# Patient Record
Sex: FEMALE | Race: ASIAN | NOT HISPANIC OR LATINO
[De-identification: names, ages, dates, MRNs, and addresses within clinical notes are randomized per-mention and may not be internally consistent; named-entity substitution may affect disease eponyms.]

---

## 2018-05-01 ENCOUNTER — APPOINTMENT (OUTPATIENT)
Dept: UROLOGY | Facility: CLINIC | Age: 63
End: 2018-05-01
Payer: COMMERCIAL

## 2018-05-01 VITALS
BODY MASS INDEX: 27.79 KG/M2 | HEART RATE: 76 BPM | TEMPERATURE: 98.5 F | WEIGHT: 151 LBS | DIASTOLIC BLOOD PRESSURE: 78 MMHG | HEIGHT: 62 IN | SYSTOLIC BLOOD PRESSURE: 130 MMHG | RESPIRATION RATE: 16 BRPM

## 2018-05-01 DIAGNOSIS — Z86.39 PERSONAL HISTORY OF OTHER ENDOCRINE, NUTRITIONAL AND METABOLIC DISEASE: ICD-10-CM

## 2018-05-01 PROCEDURE — 99204 OFFICE O/P NEW MOD 45 MIN: CPT

## 2018-05-04 LAB
BACTERIA UR CULT: NORMAL
CORE LAB FLUID CYTOLOGY: NORMAL

## 2018-05-06 ENCOUNTER — FORM ENCOUNTER (OUTPATIENT)
Age: 63
End: 2018-05-06

## 2018-05-07 ENCOUNTER — APPOINTMENT (OUTPATIENT)
Dept: UROLOGY | Facility: CLINIC | Age: 63
End: 2018-05-07
Payer: COMMERCIAL

## 2018-05-07 ENCOUNTER — OUTPATIENT (OUTPATIENT)
Dept: OUTPATIENT SERVICES | Facility: HOSPITAL | Age: 63
LOS: 1 days | End: 2018-05-07
Payer: COMMERCIAL

## 2018-05-07 ENCOUNTER — APPOINTMENT (OUTPATIENT)
Dept: CT IMAGING | Facility: IMAGING CENTER | Age: 63
End: 2018-05-07
Payer: COMMERCIAL

## 2018-05-07 VITALS
DIASTOLIC BLOOD PRESSURE: 73 MMHG | SYSTOLIC BLOOD PRESSURE: 113 MMHG | TEMPERATURE: 97.4 F | RESPIRATION RATE: 16 BRPM | HEART RATE: 73 BPM

## 2018-05-07 DIAGNOSIS — Z00.8 ENCOUNTER FOR OTHER GENERAL EXAMINATION: ICD-10-CM

## 2018-05-07 DIAGNOSIS — R35.0 FREQUENCY OF MICTURITION: ICD-10-CM

## 2018-05-07 PROCEDURE — 52000 CYSTOURETHROSCOPY: CPT

## 2018-05-07 PROCEDURE — 74178 CT ABD&PLV WO CNTR FLWD CNTR: CPT | Mod: 26

## 2018-05-07 PROCEDURE — 74178 CT ABD&PLV WO CNTR FLWD CNTR: CPT

## 2018-05-08 DIAGNOSIS — R31.29 OTHER MICROSCOPIC HEMATURIA: ICD-10-CM

## 2018-06-29 ENCOUNTER — APPOINTMENT (OUTPATIENT)
Dept: NEUROLOGY | Facility: CLINIC | Age: 63
End: 2018-06-29
Payer: COMMERCIAL

## 2018-06-29 VITALS
SYSTOLIC BLOOD PRESSURE: 130 MMHG | DIASTOLIC BLOOD PRESSURE: 70 MMHG | HEART RATE: 64 BPM | HEIGHT: 62 IN | WEIGHT: 155 LBS | OXYGEN SATURATION: 98 % | RESPIRATION RATE: 16 BRPM | BODY MASS INDEX: 28.52 KG/M2 | TEMPERATURE: 98.3 F

## 2018-06-29 DIAGNOSIS — G44.039 EPISODIC PAROXYSMAL HEMICRANIA, NOT INTRACTABLE: ICD-10-CM

## 2018-06-29 PROCEDURE — 99244 OFF/OP CNSLTJ NEW/EST MOD 40: CPT

## 2018-06-29 RX ORDER — INDOMETHACIN 25 MG/1
25 CAPSULE ORAL 3 TIMES DAILY
Qty: 90 | Refills: 0 | Status: ACTIVE | COMMUNITY
Start: 2018-06-29 | End: 1900-01-01

## 2019-01-01 ENCOUNTER — APPOINTMENT (OUTPATIENT)
Dept: UROLOGY | Facility: CLINIC | Age: 64
End: 2019-01-01
Payer: COMMERCIAL

## 2019-01-01 DIAGNOSIS — N95.2 POSTMENOPAUSAL ATROPHIC VAGINITIS: ICD-10-CM

## 2019-01-01 DIAGNOSIS — N94.10 UNSPECIFIED DYSPAREUNIA: ICD-10-CM

## 2019-01-01 DIAGNOSIS — R31.29 OTHER MICROSCOPIC HEMATURIA: ICD-10-CM

## 2019-01-01 LAB — URINE CYTOLOGY: NORMAL

## 2019-01-01 PROCEDURE — 99213 OFFICE O/P EST LOW 20 MIN: CPT

## 2019-01-01 RX ORDER — ESTRADIOL 0.1 MG/G
0.1 CREAM VAGINAL
Qty: 1 | Refills: 6 | Status: ACTIVE | COMMUNITY
Start: 2019-01-01 | End: 1900-01-01

## 2019-06-03 PROBLEM — N94.10 DYSPAREUNIA IN FEMALE: Status: ACTIVE | Noted: 2019-01-01

## 2019-06-03 PROBLEM — R31.29 MICROHEMATURIA: Status: ACTIVE | Noted: 2018-05-01

## 2019-06-03 PROBLEM — N95.2 POST-MENOPAUSAL ATROPHIC VAGINITIS: Status: ACTIVE | Noted: 2019-01-01

## 2019-06-03 NOTE — HISTORY OF PRESENT ILLNESS
[FreeTextEntry1] : 65 yo woman with hx of  microhematuria in past - work up negative and had recent UA while in [place] routinely that showed 2-3  RBC and neg cx and Abdominal US that was WNL except fatty liver that she will follow up with her PCP .Voids Q 1 1 /2 TO 2  HOURS day time and nocturia X 3 , drinks close to bedtime and consumes 8 glasses of water, 1 cup tea and 2 cups of  coffee. Reports dyspareunia , naive to therapy, denies personal hx of Uterine/ovarian/breast  cancer.  denies any other obstructive  voiding symptoms. 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

## 2019-06-03 NOTE — ASSESSMENT
[FreeTextEntry1] : \par \par Impression/Plan:63 yo woman with hx of  microhematuria in past - work up negative and had recent UA while in [place] routinely that showed 2-3  RBC and neg cx and Abdominal US that was WNL except fatty liver that she will follow up with her PCP .Voids Q 1 1 /2 TO 2  HOURS day time and nocturia X 3 , drinks close to bedtime and consumes 8 glasses of water, 1 cup tea and 2 cups of  coffee. Reports dyspareunia , naive to therapy, denies personal hx of Uterine/ovarian/breast  cancer.  denies any other obstructive  voiding symptoms. 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

## 2019-06-03 NOTE — PHYSICAL EXAM
[General Appearance - Well Developed] : well developed [General Appearance - Well Nourished] : well nourished [Normal Appearance] : normal appearance [Well Groomed] : well groomed [Abdomen Soft] : soft [General Appearance - In No Acute Distress] : no acute distress [Abdomen Tenderness] : non-tender [Urinary Bladder Findings] : the bladder was normal on palpation [Costovertebral Angle Tenderness] : no ~M costovertebral angle tenderness [] : no respiratory distress [Edema] : no peripheral edema [Oriented To Time, Place, And Person] : oriented to person, place, and time [Not Anxious] : not anxious [Mood] : the mood was normal [Affect] : the affect was normal [Normal Station and Gait] : the gait and station were normal for the patient's age

## 2020-01-01 ENCOUNTER — INPATIENT (INPATIENT)
Facility: HOSPITAL | Age: 65
LOS: 35 days | DRG: 870 | End: 2020-06-03
Attending: STUDENT IN AN ORGANIZED HEALTH CARE EDUCATION/TRAINING PROGRAM | Admitting: HOSPITALIST
Payer: COMMERCIAL

## 2020-01-01 VITALS — RESPIRATION RATE: 32 BRPM

## 2020-01-01 VITALS
HEIGHT: 62 IN | TEMPERATURE: 99 F | RESPIRATION RATE: 18 BRPM | DIASTOLIC BLOOD PRESSURE: 76 MMHG | WEIGHT: 154.98 LBS | SYSTOLIC BLOOD PRESSURE: 132 MMHG | OXYGEN SATURATION: 95 % | HEART RATE: 86 BPM

## 2020-01-01 DIAGNOSIS — U07.1 COVID-19: ICD-10-CM

## 2020-01-01 DIAGNOSIS — Z51.5 ENCOUNTER FOR PALLIATIVE CARE: ICD-10-CM

## 2020-01-01 DIAGNOSIS — Z90.49 ACQUIRED ABSENCE OF OTHER SPECIFIED PARTS OF DIGESTIVE TRACT: Chronic | ICD-10-CM

## 2020-01-01 DIAGNOSIS — Z98.890 OTHER SPECIFIED POSTPROCEDURAL STATES: Chronic | ICD-10-CM

## 2020-01-01 DIAGNOSIS — R53.2 FUNCTIONAL QUADRIPLEGIA: ICD-10-CM

## 2020-01-01 DIAGNOSIS — J96.01 ACUTE RESPIRATORY FAILURE WITH HYPOXIA: ICD-10-CM

## 2020-01-01 DIAGNOSIS — Z29.9 ENCOUNTER FOR PROPHYLACTIC MEASURES, UNSPECIFIED: ICD-10-CM

## 2020-01-01 DIAGNOSIS — R09.02 HYPOXEMIA: ICD-10-CM

## 2020-01-01 DIAGNOSIS — Z71.89 OTHER SPECIFIED COUNSELING: ICD-10-CM

## 2020-01-01 DIAGNOSIS — K13.79 OTHER LESIONS OF ORAL MUCOSA: ICD-10-CM

## 2020-01-01 DIAGNOSIS — J18.9 PNEUMONIA, UNSPECIFIED ORGANISM: ICD-10-CM

## 2020-01-01 LAB
ALBUMIN SERPL ELPH-MCNC: 1.9 G/DL — LOW (ref 3.3–5)
ALBUMIN SERPL ELPH-MCNC: 1.9 G/DL — LOW (ref 3.3–5)
ALBUMIN SERPL ELPH-MCNC: 2 G/DL — LOW (ref 3.3–5)
ALBUMIN SERPL ELPH-MCNC: 2 G/DL — LOW (ref 3.3–5)
ALBUMIN SERPL ELPH-MCNC: 2.1 G/DL — LOW (ref 3.3–5)
ALBUMIN SERPL ELPH-MCNC: 2.2 G/DL — LOW (ref 3.3–5)
ALBUMIN SERPL ELPH-MCNC: 2.3 G/DL — LOW (ref 3.3–5)
ALBUMIN SERPL ELPH-MCNC: 2.4 G/DL — LOW (ref 3.3–5)
ALBUMIN SERPL ELPH-MCNC: 2.4 G/DL — LOW (ref 3.3–5)
ALBUMIN SERPL ELPH-MCNC: 2.5 G/DL — LOW (ref 3.3–5)
ALBUMIN SERPL ELPH-MCNC: 2.6 G/DL — LOW (ref 3.3–5)
ALBUMIN SERPL ELPH-MCNC: 2.7 G/DL — LOW (ref 3.3–5)
ALBUMIN SERPL ELPH-MCNC: 2.8 G/DL — LOW (ref 3.3–5)
ALBUMIN SERPL ELPH-MCNC: 2.8 G/DL — LOW (ref 3.3–5)
ALBUMIN SERPL ELPH-MCNC: 2.9 G/DL — LOW (ref 3.3–5)
ALBUMIN SERPL ELPH-MCNC: 3 G/DL — LOW (ref 3.3–5)
ALBUMIN SERPL ELPH-MCNC: 3 G/DL — LOW (ref 3.3–5)
ALBUMIN SERPL ELPH-MCNC: 3.1 G/DL — LOW (ref 3.3–5)
ALBUMIN SERPL ELPH-MCNC: 3.2 G/DL — LOW (ref 3.3–5)
ALBUMIN SERPL ELPH-MCNC: 3.3 G/DL — SIGNIFICANT CHANGE UP (ref 3.3–5)
ALP SERPL-CCNC: 103 U/L — SIGNIFICANT CHANGE UP (ref 40–120)
ALP SERPL-CCNC: 103 U/L — SIGNIFICANT CHANGE UP (ref 40–120)
ALP SERPL-CCNC: 110 U/L — SIGNIFICANT CHANGE UP (ref 40–120)
ALP SERPL-CCNC: 110 U/L — SIGNIFICANT CHANGE UP (ref 40–120)
ALP SERPL-CCNC: 112 U/L — SIGNIFICANT CHANGE UP (ref 40–120)
ALP SERPL-CCNC: 118 U/L — SIGNIFICANT CHANGE UP (ref 40–120)
ALP SERPL-CCNC: 137 U/L — HIGH (ref 40–120)
ALP SERPL-CCNC: 142 U/L — HIGH (ref 40–120)
ALP SERPL-CCNC: 142 U/L — HIGH (ref 40–120)
ALP SERPL-CCNC: 145 U/L — HIGH (ref 40–120)
ALP SERPL-CCNC: 148 U/L — HIGH (ref 40–120)
ALP SERPL-CCNC: 149 U/L — HIGH (ref 40–120)
ALP SERPL-CCNC: 169 U/L — HIGH (ref 40–120)
ALP SERPL-CCNC: 176 U/L — HIGH (ref 40–120)
ALP SERPL-CCNC: 179 U/L — HIGH (ref 40–120)
ALP SERPL-CCNC: 208 U/L — HIGH (ref 40–120)
ALP SERPL-CCNC: 218 U/L — HIGH (ref 40–120)
ALP SERPL-CCNC: 221 U/L — HIGH (ref 40–120)
ALP SERPL-CCNC: 231 U/L — HIGH (ref 40–120)
ALP SERPL-CCNC: 248 U/L — HIGH (ref 40–120)
ALP SERPL-CCNC: 250 U/L — HIGH (ref 40–120)
ALP SERPL-CCNC: 263 U/L — HIGH (ref 40–120)
ALP SERPL-CCNC: 265 U/L — HIGH (ref 40–120)
ALP SERPL-CCNC: 270 U/L — HIGH (ref 40–120)
ALP SERPL-CCNC: 277 U/L — HIGH (ref 40–120)
ALP SERPL-CCNC: 282 U/L — HIGH (ref 40–120)
ALP SERPL-CCNC: 294 U/L — HIGH (ref 40–120)
ALP SERPL-CCNC: 295 U/L — HIGH (ref 40–120)
ALP SERPL-CCNC: 304 U/L — HIGH (ref 40–120)
ALP SERPL-CCNC: 311 U/L — HIGH (ref 40–120)
ALP SERPL-CCNC: 315 U/L — HIGH (ref 40–120)
ALP SERPL-CCNC: 321 U/L — HIGH (ref 40–120)
ALP SERPL-CCNC: 322 U/L — HIGH (ref 40–120)
ALP SERPL-CCNC: 331 U/L — HIGH (ref 40–120)
ALP SERPL-CCNC: 343 U/L — HIGH (ref 40–120)
ALP SERPL-CCNC: 358 U/L — HIGH (ref 40–120)
ALP SERPL-CCNC: 386 U/L — HIGH (ref 40–120)
ALP SERPL-CCNC: 395 U/L — HIGH (ref 40–120)
ALT FLD-CCNC: 105 U/L — HIGH (ref 10–45)
ALT FLD-CCNC: 105 U/L — HIGH (ref 10–45)
ALT FLD-CCNC: 106 U/L — HIGH (ref 10–45)
ALT FLD-CCNC: 122 U/L — HIGH (ref 10–45)
ALT FLD-CCNC: 128 U/L — HIGH (ref 10–45)
ALT FLD-CCNC: 128 U/L — HIGH (ref 10–45)
ALT FLD-CCNC: 129 U/L — HIGH (ref 10–45)
ALT FLD-CCNC: 132 U/L — HIGH (ref 10–45)
ALT FLD-CCNC: 136 U/L — HIGH (ref 10–45)
ALT FLD-CCNC: 147 U/L — HIGH (ref 10–45)
ALT FLD-CCNC: 160 U/L — HIGH (ref 10–45)
ALT FLD-CCNC: 165 U/L — HIGH (ref 10–45)
ALT FLD-CCNC: 193 U/L — HIGH (ref 10–45)
ALT FLD-CCNC: 43 U/L — SIGNIFICANT CHANGE UP (ref 10–45)
ALT FLD-CCNC: 44 U/L — SIGNIFICANT CHANGE UP (ref 10–45)
ALT FLD-CCNC: 48 U/L — HIGH (ref 10–45)
ALT FLD-CCNC: 49 U/L — HIGH (ref 10–45)
ALT FLD-CCNC: 50 U/L — HIGH (ref 10–45)
ALT FLD-CCNC: 51 U/L — HIGH (ref 10–45)
ALT FLD-CCNC: 52 U/L — HIGH (ref 10–45)
ALT FLD-CCNC: 54 U/L — HIGH (ref 10–45)
ALT FLD-CCNC: 56 U/L — HIGH (ref 10–45)
ALT FLD-CCNC: 57 U/L — HIGH (ref 10–45)
ALT FLD-CCNC: 59 U/L — HIGH (ref 10–45)
ALT FLD-CCNC: 60 U/L — HIGH (ref 10–45)
ALT FLD-CCNC: 61 U/L — HIGH (ref 10–45)
ALT FLD-CCNC: 65 U/L — HIGH (ref 10–45)
ALT FLD-CCNC: 72 U/L — HIGH (ref 10–45)
ALT FLD-CCNC: 72 U/L — HIGH (ref 10–45)
ALT FLD-CCNC: 74 U/L — HIGH (ref 10–45)
ALT FLD-CCNC: 74 U/L — HIGH (ref 10–45)
ALT FLD-CCNC: 77 U/L — HIGH (ref 10–45)
ALT FLD-CCNC: 77 U/L — HIGH (ref 10–45)
ALT FLD-CCNC: 80 U/L — HIGH (ref 10–45)
ALT FLD-CCNC: 82 U/L — HIGH (ref 10–45)
ALT FLD-CCNC: 87 U/L — HIGH (ref 10–45)
ALT FLD-CCNC: 90 U/L — HIGH (ref 10–45)
ALT FLD-CCNC: 92 U/L — HIGH (ref 10–45)
AMYLASE P1 CFR SERPL: 43 U/L — SIGNIFICANT CHANGE UP (ref 25–125)
ANION GAP SERPL CALC-SCNC: 10 MMOL/L — SIGNIFICANT CHANGE UP (ref 5–17)
ANION GAP SERPL CALC-SCNC: 10 MMOL/L — SIGNIFICANT CHANGE UP (ref 5–17)
ANION GAP SERPL CALC-SCNC: 11 MMOL/L — SIGNIFICANT CHANGE UP (ref 5–17)
ANION GAP SERPL CALC-SCNC: 12 MMOL/L — SIGNIFICANT CHANGE UP (ref 5–17)
ANION GAP SERPL CALC-SCNC: 13 MMOL/L — SIGNIFICANT CHANGE UP (ref 5–17)
ANION GAP SERPL CALC-SCNC: 14 MMOL/L — SIGNIFICANT CHANGE UP (ref 5–17)
ANION GAP SERPL CALC-SCNC: 14 MMOL/L — SIGNIFICANT CHANGE UP (ref 5–17)
ANION GAP SERPL CALC-SCNC: 15 MMOL/L — SIGNIFICANT CHANGE UP (ref 5–17)
ANION GAP SERPL CALC-SCNC: 16 MMOL/L — SIGNIFICANT CHANGE UP (ref 5–17)
ANION GAP SERPL CALC-SCNC: 20 MMOL/L — HIGH (ref 5–17)
ANION GAP SERPL CALC-SCNC: 3 MMOL/L — LOW (ref 5–17)
ANION GAP SERPL CALC-SCNC: 3 MMOL/L — LOW (ref 5–17)
ANION GAP SERPL CALC-SCNC: 4 MMOL/L — LOW (ref 5–17)
ANION GAP SERPL CALC-SCNC: 4 MMOL/L — LOW (ref 5–17)
ANION GAP SERPL CALC-SCNC: 5 MMOL/L — SIGNIFICANT CHANGE UP (ref 5–17)
ANION GAP SERPL CALC-SCNC: 6 MMOL/L — SIGNIFICANT CHANGE UP (ref 5–17)
ANION GAP SERPL CALC-SCNC: 7 MMOL/L — SIGNIFICANT CHANGE UP (ref 5–17)
ANION GAP SERPL CALC-SCNC: 8 MMOL/L — SIGNIFICANT CHANGE UP (ref 5–17)
ANION GAP SERPL CALC-SCNC: 9 MMOL/L — SIGNIFICANT CHANGE UP (ref 5–17)
ANION GAP SERPL CALC-SCNC: SIGNIFICANT CHANGE UP MMOL/L (ref 5–17)
ANION GAP SERPL CALC-SCNC: SIGNIFICANT CHANGE UP MMOL/L (ref 5–17)
APPEARANCE UR: ABNORMAL
APTT BLD: 131.4 SEC — CRITICAL HIGH (ref 27.5–36.3)
APTT BLD: 155.7 SEC — CRITICAL HIGH (ref 27.5–36.3)
APTT BLD: 25.8 SEC — LOW (ref 27.5–36.3)
APTT BLD: 27.6 SEC — SIGNIFICANT CHANGE UP (ref 27.5–36.3)
APTT BLD: 28.9 SEC — SIGNIFICANT CHANGE UP (ref 27.5–36.3)
APTT BLD: 30.9 SEC — SIGNIFICANT CHANGE UP (ref 27.5–36.3)
APTT BLD: 31 SEC — SIGNIFICANT CHANGE UP (ref 27.5–36.3)
APTT BLD: 40.1 SEC — HIGH (ref 27.5–36.3)
APTT BLD: 50.5 SEC — HIGH (ref 27.5–36.3)
APTT BLD: 52.1 SEC — HIGH (ref 27.5–36.3)
APTT BLD: 52.2 SEC — HIGH (ref 27.5–36.3)
APTT BLD: 52.6 SEC — HIGH (ref 27.5–36.3)
APTT BLD: 55.1 SEC — HIGH (ref 27.5–36.3)
APTT BLD: 56.5 SEC — HIGH (ref 27.5–36.3)
APTT BLD: 56.9 SEC — HIGH (ref 27.5–36.3)
APTT BLD: 57.1 SEC — HIGH (ref 27.5–36.3)
APTT BLD: 58 SEC — HIGH (ref 27.5–36.3)
APTT BLD: 60.7 SEC — HIGH (ref 27.5–36.3)
APTT BLD: 61.4 SEC — HIGH (ref 27.5–36.3)
APTT BLD: 61.9 SEC — HIGH (ref 27.5–36.3)
APTT BLD: 63.4 SEC — HIGH (ref 27.5–36.3)
APTT BLD: 63.7 SEC — HIGH (ref 27.5–36.3)
APTT BLD: 66.4 SEC — HIGH (ref 27.5–36.3)
APTT BLD: 67.6 SEC — HIGH (ref 27.5–36.3)
APTT BLD: 68.4 SEC — HIGH (ref 27.5–36.3)
APTT BLD: 68.6 SEC — HIGH (ref 27.5–36.3)
APTT BLD: 68.9 SEC — HIGH (ref 27.5–36.3)
APTT BLD: 69.4 SEC — HIGH (ref 27.5–36.3)
APTT BLD: 70.2 SEC — HIGH (ref 27.5–36.3)
APTT BLD: 71.9 SEC — HIGH (ref 27.5–36.3)
APTT BLD: 74.4 SEC — HIGH (ref 27.5–36.3)
APTT BLD: 75.8 SEC — HIGH (ref 27.5–36.3)
APTT BLD: 76.7 SEC — HIGH (ref 27.5–36.3)
APTT BLD: 78.2 SEC — HIGH (ref 27.5–36.3)
APTT BLD: 78.3 SEC — HIGH (ref 27.5–36.3)
APTT BLD: 79.1 SEC — HIGH (ref 27.5–36.3)
APTT BLD: 79.3 SEC — HIGH (ref 27.5–36.3)
APTT BLD: 79.3 SEC — HIGH (ref 27.5–36.3)
APTT BLD: 79.4 SEC — HIGH (ref 27.5–36.3)
APTT BLD: 81 SEC — HIGH (ref 27.5–36.3)
APTT BLD: 81.9 SEC — HIGH (ref 27.5–36.3)
APTT BLD: 83.2 SEC — HIGH (ref 27.5–36.3)
APTT BLD: 83.3 SEC — HIGH (ref 27.5–36.3)
APTT BLD: 83.5 SEC — HIGH (ref 27.5–36.3)
APTT BLD: 94.1 SEC — HIGH (ref 27.5–36.3)
APTT BLD: 96.8 SEC — HIGH (ref 27.5–36.3)
APTT BLD: 96.8 SEC — HIGH (ref 27.5–36.3)
AST SERPL-CCNC: 100 U/L — HIGH (ref 10–40)
AST SERPL-CCNC: 101 U/L — HIGH (ref 10–40)
AST SERPL-CCNC: 103 U/L — HIGH (ref 10–40)
AST SERPL-CCNC: 110 U/L — HIGH (ref 10–40)
AST SERPL-CCNC: 115 U/L — HIGH (ref 10–40)
AST SERPL-CCNC: 124 U/L — HIGH (ref 10–40)
AST SERPL-CCNC: 126 U/L — HIGH (ref 10–40)
AST SERPL-CCNC: 134 U/L — HIGH (ref 10–40)
AST SERPL-CCNC: 138 U/L — HIGH (ref 10–40)
AST SERPL-CCNC: 138 U/L — HIGH (ref 10–40)
AST SERPL-CCNC: 147 U/L — HIGH (ref 10–40)
AST SERPL-CCNC: 156 U/L — HIGH (ref 10–40)
AST SERPL-CCNC: 156 U/L — HIGH (ref 10–40)
AST SERPL-CCNC: 41 U/L — HIGH (ref 10–40)
AST SERPL-CCNC: 44 U/L — HIGH (ref 10–40)
AST SERPL-CCNC: 48 U/L — HIGH (ref 10–40)
AST SERPL-CCNC: 49 U/L — HIGH (ref 10–40)
AST SERPL-CCNC: 56 U/L — HIGH (ref 10–40)
AST SERPL-CCNC: 56 U/L — HIGH (ref 10–40)
AST SERPL-CCNC: 61 U/L — HIGH (ref 10–40)
AST SERPL-CCNC: 64 U/L — HIGH (ref 10–40)
AST SERPL-CCNC: 67 U/L — HIGH (ref 10–40)
AST SERPL-CCNC: 70 U/L — HIGH (ref 10–40)
AST SERPL-CCNC: 71 U/L — HIGH (ref 10–40)
AST SERPL-CCNC: 71 U/L — HIGH (ref 10–40)
AST SERPL-CCNC: 74 U/L — HIGH (ref 10–40)
AST SERPL-CCNC: 75 U/L — HIGH (ref 10–40)
AST SERPL-CCNC: 77 U/L — HIGH (ref 10–40)
AST SERPL-CCNC: 78 U/L — HIGH (ref 10–40)
AST SERPL-CCNC: 80 U/L — HIGH (ref 10–40)
AST SERPL-CCNC: 85 U/L — HIGH (ref 10–40)
AST SERPL-CCNC: 87 U/L — HIGH (ref 10–40)
AST SERPL-CCNC: 88 U/L — HIGH (ref 10–40)
AST SERPL-CCNC: 91 U/L — HIGH (ref 10–40)
AST SERPL-CCNC: 95 U/L — HIGH (ref 10–40)
BACTERIA # UR AUTO: ABNORMAL
BACTERIA # UR AUTO: NEGATIVE — SIGNIFICANT CHANGE UP
BACTERIA # UR AUTO: NEGATIVE — SIGNIFICANT CHANGE UP
BASE EXCESS BLDA CALC-SCNC: -0.4 MMOL/L — SIGNIFICANT CHANGE UP (ref -2–2)
BASE EXCESS BLDA CALC-SCNC: 12.5 MMOL/L — HIGH (ref -2–2)
BASE EXCESS BLDA CALC-SCNC: 12.8 MMOL/L — HIGH (ref -2–2)
BASE EXCESS BLDA CALC-SCNC: 14.4 MMOL/L — HIGH (ref -2–2)
BASE EXCESS BLDA CALC-SCNC: 17.7 MMOL/L — HIGH (ref -2–2)
BASE EXCESS BLDA CALC-SCNC: 18.9 MMOL/L — HIGH (ref -2–2)
BASE EXCESS BLDA CALC-SCNC: 18.9 MMOL/L — HIGH (ref -2–2)
BASE EXCESS BLDA CALC-SCNC: 19.4 MMOL/L — HIGH (ref -2–2)
BASE EXCESS BLDA CALC-SCNC: 19.9 MMOL/L — HIGH (ref -2–2)
BASE EXCESS BLDA CALC-SCNC: 21.2 MMOL/L — HIGH (ref -2–2)
BASE EXCESS BLDA CALC-SCNC: 23 MMOL/L — HIGH (ref -2–2)
BASE EXCESS BLDA CALC-SCNC: 23.5 MMOL/L — HIGH (ref -2–2)
BASE EXCESS BLDA CALC-SCNC: 24.8 MMOL/L — HIGH (ref -2–2)
BASE EXCESS BLDA CALC-SCNC: 3.6 MMOL/L — HIGH (ref -2–2)
BASE EXCESS BLDA CALC-SCNC: 7.2 MMOL/L — HIGH (ref -2–2)
BASE EXCESS BLDA CALC-SCNC: 9.7 MMOL/L — HIGH (ref -2–2)
BASE EXCESS BLDA CALC-SCNC: SIGNIFICANT CHANGE UP MMOL/L (ref -2–2)
BASE EXCESS BLDA CALC-SCNC: SIGNIFICANT CHANGE UP MMOL/L (ref -2–2)
BASE EXCESS BLDV CALC-SCNC: 6.2 MMOL/L — HIGH (ref -2–2)
BASOPHILS # BLD AUTO: 0 K/UL — SIGNIFICANT CHANGE UP (ref 0–0.2)
BASOPHILS # BLD AUTO: 0 K/UL — SIGNIFICANT CHANGE UP (ref 0–0.2)
BASOPHILS # BLD AUTO: 0.03 K/UL — SIGNIFICANT CHANGE UP (ref 0–0.2)
BASOPHILS # BLD AUTO: 0.04 K/UL — SIGNIFICANT CHANGE UP (ref 0–0.2)
BASOPHILS # BLD AUTO: 0.05 K/UL — SIGNIFICANT CHANGE UP (ref 0–0.2)
BASOPHILS # BLD AUTO: 0.07 K/UL — SIGNIFICANT CHANGE UP (ref 0–0.2)
BASOPHILS # BLD AUTO: 0.13 K/UL — SIGNIFICANT CHANGE UP (ref 0–0.2)
BASOPHILS # BLD AUTO: 0.14 K/UL — SIGNIFICANT CHANGE UP (ref 0–0.2)
BASOPHILS # BLD AUTO: 0.17 K/UL — SIGNIFICANT CHANGE UP (ref 0–0.2)
BASOPHILS NFR BLD AUTO: 0 % — SIGNIFICANT CHANGE UP (ref 0–2)
BASOPHILS NFR BLD AUTO: 0 % — SIGNIFICANT CHANGE UP (ref 0–2)
BASOPHILS NFR BLD AUTO: 0.2 % — SIGNIFICANT CHANGE UP (ref 0–2)
BASOPHILS NFR BLD AUTO: 0.3 % — SIGNIFICANT CHANGE UP (ref 0–2)
BASOPHILS NFR BLD AUTO: 0.4 % — SIGNIFICANT CHANGE UP (ref 0–2)
BASOPHILS NFR BLD AUTO: 0.4 % — SIGNIFICANT CHANGE UP (ref 0–2)
BASOPHILS NFR BLD AUTO: 0.5 % — SIGNIFICANT CHANGE UP (ref 0–2)
BASOPHILS NFR BLD AUTO: 0.7 % — SIGNIFICANT CHANGE UP (ref 0–2)
BILIRUB SERPL-MCNC: 0.2 MG/DL — SIGNIFICANT CHANGE UP (ref 0.2–1.2)
BILIRUB SERPL-MCNC: 0.3 MG/DL — SIGNIFICANT CHANGE UP (ref 0.2–1.2)
BILIRUB SERPL-MCNC: 0.4 MG/DL — SIGNIFICANT CHANGE UP (ref 0.2–1.2)
BILIRUB SERPL-MCNC: 0.5 MG/DL — SIGNIFICANT CHANGE UP (ref 0.2–1.2)
BILIRUB SERPL-MCNC: 0.6 MG/DL — SIGNIFICANT CHANGE UP (ref 0.2–1.2)
BILIRUB SERPL-MCNC: 0.7 MG/DL — SIGNIFICANT CHANGE UP (ref 0.2–1.2)
BILIRUB SERPL-MCNC: 0.7 MG/DL — SIGNIFICANT CHANGE UP (ref 0.2–1.2)
BILIRUB SERPL-MCNC: 0.8 MG/DL — SIGNIFICANT CHANGE UP (ref 0.2–1.2)
BILIRUB UR-MCNC: ABNORMAL
BILIRUB UR-MCNC: ABNORMAL
BILIRUB UR-MCNC: NEGATIVE — SIGNIFICANT CHANGE UP
BLD GP AB SCN SERPL QL: NEGATIVE — SIGNIFICANT CHANGE UP
BUN SERPL-MCNC: 10 MG/DL — SIGNIFICANT CHANGE UP (ref 7–23)
BUN SERPL-MCNC: 10 MG/DL — SIGNIFICANT CHANGE UP (ref 7–23)
BUN SERPL-MCNC: 11 MG/DL — SIGNIFICANT CHANGE UP (ref 7–23)
BUN SERPL-MCNC: 12 MG/DL — SIGNIFICANT CHANGE UP (ref 7–23)
BUN SERPL-MCNC: 13 MG/DL — SIGNIFICANT CHANGE UP (ref 7–23)
BUN SERPL-MCNC: 14 MG/DL — SIGNIFICANT CHANGE UP (ref 7–23)
BUN SERPL-MCNC: 16 MG/DL — SIGNIFICANT CHANGE UP (ref 7–23)
BUN SERPL-MCNC: 17 MG/DL — SIGNIFICANT CHANGE UP (ref 7–23)
BUN SERPL-MCNC: 18 MG/DL — SIGNIFICANT CHANGE UP (ref 7–23)
BUN SERPL-MCNC: 19 MG/DL — SIGNIFICANT CHANGE UP (ref 7–23)
BUN SERPL-MCNC: 20 MG/DL — SIGNIFICANT CHANGE UP (ref 7–23)
BUN SERPL-MCNC: 21 MG/DL — SIGNIFICANT CHANGE UP (ref 7–23)
BUN SERPL-MCNC: 22 MG/DL — SIGNIFICANT CHANGE UP (ref 7–23)
BUN SERPL-MCNC: 25 MG/DL — HIGH (ref 7–23)
BUN SERPL-MCNC: 26 MG/DL — HIGH (ref 7–23)
BUN SERPL-MCNC: 26 MG/DL — HIGH (ref 7–23)
BUN SERPL-MCNC: 27 MG/DL — HIGH (ref 7–23)
BUN SERPL-MCNC: 28 MG/DL — HIGH (ref 7–23)
BUN SERPL-MCNC: 28 MG/DL — HIGH (ref 7–23)
BUN SERPL-MCNC: 29 MG/DL — HIGH (ref 7–23)
BUN SERPL-MCNC: 30 MG/DL — HIGH (ref 7–23)
BUN SERPL-MCNC: 31 MG/DL — HIGH (ref 7–23)
BUN SERPL-MCNC: 33 MG/DL — HIGH (ref 7–23)
BUN SERPL-MCNC: 34 MG/DL — HIGH (ref 7–23)
BUN SERPL-MCNC: 35 MG/DL — HIGH (ref 7–23)
BUN SERPL-MCNC: 35 MG/DL — HIGH (ref 7–23)
BUN SERPL-MCNC: 41 MG/DL — HIGH (ref 7–23)
BUN SERPL-MCNC: 41 MG/DL — HIGH (ref 7–23)
BUN SERPL-MCNC: 48 MG/DL — HIGH (ref 7–23)
BUN SERPL-MCNC: 54 MG/DL — HIGH (ref 7–23)
BUN SERPL-MCNC: 57 MG/DL — HIGH (ref 7–23)
BUN SERPL-MCNC: 61 MG/DL — HIGH (ref 7–23)
BUN SERPL-MCNC: 7 MG/DL — SIGNIFICANT CHANGE UP (ref 7–23)
BUN SERPL-MCNC: 7 MG/DL — SIGNIFICANT CHANGE UP (ref 7–23)
BUN SERPL-MCNC: 8 MG/DL — SIGNIFICANT CHANGE UP (ref 7–23)
BUN SERPL-MCNC: 9 MG/DL — SIGNIFICANT CHANGE UP (ref 7–23)
C DIFF GDH STL QL: NEGATIVE — SIGNIFICANT CHANGE UP
C DIFF GDH STL QL: SIGNIFICANT CHANGE UP
CA-I SERPL-SCNC: 1.15 MMOL/L — SIGNIFICANT CHANGE UP (ref 1.12–1.3)
CALCIUM SERPL-MCNC: 7.4 MG/DL — LOW (ref 8.4–10.5)
CALCIUM SERPL-MCNC: 7.5 MG/DL — LOW (ref 8.4–10.5)
CALCIUM SERPL-MCNC: 7.5 MG/DL — LOW (ref 8.4–10.5)
CALCIUM SERPL-MCNC: 7.6 MG/DL — LOW (ref 8.4–10.5)
CALCIUM SERPL-MCNC: 7.6 MG/DL — LOW (ref 8.4–10.5)
CALCIUM SERPL-MCNC: 7.7 MG/DL — LOW (ref 8.4–10.5)
CALCIUM SERPL-MCNC: 7.8 MG/DL — LOW (ref 8.4–10.5)
CALCIUM SERPL-MCNC: 7.9 MG/DL — LOW (ref 8.4–10.5)
CALCIUM SERPL-MCNC: 8 MG/DL — LOW (ref 8.4–10.5)
CALCIUM SERPL-MCNC: 8.1 MG/DL — LOW (ref 8.4–10.5)
CALCIUM SERPL-MCNC: 8.2 MG/DL — LOW (ref 8.4–10.5)
CALCIUM SERPL-MCNC: 8.2 MG/DL — LOW (ref 8.4–10.5)
CALCIUM SERPL-MCNC: 8.3 MG/DL — LOW (ref 8.4–10.5)
CALCIUM SERPL-MCNC: 8.4 MG/DL — SIGNIFICANT CHANGE UP (ref 8.4–10.5)
CALCIUM SERPL-MCNC: 8.5 MG/DL — SIGNIFICANT CHANGE UP (ref 8.4–10.5)
CALCIUM SERPL-MCNC: 8.6 MG/DL — SIGNIFICANT CHANGE UP (ref 8.4–10.5)
CALCIUM SERPL-MCNC: 8.7 MG/DL — SIGNIFICANT CHANGE UP (ref 8.4–10.5)
CALCIUM SERPL-MCNC: 8.8 MG/DL — SIGNIFICANT CHANGE UP (ref 8.4–10.5)
CALCIUM SERPL-MCNC: 8.9 MG/DL — SIGNIFICANT CHANGE UP (ref 8.4–10.5)
CALCIUM SERPL-MCNC: 8.9 MG/DL — SIGNIFICANT CHANGE UP (ref 8.4–10.5)
CALCIUM UR-MCNC: 4.1 MG/DL — SIGNIFICANT CHANGE UP
CHLORIDE BLDV-SCNC: 104 MMOL/L — SIGNIFICANT CHANGE UP (ref 96–108)
CHLORIDE SERPL-SCNC: 100 MMOL/L — SIGNIFICANT CHANGE UP (ref 96–108)
CHLORIDE SERPL-SCNC: 100 MMOL/L — SIGNIFICANT CHANGE UP (ref 96–108)
CHLORIDE SERPL-SCNC: 101 MMOL/L — SIGNIFICANT CHANGE UP (ref 96–108)
CHLORIDE SERPL-SCNC: 102 MMOL/L — SIGNIFICANT CHANGE UP (ref 96–108)
CHLORIDE SERPL-SCNC: 102 MMOL/L — SIGNIFICANT CHANGE UP (ref 96–108)
CHLORIDE SERPL-SCNC: 103 MMOL/L — SIGNIFICANT CHANGE UP (ref 96–108)
CHLORIDE SERPL-SCNC: 105 MMOL/L — SIGNIFICANT CHANGE UP (ref 96–108)
CHLORIDE SERPL-SCNC: 106 MMOL/L — SIGNIFICANT CHANGE UP (ref 96–108)
CHLORIDE SERPL-SCNC: 107 MMOL/L — SIGNIFICANT CHANGE UP (ref 96–108)
CHLORIDE SERPL-SCNC: 109 MMOL/L — HIGH (ref 96–108)
CHLORIDE SERPL-SCNC: 109 MMOL/L — HIGH (ref 96–108)
CHLORIDE SERPL-SCNC: 110 MMOL/L — HIGH (ref 96–108)
CHLORIDE SERPL-SCNC: 85 MMOL/L — LOW (ref 96–108)
CHLORIDE SERPL-SCNC: 85 MMOL/L — LOW (ref 96–108)
CHLORIDE SERPL-SCNC: 86 MMOL/L — LOW (ref 96–108)
CHLORIDE SERPL-SCNC: 87 MMOL/L — LOW (ref 96–108)
CHLORIDE SERPL-SCNC: 88 MMOL/L — LOW (ref 96–108)
CHLORIDE SERPL-SCNC: 89 MMOL/L — LOW (ref 96–108)
CHLORIDE SERPL-SCNC: 90 MMOL/L — LOW (ref 96–108)
CHLORIDE SERPL-SCNC: 91 MMOL/L — LOW (ref 96–108)
CHLORIDE SERPL-SCNC: 92 MMOL/L — LOW (ref 96–108)
CHLORIDE SERPL-SCNC: 93 MMOL/L — LOW (ref 96–108)
CHLORIDE SERPL-SCNC: 95 MMOL/L — LOW (ref 96–108)
CHLORIDE SERPL-SCNC: 95 MMOL/L — LOW (ref 96–108)
CHLORIDE SERPL-SCNC: 96 MMOL/L — SIGNIFICANT CHANGE UP (ref 96–108)
CHLORIDE SERPL-SCNC: 96 MMOL/L — SIGNIFICANT CHANGE UP (ref 96–108)
CHLORIDE SERPL-SCNC: 97 MMOL/L — SIGNIFICANT CHANGE UP (ref 96–108)
CHLORIDE SERPL-SCNC: 98 MMOL/L — SIGNIFICANT CHANGE UP (ref 96–108)
CHLORIDE SERPL-SCNC: 98 MMOL/L — SIGNIFICANT CHANGE UP (ref 96–108)
CHLORIDE SERPL-SCNC: 99 MMOL/L — SIGNIFICANT CHANGE UP (ref 96–108)
CHLORIDE SERPL-SCNC: 99 MMOL/L — SIGNIFICANT CHANGE UP (ref 96–108)
CHLORIDE UR-SCNC: 53 MMOL/L — SIGNIFICANT CHANGE UP
CK SERPL-CCNC: 114 U/L — SIGNIFICANT CHANGE UP (ref 25–170)
CK SERPL-CCNC: 117 U/L — SIGNIFICANT CHANGE UP (ref 25–170)
CK SERPL-CCNC: 132 U/L — SIGNIFICANT CHANGE UP (ref 25–170)
CK SERPL-CCNC: 148 U/L — SIGNIFICANT CHANGE UP (ref 25–170)
CK SERPL-CCNC: 162 U/L — SIGNIFICANT CHANGE UP (ref 25–170)
CK SERPL-CCNC: 205 U/L — HIGH (ref 25–170)
CK SERPL-CCNC: 213 U/L — HIGH (ref 25–170)
CK SERPL-CCNC: 301 U/L — HIGH (ref 25–170)
CK SERPL-CCNC: 34 U/L — SIGNIFICANT CHANGE UP (ref 25–170)
CK SERPL-CCNC: 389 U/L — HIGH (ref 25–170)
CK SERPL-CCNC: 41 U/L — SIGNIFICANT CHANGE UP (ref 25–170)
CK SERPL-CCNC: 416 U/L — HIGH (ref 25–170)
CK SERPL-CCNC: 433 U/L — HIGH (ref 25–170)
CK SERPL-CCNC: 48 U/L — SIGNIFICANT CHANGE UP (ref 25–170)
CK SERPL-CCNC: 49 U/L — SIGNIFICANT CHANGE UP (ref 25–170)
CK SERPL-CCNC: 50 U/L — SIGNIFICANT CHANGE UP (ref 25–170)
CK SERPL-CCNC: 54 U/L — SIGNIFICANT CHANGE UP (ref 25–170)
CK SERPL-CCNC: 543 U/L — HIGH (ref 25–170)
CK SERPL-CCNC: 55 U/L — SIGNIFICANT CHANGE UP (ref 25–170)
CK SERPL-CCNC: 617 U/L — HIGH (ref 25–170)
CK SERPL-CCNC: 62 U/L — SIGNIFICANT CHANGE UP (ref 25–170)
CK SERPL-CCNC: 63 U/L — SIGNIFICANT CHANGE UP (ref 25–170)
CK SERPL-CCNC: 64 U/L — SIGNIFICANT CHANGE UP (ref 25–170)
CK SERPL-CCNC: 76 U/L — SIGNIFICANT CHANGE UP (ref 25–170)
CK SERPL-CCNC: 79 U/L — SIGNIFICANT CHANGE UP (ref 25–170)
CK SERPL-CCNC: 80 U/L — SIGNIFICANT CHANGE UP (ref 25–170)
CK SERPL-CCNC: 94 U/L — SIGNIFICANT CHANGE UP (ref 25–170)
CK SERPL-CCNC: 97 U/L — SIGNIFICANT CHANGE UP (ref 25–170)
CO2 BLDA-SCNC: 30 MMOL/L — SIGNIFICANT CHANGE UP (ref 22–30)
CO2 BLDA-SCNC: 33 MMOL/L — HIGH (ref 22–30)
CO2 BLDA-SCNC: 37 MMOL/L — HIGH (ref 22–30)
CO2 BLDA-SCNC: 37 MMOL/L — HIGH (ref 22–30)
CO2 BLDA-SCNC: 40 MMOL/L — HIGH (ref 22–30)
CO2 BLDA-SCNC: 40 MMOL/L — HIGH (ref 22–30)
CO2 BLDA-SCNC: 46 MMOL/L — HIGH (ref 22–30)
CO2 BLDA-SCNC: 49 MMOL/L — HIGH (ref 22–30)
CO2 BLDA-SCNC: 50 MMOL/L — HIGH (ref 22–30)
CO2 BLDA-SCNC: 51 MMOL/L — HIGH (ref 22–30)
CO2 BLDA-SCNC: 53 MMOL/L — HIGH (ref 22–30)
CO2 BLDA-SCNC: 53 MMOL/L — HIGH (ref 22–30)
CO2 BLDA-SCNC: 55 MMOL/L — HIGH (ref 22–30)
CO2 BLDA-SCNC: 56 MMOL/L — HIGH (ref 22–30)
CO2 BLDA-SCNC: 72 MMOL/L — HIGH (ref 22–30)
CO2 BLDA-SCNC: 72 MMOL/L — HIGH (ref 22–30)
CO2 BLDV-SCNC: 35 MMOL/L — HIGH (ref 22–30)
CO2 SERPL-SCNC: 22 MMOL/L — SIGNIFICANT CHANGE UP (ref 22–31)
CO2 SERPL-SCNC: 23 MMOL/L — SIGNIFICANT CHANGE UP (ref 22–31)
CO2 SERPL-SCNC: 24 MMOL/L — SIGNIFICANT CHANGE UP (ref 22–31)
CO2 SERPL-SCNC: 25 MMOL/L — SIGNIFICANT CHANGE UP (ref 22–31)
CO2 SERPL-SCNC: 26 MMOL/L — SIGNIFICANT CHANGE UP (ref 22–31)
CO2 SERPL-SCNC: 27 MMOL/L — SIGNIFICANT CHANGE UP (ref 22–31)
CO2 SERPL-SCNC: 28 MMOL/L — SIGNIFICANT CHANGE UP (ref 22–31)
CO2 SERPL-SCNC: 29 MMOL/L — SIGNIFICANT CHANGE UP (ref 22–31)
CO2 SERPL-SCNC: 30 MMOL/L — SIGNIFICANT CHANGE UP (ref 22–31)
CO2 SERPL-SCNC: 31 MMOL/L — SIGNIFICANT CHANGE UP (ref 22–31)
CO2 SERPL-SCNC: 32 MMOL/L — HIGH (ref 22–31)
CO2 SERPL-SCNC: 33 MMOL/L — HIGH (ref 22–31)
CO2 SERPL-SCNC: 34 MMOL/L — HIGH (ref 22–31)
CO2 SERPL-SCNC: 35 MMOL/L — HIGH (ref 22–31)
CO2 SERPL-SCNC: 36 MMOL/L — HIGH (ref 22–31)
CO2 SERPL-SCNC: 36 MMOL/L — HIGH (ref 22–31)
CO2 SERPL-SCNC: 37 MMOL/L — HIGH (ref 22–31)
CO2 SERPL-SCNC: 38 MMOL/L — HIGH (ref 22–31)
CO2 SERPL-SCNC: 39 MMOL/L — HIGH (ref 22–31)
CO2 SERPL-SCNC: 40 MMOL/L — HIGH (ref 22–31)
CO2 SERPL-SCNC: 41 MMOL/L — HIGH (ref 22–31)
CO2 SERPL-SCNC: 42 MMOL/L — HIGH (ref 22–31)
CO2 SERPL-SCNC: 43 MMOL/L — HIGH (ref 22–31)
CO2 SERPL-SCNC: 44 MMOL/L — HIGH (ref 22–31)
CO2 SERPL-SCNC: 44 MMOL/L — HIGH (ref 22–31)
CO2 SERPL-SCNC: 45 MMOL/L — CRITICAL HIGH (ref 22–31)
CO2 SERPL-SCNC: 45 MMOL/L — CRITICAL HIGH (ref 22–31)
CO2 SERPL-SCNC: >45 MMOL/L — CRITICAL HIGH (ref 22–31)
COLOR SPEC: ABNORMAL
COLOR SPEC: SIGNIFICANT CHANGE UP
COLOR SPEC: YELLOW — SIGNIFICANT CHANGE UP
COMMENT - URINE: SIGNIFICANT CHANGE UP
CREAT SERPL-MCNC: 0.3 MG/DL — LOW (ref 0.5–1.3)
CREAT SERPL-MCNC: 0.31 MG/DL — LOW (ref 0.5–1.3)
CREAT SERPL-MCNC: 0.32 MG/DL — LOW (ref 0.5–1.3)
CREAT SERPL-MCNC: 0.32 MG/DL — LOW (ref 0.5–1.3)
CREAT SERPL-MCNC: 0.33 MG/DL — LOW (ref 0.5–1.3)
CREAT SERPL-MCNC: 0.34 MG/DL — LOW (ref 0.5–1.3)
CREAT SERPL-MCNC: 0.34 MG/DL — LOW (ref 0.5–1.3)
CREAT SERPL-MCNC: 0.35 MG/DL — LOW (ref 0.5–1.3)
CREAT SERPL-MCNC: 0.36 MG/DL — LOW (ref 0.5–1.3)
CREAT SERPL-MCNC: 0.38 MG/DL — LOW (ref 0.5–1.3)
CREAT SERPL-MCNC: 0.41 MG/DL — LOW (ref 0.5–1.3)
CREAT SERPL-MCNC: 0.44 MG/DL — LOW (ref 0.5–1.3)
CREAT SERPL-MCNC: 0.45 MG/DL — LOW (ref 0.5–1.3)
CREAT SERPL-MCNC: 0.48 MG/DL — LOW (ref 0.5–1.3)
CREAT SERPL-MCNC: 0.48 MG/DL — LOW (ref 0.5–1.3)
CREAT SERPL-MCNC: 0.49 MG/DL — LOW (ref 0.5–1.3)
CREAT SERPL-MCNC: 0.5 MG/DL — SIGNIFICANT CHANGE UP (ref 0.5–1.3)
CREAT SERPL-MCNC: 0.52 MG/DL — SIGNIFICANT CHANGE UP (ref 0.5–1.3)
CREAT SERPL-MCNC: 0.63 MG/DL — SIGNIFICANT CHANGE UP (ref 0.5–1.3)
CREAT SERPL-MCNC: 0.64 MG/DL — SIGNIFICANT CHANGE UP (ref 0.5–1.3)
CREAT SERPL-MCNC: 0.66 MG/DL — SIGNIFICANT CHANGE UP (ref 0.5–1.3)
CREAT SERPL-MCNC: 0.66 MG/DL — SIGNIFICANT CHANGE UP (ref 0.5–1.3)
CREAT SERPL-MCNC: 0.78 MG/DL — SIGNIFICANT CHANGE UP (ref 0.5–1.3)
CREAT SERPL-MCNC: <0.3 MG/DL — LOW (ref 0.5–1.3)
CRP SERPL-MCNC: 0.61 UG/ML — SIGNIFICANT CHANGE UP (ref 0–40)
CRP SERPL-MCNC: 0.66 UG/ML — SIGNIFICANT CHANGE UP (ref 0–40)
CRP SERPL-MCNC: 0.76 MG/DL — HIGH (ref 0–0.4)
CRP SERPL-MCNC: 1.35 UG/ML — SIGNIFICANT CHANGE UP (ref 0–40)
CRP SERPL-MCNC: 1.8 UG/ML — SIGNIFICANT CHANGE UP (ref 0–40)
CRP SERPL-MCNC: 12.05 MG/DL — HIGH (ref 0–0.4)
CRP SERPL-MCNC: 12.1 UG/ML — SIGNIFICANT CHANGE UP (ref 0–40)
CRP SERPL-MCNC: 12.22 UG/ML — SIGNIFICANT CHANGE UP (ref 0–40)
CRP SERPL-MCNC: 12.57 UG/ML — SIGNIFICANT CHANGE UP (ref 0–40)
CRP SERPL-MCNC: 13.66 MG/DL — HIGH (ref 0–0.4)
CRP SERPL-MCNC: 15.76 MG/DL — HIGH (ref 0–0.4)
CRP SERPL-MCNC: 17.01 MG/DL — HIGH (ref 0–0.4)
CRP SERPL-MCNC: 17.09 UG/ML — SIGNIFICANT CHANGE UP (ref 0–40)
CRP SERPL-MCNC: 2.46 UG/ML — SIGNIFICANT CHANGE UP (ref 0–40)
CRP SERPL-MCNC: 20.92 MG/DL — HIGH (ref 0–0.4)
CRP SERPL-MCNC: 23.92 UG/ML — SIGNIFICANT CHANGE UP (ref 0–40)
CRP SERPL-MCNC: 25.9 MG/DL — HIGH (ref 0–0.4)
CRP SERPL-MCNC: 31.81 UG/ML — SIGNIFICANT CHANGE UP (ref 0–40)
CRP SERPL-MCNC: 33.15 MG/DL — HIGH (ref 0–0.4)
CRP SERPL-MCNC: 4.27 MG/DL — HIGH (ref 0–0.4)
CRP SERPL-MCNC: 4.59 UG/ML — SIGNIFICANT CHANGE UP (ref 0–40)
CRP SERPL-MCNC: 4.73 UG/ML — SIGNIFICANT CHANGE UP (ref 0–40)
CRP SERPL-MCNC: 5.26 MG/DL — HIGH (ref 0–0.4)
CRP SERPL-MCNC: 5.39 MG/DL — HIGH (ref 0–0.4)
CRP SERPL-MCNC: 5.47 MG/DL — HIGH (ref 0–0.4)
CRP SERPL-MCNC: 6.31 MG/DL — HIGH (ref 0–0.4)
CRP SERPL-MCNC: 6.58 UG/ML — SIGNIFICANT CHANGE UP (ref 0–40)
CRP SERPL-MCNC: 7.73 MG/DL — HIGH (ref 0–0.4)
CRP SERPL-MCNC: 8.36 UG/ML — SIGNIFICANT CHANGE UP (ref 0–40)
CRP SERPL-MCNC: 9.35 MG/DL — HIGH (ref 0–0.4)
CRP SERPL-MCNC: 9.37 MG/DL — HIGH (ref 0–0.4)
CRP SERPL-MCNC: 9.66 UG/ML — SIGNIFICANT CHANGE UP (ref 0–40)
CULTURE RESULTS: NO GROWTH — SIGNIFICANT CHANGE UP
CULTURE RESULTS: NO GROWTH — SIGNIFICANT CHANGE UP
CULTURE RESULTS: SIGNIFICANT CHANGE UP
D DIMER BLD IA.RAPID-MCNC: 1031 NG/ML DDU — HIGH
D DIMER BLD IA.RAPID-MCNC: 1034 NG/ML DDU — HIGH
D DIMER BLD IA.RAPID-MCNC: 1084 NG/ML DDU — HIGH
D DIMER BLD IA.RAPID-MCNC: 1114 NG/ML DDU — HIGH
D DIMER BLD IA.RAPID-MCNC: 1121 NG/ML DDU — HIGH
D DIMER BLD IA.RAPID-MCNC: 1168 NG/ML DDU — HIGH
D DIMER BLD IA.RAPID-MCNC: 1209 NG/ML DDU — HIGH
D DIMER BLD IA.RAPID-MCNC: 1295 NG/ML DDU — HIGH
D DIMER BLD IA.RAPID-MCNC: 1326 NG/ML DDU — HIGH
D DIMER BLD IA.RAPID-MCNC: 1339 NG/ML DDU — HIGH
D DIMER BLD IA.RAPID-MCNC: 1663 NG/ML DDU — HIGH
D DIMER BLD IA.RAPID-MCNC: 1819 NG/ML DDU — HIGH
D DIMER BLD IA.RAPID-MCNC: 2020 NG/ML DDU — HIGH
D DIMER BLD IA.RAPID-MCNC: 2281 NG/ML DDU — HIGH
D DIMER BLD IA.RAPID-MCNC: 2353 NG/ML DDU — HIGH
D DIMER BLD IA.RAPID-MCNC: 2635 NG/ML DDU — HIGH
D DIMER BLD IA.RAPID-MCNC: 2662 NG/ML DDU — HIGH
D DIMER BLD IA.RAPID-MCNC: 2865 NG/ML DDU — HIGH
D DIMER BLD IA.RAPID-MCNC: 290 NG/ML DDU — HIGH
D DIMER BLD IA.RAPID-MCNC: 3247 NG/ML DDU — HIGH
D DIMER BLD IA.RAPID-MCNC: 3356 NG/ML DDU — HIGH
D DIMER BLD IA.RAPID-MCNC: 3610 NG/ML DDU — HIGH
D DIMER BLD IA.RAPID-MCNC: 453 NG/ML DDU — HIGH
D DIMER BLD IA.RAPID-MCNC: 5707 NG/ML DDU — HIGH
D DIMER BLD IA.RAPID-MCNC: 814 NG/ML DDU — HIGH
D DIMER BLD IA.RAPID-MCNC: 966 NG/ML DDU — HIGH
D DIMER BLD IA.RAPID-MCNC: 983 NG/ML DDU — HIGH
D DIMER BLD IA.RAPID-MCNC: 998 NG/ML DDU — HIGH
D DIMER BLD IA.RAPID-MCNC: HIGH NG/ML DDU
DIFF PNL FLD: ABNORMAL
EOSINOPHIL # BLD AUTO: 0 K/UL — SIGNIFICANT CHANGE UP (ref 0–0.5)
EOSINOPHIL # BLD AUTO: 0.04 K/UL — SIGNIFICANT CHANGE UP (ref 0–0.5)
EOSINOPHIL # BLD AUTO: 0.04 K/UL — SIGNIFICANT CHANGE UP (ref 0–0.5)
EOSINOPHIL # BLD AUTO: 0.05 K/UL — SIGNIFICANT CHANGE UP (ref 0–0.5)
EOSINOPHIL # BLD AUTO: 0.06 K/UL — SIGNIFICANT CHANGE UP (ref 0–0.5)
EOSINOPHIL # BLD AUTO: 0.09 K/UL — SIGNIFICANT CHANGE UP (ref 0–0.5)
EOSINOPHIL # BLD AUTO: 0.12 K/UL — SIGNIFICANT CHANGE UP (ref 0–0.5)
EOSINOPHIL # BLD AUTO: 0.2 K/UL — SIGNIFICANT CHANGE UP (ref 0–0.5)
EOSINOPHIL # BLD AUTO: 0.24 K/UL — SIGNIFICANT CHANGE UP (ref 0–0.5)
EOSINOPHIL # BLD AUTO: 0.26 K/UL — SIGNIFICANT CHANGE UP (ref 0–0.5)
EOSINOPHIL # BLD AUTO: 1.01 K/UL — HIGH (ref 0–0.5)
EOSINOPHIL NFR BLD AUTO: 0 % — SIGNIFICANT CHANGE UP (ref 0–6)
EOSINOPHIL NFR BLD AUTO: 0.2 % — SIGNIFICANT CHANGE UP (ref 0–6)
EOSINOPHIL NFR BLD AUTO: 0.3 % — SIGNIFICANT CHANGE UP (ref 0–6)
EOSINOPHIL NFR BLD AUTO: 0.4 % — SIGNIFICANT CHANGE UP (ref 0–6)
EOSINOPHIL NFR BLD AUTO: 0.5 % — SIGNIFICANT CHANGE UP (ref 0–6)
EOSINOPHIL NFR BLD AUTO: 0.7 % — SIGNIFICANT CHANGE UP (ref 0–6)
EOSINOPHIL NFR BLD AUTO: 0.9 % — SIGNIFICANT CHANGE UP (ref 0–6)
EOSINOPHIL NFR BLD AUTO: 1.2 % — SIGNIFICANT CHANGE UP (ref 0–6)
EOSINOPHIL NFR BLD AUTO: 5.8 % — SIGNIFICANT CHANGE UP (ref 0–6)
EPI CELLS # UR: 0 /HPF — SIGNIFICANT CHANGE UP
EPI CELLS # UR: 1 — SIGNIFICANT CHANGE UP
EPI CELLS # UR: 4 /HPF — SIGNIFICANT CHANGE UP
FERRITIN SERPL-MCNC: 1002 NG/ML — HIGH (ref 15–150)
FERRITIN SERPL-MCNC: 1017 NG/ML — HIGH (ref 15–150)
FERRITIN SERPL-MCNC: 1017 NG/ML — HIGH (ref 15–150)
FERRITIN SERPL-MCNC: 1018 NG/ML — HIGH (ref 15–150)
FERRITIN SERPL-MCNC: 1060 NG/ML — HIGH (ref 15–150)
FERRITIN SERPL-MCNC: 1068 NG/ML — HIGH (ref 15–150)
FERRITIN SERPL-MCNC: 1093 NG/ML — HIGH (ref 15–150)
FERRITIN SERPL-MCNC: 1130 NG/ML — HIGH (ref 15–150)
FERRITIN SERPL-MCNC: 1145 NG/ML — HIGH (ref 15–150)
FERRITIN SERPL-MCNC: 1326 NG/ML — HIGH (ref 15–150)
FERRITIN SERPL-MCNC: 1460 NG/ML — HIGH (ref 15–150)
FERRITIN SERPL-MCNC: 1549 NG/ML — HIGH (ref 15–150)
FERRITIN SERPL-MCNC: 1747 NG/ML — HIGH (ref 15–150)
FERRITIN SERPL-MCNC: 1781 NG/ML — HIGH (ref 15–150)
FERRITIN SERPL-MCNC: 1863 NG/ML — HIGH (ref 15–150)
FERRITIN SERPL-MCNC: 1871 NG/ML — HIGH (ref 15–150)
FERRITIN SERPL-MCNC: 1896 NG/ML — HIGH (ref 15–150)
FERRITIN SERPL-MCNC: 621 NG/ML — HIGH (ref 15–150)
FERRITIN SERPL-MCNC: 678 NG/ML — HIGH (ref 15–150)
FERRITIN SERPL-MCNC: 768 NG/ML — HIGH (ref 15–150)
FERRITIN SERPL-MCNC: 818 NG/ML — HIGH (ref 15–150)
FERRITIN SERPL-MCNC: 855 NG/ML — HIGH (ref 15–150)
FERRITIN SERPL-MCNC: 880 NG/ML — HIGH (ref 15–150)
FERRITIN SERPL-MCNC: 881 NG/ML — HIGH (ref 15–150)
FERRITIN SERPL-MCNC: 883 NG/ML — HIGH (ref 15–150)
FERRITIN SERPL-MCNC: 918 NG/ML — HIGH (ref 15–150)
FERRITIN SERPL-MCNC: 920 NG/ML — HIGH (ref 15–150)
FERRITIN SERPL-MCNC: 933 NG/ML — HIGH (ref 15–150)
FERRITIN SERPL-MCNC: 950 NG/ML — HIGH (ref 15–150)
FERRITIN SERPL-MCNC: 959 NG/ML — HIGH (ref 15–150)
FERRITIN SERPL-MCNC: 982 NG/ML — HIGH (ref 15–150)
FUNGITELL: <31 PG/ML — SIGNIFICANT CHANGE UP
GAS PNL BLDA: SIGNIFICANT CHANGE UP
GAS PNL BLDV: 140 MMOL/L — SIGNIFICANT CHANGE UP (ref 135–145)
GAS PNL BLDV: SIGNIFICANT CHANGE UP
GLUCOSE BLDC GLUCOMTR-MCNC: 100 MG/DL — HIGH (ref 70–99)
GLUCOSE BLDC GLUCOMTR-MCNC: 101 MG/DL — HIGH (ref 70–99)
GLUCOSE BLDC GLUCOMTR-MCNC: 106 MG/DL — HIGH (ref 70–99)
GLUCOSE BLDC GLUCOMTR-MCNC: 109 MG/DL — HIGH (ref 70–99)
GLUCOSE BLDC GLUCOMTR-MCNC: 110 MG/DL — HIGH (ref 70–99)
GLUCOSE BLDC GLUCOMTR-MCNC: 112 MG/DL — HIGH (ref 70–99)
GLUCOSE BLDC GLUCOMTR-MCNC: 113 MG/DL — HIGH (ref 70–99)
GLUCOSE BLDC GLUCOMTR-MCNC: 114 MG/DL — HIGH (ref 70–99)
GLUCOSE BLDC GLUCOMTR-MCNC: 116 MG/DL — HIGH (ref 70–99)
GLUCOSE BLDC GLUCOMTR-MCNC: 118 MG/DL — HIGH (ref 70–99)
GLUCOSE BLDC GLUCOMTR-MCNC: 119 MG/DL — HIGH (ref 70–99)
GLUCOSE BLDC GLUCOMTR-MCNC: 120 MG/DL — HIGH (ref 70–99)
GLUCOSE BLDC GLUCOMTR-MCNC: 121 MG/DL — HIGH (ref 70–99)
GLUCOSE BLDC GLUCOMTR-MCNC: 122 MG/DL — HIGH (ref 70–99)
GLUCOSE BLDC GLUCOMTR-MCNC: 124 MG/DL — HIGH (ref 70–99)
GLUCOSE BLDC GLUCOMTR-MCNC: 125 MG/DL — HIGH (ref 70–99)
GLUCOSE BLDC GLUCOMTR-MCNC: 126 MG/DL — HIGH (ref 70–99)
GLUCOSE BLDC GLUCOMTR-MCNC: 129 MG/DL — HIGH (ref 70–99)
GLUCOSE BLDC GLUCOMTR-MCNC: 132 MG/DL — HIGH (ref 70–99)
GLUCOSE BLDC GLUCOMTR-MCNC: 132 MG/DL — HIGH (ref 70–99)
GLUCOSE BLDC GLUCOMTR-MCNC: 133 MG/DL — HIGH (ref 70–99)
GLUCOSE BLDC GLUCOMTR-MCNC: 133 MG/DL — HIGH (ref 70–99)
GLUCOSE BLDC GLUCOMTR-MCNC: 135 MG/DL — HIGH (ref 70–99)
GLUCOSE BLDC GLUCOMTR-MCNC: 137 MG/DL — HIGH (ref 70–99)
GLUCOSE BLDC GLUCOMTR-MCNC: 138 MG/DL — HIGH (ref 70–99)
GLUCOSE BLDC GLUCOMTR-MCNC: 138 MG/DL — HIGH (ref 70–99)
GLUCOSE BLDC GLUCOMTR-MCNC: 139 MG/DL — HIGH (ref 70–99)
GLUCOSE BLDC GLUCOMTR-MCNC: 140 MG/DL — HIGH (ref 70–99)
GLUCOSE BLDC GLUCOMTR-MCNC: 141 MG/DL — HIGH (ref 70–99)
GLUCOSE BLDC GLUCOMTR-MCNC: 142 MG/DL — HIGH (ref 70–99)
GLUCOSE BLDC GLUCOMTR-MCNC: 142 MG/DL — HIGH (ref 70–99)
GLUCOSE BLDC GLUCOMTR-MCNC: 143 MG/DL — HIGH (ref 70–99)
GLUCOSE BLDC GLUCOMTR-MCNC: 145 MG/DL — HIGH (ref 70–99)
GLUCOSE BLDC GLUCOMTR-MCNC: 146 MG/DL — HIGH (ref 70–99)
GLUCOSE BLDC GLUCOMTR-MCNC: 147 MG/DL — HIGH (ref 70–99)
GLUCOSE BLDC GLUCOMTR-MCNC: 148 MG/DL — HIGH (ref 70–99)
GLUCOSE BLDC GLUCOMTR-MCNC: 149 MG/DL — HIGH (ref 70–99)
GLUCOSE BLDC GLUCOMTR-MCNC: 150 MG/DL — HIGH (ref 70–99)
GLUCOSE BLDC GLUCOMTR-MCNC: 151 MG/DL — HIGH (ref 70–99)
GLUCOSE BLDC GLUCOMTR-MCNC: 151 MG/DL — HIGH (ref 70–99)
GLUCOSE BLDC GLUCOMTR-MCNC: 152 MG/DL — HIGH (ref 70–99)
GLUCOSE BLDC GLUCOMTR-MCNC: 153 MG/DL — HIGH (ref 70–99)
GLUCOSE BLDC GLUCOMTR-MCNC: 153 MG/DL — HIGH (ref 70–99)
GLUCOSE BLDC GLUCOMTR-MCNC: 154 MG/DL — HIGH (ref 70–99)
GLUCOSE BLDC GLUCOMTR-MCNC: 154 MG/DL — HIGH (ref 70–99)
GLUCOSE BLDC GLUCOMTR-MCNC: 155 MG/DL — HIGH (ref 70–99)
GLUCOSE BLDC GLUCOMTR-MCNC: 156 MG/DL — HIGH (ref 70–99)
GLUCOSE BLDC GLUCOMTR-MCNC: 157 MG/DL — HIGH (ref 70–99)
GLUCOSE BLDC GLUCOMTR-MCNC: 157 MG/DL — HIGH (ref 70–99)
GLUCOSE BLDC GLUCOMTR-MCNC: 158 MG/DL — HIGH (ref 70–99)
GLUCOSE BLDC GLUCOMTR-MCNC: 158 MG/DL — HIGH (ref 70–99)
GLUCOSE BLDC GLUCOMTR-MCNC: 159 MG/DL — HIGH (ref 70–99)
GLUCOSE BLDC GLUCOMTR-MCNC: 161 MG/DL — HIGH (ref 70–99)
GLUCOSE BLDC GLUCOMTR-MCNC: 162 MG/DL — HIGH (ref 70–99)
GLUCOSE BLDC GLUCOMTR-MCNC: 162 MG/DL — HIGH (ref 70–99)
GLUCOSE BLDC GLUCOMTR-MCNC: 163 MG/DL — HIGH (ref 70–99)
GLUCOSE BLDC GLUCOMTR-MCNC: 164 MG/DL — HIGH (ref 70–99)
GLUCOSE BLDC GLUCOMTR-MCNC: 166 MG/DL — HIGH (ref 70–99)
GLUCOSE BLDC GLUCOMTR-MCNC: 166 MG/DL — HIGH (ref 70–99)
GLUCOSE BLDC GLUCOMTR-MCNC: 167 MG/DL — HIGH (ref 70–99)
GLUCOSE BLDC GLUCOMTR-MCNC: 168 MG/DL — HIGH (ref 70–99)
GLUCOSE BLDC GLUCOMTR-MCNC: 169 MG/DL — HIGH (ref 70–99)
GLUCOSE BLDC GLUCOMTR-MCNC: 170 MG/DL — HIGH (ref 70–99)
GLUCOSE BLDC GLUCOMTR-MCNC: 170 MG/DL — HIGH (ref 70–99)
GLUCOSE BLDC GLUCOMTR-MCNC: 171 MG/DL — HIGH (ref 70–99)
GLUCOSE BLDC GLUCOMTR-MCNC: 171 MG/DL — HIGH (ref 70–99)
GLUCOSE BLDC GLUCOMTR-MCNC: 172 MG/DL — HIGH (ref 70–99)
GLUCOSE BLDC GLUCOMTR-MCNC: 173 MG/DL — HIGH (ref 70–99)
GLUCOSE BLDC GLUCOMTR-MCNC: 173 MG/DL — HIGH (ref 70–99)
GLUCOSE BLDC GLUCOMTR-MCNC: 174 MG/DL — HIGH (ref 70–99)
GLUCOSE BLDC GLUCOMTR-MCNC: 178 MG/DL — HIGH (ref 70–99)
GLUCOSE BLDC GLUCOMTR-MCNC: 178 MG/DL — HIGH (ref 70–99)
GLUCOSE BLDC GLUCOMTR-MCNC: 182 MG/DL — HIGH (ref 70–99)
GLUCOSE BLDC GLUCOMTR-MCNC: 185 MG/DL — HIGH (ref 70–99)
GLUCOSE BLDC GLUCOMTR-MCNC: 187 MG/DL — HIGH (ref 70–99)
GLUCOSE BLDC GLUCOMTR-MCNC: 187 MG/DL — HIGH (ref 70–99)
GLUCOSE BLDC GLUCOMTR-MCNC: 188 MG/DL — HIGH (ref 70–99)
GLUCOSE BLDC GLUCOMTR-MCNC: 190 MG/DL — HIGH (ref 70–99)
GLUCOSE BLDC GLUCOMTR-MCNC: 193 MG/DL — HIGH (ref 70–99)
GLUCOSE BLDC GLUCOMTR-MCNC: 196 MG/DL — HIGH (ref 70–99)
GLUCOSE BLDC GLUCOMTR-MCNC: 197 MG/DL — HIGH (ref 70–99)
GLUCOSE BLDC GLUCOMTR-MCNC: 205 MG/DL — HIGH (ref 70–99)
GLUCOSE BLDC GLUCOMTR-MCNC: 208 MG/DL — HIGH (ref 70–99)
GLUCOSE BLDC GLUCOMTR-MCNC: 210 MG/DL — HIGH (ref 70–99)
GLUCOSE BLDC GLUCOMTR-MCNC: 242 MG/DL — HIGH (ref 70–99)
GLUCOSE BLDV-MCNC: 139 MG/DL — HIGH (ref 70–99)
GLUCOSE SERPL-MCNC: 102 MG/DL — HIGH (ref 70–99)
GLUCOSE SERPL-MCNC: 106 MG/DL — HIGH (ref 70–99)
GLUCOSE SERPL-MCNC: 110 MG/DL — HIGH (ref 70–99)
GLUCOSE SERPL-MCNC: 113 MG/DL — HIGH (ref 70–99)
GLUCOSE SERPL-MCNC: 113 MG/DL — HIGH (ref 70–99)
GLUCOSE SERPL-MCNC: 115 MG/DL — HIGH (ref 70–99)
GLUCOSE SERPL-MCNC: 116 MG/DL — HIGH (ref 70–99)
GLUCOSE SERPL-MCNC: 120 MG/DL — HIGH (ref 70–99)
GLUCOSE SERPL-MCNC: 122 MG/DL — HIGH (ref 70–99)
GLUCOSE SERPL-MCNC: 125 MG/DL — HIGH (ref 70–99)
GLUCOSE SERPL-MCNC: 125 MG/DL — HIGH (ref 70–99)
GLUCOSE SERPL-MCNC: 128 MG/DL — HIGH (ref 70–99)
GLUCOSE SERPL-MCNC: 129 MG/DL — HIGH (ref 70–99)
GLUCOSE SERPL-MCNC: 129 MG/DL — HIGH (ref 70–99)
GLUCOSE SERPL-MCNC: 132 MG/DL — HIGH (ref 70–99)
GLUCOSE SERPL-MCNC: 133 MG/DL — HIGH (ref 70–99)
GLUCOSE SERPL-MCNC: 133 MG/DL — HIGH (ref 70–99)
GLUCOSE SERPL-MCNC: 135 MG/DL — HIGH (ref 70–99)
GLUCOSE SERPL-MCNC: 139 MG/DL — HIGH (ref 70–99)
GLUCOSE SERPL-MCNC: 140 MG/DL — HIGH (ref 70–99)
GLUCOSE SERPL-MCNC: 141 MG/DL — HIGH (ref 70–99)
GLUCOSE SERPL-MCNC: 143 MG/DL — HIGH (ref 70–99)
GLUCOSE SERPL-MCNC: 148 MG/DL — HIGH (ref 70–99)
GLUCOSE SERPL-MCNC: 149 MG/DL — HIGH (ref 70–99)
GLUCOSE SERPL-MCNC: 149 MG/DL — HIGH (ref 70–99)
GLUCOSE SERPL-MCNC: 150 MG/DL — HIGH (ref 70–99)
GLUCOSE SERPL-MCNC: 152 MG/DL — HIGH (ref 70–99)
GLUCOSE SERPL-MCNC: 153 MG/DL — HIGH (ref 70–99)
GLUCOSE SERPL-MCNC: 154 MG/DL — HIGH (ref 70–99)
GLUCOSE SERPL-MCNC: 154 MG/DL — HIGH (ref 70–99)
GLUCOSE SERPL-MCNC: 155 MG/DL — HIGH (ref 70–99)
GLUCOSE SERPL-MCNC: 157 MG/DL — HIGH (ref 70–99)
GLUCOSE SERPL-MCNC: 158 MG/DL — HIGH (ref 70–99)
GLUCOSE SERPL-MCNC: 163 MG/DL — HIGH (ref 70–99)
GLUCOSE SERPL-MCNC: 164 MG/DL — HIGH (ref 70–99)
GLUCOSE SERPL-MCNC: 167 MG/DL — HIGH (ref 70–99)
GLUCOSE SERPL-MCNC: 168 MG/DL — HIGH (ref 70–99)
GLUCOSE SERPL-MCNC: 169 MG/DL — HIGH (ref 70–99)
GLUCOSE SERPL-MCNC: 172 MG/DL — HIGH (ref 70–99)
GLUCOSE SERPL-MCNC: 174 MG/DL — HIGH (ref 70–99)
GLUCOSE SERPL-MCNC: 177 MG/DL — HIGH (ref 70–99)
GLUCOSE SERPL-MCNC: 179 MG/DL — HIGH (ref 70–99)
GLUCOSE SERPL-MCNC: 180 MG/DL — HIGH (ref 70–99)
GLUCOSE SERPL-MCNC: 182 MG/DL — HIGH (ref 70–99)
GLUCOSE SERPL-MCNC: 192 MG/DL — HIGH (ref 70–99)
GLUCOSE SERPL-MCNC: 198 MG/DL — HIGH (ref 70–99)
GLUCOSE SERPL-MCNC: 203 MG/DL — HIGH (ref 70–99)
GLUCOSE SERPL-MCNC: 209 MG/DL — HIGH (ref 70–99)
GLUCOSE SERPL-MCNC: 214 MG/DL — HIGH (ref 70–99)
GLUCOSE SERPL-MCNC: 222 MG/DL — HIGH (ref 70–99)
GLUCOSE SERPL-MCNC: 238 MG/DL — HIGH (ref 70–99)
GLUCOSE SERPL-MCNC: 92 MG/DL — SIGNIFICANT CHANGE UP (ref 70–99)
GLUCOSE UR QL: NEGATIVE — SIGNIFICANT CHANGE UP
GRAM STN FLD: SIGNIFICANT CHANGE UP
HCO3 BLDA-SCNC: 28 MMOL/L — SIGNIFICANT CHANGE UP (ref 21–29)
HCO3 BLDA-SCNC: 31 MMOL/L — HIGH (ref 21–29)
HCO3 BLDA-SCNC: 35 MMOL/L — HIGH (ref 21–29)
HCO3 BLDA-SCNC: 36 MMOL/L — HIGH (ref 21–29)
HCO3 BLDA-SCNC: 38 MMOL/L — HIGH (ref 21–29)
HCO3 BLDA-SCNC: 38 MMOL/L — HIGH (ref 21–29)
HCO3 BLDA-SCNC: 43 MMOL/L — HIGH (ref 21–29)
HCO3 BLDA-SCNC: 46 MMOL/L — HIGH (ref 21–29)
HCO3 BLDA-SCNC: 48 MMOL/L — HIGH (ref 21–29)
HCO3 BLDA-SCNC: 49 MMOL/L — HIGH (ref 21–29)
HCO3 BLDA-SCNC: 50 MMOL/L — HIGH (ref 21–29)
HCO3 BLDA-SCNC: 53 MMOL/L — HIGH (ref 21–29)
HCO3 BLDA-SCNC: 53 MMOL/L — HIGH (ref 21–29)
HCO3 BLDA-SCNC: 65 MMOL/L — HIGH (ref 21–29)
HCO3 BLDA-SCNC: 68 MMOL/L — HIGH (ref 21–29)
HCO3 BLDV-SCNC: 33 MMOL/L — HIGH (ref 21–29)
HCT VFR BLD CALC: 21.4 % — LOW (ref 34.5–45)
HCT VFR BLD CALC: 23.5 % — LOW (ref 34.5–45)
HCT VFR BLD CALC: 23.6 % — LOW (ref 34.5–45)
HCT VFR BLD CALC: 23.7 % — LOW (ref 34.5–45)
HCT VFR BLD CALC: 24.6 % — LOW (ref 34.5–45)
HCT VFR BLD CALC: 24.7 % — LOW (ref 34.5–45)
HCT VFR BLD CALC: 24.9 % — LOW (ref 34.5–45)
HCT VFR BLD CALC: 25 % — LOW (ref 34.5–45)
HCT VFR BLD CALC: 25.3 % — LOW (ref 34.5–45)
HCT VFR BLD CALC: 25.5 % — LOW (ref 34.5–45)
HCT VFR BLD CALC: 25.7 % — LOW (ref 34.5–45)
HCT VFR BLD CALC: 26.2 % — LOW (ref 34.5–45)
HCT VFR BLD CALC: 26.2 % — LOW (ref 34.5–45)
HCT VFR BLD CALC: 27.4 % — LOW (ref 34.5–45)
HCT VFR BLD CALC: 28 % — LOW (ref 34.5–45)
HCT VFR BLD CALC: 28.1 % — LOW (ref 34.5–45)
HCT VFR BLD CALC: 28.7 % — LOW (ref 34.5–45)
HCT VFR BLD CALC: 28.8 % — LOW (ref 34.5–45)
HCT VFR BLD CALC: 29.5 % — LOW (ref 34.5–45)
HCT VFR BLD CALC: 29.6 % — LOW (ref 34.5–45)
HCT VFR BLD CALC: 29.6 % — LOW (ref 34.5–45)
HCT VFR BLD CALC: 29.7 % — LOW (ref 34.5–45)
HCT VFR BLD CALC: 30 % — LOW (ref 34.5–45)
HCT VFR BLD CALC: 30.2 % — LOW (ref 34.5–45)
HCT VFR BLD CALC: 30.2 % — LOW (ref 34.5–45)
HCT VFR BLD CALC: 30.4 % — LOW (ref 34.5–45)
HCT VFR BLD CALC: 30.9 % — LOW (ref 34.5–45)
HCT VFR BLD CALC: 31.7 % — LOW (ref 34.5–45)
HCT VFR BLD CALC: 32 % — LOW (ref 34.5–45)
HCT VFR BLD CALC: 32.2 % — LOW (ref 34.5–45)
HCT VFR BLD CALC: 32.4 % — LOW (ref 34.5–45)
HCT VFR BLD CALC: 34 % — LOW (ref 34.5–45)
HCT VFR BLD CALC: 34.2 % — LOW (ref 34.5–45)
HCT VFR BLD CALC: 34.3 % — LOW (ref 34.5–45)
HCT VFR BLD CALC: 34.4 % — LOW (ref 34.5–45)
HCT VFR BLD CALC: 34.9 % — SIGNIFICANT CHANGE UP (ref 34.5–45)
HCT VFR BLD CALC: 35.1 % — SIGNIFICANT CHANGE UP (ref 34.5–45)
HCT VFR BLD CALC: 35.4 % — SIGNIFICANT CHANGE UP (ref 34.5–45)
HCT VFR BLD CALC: 35.6 % — SIGNIFICANT CHANGE UP (ref 34.5–45)
HCT VFR BLD CALC: 36.5 % — SIGNIFICANT CHANGE UP (ref 34.5–45)
HCT VFR BLD CALC: 37.1 % — SIGNIFICANT CHANGE UP (ref 34.5–45)
HCT VFR BLD CALC: 37.6 % — SIGNIFICANT CHANGE UP (ref 34.5–45)
HCT VFR BLD CALC: 38.4 % — SIGNIFICANT CHANGE UP (ref 34.5–45)
HCT VFR BLD CALC: 38.4 % — SIGNIFICANT CHANGE UP (ref 34.5–45)
HCT VFR BLD CALC: 38.7 % — SIGNIFICANT CHANGE UP (ref 34.5–45)
HCT VFR BLD CALC: 39.1 % — SIGNIFICANT CHANGE UP (ref 34.5–45)
HCT VFR BLD CALC: 39.3 % — SIGNIFICANT CHANGE UP (ref 34.5–45)
HCT VFR BLD CALC: 39.5 % — SIGNIFICANT CHANGE UP (ref 34.5–45)
HCT VFR BLD CALC: 40 % — SIGNIFICANT CHANGE UP (ref 34.5–45)
HCT VFR BLD CALC: 44.6 % — SIGNIFICANT CHANGE UP (ref 34.5–45)
HCT VFR BLDA CALC: 27 % — LOW (ref 39–50)
HCV AB S/CO SERPL IA: 0.16 S/CO — SIGNIFICANT CHANGE UP (ref 0–0.99)
HCV AB SERPL-IMP: SIGNIFICANT CHANGE UP
HGB BLD CALC-MCNC: 8.7 G/DL — LOW (ref 11.5–15.5)
HGB BLD-MCNC: 10.4 G/DL — LOW (ref 11.5–15.5)
HGB BLD-MCNC: 10.5 G/DL — LOW (ref 11.5–15.5)
HGB BLD-MCNC: 10.5 G/DL — LOW (ref 11.5–15.5)
HGB BLD-MCNC: 10.6 G/DL — LOW (ref 11.5–15.5)
HGB BLD-MCNC: 10.8 G/DL — LOW (ref 11.5–15.5)
HGB BLD-MCNC: 10.9 G/DL — LOW (ref 11.5–15.5)
HGB BLD-MCNC: 11 G/DL — LOW (ref 11.5–15.5)
HGB BLD-MCNC: 11.1 G/DL — LOW (ref 11.5–15.5)
HGB BLD-MCNC: 11.3 G/DL — LOW (ref 11.5–15.5)
HGB BLD-MCNC: 11.4 G/DL — LOW (ref 11.5–15.5)
HGB BLD-MCNC: 11.5 G/DL — SIGNIFICANT CHANGE UP (ref 11.5–15.5)
HGB BLD-MCNC: 11.8 G/DL — SIGNIFICANT CHANGE UP (ref 11.5–15.5)
HGB BLD-MCNC: 11.9 G/DL — SIGNIFICANT CHANGE UP (ref 11.5–15.5)
HGB BLD-MCNC: 12.5 G/DL — SIGNIFICANT CHANGE UP (ref 11.5–15.5)
HGB BLD-MCNC: 12.7 G/DL — SIGNIFICANT CHANGE UP (ref 11.5–15.5)
HGB BLD-MCNC: 12.8 G/DL — SIGNIFICANT CHANGE UP (ref 11.5–15.5)
HGB BLD-MCNC: 13 G/DL — SIGNIFICANT CHANGE UP (ref 11.5–15.5)
HGB BLD-MCNC: 13.3 G/DL — SIGNIFICANT CHANGE UP (ref 11.5–15.5)
HGB BLD-MCNC: 13.3 G/DL — SIGNIFICANT CHANGE UP (ref 11.5–15.5)
HGB BLD-MCNC: 13.9 G/DL — SIGNIFICANT CHANGE UP (ref 11.5–15.5)
HGB BLD-MCNC: 6.2 G/DL — CRITICAL LOW (ref 11.5–15.5)
HGB BLD-MCNC: 6.8 G/DL — CRITICAL LOW (ref 11.5–15.5)
HGB BLD-MCNC: 7.1 G/DL — LOW (ref 11.5–15.5)
HGB BLD-MCNC: 7.2 G/DL — LOW (ref 11.5–15.5)
HGB BLD-MCNC: 7.4 G/DL — LOW (ref 11.5–15.5)
HGB BLD-MCNC: 7.4 G/DL — LOW (ref 11.5–15.5)
HGB BLD-MCNC: 7.5 G/DL — LOW (ref 11.5–15.5)
HGB BLD-MCNC: 7.5 G/DL — LOW (ref 11.5–15.5)
HGB BLD-MCNC: 7.7 G/DL — LOW (ref 11.5–15.5)
HGB BLD-MCNC: 7.8 G/DL — LOW (ref 11.5–15.5)
HGB BLD-MCNC: 8 G/DL — LOW (ref 11.5–15.5)
HGB BLD-MCNC: 8.4 G/DL — LOW (ref 11.5–15.5)
HGB BLD-MCNC: 8.5 G/DL — LOW (ref 11.5–15.5)
HGB BLD-MCNC: 8.6 G/DL — LOW (ref 11.5–15.5)
HGB BLD-MCNC: 9 G/DL — LOW (ref 11.5–15.5)
HGB BLD-MCNC: 9 G/DL — LOW (ref 11.5–15.5)
HGB BLD-MCNC: 9.1 G/DL — LOW (ref 11.5–15.5)
HGB BLD-MCNC: 9.2 G/DL — LOW (ref 11.5–15.5)
HGB BLD-MCNC: 9.2 G/DL — LOW (ref 11.5–15.5)
HGB BLD-MCNC: 9.4 G/DL — LOW (ref 11.5–15.5)
HGB BLD-MCNC: 9.5 G/DL — LOW (ref 11.5–15.5)
HGB BLD-MCNC: 9.6 G/DL — LOW (ref 11.5–15.5)
HGB BLD-MCNC: 9.8 G/DL — LOW (ref 11.5–15.5)
HOROWITZ INDEX BLDA+IHG-RTO: 100 — SIGNIFICANT CHANGE UP
HOROWITZ INDEX BLDA+IHG-RTO: 100 — SIGNIFICANT CHANGE UP
HOROWITZ INDEX BLDA+IHG-RTO: 40 — SIGNIFICANT CHANGE UP
HOROWITZ INDEX BLDA+IHG-RTO: 40 — SIGNIFICANT CHANGE UP
HOROWITZ INDEX BLDA+IHG-RTO: 70 — SIGNIFICANT CHANGE UP
HOROWITZ INDEX BLDA+IHG-RTO: 70 — SIGNIFICANT CHANGE UP
HOROWITZ INDEX BLDA+IHG-RTO: 80 — SIGNIFICANT CHANGE UP
HOROWITZ INDEX BLDA+IHG-RTO: 80 — SIGNIFICANT CHANGE UP
HOROWITZ INDEX BLDA+IHG-RTO: 90 — SIGNIFICANT CHANGE UP
HYALINE CASTS # UR AUTO: 0 /LPF — SIGNIFICANT CHANGE UP (ref 0–2)
HYALINE CASTS # UR AUTO: 0 /LPF — SIGNIFICANT CHANGE UP (ref 0–2)
HYALINE CASTS # UR AUTO: 5 /LPF — HIGH (ref 0–2)
IMM GRANULOCYTES NFR BLD AUTO: 0.6 % — SIGNIFICANT CHANGE UP (ref 0–1.5)
IMM GRANULOCYTES NFR BLD AUTO: 1.7 % — HIGH (ref 0–1.5)
IMM GRANULOCYTES NFR BLD AUTO: 1.7 % — HIGH (ref 0–1.5)
IMM GRANULOCYTES NFR BLD AUTO: 2.3 % — HIGH (ref 0–1.5)
IMM GRANULOCYTES NFR BLD AUTO: 2.7 % — HIGH (ref 0–1.5)
IMM GRANULOCYTES NFR BLD AUTO: 2.9 % — HIGH (ref 0–1.5)
IMM GRANULOCYTES NFR BLD AUTO: 3.4 % — HIGH (ref 0–1.5)
IMM GRANULOCYTES NFR BLD AUTO: 4.3 % — HIGH (ref 0–1.5)
IMM GRANULOCYTES NFR BLD AUTO: 4.8 % — HIGH (ref 0–1.5)
INR BLD: 0.94 RATIO — SIGNIFICANT CHANGE UP (ref 0.88–1.16)
INR BLD: 0.94 RATIO — SIGNIFICANT CHANGE UP (ref 0.88–1.16)
INR BLD: 0.96 RATIO — SIGNIFICANT CHANGE UP (ref 0.88–1.16)
INR BLD: 0.97 RATIO — SIGNIFICANT CHANGE UP (ref 0.88–1.16)
INR BLD: 0.97 RATIO — SIGNIFICANT CHANGE UP (ref 0.88–1.16)
INR BLD: 1 RATIO — SIGNIFICANT CHANGE UP (ref 0.88–1.16)
INR BLD: 1.02 RATIO — SIGNIFICANT CHANGE UP (ref 0.88–1.16)
INR BLD: 1.02 RATIO — SIGNIFICANT CHANGE UP (ref 0.88–1.16)
INR BLD: 1.03 RATIO — SIGNIFICANT CHANGE UP (ref 0.88–1.16)
INR BLD: 1.04 RATIO — SIGNIFICANT CHANGE UP (ref 0.88–1.16)
INR BLD: 1.06 RATIO — SIGNIFICANT CHANGE UP (ref 0.88–1.16)
INR BLD: 1.07 RATIO — SIGNIFICANT CHANGE UP (ref 0.88–1.16)
INR BLD: 1.07 RATIO — SIGNIFICANT CHANGE UP (ref 0.88–1.16)
INR BLD: 1.1 RATIO — SIGNIFICANT CHANGE UP (ref 0.88–1.16)
INR BLD: 1.11 RATIO — SIGNIFICANT CHANGE UP (ref 0.88–1.16)
INR BLD: 1.12 RATIO — SIGNIFICANT CHANGE UP (ref 0.88–1.16)
INR BLD: 1.13 RATIO — SIGNIFICANT CHANGE UP (ref 0.88–1.16)
INR BLD: 1.17 RATIO — HIGH (ref 0.88–1.16)
INR BLD: 1.19 RATIO — HIGH (ref 0.88–1.16)
INR BLD: 1.23 RATIO — HIGH (ref 0.88–1.16)
INR BLD: 1.29 RATIO — HIGH (ref 0.88–1.16)
INR BLD: 1.39 RATIO — HIGH (ref 0.88–1.16)
KETONES UR-MCNC: ABNORMAL
KETONES UR-MCNC: NEGATIVE — SIGNIFICANT CHANGE UP
LACTATE BLDA-MCNC: 0.8 MMOL/L — SIGNIFICANT CHANGE UP (ref 0.7–2)
LACTATE BLDV-MCNC: 1.1 MMOL/L — SIGNIFICANT CHANGE UP (ref 0.7–2)
LACTATE SERPL-SCNC: 0.7 MMOL/L — SIGNIFICANT CHANGE UP (ref 0.7–2)
LACTATE SERPL-SCNC: 0.8 MMOL/L — SIGNIFICANT CHANGE UP (ref 0.7–2)
LACTATE SERPL-SCNC: 0.9 MMOL/L — SIGNIFICANT CHANGE UP (ref 0.7–2)
LDH SERPL L TO P-CCNC: 1406 U/L — HIGH (ref 50–242)
LDH SERPL L TO P-CCNC: 1504 U/L — HIGH (ref 50–242)
LDH SERPL L TO P-CCNC: 431 U/L — HIGH (ref 50–242)
LDH SERPL L TO P-CCNC: 447 U/L — HIGH (ref 50–242)
LDH SERPL L TO P-CCNC: 478 U/L — HIGH (ref 50–242)
LDH SERPL L TO P-CCNC: 491 U/L — HIGH (ref 50–242)
LDH SERPL L TO P-CCNC: 522 U/L — HIGH (ref 50–242)
LDH SERPL L TO P-CCNC: 525 U/L — HIGH (ref 50–242)
LDH SERPL L TO P-CCNC: 536 U/L — HIGH (ref 50–242)
LDH SERPL L TO P-CCNC: 540 U/L — HIGH (ref 50–242)
LDH SERPL L TO P-CCNC: 551 U/L — HIGH (ref 50–242)
LDH SERPL L TO P-CCNC: 564 U/L — HIGH (ref 50–242)
LDH SERPL L TO P-CCNC: 566 U/L — HIGH (ref 50–242)
LDH SERPL L TO P-CCNC: 573 U/L — HIGH (ref 50–242)
LDH SERPL L TO P-CCNC: 574 U/L — HIGH (ref 50–242)
LDH SERPL L TO P-CCNC: 583 U/L — HIGH (ref 50–242)
LDH SERPL L TO P-CCNC: 595 U/L — HIGH (ref 50–242)
LDH SERPL L TO P-CCNC: 610 U/L — HIGH (ref 50–242)
LDH SERPL L TO P-CCNC: 612 U/L — HIGH (ref 50–242)
LDH SERPL L TO P-CCNC: 614 U/L — HIGH (ref 50–242)
LDH SERPL L TO P-CCNC: 627 U/L — HIGH (ref 50–242)
LDH SERPL L TO P-CCNC: 651 U/L — HIGH (ref 50–242)
LDH SERPL L TO P-CCNC: 652 U/L — HIGH (ref 50–242)
LDH SERPL L TO P-CCNC: 692 U/L — HIGH (ref 50–242)
LDH SERPL L TO P-CCNC: 806 U/L — HIGH (ref 50–242)
LDH SERPL L TO P-CCNC: 959 U/L — HIGH (ref 50–242)
LEUKOCYTE ESTERASE UR-ACNC: ABNORMAL
LEUKOCYTE ESTERASE UR-ACNC: ABNORMAL
LEUKOCYTE ESTERASE UR-ACNC: NEGATIVE — SIGNIFICANT CHANGE UP
LEUKOCYTE ESTERASE UR-ACNC: SIGNIFICANT CHANGE UP
LIDOCAIN IGE QN: 29 U/L — SIGNIFICANT CHANGE UP (ref 7–60)
LYMPHOCYTES # BLD AUTO: 0 % — LOW (ref 13–44)
LYMPHOCYTES # BLD AUTO: 0 K/UL — LOW (ref 1–3.3)
LYMPHOCYTES # BLD AUTO: 0.71 K/UL — LOW (ref 1–3.3)
LYMPHOCYTES # BLD AUTO: 0.77 K/UL — LOW (ref 1–3.3)
LYMPHOCYTES # BLD AUTO: 0.88 K/UL — LOW (ref 1–3.3)
LYMPHOCYTES # BLD AUTO: 0.88 K/UL — LOW (ref 1–3.3)
LYMPHOCYTES # BLD AUTO: 1.11 K/UL — SIGNIFICANT CHANGE UP (ref 1–3.3)
LYMPHOCYTES # BLD AUTO: 1.16 K/UL — SIGNIFICANT CHANGE UP (ref 1–3.3)
LYMPHOCYTES # BLD AUTO: 1.29 K/UL — SIGNIFICANT CHANGE UP (ref 1–3.3)
LYMPHOCYTES # BLD AUTO: 1.34 K/UL — SIGNIFICANT CHANGE UP (ref 1–3.3)
LYMPHOCYTES # BLD AUTO: 1.5 K/UL — SIGNIFICANT CHANGE UP (ref 1–3.3)
LYMPHOCYTES # BLD AUTO: 1.54 K/UL — SIGNIFICANT CHANGE UP (ref 1–3.3)
LYMPHOCYTES # BLD AUTO: 2.4 % — LOW (ref 13–44)
LYMPHOCYTES # BLD AUTO: 3.7 % — LOW (ref 13–44)
LYMPHOCYTES # BLD AUTO: 4.3 % — LOW (ref 13–44)
LYMPHOCYTES # BLD AUTO: 4.8 % — LOW (ref 13–44)
LYMPHOCYTES # BLD AUTO: 4.9 % — LOW (ref 13–44)
LYMPHOCYTES # BLD AUTO: 6 % — LOW (ref 13–44)
LYMPHOCYTES # BLD AUTO: 6.9 % — LOW (ref 13–44)
LYMPHOCYTES # BLD AUTO: 7.1 % — LOW (ref 13–44)
LYMPHOCYTES # BLD AUTO: 8.7 % — LOW (ref 13–44)
LYMPHOCYTES # BLD AUTO: 8.8 % — LOW (ref 13–44)
MAGNESIUM SERPL-MCNC: 2 MG/DL — SIGNIFICANT CHANGE UP (ref 1.6–2.6)
MAGNESIUM SERPL-MCNC: 2.1 MG/DL — SIGNIFICANT CHANGE UP (ref 1.6–2.6)
MAGNESIUM SERPL-MCNC: 2.2 MG/DL — SIGNIFICANT CHANGE UP (ref 1.6–2.6)
MAGNESIUM SERPL-MCNC: 2.3 MG/DL — SIGNIFICANT CHANGE UP (ref 1.6–2.6)
MAGNESIUM SERPL-MCNC: 2.4 MG/DL — SIGNIFICANT CHANGE UP (ref 1.6–2.6)
MAGNESIUM SERPL-MCNC: 2.5 MG/DL — SIGNIFICANT CHANGE UP (ref 1.6–2.6)
MAGNESIUM SERPL-MCNC: 2.6 MG/DL — SIGNIFICANT CHANGE UP (ref 1.6–2.6)
MAGNESIUM SERPL-MCNC: 2.7 MG/DL — HIGH (ref 1.6–2.6)
MAGNESIUM SERPL-MCNC: 2.8 MG/DL — HIGH (ref 1.6–2.6)
MAGNESIUM SERPL-MCNC: 3.1 MG/DL — HIGH (ref 1.6–2.6)
MANUAL SMEAR VERIFICATION: SIGNIFICANT CHANGE UP
MANUAL SMEAR VERIFICATION: SIGNIFICANT CHANGE UP
MCHC RBC-ENTMCNC: 28 GM/DL — LOW (ref 32–36)
MCHC RBC-ENTMCNC: 28.4 GM/DL — LOW (ref 32–36)
MCHC RBC-ENTMCNC: 28.5 GM/DL — LOW (ref 32–36)
MCHC RBC-ENTMCNC: 28.7 GM/DL — LOW (ref 32–36)
MCHC RBC-ENTMCNC: 28.8 GM/DL — LOW (ref 32–36)
MCHC RBC-ENTMCNC: 29 GM/DL — LOW (ref 32–36)
MCHC RBC-ENTMCNC: 29 GM/DL — LOW (ref 32–36)
MCHC RBC-ENTMCNC: 29.3 GM/DL — LOW (ref 32–36)
MCHC RBC-ENTMCNC: 29.4 GM/DL — LOW (ref 32–36)
MCHC RBC-ENTMCNC: 29.4 GM/DL — LOW (ref 32–36)
MCHC RBC-ENTMCNC: 29.8 PG — SIGNIFICANT CHANGE UP (ref 27–34)
MCHC RBC-ENTMCNC: 29.9 GM/DL — LOW (ref 32–36)
MCHC RBC-ENTMCNC: 29.9 PG — SIGNIFICANT CHANGE UP (ref 27–34)
MCHC RBC-ENTMCNC: 30 GM/DL — LOW (ref 32–36)
MCHC RBC-ENTMCNC: 30 PG — SIGNIFICANT CHANGE UP (ref 27–34)
MCHC RBC-ENTMCNC: 30.1 PG — SIGNIFICANT CHANGE UP (ref 27–34)
MCHC RBC-ENTMCNC: 30.2 GM/DL — LOW (ref 32–36)
MCHC RBC-ENTMCNC: 30.2 PG — SIGNIFICANT CHANGE UP (ref 27–34)
MCHC RBC-ENTMCNC: 30.3 PG — SIGNIFICANT CHANGE UP (ref 27–34)
MCHC RBC-ENTMCNC: 30.4 PG — SIGNIFICANT CHANGE UP (ref 27–34)
MCHC RBC-ENTMCNC: 30.5 GM/DL — LOW (ref 32–36)
MCHC RBC-ENTMCNC: 30.5 PG — SIGNIFICANT CHANGE UP (ref 27–34)
MCHC RBC-ENTMCNC: 30.5 PG — SIGNIFICANT CHANGE UP (ref 27–34)
MCHC RBC-ENTMCNC: 30.6 PG — SIGNIFICANT CHANGE UP (ref 27–34)
MCHC RBC-ENTMCNC: 30.7 GM/DL — LOW (ref 32–36)
MCHC RBC-ENTMCNC: 30.7 PG — SIGNIFICANT CHANGE UP (ref 27–34)
MCHC RBC-ENTMCNC: 30.7 PG — SIGNIFICANT CHANGE UP (ref 27–34)
MCHC RBC-ENTMCNC: 30.8 GM/DL — LOW (ref 32–36)
MCHC RBC-ENTMCNC: 30.8 GM/DL — LOW (ref 32–36)
MCHC RBC-ENTMCNC: 30.8 PG — SIGNIFICANT CHANGE UP (ref 27–34)
MCHC RBC-ENTMCNC: 30.8 PG — SIGNIFICANT CHANGE UP (ref 27–34)
MCHC RBC-ENTMCNC: 30.9 GM/DL — LOW (ref 32–36)
MCHC RBC-ENTMCNC: 30.9 GM/DL — LOW (ref 32–36)
MCHC RBC-ENTMCNC: 31 GM/DL — LOW (ref 32–36)
MCHC RBC-ENTMCNC: 31 PG — SIGNIFICANT CHANGE UP (ref 27–34)
MCHC RBC-ENTMCNC: 31 PG — SIGNIFICANT CHANGE UP (ref 27–34)
MCHC RBC-ENTMCNC: 31.1 GM/DL — LOW (ref 32–36)
MCHC RBC-ENTMCNC: 31.1 PG — SIGNIFICANT CHANGE UP (ref 27–34)
MCHC RBC-ENTMCNC: 31.2 GM/DL — LOW (ref 32–36)
MCHC RBC-ENTMCNC: 31.2 GM/DL — LOW (ref 32–36)
MCHC RBC-ENTMCNC: 31.2 PG — SIGNIFICANT CHANGE UP (ref 27–34)
MCHC RBC-ENTMCNC: 31.2 PG — SIGNIFICANT CHANGE UP (ref 27–34)
MCHC RBC-ENTMCNC: 31.3 GM/DL — LOW (ref 32–36)
MCHC RBC-ENTMCNC: 31.3 GM/DL — LOW (ref 32–36)
MCHC RBC-ENTMCNC: 31.3 PG — SIGNIFICANT CHANGE UP (ref 27–34)
MCHC RBC-ENTMCNC: 31.4 GM/DL — LOW (ref 32–36)
MCHC RBC-ENTMCNC: 31.4 PG — SIGNIFICANT CHANGE UP (ref 27–34)
MCHC RBC-ENTMCNC: 31.5 PG — SIGNIFICANT CHANGE UP (ref 27–34)
MCHC RBC-ENTMCNC: 31.6 GM/DL — LOW (ref 32–36)
MCHC RBC-ENTMCNC: 31.6 PG — SIGNIFICANT CHANGE UP (ref 27–34)
MCHC RBC-ENTMCNC: 31.6 PG — SIGNIFICANT CHANGE UP (ref 27–34)
MCHC RBC-ENTMCNC: 31.8 GM/DL — LOW (ref 32–36)
MCHC RBC-ENTMCNC: 32 GM/DL — SIGNIFICANT CHANGE UP (ref 32–36)
MCHC RBC-ENTMCNC: 32 PG — SIGNIFICANT CHANGE UP (ref 27–34)
MCHC RBC-ENTMCNC: 32.1 GM/DL — SIGNIFICANT CHANGE UP (ref 32–36)
MCHC RBC-ENTMCNC: 32.1 GM/DL — SIGNIFICANT CHANGE UP (ref 32–36)
MCHC RBC-ENTMCNC: 32.1 PG — SIGNIFICANT CHANGE UP (ref 27–34)
MCHC RBC-ENTMCNC: 32.3 GM/DL — SIGNIFICANT CHANGE UP (ref 32–36)
MCHC RBC-ENTMCNC: 32.4 GM/DL — SIGNIFICANT CHANGE UP (ref 32–36)
MCHC RBC-ENTMCNC: 32.5 PG — SIGNIFICANT CHANGE UP (ref 27–34)
MCHC RBC-ENTMCNC: 32.6 GM/DL — SIGNIFICANT CHANGE UP (ref 32–36)
MCHC RBC-ENTMCNC: 32.6 GM/DL — SIGNIFICANT CHANGE UP (ref 32–36)
MCHC RBC-ENTMCNC: 33 PG — SIGNIFICANT CHANGE UP (ref 27–34)
MCHC RBC-ENTMCNC: 33.1 GM/DL — SIGNIFICANT CHANGE UP (ref 32–36)
MCHC RBC-ENTMCNC: 33.3 GM/DL — SIGNIFICANT CHANGE UP (ref 32–36)
MCHC RBC-ENTMCNC: 33.5 GM/DL — SIGNIFICANT CHANGE UP (ref 32–36)
MCHC RBC-ENTMCNC: 33.6 GM/DL — SIGNIFICANT CHANGE UP (ref 32–36)
MCHC RBC-ENTMCNC: 33.6 GM/DL — SIGNIFICANT CHANGE UP (ref 32–36)
MCHC RBC-ENTMCNC: 33.8 GM/DL — SIGNIFICANT CHANGE UP (ref 32–36)
MCHC RBC-ENTMCNC: 34 GM/DL — SIGNIFICANT CHANGE UP (ref 32–36)
MCV RBC AUTO: 100.3 FL — HIGH (ref 80–100)
MCV RBC AUTO: 100.3 FL — HIGH (ref 80–100)
MCV RBC AUTO: 100.7 FL — HIGH (ref 80–100)
MCV RBC AUTO: 101.3 FL — HIGH (ref 80–100)
MCV RBC AUTO: 102.3 FL — HIGH (ref 80–100)
MCV RBC AUTO: 103 FL — HIGH (ref 80–100)
MCV RBC AUTO: 103.1 FL — HIGH (ref 80–100)
MCV RBC AUTO: 103.5 FL — HIGH (ref 80–100)
MCV RBC AUTO: 103.7 FL — HIGH (ref 80–100)
MCV RBC AUTO: 103.8 FL — HIGH (ref 80–100)
MCV RBC AUTO: 103.8 FL — HIGH (ref 80–100)
MCV RBC AUTO: 104.5 FL — HIGH (ref 80–100)
MCV RBC AUTO: 105 FL — HIGH (ref 80–100)
MCV RBC AUTO: 105.1 FL — HIGH (ref 80–100)
MCV RBC AUTO: 105.8 FL — HIGH (ref 80–100)
MCV RBC AUTO: 105.8 FL — HIGH (ref 80–100)
MCV RBC AUTO: 106.1 FL — HIGH (ref 80–100)
MCV RBC AUTO: 106.5 FL — HIGH (ref 80–100)
MCV RBC AUTO: 106.5 FL — HIGH (ref 80–100)
MCV RBC AUTO: 107 FL — HIGH (ref 80–100)
MCV RBC AUTO: 109.2 FL — HIGH (ref 80–100)
MCV RBC AUTO: 110.5 FL — HIGH (ref 80–100)
MCV RBC AUTO: 111.9 FL — HIGH (ref 80–100)
MCV RBC AUTO: 89.1 FL — SIGNIFICANT CHANGE UP (ref 80–100)
MCV RBC AUTO: 90.2 FL — SIGNIFICANT CHANGE UP (ref 80–100)
MCV RBC AUTO: 90.5 FL — SIGNIFICANT CHANGE UP (ref 80–100)
MCV RBC AUTO: 91 FL — SIGNIFICANT CHANGE UP (ref 80–100)
MCV RBC AUTO: 91 FL — SIGNIFICANT CHANGE UP (ref 80–100)
MCV RBC AUTO: 91.6 FL — SIGNIFICANT CHANGE UP (ref 80–100)
MCV RBC AUTO: 91.6 FL — SIGNIFICANT CHANGE UP (ref 80–100)
MCV RBC AUTO: 92.3 FL — SIGNIFICANT CHANGE UP (ref 80–100)
MCV RBC AUTO: 92.6 FL — SIGNIFICANT CHANGE UP (ref 80–100)
MCV RBC AUTO: 94.2 FL — SIGNIFICANT CHANGE UP (ref 80–100)
MCV RBC AUTO: 95.8 FL — SIGNIFICANT CHANGE UP (ref 80–100)
MCV RBC AUTO: 95.9 FL — SIGNIFICANT CHANGE UP (ref 80–100)
MCV RBC AUTO: 96.8 FL — SIGNIFICANT CHANGE UP (ref 80–100)
MCV RBC AUTO: 97.1 FL — SIGNIFICANT CHANGE UP (ref 80–100)
MCV RBC AUTO: 97.2 FL — SIGNIFICANT CHANGE UP (ref 80–100)
MCV RBC AUTO: 97.2 FL — SIGNIFICANT CHANGE UP (ref 80–100)
MCV RBC AUTO: 97.5 FL — SIGNIFICANT CHANGE UP (ref 80–100)
MCV RBC AUTO: 97.7 FL — SIGNIFICANT CHANGE UP (ref 80–100)
MCV RBC AUTO: 97.9 FL — SIGNIFICANT CHANGE UP (ref 80–100)
MCV RBC AUTO: 98.1 FL — SIGNIFICANT CHANGE UP (ref 80–100)
MCV RBC AUTO: 98.3 FL — SIGNIFICANT CHANGE UP (ref 80–100)
MCV RBC AUTO: 98.3 FL — SIGNIFICANT CHANGE UP (ref 80–100)
MCV RBC AUTO: 98.5 FL — SIGNIFICANT CHANGE UP (ref 80–100)
MCV RBC AUTO: 98.6 FL — SIGNIFICANT CHANGE UP (ref 80–100)
MCV RBC AUTO: 99.3 FL — SIGNIFICANT CHANGE UP (ref 80–100)
MCV RBC AUTO: 99.5 FL — SIGNIFICANT CHANGE UP (ref 80–100)
MCV RBC AUTO: 99.7 FL — SIGNIFICANT CHANGE UP (ref 80–100)
MONOCYTES # BLD AUTO: 0 K/UL — SIGNIFICANT CHANGE UP (ref 0–0.9)
MONOCYTES # BLD AUTO: 0.26 K/UL — SIGNIFICANT CHANGE UP (ref 0–0.9)
MONOCYTES # BLD AUTO: 0.32 K/UL — SIGNIFICANT CHANGE UP (ref 0–0.9)
MONOCYTES # BLD AUTO: 0.43 K/UL — SIGNIFICANT CHANGE UP (ref 0–0.9)
MONOCYTES # BLD AUTO: 0.69 K/UL — SIGNIFICANT CHANGE UP (ref 0–0.9)
MONOCYTES # BLD AUTO: 0.78 K/UL — SIGNIFICANT CHANGE UP (ref 0–0.9)
MONOCYTES # BLD AUTO: 0.93 K/UL — HIGH (ref 0–0.9)
MONOCYTES # BLD AUTO: 0.99 K/UL — HIGH (ref 0–0.9)
MONOCYTES # BLD AUTO: 1.21 K/UL — HIGH (ref 0–0.9)
MONOCYTES # BLD AUTO: 1.33 K/UL — HIGH (ref 0–0.9)
MONOCYTES # BLD AUTO: 1.67 K/UL — HIGH (ref 0–0.9)
MONOCYTES NFR BLD AUTO: 0 % — LOW (ref 2–14)
MONOCYTES NFR BLD AUTO: 0.8 % — LOW (ref 2–14)
MONOCYTES NFR BLD AUTO: 0.9 % — LOW (ref 2–14)
MONOCYTES NFR BLD AUTO: 2.9 % — SIGNIFICANT CHANGE UP (ref 2–14)
MONOCYTES NFR BLD AUTO: 3.8 % — SIGNIFICANT CHANGE UP (ref 2–14)
MONOCYTES NFR BLD AUTO: 4.2 % — SIGNIFICANT CHANGE UP (ref 2–14)
MONOCYTES NFR BLD AUTO: 5 % — SIGNIFICANT CHANGE UP (ref 2–14)
MONOCYTES NFR BLD AUTO: 5.7 % — SIGNIFICANT CHANGE UP (ref 2–14)
MONOCYTES NFR BLD AUTO: 6.2 % — SIGNIFICANT CHANGE UP (ref 2–14)
MONOCYTES NFR BLD AUTO: 8.1 % — SIGNIFICANT CHANGE UP (ref 2–14)
MONOCYTES NFR BLD AUTO: 8.6 % — SIGNIFICANT CHANGE UP (ref 2–14)
MRSA PCR RESULT.: SIGNIFICANT CHANGE UP
MRSA PCR RESULT.: SIGNIFICANT CHANGE UP
MYELOCYTES NFR BLD: 1 % — HIGH (ref 0–0)
NEUTROPHILS # BLD AUTO: 12.88 K/UL — HIGH (ref 1.8–7.4)
NEUTROPHILS # BLD AUTO: 12.97 K/UL — HIGH (ref 1.8–7.4)
NEUTROPHILS # BLD AUTO: 13.23 K/UL — HIGH (ref 1.8–7.4)
NEUTROPHILS # BLD AUTO: 15.7 K/UL — HIGH (ref 1.8–7.4)
NEUTROPHILS # BLD AUTO: 16.06 K/UL — HIGH (ref 1.8–7.4)
NEUTROPHILS # BLD AUTO: 16.1 K/UL — HIGH (ref 1.8–7.4)
NEUTROPHILS # BLD AUTO: 16.35 K/UL — HIGH (ref 1.8–7.4)
NEUTROPHILS # BLD AUTO: 17.08 K/UL — HIGH (ref 1.8–7.4)
NEUTROPHILS # BLD AUTO: 28.53 K/UL — HIGH (ref 1.8–7.4)
NEUTROPHILS # BLD AUTO: 30.6 K/UL — HIGH (ref 1.8–7.4)
NEUTROPHILS # BLD AUTO: 31.02 K/UL — HIGH (ref 1.8–7.4)
NEUTROPHILS NFR BLD AUTO: 74.2 % — SIGNIFICANT CHANGE UP (ref 43–77)
NEUTROPHILS NFR BLD AUTO: 80.9 % — HIGH (ref 43–77)
NEUTROPHILS NFR BLD AUTO: 81 % — HIGH (ref 43–77)
NEUTROPHILS NFR BLD AUTO: 81.1 % — HIGH (ref 43–77)
NEUTROPHILS NFR BLD AUTO: 87 % — HIGH (ref 43–77)
NEUTROPHILS NFR BLD AUTO: 87.3 % — HIGH (ref 43–77)
NEUTROPHILS NFR BLD AUTO: 87.8 % — HIGH (ref 43–77)
NEUTROPHILS NFR BLD AUTO: 89.1 % — HIGH (ref 43–77)
NEUTROPHILS NFR BLD AUTO: 89.3 % — HIGH (ref 43–77)
NEUTROPHILS NFR BLD AUTO: 93.3 % — HIGH (ref 43–77)
NEUTROPHILS NFR BLD AUTO: 99.1 % — HIGH (ref 43–77)
NEUTS BAND # BLD: 7 % — SIGNIFICANT CHANGE UP (ref 0–8)
NITRITE UR-MCNC: NEGATIVE — SIGNIFICANT CHANGE UP
NITRITE UR-MCNC: POSITIVE
NRBC # BLD: 0 /100 WBCS — SIGNIFICANT CHANGE UP (ref 0–0)
NRBC # BLD: 1 /100 WBCS — HIGH (ref 0–0)
NRBC # BLD: 1 /100 WBCS — HIGH (ref 0–0)
NRBC # BLD: 1 /100 — HIGH (ref 0–0)
NT-PROBNP SERPL-SCNC: 1049 PG/ML — HIGH (ref 0–300)
NT-PROBNP SERPL-SCNC: 1178 PG/ML — HIGH (ref 0–300)
NT-PROBNP SERPL-SCNC: 1275 PG/ML — HIGH (ref 0–300)
NT-PROBNP SERPL-SCNC: 1304 PG/ML — HIGH (ref 0–300)
NT-PROBNP SERPL-SCNC: 1453 PG/ML — HIGH (ref 0–300)
NT-PROBNP SERPL-SCNC: 1537 PG/ML — HIGH (ref 0–300)
NT-PROBNP SERPL-SCNC: 1630 PG/ML — HIGH (ref 0–300)
NT-PROBNP SERPL-SCNC: 2201 PG/ML — HIGH (ref 0–300)
NT-PROBNP SERPL-SCNC: 378 PG/ML — HIGH (ref 0–300)
NT-PROBNP SERPL-SCNC: 410 PG/ML — HIGH (ref 0–300)
NT-PROBNP SERPL-SCNC: 448 PG/ML — HIGH (ref 0–300)
NT-PROBNP SERPL-SCNC: 656 PG/ML — HIGH (ref 0–300)
NT-PROBNP SERPL-SCNC: 746 PG/ML — HIGH (ref 0–300)
NT-PROBNP SERPL-SCNC: 861 PG/ML — HIGH (ref 0–300)
NT-PROBNP SERPL-SCNC: 882 PG/ML — HIGH (ref 0–300)
NT-PROBNP SERPL-SCNC: 890 PG/ML — HIGH (ref 0–300)
NT-PROBNP SERPL-SCNC: 982 PG/ML — HIGH (ref 0–300)
OSMOLALITY SERPL: 361 MOSMOL/KG — HIGH (ref 280–301)
OSMOLALITY UR: 444 MOS/KG — SIGNIFICANT CHANGE UP (ref 300–900)
OTHER CELLS CSF MANUAL: 11 ML/DL — LOW (ref 18–22)
PCO2 BLDA: 53 MMHG — HIGH (ref 32–46)
PCO2 BLDA: 55 MMHG — HIGH (ref 32–46)
PCO2 BLDA: 56 MMHG — HIGH (ref 32–46)
PCO2 BLDA: 64 MMHG — HIGH (ref 32–46)
PCO2 BLDA: 67 MMHG — HIGH (ref 32–46)
PCO2 BLDA: 68 MMHG — HIGH (ref 32–46)
PCO2 BLDA: 75 MMHG — CRITICAL HIGH (ref 32–46)
PCO2 BLDA: 79 MMHG — CRITICAL HIGH (ref 32–46)
PCO2 BLDA: 82 MMHG — CRITICAL HIGH (ref 32–46)
PCO2 BLDA: 85 MMHG — CRITICAL HIGH (ref 32–46)
PCO2 BLDA: 87 MMHG — CRITICAL HIGH (ref 32–46)
PCO2 BLDA: 90 MMHG — CRITICAL HIGH (ref 32–46)
PCO2 BLDA: 91 MMHG — CRITICAL HIGH (ref 32–46)
PCO2 BLDA: 92 MMHG — CRITICAL HIGH (ref 32–46)
PCO2 BLDA: 93 MMHG — CRITICAL HIGH (ref 32–46)
PCO2 BLDA: >104 MMHG — CRITICAL HIGH (ref 32–46)
PCO2 BLDV: 67 MMHG — HIGH (ref 35–50)
PH BLDA: 7.12 — CRITICAL LOW (ref 7.35–7.45)
PH BLDA: 7.21 — LOW (ref 7.35–7.45)
PH BLDA: 7.24 — LOW (ref 7.35–7.45)
PH BLDA: 7.3 — LOW (ref 7.35–7.45)
PH BLDA: 7.33 — LOW (ref 7.35–7.45)
PH BLDA: 7.33 — LOW (ref 7.35–7.45)
PH BLDA: 7.34 — LOW (ref 7.35–7.45)
PH BLDA: 7.36 — SIGNIFICANT CHANGE UP (ref 7.35–7.45)
PH BLDA: 7.37 — SIGNIFICANT CHANGE UP (ref 7.35–7.45)
PH BLDA: 7.39 — SIGNIFICANT CHANGE UP (ref 7.35–7.45)
PH BLDA: 7.41 — SIGNIFICANT CHANGE UP (ref 7.35–7.45)
PH BLDA: 7.42 — SIGNIFICANT CHANGE UP (ref 7.35–7.45)
PH BLDA: 7.45 — SIGNIFICANT CHANGE UP (ref 7.35–7.45)
PH BLDA: 7.46 — HIGH (ref 7.35–7.45)
PH BLDA: 7.47 — HIGH (ref 7.35–7.45)
PH BLDV: 7.31 — LOW (ref 7.35–7.45)
PH UR: 5.5 — SIGNIFICANT CHANGE UP (ref 5–8)
PH UR: 5.5 — SIGNIFICANT CHANGE UP (ref 5–8)
PH UR: 6 — SIGNIFICANT CHANGE UP (ref 5–8)
PH UR: 6 — SIGNIFICANT CHANGE UP (ref 5–8)
PH UR: 6.5 — SIGNIFICANT CHANGE UP (ref 5–8)
PH UR: 7 — SIGNIFICANT CHANGE UP (ref 5–8)
PH UR: 7 — SIGNIFICANT CHANGE UP (ref 5–8)
PH UR: 7.5 — SIGNIFICANT CHANGE UP (ref 5–8)
PHOSPHATE SERPL-MCNC: 1.5 MG/DL — LOW (ref 2.5–4.5)
PHOSPHATE SERPL-MCNC: 1.6 MG/DL — LOW (ref 2.5–4.5)
PHOSPHATE SERPL-MCNC: 1.7 MG/DL — LOW (ref 2.5–4.5)
PHOSPHATE SERPL-MCNC: 1.7 MG/DL — LOW (ref 2.5–4.5)
PHOSPHATE SERPL-MCNC: 1.8 MG/DL — LOW (ref 2.5–4.5)
PHOSPHATE SERPL-MCNC: 2 MG/DL — LOW (ref 2.5–4.5)
PHOSPHATE SERPL-MCNC: 2.1 MG/DL — LOW (ref 2.5–4.5)
PHOSPHATE SERPL-MCNC: 2.1 MG/DL — LOW (ref 2.5–4.5)
PHOSPHATE SERPL-MCNC: 2.2 MG/DL — LOW (ref 2.5–4.5)
PHOSPHATE SERPL-MCNC: 2.3 MG/DL — LOW (ref 2.5–4.5)
PHOSPHATE SERPL-MCNC: 2.4 MG/DL — LOW (ref 2.5–4.5)
PHOSPHATE SERPL-MCNC: 2.4 MG/DL — LOW (ref 2.5–4.5)
PHOSPHATE SERPL-MCNC: 2.5 MG/DL — SIGNIFICANT CHANGE UP (ref 2.5–4.5)
PHOSPHATE SERPL-MCNC: 2.6 MG/DL — SIGNIFICANT CHANGE UP (ref 2.5–4.5)
PHOSPHATE SERPL-MCNC: 2.7 MG/DL — SIGNIFICANT CHANGE UP (ref 2.5–4.5)
PHOSPHATE SERPL-MCNC: 2.7 MG/DL — SIGNIFICANT CHANGE UP (ref 2.5–4.5)
PHOSPHATE SERPL-MCNC: 2.8 MG/DL — SIGNIFICANT CHANGE UP (ref 2.5–4.5)
PHOSPHATE SERPL-MCNC: 2.9 MG/DL — SIGNIFICANT CHANGE UP (ref 2.5–4.5)
PHOSPHATE SERPL-MCNC: 3 MG/DL — SIGNIFICANT CHANGE UP (ref 2.5–4.5)
PHOSPHATE SERPL-MCNC: 3.1 MG/DL — SIGNIFICANT CHANGE UP (ref 2.5–4.5)
PHOSPHATE SERPL-MCNC: 3.2 MG/DL — SIGNIFICANT CHANGE UP (ref 2.5–4.5)
PHOSPHATE SERPL-MCNC: 3.3 MG/DL — SIGNIFICANT CHANGE UP (ref 2.5–4.5)
PHOSPHATE SERPL-MCNC: 3.4 MG/DL — SIGNIFICANT CHANGE UP (ref 2.5–4.5)
PHOSPHATE SERPL-MCNC: 3.6 MG/DL — SIGNIFICANT CHANGE UP (ref 2.5–4.5)
PHOSPHATE SERPL-MCNC: 3.8 MG/DL — SIGNIFICANT CHANGE UP (ref 2.5–4.5)
PHOSPHATE SERPL-MCNC: 4 MG/DL — SIGNIFICANT CHANGE UP (ref 2.5–4.5)
PHOSPHATE SERPL-MCNC: 4.6 MG/DL — HIGH (ref 2.5–4.5)
PLAT MORPH BLD: NORMAL — SIGNIFICANT CHANGE UP
PLAT MORPH BLD: NORMAL — SIGNIFICANT CHANGE UP
PLATELET # BLD AUTO: 193 K/UL — SIGNIFICANT CHANGE UP (ref 150–400)
PLATELET # BLD AUTO: 196 K/UL — SIGNIFICANT CHANGE UP (ref 150–400)
PLATELET # BLD AUTO: 216 K/UL — SIGNIFICANT CHANGE UP (ref 150–400)
PLATELET # BLD AUTO: 231 K/UL — SIGNIFICANT CHANGE UP (ref 150–400)
PLATELET # BLD AUTO: 236 K/UL — SIGNIFICANT CHANGE UP (ref 150–400)
PLATELET # BLD AUTO: 245 K/UL — SIGNIFICANT CHANGE UP (ref 150–400)
PLATELET # BLD AUTO: 250 K/UL — SIGNIFICANT CHANGE UP (ref 150–400)
PLATELET # BLD AUTO: 250 K/UL — SIGNIFICANT CHANGE UP (ref 150–400)
PLATELET # BLD AUTO: 251 K/UL — SIGNIFICANT CHANGE UP (ref 150–400)
PLATELET # BLD AUTO: 252 K/UL — SIGNIFICANT CHANGE UP (ref 150–400)
PLATELET # BLD AUTO: 264 K/UL — SIGNIFICANT CHANGE UP (ref 150–400)
PLATELET # BLD AUTO: 267 K/UL — SIGNIFICANT CHANGE UP (ref 150–400)
PLATELET # BLD AUTO: 268 K/UL — SIGNIFICANT CHANGE UP (ref 150–400)
PLATELET # BLD AUTO: 269 K/UL — SIGNIFICANT CHANGE UP (ref 150–400)
PLATELET # BLD AUTO: 271 K/UL — SIGNIFICANT CHANGE UP (ref 150–400)
PLATELET # BLD AUTO: 275 K/UL — SIGNIFICANT CHANGE UP (ref 150–400)
PLATELET # BLD AUTO: 277 K/UL — SIGNIFICANT CHANGE UP (ref 150–400)
PLATELET # BLD AUTO: 278 K/UL — SIGNIFICANT CHANGE UP (ref 150–400)
PLATELET # BLD AUTO: 279 K/UL — SIGNIFICANT CHANGE UP (ref 150–400)
PLATELET # BLD AUTO: 282 K/UL — SIGNIFICANT CHANGE UP (ref 150–400)
PLATELET # BLD AUTO: 284 K/UL — SIGNIFICANT CHANGE UP (ref 150–400)
PLATELET # BLD AUTO: 285 K/UL — SIGNIFICANT CHANGE UP (ref 150–400)
PLATELET # BLD AUTO: 287 K/UL — SIGNIFICANT CHANGE UP (ref 150–400)
PLATELET # BLD AUTO: 288 K/UL — SIGNIFICANT CHANGE UP (ref 150–400)
PLATELET # BLD AUTO: 289 K/UL — SIGNIFICANT CHANGE UP (ref 150–400)
PLATELET # BLD AUTO: 290 K/UL — SIGNIFICANT CHANGE UP (ref 150–400)
PLATELET # BLD AUTO: 291 K/UL — SIGNIFICANT CHANGE UP (ref 150–400)
PLATELET # BLD AUTO: 292 K/UL — SIGNIFICANT CHANGE UP (ref 150–400)
PLATELET # BLD AUTO: 298 K/UL — SIGNIFICANT CHANGE UP (ref 150–400)
PLATELET # BLD AUTO: 301 K/UL — SIGNIFICANT CHANGE UP (ref 150–400)
PLATELET # BLD AUTO: 307 K/UL — SIGNIFICANT CHANGE UP (ref 150–400)
PLATELET # BLD AUTO: 309 K/UL — SIGNIFICANT CHANGE UP (ref 150–400)
PLATELET # BLD AUTO: 320 K/UL — SIGNIFICANT CHANGE UP (ref 150–400)
PLATELET # BLD AUTO: 322 K/UL — SIGNIFICANT CHANGE UP (ref 150–400)
PLATELET # BLD AUTO: 325 K/UL — SIGNIFICANT CHANGE UP (ref 150–400)
PLATELET # BLD AUTO: 328 K/UL — SIGNIFICANT CHANGE UP (ref 150–400)
PLATELET # BLD AUTO: 334 K/UL — SIGNIFICANT CHANGE UP (ref 150–400)
PLATELET # BLD AUTO: 353 K/UL — SIGNIFICANT CHANGE UP (ref 150–400)
PLATELET # BLD AUTO: 358 K/UL — SIGNIFICANT CHANGE UP (ref 150–400)
PLATELET # BLD AUTO: 381 K/UL — SIGNIFICANT CHANGE UP (ref 150–400)
PLATELET # BLD AUTO: 382 K/UL — SIGNIFICANT CHANGE UP (ref 150–400)
PLATELET # BLD AUTO: 389 K/UL — SIGNIFICANT CHANGE UP (ref 150–400)
PLATELET # BLD AUTO: 398 K/UL — SIGNIFICANT CHANGE UP (ref 150–400)
PLATELET # BLD AUTO: 403 K/UL — HIGH (ref 150–400)
PLATELET # BLD AUTO: 405 K/UL — HIGH (ref 150–400)
PLATELET # BLD AUTO: 407 K/UL — HIGH (ref 150–400)
PLATELET # BLD AUTO: 422 K/UL — HIGH (ref 150–400)
PLATELET # BLD AUTO: 445 K/UL — HIGH (ref 150–400)
PO2 BLDA: 110 MMHG — HIGH (ref 74–108)
PO2 BLDA: 115 MMHG — HIGH (ref 74–108)
PO2 BLDA: 121 MMHG — HIGH (ref 74–108)
PO2 BLDA: 132 MMHG — HIGH (ref 74–108)
PO2 BLDA: 55 MMHG — LOW (ref 74–108)
PO2 BLDA: 57 MMHG — LOW (ref 74–108)
PO2 BLDA: 60 MMHG — LOW (ref 74–108)
PO2 BLDA: 62 MMHG — LOW (ref 74–108)
PO2 BLDA: 63 MMHG — LOW (ref 74–108)
PO2 BLDA: 64 MMHG — LOW (ref 74–108)
PO2 BLDA: 69 MMHG — LOW (ref 74–108)
PO2 BLDA: 76 MMHG — SIGNIFICANT CHANGE UP (ref 74–108)
PO2 BLDA: 77 MMHG — SIGNIFICANT CHANGE UP (ref 74–108)
PO2 BLDA: 78 MMHG — SIGNIFICANT CHANGE UP (ref 74–108)
PO2 BLDA: 80 MMHG — SIGNIFICANT CHANGE UP (ref 74–108)
PO2 BLDA: 81 MMHG — SIGNIFICANT CHANGE UP (ref 74–108)
PO2 BLDA: 84 MMHG — SIGNIFICANT CHANGE UP (ref 74–108)
PO2 BLDA: 96 MMHG — SIGNIFICANT CHANGE UP (ref 74–108)
PO2 BLDV: 68 MMHG — HIGH (ref 25–45)
POTASSIUM BLDV-SCNC: 3.5 MMOL/L — SIGNIFICANT CHANGE UP (ref 3.5–5.3)
POTASSIUM SERPL-MCNC: 3 MMOL/L — LOW (ref 3.5–5.3)
POTASSIUM SERPL-MCNC: 3.1 MMOL/L — LOW (ref 3.5–5.3)
POTASSIUM SERPL-MCNC: 3.2 MMOL/L — LOW (ref 3.5–5.3)
POTASSIUM SERPL-MCNC: 3.4 MMOL/L — LOW (ref 3.5–5.3)
POTASSIUM SERPL-MCNC: 3.5 MMOL/L — SIGNIFICANT CHANGE UP (ref 3.5–5.3)
POTASSIUM SERPL-MCNC: 3.6 MMOL/L — SIGNIFICANT CHANGE UP (ref 3.5–5.3)
POTASSIUM SERPL-MCNC: 3.7 MMOL/L — SIGNIFICANT CHANGE UP (ref 3.5–5.3)
POTASSIUM SERPL-MCNC: 3.8 MMOL/L — SIGNIFICANT CHANGE UP (ref 3.5–5.3)
POTASSIUM SERPL-MCNC: 3.9 MMOL/L — SIGNIFICANT CHANGE UP (ref 3.5–5.3)
POTASSIUM SERPL-MCNC: 4 MMOL/L — SIGNIFICANT CHANGE UP (ref 3.5–5.3)
POTASSIUM SERPL-MCNC: 4.1 MMOL/L — SIGNIFICANT CHANGE UP (ref 3.5–5.3)
POTASSIUM SERPL-MCNC: 4.1 MMOL/L — SIGNIFICANT CHANGE UP (ref 3.5–5.3)
POTASSIUM SERPL-MCNC: 4.2 MMOL/L — SIGNIFICANT CHANGE UP (ref 3.5–5.3)
POTASSIUM SERPL-MCNC: 4.3 MMOL/L — SIGNIFICANT CHANGE UP (ref 3.5–5.3)
POTASSIUM SERPL-MCNC: 4.4 MMOL/L — SIGNIFICANT CHANGE UP (ref 3.5–5.3)
POTASSIUM SERPL-MCNC: 4.4 MMOL/L — SIGNIFICANT CHANGE UP (ref 3.5–5.3)
POTASSIUM SERPL-MCNC: 4.5 MMOL/L — SIGNIFICANT CHANGE UP (ref 3.5–5.3)
POTASSIUM SERPL-MCNC: 5.2 MMOL/L — SIGNIFICANT CHANGE UP (ref 3.5–5.3)
POTASSIUM SERPL-SCNC: 3 MMOL/L — LOW (ref 3.5–5.3)
POTASSIUM SERPL-SCNC: 3.1 MMOL/L — LOW (ref 3.5–5.3)
POTASSIUM SERPL-SCNC: 3.2 MMOL/L — LOW (ref 3.5–5.3)
POTASSIUM SERPL-SCNC: 3.4 MMOL/L — LOW (ref 3.5–5.3)
POTASSIUM SERPL-SCNC: 3.5 MMOL/L — SIGNIFICANT CHANGE UP (ref 3.5–5.3)
POTASSIUM SERPL-SCNC: 3.6 MMOL/L — SIGNIFICANT CHANGE UP (ref 3.5–5.3)
POTASSIUM SERPL-SCNC: 3.7 MMOL/L — SIGNIFICANT CHANGE UP (ref 3.5–5.3)
POTASSIUM SERPL-SCNC: 3.8 MMOL/L — SIGNIFICANT CHANGE UP (ref 3.5–5.3)
POTASSIUM SERPL-SCNC: 3.9 MMOL/L — SIGNIFICANT CHANGE UP (ref 3.5–5.3)
POTASSIUM SERPL-SCNC: 4 MMOL/L — SIGNIFICANT CHANGE UP (ref 3.5–5.3)
POTASSIUM SERPL-SCNC: 4.1 MMOL/L — SIGNIFICANT CHANGE UP (ref 3.5–5.3)
POTASSIUM SERPL-SCNC: 4.1 MMOL/L — SIGNIFICANT CHANGE UP (ref 3.5–5.3)
POTASSIUM SERPL-SCNC: 4.2 MMOL/L — SIGNIFICANT CHANGE UP (ref 3.5–5.3)
POTASSIUM SERPL-SCNC: 4.3 MMOL/L — SIGNIFICANT CHANGE UP (ref 3.5–5.3)
POTASSIUM SERPL-SCNC: 4.4 MMOL/L — SIGNIFICANT CHANGE UP (ref 3.5–5.3)
POTASSIUM SERPL-SCNC: 4.4 MMOL/L — SIGNIFICANT CHANGE UP (ref 3.5–5.3)
POTASSIUM SERPL-SCNC: 4.5 MMOL/L — SIGNIFICANT CHANGE UP (ref 3.5–5.3)
POTASSIUM SERPL-SCNC: 5.2 MMOL/L — SIGNIFICANT CHANGE UP (ref 3.5–5.3)
POTASSIUM UR-SCNC: 36 MMOL/L — SIGNIFICANT CHANGE UP
PROCALCITONIN SERPL-MCNC: 0.08 NG/ML — SIGNIFICANT CHANGE UP (ref 0.02–0.1)
PROCALCITONIN SERPL-MCNC: 0.08 NG/ML — SIGNIFICANT CHANGE UP (ref 0.02–0.1)
PROCALCITONIN SERPL-MCNC: 0.09 NG/ML — SIGNIFICANT CHANGE UP (ref 0.02–0.1)
PROCALCITONIN SERPL-MCNC: 0.1 NG/ML — SIGNIFICANT CHANGE UP (ref 0.02–0.1)
PROCALCITONIN SERPL-MCNC: 0.11 NG/ML — HIGH (ref 0.02–0.1)
PROCALCITONIN SERPL-MCNC: 0.12 NG/ML — HIGH (ref 0.02–0.1)
PROCALCITONIN SERPL-MCNC: 0.12 NG/ML — HIGH (ref 0.02–0.1)
PROCALCITONIN SERPL-MCNC: 0.15 NG/ML — HIGH (ref 0.02–0.1)
PROCALCITONIN SERPL-MCNC: 0.16 NG/ML — HIGH (ref 0.02–0.1)
PROCALCITONIN SERPL-MCNC: 0.17 NG/ML — HIGH (ref 0.02–0.1)
PROCALCITONIN SERPL-MCNC: 0.18 NG/ML — HIGH (ref 0.02–0.1)
PROCALCITONIN SERPL-MCNC: 0.19 NG/ML — HIGH (ref 0.02–0.1)
PROCALCITONIN SERPL-MCNC: 0.2 NG/ML — HIGH (ref 0.02–0.1)
PROCALCITONIN SERPL-MCNC: 0.21 NG/ML — HIGH (ref 0.02–0.1)
PROCALCITONIN SERPL-MCNC: 0.23 NG/ML — HIGH (ref 0.02–0.1)
PROCALCITONIN SERPL-MCNC: 0.24 NG/ML — HIGH (ref 0.02–0.1)
PROCALCITONIN SERPL-MCNC: 0.25 NG/ML — HIGH (ref 0.02–0.1)
PROCALCITONIN SERPL-MCNC: 0.26 NG/ML — HIGH (ref 0.02–0.1)
PROCALCITONIN SERPL-MCNC: 0.26 NG/ML — HIGH (ref 0.02–0.1)
PROCALCITONIN SERPL-MCNC: 0.28 NG/ML — HIGH (ref 0.02–0.1)
PROCALCITONIN SERPL-MCNC: 0.29 NG/ML — HIGH (ref 0.02–0.1)
PROCALCITONIN SERPL-MCNC: 0.32 NG/ML — HIGH (ref 0.02–0.1)
PROCALCITONIN SERPL-MCNC: 0.34 NG/ML — HIGH (ref 0.02–0.1)
PROCALCITONIN SERPL-MCNC: 0.4 NG/ML — HIGH (ref 0.02–0.1)
PROCALCITONIN SERPL-MCNC: 0.63 NG/ML — HIGH (ref 0.02–0.1)
PROCALCITONIN SERPL-MCNC: 1.18 NG/ML — HIGH (ref 0.02–0.1)
PROT SERPL-MCNC: 4.5 G/DL — LOW (ref 6–8.3)
PROT SERPL-MCNC: 4.9 G/DL — LOW (ref 6–8.3)
PROT SERPL-MCNC: 5 G/DL — LOW (ref 6–8.3)
PROT SERPL-MCNC: 5.1 G/DL — LOW (ref 6–8.3)
PROT SERPL-MCNC: 5.1 G/DL — LOW (ref 6–8.3)
PROT SERPL-MCNC: 5.2 G/DL — LOW (ref 6–8.3)
PROT SERPL-MCNC: 5.3 G/DL — LOW (ref 6–8.3)
PROT SERPL-MCNC: 5.5 G/DL — LOW (ref 6–8.3)
PROT SERPL-MCNC: 5.5 G/DL — LOW (ref 6–8.3)
PROT SERPL-MCNC: 5.6 G/DL — LOW (ref 6–8.3)
PROT SERPL-MCNC: 5.6 G/DL — LOW (ref 6–8.3)
PROT SERPL-MCNC: 5.7 G/DL — LOW (ref 6–8.3)
PROT SERPL-MCNC: 5.8 G/DL — LOW (ref 6–8.3)
PROT SERPL-MCNC: 6 G/DL — SIGNIFICANT CHANGE UP (ref 6–8.3)
PROT SERPL-MCNC: 6.1 G/DL — SIGNIFICANT CHANGE UP (ref 6–8.3)
PROT SERPL-MCNC: 6.2 G/DL — SIGNIFICANT CHANGE UP (ref 6–8.3)
PROT SERPL-MCNC: 6.3 G/DL — SIGNIFICANT CHANGE UP (ref 6–8.3)
PROT SERPL-MCNC: 6.4 G/DL — SIGNIFICANT CHANGE UP (ref 6–8.3)
PROT SERPL-MCNC: 6.5 G/DL — SIGNIFICANT CHANGE UP (ref 6–8.3)
PROT SERPL-MCNC: 6.5 G/DL — SIGNIFICANT CHANGE UP (ref 6–8.3)
PROT SERPL-MCNC: 6.6 G/DL — SIGNIFICANT CHANGE UP (ref 6–8.3)
PROT SERPL-MCNC: 6.8 G/DL — SIGNIFICANT CHANGE UP (ref 6–8.3)
PROT SERPL-MCNC: 7.2 G/DL — SIGNIFICANT CHANGE UP (ref 6–8.3)
PROT SERPL-MCNC: 7.2 G/DL — SIGNIFICANT CHANGE UP (ref 6–8.3)
PROT SERPL-MCNC: 7.3 G/DL — SIGNIFICANT CHANGE UP (ref 6–8.3)
PROT UR-MCNC: ABNORMAL
PROTHROM AB SERPL-ACNC: 10.7 SEC — SIGNIFICANT CHANGE UP (ref 10–12.9)
PROTHROM AB SERPL-ACNC: 10.8 SEC — SIGNIFICANT CHANGE UP (ref 10–12.9)
PROTHROM AB SERPL-ACNC: 11 SEC — SIGNIFICANT CHANGE UP (ref 10–12.9)
PROTHROM AB SERPL-ACNC: 11.5 SEC — SIGNIFICANT CHANGE UP (ref 10–12.9)
PROTHROM AB SERPL-ACNC: 11.6 SEC — SIGNIFICANT CHANGE UP (ref 10–12.9)
PROTHROM AB SERPL-ACNC: 11.6 SEC — SIGNIFICANT CHANGE UP (ref 10–12.9)
PROTHROM AB SERPL-ACNC: 11.7 SEC — SIGNIFICANT CHANGE UP (ref 10–12.9)
PROTHROM AB SERPL-ACNC: 11.9 SEC — SIGNIFICANT CHANGE UP (ref 10–12.9)
PROTHROM AB SERPL-ACNC: 11.9 SEC — SIGNIFICANT CHANGE UP (ref 10–12.9)
PROTHROM AB SERPL-ACNC: 12 SEC — SIGNIFICANT CHANGE UP (ref 10–12.9)
PROTHROM AB SERPL-ACNC: 12.1 SEC — SIGNIFICANT CHANGE UP (ref 10–12.9)
PROTHROM AB SERPL-ACNC: 12.2 SEC — SIGNIFICANT CHANGE UP (ref 10–12.9)
PROTHROM AB SERPL-ACNC: 12.3 SEC — SIGNIFICANT CHANGE UP (ref 10–12.9)
PROTHROM AB SERPL-ACNC: 12.7 SEC — SIGNIFICANT CHANGE UP (ref 10–12.9)
PROTHROM AB SERPL-ACNC: 12.7 SEC — SIGNIFICANT CHANGE UP (ref 10–12.9)
PROTHROM AB SERPL-ACNC: 12.8 SEC — SIGNIFICANT CHANGE UP (ref 10–12.9)
PROTHROM AB SERPL-ACNC: 13 SEC — HIGH (ref 10–12.9)
PROTHROM AB SERPL-ACNC: 13.4 SEC — HIGH (ref 10–13.1)
PROTHROM AB SERPL-ACNC: 13.7 SEC — HIGH (ref 10–12.9)
PROTHROM AB SERPL-ACNC: 14.2 SEC — HIGH (ref 10–12.9)
PROTHROM AB SERPL-ACNC: 14.8 SEC — HIGH (ref 10–12.9)
PROTHROM AB SERPL-ACNC: 16.2 SEC — HIGH (ref 10–12.9)
RBC # BLD: 2 M/UL — LOW (ref 3.8–5.2)
RBC # BLD: 2.17 M/UL — LOW (ref 3.8–5.2)
RBC # BLD: 2.27 M/UL — LOW (ref 3.8–5.2)
RBC # BLD: 2.29 M/UL — LOW (ref 3.8–5.2)
RBC # BLD: 2.29 M/UL — LOW (ref 3.8–5.2)
RBC # BLD: 2.31 M/UL — LOW (ref 3.8–5.2)
RBC # BLD: 2.35 M/UL — LOW (ref 3.8–5.2)
RBC # BLD: 2.38 M/UL — LOW (ref 3.8–5.2)
RBC # BLD: 2.4 M/UL — LOW (ref 3.8–5.2)
RBC # BLD: 2.41 M/UL — LOW (ref 3.8–5.2)
RBC # BLD: 2.43 M/UL — LOW (ref 3.8–5.2)
RBC # BLD: 2.47 M/UL — LOW (ref 3.8–5.2)
RBC # BLD: 2.56 M/UL — LOW (ref 3.8–5.2)
RBC # BLD: 2.66 M/UL — LOW (ref 3.8–5.2)
RBC # BLD: 2.68 M/UL — LOW (ref 3.8–5.2)
RBC # BLD: 2.68 M/UL — LOW (ref 3.8–5.2)
RBC # BLD: 2.71 M/UL — LOW (ref 3.8–5.2)
RBC # BLD: 2.78 M/UL — LOW (ref 3.8–5.2)
RBC # BLD: 2.85 M/UL — LOW (ref 3.8–5.2)
RBC # BLD: 2.86 M/UL — LOW (ref 3.8–5.2)
RBC # BLD: 2.87 M/UL — LOW (ref 3.8–5.2)
RBC # BLD: 3 M/UL — LOW (ref 3.8–5.2)
RBC # BLD: 3 M/UL — LOW (ref 3.8–5.2)
RBC # BLD: 3.01 M/UL — LOW (ref 3.8–5.2)
RBC # BLD: 3.03 M/UL — LOW (ref 3.8–5.2)
RBC # BLD: 3.04 M/UL — LOW (ref 3.8–5.2)
RBC # BLD: 3.15 M/UL — LOW (ref 3.8–5.2)
RBC # BLD: 3.21 M/UL — LOW (ref 3.8–5.2)
RBC # BLD: 3.25 M/UL — LOW (ref 3.8–5.2)
RBC # BLD: 3.27 M/UL — LOW (ref 3.8–5.2)
RBC # BLD: 3.31 M/UL — LOW (ref 3.8–5.2)
RBC # BLD: 3.52 M/UL — LOW (ref 3.8–5.2)
RBC # BLD: 3.55 M/UL — LOW (ref 3.8–5.2)
RBC # BLD: 3.56 M/UL — LOW (ref 3.8–5.2)
RBC # BLD: 3.59 M/UL — LOW (ref 3.8–5.2)
RBC # BLD: 3.62 M/UL — LOW (ref 3.8–5.2)
RBC # BLD: 3.65 M/UL — LOW (ref 3.8–5.2)
RBC # BLD: 3.73 M/UL — LOW (ref 3.8–5.2)
RBC # BLD: 3.76 M/UL — LOW (ref 3.8–5.2)
RBC # BLD: 3.77 M/UL — LOW (ref 3.8–5.2)
RBC # BLD: 3.89 M/UL — SIGNIFICANT CHANGE UP (ref 3.8–5.2)
RBC # BLD: 4.08 M/UL — SIGNIFICANT CHANGE UP (ref 3.8–5.2)
RBC # BLD: 4.19 M/UL — SIGNIFICANT CHANGE UP (ref 3.8–5.2)
RBC # BLD: 4.19 M/UL — SIGNIFICANT CHANGE UP (ref 3.8–5.2)
RBC # BLD: 4.22 M/UL — SIGNIFICANT CHANGE UP (ref 3.8–5.2)
RBC # BLD: 4.26 M/UL — SIGNIFICANT CHANGE UP (ref 3.8–5.2)
RBC # BLD: 4.29 M/UL — SIGNIFICANT CHANGE UP (ref 3.8–5.2)
RBC # BLD: 4.32 M/UL — SIGNIFICANT CHANGE UP (ref 3.8–5.2)
RBC # BLD: 4.32 M/UL — SIGNIFICANT CHANGE UP (ref 3.8–5.2)
RBC # BLD: 4.65 M/UL — SIGNIFICANT CHANGE UP (ref 3.8–5.2)
RBC # FLD: 11.6 % — SIGNIFICANT CHANGE UP (ref 10.3–14.5)
RBC # FLD: 11.7 % — SIGNIFICANT CHANGE UP (ref 10.3–14.5)
RBC # FLD: 11.9 % — SIGNIFICANT CHANGE UP (ref 10.3–14.5)
RBC # FLD: 12 % — SIGNIFICANT CHANGE UP (ref 10.3–14.5)
RBC # FLD: 12 % — SIGNIFICANT CHANGE UP (ref 10.3–14.5)
RBC # FLD: 12.1 % — SIGNIFICANT CHANGE UP (ref 10.3–14.5)
RBC # FLD: 12.3 % — SIGNIFICANT CHANGE UP (ref 10.3–14.5)
RBC # FLD: 12.6 % — SIGNIFICANT CHANGE UP (ref 10.3–14.5)
RBC # FLD: 13 % — SIGNIFICANT CHANGE UP (ref 10.3–14.5)
RBC # FLD: 13.3 % — SIGNIFICANT CHANGE UP (ref 10.3–14.5)
RBC # FLD: 13.8 % — SIGNIFICANT CHANGE UP (ref 10.3–14.5)
RBC # FLD: 14.4 % — SIGNIFICANT CHANGE UP (ref 10.3–14.5)
RBC # FLD: 14.7 % — HIGH (ref 10.3–14.5)
RBC # FLD: 15.1 % — HIGH (ref 10.3–14.5)
RBC # FLD: 15.5 % — HIGH (ref 10.3–14.5)
RBC # FLD: 15.6 % — HIGH (ref 10.3–14.5)
RBC # FLD: 15.8 % — HIGH (ref 10.3–14.5)
RBC # FLD: 15.9 % — HIGH (ref 10.3–14.5)
RBC # FLD: 16 % — HIGH (ref 10.3–14.5)
RBC # FLD: 16.1 % — HIGH (ref 10.3–14.5)
RBC # FLD: 16.2 % — HIGH (ref 10.3–14.5)
RBC # FLD: 16.2 % — HIGH (ref 10.3–14.5)
RBC # FLD: 16.3 % — HIGH (ref 10.3–14.5)
RBC # FLD: 16.4 % — HIGH (ref 10.3–14.5)
RBC # FLD: 16.4 % — HIGH (ref 10.3–14.5)
RBC # FLD: 16.5 % — HIGH (ref 10.3–14.5)
RBC # FLD: 16.5 % — HIGH (ref 10.3–14.5)
RBC # FLD: 16.7 % — HIGH (ref 10.3–14.5)
RBC # FLD: 17 % — HIGH (ref 10.3–14.5)
RBC # FLD: 17.3 % — HIGH (ref 10.3–14.5)
RBC # FLD: 17.4 % — HIGH (ref 10.3–14.5)
RBC # FLD: 17.6 % — HIGH (ref 10.3–14.5)
RBC # FLD: 17.7 % — HIGH (ref 10.3–14.5)
RBC # FLD: 17.9 % — HIGH (ref 10.3–14.5)
RBC # FLD: 17.9 % — HIGH (ref 10.3–14.5)
RBC # FLD: 18.6 % — HIGH (ref 10.3–14.5)
RBC # FLD: 18.6 % — HIGH (ref 10.3–14.5)
RBC # FLD: 18.8 % — HIGH (ref 10.3–14.5)
RBC # FLD: 18.8 % — HIGH (ref 10.3–14.5)
RBC # FLD: 19 % — HIGH (ref 10.3–14.5)
RBC # FLD: 19.2 % — HIGH (ref 10.3–14.5)
RBC # FLD: 19.4 % — HIGH (ref 10.3–14.5)
RBC # FLD: 19.6 % — HIGH (ref 10.3–14.5)
RBC # FLD: 20.5 % — HIGH (ref 10.3–14.5)
RBC BLD AUTO: NORMAL — SIGNIFICANT CHANGE UP
RBC BLD AUTO: SIGNIFICANT CHANGE UP
RBC CASTS # UR COMP ASSIST: 437 /HPF — HIGH (ref 0–4)
RBC CASTS # UR COMP ASSIST: >50 /HPF — SIGNIFICANT CHANGE UP (ref 0–4)
RH IG SCN BLD-IMP: POSITIVE — SIGNIFICANT CHANGE UP
S AUREUS DNA NOSE QL NAA+PROBE: SIGNIFICANT CHANGE UP
S AUREUS DNA NOSE QL NAA+PROBE: SIGNIFICANT CHANGE UP
SAO2 % BLDA: 90 % — LOW (ref 92–96)
SAO2 % BLDA: 90 % — LOW (ref 92–96)
SAO2 % BLDA: 91 % — LOW (ref 92–96)
SAO2 % BLDA: 92 % — SIGNIFICANT CHANGE UP (ref 92–96)
SAO2 % BLDA: 93 % — SIGNIFICANT CHANGE UP (ref 92–96)
SAO2 % BLDA: 94 % — SIGNIFICANT CHANGE UP (ref 92–96)
SAO2 % BLDA: 95 % — SIGNIFICANT CHANGE UP (ref 92–96)
SAO2 % BLDA: 96 % — SIGNIFICANT CHANGE UP (ref 92–96)
SAO2 % BLDA: 97 % — HIGH (ref 92–96)
SAO2 % BLDA: 98 % — HIGH (ref 92–96)
SAO2 % BLDA: 99 % — HIGH (ref 92–96)
SAO2 % BLDA: 99 % — HIGH (ref 92–96)
SAO2 % BLDV: 92 % — HIGH (ref 67–88)
SARS-COV-2 RNA SPEC QL NAA+PROBE: DETECTED
SARS-COV-2 RNA SPEC QL NAA+PROBE: DETECTED
SODIUM SERPL-SCNC: 130 MMOL/L — LOW (ref 135–145)
SODIUM SERPL-SCNC: 131 MMOL/L — LOW (ref 135–145)
SODIUM SERPL-SCNC: 132 MMOL/L — LOW (ref 135–145)
SODIUM SERPL-SCNC: 133 MMOL/L — LOW (ref 135–145)
SODIUM SERPL-SCNC: 134 MMOL/L — LOW (ref 135–145)
SODIUM SERPL-SCNC: 135 MMOL/L — SIGNIFICANT CHANGE UP (ref 135–145)
SODIUM SERPL-SCNC: 135 MMOL/L — SIGNIFICANT CHANGE UP (ref 135–145)
SODIUM SERPL-SCNC: 136 MMOL/L — SIGNIFICANT CHANGE UP (ref 135–145)
SODIUM SERPL-SCNC: 136 MMOL/L — SIGNIFICANT CHANGE UP (ref 135–145)
SODIUM SERPL-SCNC: 137 MMOL/L — SIGNIFICANT CHANGE UP (ref 135–145)
SODIUM SERPL-SCNC: 138 MMOL/L — SIGNIFICANT CHANGE UP (ref 135–145)
SODIUM SERPL-SCNC: 139 MMOL/L — SIGNIFICANT CHANGE UP (ref 135–145)
SODIUM SERPL-SCNC: 140 MMOL/L — SIGNIFICANT CHANGE UP (ref 135–145)
SODIUM SERPL-SCNC: 140 MMOL/L — SIGNIFICANT CHANGE UP (ref 135–145)
SODIUM SERPL-SCNC: 141 MMOL/L — SIGNIFICANT CHANGE UP (ref 135–145)
SODIUM SERPL-SCNC: 141 MMOL/L — SIGNIFICANT CHANGE UP (ref 135–145)
SODIUM SERPL-SCNC: 142 MMOL/L — SIGNIFICANT CHANGE UP (ref 135–145)
SODIUM SERPL-SCNC: 143 MMOL/L — SIGNIFICANT CHANGE UP (ref 135–145)
SODIUM SERPL-SCNC: 144 MMOL/L — SIGNIFICANT CHANGE UP (ref 135–145)
SODIUM SERPL-SCNC: 145 MMOL/L — SIGNIFICANT CHANGE UP (ref 135–145)
SODIUM SERPL-SCNC: 146 MMOL/L — HIGH (ref 135–145)
SODIUM SERPL-SCNC: 147 MMOL/L — HIGH (ref 135–145)
SODIUM SERPL-SCNC: 147 MMOL/L — HIGH (ref 135–145)
SODIUM SERPL-SCNC: 148 MMOL/L — HIGH (ref 135–145)
SODIUM SERPL-SCNC: 150 MMOL/L — HIGH (ref 135–145)
SODIUM SERPL-SCNC: 150 MMOL/L — HIGH (ref 135–145)
SODIUM SERPL-SCNC: 151 MMOL/L — HIGH (ref 135–145)
SODIUM SERPL-SCNC: 153 MMOL/L — HIGH (ref 135–145)
SODIUM SERPL-SCNC: 155 MMOL/L — HIGH (ref 135–145)
SODIUM SERPL-SCNC: 158 MMOL/L — HIGH (ref 135–145)
SODIUM UR-SCNC: <35 MMOL/L — SIGNIFICANT CHANGE UP
SP GR SPEC: 1.02 — SIGNIFICANT CHANGE UP (ref 1.01–1.02)
SP GR SPEC: 1.03 — HIGH (ref 1.01–1.02)
SP GR SPEC: 1.03 — HIGH (ref 1.01–1.02)
SP GR UR STRIP: 1.01 — SIGNIFICANT CHANGE UP (ref 1.01–1.02)
SPECIMEN SOURCE: SIGNIFICANT CHANGE UP
TRIGL SERPL-MCNC: 106 MG/DL — SIGNIFICANT CHANGE UP (ref 10–149)
TRIGL SERPL-MCNC: 121 MG/DL — SIGNIFICANT CHANGE UP (ref 10–149)
TRIGL SERPL-MCNC: 139 MG/DL — SIGNIFICANT CHANGE UP (ref 10–149)
TRIGL SERPL-MCNC: 152 MG/DL — HIGH (ref 10–149)
TRIGL SERPL-MCNC: 155 MG/DL — HIGH (ref 10–149)
TRIGL SERPL-MCNC: 170 MG/DL — HIGH (ref 10–149)
TRIGL SERPL-MCNC: 171 MG/DL — HIGH (ref 10–149)
TRIGL SERPL-MCNC: 179 MG/DL — HIGH (ref 10–149)
TRIGL SERPL-MCNC: 182 MG/DL — HIGH (ref 10–149)
TRIGL SERPL-MCNC: 183 MG/DL — HIGH (ref 10–149)
TRIGL SERPL-MCNC: 184 MG/DL — HIGH (ref 10–149)
TRIGL SERPL-MCNC: 192 MG/DL — HIGH (ref 10–149)
TRIGL SERPL-MCNC: 204 MG/DL — HIGH (ref 10–149)
TRIGL SERPL-MCNC: 208 MG/DL — HIGH (ref 10–149)
TRIGL SERPL-MCNC: 210 MG/DL — HIGH (ref 10–149)
TRIGL SERPL-MCNC: 212 MG/DL — HIGH (ref 10–149)
TRIGL SERPL-MCNC: 249 MG/DL — HIGH (ref 10–149)
TRIGL SERPL-MCNC: 352 MG/DL — HIGH (ref 10–149)
TRIGL SERPL-MCNC: 532 MG/DL — HIGH (ref 10–149)
TRIGL SERPL-MCNC: 89 MG/DL — SIGNIFICANT CHANGE UP (ref 10–149)
TROPONIN T, HIGH SENSITIVITY RESULT: 12 NG/L — SIGNIFICANT CHANGE UP (ref 0–51)
TROPONIN T, HIGH SENSITIVITY RESULT: 13 NG/L — SIGNIFICANT CHANGE UP (ref 0–51)
TROPONIN T, HIGH SENSITIVITY RESULT: 13 NG/L — SIGNIFICANT CHANGE UP (ref 0–51)
TROPONIN T, HIGH SENSITIVITY RESULT: 16 NG/L — SIGNIFICANT CHANGE UP (ref 0–51)
TROPONIN T, HIGH SENSITIVITY RESULT: 17 NG/L — SIGNIFICANT CHANGE UP (ref 0–51)
TROPONIN T, HIGH SENSITIVITY RESULT: 19 NG/L — SIGNIFICANT CHANGE UP (ref 0–51)
TROPONIN T, HIGH SENSITIVITY RESULT: 23 NG/L — SIGNIFICANT CHANGE UP (ref 0–51)
TROPONIN T, HIGH SENSITIVITY RESULT: 25 NG/L — SIGNIFICANT CHANGE UP (ref 0–51)
TROPONIN T, HIGH SENSITIVITY RESULT: 27 NG/L — SIGNIFICANT CHANGE UP (ref 0–51)
TROPONIN T, HIGH SENSITIVITY RESULT: 28 NG/L — SIGNIFICANT CHANGE UP (ref 0–51)
TROPONIN T, HIGH SENSITIVITY RESULT: 31 NG/L — SIGNIFICANT CHANGE UP (ref 0–51)
TROPONIN T, HIGH SENSITIVITY RESULT: 31 NG/L — SIGNIFICANT CHANGE UP (ref 0–51)
TROPONIN T, HIGH SENSITIVITY RESULT: 33 NG/L — SIGNIFICANT CHANGE UP (ref 0–51)
TROPONIN T, HIGH SENSITIVITY RESULT: 33 NG/L — SIGNIFICANT CHANGE UP (ref 0–51)
TROPONIN T, HIGH SENSITIVITY RESULT: 37 NG/L — SIGNIFICANT CHANGE UP (ref 0–51)
TROPONIN T, HIGH SENSITIVITY RESULT: 48 NG/L — SIGNIFICANT CHANGE UP (ref 0–51)
TROPONIN T, HIGH SENSITIVITY RESULT: 51 NG/L — SIGNIFICANT CHANGE UP (ref 0–51)
UROBILINOGEN FLD QL: ABNORMAL
UROBILINOGEN FLD QL: NEGATIVE — SIGNIFICANT CHANGE UP
VANCOMYCIN TROUGH SERPL-MCNC: 13 UG/ML — SIGNIFICANT CHANGE UP (ref 10–20)
VANCOMYCIN TROUGH SERPL-MCNC: 15 UG/ML — SIGNIFICANT CHANGE UP (ref 10–20)
VANCOMYCIN TROUGH SERPL-MCNC: 15.7 UG/ML — SIGNIFICANT CHANGE UP (ref 10–20)
VANCOMYCIN TROUGH SERPL-MCNC: 16.2 UG/ML — SIGNIFICANT CHANGE UP (ref 10–20)
VANCOMYCIN TROUGH SERPL-MCNC: 7.1 UG/ML — LOW (ref 10–20)
VANCOMYCIN TROUGH SERPL-MCNC: <4 UG/ML — LOW (ref 10–20)
WBC # BLD: 10.68 K/UL — HIGH (ref 3.8–10.5)
WBC # BLD: 12.17 K/UL — HIGH (ref 3.8–10.5)
WBC # BLD: 14.76 K/UL — HIGH (ref 3.8–10.5)
WBC # BLD: 14.96 K/UL — HIGH (ref 3.8–10.5)
WBC # BLD: 15.15 K/UL — HIGH (ref 3.8–10.5)
WBC # BLD: 15.75 K/UL — HIGH (ref 3.8–10.5)
WBC # BLD: 15.91 K/UL — HIGH (ref 3.8–10.5)
WBC # BLD: 16.13 K/UL — HIGH (ref 3.8–10.5)
WBC # BLD: 16.34 K/UL — HIGH (ref 3.8–10.5)
WBC # BLD: 16.35 K/UL — HIGH (ref 3.8–10.5)
WBC # BLD: 16.81 K/UL — HIGH (ref 3.8–10.5)
WBC # BLD: 17.08 K/UL — HIGH (ref 3.8–10.5)
WBC # BLD: 17.27 K/UL — HIGH (ref 3.8–10.5)
WBC # BLD: 17.29 K/UL — HIGH (ref 3.8–10.5)
WBC # BLD: 17.48 K/UL — HIGH (ref 3.8–10.5)
WBC # BLD: 17.59 K/UL — HIGH (ref 3.8–10.5)
WBC # BLD: 17.81 K/UL — HIGH (ref 3.8–10.5)
WBC # BLD: 18.02 K/UL — HIGH (ref 3.8–10.5)
WBC # BLD: 18.06 K/UL — HIGH (ref 3.8–10.5)
WBC # BLD: 18.26 K/UL — HIGH (ref 3.8–10.5)
WBC # BLD: 18.32 K/UL — HIGH (ref 3.8–10.5)
WBC # BLD: 18.37 K/UL — HIGH (ref 3.8–10.5)
WBC # BLD: 18.46 K/UL — HIGH (ref 3.8–10.5)
WBC # BLD: 18.58 K/UL — HIGH (ref 3.8–10.5)
WBC # BLD: 19.04 K/UL — HIGH (ref 3.8–10.5)
WBC # BLD: 19.28 K/UL — HIGH (ref 3.8–10.5)
WBC # BLD: 19.41 K/UL — HIGH (ref 3.8–10.5)
WBC # BLD: 19.45 K/UL — HIGH (ref 3.8–10.5)
WBC # BLD: 19.45 K/UL — HIGH (ref 3.8–10.5)
WBC # BLD: 20.64 K/UL — HIGH (ref 3.8–10.5)
WBC # BLD: 20.65 K/UL — HIGH (ref 3.8–10.5)
WBC # BLD: 20.67 K/UL — HIGH (ref 3.8–10.5)
WBC # BLD: 21.02 K/UL — HIGH (ref 3.8–10.5)
WBC # BLD: 21.41 K/UL — HIGH (ref 3.8–10.5)
WBC # BLD: 22.01 K/UL — HIGH (ref 3.8–10.5)
WBC # BLD: 22.54 K/UL — HIGH (ref 3.8–10.5)
WBC # BLD: 23.41 K/UL — HIGH (ref 3.8–10.5)
WBC # BLD: 25 K/UL — HIGH (ref 3.8–10.5)
WBC # BLD: 25.65 K/UL — HIGH (ref 3.8–10.5)
WBC # BLD: 25.78 K/UL — HIGH (ref 3.8–10.5)
WBC # BLD: 25.8 K/UL — HIGH (ref 3.8–10.5)
WBC # BLD: 26.19 K/UL — HIGH (ref 3.8–10.5)
WBC # BLD: 26.76 K/UL — HIGH (ref 3.8–10.5)
WBC # BLD: 28.79 K/UL — HIGH (ref 3.8–10.5)
WBC # BLD: 31.35 K/UL — HIGH (ref 3.8–10.5)
WBC # BLD: 32.55 K/UL — HIGH (ref 3.8–10.5)
WBC # BLD: 32.69 K/UL — HIGH (ref 3.8–10.5)
WBC # BLD: 33.56 K/UL — HIGH (ref 3.8–10.5)
WBC # BLD: 34.76 K/UL — HIGH (ref 3.8–10.5)
WBC # BLD: 35.06 K/UL — HIGH (ref 3.8–10.5)
WBC # FLD AUTO: 10.68 K/UL — HIGH (ref 3.8–10.5)
WBC # FLD AUTO: 12.17 K/UL — HIGH (ref 3.8–10.5)
WBC # FLD AUTO: 14.76 K/UL — HIGH (ref 3.8–10.5)
WBC # FLD AUTO: 14.96 K/UL — HIGH (ref 3.8–10.5)
WBC # FLD AUTO: 15.15 K/UL — HIGH (ref 3.8–10.5)
WBC # FLD AUTO: 15.75 K/UL — HIGH (ref 3.8–10.5)
WBC # FLD AUTO: 15.91 K/UL — HIGH (ref 3.8–10.5)
WBC # FLD AUTO: 16.13 K/UL — HIGH (ref 3.8–10.5)
WBC # FLD AUTO: 16.34 K/UL — HIGH (ref 3.8–10.5)
WBC # FLD AUTO: 16.35 K/UL — HIGH (ref 3.8–10.5)
WBC # FLD AUTO: 16.81 K/UL — HIGH (ref 3.8–10.5)
WBC # FLD AUTO: 17.08 K/UL — HIGH (ref 3.8–10.5)
WBC # FLD AUTO: 17.27 K/UL — HIGH (ref 3.8–10.5)
WBC # FLD AUTO: 17.29 K/UL — HIGH (ref 3.8–10.5)
WBC # FLD AUTO: 17.48 K/UL — HIGH (ref 3.8–10.5)
WBC # FLD AUTO: 17.59 K/UL — HIGH (ref 3.8–10.5)
WBC # FLD AUTO: 17.81 K/UL — HIGH (ref 3.8–10.5)
WBC # FLD AUTO: 18.02 K/UL — HIGH (ref 3.8–10.5)
WBC # FLD AUTO: 18.06 K/UL — HIGH (ref 3.8–10.5)
WBC # FLD AUTO: 18.26 K/UL — HIGH (ref 3.8–10.5)
WBC # FLD AUTO: 18.32 K/UL — HIGH (ref 3.8–10.5)
WBC # FLD AUTO: 18.37 K/UL — HIGH (ref 3.8–10.5)
WBC # FLD AUTO: 18.46 K/UL — HIGH (ref 3.8–10.5)
WBC # FLD AUTO: 18.58 K/UL — HIGH (ref 3.8–10.5)
WBC # FLD AUTO: 19.04 K/UL — HIGH (ref 3.8–10.5)
WBC # FLD AUTO: 19.28 K/UL — HIGH (ref 3.8–10.5)
WBC # FLD AUTO: 19.41 K/UL — HIGH (ref 3.8–10.5)
WBC # FLD AUTO: 19.45 K/UL — HIGH (ref 3.8–10.5)
WBC # FLD AUTO: 19.45 K/UL — HIGH (ref 3.8–10.5)
WBC # FLD AUTO: 20.64 K/UL — HIGH (ref 3.8–10.5)
WBC # FLD AUTO: 20.65 K/UL — HIGH (ref 3.8–10.5)
WBC # FLD AUTO: 20.67 K/UL — HIGH (ref 3.8–10.5)
WBC # FLD AUTO: 21.02 K/UL — HIGH (ref 3.8–10.5)
WBC # FLD AUTO: 21.41 K/UL — HIGH (ref 3.8–10.5)
WBC # FLD AUTO: 22.01 K/UL — HIGH (ref 3.8–10.5)
WBC # FLD AUTO: 22.54 K/UL — HIGH (ref 3.8–10.5)
WBC # FLD AUTO: 23.41 K/UL — HIGH (ref 3.8–10.5)
WBC # FLD AUTO: 25 K/UL — HIGH (ref 3.8–10.5)
WBC # FLD AUTO: 25.65 K/UL — HIGH (ref 3.8–10.5)
WBC # FLD AUTO: 25.78 K/UL — HIGH (ref 3.8–10.5)
WBC # FLD AUTO: 25.8 K/UL — HIGH (ref 3.8–10.5)
WBC # FLD AUTO: 26.19 K/UL — HIGH (ref 3.8–10.5)
WBC # FLD AUTO: 26.76 K/UL — HIGH (ref 3.8–10.5)
WBC # FLD AUTO: 28.79 K/UL — HIGH (ref 3.8–10.5)
WBC # FLD AUTO: 31.35 K/UL — HIGH (ref 3.8–10.5)
WBC # FLD AUTO: 32.55 K/UL — HIGH (ref 3.8–10.5)
WBC # FLD AUTO: 32.69 K/UL — HIGH (ref 3.8–10.5)
WBC # FLD AUTO: 33.56 K/UL — HIGH (ref 3.8–10.5)
WBC # FLD AUTO: 34.76 K/UL — HIGH (ref 3.8–10.5)
WBC # FLD AUTO: 35.06 K/UL — HIGH (ref 3.8–10.5)
WBC UR QL: 2 /HPF — SIGNIFICANT CHANGE UP (ref 0–5)
WBC UR QL: 8 /HPF — HIGH (ref 0–5)
WBC UR QL: ABNORMAL

## 2020-01-01 PROCEDURE — 99232 SBSQ HOSP IP/OBS MODERATE 35: CPT | Mod: GC

## 2020-01-01 PROCEDURE — 84100 ASSAY OF PHOSPHORUS: CPT

## 2020-01-01 PROCEDURE — 84300 ASSAY OF URINE SODIUM: CPT

## 2020-01-01 PROCEDURE — 99291 CRITICAL CARE FIRST HOUR: CPT

## 2020-01-01 PROCEDURE — 99233 SBSQ HOSP IP/OBS HIGH 50: CPT

## 2020-01-01 PROCEDURE — 36620 INSERTION CATHETER ARTERY: CPT | Mod: GC

## 2020-01-01 PROCEDURE — 71045 X-RAY EXAM CHEST 1 VIEW: CPT | Mod: 26

## 2020-01-01 PROCEDURE — 99291 CRITICAL CARE FIRST HOUR: CPT | Mod: 25

## 2020-01-01 PROCEDURE — 99233 SBSQ HOSP IP/OBS HIGH 50: CPT | Mod: GC

## 2020-01-01 PROCEDURE — 99232 SBSQ HOSP IP/OBS MODERATE 35: CPT

## 2020-01-01 PROCEDURE — 94640 AIRWAY INHALATION TREATMENT: CPT

## 2020-01-01 PROCEDURE — 99223 1ST HOSP IP/OBS HIGH 75: CPT | Mod: 25

## 2020-01-01 PROCEDURE — 82340 ASSAY OF CALCIUM IN URINE: CPT

## 2020-01-01 PROCEDURE — 31615 TRCHEOBRNCHSC EST TRACHS INC: CPT | Mod: 52

## 2020-01-01 PROCEDURE — 85610 PROTHROMBIN TIME: CPT

## 2020-01-01 PROCEDURE — 99285 EMERGENCY DEPT VISIT HI MDM: CPT

## 2020-01-01 PROCEDURE — 76604 US EXAM CHEST: CPT | Mod: 26,GC

## 2020-01-01 PROCEDURE — 80053 COMPREHEN METABOLIC PANEL: CPT

## 2020-01-01 PROCEDURE — 82436 ASSAY OF URINE CHLORIDE: CPT

## 2020-01-01 PROCEDURE — 87640 STAPH A DNA AMP PROBE: CPT

## 2020-01-01 PROCEDURE — 87086 URINE CULTURE/COLONY COUNT: CPT

## 2020-01-01 PROCEDURE — 83930 ASSAY OF BLOOD OSMOLALITY: CPT

## 2020-01-01 PROCEDURE — 86923 COMPATIBILITY TEST ELECTRIC: CPT

## 2020-01-01 PROCEDURE — 96374 THER/PROPH/DIAG INJ IV PUSH: CPT

## 2020-01-01 PROCEDURE — 86140 C-REACTIVE PROTEIN: CPT

## 2020-01-01 PROCEDURE — 82728 ASSAY OF FERRITIN: CPT

## 2020-01-01 PROCEDURE — 36556 INSERT NON-TUNNEL CV CATH: CPT | Mod: GC

## 2020-01-01 PROCEDURE — 93308 TTE F-UP OR LMTD: CPT | Mod: 26,GC

## 2020-01-01 PROCEDURE — G0378: CPT

## 2020-01-01 PROCEDURE — P9016: CPT

## 2020-01-01 PROCEDURE — 82962 GLUCOSE BLOOD TEST: CPT

## 2020-01-01 PROCEDURE — 93010 ELECTROCARDIOGRAM REPORT: CPT

## 2020-01-01 PROCEDURE — 86803 HEPATITIS C AB TEST: CPT

## 2020-01-01 PROCEDURE — 74018 RADEX ABDOMEN 1 VIEW: CPT | Mod: 26

## 2020-01-01 PROCEDURE — 87641 MR-STAPH DNA AMP PROBE: CPT

## 2020-01-01 PROCEDURE — 83935 ASSAY OF URINE OSMOLALITY: CPT

## 2020-01-01 PROCEDURE — 87040 BLOOD CULTURE FOR BACTERIA: CPT

## 2020-01-01 PROCEDURE — P9047: CPT

## 2020-01-01 PROCEDURE — 93971 EXTREMITY STUDY: CPT | Mod: 26,GC

## 2020-01-01 PROCEDURE — 84484 ASSAY OF TROPONIN QUANT: CPT

## 2020-01-01 PROCEDURE — 82150 ASSAY OF AMYLASE: CPT

## 2020-01-01 PROCEDURE — 87324 CLOSTRIDIUM AG IA: CPT

## 2020-01-01 PROCEDURE — 86900 BLOOD TYPING SEROLOGIC ABO: CPT

## 2020-01-01 PROCEDURE — 96375 TX/PRO/DX INJ NEW DRUG ADDON: CPT

## 2020-01-01 PROCEDURE — 83615 LACTATE (LD) (LDH) ENZYME: CPT

## 2020-01-01 PROCEDURE — 83880 ASSAY OF NATRIURETIC PEPTIDE: CPT

## 2020-01-01 PROCEDURE — 84133 ASSAY OF URINE POTASSIUM: CPT

## 2020-01-01 PROCEDURE — 86901 BLOOD TYPING SEROLOGIC RH(D): CPT

## 2020-01-01 PROCEDURE — 74018 RADEX ABDOMEN 1 VIEW: CPT

## 2020-01-01 PROCEDURE — 84478 ASSAY OF TRIGLYCERIDES: CPT

## 2020-01-01 PROCEDURE — 99236 HOSP IP/OBS SAME DATE HI 85: CPT

## 2020-01-01 PROCEDURE — 93005 ELECTROCARDIOGRAM TRACING: CPT

## 2020-01-01 PROCEDURE — 84295 ASSAY OF SERUM SODIUM: CPT

## 2020-01-01 PROCEDURE — 99285 EMERGENCY DEPT VISIT HI MDM: CPT | Mod: 25

## 2020-01-01 PROCEDURE — 83986 ASSAY PH BODY FLUID NOS: CPT

## 2020-01-01 PROCEDURE — 82565 ASSAY OF CREATININE: CPT

## 2020-01-01 PROCEDURE — 82435 ASSAY OF BLOOD CHLORIDE: CPT

## 2020-01-01 PROCEDURE — 82330 ASSAY OF CALCIUM: CPT

## 2020-01-01 PROCEDURE — 82803 BLOOD GASES ANY COMBINATION: CPT

## 2020-01-01 PROCEDURE — 94002 VENT MGMT INPAT INIT DAY: CPT

## 2020-01-01 PROCEDURE — 83690 ASSAY OF LIPASE: CPT

## 2020-01-01 PROCEDURE — 83735 ASSAY OF MAGNESIUM: CPT

## 2020-01-01 PROCEDURE — 85379 FIBRIN DEGRADATION QUANT: CPT

## 2020-01-01 PROCEDURE — 99223 1ST HOSP IP/OBS HIGH 75: CPT | Mod: GC

## 2020-01-01 PROCEDURE — 85730 THROMBOPLASTIN TIME PARTIAL: CPT

## 2020-01-01 PROCEDURE — 83605 ASSAY OF LACTIC ACID: CPT

## 2020-01-01 PROCEDURE — 82550 ASSAY OF CK (CPK): CPT

## 2020-01-01 PROCEDURE — 81001 URINALYSIS AUTO W/SCOPE: CPT

## 2020-01-01 PROCEDURE — 71045 X-RAY EXAM CHEST 1 VIEW: CPT | Mod: 26,77

## 2020-01-01 PROCEDURE — 82947 ASSAY GLUCOSE BLOOD QUANT: CPT

## 2020-01-01 PROCEDURE — 76705 ECHO EXAM OF ABDOMEN: CPT | Mod: 26,RT

## 2020-01-01 PROCEDURE — 31622 DX BRONCHOSCOPE/WASH: CPT | Mod: GC,59

## 2020-01-01 PROCEDURE — 99497 ADVNCD CARE PLAN 30 MIN: CPT

## 2020-01-01 PROCEDURE — 87449 NOS EACH ORGANISM AG IA: CPT

## 2020-01-01 PROCEDURE — 81005 URINALYSIS: CPT

## 2020-01-01 PROCEDURE — 76705 ECHO EXAM OF ABDOMEN: CPT

## 2020-01-01 PROCEDURE — 85014 HEMATOCRIT: CPT

## 2020-01-01 PROCEDURE — 71045 X-RAY EXAM CHEST 1 VIEW: CPT

## 2020-01-01 PROCEDURE — 84132 ASSAY OF SERUM POTASSIUM: CPT

## 2020-01-01 PROCEDURE — U0003: CPT

## 2020-01-01 PROCEDURE — 86850 RBC ANTIBODY SCREEN: CPT

## 2020-01-01 PROCEDURE — 99497 ADVNCD CARE PLAN 30 MIN: CPT | Mod: GC

## 2020-01-01 PROCEDURE — 94003 VENT MGMT INPAT SUBQ DAY: CPT

## 2020-01-01 PROCEDURE — 87070 CULTURE OTHR SPECIMN AEROBIC: CPT

## 2020-01-01 PROCEDURE — 80048 BASIC METABOLIC PNL TOTAL CA: CPT

## 2020-01-01 PROCEDURE — 31500 INSERT EMERGENCY AIRWAY: CPT | Mod: GC,59

## 2020-01-01 PROCEDURE — 80202 ASSAY OF VANCOMYCIN: CPT

## 2020-01-01 PROCEDURE — 85027 COMPLETE CBC AUTOMATED: CPT

## 2020-01-01 PROCEDURE — 36430 TRANSFUSION BLD/BLD COMPNT: CPT

## 2020-01-01 PROCEDURE — 99221 1ST HOSP IP/OBS SF/LOW 40: CPT

## 2020-01-01 PROCEDURE — 84145 PROCALCITONIN (PCT): CPT

## 2020-01-01 RX ORDER — METHADONE HYDROCHLORIDE 40 MG/1
5 TABLET ORAL DAILY
Refills: 0 | Status: DISCONTINUED | OUTPATIENT
Start: 2020-01-01 | End: 2020-01-01

## 2020-01-01 RX ORDER — FUROSEMIDE 40 MG
7 TABLET ORAL
Qty: 500 | Refills: 0 | Status: DISCONTINUED | OUTPATIENT
Start: 2020-01-01 | End: 2020-01-01

## 2020-01-01 RX ORDER — NOREPINEPHRINE BITARTRATE/D5W 8 MG/250ML
0.05 PLASTIC BAG, INJECTION (ML) INTRAVENOUS
Qty: 16 | Refills: 0 | Status: DISCONTINUED | OUTPATIENT
Start: 2020-01-01 | End: 2020-01-01

## 2020-01-01 RX ORDER — DIPHENHYDRAMINE HCL 50 MG
50 CAPSULE ORAL ONCE
Refills: 0 | Status: DISCONTINUED | OUTPATIENT
Start: 2020-01-01 | End: 2020-01-01

## 2020-01-01 RX ORDER — POTASSIUM CHLORIDE 20 MEQ
40 PACKET (EA) ORAL ONCE
Refills: 0 | Status: COMPLETED | OUTPATIENT
Start: 2020-01-01 | End: 2020-01-01

## 2020-01-01 RX ORDER — POTASSIUM CHLORIDE 20 MEQ
40 PACKET (EA) ORAL EVERY 4 HOURS
Refills: 0 | Status: COMPLETED | OUTPATIENT
Start: 2020-01-01 | End: 2020-01-01

## 2020-01-01 RX ORDER — CASPOFUNGIN ACETATE 7 MG/ML
50 INJECTION, POWDER, LYOPHILIZED, FOR SOLUTION INTRAVENOUS EVERY 24 HOURS
Refills: 0 | Status: DISCONTINUED | OUTPATIENT
Start: 2020-01-01 | End: 2020-01-01

## 2020-01-01 RX ORDER — FENTANYL CITRATE 50 UG/ML
100 INJECTION INTRAVENOUS ONCE
Refills: 0 | Status: DISCONTINUED | OUTPATIENT
Start: 2020-01-01 | End: 2020-01-01

## 2020-01-01 RX ORDER — ALBUTEROL 90 UG/1
2 AEROSOL, METERED ORAL EVERY 6 HOURS
Refills: 0 | Status: DISCONTINUED | OUTPATIENT
Start: 2020-01-01 | End: 2020-01-01

## 2020-01-01 RX ORDER — PROPOFOL 10 MG/ML
24.89 INJECTION, EMULSION INTRAVENOUS
Qty: 1000 | Refills: 0 | Status: DISCONTINUED | OUTPATIENT
Start: 2020-01-01 | End: 2020-01-01

## 2020-01-01 RX ORDER — ROCURONIUM BROMIDE 10 MG/ML
8 VIAL (ML) INTRAVENOUS
Qty: 500 | Refills: 0 | Status: DISCONTINUED | OUTPATIENT
Start: 2020-01-01 | End: 2020-01-01

## 2020-01-01 RX ORDER — KETAMINE HYDROCHLORIDE 100 MG/ML
0.25 INJECTION INTRAMUSCULAR; INTRAVENOUS
Qty: 1000 | Refills: 0 | Status: DISCONTINUED | OUTPATIENT
Start: 2020-01-01 | End: 2020-01-01

## 2020-01-01 RX ORDER — SENNA PLUS 8.6 MG/1
2 TABLET ORAL AT BEDTIME
Refills: 0 | Status: DISCONTINUED | OUTPATIENT
Start: 2020-01-01 | End: 2020-01-01

## 2020-01-01 RX ORDER — SODIUM CHLORIDE 9 MG/ML
1000 INJECTION, SOLUTION INTRAVENOUS ONCE
Refills: 0 | Status: COMPLETED | OUTPATIENT
Start: 2020-01-01 | End: 2020-01-01

## 2020-01-01 RX ORDER — KETAMINE HYDROCHLORIDE 100 MG/ML
0.5 INJECTION INTRAMUSCULAR; INTRAVENOUS
Qty: 1000 | Refills: 0 | Status: DISCONTINUED | OUTPATIENT
Start: 2020-01-01 | End: 2020-01-01

## 2020-01-01 RX ORDER — POTASSIUM CHLORIDE 20 MEQ
20 PACKET (EA) ORAL
Refills: 0 | Status: COMPLETED | OUTPATIENT
Start: 2020-01-01 | End: 2020-01-01

## 2020-01-01 RX ORDER — POTASSIUM CHLORIDE 20 MEQ
10 PACKET (EA) ORAL
Refills: 0 | Status: COMPLETED | OUTPATIENT
Start: 2020-01-01 | End: 2020-01-01

## 2020-01-01 RX ORDER — ACETAMINOPHEN 500 MG
1000 TABLET ORAL ONCE
Refills: 0 | Status: COMPLETED | OUTPATIENT
Start: 2020-01-01 | End: 2020-01-01

## 2020-01-01 RX ORDER — ACETAZOLAMIDE 250 MG/1
500 TABLET ORAL ONCE
Refills: 0 | Status: COMPLETED | OUTPATIENT
Start: 2020-01-01 | End: 2020-01-01

## 2020-01-01 RX ORDER — VANCOMYCIN HCL 1 G
750 VIAL (EA) INTRAVENOUS EVERY 12 HOURS
Refills: 0 | Status: DISCONTINUED | OUTPATIENT
Start: 2020-01-01 | End: 2020-01-01

## 2020-01-01 RX ORDER — AZITHROMYCIN 500 MG/1
500 TABLET, FILM COATED ORAL ONCE
Refills: 0 | Status: COMPLETED | OUTPATIENT
Start: 2020-01-01 | End: 2020-01-01

## 2020-01-01 RX ORDER — HEPARIN SODIUM 5000 [USP'U]/ML
900 INJECTION INTRAVENOUS; SUBCUTANEOUS
Qty: 25000 | Refills: 0 | Status: DISCONTINUED | OUTPATIENT
Start: 2020-01-01 | End: 2020-01-01

## 2020-01-01 RX ORDER — FUROSEMIDE 40 MG
40 TABLET ORAL ONCE
Refills: 0 | Status: COMPLETED | OUTPATIENT
Start: 2020-01-01 | End: 2020-01-01

## 2020-01-01 RX ORDER — CHLORHEXIDINE GLUCONATE 213 G/1000ML
1 SOLUTION TOPICAL
Refills: 0 | Status: DISCONTINUED | OUTPATIENT
Start: 2020-01-01 | End: 2020-01-01

## 2020-01-01 RX ORDER — FENTANYL CITRATE 50 UG/ML
0.5 INJECTION INTRAVENOUS
Qty: 5000 | Refills: 0 | Status: DISCONTINUED | OUTPATIENT
Start: 2020-01-01 | End: 2020-01-01

## 2020-01-01 RX ORDER — ACETAZOLAMIDE 250 MG/1
250 TABLET ORAL ONCE
Refills: 0 | Status: COMPLETED | OUTPATIENT
Start: 2020-01-01 | End: 2020-01-01

## 2020-01-01 RX ORDER — MEROPENEM 1 G/30ML
1000 INJECTION INTRAVENOUS EVERY 8 HOURS
Refills: 0 | Status: DISCONTINUED | OUTPATIENT
Start: 2020-01-01 | End: 2020-01-01

## 2020-01-01 RX ORDER — HEPARIN SODIUM 5000 [USP'U]/ML
13 INJECTION INTRAVENOUS; SUBCUTANEOUS
Qty: 25000 | Refills: 0 | Status: DISCONTINUED | OUTPATIENT
Start: 2020-01-01 | End: 2020-01-01

## 2020-01-01 RX ORDER — CASPOFUNGIN ACETATE 7 MG/ML
70 INJECTION, POWDER, LYOPHILIZED, FOR SOLUTION INTRAVENOUS ONCE
Refills: 0 | Status: COMPLETED | OUTPATIENT
Start: 2020-01-01 | End: 2020-01-01

## 2020-01-01 RX ORDER — FUROSEMIDE 40 MG
40 TABLET ORAL EVERY 12 HOURS
Refills: 0 | Status: DISCONTINUED | OUTPATIENT
Start: 2020-01-01 | End: 2020-01-01

## 2020-01-01 RX ORDER — PROPOFOL 10 MG/ML
20 INJECTION, EMULSION INTRAVENOUS
Qty: 1000 | Refills: 0 | Status: DISCONTINUED | OUTPATIENT
Start: 2020-01-01 | End: 2020-01-01

## 2020-01-01 RX ORDER — POTASSIUM CHLORIDE 20 MEQ
40 PACKET (EA) ORAL ONCE
Refills: 0 | Status: DISCONTINUED | OUTPATIENT
Start: 2020-01-01 | End: 2020-01-01

## 2020-01-01 RX ORDER — POTASSIUM PHOSPHATE, MONOBASIC POTASSIUM PHOSPHATE, DIBASIC 236; 224 MG/ML; MG/ML
30 INJECTION, SOLUTION INTRAVENOUS ONCE
Refills: 0 | Status: COMPLETED | OUTPATIENT
Start: 2020-01-01 | End: 2020-01-01

## 2020-01-01 RX ORDER — LABETALOL HCL 100 MG
10 TABLET ORAL
Refills: 0 | Status: DISCONTINUED | OUTPATIENT
Start: 2020-01-01 | End: 2020-01-01

## 2020-01-01 RX ORDER — PROPOFOL 10 MG/ML
50 INJECTION, EMULSION INTRAVENOUS
Qty: 1000 | Refills: 0 | Status: DISCONTINUED | OUTPATIENT
Start: 2020-01-01 | End: 2020-01-01

## 2020-01-01 RX ORDER — REMDESIVIR 5 MG/ML
200 INJECTION INTRAVENOUS ONCE
Refills: 0 | Status: COMPLETED | OUTPATIENT
Start: 2020-01-01 | End: 2020-01-01

## 2020-01-01 RX ORDER — ACETAZOLAMIDE 250 MG/1
250 TABLET ORAL ONCE
Refills: 0 | Status: DISCONTINUED | OUTPATIENT
Start: 2020-01-01 | End: 2020-01-01

## 2020-01-01 RX ORDER — POTASSIUM PHOSPHATE, MONOBASIC POTASSIUM PHOSPHATE, DIBASIC 236; 224 MG/ML; MG/ML
15 INJECTION, SOLUTION INTRAVENOUS ONCE
Refills: 0 | Status: COMPLETED | OUTPATIENT
Start: 2020-01-01 | End: 2020-01-01

## 2020-01-01 RX ORDER — SODIUM CHLORIDE 9 MG/ML
1000 INJECTION INTRAMUSCULAR; INTRAVENOUS; SUBCUTANEOUS
Refills: 0 | Status: DISCONTINUED | OUTPATIENT
Start: 2020-01-01 | End: 2020-01-01

## 2020-01-01 RX ORDER — CISATRACURIUM BESYLATE 2 MG/ML
20 INJECTION INTRAVENOUS ONCE
Refills: 0 | Status: COMPLETED | OUTPATIENT
Start: 2020-01-01 | End: 2020-01-01

## 2020-01-01 RX ORDER — SODIUM CHLORIDE 9 MG/ML
500 INJECTION, SOLUTION INTRAVENOUS ONCE
Refills: 0 | Status: COMPLETED | OUTPATIENT
Start: 2020-01-01 | End: 2020-01-01

## 2020-01-01 RX ORDER — VASOPRESSIN 20 [USP'U]/ML
0.04 INJECTION INTRAVENOUS
Qty: 50 | Refills: 0 | Status: DISCONTINUED | OUTPATIENT
Start: 2020-01-01 | End: 2020-01-01

## 2020-01-01 RX ORDER — ROCURONIUM BROMIDE 10 MG/ML
6 VIAL (ML) INTRAVENOUS
Qty: 500 | Refills: 0 | Status: DISCONTINUED | OUTPATIENT
Start: 2020-01-01 | End: 2020-01-01

## 2020-01-01 RX ORDER — AZITHROMYCIN 500 MG/1
500 TABLET, FILM COATED ORAL EVERY 24 HOURS
Refills: 0 | Status: DISCONTINUED | OUTPATIENT
Start: 2020-01-01 | End: 2020-01-01

## 2020-01-01 RX ORDER — FUROSEMIDE 40 MG
20 TABLET ORAL DAILY
Refills: 0 | Status: DISCONTINUED | OUTPATIENT
Start: 2020-01-01 | End: 2020-01-01

## 2020-01-01 RX ORDER — FUROSEMIDE 40 MG
20 TABLET ORAL ONCE
Refills: 0 | Status: COMPLETED | OUTPATIENT
Start: 2020-01-01 | End: 2020-01-01

## 2020-01-01 RX ORDER — MIDAZOLAM HYDROCHLORIDE 1 MG/ML
2 INJECTION, SOLUTION INTRAMUSCULAR; INTRAVENOUS ONCE
Refills: 0 | Status: DISCONTINUED | OUTPATIENT
Start: 2020-01-01 | End: 2020-01-01

## 2020-01-01 RX ORDER — CHLORHEXIDINE GLUCONATE 213 G/1000ML
15 SOLUTION TOPICAL EVERY 12 HOURS
Refills: 0 | Status: DISCONTINUED | OUTPATIENT
Start: 2020-01-01 | End: 2020-01-01

## 2020-01-01 RX ORDER — CALCIUM GLUCONATE 100 MG/ML
2 VIAL (ML) INTRAVENOUS ONCE
Refills: 0 | Status: COMPLETED | OUTPATIENT
Start: 2020-01-01 | End: 2020-01-01

## 2020-01-01 RX ORDER — PROPOFOL 10 MG/ML
25 INJECTION, EMULSION INTRAVENOUS
Qty: 1000 | Refills: 0 | Status: DISCONTINUED | OUTPATIENT
Start: 2020-01-01 | End: 2020-01-01

## 2020-01-01 RX ORDER — BUMETANIDE 0.25 MG/ML
1 INJECTION INTRAMUSCULAR; INTRAVENOUS
Qty: 20 | Refills: 0 | Status: DISCONTINUED | OUTPATIENT
Start: 2020-01-01 | End: 2020-01-01

## 2020-01-01 RX ORDER — VANCOMYCIN HCL 1 G
1000 VIAL (EA) INTRAVENOUS EVERY 12 HOURS
Refills: 0 | Status: DISCONTINUED | OUTPATIENT
Start: 2020-01-01 | End: 2020-01-01

## 2020-01-01 RX ORDER — EPINEPHRINE 0.3 MG/.3ML
0.3 INJECTION INTRAMUSCULAR; SUBCUTANEOUS ONCE
Refills: 0 | Status: DISCONTINUED | OUTPATIENT
Start: 2020-01-01 | End: 2020-01-01

## 2020-01-01 RX ORDER — CEFTRIAXONE 500 MG/1
1000 INJECTION, POWDER, FOR SOLUTION INTRAMUSCULAR; INTRAVENOUS ONCE
Refills: 0 | Status: COMPLETED | OUTPATIENT
Start: 2020-01-01 | End: 2020-01-01

## 2020-01-01 RX ORDER — BUMETANIDE 0.25 MG/ML
1 INJECTION INTRAMUSCULAR; INTRAVENOUS EVERY 8 HOURS
Refills: 0 | Status: DISCONTINUED | OUTPATIENT
Start: 2020-01-01 | End: 2020-01-01

## 2020-01-01 RX ORDER — SENNA PLUS 8.6 MG/1
10 TABLET ORAL DAILY
Refills: 0 | Status: DISCONTINUED | OUTPATIENT
Start: 2020-01-01 | End: 2020-01-01

## 2020-01-01 RX ORDER — ACETAMINOPHEN 500 MG
650 TABLET ORAL EVERY 6 HOURS
Refills: 0 | Status: DISCONTINUED | OUTPATIENT
Start: 2020-01-01 | End: 2020-01-01

## 2020-01-01 RX ORDER — ENOXAPARIN SODIUM 100 MG/ML
40 INJECTION SUBCUTANEOUS DAILY
Refills: 0 | Status: DISCONTINUED | OUTPATIENT
Start: 2020-01-01 | End: 2020-01-01

## 2020-01-01 RX ORDER — POLYETHYLENE GLYCOL 3350 17 G/17G
17 POWDER, FOR SOLUTION ORAL
Refills: 0 | Status: DISCONTINUED | OUTPATIENT
Start: 2020-01-01 | End: 2020-01-01

## 2020-01-01 RX ORDER — FENTANYL CITRATE 50 UG/ML
0.5 INJECTION INTRAVENOUS
Qty: 2500 | Refills: 0 | Status: DISCONTINUED | OUTPATIENT
Start: 2020-01-01 | End: 2020-01-01

## 2020-01-01 RX ORDER — HEPARIN SODIUM 5000 [USP'U]/ML
1300 INJECTION INTRAVENOUS; SUBCUTANEOUS
Qty: 25000 | Refills: 0 | Status: DISCONTINUED | OUTPATIENT
Start: 2020-01-01 | End: 2020-01-01

## 2020-01-01 RX ORDER — PIPERACILLIN AND TAZOBACTAM 4; .5 G/20ML; G/20ML
3.38 INJECTION, POWDER, LYOPHILIZED, FOR SOLUTION INTRAVENOUS ONCE
Refills: 0 | Status: COMPLETED | OUTPATIENT
Start: 2020-01-01 | End: 2020-01-01

## 2020-01-01 RX ORDER — INSULIN LISPRO 100/ML
VIAL (ML) SUBCUTANEOUS
Refills: 0 | Status: DISCONTINUED | OUTPATIENT
Start: 2020-01-01 | End: 2020-01-01

## 2020-01-01 RX ORDER — ALBUMIN HUMAN 25 %
100 VIAL (ML) INTRAVENOUS EVERY 6 HOURS
Refills: 0 | Status: DISCONTINUED | OUTPATIENT
Start: 2020-01-01 | End: 2020-01-01

## 2020-01-01 RX ORDER — ACETAZOLAMIDE 250 MG/1
500 TABLET ORAL ONCE
Refills: 0 | Status: DISCONTINUED | OUTPATIENT
Start: 2020-01-01 | End: 2020-01-01

## 2020-01-01 RX ORDER — CASPOFUNGIN ACETATE 7 MG/ML
INJECTION, POWDER, LYOPHILIZED, FOR SOLUTION INTRAVENOUS
Refills: 0 | Status: DISCONTINUED | OUTPATIENT
Start: 2020-01-01 | End: 2020-01-01

## 2020-01-01 RX ORDER — SODIUM ZIRCONIUM CYCLOSILICATE 10 G/10G
10 POWDER, FOR SUSPENSION ORAL ONCE
Refills: 0 | Status: DISCONTINUED | OUTPATIENT
Start: 2020-01-01 | End: 2020-01-01

## 2020-01-01 RX ORDER — BACITRACIN ZINC 500 UNIT/G
1 OINTMENT IN PACKET (EA) TOPICAL
Refills: 0 | Status: DISCONTINUED | OUTPATIENT
Start: 2020-01-01 | End: 2020-01-01

## 2020-01-01 RX ORDER — CHLORHEXIDINE GLUCONATE 213 G/1000ML
15 SOLUTION TOPICAL
Refills: 0 | Status: DISCONTINUED | OUTPATIENT
Start: 2020-01-01 | End: 2020-01-01

## 2020-01-01 RX ORDER — BUMETANIDE 0.25 MG/ML
1 INJECTION INTRAMUSCULAR; INTRAVENOUS EVERY 12 HOURS
Refills: 0 | Status: DISCONTINUED | OUTPATIENT
Start: 2020-01-01 | End: 2020-01-01

## 2020-01-01 RX ORDER — ROCURONIUM BROMIDE 10 MG/ML
35 VIAL (ML) INTRAVENOUS ONCE
Refills: 0 | Status: COMPLETED | OUTPATIENT
Start: 2020-01-01 | End: 2020-01-01

## 2020-01-01 RX ORDER — SODIUM CHLORIDE 9 MG/ML
2000 INJECTION, SOLUTION INTRAVENOUS
Refills: 0 | Status: DISCONTINUED | OUTPATIENT
Start: 2020-01-01 | End: 2020-01-01

## 2020-01-01 RX ORDER — FLUCONAZOLE 150 MG/1
200 TABLET ORAL EVERY 24 HOURS
Refills: 0 | Status: DISCONTINUED | OUTPATIENT
Start: 2020-01-01 | End: 2020-01-01

## 2020-01-01 RX ORDER — TOCILIZUMAB 20 MG/ML
400 INJECTION, SOLUTION, CONCENTRATE INTRAVENOUS ONCE
Refills: 0 | Status: COMPLETED | OUTPATIENT
Start: 2020-01-01 | End: 2020-01-01

## 2020-01-01 RX ORDER — SODIUM CHLORIDE 9 MG/ML
250 INJECTION, SOLUTION INTRAVENOUS
Refills: 0 | Status: DISCONTINUED | OUTPATIENT
Start: 2020-01-01 | End: 2020-01-01

## 2020-01-01 RX ORDER — SODIUM CHLORIDE 9 MG/ML
10 INJECTION INTRAMUSCULAR; INTRAVENOUS; SUBCUTANEOUS
Refills: 0 | Status: DISCONTINUED | OUTPATIENT
Start: 2020-01-01 | End: 2020-01-01

## 2020-01-01 RX ORDER — NICARDIPINE HYDROCHLORIDE 30 MG/1
5 CAPSULE, EXTENDED RELEASE ORAL
Qty: 40 | Refills: 0 | Status: DISCONTINUED | OUTPATIENT
Start: 2020-01-01 | End: 2020-01-01

## 2020-01-01 RX ORDER — METHADONE HYDROCHLORIDE 40 MG/1
5 TABLET ORAL EVERY 12 HOURS
Refills: 0 | Status: DISCONTINUED | OUTPATIENT
Start: 2020-01-01 | End: 2020-01-01

## 2020-01-01 RX ORDER — PIPERACILLIN AND TAZOBACTAM 4; .5 G/20ML; G/20ML
3.38 INJECTION, POWDER, LYOPHILIZED, FOR SOLUTION INTRAVENOUS EVERY 8 HOURS
Refills: 0 | Status: DISCONTINUED | OUTPATIENT
Start: 2020-01-01 | End: 2020-01-01

## 2020-01-01 RX ORDER — BUMETANIDE 0.25 MG/ML
1 INJECTION INTRAMUSCULAR; INTRAVENOUS ONCE
Refills: 0 | Status: COMPLETED | OUTPATIENT
Start: 2020-01-01 | End: 2020-01-01

## 2020-01-01 RX ORDER — LABETALOL HCL 100 MG
200 TABLET ORAL EVERY 8 HOURS
Refills: 0 | Status: DISCONTINUED | OUTPATIENT
Start: 2020-01-01 | End: 2020-01-01

## 2020-01-01 RX ORDER — MIDODRINE HYDROCHLORIDE 2.5 MG/1
10 TABLET ORAL EVERY 8 HOURS
Refills: 0 | Status: DISCONTINUED | OUTPATIENT
Start: 2020-01-01 | End: 2020-01-01

## 2020-01-01 RX ORDER — ROCURONIUM BROMIDE 10 MG/ML
70 VIAL (ML) INTRAVENOUS ONCE
Refills: 0 | Status: COMPLETED | OUTPATIENT
Start: 2020-01-01 | End: 2020-01-01

## 2020-01-01 RX ORDER — POTASSIUM CHLORIDE 20 MEQ
20 PACKET (EA) ORAL EVERY 4 HOURS
Refills: 0 | Status: DISCONTINUED | OUTPATIENT
Start: 2020-01-01 | End: 2020-01-01

## 2020-01-01 RX ORDER — FENTANYL CITRATE 50 UG/ML
1.5 INJECTION INTRAVENOUS
Qty: 5000 | Refills: 0 | Status: DISCONTINUED | OUTPATIENT
Start: 2020-01-01 | End: 2020-01-01

## 2020-01-01 RX ORDER — SODIUM BICARBONATE 1 MEQ/ML
50 SYRINGE (ML) INTRAVENOUS
Refills: 0 | Status: COMPLETED | OUTPATIENT
Start: 2020-01-01 | End: 2020-01-01

## 2020-01-01 RX ORDER — ENOXAPARIN SODIUM 100 MG/ML
70 INJECTION SUBCUTANEOUS
Refills: 0 | Status: DISCONTINUED | OUTPATIENT
Start: 2020-01-01 | End: 2020-01-01

## 2020-01-01 RX ORDER — LACTULOSE 10 G/15ML
10 SOLUTION ORAL EVERY 12 HOURS
Refills: 0 | Status: DISCONTINUED | OUTPATIENT
Start: 2020-01-01 | End: 2020-01-01

## 2020-01-01 RX ORDER — SODIUM BICARBONATE 1 MEQ/ML
50 SYRINGE (ML) INTRAVENOUS ONCE
Refills: 0 | Status: COMPLETED | OUTPATIENT
Start: 2020-01-01 | End: 2020-01-01

## 2020-01-01 RX ORDER — AZITHROMYCIN 500 MG/1
250 TABLET, FILM COATED ORAL DAILY
Refills: 0 | Status: DISCONTINUED | OUTPATIENT
Start: 2020-01-01 | End: 2020-01-01

## 2020-01-01 RX ORDER — FENTANYL CITRATE 50 UG/ML
200 INJECTION INTRAVENOUS ONCE
Refills: 0 | Status: DISCONTINUED | OUTPATIENT
Start: 2020-01-01 | End: 2020-01-01

## 2020-01-01 RX ORDER — VANCOMYCIN HCL 1 G
750 VIAL (EA) INTRAVENOUS EVERY 8 HOURS
Refills: 0 | Status: DISCONTINUED | OUTPATIENT
Start: 2020-01-01 | End: 2020-01-01

## 2020-01-01 RX ORDER — LABETALOL HCL 100 MG
10 TABLET ORAL ONCE
Refills: 0 | Status: COMPLETED | OUTPATIENT
Start: 2020-01-01 | End: 2020-01-01

## 2020-01-01 RX ORDER — MIDAZOLAM HYDROCHLORIDE 1 MG/ML
4 INJECTION, SOLUTION INTRAMUSCULAR; INTRAVENOUS ONCE
Refills: 0 | Status: DISCONTINUED | OUTPATIENT
Start: 2020-01-01 | End: 2020-01-01

## 2020-01-01 RX ORDER — CEFTRIAXONE 500 MG/1
1000 INJECTION, POWDER, FOR SOLUTION INTRAMUSCULAR; INTRAVENOUS EVERY 24 HOURS
Refills: 0 | Status: DISCONTINUED | OUTPATIENT
Start: 2020-01-01 | End: 2020-01-01

## 2020-01-01 RX ORDER — SODIUM CHLORIDE 9 MG/ML
3 INJECTION INTRAMUSCULAR; INTRAVENOUS; SUBCUTANEOUS EVERY 8 HOURS
Refills: 0 | Status: DISCONTINUED | OUTPATIENT
Start: 2020-01-01 | End: 2020-01-01

## 2020-01-01 RX ORDER — HYDRALAZINE HCL 50 MG
50 TABLET ORAL EVERY 8 HOURS
Refills: 0 | Status: DISCONTINUED | OUTPATIENT
Start: 2020-01-01 | End: 2020-01-01

## 2020-01-01 RX ORDER — REMDESIVIR 5 MG/ML
100 INJECTION INTRAVENOUS DAILY
Refills: 0 | Status: COMPLETED | OUTPATIENT
Start: 2020-01-01 | End: 2020-01-01

## 2020-01-01 RX ORDER — ROCURONIUM BROMIDE 10 MG/ML
25 VIAL (ML) INTRAVENOUS ONCE
Refills: 0 | Status: COMPLETED | OUTPATIENT
Start: 2020-01-01 | End: 2020-01-01

## 2020-01-01 RX ORDER — FENTANYL CITRATE 50 UG/ML
50 INJECTION INTRAVENOUS ONCE
Refills: 0 | Status: DISCONTINUED | OUTPATIENT
Start: 2020-01-01 | End: 2020-01-01

## 2020-01-01 RX ORDER — POLYETHYLENE GLYCOL 3350 17 G/17G
17 POWDER, FOR SOLUTION ORAL DAILY
Refills: 0 | Status: DISCONTINUED | OUTPATIENT
Start: 2020-01-01 | End: 2020-01-01

## 2020-01-01 RX ORDER — AZITHROMYCIN 500 MG/1
TABLET, FILM COATED ORAL
Refills: 0 | Status: DISCONTINUED | OUTPATIENT
Start: 2020-01-01 | End: 2020-01-01

## 2020-01-01 RX ORDER — METHADONE HYDROCHLORIDE 40 MG/1
5 TABLET ORAL EVERY 8 HOURS
Refills: 0 | Status: DISCONTINUED | OUTPATIENT
Start: 2020-01-01 | End: 2020-01-01

## 2020-01-01 RX ORDER — IPRATROPIUM/ALBUTEROL SULFATE 18-103MCG
3 AEROSOL WITH ADAPTER (GRAM) INHALATION EVERY 6 HOURS
Refills: 0 | Status: DISCONTINUED | OUTPATIENT
Start: 2020-01-01 | End: 2020-01-01

## 2020-01-01 RX ORDER — PROPOFOL 10 MG/ML
10 INJECTION, EMULSION INTRAVENOUS
Qty: 500 | Refills: 0 | Status: DISCONTINUED | OUTPATIENT
Start: 2020-01-01 | End: 2020-01-01

## 2020-01-01 RX ORDER — NOREPINEPHRINE BITARTRATE/D5W 8 MG/250ML
0.06 PLASTIC BAG, INJECTION (ML) INTRAVENOUS
Qty: 8 | Refills: 0 | Status: DISCONTINUED | OUTPATIENT
Start: 2020-01-01 | End: 2020-01-01

## 2020-01-01 RX ORDER — LACTULOSE 10 G/15ML
10 SOLUTION ORAL ONCE
Refills: 0 | Status: COMPLETED | OUTPATIENT
Start: 2020-01-01 | End: 2020-01-01

## 2020-01-01 RX ORDER — HYDRALAZINE HCL 50 MG
25 TABLET ORAL EVERY 8 HOURS
Refills: 0 | Status: DISCONTINUED | OUTPATIENT
Start: 2020-01-01 | End: 2020-01-01

## 2020-01-01 RX ORDER — HYDRALAZINE HCL 50 MG
25 TABLET ORAL THREE TIMES A DAY
Refills: 0 | Status: DISCONTINUED | OUTPATIENT
Start: 2020-01-01 | End: 2020-01-01

## 2020-01-01 RX ORDER — MAGNESIUM SULFATE 500 MG/ML
2 VIAL (ML) INJECTION ONCE
Refills: 0 | Status: COMPLETED | OUTPATIENT
Start: 2020-01-01 | End: 2020-01-01

## 2020-01-01 RX ORDER — INSULIN LISPRO 100/ML
VIAL (ML) SUBCUTANEOUS EVERY 6 HOURS
Refills: 0 | Status: DISCONTINUED | OUTPATIENT
Start: 2020-01-01 | End: 2020-01-01

## 2020-01-01 RX ORDER — POTASSIUM CHLORIDE 20 MEQ
20 PACKET (EA) ORAL ONCE
Refills: 0 | Status: COMPLETED | OUTPATIENT
Start: 2020-01-01 | End: 2020-01-01

## 2020-01-01 RX ORDER — ALBUMIN HUMAN 25 %
100 VIAL (ML) INTRAVENOUS EVERY 12 HOURS
Refills: 0 | Status: DISCONTINUED | OUTPATIENT
Start: 2020-01-01 | End: 2020-01-01

## 2020-01-01 RX ORDER — FUROSEMIDE 40 MG
8 TABLET ORAL
Qty: 500 | Refills: 0 | Status: DISCONTINUED | OUTPATIENT
Start: 2020-01-01 | End: 2020-01-01

## 2020-01-01 RX ORDER — FENTANYL CITRATE 50 UG/ML
25 INJECTION INTRAVENOUS ONCE
Refills: 0 | Status: DISCONTINUED | OUTPATIENT
Start: 2020-01-01 | End: 2020-01-01

## 2020-01-01 RX ORDER — VANCOMYCIN HCL 1 G
VIAL (EA) INTRAVENOUS
Refills: 0 | Status: DISCONTINUED | OUTPATIENT
Start: 2020-01-01 | End: 2020-01-01

## 2020-01-01 RX ORDER — ACETAMINOPHEN 500 MG
975 TABLET ORAL ONCE
Refills: 0 | Status: COMPLETED | OUTPATIENT
Start: 2020-01-01 | End: 2020-01-01

## 2020-01-01 RX ORDER — METHADONE HYDROCHLORIDE 40 MG/1
TABLET ORAL
Refills: 0 | Status: DISCONTINUED | OUTPATIENT
Start: 2020-01-01 | End: 2020-01-01

## 2020-01-01 RX ORDER — DEXMEDETOMIDINE HYDROCHLORIDE IN 0.9% SODIUM CHLORIDE 4 UG/ML
0.5 INJECTION INTRAVENOUS
Qty: 200 | Refills: 0 | Status: DISCONTINUED | OUTPATIENT
Start: 2020-01-01 | End: 2020-01-01

## 2020-01-01 RX ORDER — LABETALOL HCL 100 MG
200 TABLET ORAL
Refills: 0 | Status: DISCONTINUED | OUTPATIENT
Start: 2020-01-01 | End: 2020-01-01

## 2020-01-01 RX ORDER — FAMOTIDINE 10 MG/ML
20 INJECTION INTRAVENOUS DAILY
Refills: 0 | Status: DISCONTINUED | OUTPATIENT
Start: 2020-01-01 | End: 2020-01-01

## 2020-01-01 RX ORDER — ROCURONIUM BROMIDE 10 MG/ML
50 VIAL (ML) INTRAVENOUS ONCE
Refills: 0 | Status: COMPLETED | OUTPATIENT
Start: 2020-01-01 | End: 2020-01-01

## 2020-01-01 RX ORDER — ALBUTEROL 90 UG/1
2 AEROSOL, METERED ORAL EVERY 4 HOURS
Refills: 0 | Status: COMPLETED | OUTPATIENT
Start: 2020-01-01 | End: 2020-01-01

## 2020-01-01 RX ORDER — SENNA PLUS 8.6 MG/1
10 TABLET ORAL AT BEDTIME
Refills: 0 | Status: DISCONTINUED | OUTPATIENT
Start: 2020-01-01 | End: 2020-01-01

## 2020-01-01 RX ORDER — SODIUM CHLORIDE 9 MG/ML
5 INJECTION INTRAMUSCULAR; INTRAVENOUS; SUBCUTANEOUS EVERY 6 HOURS
Refills: 0 | Status: DISCONTINUED | OUTPATIENT
Start: 2020-01-01 | End: 2020-01-01

## 2020-01-01 RX ORDER — FUROSEMIDE 40 MG
40 TABLET ORAL EVERY 8 HOURS
Refills: 0 | Status: DISCONTINUED | OUTPATIENT
Start: 2020-01-01 | End: 2020-01-01

## 2020-01-01 RX ORDER — SODIUM CHLORIDE 9 MG/ML
1000 INJECTION, SOLUTION INTRAVENOUS
Refills: 0 | Status: DISCONTINUED | OUTPATIENT
Start: 2020-01-01 | End: 2020-01-01

## 2020-01-01 RX ORDER — PHENYLEPHRINE HYDROCHLORIDE 10 MG/ML
0.3 INJECTION INTRAVENOUS
Qty: 160 | Refills: 0 | Status: DISCONTINUED | OUTPATIENT
Start: 2020-01-01 | End: 2020-01-01

## 2020-01-01 RX ORDER — HYDRALAZINE HCL 50 MG
10 TABLET ORAL ONCE
Refills: 0 | Status: COMPLETED | OUTPATIENT
Start: 2020-01-01 | End: 2020-01-01

## 2020-01-01 RX ORDER — HEPARIN SODIUM 5000 [USP'U]/ML
1200 INJECTION INTRAVENOUS; SUBCUTANEOUS
Qty: 25000 | Refills: 0 | Status: DISCONTINUED | OUTPATIENT
Start: 2020-01-01 | End: 2020-01-01

## 2020-01-01 RX ORDER — DEXMEDETOMIDINE HYDROCHLORIDE IN 0.9% SODIUM CHLORIDE 4 UG/ML
1.5 INJECTION INTRAVENOUS
Qty: 200 | Refills: 0 | Status: DISCONTINUED | OUTPATIENT
Start: 2020-01-01 | End: 2020-01-01

## 2020-01-01 RX ORDER — VANCOMYCIN HCL 1 G
1000 VIAL (EA) INTRAVENOUS ONCE
Refills: 0 | Status: COMPLETED | OUTPATIENT
Start: 2020-01-01 | End: 2020-01-01

## 2020-01-01 RX ADMIN — Medication 1 APPLICATION(S): at 19:32

## 2020-01-01 RX ADMIN — MIDAZOLAM HYDROCHLORIDE 2 MILLIGRAM(S): 1 INJECTION, SOLUTION INTRAMUSCULAR; INTRAVENOUS at 10:53

## 2020-01-01 RX ADMIN — ALBUTEROL 2 PUFF(S): 90 AEROSOL, METERED ORAL at 04:27

## 2020-01-01 RX ADMIN — REMDESIVIR 500 MILLIGRAM(S): 5 INJECTION INTRAVENOUS at 10:35

## 2020-01-01 RX ADMIN — Medication 975 MILLIGRAM(S): at 23:53

## 2020-01-01 RX ADMIN — POTASSIUM PHOSPHATE, MONOBASIC POTASSIUM PHOSPHATE, DIBASIC 62.5 MILLIMOLE(S): 236; 224 INJECTION, SOLUTION INTRAVENOUS at 02:15

## 2020-01-01 RX ADMIN — Medication 1: at 11:59

## 2020-01-01 RX ADMIN — CHLORHEXIDINE GLUCONATE 15 MILLILITER(S): 213 SOLUTION TOPICAL at 17:56

## 2020-01-01 RX ADMIN — BUMETANIDE 1 MILLIGRAM(S): 0.25 INJECTION INTRAMUSCULAR; INTRAVENOUS at 16:20

## 2020-01-01 RX ADMIN — Medication 1: at 12:08

## 2020-01-01 RX ADMIN — Medication 1: at 05:30

## 2020-01-01 RX ADMIN — Medication 1 APPLICATION(S): at 05:48

## 2020-01-01 RX ADMIN — REMDESIVIR 500 MILLIGRAM(S): 5 INJECTION INTRAVENOUS at 13:30

## 2020-01-01 RX ADMIN — ALBUTEROL 2 PUFF(S): 90 AEROSOL, METERED ORAL at 11:13

## 2020-01-01 RX ADMIN — Medication 1 APPLICATION(S): at 18:20

## 2020-01-01 RX ADMIN — CHLORHEXIDINE GLUCONATE 15 MILLILITER(S): 213 SOLUTION TOPICAL at 16:52

## 2020-01-01 RX ADMIN — PROPOFOL 10.5 MICROGRAM(S)/KG/MIN: 10 INJECTION, EMULSION INTRAVENOUS at 02:15

## 2020-01-01 RX ADMIN — BUMETANIDE 1 MILLIGRAM(S): 0.25 INJECTION INTRAMUSCULAR; INTRAVENOUS at 08:04

## 2020-01-01 RX ADMIN — Medication 40 MILLIGRAM(S): at 17:32

## 2020-01-01 RX ADMIN — Medication 1: at 06:01

## 2020-01-01 RX ADMIN — MIDODRINE HYDROCHLORIDE 10 MILLIGRAM(S): 2.5 TABLET ORAL at 12:37

## 2020-01-01 RX ADMIN — SODIUM CHLORIDE 3 MILLILITER(S): 9 INJECTION INTRAMUSCULAR; INTRAVENOUS; SUBCUTANEOUS at 04:56

## 2020-01-01 RX ADMIN — Medication 50 MILLIGRAM(S): at 05:13

## 2020-01-01 RX ADMIN — FENTANYL CITRATE 50 MICROGRAM(S): 50 INJECTION INTRAVENOUS at 00:27

## 2020-01-01 RX ADMIN — SODIUM CHLORIDE 5 MILLILITER(S): 9 INJECTION INTRAMUSCULAR; INTRAVENOUS; SUBCUTANEOUS at 06:39

## 2020-01-01 RX ADMIN — PROPOFOL 10.5 MICROGRAM(S)/KG/MIN: 10 INJECTION, EMULSION INTRAVENOUS at 18:11

## 2020-01-01 RX ADMIN — ALBUTEROL 2 PUFF(S): 90 AEROSOL, METERED ORAL at 17:06

## 2020-01-01 RX ADMIN — BUMETANIDE 1 MILLIGRAM(S): 0.25 INJECTION INTRAMUSCULAR; INTRAVENOUS at 16:40

## 2020-01-01 RX ADMIN — PIPERACILLIN AND TAZOBACTAM 25 GRAM(S): 4; .5 INJECTION, POWDER, LYOPHILIZED, FOR SOLUTION INTRAVENOUS at 05:09

## 2020-01-01 RX ADMIN — FENTANYL CITRATE 25 MICROGRAM(S): 50 INJECTION INTRAVENOUS at 08:20

## 2020-01-01 RX ADMIN — Medication 1: at 06:26

## 2020-01-01 RX ADMIN — Medication 250 MILLIGRAM(S): at 12:25

## 2020-01-01 RX ADMIN — ALBUTEROL 2 PUFF(S): 90 AEROSOL, METERED ORAL at 23:47

## 2020-01-01 RX ADMIN — MEROPENEM 100 MILLIGRAM(S): 1 INJECTION INTRAVENOUS at 04:54

## 2020-01-01 RX ADMIN — PROPOFOL 10.5 MICROGRAM(S)/KG/MIN: 10 INJECTION, EMULSION INTRAVENOUS at 12:41

## 2020-01-01 RX ADMIN — MIDODRINE HYDROCHLORIDE 10 MILLIGRAM(S): 2.5 TABLET ORAL at 14:16

## 2020-01-01 RX ADMIN — CASPOFUNGIN ACETATE 260 MILLIGRAM(S): 7 INJECTION, POWDER, LYOPHILIZED, FOR SOLUTION INTRAVENOUS at 16:43

## 2020-01-01 RX ADMIN — ALBUTEROL 2 PUFF(S): 90 AEROSOL, METERED ORAL at 13:35

## 2020-01-01 RX ADMIN — PROPOFOL 10.5 MICROGRAM(S)/KG/MIN: 10 INJECTION, EMULSION INTRAVENOUS at 09:08

## 2020-01-01 RX ADMIN — FAMOTIDINE 20 MILLIGRAM(S): 10 INJECTION INTRAVENOUS at 11:42

## 2020-01-01 RX ADMIN — BUMETANIDE 5 MG/HR: 0.25 INJECTION INTRAMUSCULAR; INTRAVENOUS at 06:20

## 2020-01-01 RX ADMIN — Medication 10 MILLIGRAM(S): at 11:33

## 2020-01-01 RX ADMIN — Medication 40 MILLIGRAM(S): at 09:09

## 2020-01-01 RX ADMIN — Medication 250 MILLIGRAM(S): at 05:58

## 2020-01-01 RX ADMIN — Medication 40 MILLIEQUIVALENT(S): at 11:02

## 2020-01-01 RX ADMIN — BUMETANIDE 1 MILLIGRAM(S): 0.25 INJECTION INTRAMUSCULAR; INTRAVENOUS at 17:41

## 2020-01-01 RX ADMIN — MEROPENEM 100 MILLIGRAM(S): 1 INJECTION INTRAVENOUS at 20:07

## 2020-01-01 RX ADMIN — BUMETANIDE 1 MILLIGRAM(S): 0.25 INJECTION INTRAMUSCULAR; INTRAVENOUS at 10:41

## 2020-01-01 RX ADMIN — SODIUM CHLORIDE 5 MILLILITER(S): 9 INJECTION INTRAMUSCULAR; INTRAVENOUS; SUBCUTANEOUS at 17:56

## 2020-01-01 RX ADMIN — MEROPENEM 100 MILLIGRAM(S): 1 INJECTION INTRAVENOUS at 13:19

## 2020-01-01 RX ADMIN — PROPOFOL 10.5 MICROGRAM(S)/KG/MIN: 10 INJECTION, EMULSION INTRAVENOUS at 16:44

## 2020-01-01 RX ADMIN — Medication 40 MILLIEQUIVALENT(S): at 08:54

## 2020-01-01 RX ADMIN — SODIUM CHLORIDE 2000 MILLILITER(S): 9 INJECTION, SOLUTION INTRAVENOUS at 08:22

## 2020-01-01 RX ADMIN — MIDODRINE HYDROCHLORIDE 10 MILLIGRAM(S): 2.5 TABLET ORAL at 00:09

## 2020-01-01 RX ADMIN — MEROPENEM 100 MILLIGRAM(S): 1 INJECTION INTRAVENOUS at 05:24

## 2020-01-01 RX ADMIN — MIDODRINE HYDROCHLORIDE 10 MILLIGRAM(S): 2.5 TABLET ORAL at 13:14

## 2020-01-01 RX ADMIN — PROPOFOL 8.44 MICROGRAM(S)/KG/MIN: 10 INJECTION, EMULSION INTRAVENOUS at 20:11

## 2020-01-01 RX ADMIN — SODIUM CHLORIDE 2000 MILLILITER(S): 9 INJECTION, SOLUTION INTRAVENOUS at 12:27

## 2020-01-01 RX ADMIN — BUMETANIDE 1 MILLIGRAM(S): 0.25 INJECTION INTRAMUSCULAR; INTRAVENOUS at 23:49

## 2020-01-01 RX ADMIN — REMDESIVIR 500 MILLIGRAM(S): 5 INJECTION INTRAVENOUS at 17:05

## 2020-01-01 RX ADMIN — CHLORHEXIDINE GLUCONATE 1 APPLICATION(S): 213 SOLUTION TOPICAL at 04:27

## 2020-01-01 RX ADMIN — BUMETANIDE 1 MILLIGRAM(S): 0.25 INJECTION INTRAMUSCULAR; INTRAVENOUS at 17:06

## 2020-01-01 RX ADMIN — HEPARIN SODIUM 1000 UNIT(S)/HR: 5000 INJECTION INTRAVENOUS; SUBCUTANEOUS at 07:52

## 2020-01-01 RX ADMIN — PIPERACILLIN AND TAZOBACTAM 25 GRAM(S): 4; .5 INJECTION, POWDER, LYOPHILIZED, FOR SOLUTION INTRAVENOUS at 21:48

## 2020-01-01 RX ADMIN — MIDODRINE HYDROCHLORIDE 10 MILLIGRAM(S): 2.5 TABLET ORAL at 21:44

## 2020-01-01 RX ADMIN — CISATRACURIUM BESYLATE 20 MILLIGRAM(S): 2 INJECTION INTRAVENOUS at 17:38

## 2020-01-01 RX ADMIN — PIPERACILLIN AND TAZOBACTAM 25 GRAM(S): 4; .5 INJECTION, POWDER, LYOPHILIZED, FOR SOLUTION INTRAVENOUS at 20:01

## 2020-01-01 RX ADMIN — CHLORHEXIDINE GLUCONATE 15 MILLILITER(S): 213 SOLUTION TOPICAL at 04:11

## 2020-01-01 RX ADMIN — SODIUM CHLORIDE 3 MILLILITER(S): 9 INJECTION INTRAMUSCULAR; INTRAVENOUS; SUBCUTANEOUS at 04:17

## 2020-01-01 RX ADMIN — PROPOFOL 10.5 MICROGRAM(S)/KG/MIN: 10 INJECTION, EMULSION INTRAVENOUS at 23:16

## 2020-01-01 RX ADMIN — FAMOTIDINE 20 MILLIGRAM(S): 10 INJECTION INTRAVENOUS at 10:41

## 2020-01-01 RX ADMIN — FENTANYL CITRATE 50 MICROGRAM(S): 50 INJECTION INTRAVENOUS at 04:15

## 2020-01-01 RX ADMIN — HEPARIN SODIUM 1100 UNIT(S)/HR: 5000 INJECTION INTRAVENOUS; SUBCUTANEOUS at 18:06

## 2020-01-01 RX ADMIN — MEROPENEM 100 MILLIGRAM(S): 1 INJECTION INTRAVENOUS at 13:05

## 2020-01-01 RX ADMIN — Medication 1: at 04:51

## 2020-01-01 RX ADMIN — Medication 1 APPLICATION(S): at 05:16

## 2020-01-01 RX ADMIN — Medication 40 MILLIEQUIVALENT(S): at 04:50

## 2020-01-01 RX ADMIN — Medication 1 APPLICATION(S): at 04:28

## 2020-01-01 RX ADMIN — POLYETHYLENE GLYCOL 3350 17 GRAM(S): 17 POWDER, FOR SOLUTION ORAL at 14:04

## 2020-01-01 RX ADMIN — CHLORHEXIDINE GLUCONATE 15 MILLILITER(S): 213 SOLUTION TOPICAL at 17:40

## 2020-01-01 RX ADMIN — FENTANYL CITRATE 1.76 MICROGRAM(S)/KG/HR: 50 INJECTION INTRAVENOUS at 18:32

## 2020-01-01 RX ADMIN — Medication 40 MILLIGRAM(S): at 05:06

## 2020-01-01 RX ADMIN — ALBUTEROL 2 PUFF(S): 90 AEROSOL, METERED ORAL at 00:56

## 2020-01-01 RX ADMIN — Medication 25 MILLIGRAM(S): at 11:14

## 2020-01-01 RX ADMIN — PIPERACILLIN AND TAZOBACTAM 25 GRAM(S): 4; .5 INJECTION, POWDER, LYOPHILIZED, FOR SOLUTION INTRAVENOUS at 13:24

## 2020-01-01 RX ADMIN — SODIUM CHLORIDE 3 MILLILITER(S): 9 INJECTION INTRAMUSCULAR; INTRAVENOUS; SUBCUTANEOUS at 20:09

## 2020-01-01 RX ADMIN — POLYETHYLENE GLYCOL 3350 17 GRAM(S): 17 POWDER, FOR SOLUTION ORAL at 17:28

## 2020-01-01 RX ADMIN — ACETAZOLAMIDE 250 MILLIGRAM(S): 250 TABLET ORAL at 15:13

## 2020-01-01 RX ADMIN — Medication 1: at 11:41

## 2020-01-01 RX ADMIN — Medication 20 MILLIEQUIVALENT(S): at 04:42

## 2020-01-01 RX ADMIN — SODIUM CHLORIDE 3 MILLILITER(S): 9 INJECTION INTRAMUSCULAR; INTRAVENOUS; SUBCUTANEOUS at 07:04

## 2020-01-01 RX ADMIN — Medication 3 MILLILITER(S): at 21:08

## 2020-01-01 RX ADMIN — Medication 1: at 11:55

## 2020-01-01 RX ADMIN — Medication 250 MILLIGRAM(S): at 17:14

## 2020-01-01 RX ADMIN — CHLORHEXIDINE GLUCONATE 1 APPLICATION(S): 213 SOLUTION TOPICAL at 04:39

## 2020-01-01 RX ADMIN — HEPARIN SODIUM 13 UNIT(S)/HR: 5000 INJECTION INTRAVENOUS; SUBCUTANEOUS at 18:44

## 2020-01-01 RX ADMIN — Medication 250 MILLIGRAM(S): at 16:58

## 2020-01-01 RX ADMIN — Medication 10 MILLIGRAM(S): at 01:47

## 2020-01-01 RX ADMIN — SODIUM CHLORIDE 3 MILLILITER(S): 9 INJECTION INTRAMUSCULAR; INTRAVENOUS; SUBCUTANEOUS at 14:21

## 2020-01-01 RX ADMIN — Medication 100 MILLIEQUIVALENT(S): at 06:00

## 2020-01-01 RX ADMIN — SODIUM CHLORIDE 3 MILLILITER(S): 9 INJECTION INTRAMUSCULAR; INTRAVENOUS; SUBCUTANEOUS at 14:14

## 2020-01-01 RX ADMIN — Medication 1: at 00:19

## 2020-01-01 RX ADMIN — Medication 650 MILLIGRAM(S): at 17:15

## 2020-01-01 RX ADMIN — CHLORHEXIDINE GLUCONATE 15 MILLILITER(S): 213 SOLUTION TOPICAL at 17:27

## 2020-01-01 RX ADMIN — CHLORHEXIDINE GLUCONATE 15 MILLILITER(S): 213 SOLUTION TOPICAL at 18:08

## 2020-01-01 RX ADMIN — MIDODRINE HYDROCHLORIDE 10 MILLIGRAM(S): 2.5 TABLET ORAL at 05:05

## 2020-01-01 RX ADMIN — CHLORHEXIDINE GLUCONATE 15 MILLILITER(S): 213 SOLUTION TOPICAL at 04:55

## 2020-01-01 RX ADMIN — FENTANYL CITRATE 1.76 MICROGRAM(S)/KG/HR: 50 INJECTION INTRAVENOUS at 07:32

## 2020-01-01 RX ADMIN — CEFTRIAXONE 100 MILLIGRAM(S): 500 INJECTION, POWDER, FOR SOLUTION INTRAMUSCULAR; INTRAVENOUS at 16:26

## 2020-01-01 RX ADMIN — POLYETHYLENE GLYCOL 3350 17 GRAM(S): 17 POWDER, FOR SOLUTION ORAL at 11:56

## 2020-01-01 RX ADMIN — PROPOFOL 10.5 MICROGRAM(S)/KG/MIN: 10 INJECTION, EMULSION INTRAVENOUS at 16:54

## 2020-01-01 RX ADMIN — CHLORHEXIDINE GLUCONATE 15 MILLILITER(S): 213 SOLUTION TOPICAL at 06:18

## 2020-01-01 RX ADMIN — Medication 10 MILLIGRAM(S): at 15:59

## 2020-01-01 RX ADMIN — Medication 1 APPLICATION(S): at 05:14

## 2020-01-01 RX ADMIN — PROPOFOL 10.5 MICROGRAM(S)/KG/MIN: 10 INJECTION, EMULSION INTRAVENOUS at 15:14

## 2020-01-01 RX ADMIN — SODIUM CHLORIDE 3 MILLILITER(S): 9 INJECTION INTRAMUSCULAR; INTRAVENOUS; SUBCUTANEOUS at 04:49

## 2020-01-01 RX ADMIN — CHLORHEXIDINE GLUCONATE 15 MILLILITER(S): 213 SOLUTION TOPICAL at 18:05

## 2020-01-01 RX ADMIN — CHLORHEXIDINE GLUCONATE 15 MILLILITER(S): 213 SOLUTION TOPICAL at 04:14

## 2020-01-01 RX ADMIN — SENNA PLUS 2 TABLET(S): 8.6 TABLET ORAL at 20:14

## 2020-01-01 RX ADMIN — FAMOTIDINE 20 MILLIGRAM(S): 10 INJECTION INTRAVENOUS at 10:01

## 2020-01-01 RX ADMIN — POLYETHYLENE GLYCOL 3350 17 GRAM(S): 17 POWDER, FOR SOLUTION ORAL at 04:10

## 2020-01-01 RX ADMIN — Medication 3 MILLILITER(S): at 17:22

## 2020-01-01 RX ADMIN — Medication 3 MILLILITER(S): at 17:55

## 2020-01-01 RX ADMIN — Medication 650 MILLIGRAM(S): at 05:42

## 2020-01-01 RX ADMIN — CASPOFUNGIN ACETATE 260 MILLIGRAM(S): 7 INJECTION, POWDER, LYOPHILIZED, FOR SOLUTION INTRAVENOUS at 12:17

## 2020-01-01 RX ADMIN — MEROPENEM 100 MILLIGRAM(S): 1 INJECTION INTRAVENOUS at 15:05

## 2020-01-01 RX ADMIN — KETAMINE HYDROCHLORIDE 1.76 MG/KG/HR: 100 INJECTION INTRAMUSCULAR; INTRAVENOUS at 17:30

## 2020-01-01 RX ADMIN — Medication 250 MILLIGRAM(S): at 12:48

## 2020-01-01 RX ADMIN — BUMETANIDE 1 MILLIGRAM(S): 0.25 INJECTION INTRAMUSCULAR; INTRAVENOUS at 01:42

## 2020-01-01 RX ADMIN — CHLORHEXIDINE GLUCONATE 15 MILLILITER(S): 213 SOLUTION TOPICAL at 18:19

## 2020-01-01 RX ADMIN — FENTANYL CITRATE 1.76 MICROGRAM(S)/KG/HR: 50 INJECTION INTRAVENOUS at 07:53

## 2020-01-01 RX ADMIN — Medication 40 MILLIGRAM(S): at 17:25

## 2020-01-01 RX ADMIN — CHLORHEXIDINE GLUCONATE 15 MILLILITER(S): 213 SOLUTION TOPICAL at 04:43

## 2020-01-01 RX ADMIN — Medication 50 MILLIEQUIVALENT(S): at 17:40

## 2020-01-01 RX ADMIN — Medication 650 MILLIGRAM(S): at 06:36

## 2020-01-01 RX ADMIN — SODIUM CHLORIDE 5 MILLILITER(S): 9 INJECTION INTRAMUSCULAR; INTRAVENOUS; SUBCUTANEOUS at 08:51

## 2020-01-01 RX ADMIN — POTASSIUM PHOSPHATE, MONOBASIC POTASSIUM PHOSPHATE, DIBASIC 83.33 MILLIMOLE(S): 236; 224 INJECTION, SOLUTION INTRAVENOUS at 15:57

## 2020-01-01 RX ADMIN — ALBUTEROL 2 PUFF(S): 90 AEROSOL, METERED ORAL at 17:52

## 2020-01-01 RX ADMIN — MIDODRINE HYDROCHLORIDE 10 MILLIGRAM(S): 2.5 TABLET ORAL at 14:03

## 2020-01-01 RX ADMIN — Medication 40 MILLIGRAM(S): at 13:16

## 2020-01-01 RX ADMIN — KETAMINE HYDROCHLORIDE 1.76 MG/KG/HR: 100 INJECTION INTRAMUSCULAR; INTRAVENOUS at 07:32

## 2020-01-01 RX ADMIN — PROPOFOL 10.5 MICROGRAM(S)/KG/MIN: 10 INJECTION, EMULSION INTRAVENOUS at 04:10

## 2020-01-01 RX ADMIN — POLYETHYLENE GLYCOL 3350 17 GRAM(S): 17 POWDER, FOR SOLUTION ORAL at 12:17

## 2020-01-01 RX ADMIN — Medication 1: at 00:09

## 2020-01-01 RX ADMIN — CHLORHEXIDINE GLUCONATE 1 APPLICATION(S): 213 SOLUTION TOPICAL at 05:25

## 2020-01-01 RX ADMIN — Medication 250 MILLIGRAM(S): at 04:11

## 2020-01-01 RX ADMIN — PROPOFOL 10.5 MICROGRAM(S)/KG/MIN: 10 INJECTION, EMULSION INTRAVENOUS at 14:49

## 2020-01-01 RX ADMIN — CHLORHEXIDINE GLUCONATE 15 MILLILITER(S): 213 SOLUTION TOPICAL at 05:15

## 2020-01-01 RX ADMIN — CHLORHEXIDINE GLUCONATE 15 MILLILITER(S): 213 SOLUTION TOPICAL at 05:41

## 2020-01-01 RX ADMIN — KETAMINE HYDROCHLORIDE 3.52 MG/KG/HR: 100 INJECTION INTRAMUSCULAR; INTRAVENOUS at 09:39

## 2020-01-01 RX ADMIN — SODIUM CHLORIDE 3 MILLILITER(S): 9 INJECTION INTRAMUSCULAR; INTRAVENOUS; SUBCUTANEOUS at 04:15

## 2020-01-01 RX ADMIN — Medication 650 MILLIGRAM(S): at 13:05

## 2020-01-01 RX ADMIN — CHLORHEXIDINE GLUCONATE 1 APPLICATION(S): 213 SOLUTION TOPICAL at 04:22

## 2020-01-01 RX ADMIN — SENNA PLUS 10 MILLILITER(S): 8.6 TABLET ORAL at 10:54

## 2020-01-01 RX ADMIN — Medication 40 MILLIGRAM(S): at 04:55

## 2020-01-01 RX ADMIN — METHADONE HYDROCHLORIDE 5 MILLIGRAM(S): 40 TABLET ORAL at 05:52

## 2020-01-01 RX ADMIN — FAMOTIDINE 20 MILLIGRAM(S): 10 INJECTION INTRAVENOUS at 11:37

## 2020-01-01 RX ADMIN — Medication 250 MILLIGRAM(S): at 21:04

## 2020-01-01 RX ADMIN — ALBUTEROL 2 PUFF(S): 90 AEROSOL, METERED ORAL at 11:08

## 2020-01-01 RX ADMIN — MIDAZOLAM HYDROCHLORIDE 2 MILLIGRAM(S): 1 INJECTION, SOLUTION INTRAMUSCULAR; INTRAVENOUS at 18:29

## 2020-01-01 RX ADMIN — FENTANYL CITRATE 1.76 MICROGRAM(S)/KG/HR: 50 INJECTION INTRAVENOUS at 05:38

## 2020-01-01 RX ADMIN — PROPOFOL 10.5 MICROGRAM(S)/KG/MIN: 10 INJECTION, EMULSION INTRAVENOUS at 22:30

## 2020-01-01 RX ADMIN — Medication 1 APPLICATION(S): at 17:22

## 2020-01-01 RX ADMIN — REMDESIVIR 500 MILLIGRAM(S): 5 INJECTION INTRAVENOUS at 11:20

## 2020-01-01 RX ADMIN — ALBUTEROL 2 PUFF(S): 90 AEROSOL, METERED ORAL at 03:07

## 2020-01-01 RX ADMIN — Medication 1: at 18:31

## 2020-01-01 RX ADMIN — CHLORHEXIDINE GLUCONATE 1 APPLICATION(S): 213 SOLUTION TOPICAL at 04:57

## 2020-01-01 RX ADMIN — FLUCONAZOLE 100 MILLIGRAM(S): 150 TABLET ORAL at 10:03

## 2020-01-01 RX ADMIN — CHLORHEXIDINE GLUCONATE 1 APPLICATION(S): 213 SOLUTION TOPICAL at 04:12

## 2020-01-01 RX ADMIN — FENTANYL CITRATE 1.76 MICROGRAM(S)/KG/HR: 50 INJECTION INTRAVENOUS at 10:54

## 2020-01-01 RX ADMIN — DEXMEDETOMIDINE HYDROCHLORIDE IN 0.9% SODIUM CHLORIDE 8.79 MICROGRAM(S)/KG/HR: 4 INJECTION INTRAVENOUS at 15:37

## 2020-01-01 RX ADMIN — Medication 40 MILLIGRAM(S): at 04:50

## 2020-01-01 RX ADMIN — CHLORHEXIDINE GLUCONATE 1 APPLICATION(S): 213 SOLUTION TOPICAL at 06:20

## 2020-01-01 RX ADMIN — CASPOFUNGIN ACETATE 260 MILLIGRAM(S): 7 INJECTION, POWDER, LYOPHILIZED, FOR SOLUTION INTRAVENOUS at 13:50

## 2020-01-01 RX ADMIN — POLYETHYLENE GLYCOL 3350 17 GRAM(S): 17 POWDER, FOR SOLUTION ORAL at 17:40

## 2020-01-01 RX ADMIN — Medication 1 APPLICATION(S): at 04:12

## 2020-01-01 RX ADMIN — FENTANYL CITRATE 1.76 MICROGRAM(S)/KG/HR: 50 INJECTION INTRAVENOUS at 17:30

## 2020-01-01 RX ADMIN — DEXMEDETOMIDINE HYDROCHLORIDE IN 0.9% SODIUM CHLORIDE 8.79 MICROGRAM(S)/KG/HR: 4 INJECTION INTRAVENOUS at 18:22

## 2020-01-01 RX ADMIN — MIDODRINE HYDROCHLORIDE 10 MILLIGRAM(S): 2.5 TABLET ORAL at 05:31

## 2020-01-01 RX ADMIN — AZITHROMYCIN 250 MILLIGRAM(S): 500 TABLET, FILM COATED ORAL at 11:42

## 2020-01-01 RX ADMIN — Medication 3 MILLILITER(S): at 11:35

## 2020-01-01 RX ADMIN — SODIUM CHLORIDE 3 MILLILITER(S): 9 INJECTION INTRAMUSCULAR; INTRAVENOUS; SUBCUTANEOUS at 13:21

## 2020-01-01 RX ADMIN — Medication 62.5 MILLIMOLE(S): at 23:53

## 2020-01-01 RX ADMIN — Medication 50 MILLIEQUIVALENT(S): at 13:17

## 2020-01-01 RX ADMIN — MIDODRINE HYDROCHLORIDE 10 MILLIGRAM(S): 2.5 TABLET ORAL at 20:00

## 2020-01-01 RX ADMIN — FENTANYL CITRATE 100 MICROGRAM(S): 50 INJECTION INTRAVENOUS at 11:33

## 2020-01-01 RX ADMIN — FAMOTIDINE 20 MILLIGRAM(S): 10 INJECTION INTRAVENOUS at 12:02

## 2020-01-01 RX ADMIN — ACETAZOLAMIDE 250 MILLIGRAM(S): 250 TABLET ORAL at 16:58

## 2020-01-01 RX ADMIN — Medication 400 MILLIGRAM(S): at 16:25

## 2020-01-01 RX ADMIN — SENNA PLUS 10 MILLILITER(S): 8.6 TABLET ORAL at 00:48

## 2020-01-01 RX ADMIN — Medication 650 MILLIGRAM(S): at 20:11

## 2020-01-01 RX ADMIN — METHADONE HYDROCHLORIDE 5 MILLIGRAM(S): 40 TABLET ORAL at 05:12

## 2020-01-01 RX ADMIN — CHLORHEXIDINE GLUCONATE 15 MILLILITER(S): 213 SOLUTION TOPICAL at 04:57

## 2020-01-01 RX ADMIN — Medication 100 MILLIEQUIVALENT(S): at 13:00

## 2020-01-01 RX ADMIN — Medication 200 GRAM(S): at 22:12

## 2020-01-01 RX ADMIN — FAMOTIDINE 20 MILLIGRAM(S): 10 INJECTION INTRAVENOUS at 10:52

## 2020-01-01 RX ADMIN — SODIUM CHLORIDE 3 MILLILITER(S): 9 INJECTION INTRAMUSCULAR; INTRAVENOUS; SUBCUTANEOUS at 04:57

## 2020-01-01 RX ADMIN — Medication 1: at 16:33

## 2020-01-01 RX ADMIN — KETAMINE HYDROCHLORIDE 1.76 MG/KG/HR: 100 INJECTION INTRAMUSCULAR; INTRAVENOUS at 09:47

## 2020-01-01 RX ADMIN — Medication 6.75 MICROGRAM(S)/KG/MIN: at 10:54

## 2020-01-01 RX ADMIN — Medication 1 APPLICATION(S): at 16:58

## 2020-01-01 RX ADMIN — AZITHROMYCIN 250 MILLIGRAM(S): 500 TABLET, FILM COATED ORAL at 00:41

## 2020-01-01 RX ADMIN — CHLORHEXIDINE GLUCONATE 15 MILLILITER(S): 213 SOLUTION TOPICAL at 16:33

## 2020-01-01 RX ADMIN — MEROPENEM 100 MILLIGRAM(S): 1 INJECTION INTRAVENOUS at 20:13

## 2020-01-01 RX ADMIN — Medication 7.91 MICROGRAM(S)/KG/MIN: at 11:34

## 2020-01-01 RX ADMIN — POLYETHYLENE GLYCOL 3350 17 GRAM(S): 17 POWDER, FOR SOLUTION ORAL at 13:25

## 2020-01-01 RX ADMIN — CHLORHEXIDINE GLUCONATE 1 APPLICATION(S): 213 SOLUTION TOPICAL at 05:36

## 2020-01-01 RX ADMIN — SENNA PLUS 10 MILLILITER(S): 8.6 TABLET ORAL at 10:40

## 2020-01-01 RX ADMIN — SODIUM CHLORIDE 3 MILLILITER(S): 9 INJECTION INTRAMUSCULAR; INTRAVENOUS; SUBCUTANEOUS at 12:04

## 2020-01-01 RX ADMIN — Medication 62.5 MILLIMOLE(S): at 15:16

## 2020-01-01 RX ADMIN — ALBUTEROL 2 PUFF(S): 90 AEROSOL, METERED ORAL at 21:22

## 2020-01-01 RX ADMIN — Medication 1: at 11:38

## 2020-01-01 RX ADMIN — Medication 650 MILLIGRAM(S): at 08:44

## 2020-01-01 RX ADMIN — Medication 1 APPLICATION(S): at 06:14

## 2020-01-01 RX ADMIN — HEPARIN SODIUM 11 UNIT(S)/HR: 5000 INJECTION INTRAVENOUS; SUBCUTANEOUS at 09:06

## 2020-01-01 RX ADMIN — Medication 250 MILLIGRAM(S): at 05:08

## 2020-01-01 RX ADMIN — Medication 50 MILLILITER(S): at 02:37

## 2020-01-01 RX ADMIN — KETAMINE HYDROCHLORIDE 3.52 MG/KG/HR: 100 INJECTION INTRAMUSCULAR; INTRAVENOUS at 14:49

## 2020-01-01 RX ADMIN — Medication 1 APPLICATION(S): at 17:30

## 2020-01-01 RX ADMIN — ACETAZOLAMIDE 250 MILLIGRAM(S): 250 TABLET ORAL at 13:02

## 2020-01-01 RX ADMIN — Medication 1: at 12:04

## 2020-01-01 RX ADMIN — Medication 250 MILLIGRAM(S): at 18:42

## 2020-01-01 RX ADMIN — ACETAZOLAMIDE 250 MILLIGRAM(S): 250 TABLET ORAL at 11:24

## 2020-01-01 RX ADMIN — DEXMEDETOMIDINE HYDROCHLORIDE IN 0.9% SODIUM CHLORIDE 8.79 MICROGRAM(S)/KG/HR: 4 INJECTION INTRAVENOUS at 08:08

## 2020-01-01 RX ADMIN — Medication 40 MILLIEQUIVALENT(S): at 19:33

## 2020-01-01 RX ADMIN — MIDODRINE HYDROCHLORIDE 10 MILLIGRAM(S): 2.5 TABLET ORAL at 04:43

## 2020-01-01 RX ADMIN — Medication 40 MILLIEQUIVALENT(S): at 04:27

## 2020-01-01 RX ADMIN — POTASSIUM PHOSPHATE, MONOBASIC POTASSIUM PHOSPHATE, DIBASIC 62.5 MILLIMOLE(S): 236; 224 INJECTION, SOLUTION INTRAVENOUS at 07:47

## 2020-01-01 RX ADMIN — FENTANYL CITRATE 25 MICROGRAM(S): 50 INJECTION INTRAVENOUS at 10:38

## 2020-01-01 RX ADMIN — MIDODRINE HYDROCHLORIDE 10 MILLIGRAM(S): 2.5 TABLET ORAL at 12:23

## 2020-01-01 RX ADMIN — SENNA PLUS 2 TABLET(S): 8.6 TABLET ORAL at 21:01

## 2020-01-01 RX ADMIN — SODIUM CHLORIDE 3 MILLILITER(S): 9 INJECTION INTRAMUSCULAR; INTRAVENOUS; SUBCUTANEOUS at 05:12

## 2020-01-01 RX ADMIN — BUMETANIDE 1 MILLIGRAM(S): 0.25 INJECTION INTRAMUSCULAR; INTRAVENOUS at 09:44

## 2020-01-01 RX ADMIN — Medication 2: at 00:14

## 2020-01-01 RX ADMIN — ENOXAPARIN SODIUM 40 MILLIGRAM(S): 100 INJECTION SUBCUTANEOUS at 13:05

## 2020-01-01 RX ADMIN — Medication 10 MILLIGRAM(S): at 11:53

## 2020-01-01 RX ADMIN — ALBUTEROL 2 PUFF(S): 90 AEROSOL, METERED ORAL at 17:24

## 2020-01-01 RX ADMIN — ALBUTEROL 2 PUFF(S): 90 AEROSOL, METERED ORAL at 12:38

## 2020-01-01 RX ADMIN — MIDODRINE HYDROCHLORIDE 10 MILLIGRAM(S): 2.5 TABLET ORAL at 13:05

## 2020-01-01 RX ADMIN — Medication 1 APPLICATION(S): at 05:38

## 2020-01-01 RX ADMIN — Medication 250 MILLIGRAM(S): at 04:28

## 2020-01-01 RX ADMIN — Medication 40 MILLIGRAM(S): at 11:37

## 2020-01-01 RX ADMIN — Medication 1 APPLICATION(S): at 17:11

## 2020-01-01 RX ADMIN — Medication 250 MILLIGRAM(S): at 20:58

## 2020-01-01 RX ADMIN — SODIUM CHLORIDE 3 MILLILITER(S): 9 INJECTION INTRAMUSCULAR; INTRAVENOUS; SUBCUTANEOUS at 14:43

## 2020-01-01 RX ADMIN — PIPERACILLIN AND TAZOBACTAM 25 GRAM(S): 4; .5 INJECTION, POWDER, LYOPHILIZED, FOR SOLUTION INTRAVENOUS at 06:12

## 2020-01-01 RX ADMIN — SODIUM CHLORIDE 5 MILLILITER(S): 9 INJECTION INTRAMUSCULAR; INTRAVENOUS; SUBCUTANEOUS at 03:16

## 2020-01-01 RX ADMIN — SODIUM CHLORIDE 5 MILLILITER(S): 9 INJECTION INTRAMUSCULAR; INTRAVENOUS; SUBCUTANEOUS at 00:00

## 2020-01-01 RX ADMIN — ALBUTEROL 2 PUFF(S): 90 AEROSOL, METERED ORAL at 01:19

## 2020-01-01 RX ADMIN — BUMETANIDE 1 MILLIGRAM(S): 0.25 INJECTION INTRAMUSCULAR; INTRAVENOUS at 09:09

## 2020-01-01 RX ADMIN — Medication 250 MILLIGRAM(S): at 14:13

## 2020-01-01 RX ADMIN — REMDESIVIR 500 MILLIGRAM(S): 5 INJECTION INTRAVENOUS at 10:10

## 2020-01-01 RX ADMIN — Medication 1 APPLICATION(S): at 05:08

## 2020-01-01 RX ADMIN — Medication 1 APPLICATION(S): at 17:28

## 2020-01-01 RX ADMIN — FENTANYL CITRATE 1.76 MICROGRAM(S)/KG/HR: 50 INJECTION INTRAVENOUS at 20:08

## 2020-01-01 RX ADMIN — KETAMINE HYDROCHLORIDE 1.76 MG/KG/HR: 100 INJECTION INTRAMUSCULAR; INTRAVENOUS at 12:57

## 2020-01-01 RX ADMIN — Medication 250 MILLIGRAM(S): at 23:02

## 2020-01-01 RX ADMIN — Medication 40 MILLIGRAM(S): at 04:05

## 2020-01-01 RX ADMIN — Medication 1: at 17:38

## 2020-01-01 RX ADMIN — KETAMINE HYDROCHLORIDE 3.52 MG/KG/HR: 100 INJECTION INTRAMUSCULAR; INTRAVENOUS at 10:50

## 2020-01-01 RX ADMIN — METHADONE HYDROCHLORIDE 5 MILLIGRAM(S): 40 TABLET ORAL at 19:46

## 2020-01-01 RX ADMIN — Medication 50 MILLIEQUIVALENT(S): at 16:39

## 2020-01-01 RX ADMIN — Medication 50 MILLIGRAM(S): at 20:12

## 2020-01-01 RX ADMIN — Medication 4 MG/HR: at 21:44

## 2020-01-01 RX ADMIN — MIDODRINE HYDROCHLORIDE 10 MILLIGRAM(S): 2.5 TABLET ORAL at 21:48

## 2020-01-01 RX ADMIN — HEPARIN SODIUM 900 UNIT(S)/HR: 5000 INJECTION INTRAVENOUS; SUBCUTANEOUS at 05:00

## 2020-01-01 RX ADMIN — BUMETANIDE 1 MILLIGRAM(S): 0.25 INJECTION INTRAMUSCULAR; INTRAVENOUS at 17:55

## 2020-01-01 RX ADMIN — ALBUTEROL 2 PUFF(S): 90 AEROSOL, METERED ORAL at 17:55

## 2020-01-01 RX ADMIN — POLYETHYLENE GLYCOL 3350 17 GRAM(S): 17 POWDER, FOR SOLUTION ORAL at 05:05

## 2020-01-01 RX ADMIN — SODIUM CHLORIDE 3 MILLILITER(S): 9 INJECTION INTRAMUSCULAR; INTRAVENOUS; SUBCUTANEOUS at 13:30

## 2020-01-01 RX ADMIN — ALBUTEROL 2 PUFF(S): 90 AEROSOL, METERED ORAL at 21:45

## 2020-01-01 RX ADMIN — CHLORHEXIDINE GLUCONATE 1 APPLICATION(S): 213 SOLUTION TOPICAL at 04:10

## 2020-01-01 RX ADMIN — Medication 1: at 10:52

## 2020-01-01 RX ADMIN — Medication 650 MILLIGRAM(S): at 15:55

## 2020-01-01 RX ADMIN — SODIUM CHLORIDE 3 MILLILITER(S): 9 INJECTION INTRAMUSCULAR; INTRAVENOUS; SUBCUTANEOUS at 14:05

## 2020-01-01 RX ADMIN — Medication 40 MILLIGRAM(S): at 15:13

## 2020-01-01 RX ADMIN — POTASSIUM PHOSPHATE, MONOBASIC POTASSIUM PHOSPHATE, DIBASIC 83.33 MILLIMOLE(S): 236; 224 INJECTION, SOLUTION INTRAVENOUS at 10:12

## 2020-01-01 RX ADMIN — MEROPENEM 100 MILLIGRAM(S): 1 INJECTION INTRAVENOUS at 20:02

## 2020-01-01 RX ADMIN — Medication 100 MILLIEQUIVALENT(S): at 17:14

## 2020-01-01 RX ADMIN — AZITHROMYCIN 250 MILLIGRAM(S): 500 TABLET, FILM COATED ORAL at 10:59

## 2020-01-01 RX ADMIN — DEXMEDETOMIDINE HYDROCHLORIDE IN 0.9% SODIUM CHLORIDE 8.79 MICROGRAM(S)/KG/HR: 4 INJECTION INTRAVENOUS at 20:17

## 2020-01-01 RX ADMIN — Medication 1 APPLICATION(S): at 04:43

## 2020-01-01 RX ADMIN — KETAMINE HYDROCHLORIDE 1.76 MG/KG/HR: 100 INJECTION INTRAMUSCULAR; INTRAVENOUS at 00:27

## 2020-01-01 RX ADMIN — SODIUM CHLORIDE 3 MILLILITER(S): 9 INJECTION INTRAMUSCULAR; INTRAVENOUS; SUBCUTANEOUS at 12:52

## 2020-01-01 RX ADMIN — Medication 50 MILLIEQUIVALENT(S): at 08:20

## 2020-01-01 RX ADMIN — CHLORHEXIDINE GLUCONATE 15 MILLILITER(S): 213 SOLUTION TOPICAL at 17:15

## 2020-01-01 RX ADMIN — MEROPENEM 100 MILLIGRAM(S): 1 INJECTION INTRAVENOUS at 21:01

## 2020-01-01 RX ADMIN — Medication 100 MILLIEQUIVALENT(S): at 13:51

## 2020-01-01 RX ADMIN — BUMETANIDE 1 MILLIGRAM(S): 0.25 INJECTION INTRAMUSCULAR; INTRAVENOUS at 20:00

## 2020-01-01 RX ADMIN — FENTANYL CITRATE 1.76 MICROGRAM(S)/KG/HR: 50 INJECTION INTRAVENOUS at 05:42

## 2020-01-01 RX ADMIN — POTASSIUM PHOSPHATE, MONOBASIC POTASSIUM PHOSPHATE, DIBASIC 83.33 MILLIMOLE(S): 236; 224 INJECTION, SOLUTION INTRAVENOUS at 04:50

## 2020-01-01 RX ADMIN — Medication 1: at 00:23

## 2020-01-01 RX ADMIN — Medication 1: at 06:17

## 2020-01-01 RX ADMIN — Medication 62.5 MILLIMOLE(S): at 01:04

## 2020-01-01 RX ADMIN — CHLORHEXIDINE GLUCONATE 15 MILLILITER(S): 213 SOLUTION TOPICAL at 04:04

## 2020-01-01 RX ADMIN — ALBUTEROL 2 PUFF(S): 90 AEROSOL, METERED ORAL at 12:48

## 2020-01-01 RX ADMIN — CHLORHEXIDINE GLUCONATE 1 APPLICATION(S): 213 SOLUTION TOPICAL at 04:56

## 2020-01-01 RX ADMIN — BUMETANIDE 1 MILLIGRAM(S): 0.25 INJECTION INTRAMUSCULAR; INTRAVENOUS at 00:00

## 2020-01-01 RX ADMIN — Medication 1 APPLICATION(S): at 05:26

## 2020-01-01 RX ADMIN — MIDAZOLAM HYDROCHLORIDE 4 MILLIGRAM(S): 1 INJECTION, SOLUTION INTRAMUSCULAR; INTRAVENOUS at 15:02

## 2020-01-01 RX ADMIN — FENTANYL CITRATE 1.76 MICROGRAM(S)/KG/HR: 50 INJECTION INTRAVENOUS at 18:42

## 2020-01-01 RX ADMIN — SODIUM CHLORIDE 5 MILLILITER(S): 9 INJECTION INTRAMUSCULAR; INTRAVENOUS; SUBCUTANEOUS at 17:22

## 2020-01-01 RX ADMIN — Medication 10 MILLIGRAM(S): at 21:28

## 2020-01-01 RX ADMIN — Medication 50 MILLIGRAM(S): at 05:14

## 2020-01-01 RX ADMIN — Medication 3 MILLILITER(S): at 03:39

## 2020-01-01 RX ADMIN — SODIUM CHLORIDE 3 MILLILITER(S): 9 INJECTION INTRAMUSCULAR; INTRAVENOUS; SUBCUTANEOUS at 13:18

## 2020-01-01 RX ADMIN — Medication 1 APPLICATION(S): at 16:20

## 2020-01-01 RX ADMIN — MEROPENEM 100 MILLIGRAM(S): 1 INJECTION INTRAVENOUS at 01:23

## 2020-01-01 RX ADMIN — Medication 50 MILLIEQUIVALENT(S): at 02:26

## 2020-01-01 RX ADMIN — MIDAZOLAM HYDROCHLORIDE 2 MILLIGRAM(S): 1 INJECTION, SOLUTION INTRAMUSCULAR; INTRAVENOUS at 11:33

## 2020-01-01 RX ADMIN — ALBUTEROL 2 PUFF(S): 90 AEROSOL, METERED ORAL at 23:24

## 2020-01-01 RX ADMIN — ALBUTEROL 2 PUFF(S): 90 AEROSOL, METERED ORAL at 13:14

## 2020-01-01 RX ADMIN — Medication 40 MILLIGRAM(S): at 17:30

## 2020-01-01 RX ADMIN — PROPOFOL 10.5 MICROGRAM(S)/KG/MIN: 10 INJECTION, EMULSION INTRAVENOUS at 02:30

## 2020-01-01 RX ADMIN — Medication 3.3 MICROGRAM(S)/KG/MIN: at 12:25

## 2020-01-01 RX ADMIN — POLYETHYLENE GLYCOL 3350 17 GRAM(S): 17 POWDER, FOR SOLUTION ORAL at 13:58

## 2020-01-01 RX ADMIN — Medication 250 MILLIGRAM(S): at 06:16

## 2020-01-01 RX ADMIN — CHLORHEXIDINE GLUCONATE 15 MILLILITER(S): 213 SOLUTION TOPICAL at 05:48

## 2020-01-01 RX ADMIN — Medication 1: at 00:00

## 2020-01-01 RX ADMIN — Medication 250 MILLIGRAM(S): at 05:00

## 2020-01-01 RX ADMIN — ALBUTEROL 2 PUFF(S): 90 AEROSOL, METERED ORAL at 05:08

## 2020-01-01 RX ADMIN — MIDODRINE HYDROCHLORIDE 10 MILLIGRAM(S): 2.5 TABLET ORAL at 05:25

## 2020-01-01 RX ADMIN — Medication 20 MILLIEQUIVALENT(S): at 06:16

## 2020-01-01 RX ADMIN — Medication 650 MILLIGRAM(S): at 08:54

## 2020-01-01 RX ADMIN — SENNA PLUS 10 MILLILITER(S): 8.6 TABLET ORAL at 13:51

## 2020-01-01 RX ADMIN — Medication 1 APPLICATION(S): at 06:17

## 2020-01-01 RX ADMIN — MEROPENEM 100 MILLIGRAM(S): 1 INJECTION INTRAVENOUS at 12:01

## 2020-01-01 RX ADMIN — SODIUM CHLORIDE 3 MILLILITER(S): 9 INJECTION INTRAMUSCULAR; INTRAVENOUS; SUBCUTANEOUS at 20:02

## 2020-01-01 RX ADMIN — FAMOTIDINE 20 MILLIGRAM(S): 10 INJECTION INTRAVENOUS at 11:25

## 2020-01-01 RX ADMIN — MEROPENEM 100 MILLIGRAM(S): 1 INJECTION INTRAVENOUS at 13:49

## 2020-01-01 RX ADMIN — SODIUM CHLORIDE 5 MILLILITER(S): 9 INJECTION INTRAMUSCULAR; INTRAVENOUS; SUBCUTANEOUS at 21:08

## 2020-01-01 RX ADMIN — Medication 40 MILLIGRAM(S): at 11:43

## 2020-01-01 RX ADMIN — Medication 1 APPLICATION(S): at 18:08

## 2020-01-01 RX ADMIN — FAMOTIDINE 20 MILLIGRAM(S): 10 INJECTION INTRAVENOUS at 10:31

## 2020-01-01 RX ADMIN — ALBUTEROL 2 PUFF(S): 90 AEROSOL, METERED ORAL at 06:37

## 2020-01-01 RX ADMIN — HEPARIN SODIUM 900 UNIT(S)/HR: 5000 INJECTION INTRAVENOUS; SUBCUTANEOUS at 05:45

## 2020-01-01 RX ADMIN — Medication 400 MILLIGRAM(S): at 10:24

## 2020-01-01 RX ADMIN — Medication 650 MILLIGRAM(S): at 03:21

## 2020-01-01 RX ADMIN — FAMOTIDINE 20 MILLIGRAM(S): 10 INJECTION INTRAVENOUS at 11:03

## 2020-01-01 RX ADMIN — FAMOTIDINE 20 MILLIGRAM(S): 10 INJECTION INTRAVENOUS at 10:51

## 2020-01-01 RX ADMIN — FAMOTIDINE 20 MILLIGRAM(S): 10 INJECTION INTRAVENOUS at 11:39

## 2020-01-01 RX ADMIN — ENOXAPARIN SODIUM 40 MILLIGRAM(S): 100 INJECTION SUBCUTANEOUS at 14:21

## 2020-01-01 RX ADMIN — ALBUTEROL 2 PUFF(S): 90 AEROSOL, METERED ORAL at 21:32

## 2020-01-01 RX ADMIN — ALBUTEROL 2 PUFF(S): 90 AEROSOL, METERED ORAL at 22:43

## 2020-01-01 RX ADMIN — REMDESIVIR 500 MILLIGRAM(S): 5 INJECTION INTRAVENOUS at 10:40

## 2020-01-01 RX ADMIN — KETAMINE HYDROCHLORIDE 1.76 MG/KG/HR: 100 INJECTION INTRAMUSCULAR; INTRAVENOUS at 20:07

## 2020-01-01 RX ADMIN — FENTANYL CITRATE 100 MICROGRAM(S): 50 INJECTION INTRAVENOUS at 16:16

## 2020-01-01 RX ADMIN — FAMOTIDINE 20 MILLIGRAM(S): 10 INJECTION INTRAVENOUS at 11:56

## 2020-01-01 RX ADMIN — MIDAZOLAM HYDROCHLORIDE 2 MILLIGRAM(S): 1 INJECTION, SOLUTION INTRAMUSCULAR; INTRAVENOUS at 19:50

## 2020-01-01 RX ADMIN — ALBUTEROL 2 PUFF(S): 90 AEROSOL, METERED ORAL at 05:56

## 2020-01-01 RX ADMIN — POLYETHYLENE GLYCOL 3350 17 GRAM(S): 17 POWDER, FOR SOLUTION ORAL at 17:31

## 2020-01-01 RX ADMIN — SENNA PLUS 2 TABLET(S): 8.6 TABLET ORAL at 20:10

## 2020-01-01 RX ADMIN — CHLORHEXIDINE GLUCONATE 15 MILLILITER(S): 213 SOLUTION TOPICAL at 04:10

## 2020-01-01 RX ADMIN — ACETAZOLAMIDE 500 MILLIGRAM(S): 250 TABLET ORAL at 11:33

## 2020-01-01 RX ADMIN — KETAMINE HYDROCHLORIDE 1.76 MG/KG/HR: 100 INJECTION INTRAMUSCULAR; INTRAVENOUS at 20:44

## 2020-01-01 RX ADMIN — KETAMINE HYDROCHLORIDE 1.76 MG/KG/HR: 100 INJECTION INTRAMUSCULAR; INTRAVENOUS at 05:42

## 2020-01-01 RX ADMIN — Medication 62.5 MILLIMOLE(S): at 04:55

## 2020-01-01 RX ADMIN — ALBUTEROL 2 PUFF(S): 90 AEROSOL, METERED ORAL at 12:02

## 2020-01-01 RX ADMIN — Medication 2: at 17:41

## 2020-01-01 RX ADMIN — Medication 400 MILLIGRAM(S): at 21:38

## 2020-01-01 RX ADMIN — PIPERACILLIN AND TAZOBACTAM 25 GRAM(S): 4; .5 INJECTION, POWDER, LYOPHILIZED, FOR SOLUTION INTRAVENOUS at 12:24

## 2020-01-01 RX ADMIN — REMDESIVIR 500 MILLIGRAM(S): 5 INJECTION INTRAVENOUS at 09:45

## 2020-01-01 RX ADMIN — CHLORHEXIDINE GLUCONATE 15 MILLILITER(S): 213 SOLUTION TOPICAL at 03:50

## 2020-01-01 RX ADMIN — CHLORHEXIDINE GLUCONATE 15 MILLILITER(S): 213 SOLUTION TOPICAL at 04:39

## 2020-01-01 RX ADMIN — FENTANYL CITRATE 1.76 MICROGRAM(S)/KG/HR: 50 INJECTION INTRAVENOUS at 18:00

## 2020-01-01 RX ADMIN — ALBUTEROL 2 PUFF(S): 90 AEROSOL, METERED ORAL at 03:16

## 2020-01-01 RX ADMIN — PROPOFOL 21.1 MICROGRAM(S)/KG/MIN: 10 INJECTION, EMULSION INTRAVENOUS at 20:08

## 2020-01-01 RX ADMIN — SODIUM CHLORIDE 3 MILLILITER(S): 9 INJECTION INTRAMUSCULAR; INTRAVENOUS; SUBCUTANEOUS at 04:44

## 2020-01-01 RX ADMIN — SODIUM CHLORIDE 1000 MILLILITER(S): 9 INJECTION, SOLUTION INTRAVENOUS at 17:38

## 2020-01-01 RX ADMIN — ALBUTEROL 2 PUFF(S): 90 AEROSOL, METERED ORAL at 00:39

## 2020-01-01 RX ADMIN — POTASSIUM PHOSPHATE, MONOBASIC POTASSIUM PHOSPHATE, DIBASIC 83.33 MILLIMOLE(S): 236; 224 INJECTION, SOLUTION INTRAVENOUS at 18:22

## 2020-01-01 RX ADMIN — Medication 1: at 05:06

## 2020-01-01 RX ADMIN — CHLORHEXIDINE GLUCONATE 1 APPLICATION(S): 213 SOLUTION TOPICAL at 05:26

## 2020-01-01 RX ADMIN — CHLORHEXIDINE GLUCONATE 15 MILLILITER(S): 213 SOLUTION TOPICAL at 05:06

## 2020-01-01 RX ADMIN — Medication 40 MILLIEQUIVALENT(S): at 23:36

## 2020-01-01 RX ADMIN — SODIUM CHLORIDE 3 MILLILITER(S): 9 INJECTION INTRAMUSCULAR; INTRAVENOUS; SUBCUTANEOUS at 21:10

## 2020-01-01 RX ADMIN — MIDODRINE HYDROCHLORIDE 10 MILLIGRAM(S): 2.5 TABLET ORAL at 13:36

## 2020-01-01 RX ADMIN — KETAMINE HYDROCHLORIDE 3.52 MG/KG/HR: 100 INJECTION INTRAMUSCULAR; INTRAVENOUS at 10:51

## 2020-01-01 RX ADMIN — MIDODRINE HYDROCHLORIDE 10 MILLIGRAM(S): 2.5 TABLET ORAL at 04:10

## 2020-01-01 RX ADMIN — Medication 650 MILLIGRAM(S): at 16:34

## 2020-01-01 RX ADMIN — ALBUTEROL 2 PUFF(S): 90 AEROSOL, METERED ORAL at 04:22

## 2020-01-01 RX ADMIN — KETAMINE HYDROCHLORIDE 1.76 MG/KG/HR: 100 INJECTION INTRAMUSCULAR; INTRAVENOUS at 17:59

## 2020-01-01 RX ADMIN — Medication 1 APPLICATION(S): at 04:13

## 2020-01-01 RX ADMIN — Medication 650 MILLIGRAM(S): at 00:22

## 2020-01-01 RX ADMIN — PROPOFOL 10.5 MICROGRAM(S)/KG/MIN: 10 INJECTION, EMULSION INTRAVENOUS at 11:43

## 2020-01-01 RX ADMIN — MEROPENEM 100 MILLIGRAM(S): 1 INJECTION INTRAVENOUS at 22:00

## 2020-01-01 RX ADMIN — CHLORHEXIDINE GLUCONATE 15 MILLILITER(S): 213 SOLUTION TOPICAL at 17:29

## 2020-01-01 RX ADMIN — SODIUM CHLORIDE 3 MILLILITER(S): 9 INJECTION INTRAMUSCULAR; INTRAVENOUS; SUBCUTANEOUS at 11:47

## 2020-01-01 RX ADMIN — PIPERACILLIN AND TAZOBACTAM 25 GRAM(S): 4; .5 INJECTION, POWDER, LYOPHILIZED, FOR SOLUTION INTRAVENOUS at 05:36

## 2020-01-01 RX ADMIN — PIPERACILLIN AND TAZOBACTAM 25 GRAM(S): 4; .5 INJECTION, POWDER, LYOPHILIZED, FOR SOLUTION INTRAVENOUS at 12:28

## 2020-01-01 RX ADMIN — FAMOTIDINE 20 MILLIGRAM(S): 10 INJECTION INTRAVENOUS at 11:14

## 2020-01-01 RX ADMIN — POTASSIUM PHOSPHATE, MONOBASIC POTASSIUM PHOSPHATE, DIBASIC 62.5 MILLIMOLE(S): 236; 224 INJECTION, SOLUTION INTRAVENOUS at 17:11

## 2020-01-01 RX ADMIN — ALBUTEROL 2 PUFF(S): 90 AEROSOL, METERED ORAL at 21:41

## 2020-01-01 RX ADMIN — MIDODRINE HYDROCHLORIDE 10 MILLIGRAM(S): 2.5 TABLET ORAL at 13:50

## 2020-01-01 RX ADMIN — FENTANYL CITRATE 100 MICROGRAM(S): 50 INJECTION INTRAVENOUS at 11:15

## 2020-01-01 RX ADMIN — Medication 1: at 16:51

## 2020-01-01 RX ADMIN — PROPOFOL 10.5 MICROGRAM(S)/KG/MIN: 10 INJECTION, EMULSION INTRAVENOUS at 05:43

## 2020-01-01 RX ADMIN — Medication 6.75 MICROGRAM(S)/KG/MIN: at 20:08

## 2020-01-01 RX ADMIN — FENTANYL CITRATE 1.76 MICROGRAM(S)/KG/HR: 50 INJECTION INTRAVENOUS at 20:45

## 2020-01-01 RX ADMIN — SODIUM CHLORIDE 3 MILLILITER(S): 9 INJECTION INTRAMUSCULAR; INTRAVENOUS; SUBCUTANEOUS at 20:18

## 2020-01-01 RX ADMIN — CHLORHEXIDINE GLUCONATE 1 APPLICATION(S): 213 SOLUTION TOPICAL at 04:04

## 2020-01-01 RX ADMIN — Medication 1 APPLICATION(S): at 04:27

## 2020-01-01 RX ADMIN — ALBUTEROL 2 PUFF(S): 90 AEROSOL, METERED ORAL at 03:44

## 2020-01-01 RX ADMIN — CHLORHEXIDINE GLUCONATE 15 MILLILITER(S): 213 SOLUTION TOPICAL at 04:22

## 2020-01-01 RX ADMIN — BUMETANIDE 1 MILLIGRAM(S): 0.25 INJECTION INTRAMUSCULAR; INTRAVENOUS at 08:10

## 2020-01-01 RX ADMIN — MEROPENEM 100 MILLIGRAM(S): 1 INJECTION INTRAVENOUS at 13:10

## 2020-01-01 RX ADMIN — SODIUM CHLORIDE 75 MILLILITER(S): 9 INJECTION INTRAMUSCULAR; INTRAVENOUS; SUBCUTANEOUS at 05:43

## 2020-01-01 RX ADMIN — NICARDIPINE HYDROCHLORIDE 25 MG/HR: 30 CAPSULE, EXTENDED RELEASE ORAL at 21:51

## 2020-01-01 RX ADMIN — MIDODRINE HYDROCHLORIDE 10 MILLIGRAM(S): 2.5 TABLET ORAL at 00:24

## 2020-01-01 RX ADMIN — SODIUM CHLORIDE 3 MILLILITER(S): 9 INJECTION INTRAMUSCULAR; INTRAVENOUS; SUBCUTANEOUS at 04:33

## 2020-01-01 RX ADMIN — ENOXAPARIN SODIUM 40 MILLIGRAM(S): 100 INJECTION SUBCUTANEOUS at 12:52

## 2020-01-01 RX ADMIN — SODIUM CHLORIDE 3 MILLILITER(S): 9 INJECTION INTRAMUSCULAR; INTRAVENOUS; SUBCUTANEOUS at 22:36

## 2020-01-01 RX ADMIN — Medication 25 MILLIGRAM(S): at 02:11

## 2020-01-01 RX ADMIN — MIDODRINE HYDROCHLORIDE 10 MILLIGRAM(S): 2.5 TABLET ORAL at 20:44

## 2020-01-01 RX ADMIN — CHLORHEXIDINE GLUCONATE 15 MILLILITER(S): 213 SOLUTION TOPICAL at 17:38

## 2020-01-01 RX ADMIN — CHLORHEXIDINE GLUCONATE 15 MILLILITER(S): 213 SOLUTION TOPICAL at 16:20

## 2020-01-01 RX ADMIN — SODIUM CHLORIDE 3 MILLILITER(S): 9 INJECTION INTRAMUSCULAR; INTRAVENOUS; SUBCUTANEOUS at 13:58

## 2020-01-01 RX ADMIN — METHADONE HYDROCHLORIDE 5 MILLIGRAM(S): 40 TABLET ORAL at 17:26

## 2020-01-01 RX ADMIN — ACETAZOLAMIDE 250 MILLIGRAM(S): 250 TABLET ORAL at 11:29

## 2020-01-01 RX ADMIN — SODIUM CHLORIDE 3 MILLILITER(S): 9 INJECTION INTRAMUSCULAR; INTRAVENOUS; SUBCUTANEOUS at 11:58

## 2020-01-01 RX ADMIN — FENTANYL CITRATE 1.76 MICROGRAM(S)/KG/HR: 50 INJECTION INTRAVENOUS at 18:47

## 2020-01-01 RX ADMIN — MEROPENEM 100 MILLIGRAM(S): 1 INJECTION INTRAVENOUS at 05:31

## 2020-01-01 RX ADMIN — KETAMINE HYDROCHLORIDE 3.52 MG/KG/HR: 100 INJECTION INTRAMUSCULAR; INTRAVENOUS at 11:05

## 2020-01-01 RX ADMIN — PIPERACILLIN AND TAZOBACTAM 25 GRAM(S): 4; .5 INJECTION, POWDER, LYOPHILIZED, FOR SOLUTION INTRAVENOUS at 05:20

## 2020-01-01 RX ADMIN — KETAMINE HYDROCHLORIDE 3.52 MG/KG/HR: 100 INJECTION INTRAMUSCULAR; INTRAVENOUS at 17:28

## 2020-01-01 RX ADMIN — Medication 1: at 11:25

## 2020-01-01 RX ADMIN — SODIUM CHLORIDE 3 MILLILITER(S): 9 INJECTION INTRAMUSCULAR; INTRAVENOUS; SUBCUTANEOUS at 20:58

## 2020-01-01 RX ADMIN — CHLORHEXIDINE GLUCONATE 1 APPLICATION(S): 213 SOLUTION TOPICAL at 05:06

## 2020-01-01 RX ADMIN — Medication 50 MILLIEQUIVALENT(S): at 15:43

## 2020-01-01 RX ADMIN — PIPERACILLIN AND TAZOBACTAM 25 GRAM(S): 4; .5 INJECTION, POWDER, LYOPHILIZED, FOR SOLUTION INTRAVENOUS at 14:20

## 2020-01-01 RX ADMIN — Medication 1 APPLICATION(S): at 17:13

## 2020-01-01 RX ADMIN — Medication 250 MILLIGRAM(S): at 05:41

## 2020-01-01 RX ADMIN — Medication 250 MILLIGRAM(S): at 17:24

## 2020-01-01 RX ADMIN — Medication 40 MILLIGRAM(S): at 20:07

## 2020-01-01 RX ADMIN — Medication 250 MILLIGRAM(S): at 14:10

## 2020-01-01 RX ADMIN — Medication 40 MILLIGRAM(S): at 13:29

## 2020-01-01 RX ADMIN — HEPARIN SODIUM 1000 UNIT(S)/HR: 5000 INJECTION INTRAVENOUS; SUBCUTANEOUS at 14:00

## 2020-01-01 RX ADMIN — ALBUTEROL 2 PUFF(S): 90 AEROSOL, METERED ORAL at 21:18

## 2020-01-01 RX ADMIN — Medication 400 MILLIGRAM(S): at 13:24

## 2020-01-01 RX ADMIN — MIDODRINE HYDROCHLORIDE 10 MILLIGRAM(S): 2.5 TABLET ORAL at 05:49

## 2020-01-01 RX ADMIN — Medication 5.06 MICROGRAM(S)/KG/MIN: at 15:02

## 2020-01-01 RX ADMIN — ALBUTEROL 2 PUFF(S): 90 AEROSOL, METERED ORAL at 12:34

## 2020-01-01 RX ADMIN — MEROPENEM 100 MILLIGRAM(S): 1 INJECTION INTRAVENOUS at 23:17

## 2020-01-01 RX ADMIN — PIPERACILLIN AND TAZOBACTAM 25 GRAM(S): 4; .5 INJECTION, POWDER, LYOPHILIZED, FOR SOLUTION INTRAVENOUS at 21:35

## 2020-01-01 RX ADMIN — MEROPENEM 100 MILLIGRAM(S): 1 INJECTION INTRAVENOUS at 04:50

## 2020-01-01 RX ADMIN — Medication 1: at 11:36

## 2020-01-01 RX ADMIN — FENTANYL CITRATE 1.76 MICROGRAM(S)/KG/HR: 50 INJECTION INTRAVENOUS at 18:59

## 2020-01-01 RX ADMIN — Medication 650 MILLIGRAM(S): at 12:46

## 2020-01-01 RX ADMIN — Medication 1: at 18:05

## 2020-01-01 RX ADMIN — PROPOFOL 10.5 MICROGRAM(S)/KG/MIN: 10 INJECTION, EMULSION INTRAVENOUS at 15:36

## 2020-01-01 RX ADMIN — KETAMINE HYDROCHLORIDE 1.76 MG/KG/HR: 100 INJECTION INTRAMUSCULAR; INTRAVENOUS at 17:32

## 2020-01-01 RX ADMIN — Medication 1 APPLICATION(S): at 04:57

## 2020-01-01 RX ADMIN — Medication 1: at 06:30

## 2020-01-01 RX ADMIN — Medication 1: at 18:34

## 2020-01-01 RX ADMIN — MIDODRINE HYDROCHLORIDE 10 MILLIGRAM(S): 2.5 TABLET ORAL at 22:23

## 2020-01-01 RX ADMIN — ALBUTEROL 2 PUFF(S): 90 AEROSOL, METERED ORAL at 17:46

## 2020-01-01 RX ADMIN — CHLORHEXIDINE GLUCONATE 15 MILLILITER(S): 213 SOLUTION TOPICAL at 05:27

## 2020-01-01 RX ADMIN — Medication 50 MILLIGRAM(S): at 13:00

## 2020-01-01 RX ADMIN — FAMOTIDINE 20 MILLIGRAM(S): 10 INJECTION INTRAVENOUS at 10:02

## 2020-01-01 RX ADMIN — PROPOFOL 10.5 MICROGRAM(S)/KG/MIN: 10 INJECTION, EMULSION INTRAVENOUS at 08:04

## 2020-01-01 RX ADMIN — Medication 1: at 23:51

## 2020-01-01 RX ADMIN — Medication 250 MILLIGRAM(S): at 16:15

## 2020-01-01 RX ADMIN — HEPARIN SODIUM 13 UNIT(S)/HR: 5000 INJECTION INTRAVENOUS; SUBCUTANEOUS at 23:16

## 2020-01-01 RX ADMIN — Medication 0: at 06:20

## 2020-01-01 RX ADMIN — ALBUTEROL 2 PUFF(S): 90 AEROSOL, METERED ORAL at 17:33

## 2020-01-01 RX ADMIN — METHADONE HYDROCHLORIDE 5 MILLIGRAM(S): 40 TABLET ORAL at 20:18

## 2020-01-01 RX ADMIN — POLYETHYLENE GLYCOL 3350 17 GRAM(S): 17 POWDER, FOR SOLUTION ORAL at 11:40

## 2020-01-01 RX ADMIN — KETAMINE HYDROCHLORIDE 1.76 MG/KG/HR: 100 INJECTION INTRAMUSCULAR; INTRAVENOUS at 08:10

## 2020-01-01 RX ADMIN — MIDODRINE HYDROCHLORIDE 10 MILLIGRAM(S): 2.5 TABLET ORAL at 06:17

## 2020-01-01 RX ADMIN — CHLORHEXIDINE GLUCONATE 1 APPLICATION(S): 213 SOLUTION TOPICAL at 04:44

## 2020-01-01 RX ADMIN — ENOXAPARIN SODIUM 40 MILLIGRAM(S): 100 INJECTION SUBCUTANEOUS at 12:06

## 2020-01-01 RX ADMIN — ALBUTEROL 2 PUFF(S): 90 AEROSOL, METERED ORAL at 12:01

## 2020-01-01 RX ADMIN — METHADONE HYDROCHLORIDE 5 MILLIGRAM(S): 40 TABLET ORAL at 12:48

## 2020-01-01 RX ADMIN — TOCILIZUMAB 100 MILLIGRAM(S): 20 INJECTION, SOLUTION, CONCENTRATE INTRAVENOUS at 18:06

## 2020-01-01 RX ADMIN — FENTANYL CITRATE 50 MICROGRAM(S): 50 INJECTION INTRAVENOUS at 08:20

## 2020-01-01 RX ADMIN — Medication 3 MILLILITER(S): at 23:37

## 2020-01-01 RX ADMIN — MEROPENEM 100 MILLIGRAM(S): 1 INJECTION INTRAVENOUS at 04:55

## 2020-01-01 RX ADMIN — MEROPENEM 100 MILLIGRAM(S): 1 INJECTION INTRAVENOUS at 12:22

## 2020-01-01 RX ADMIN — Medication 40 MILLIEQUIVALENT(S): at 08:08

## 2020-01-01 RX ADMIN — BUMETANIDE 1 MILLIGRAM(S): 0.25 INJECTION INTRAMUSCULAR; INTRAVENOUS at 00:44

## 2020-01-01 RX ADMIN — DEXMEDETOMIDINE HYDROCHLORIDE IN 0.9% SODIUM CHLORIDE 8.79 MICROGRAM(S)/KG/HR: 4 INJECTION INTRAVENOUS at 17:21

## 2020-01-01 RX ADMIN — MIDAZOLAM HYDROCHLORIDE 4 MILLIGRAM(S): 1 INJECTION, SOLUTION INTRAMUSCULAR; INTRAVENOUS at 19:45

## 2020-01-01 RX ADMIN — CEFTRIAXONE 100 MILLIGRAM(S): 500 INJECTION, POWDER, FOR SOLUTION INTRAMUSCULAR; INTRAVENOUS at 21:06

## 2020-01-01 RX ADMIN — KETAMINE HYDROCHLORIDE 3.52 MG/KG/HR: 100 INJECTION INTRAMUSCULAR; INTRAVENOUS at 18:22

## 2020-01-01 RX ADMIN — HEPARIN SODIUM 9 UNIT(S)/HR: 5000 INJECTION INTRAVENOUS; SUBCUTANEOUS at 17:40

## 2020-01-01 RX ADMIN — Medication 650 MILLIGRAM(S): at 12:07

## 2020-01-01 RX ADMIN — MEROPENEM 100 MILLIGRAM(S): 1 INJECTION INTRAVENOUS at 05:04

## 2020-01-01 RX ADMIN — FLUCONAZOLE 100 MILLIGRAM(S): 150 TABLET ORAL at 10:01

## 2020-01-01 RX ADMIN — CHLORHEXIDINE GLUCONATE 15 MILLILITER(S): 213 SOLUTION TOPICAL at 05:36

## 2020-01-01 RX ADMIN — Medication 50 MILLIEQUIVALENT(S): at 03:06

## 2020-01-01 RX ADMIN — MIDODRINE HYDROCHLORIDE 10 MILLIGRAM(S): 2.5 TABLET ORAL at 20:35

## 2020-01-01 RX ADMIN — FENTANYL CITRATE 1.76 MICROGRAM(S)/KG/HR: 50 INJECTION INTRAVENOUS at 11:43

## 2020-01-01 RX ADMIN — Medication 40 MILLIEQUIVALENT(S): at 05:13

## 2020-01-01 RX ADMIN — FAMOTIDINE 20 MILLIGRAM(S): 10 INJECTION INTRAVENOUS at 13:25

## 2020-01-01 RX ADMIN — Medication 1 APPLICATION(S): at 17:10

## 2020-01-01 RX ADMIN — Medication 40 MILLIGRAM(S): at 09:07

## 2020-01-01 RX ADMIN — CASPOFUNGIN ACETATE 260 MILLIGRAM(S): 7 INJECTION, POWDER, LYOPHILIZED, FOR SOLUTION INTRAVENOUS at 13:38

## 2020-01-01 RX ADMIN — KETAMINE HYDROCHLORIDE 1.76 MG/KG/HR: 100 INJECTION INTRAMUSCULAR; INTRAVENOUS at 05:41

## 2020-01-01 RX ADMIN — KETAMINE HYDROCHLORIDE 1.76 MG/KG/HR: 100 INJECTION INTRAMUSCULAR; INTRAVENOUS at 20:14

## 2020-01-01 RX ADMIN — Medication 3.3 MICROGRAM(S)/KG/MIN: at 08:05

## 2020-01-01 RX ADMIN — ALBUTEROL 2 PUFF(S): 90 AEROSOL, METERED ORAL at 00:40

## 2020-01-01 RX ADMIN — Medication 6.75 MICROGRAM(S)/KG/MIN: at 21:35

## 2020-01-01 RX ADMIN — Medication 40 MILLIGRAM(S): at 22:23

## 2020-01-01 RX ADMIN — SENNA PLUS 10 MILLILITER(S): 8.6 TABLET ORAL at 20:35

## 2020-01-01 RX ADMIN — SENNA PLUS 2 TABLET(S): 8.6 TABLET ORAL at 21:04

## 2020-01-01 RX ADMIN — Medication 50 MILLIEQUIVALENT(S): at 08:21

## 2020-01-01 RX ADMIN — Medication 20 MILLIGRAM(S): at 05:13

## 2020-01-01 RX ADMIN — Medication 1 APPLICATION(S): at 05:40

## 2020-01-01 RX ADMIN — Medication 1: at 12:05

## 2020-01-01 RX ADMIN — AZITHROMYCIN 250 MILLIGRAM(S): 500 TABLET, FILM COATED ORAL at 01:13

## 2020-01-01 RX ADMIN — Medication 1 APPLICATION(S): at 17:40

## 2020-01-01 RX ADMIN — MIDAZOLAM HYDROCHLORIDE 4 MILLIGRAM(S): 1 INJECTION, SOLUTION INTRAMUSCULAR; INTRAVENOUS at 04:00

## 2020-01-01 RX ADMIN — MEROPENEM 100 MILLIGRAM(S): 1 INJECTION INTRAVENOUS at 22:21

## 2020-01-01 RX ADMIN — MEROPENEM 100 MILLIGRAM(S): 1 INJECTION INTRAVENOUS at 22:22

## 2020-01-01 RX ADMIN — PROPOFOL 10.5 MICROGRAM(S)/KG/MIN: 10 INJECTION, EMULSION INTRAVENOUS at 13:44

## 2020-01-01 RX ADMIN — Medication 250 MILLIGRAM(S): at 05:24

## 2020-01-01 RX ADMIN — Medication 3 MILLILITER(S): at 17:26

## 2020-01-01 RX ADMIN — ALBUTEROL 2 PUFF(S): 90 AEROSOL, METERED ORAL at 06:26

## 2020-01-01 RX ADMIN — NICARDIPINE HYDROCHLORIDE 25 MG/HR: 30 CAPSULE, EXTENDED RELEASE ORAL at 20:17

## 2020-01-01 RX ADMIN — BUMETANIDE 1 MILLIGRAM(S): 0.25 INJECTION INTRAMUSCULAR; INTRAVENOUS at 08:30

## 2020-01-01 RX ADMIN — Medication 1: at 06:03

## 2020-01-01 RX ADMIN — ALBUTEROL 2 PUFF(S): 90 AEROSOL, METERED ORAL at 18:02

## 2020-01-01 RX ADMIN — Medication 40 MILLIEQUIVALENT(S): at 18:19

## 2020-01-01 RX ADMIN — Medication 40 MILLIEQUIVALENT(S): at 17:28

## 2020-01-01 RX ADMIN — Medication 40 MILLIGRAM(S): at 05:38

## 2020-01-01 RX ADMIN — Medication 1: at 06:20

## 2020-01-01 RX ADMIN — ENOXAPARIN SODIUM 40 MILLIGRAM(S): 100 INJECTION SUBCUTANEOUS at 11:27

## 2020-01-01 RX ADMIN — FAMOTIDINE 20 MILLIGRAM(S): 10 INJECTION INTRAVENOUS at 12:24

## 2020-01-01 RX ADMIN — ALBUTEROL 2 PUFF(S): 90 AEROSOL, METERED ORAL at 06:30

## 2020-01-01 RX ADMIN — Medication 40 MILLIEQUIVALENT(S): at 14:19

## 2020-01-01 RX ADMIN — CHLORHEXIDINE GLUCONATE 15 MILLILITER(S): 213 SOLUTION TOPICAL at 16:56

## 2020-01-01 RX ADMIN — ALBUTEROL 2 PUFF(S): 90 AEROSOL, METERED ORAL at 00:52

## 2020-01-01 RX ADMIN — MEROPENEM 100 MILLIGRAM(S): 1 INJECTION INTRAVENOUS at 05:30

## 2020-01-01 RX ADMIN — Medication 35 MILLIGRAM(S): at 03:25

## 2020-01-01 RX ADMIN — FENTANYL CITRATE 1.76 MICROGRAM(S)/KG/HR: 50 INJECTION INTRAVENOUS at 20:07

## 2020-01-01 RX ADMIN — MEROPENEM 100 MILLIGRAM(S): 1 INJECTION INTRAVENOUS at 20:44

## 2020-01-01 RX ADMIN — Medication 1 APPLICATION(S): at 17:38

## 2020-01-01 RX ADMIN — Medication 100 MILLIEQUIVALENT(S): at 15:25

## 2020-01-01 RX ADMIN — Medication 20 MILLIGRAM(S): at 13:25

## 2020-01-01 RX ADMIN — Medication 3 MILLILITER(S): at 00:00

## 2020-01-01 RX ADMIN — Medication 3.3 MICROGRAM(S)/KG/MIN: at 17:03

## 2020-01-01 RX ADMIN — KETAMINE HYDROCHLORIDE 1.76 MG/KG/HR: 100 INJECTION INTRAMUSCULAR; INTRAVENOUS at 11:56

## 2020-01-01 RX ADMIN — ALBUTEROL 2 PUFF(S): 90 AEROSOL, METERED ORAL at 05:41

## 2020-01-01 RX ADMIN — FLUCONAZOLE 100 MILLIGRAM(S): 150 TABLET ORAL at 08:54

## 2020-01-01 RX ADMIN — CHLORHEXIDINE GLUCONATE 15 MILLILITER(S): 213 SOLUTION TOPICAL at 16:42

## 2020-01-01 RX ADMIN — POLYETHYLENE GLYCOL 3350 17 GRAM(S): 17 POWDER, FOR SOLUTION ORAL at 12:02

## 2020-01-01 RX ADMIN — SENNA PLUS 2 TABLET(S): 8.6 TABLET ORAL at 20:07

## 2020-01-01 RX ADMIN — Medication 70 MILLIGRAM(S): at 19:05

## 2020-01-01 RX ADMIN — HEPARIN SODIUM 13 UNIT(S)/HR: 5000 INJECTION INTRAVENOUS; SUBCUTANEOUS at 07:53

## 2020-01-01 RX ADMIN — KETAMINE HYDROCHLORIDE 1.76 MG/KG/HR: 100 INJECTION INTRAMUSCULAR; INTRAVENOUS at 12:36

## 2020-01-01 RX ADMIN — HEPARIN SODIUM 9 UNIT(S)/HR: 5000 INJECTION INTRAVENOUS; SUBCUTANEOUS at 01:05

## 2020-01-01 RX ADMIN — Medication 40 MILLIGRAM(S): at 23:55

## 2020-01-01 RX ADMIN — SODIUM CHLORIDE 5 MILLILITER(S): 9 INJECTION INTRAMUSCULAR; INTRAVENOUS; SUBCUTANEOUS at 11:35

## 2020-01-01 RX ADMIN — FAMOTIDINE 20 MILLIGRAM(S): 10 INJECTION INTRAVENOUS at 12:09

## 2020-01-01 RX ADMIN — HEPARIN SODIUM 13 UNIT(S)/HR: 5000 INJECTION INTRAVENOUS; SUBCUTANEOUS at 02:16

## 2020-01-01 RX ADMIN — LACTULOSE 10 GRAM(S): 10 SOLUTION ORAL at 00:05

## 2020-01-01 RX ADMIN — Medication 40 MILLIGRAM(S): at 10:25

## 2020-01-01 RX ADMIN — Medication 40 MILLIGRAM(S): at 01:40

## 2020-01-01 RX ADMIN — MEROPENEM 100 MILLIGRAM(S): 1 INJECTION INTRAVENOUS at 05:38

## 2020-01-01 RX ADMIN — Medication 3.3 MICROGRAM(S)/KG/MIN: at 08:22

## 2020-01-01 RX ADMIN — MEROPENEM 100 MILLIGRAM(S): 1 INJECTION INTRAVENOUS at 05:07

## 2020-01-01 RX ADMIN — ALBUTEROL 2 PUFF(S): 90 AEROSOL, METERED ORAL at 17:42

## 2020-01-01 RX ADMIN — ALBUTEROL 2 PUFF(S): 90 AEROSOL, METERED ORAL at 06:06

## 2020-01-01 RX ADMIN — PROPOFOL 10.5 MICROGRAM(S)/KG/MIN: 10 INJECTION, EMULSION INTRAVENOUS at 05:25

## 2020-01-01 RX ADMIN — SODIUM CHLORIDE 100 MILLILITER(S): 9 INJECTION, SOLUTION INTRAVENOUS at 05:50

## 2020-01-01 RX ADMIN — Medication 40 MILLIEQUIVALENT(S): at 04:22

## 2020-01-01 RX ADMIN — HEPARIN SODIUM 9 UNIT(S)/HR: 5000 INJECTION INTRAVENOUS; SUBCUTANEOUS at 05:50

## 2020-01-01 RX ADMIN — FENTANYL CITRATE 1.76 MICROGRAM(S)/KG/HR: 50 INJECTION INTRAVENOUS at 15:14

## 2020-01-01 RX ADMIN — FLUCONAZOLE 100 MILLIGRAM(S): 150 TABLET ORAL at 10:31

## 2020-01-01 RX ADMIN — CHLORHEXIDINE GLUCONATE 15 MILLILITER(S): 213 SOLUTION TOPICAL at 05:08

## 2020-01-01 RX ADMIN — Medication 250 MILLIGRAM(S): at 12:08

## 2020-01-01 RX ADMIN — DEXMEDETOMIDINE HYDROCHLORIDE IN 0.9% SODIUM CHLORIDE 8.79 MICROGRAM(S)/KG/HR: 4 INJECTION INTRAVENOUS at 20:11

## 2020-01-01 RX ADMIN — MEROPENEM 100 MILLIGRAM(S): 1 INJECTION INTRAVENOUS at 13:46

## 2020-01-01 RX ADMIN — HEPARIN SODIUM 1000 UNIT(S)/HR: 5000 INJECTION INTRAVENOUS; SUBCUTANEOUS at 11:47

## 2020-01-01 RX ADMIN — FENTANYL CITRATE 100 MICROGRAM(S): 50 INJECTION INTRAVENOUS at 18:24

## 2020-01-01 RX ADMIN — MIDODRINE HYDROCHLORIDE 10 MILLIGRAM(S): 2.5 TABLET ORAL at 04:26

## 2020-01-01 RX ADMIN — Medication 1 APPLICATION(S): at 04:10

## 2020-01-01 RX ADMIN — FENTANYL CITRATE 1.76 MICROGRAM(S)/KG/HR: 50 INJECTION INTRAVENOUS at 20:14

## 2020-01-01 RX ADMIN — MEROPENEM 100 MILLIGRAM(S): 1 INJECTION INTRAVENOUS at 05:41

## 2020-01-01 RX ADMIN — POLYETHYLENE GLYCOL 3350 17 GRAM(S): 17 POWDER, FOR SOLUTION ORAL at 10:00

## 2020-01-01 RX ADMIN — MEROPENEM 100 MILLIGRAM(S): 1 INJECTION INTRAVENOUS at 20:00

## 2020-01-01 RX ADMIN — MEROPENEM 100 MILLIGRAM(S): 1 INJECTION INTRAVENOUS at 04:11

## 2020-01-01 RX ADMIN — CHLORHEXIDINE GLUCONATE 1 APPLICATION(S): 213 SOLUTION TOPICAL at 05:14

## 2020-01-01 RX ADMIN — VASOPRESSIN 2.4 UNIT(S)/MIN: 20 INJECTION INTRAVENOUS at 08:22

## 2020-01-01 RX ADMIN — Medication 4 MG/HR: at 07:32

## 2020-01-01 RX ADMIN — HEPARIN SODIUM 1100 UNIT(S)/HR: 5000 INJECTION INTRAVENOUS; SUBCUTANEOUS at 07:49

## 2020-01-01 RX ADMIN — PIPERACILLIN AND TAZOBACTAM 25 GRAM(S): 4; .5 INJECTION, POWDER, LYOPHILIZED, FOR SOLUTION INTRAVENOUS at 21:22

## 2020-01-01 RX ADMIN — MIDODRINE HYDROCHLORIDE 10 MILLIGRAM(S): 2.5 TABLET ORAL at 00:06

## 2020-01-01 RX ADMIN — Medication 1: at 18:20

## 2020-01-01 RX ADMIN — PIPERACILLIN AND TAZOBACTAM 25 GRAM(S): 4; .5 INJECTION, POWDER, LYOPHILIZED, FOR SOLUTION INTRAVENOUS at 20:54

## 2020-01-01 RX ADMIN — PROPOFOL 10.5 MICROGRAM(S)/KG/MIN: 10 INJECTION, EMULSION INTRAVENOUS at 17:28

## 2020-01-01 RX ADMIN — CHLORHEXIDINE GLUCONATE 1 APPLICATION(S): 213 SOLUTION TOPICAL at 05:11

## 2020-01-01 RX ADMIN — Medication 100 MILLIEQUIVALENT(S): at 14:38

## 2020-01-01 RX ADMIN — BUMETANIDE 1 MILLIGRAM(S): 0.25 INJECTION INTRAMUSCULAR; INTRAVENOUS at 00:09

## 2020-01-01 RX ADMIN — CASPOFUNGIN ACETATE 260 MILLIGRAM(S): 7 INJECTION, POWDER, LYOPHILIZED, FOR SOLUTION INTRAVENOUS at 11:11

## 2020-01-01 RX ADMIN — PROPOFOL 21.1 MICROGRAM(S)/KG/MIN: 10 INJECTION, EMULSION INTRAVENOUS at 12:26

## 2020-01-01 RX ADMIN — CHLORHEXIDINE GLUCONATE 1 APPLICATION(S): 213 SOLUTION TOPICAL at 06:14

## 2020-01-01 RX ADMIN — SODIUM CHLORIDE 3 MILLILITER(S): 9 INJECTION INTRAMUSCULAR; INTRAVENOUS; SUBCUTANEOUS at 06:04

## 2020-01-01 RX ADMIN — CHLORHEXIDINE GLUCONATE 15 MILLILITER(S): 213 SOLUTION TOPICAL at 17:21

## 2020-01-01 RX ADMIN — Medication 3 MILLILITER(S): at 03:14

## 2020-01-01 RX ADMIN — Medication 200 MILLIGRAM(S): at 11:01

## 2020-01-01 RX ADMIN — Medication 2: at 00:52

## 2020-01-01 RX ADMIN — PROPOFOL 10.5 MICROGRAM(S)/KG/MIN: 10 INJECTION, EMULSION INTRAVENOUS at 20:45

## 2020-01-01 RX ADMIN — Medication 40 MILLIEQUIVALENT(S): at 02:14

## 2020-01-01 RX ADMIN — SODIUM CHLORIDE 5 MILLILITER(S): 9 INJECTION INTRAMUSCULAR; INTRAVENOUS; SUBCUTANEOUS at 23:38

## 2020-01-01 RX ADMIN — Medication 40 MILLIEQUIVALENT(S): at 17:14

## 2020-01-01 RX ADMIN — POTASSIUM PHOSPHATE, MONOBASIC POTASSIUM PHOSPHATE, DIBASIC 83.33 MILLIMOLE(S): 236; 224 INJECTION, SOLUTION INTRAVENOUS at 09:42

## 2020-01-01 RX ADMIN — PIPERACILLIN AND TAZOBACTAM 25 GRAM(S): 4; .5 INJECTION, POWDER, LYOPHILIZED, FOR SOLUTION INTRAVENOUS at 21:26

## 2020-01-01 RX ADMIN — DEXMEDETOMIDINE HYDROCHLORIDE IN 0.9% SODIUM CHLORIDE 26.4 MICROGRAM(S)/KG/HR: 4 INJECTION INTRAVENOUS at 02:14

## 2020-01-01 RX ADMIN — Medication 1: at 17:56

## 2020-01-01 RX ADMIN — ENOXAPARIN SODIUM 40 MILLIGRAM(S): 100 INJECTION SUBCUTANEOUS at 10:58

## 2020-01-01 RX ADMIN — KETAMINE HYDROCHLORIDE 3.52 MG/KG/HR: 100 INJECTION INTRAMUSCULAR; INTRAVENOUS at 15:37

## 2020-01-01 RX ADMIN — FAMOTIDINE 20 MILLIGRAM(S): 10 INJECTION INTRAVENOUS at 10:55

## 2020-01-01 RX ADMIN — Medication 10 MILLIGRAM(S): at 18:14

## 2020-01-01 RX ADMIN — PHENYLEPHRINE HYDROCHLORIDE 3.95 MICROGRAM(S)/KG/MIN: 10 INJECTION INTRAVENOUS at 20:08

## 2020-01-01 RX ADMIN — CHLORHEXIDINE GLUCONATE 1 APPLICATION(S): 213 SOLUTION TOPICAL at 05:48

## 2020-01-01 RX ADMIN — ALBUTEROL 2 PUFF(S): 90 AEROSOL, METERED ORAL at 17:17

## 2020-01-01 RX ADMIN — CASPOFUNGIN ACETATE 260 MILLIGRAM(S): 7 INJECTION, POWDER, LYOPHILIZED, FOR SOLUTION INTRAVENOUS at 13:36

## 2020-01-01 RX ADMIN — DEXMEDETOMIDINE HYDROCHLORIDE IN 0.9% SODIUM CHLORIDE 8.79 MICROGRAM(S)/KG/HR: 4 INJECTION INTRAVENOUS at 12:24

## 2020-01-01 RX ADMIN — Medication 3 MILLILITER(S): at 05:40

## 2020-01-01 RX ADMIN — PIPERACILLIN AND TAZOBACTAM 25 GRAM(S): 4; .5 INJECTION, POWDER, LYOPHILIZED, FOR SOLUTION INTRAVENOUS at 05:27

## 2020-01-01 RX ADMIN — CHLORHEXIDINE GLUCONATE 15 MILLILITER(S): 213 SOLUTION TOPICAL at 17:41

## 2020-01-01 RX ADMIN — METHADONE HYDROCHLORIDE 5 MILLIGRAM(S): 40 TABLET ORAL at 05:14

## 2020-01-01 RX ADMIN — Medication 40 MILLIGRAM(S): at 22:00

## 2020-01-01 RX ADMIN — PROPOFOL 10.5 MICROGRAM(S)/KG/MIN: 10 INJECTION, EMULSION INTRAVENOUS at 07:32

## 2020-01-01 RX ADMIN — BUMETANIDE 1 MILLIGRAM(S): 0.25 INJECTION INTRAMUSCULAR; INTRAVENOUS at 14:48

## 2020-01-01 RX ADMIN — Medication 1: at 11:39

## 2020-01-01 RX ADMIN — PIPERACILLIN AND TAZOBACTAM 25 GRAM(S): 4; .5 INJECTION, POWDER, LYOPHILIZED, FOR SOLUTION INTRAVENOUS at 13:17

## 2020-01-01 RX ADMIN — CHLORHEXIDINE GLUCONATE 1 APPLICATION(S): 213 SOLUTION TOPICAL at 04:14

## 2020-01-01 RX ADMIN — Medication 1: at 01:27

## 2020-01-01 RX ADMIN — Medication 1: at 05:15

## 2020-01-01 RX ADMIN — Medication 70 MILLIGRAM(S): at 03:10

## 2020-01-01 RX ADMIN — MIDODRINE HYDROCHLORIDE 10 MILLIGRAM(S): 2.5 TABLET ORAL at 12:08

## 2020-01-01 RX ADMIN — CHLORHEXIDINE GLUCONATE 15 MILLILITER(S): 213 SOLUTION TOPICAL at 17:31

## 2020-01-01 RX ADMIN — Medication 100 MILLIEQUIVALENT(S): at 18:00

## 2020-01-01 RX ADMIN — Medication 100 MILLIEQUIVALENT(S): at 19:24

## 2020-01-01 RX ADMIN — Medication 1: at 05:43

## 2020-01-01 RX ADMIN — MEROPENEM 100 MILLIGRAM(S): 1 INJECTION INTRAVENOUS at 04:39

## 2020-01-01 RX ADMIN — Medication 250 MILLIGRAM(S): at 17:42

## 2020-01-01 RX ADMIN — FENTANYL CITRATE 1.76 MICROGRAM(S)/KG/HR: 50 INJECTION INTRAVENOUS at 11:08

## 2020-01-01 RX ADMIN — CHLORHEXIDINE GLUCONATE 15 MILLILITER(S): 213 SOLUTION TOPICAL at 17:39

## 2020-01-01 RX ADMIN — KETAMINE HYDROCHLORIDE 1.76 MG/KG/HR: 100 INJECTION INTRAMUSCULAR; INTRAVENOUS at 10:54

## 2020-01-01 RX ADMIN — Medication 50 MILLIGRAM(S): at 20:18

## 2020-01-01 RX ADMIN — CHLORHEXIDINE GLUCONATE 15 MILLILITER(S): 213 SOLUTION TOPICAL at 17:25

## 2020-01-01 RX ADMIN — POTASSIUM PHOSPHATE, MONOBASIC POTASSIUM PHOSPHATE, DIBASIC 83.33 MILLIMOLE(S): 236; 224 INJECTION, SOLUTION INTRAVENOUS at 01:55

## 2020-01-01 RX ADMIN — POLYETHYLENE GLYCOL 3350 17 GRAM(S): 17 POWDER, FOR SOLUTION ORAL at 12:05

## 2020-01-01 RX ADMIN — PROPOFOL 10.5 MICROGRAM(S)/KG/MIN: 10 INJECTION, EMULSION INTRAVENOUS at 09:39

## 2020-01-01 RX ADMIN — Medication 1: at 17:09

## 2020-01-01 RX ADMIN — KETAMINE HYDROCHLORIDE 1.76 MG/KG/HR: 100 INJECTION INTRAMUSCULAR; INTRAVENOUS at 23:17

## 2020-01-01 RX ADMIN — FAMOTIDINE 20 MILLIGRAM(S): 10 INJECTION INTRAVENOUS at 10:53

## 2020-01-01 RX ADMIN — Medication 25 MILLIGRAM(S): at 20:54

## 2020-01-01 RX ADMIN — MEROPENEM 100 MILLIGRAM(S): 1 INJECTION INTRAVENOUS at 21:08

## 2020-01-01 RX ADMIN — HEPARIN SODIUM 13 UNIT(S)/HR: 5000 INJECTION INTRAVENOUS; SUBCUTANEOUS at 15:15

## 2020-01-01 RX ADMIN — Medication 250 MILLIGRAM(S): at 04:01

## 2020-01-01 RX ADMIN — CHLORHEXIDINE GLUCONATE 15 MILLILITER(S): 213 SOLUTION TOPICAL at 04:28

## 2020-01-01 RX ADMIN — PIPERACILLIN AND TAZOBACTAM 200 GRAM(S): 4; .5 INJECTION, POWDER, LYOPHILIZED, FOR SOLUTION INTRAVENOUS at 22:54

## 2020-01-01 RX ADMIN — Medication 1: at 17:40

## 2020-01-01 RX ADMIN — SODIUM CHLORIDE 3 MILLILITER(S): 9 INJECTION INTRAMUSCULAR; INTRAVENOUS; SUBCUTANEOUS at 21:01

## 2020-01-01 RX ADMIN — Medication 50 MILLIEQUIVALENT(S): at 17:28

## 2020-01-01 RX ADMIN — Medication 1 APPLICATION(S): at 04:42

## 2020-01-01 RX ADMIN — ALBUTEROL 2 PUFF(S): 90 AEROSOL, METERED ORAL at 17:25

## 2020-01-01 RX ADMIN — Medication 100 MILLIEQUIVALENT(S): at 07:31

## 2020-01-01 RX ADMIN — CHLORHEXIDINE GLUCONATE 15 MILLILITER(S): 213 SOLUTION TOPICAL at 05:14

## 2020-01-01 RX ADMIN — SODIUM CHLORIDE 3 MILLILITER(S): 9 INJECTION INTRAMUSCULAR; INTRAVENOUS; SUBCUTANEOUS at 21:08

## 2020-01-01 RX ADMIN — Medication 40 MILLIEQUIVALENT(S): at 05:21

## 2020-01-01 RX ADMIN — ALBUTEROL 2 PUFF(S): 90 AEROSOL, METERED ORAL at 11:43

## 2020-01-01 RX ADMIN — Medication 1 APPLICATION(S): at 17:55

## 2020-01-01 RX ADMIN — CHLORHEXIDINE GLUCONATE 1 APPLICATION(S): 213 SOLUTION TOPICAL at 05:13

## 2020-01-01 RX ADMIN — FAMOTIDINE 20 MILLIGRAM(S): 10 INJECTION INTRAVENOUS at 12:26

## 2020-01-01 RX ADMIN — SODIUM CHLORIDE 5 MILLILITER(S): 9 INJECTION INTRAMUSCULAR; INTRAVENOUS; SUBCUTANEOUS at 03:39

## 2020-01-01 RX ADMIN — Medication 3 MILLILITER(S): at 12:06

## 2020-01-01 RX ADMIN — Medication 50 MILLIEQUIVALENT(S): at 17:13

## 2020-01-01 RX ADMIN — ALBUTEROL 2 PUFF(S): 90 AEROSOL, METERED ORAL at 23:36

## 2020-01-01 RX ADMIN — BUMETANIDE 1 MILLIGRAM(S): 0.25 INJECTION INTRAMUSCULAR; INTRAVENOUS at 00:23

## 2020-01-01 RX ADMIN — DEXMEDETOMIDINE HYDROCHLORIDE IN 0.9% SODIUM CHLORIDE 8.79 MICROGRAM(S)/KG/HR: 4 INJECTION INTRAVENOUS at 13:24

## 2020-01-01 RX ADMIN — SODIUM CHLORIDE 3 MILLILITER(S): 9 INJECTION INTRAMUSCULAR; INTRAVENOUS; SUBCUTANEOUS at 20:00

## 2020-01-01 RX ADMIN — ACETAZOLAMIDE 500 MILLIGRAM(S): 250 TABLET ORAL at 18:00

## 2020-01-01 RX ADMIN — Medication 1: at 16:54

## 2020-01-01 RX ADMIN — Medication 40 MILLIGRAM(S): at 10:50

## 2020-01-01 RX ADMIN — Medication 100 MILLIEQUIVALENT(S): at 05:12

## 2020-01-01 RX ADMIN — KETAMINE HYDROCHLORIDE 3.52 MG/KG/HR: 100 INJECTION INTRAMUSCULAR; INTRAVENOUS at 11:43

## 2020-01-01 RX ADMIN — Medication 10 MILLIGRAM(S): at 14:30

## 2020-01-01 RX ADMIN — FAMOTIDINE 20 MILLIGRAM(S): 10 INJECTION INTRAVENOUS at 11:01

## 2020-01-01 RX ADMIN — SODIUM CHLORIDE 5 MILLILITER(S): 9 INJECTION INTRAMUSCULAR; INTRAVENOUS; SUBCUTANEOUS at 12:07

## 2020-01-01 RX ADMIN — SENNA PLUS 10 MILLILITER(S): 8.6 TABLET ORAL at 11:37

## 2020-01-01 RX ADMIN — REMDESIVIR 500 MILLIGRAM(S): 5 INJECTION INTRAVENOUS at 10:15

## 2020-01-01 RX ADMIN — FAMOTIDINE 20 MILLIGRAM(S): 10 INJECTION INTRAVENOUS at 12:14

## 2020-01-01 RX ADMIN — POTASSIUM PHOSPHATE, MONOBASIC POTASSIUM PHOSPHATE, DIBASIC 83.33 MILLIMOLE(S): 236; 224 INJECTION, SOLUTION INTRAVENOUS at 04:57

## 2020-01-01 RX ADMIN — Medication 250 MILLIGRAM(S): at 04:55

## 2020-01-01 RX ADMIN — BUMETANIDE 1 MILLIGRAM(S): 0.25 INJECTION INTRAMUSCULAR; INTRAVENOUS at 00:04

## 2020-01-01 RX ADMIN — MIDODRINE HYDROCHLORIDE 10 MILLIGRAM(S): 2.5 TABLET ORAL at 05:41

## 2020-01-01 RX ADMIN — BUMETANIDE 1 MILLIGRAM(S): 0.25 INJECTION INTRAMUSCULAR; INTRAVENOUS at 23:17

## 2020-01-01 RX ADMIN — CHLORHEXIDINE GLUCONATE 15 MILLILITER(S): 213 SOLUTION TOPICAL at 17:09

## 2020-01-01 RX ADMIN — Medication 35 MILLIGRAM(S): at 02:20

## 2020-01-01 RX ADMIN — Medication 250 MILLIGRAM(S): at 18:16

## 2020-01-01 RX ADMIN — SODIUM CHLORIDE 3 MILLILITER(S): 9 INJECTION INTRAMUSCULAR; INTRAVENOUS; SUBCUTANEOUS at 03:50

## 2020-01-01 RX ADMIN — CEFTRIAXONE 100 MILLIGRAM(S): 500 INJECTION, POWDER, FOR SOLUTION INTRAMUSCULAR; INTRAVENOUS at 13:00

## 2020-01-01 RX ADMIN — FENTANYL CITRATE 1.76 MICROGRAM(S)/KG/HR: 50 INJECTION INTRAVENOUS at 14:54

## 2020-01-01 RX ADMIN — Medication 1 APPLICATION(S): at 17:06

## 2020-01-01 RX ADMIN — SODIUM CHLORIDE 3 MILLILITER(S): 9 INJECTION INTRAMUSCULAR; INTRAVENOUS; SUBCUTANEOUS at 14:51

## 2020-01-01 RX ADMIN — CHLORHEXIDINE GLUCONATE 15 MILLILITER(S): 213 SOLUTION TOPICAL at 06:12

## 2020-01-01 RX ADMIN — Medication 650 MILLIGRAM(S): at 06:15

## 2020-01-01 RX ADMIN — Medication 1: at 12:10

## 2020-01-01 RX ADMIN — Medication 50 MILLIEQUIVALENT(S): at 00:30

## 2020-01-01 RX ADMIN — ENOXAPARIN SODIUM 40 MILLIGRAM(S): 100 INJECTION SUBCUTANEOUS at 13:02

## 2020-01-01 RX ADMIN — Medication 250 MILLIGRAM(S): at 21:25

## 2020-01-01 RX ADMIN — Medication 70 MILLIGRAM(S): at 07:20

## 2020-01-01 RX ADMIN — KETAMINE HYDROCHLORIDE 1.76 MG/KG/HR: 100 INJECTION INTRAMUSCULAR; INTRAVENOUS at 02:15

## 2020-01-01 RX ADMIN — CHLORHEXIDINE GLUCONATE 15 MILLILITER(S): 213 SOLUTION TOPICAL at 05:10

## 2020-01-01 RX ADMIN — Medication 3 MILLILITER(S): at 06:39

## 2020-01-01 RX ADMIN — PROPOFOL 10.5 MICROGRAM(S)/KG/MIN: 10 INJECTION, EMULSION INTRAVENOUS at 10:51

## 2020-01-01 RX ADMIN — HEPARIN SODIUM 13 UNIT(S)/HR: 5000 INJECTION INTRAVENOUS; SUBCUTANEOUS at 07:33

## 2020-01-01 RX ADMIN — KETAMINE HYDROCHLORIDE 3.52 MG/KG/HR: 100 INJECTION INTRAMUSCULAR; INTRAVENOUS at 17:13

## 2020-01-01 RX ADMIN — FLUCONAZOLE 100 MILLIGRAM(S): 150 TABLET ORAL at 10:15

## 2020-01-01 RX ADMIN — BUMETANIDE 1 MILLIGRAM(S): 0.25 INJECTION INTRAMUSCULAR; INTRAVENOUS at 06:19

## 2020-01-01 RX ADMIN — PHENYLEPHRINE HYDROCHLORIDE 3.95 MICROGRAM(S)/KG/MIN: 10 INJECTION INTRAVENOUS at 12:24

## 2020-01-01 RX ADMIN — Medication 1: at 23:18

## 2020-01-01 RX ADMIN — MEROPENEM 100 MILLIGRAM(S): 1 INJECTION INTRAVENOUS at 12:25

## 2020-01-01 RX ADMIN — MEROPENEM 100 MILLIGRAM(S): 1 INJECTION INTRAVENOUS at 12:05

## 2020-01-01 RX ADMIN — FENTANYL CITRATE 100 MICROGRAM(S): 50 INJECTION INTRAVENOUS at 19:55

## 2020-01-01 RX ADMIN — Medication 250 MILLIGRAM(S): at 19:08

## 2020-01-01 RX ADMIN — CHLORHEXIDINE GLUCONATE 1 APPLICATION(S): 213 SOLUTION TOPICAL at 03:50

## 2020-01-01 RX ADMIN — CEFTRIAXONE 100 MILLIGRAM(S): 500 INJECTION, POWDER, FOR SOLUTION INTRAMUSCULAR; INTRAVENOUS at 00:08

## 2020-01-01 RX ADMIN — CASPOFUNGIN ACETATE 260 MILLIGRAM(S): 7 INJECTION, POWDER, LYOPHILIZED, FOR SOLUTION INTRAVENOUS at 19:02

## 2020-01-01 RX ADMIN — Medication 1: at 06:09

## 2020-01-01 RX ADMIN — Medication 40 MILLIGRAM(S): at 00:30

## 2020-01-01 RX ADMIN — Medication 62.5 MILLIMOLE(S): at 04:22

## 2020-01-01 RX ADMIN — ALBUTEROL 2 PUFF(S): 90 AEROSOL, METERED ORAL at 17:58

## 2020-01-01 RX ADMIN — SODIUM CHLORIDE 3 MILLILITER(S): 9 INJECTION INTRAMUSCULAR; INTRAVENOUS; SUBCUTANEOUS at 21:05

## 2020-01-01 RX ADMIN — ALBUTEROL 2 PUFF(S): 90 AEROSOL, METERED ORAL at 11:40

## 2020-01-01 RX ADMIN — MIDODRINE HYDROCHLORIDE 10 MILLIGRAM(S): 2.5 TABLET ORAL at 13:23

## 2020-01-01 RX ADMIN — Medication 100 MILLIEQUIVALENT(S): at 11:04

## 2020-01-01 RX ADMIN — ENOXAPARIN SODIUM 70 MILLIGRAM(S): 100 INJECTION SUBCUTANEOUS at 17:38

## 2020-01-01 RX ADMIN — SODIUM CHLORIDE 3 MILLILITER(S): 9 INJECTION INTRAMUSCULAR; INTRAVENOUS; SUBCUTANEOUS at 12:07

## 2020-01-01 RX ADMIN — HEPARIN SODIUM 12 UNIT(S)/HR: 5000 INJECTION INTRAVENOUS; SUBCUTANEOUS at 02:00

## 2020-01-01 RX ADMIN — Medication 40 MILLIGRAM(S): at 13:14

## 2020-01-01 RX ADMIN — HEPARIN SODIUM 1000 UNIT(S)/HR: 5000 INJECTION INTRAVENOUS; SUBCUTANEOUS at 01:31

## 2020-01-01 RX ADMIN — MEROPENEM 100 MILLIGRAM(S): 1 INJECTION INTRAVENOUS at 11:58

## 2020-01-01 RX ADMIN — AZITHROMYCIN 250 MILLIGRAM(S): 500 TABLET, FILM COATED ORAL at 13:05

## 2020-01-01 RX ADMIN — Medication 50 MILLIGRAM(S): at 12:08

## 2020-01-01 RX ADMIN — CASPOFUNGIN ACETATE 260 MILLIGRAM(S): 7 INJECTION, POWDER, LYOPHILIZED, FOR SOLUTION INTRAVENOUS at 18:37

## 2020-01-01 RX ADMIN — Medication 1 APPLICATION(S): at 05:27

## 2020-01-01 RX ADMIN — CHLORHEXIDINE GLUCONATE 15 MILLILITER(S): 213 SOLUTION TOPICAL at 17:11

## 2020-01-01 RX ADMIN — CHLORHEXIDINE GLUCONATE 15 MILLILITER(S): 213 SOLUTION TOPICAL at 18:00

## 2020-01-01 RX ADMIN — Medication 40 MILLIGRAM(S): at 02:36

## 2020-01-01 RX ADMIN — POLYETHYLENE GLYCOL 3350 17 GRAM(S): 17 POWDER, FOR SOLUTION ORAL at 12:25

## 2020-01-01 RX ADMIN — Medication 1 APPLICATION(S): at 17:39

## 2020-01-01 RX ADMIN — PIPERACILLIN AND TAZOBACTAM 25 GRAM(S): 4; .5 INJECTION, POWDER, LYOPHILIZED, FOR SOLUTION INTRAVENOUS at 04:04

## 2020-01-01 RX ADMIN — HEPARIN SODIUM 900 UNIT(S)/HR: 5000 INJECTION INTRAVENOUS; SUBCUTANEOUS at 22:30

## 2020-01-01 RX ADMIN — Medication 1 APPLICATION(S): at 16:36

## 2020-01-01 RX ADMIN — HEPARIN SODIUM 13 UNIT(S)/HR: 5000 INJECTION INTRAVENOUS; SUBCUTANEOUS at 11:43

## 2020-01-01 RX ADMIN — SODIUM CHLORIDE 5 MILLILITER(S): 9 INJECTION INTRAMUSCULAR; INTRAVENOUS; SUBCUTANEOUS at 05:58

## 2020-01-01 RX ADMIN — FENTANYL CITRATE 100 MICROGRAM(S): 50 INJECTION INTRAVENOUS at 19:02

## 2020-01-01 RX ADMIN — FAMOTIDINE 20 MILLIGRAM(S): 10 INJECTION INTRAVENOUS at 12:17

## 2020-01-01 RX ADMIN — DEXMEDETOMIDINE HYDROCHLORIDE IN 0.9% SODIUM CHLORIDE 8.79 MICROGRAM(S)/KG/HR: 4 INJECTION INTRAVENOUS at 07:51

## 2020-01-01 RX ADMIN — Medication 10 MILLIGRAM(S): at 23:50

## 2020-01-01 RX ADMIN — KETAMINE HYDROCHLORIDE 3.52 MG/KG/HR: 100 INJECTION INTRAMUSCULAR; INTRAVENOUS at 15:14

## 2020-01-01 RX ADMIN — ALBUTEROL 2 PUFF(S): 90 AEROSOL, METERED ORAL at 15:27

## 2020-01-01 RX ADMIN — Medication 50 MILLIEQUIVALENT(S): at 14:11

## 2020-01-01 RX ADMIN — Medication 200 MILLIGRAM(S): at 00:03

## 2020-01-01 RX ADMIN — POLYETHYLENE GLYCOL 3350 17 GRAM(S): 17 POWDER, FOR SOLUTION ORAL at 04:04

## 2020-01-01 RX ADMIN — ALBUTEROL 2 PUFF(S): 90 AEROSOL, METERED ORAL at 01:40

## 2020-01-01 RX ADMIN — CHLORHEXIDINE GLUCONATE 15 MILLILITER(S): 213 SOLUTION TOPICAL at 04:27

## 2020-01-01 RX ADMIN — SODIUM CHLORIDE 3 MILLILITER(S): 9 INJECTION INTRAMUSCULAR; INTRAVENOUS; SUBCUTANEOUS at 05:08

## 2020-01-01 RX ADMIN — Medication 7.91 MICROGRAM(S)/KG/MIN: at 11:07

## 2020-01-01 RX ADMIN — PROPOFOL 10.5 MICROGRAM(S)/KG/MIN: 10 INJECTION, EMULSION INTRAVENOUS at 12:58

## 2020-01-01 RX ADMIN — CHLORHEXIDINE GLUCONATE 15 MILLILITER(S): 213 SOLUTION TOPICAL at 05:26

## 2020-01-01 RX ADMIN — Medication 650 MILLIGRAM(S): at 20:44

## 2020-01-01 RX ADMIN — FAMOTIDINE 20 MILLIGRAM(S): 10 INJECTION INTRAVENOUS at 12:04

## 2020-01-01 RX ADMIN — PROPOFOL 10.5 MICROGRAM(S)/KG/MIN: 10 INJECTION, EMULSION INTRAVENOUS at 11:06

## 2020-01-01 RX ADMIN — PROPOFOL 10.5 MICROGRAM(S)/KG/MIN: 10 INJECTION, EMULSION INTRAVENOUS at 14:16

## 2020-01-01 RX ADMIN — Medication 250 MILLIGRAM(S): at 06:00

## 2020-01-01 RX ADMIN — ALBUTEROL 2 PUFF(S): 90 AEROSOL, METERED ORAL at 12:45

## 2020-01-01 RX ADMIN — Medication 650 MILLIGRAM(S): at 02:51

## 2020-01-01 RX ADMIN — SODIUM CHLORIDE 3 MILLILITER(S): 9 INJECTION INTRAMUSCULAR; INTRAVENOUS; SUBCUTANEOUS at 05:33

## 2020-01-01 RX ADMIN — Medication 6.75 MICROGRAM(S)/KG/MIN: at 10:50

## 2020-01-01 RX ADMIN — HEPARIN SODIUM 1100 UNIT(S)/HR: 5000 INJECTION INTRAVENOUS; SUBCUTANEOUS at 14:30

## 2020-01-01 RX ADMIN — Medication 1 APPLICATION(S): at 16:40

## 2020-01-01 RX ADMIN — Medication 1: at 22:42

## 2020-01-01 RX ADMIN — Medication 3.3 MICROGRAM(S)/KG/MIN: at 20:08

## 2020-01-01 RX ADMIN — PROPOFOL 10.5 MICROGRAM(S)/KG/MIN: 10 INJECTION, EMULSION INTRAVENOUS at 21:35

## 2020-01-01 RX ADMIN — Medication 40 MILLIGRAM(S): at 11:25

## 2020-01-01 RX ADMIN — Medication 40 MILLIGRAM(S): at 04:38

## 2020-01-01 RX ADMIN — HEPARIN SODIUM 1200 UNIT(S)/HR: 5000 INJECTION INTRAVENOUS; SUBCUTANEOUS at 01:02

## 2020-01-01 RX ADMIN — REMDESIVIR 500 MILLIGRAM(S): 5 INJECTION INTRAVENOUS at 09:40

## 2020-01-01 RX ADMIN — SENNA PLUS 10 MILLILITER(S): 8.6 TABLET ORAL at 00:08

## 2020-01-01 RX ADMIN — Medication 3.5 MG/HR: at 06:55

## 2020-01-01 RX ADMIN — PROPOFOL 10.5 MICROGRAM(S)/KG/MIN: 10 INJECTION, EMULSION INTRAVENOUS at 11:26

## 2020-01-01 RX ADMIN — CHLORHEXIDINE GLUCONATE 1 APPLICATION(S): 213 SOLUTION TOPICAL at 05:44

## 2020-01-01 RX ADMIN — Medication 1 APPLICATION(S): at 06:12

## 2020-01-01 RX ADMIN — KETAMINE HYDROCHLORIDE 1.76 MG/KG/HR: 100 INJECTION INTRAMUSCULAR; INTRAVENOUS at 02:00

## 2020-01-01 RX ADMIN — CHLORHEXIDINE GLUCONATE 1 APPLICATION(S): 213 SOLUTION TOPICAL at 04:28

## 2020-01-01 RX ADMIN — Medication 1 APPLICATION(S): at 05:30

## 2020-01-01 RX ADMIN — ACETAZOLAMIDE 500 MILLIGRAM(S): 250 TABLET ORAL at 10:55

## 2020-01-01 RX ADMIN — MEROPENEM 100 MILLIGRAM(S): 1 INJECTION INTRAVENOUS at 04:22

## 2020-01-01 RX ADMIN — CHLORHEXIDINE GLUCONATE 1 APPLICATION(S): 213 SOLUTION TOPICAL at 04:43

## 2020-01-01 RX ADMIN — CHLORHEXIDINE GLUCONATE 1 APPLICATION(S): 213 SOLUTION TOPICAL at 06:18

## 2020-01-01 RX ADMIN — Medication 20 MILLIEQUIVALENT(S): at 02:00

## 2020-01-01 RX ADMIN — DEXMEDETOMIDINE HYDROCHLORIDE IN 0.9% SODIUM CHLORIDE 8.79 MICROGRAM(S)/KG/HR: 4 INJECTION INTRAVENOUS at 11:07

## 2020-01-01 RX ADMIN — Medication 1: at 04:58

## 2020-01-01 RX ADMIN — CHLORHEXIDINE GLUCONATE 15 MILLILITER(S): 213 SOLUTION TOPICAL at 16:58

## 2020-01-01 RX ADMIN — SENNA PLUS 10 MILLILITER(S): 8.6 TABLET ORAL at 21:58

## 2020-01-01 RX ADMIN — Medication 20 MILLIGRAM(S): at 09:38

## 2020-01-01 RX ADMIN — Medication 2: at 12:12

## 2020-01-01 RX ADMIN — SODIUM CHLORIDE 3 MILLILITER(S): 9 INJECTION INTRAMUSCULAR; INTRAVENOUS; SUBCUTANEOUS at 21:00

## 2020-01-01 RX ADMIN — Medication 250 MILLIGRAM(S): at 05:38

## 2020-01-01 RX ADMIN — Medication 1: at 17:28

## 2020-01-01 RX ADMIN — FAMOTIDINE 20 MILLIGRAM(S): 10 INJECTION INTRAVENOUS at 10:33

## 2020-01-01 RX ADMIN — BUMETANIDE 1 MILLIGRAM(S): 0.25 INJECTION INTRAMUSCULAR; INTRAVENOUS at 17:24

## 2020-01-01 RX ADMIN — Medication 650 MILLIGRAM(S): at 08:37

## 2020-01-01 RX ADMIN — Medication 40 MILLIGRAM(S): at 08:04

## 2020-01-01 RX ADMIN — MEROPENEM 100 MILLIGRAM(S): 1 INJECTION INTRAVENOUS at 13:00

## 2020-01-01 RX ADMIN — Medication 40 MILLIGRAM(S): at 10:57

## 2020-01-01 RX ADMIN — MIDODRINE HYDROCHLORIDE 10 MILLIGRAM(S): 2.5 TABLET ORAL at 23:17

## 2020-01-01 RX ADMIN — CHLORHEXIDINE GLUCONATE 1 APPLICATION(S): 213 SOLUTION TOPICAL at 05:00

## 2020-01-01 RX ADMIN — SODIUM CHLORIDE 3 MILLILITER(S): 9 INJECTION INTRAMUSCULAR; INTRAVENOUS; SUBCUTANEOUS at 05:31

## 2020-01-01 RX ADMIN — SODIUM CHLORIDE 3 MILLILITER(S): 9 INJECTION INTRAMUSCULAR; INTRAVENOUS; SUBCUTANEOUS at 20:10

## 2020-01-01 RX ADMIN — FENTANYL CITRATE 100 MICROGRAM(S): 50 INJECTION INTRAVENOUS at 21:58

## 2020-01-01 RX ADMIN — Medication 1 APPLICATION(S): at 05:10

## 2020-01-01 RX ADMIN — CHLORHEXIDINE GLUCONATE 15 MILLILITER(S): 213 SOLUTION TOPICAL at 16:40

## 2020-01-01 RX ADMIN — BUMETANIDE 1 MILLIGRAM(S): 0.25 INJECTION INTRAMUSCULAR; INTRAVENOUS at 08:14

## 2020-01-01 RX ADMIN — Medication 1 APPLICATION(S): at 05:42

## 2020-01-01 RX ADMIN — SODIUM CHLORIDE 3 MILLILITER(S): 9 INJECTION INTRAMUSCULAR; INTRAVENOUS; SUBCUTANEOUS at 04:12

## 2020-01-01 RX ADMIN — SODIUM CHLORIDE 4 MILLILITER(S): 9 INJECTION INTRAMUSCULAR; INTRAVENOUS; SUBCUTANEOUS at 17:27

## 2020-01-01 RX ADMIN — CHLORHEXIDINE GLUCONATE 15 MILLILITER(S): 213 SOLUTION TOPICAL at 05:13

## 2020-01-01 RX ADMIN — MEROPENEM 100 MILLIGRAM(S): 1 INJECTION INTRAVENOUS at 06:36

## 2020-01-01 RX ADMIN — AZITHROMYCIN 250 MILLIGRAM(S): 500 TABLET, FILM COATED ORAL at 22:28

## 2020-01-01 RX ADMIN — Medication 1 APPLICATION(S): at 04:23

## 2020-01-01 RX ADMIN — Medication 1 APPLICATION(S): at 04:05

## 2020-01-01 RX ADMIN — Medication 1 APPLICATION(S): at 17:46

## 2020-01-01 RX ADMIN — FENTANYL CITRATE 1.76 MICROGRAM(S)/KG/HR: 50 INJECTION INTRAVENOUS at 23:15

## 2020-01-01 RX ADMIN — KETAMINE HYDROCHLORIDE 1.76 MG/KG/HR: 100 INJECTION INTRAMUSCULAR; INTRAVENOUS at 08:21

## 2020-01-01 RX ADMIN — Medication 50 GRAM(S): at 15:25

## 2020-01-01 RX ADMIN — FAMOTIDINE 20 MILLIGRAM(S): 10 INJECTION INTRAVENOUS at 12:47

## 2020-01-01 RX ADMIN — CEFTRIAXONE 100 MILLIGRAM(S): 500 INJECTION, POWDER, FOR SOLUTION INTRAMUSCULAR; INTRAVENOUS at 00:02

## 2020-01-01 RX ADMIN — MEROPENEM 100 MILLIGRAM(S): 1 INJECTION INTRAVENOUS at 21:29

## 2020-01-01 RX ADMIN — Medication 40 MILLIGRAM(S): at 23:53

## 2020-01-01 RX ADMIN — PROPOFOL 10.5 MICROGRAM(S)/KG/MIN: 10 INJECTION, EMULSION INTRAVENOUS at 13:39

## 2020-01-01 RX ADMIN — CEFTRIAXONE 100 MILLIGRAM(S): 500 INJECTION, POWDER, FOR SOLUTION INTRAMUSCULAR; INTRAVENOUS at 13:16

## 2020-01-01 RX ADMIN — SODIUM CHLORIDE 3 MILLILITER(S): 9 INJECTION INTRAMUSCULAR; INTRAVENOUS; SUBCUTANEOUS at 12:27

## 2020-01-01 RX ADMIN — Medication 20 MILLIGRAM(S): at 00:38

## 2020-01-01 RX ADMIN — METHADONE HYDROCHLORIDE 5 MILLIGRAM(S): 40 TABLET ORAL at 13:46

## 2020-01-01 RX ADMIN — POTASSIUM PHOSPHATE, MONOBASIC POTASSIUM PHOSPHATE, DIBASIC 62.5 MILLIMOLE(S): 236; 224 INJECTION, SOLUTION INTRAVENOUS at 10:02

## 2020-01-01 RX ADMIN — CHLORHEXIDINE GLUCONATE 1 APPLICATION(S): 213 SOLUTION TOPICAL at 06:12

## 2020-01-01 RX ADMIN — SENNA PLUS 10 MILLILITER(S): 8.6 TABLET ORAL at 11:44

## 2020-01-01 RX ADMIN — Medication 250 MILLIGRAM(S): at 16:21

## 2020-01-01 RX ADMIN — MEROPENEM 100 MILLIGRAM(S): 1 INJECTION INTRAVENOUS at 14:53

## 2020-01-01 RX ADMIN — KETAMINE HYDROCHLORIDE 1.76 MG/KG/HR: 100 INJECTION INTRAMUSCULAR; INTRAVENOUS at 17:28

## 2020-01-01 RX ADMIN — Medication 250 MILLIGRAM(S): at 16:56

## 2020-01-01 RX ADMIN — Medication 1 APPLICATION(S): at 16:55

## 2020-01-01 RX ADMIN — BUMETANIDE 1 MILLIGRAM(S): 0.25 INJECTION INTRAMUSCULAR; INTRAVENOUS at 09:51

## 2020-01-01 RX ADMIN — POLYETHYLENE GLYCOL 3350 17 GRAM(S): 17 POWDER, FOR SOLUTION ORAL at 04:22

## 2020-01-01 RX ADMIN — POLYETHYLENE GLYCOL 3350 17 GRAM(S): 17 POWDER, FOR SOLUTION ORAL at 12:14

## 2020-01-01 RX ADMIN — Medication 25 MILLIGRAM(S): at 11:57

## 2020-01-01 RX ADMIN — MIDODRINE HYDROCHLORIDE 10 MILLIGRAM(S): 2.5 TABLET ORAL at 22:21

## 2020-01-01 RX ADMIN — SODIUM CHLORIDE 3 MILLILITER(S): 9 INJECTION INTRAMUSCULAR; INTRAVENOUS; SUBCUTANEOUS at 23:08

## 2020-01-01 RX ADMIN — AZITHROMYCIN 250 MILLIGRAM(S): 500 TABLET, FILM COATED ORAL at 12:08

## 2020-01-01 RX ADMIN — Medication 2: at 00:05

## 2020-01-01 RX ADMIN — POLYETHYLENE GLYCOL 3350 17 GRAM(S): 17 POWDER, FOR SOLUTION ORAL at 21:35

## 2020-01-01 RX ADMIN — Medication 1: at 23:50

## 2020-01-01 RX ADMIN — PROPOFOL 10.5 MICROGRAM(S)/KG/MIN: 10 INJECTION, EMULSION INTRAVENOUS at 18:32

## 2020-01-01 RX ADMIN — ALBUTEROL 2 PUFF(S): 90 AEROSOL, METERED ORAL at 06:50

## 2020-01-01 RX ADMIN — Medication 50 MILLIEQUIVALENT(S): at 18:41

## 2020-01-01 RX ADMIN — Medication 650 MILLIGRAM(S): at 04:31

## 2020-01-01 RX ADMIN — MEROPENEM 100 MILLIGRAM(S): 1 INJECTION INTRAVENOUS at 13:29

## 2020-01-01 RX ADMIN — MEROPENEM 100 MILLIGRAM(S): 1 INJECTION INTRAVENOUS at 20:57

## 2020-01-01 RX ADMIN — CHLORHEXIDINE GLUCONATE 15 MILLILITER(S): 213 SOLUTION TOPICAL at 17:47

## 2020-01-01 RX ADMIN — MEROPENEM 100 MILLIGRAM(S): 1 INJECTION INTRAVENOUS at 21:04

## 2020-01-01 RX ADMIN — FAMOTIDINE 20 MILLIGRAM(S): 10 INJECTION INTRAVENOUS at 12:15

## 2020-01-01 RX ADMIN — SENNA PLUS 10 MILLILITER(S): 8.6 TABLET ORAL at 17:13

## 2020-01-01 RX ADMIN — ENOXAPARIN SODIUM 40 MILLIGRAM(S): 100 INJECTION SUBCUTANEOUS at 11:38

## 2020-01-01 RX ADMIN — PIPERACILLIN AND TAZOBACTAM 25 GRAM(S): 4; .5 INJECTION, POWDER, LYOPHILIZED, FOR SOLUTION INTRAVENOUS at 14:41

## 2020-01-01 RX ADMIN — ALBUTEROL 2 PUFF(S): 90 AEROSOL, METERED ORAL at 12:33

## 2020-01-01 RX ADMIN — Medication 6.75 MICROGRAM(S)/KG/MIN: at 18:46

## 2020-01-01 RX ADMIN — Medication 1: at 17:00

## 2020-01-01 RX ADMIN — ALBUTEROL 2 PUFF(S): 90 AEROSOL, METERED ORAL at 11:25

## 2020-01-01 RX ADMIN — MEROPENEM 100 MILLIGRAM(S): 1 INJECTION INTRAVENOUS at 12:29

## 2020-01-01 RX ADMIN — Medication 3 MILLILITER(S): at 08:50

## 2020-01-01 RX ADMIN — BUMETANIDE 1 MILLIGRAM(S): 0.25 INJECTION INTRAMUSCULAR; INTRAVENOUS at 22:00

## 2020-01-01 RX ADMIN — Medication 50 MILLIGRAM(S): at 10:38

## 2020-01-01 RX ADMIN — Medication 1: at 12:25

## 2020-04-28 NOTE — ED ADULT NURSE NOTE - OBJECTIVE STATEMENT
65 year old Female, denies PMH. Patient comes to the ER with cough, fever, low back pain, no sense of taste or smell- symptoms started Friday, Tmax 102- relieved with Tylenol, associated with night sweats. Reports constipation for 2 days and weakness. Denies sick contacts, headache, chest pain, shortness of breath, nausea, vomiting, diarrhea, dizziness, urinary symptoms, swelling. Patient A&Ox4, breathing spontaneous and unlabored, palpable bounding pulses, skin color normal for ethnicity, in no distress at this time, able to move all extremities. Patient reports ambulating independently with no recent falls. Bed locked and in lowest position for safety.

## 2020-04-28 NOTE — ED PROVIDER NOTE - PROGRESS NOTE DETAILS
Brendan Rose D.O., PGY1 (Resident)  Patient continues to feel chills. Temp increased to 103F. Will give tylenol. With CXR and procal, will give patients abx. Patient agreeable to staying in CDU for obs. CDU called.

## 2020-04-28 NOTE — ED PROVIDER NOTE - OBJECTIVE STATEMENT
65 y.o. female no PMHx nonsmoker no cardiac hx presenting to the ED for fever tmax 102F, chills, myalgias (mid and lower back pain), loss of taste and smell but patient endorses being able to taste sugar and salt, x4 days. Took tylenol today at 6pm with some relief. Tolerating PO. Denies sick contacts, recent travel. Was taking a nap today, woke up, measured O2 sat and saw it was 89%. Spoke to PCP Dr. Zimmer who told patient it was low and to go to the ED.    PCP. Dr. Zimmer

## 2020-04-28 NOTE — ED PROVIDER NOTE - CLINICAL SUMMARY MEDICAL DECISION MAKING FREE TEXT BOX
65 Y.O. female here for resp sxs consistent with COVID-19. Walking O2 sat 93 -> 89%. Exam benign. Will check COVID labs, reassess. Likely CDU. 65 Y.O. female here for resp sxs consistent with COVID-19. Walking O2 sat 93 -> 89%. Exam benign. Possible underlying bacterial PNA. Lower suspicion for ACS given uri sxs and no chest pain, SOB. Will check COVID labs, reassess. Likely CDU.

## 2020-04-28 NOTE — ED PROVIDER NOTE - PHYSICAL EXAMINATION
GENERAL: elderly female, lying in bed, NAD. hypoxic to 89% upon walking otherwise Vital signs are within normal limits  HEENT: NC/AT, conjunctiva noninjected and sclera anicteric, moist mucous membranes,  NECK: Supple, trachea midline  LUNG: CTAB, no w/r/r appreciated  CV: RRR, no m/r/g appreciated, Pulses- Radial: 2+ b/l  ABDOMEN: Soft, NTND, no rebound or guarding  BACK: No midline spinal tenderness or step-offs. No paraspinal tenderness to palpation  MSK: No edema, no visible deformities, full range of motion UE/LE b/l, 5/5 muscle strength UE/LE b/l, nontender extremities  NEURO: AAOx4 (to person, place, time, event)  SKIN: Warm, dry, well perfused, no evidence of rash  PSYCH: Normal mood and affect

## 2020-04-28 NOTE — ED PROVIDER NOTE - ATTENDING CONTRIBUTION TO CARE
65F w/ no PMHx, non smoker, presenting with COVID like symptoms, hypoxic to 89% on ambulation, 94% when resting; Was taking a nap today, woke up, measured O2 sat and saw it was 89%. Spoke to PCP Dr. Zimmer who told patient it was low and to go to the ED. PE remarkable for hypoxia on ambulation and mild bibasilar rales; Impression: COVID, hypoxic, will benefit from CDU observation as she may need to be admitted, oxygen PRN and re-eval, antibiotics if procalcitonin elevated

## 2020-04-28 NOTE — ED PROVIDER NOTE - NS ED ROS FT
CONSTITUTIONAL: FEVERS, CHILLS. No fatigue, dizziness, lightheadedness weakness, unexpected weight change,   HEENT: RUNNY NOSE, NASAL CONGESTION. No loss of vision, double vision, blurry vision  CV: No chest pain, palpitations  PULM: COUGH. No cough, shortness of breath  GI: No abdominal pain, nausea, vomiting, diarrhea, constipation  : No dysuria, polyuria, hematuria  SKIN: No rashes, swelling  MSK: MYALGIAS. No joint aches  NEURO: no headache, paresthesias

## 2020-04-29 NOTE — ED CDU PROVIDER DISPOSITION NOTE - ATTENDING CONTRIBUTION TO CARE
Pt placed in CDU obs for covid-like sx. While being observed overnight, her O2 requirement increased and now is requiring NRB. pt more tachypneic. will require admission for suspected covid, hypoxemia.

## 2020-04-29 NOTE — H&P ADULT - PROBLEM SELECTOR PLAN 3
Patient received CTX/Azithromycin in the ED w/fever 103.2. No blood cultures drawn. Symptoms may be limited to viral, however with leukocytosis 14.76, Procalcitonin >0.25 (0.26), and CXR which appears to have GABBY consolidation and possibly RLL consolidation that appear bacterial in nature.  - C/w CTX/Azithro  - No blood cultures drawn. Check BCx x2 if patient spikes fever, although may not be of utility since patient already got antibiotics  - Treatment as above

## 2020-04-29 NOTE — ED CDU PROVIDER INITIAL DAY NOTE - DETAILS
65 y.o. female no PMHx nonsmoker, (+) COVID w/ hypoxia 88-89% on ambulation.  Plan   frequent reeval, vitals q 4hrs, continuous pulse ox monitor, optimization. +/- SW home O2

## 2020-04-29 NOTE — ED CDU PROVIDER INITIAL DAY NOTE - PHYSICAL EXAMINATION
GENERAL: elderly female, lying in bed, NAD. hypoxic to 89% upon walking otherwise Vital signs are within normal limits  HEENT: NC/AT, conjunctiva noninjected and sclera anicteric, moist mucous membranes,  NECK: Supple, trachea midline  LUNG: CTAB, no w/r/r appreciated  CV: RRR, no m/r/g appreciated, Pulses- Radial: 2+ b/l  ABDOMEN: Soft, NTND, no rebound or guarding  BACK: No midline spinal tenderness or step-offs. No paraspinal tenderness to palpation  MSK: No edema, no visible deformities, full range of motion UE/LE b/l, 5/5 muscle strength UE/LE b/l, nontender extremities  NEURO: AAOx4 (to person, place, time, event)  SKIN: Warm, dry, well perfused, no evidence of rash  PSYCH: Normal mood and affect GENERAL: female, lying in bed, NAD. hypoxic to 89% upon walking otherwise Vital signs are within normal limits  HEENT: NC/AT, conjunctiva noninjected and sclera anicteric, moist mucous membranes,  NECK: Supple, trachea midline  LUNG: CTAB, no w/r/r appreciated  CV: RRR, no m/r/g appreciated, Pulses- Radial: 2+ b/l  ABDOMEN: Soft, NTND, no rebound or guarding  BACK: No midline spinal tenderness or step-offs. No paraspinal tenderness to palpation  MSK: No edema, no visible deformities, full range of motion UE/LE b/l, 5/5 muscle strength UE/LE b/l, nontender extremities  NEURO: AAOx4 (to person, place, time, event)  SKIN: Warm, dry, well perfused, no evidence of rash GENERAL: female, lying in bed, NAD. hypoxic to 89% upon walking otherwise Vital signs are within normal limits  HEENT: NC/AT, conjunctiva noninjected and sclera anicteric, moist mucous membranes,  NECK: Supple, trachea midline  LUNG: (+) mild crackles bilateral. No use of accessory muscles, no signs of distress.   CV: RRR, no m/r/g appreciated, Pulses- Radial: 2+ b/l  ABDOMEN: Soft, NTND, no rebound or guarding  BACK: No midline spinal tenderness or step-offs. No paraspinal tenderness to palpation  MSK: No edema, no visible deformities, full range of motion UE/LE b/l, 5/5 muscle strength UE/LE b/l, nontender extremities  NEURO: AAOx4 (to person, place, time, event)  SKIN: Warm, dry, well perfused, no evidence of rash

## 2020-04-29 NOTE — ED CDU PROVIDER INITIAL DAY NOTE - MEDICAL DECISION MAKING DETAILS
Pt presents with covid-like sx. in ED pt saturating in the 89-90% range on RA - placed in CDU for o2 therapy, monitoring to ensure she is safe for d/c vs admission if her symptoms don't improve.

## 2020-04-29 NOTE — H&P ADULT - NSHPREVIEWOFSYSTEMS_GEN_ALL_CORE
Review of Systems:   CONSTITUTIONAL: +fever, +chills, +fatigue  EYES: No eye pain, visual disturbances  ENMT:  +rhinorrhea, +nasal congestion  NECK: No pain or stiffness  RESPIRATORY: +cough, +ARANDA  CARDIOVASCULAR: No chest pain, palpitations, dizziness, or leg swelling  GASTROINTESTINAL: No abdominal or epigastric pain. No nausea, vomiting, or hematemesis; +constipation. No melena or hematochezia.  GENITOURINARY: No dysuria, frequency  NEUROLOGICAL: No headaches or tremors  SKIN: No itching, burning, rashes, or lesions   MUSCULOSKELETAL: +myalgias  HEME/LYMPH: No easy bruising, or bleeding gums  ALLERY AND IMMUNOLOGIC: No hives or eczema

## 2020-04-29 NOTE — H&P ADULT - PROBLEM SELECTOR PLAN 2
Currently on NC, required NRB in the CDU.  - Supportive treatment, as above  - Avoid nebulizers and CPAP/BiPAP/HiFlo

## 2020-04-29 NOTE — ED CDU PROVIDER INITIAL DAY NOTE - NS ED ROS FT
CONSTITUTIONAL: FEVERS, CHILLS. No fatigue, dizziness, lightheadedness weakness, unexpected weight change,   HEENT: RUNNY NOSE, NASAL CONGESTION. No loss of vision, double vision, blurry vision  CV: No chest pain, palpitations  PULM: COUGH. No cough, shortness of breath  GI: No abdominal pain, nausea, vomiting, diarrhea, constipation  : No dysuria, polyuria, hematuria  SKIN: No rashes, swelling  MSK: MYALGIAS. No joint aches  NEURO: no headache, paresthesias CONSTITUTIONAL: FEVERS, CHILLS. No fatigue, dizziness, lightheadedness weakness, unexpected weight change,   HEENT: RUNNY NOSE, NASAL CONGESTION. No loss of vision, double vision, blurry vision  CV: No chest pain, palpitations  PULM: (+) COUGH. no shortness of breath  GI: No abdominal pain, nausea, vomiting, diarrhea, constipation  : No dysuria, polyuria, hematuria  SKIN: No rashes, swelling  MSK: MYALGIAS. No joint aches  NEURO: no headache, paresthesias

## 2020-04-29 NOTE — ED CDU PROVIDER INITIAL DAY NOTE - OBJECTIVE STATEMENT
65 y.o. female no PMHx nonsmoker no cardiac hx presenting to the ED for fever tmax 102F, chills, myalgias (mid and lower back pain), loss of taste and smell but patient endorses being able to taste sugar and salt, x4 days. Took tylenol today at 6pm with some relief. Tolerating PO. Denies sick contacts, recent travel. Was taking a nap today, woke up, measured O2 sat and saw it was 89%. Spoke to PCP Dr. Zimmer who told patient it was low and to go to the ED.    PCP. Dr. Zimmer 65 y.o. female no PMHx nonsmoker no cardiac hx presenting to the ED for fever tmax 102F, chills, myalgias (mid and lower back pain), loss of taste and smell but patient endorses being able to taste sugar and salt, x4 days. Took tylenol today at 6pm with some relief. Tolerating PO. Denies sick contacts, recent travel. Was taking a nap today, woke up, measured O2 sat and saw it was 89%. Spoke to PCP Dr. Zimmer who told patient it was low and to go to the ED.PCP. Dr. Zimmer  In ED, patient had ekg NSR 91bpm, No ARIEL, TWI III, QTc 437, chest x ray showed patchy bilateral lung opacities. Resting pulse ox 92-94% and ambulatory pulse ox 88-90%. No labored breathing or signs of respiratory distress. Pt sent to CDU for frequent reeval, vitals q 4hrs, continuous pulse ox monitor, optimization.

## 2020-04-29 NOTE — H&P ADULT - PROBLEM SELECTOR PLAN 1
- COVID19 PCR collected and is POSITIVE  - Continue with supplemental oxygen with goal SpO2>92, if requires escalate to NRB and avoid BiLevel/High Flow Nasal Cannula per institution policy given risk of aerosolization  - Will d/w ID regarding trial treatment options  - f/u cbc with diff, CMP w/ lytes, coag, D-dimer, procalcitonin, CRP, LDH, Ferritin  - monitor EKG for QTc prolongation  - isolation precautions - COVID19 PCR collected and is POSITIVE  - Continue with supplemental oxygen with goal SpO2>92, if requires escalate to NRB and avoid BiLevel/High Flow Nasal Cannula per institution policy given risk of aerosolization  - Email sent to ID regarding possible enrollment in Remdesivir trial  - f/u cbc with diff, CMP w/ lytes, coag, D-dimer, procalcitonin, CRP, LDH, Ferritin  - monitor EKG for QTc prolongation  - isolation precautions

## 2020-04-29 NOTE — H&P ADULT - NSHPLABSRESULTS_GEN_ALL_CORE
Labs:                          13.3   14.76 )-----------( 196      ( 2020 22:45 )             39.1         135  |  97  |  7   ----------------------------<  115<H>  3.5   |  25  |  0.66    Ca    8.5      2020 22:45    TPro  7.2  /  Alb  3.3  /  TBili  0.5  /  DBili  x   /  AST  115<H>  /  ALT  90<H>  /  AlkPhos  103      D-Dimer Assay, Quantitative: 290  Ferritin, Serum: 881 ng/mL (20 @ 01:42)  Procalcitonin, Serum: 0.26  COVID-19 PCR: Detected    Imaging:  < from: Xray Chest 1 View-PORTABLE IMMEDIATE (20 @ 22:18) >    FINDINGS:    Patchy bilateral opacities predominantly within the right upper lobe and left lower lung field.  There is no pleural effusion or pneumothorax.   The heart is normal in size.    IMPRESSION:  Right upper lobe and left lower lobe consolidations, concerning for viral pneumonia such as COVID 19.      < end of copied text >    EK2020 22:21  · Rate: 91  · Interpretation: normal sinus rhythm  · Axis: Normal  · DE: 146  · QRS: 84  · ST/T Wave: No ARIEL, TWI III, QTc 437  · Other Findings: Non-ischemic

## 2020-04-29 NOTE — H&P ADULT - NSHPPHYSICALEXAM_GEN_ALL_CORE
Portions of this History and Physical Exam are adopted from ER Provider Notation per [facility] policy to limit healthcare provider exposure during COVID19 Pandemic    GENERAL: elderly female, lying in bed, NAD. hypoxic to 89% upon walking otherwise Vital signs are within normal limits  HEENT: NC/AT, conjunctiva noninjected and sclera anicteric, moist mucous membranes,  NECK: Supple, trachea midline  LUNG: CTAB, no w/r/r appreciated  CV: RRR, no m/r/g appreciated, Pulses- Radial: 2+ b/l  ABDOMEN: Soft, NTND, no rebound or guarding  BACK: No midline spinal tenderness or step-offs. No paraspinal tenderness to palpation  MSK: No edema, no visible deformities, full range of motion UE/LE b/l, 5/5 muscle strength UE/LE b/l, nontender extremities  NEURO: AAOx4 (to person, place, time, event)  SKIN: Warm, dry, well perfused, no evidence of rash  PSYCH: Normal mood and affect

## 2020-04-29 NOTE — ED CDU PROVIDER INITIAL DAY NOTE - PROGRESS NOTE DETAILS
CDU PROGRESS NOTE PA DUSTIN: Pt sleeping, NAD. Sp02 90-91% on RA, Lungs CTAB. Will give supplemental O2 via NC and closely monitor. CDU PROGRESS NOTE PA DUSTIN: Pt resting comfortably, without complaint. NAD, VSS. Sp02 95-97% on 2liters O2 via NC. CDU PROGRESS NOTE PA DUSTIN: Pt reports feeling chills and having worsening shortness of breath. Lungs with mild crackles bilaterally, patient saturating 88% on 2l via NC. Will titrate up and closely monitor CDU PROGRESS NOTE JUAN CHAN: Pt ambulated to bathroom, and upon returning, noted to be tachypneic (25-30) and tachycardic 120bpm. Patient saturating 88-90% on 6l via NC. c/d/w Dr. Castañeda, patient placed on 8l Non rebreather and currently 97-99%. Will admit to medicine for COVID and acute respiratory failure, Hypoxia.

## 2020-04-29 NOTE — ED CDU PROVIDER DISPOSITION NOTE - CLINICAL COURSE
65 y.o. female no PMHx nonsmoker no cardiac hx presenting to the ED for fever tmax 102F, chills, myalgias (mid and lower back pain), loss of taste and smell but patient endorses being able to taste sugar and salt, x4 days. Took tylenol today at 6pm with some relief. Tolerating PO. Denies sick contacts, recent travel. Was taking a nap today, woke up, measured O2 sat and saw it was 89%. Spoke to PCP Dr. Zimmer who told patient it was low and to go to the ED.PCP. Dr. Zimmer  In ED, patient had ekg NSR 91bpm, No ARIEL, TWI III, QTc 437, chest x ray showed patchy bilateral lung opacities. Resting pulse ox 92-94% and ambulatory pulse ox 88-90%. No labored breathing or signs of respiratory distress. Pt sent to CDU for frequent reeval, vitals q 4hrs, continuous pulse ox monitor, optimization. 65 y.o. female no PMHx nonsmoker no cardiac hx presenting to the ED for fever tmax 102F, chills, myalgias (mid and lower back pain), loss of taste and smell but patient endorses being able to taste sugar and salt, x4 days. Took tylenol today at 6pm with some relief. Tolerating PO. Denies sick contacts, recent travel. Was taking a nap today, woke up, measured O2 sat and saw it was 89%. Spoke to PCP Dr. Zimmer who told patient it was low and to go to the ED.PCP. Dr. Zimmer  In ED, patient had ekg NSR 91bpm, No ARIEL, TWI III, QTc 437, chest x ray showed patchy bilateral lung opacities. Resting pulse ox 92-94% and ambulatory pulse ox 88-90%. No labored breathing or signs of respiratory distress. Pt sent to CDU for frequent reeval, vitals q 4hrs, continuous pulse ox monitor, optimization. While in CDU, symptoms worsened. Pt ambulated to bathroom, and upon returning, noted to be tachypneic (25-30) and tachycardic 120bpm. Patient saturating 88-90% on 6l via NC. c/d/w Dr. Castañeda, patient placed on 8l Non rebreather and currently 97-99%. Will admit to medicine for COVID and acute respiratory failure, Hypoxia.

## 2020-04-29 NOTE — H&P ADULT - ASSESSMENT
65F reporting no PMH who presents w/ fever, cough, myalgias. loss of taste and smell, found to be hypoxic with COVID-19

## 2020-04-29 NOTE — ED ADULT NURSE REASSESSMENT NOTE - NS ED NURSE REASSESS COMMENT FT1
07.00 Am Received report from MARQUIS Posada  Pt is covid positive observed for respiratory distress & fever . pt is febrile  with chills resting on bed SPO2 on 100 % on 6 L NC . pt got admitted as her saturation dropped to 86-88 % with ambulation   Pt is not  on respiratory distress now   pt is A&OX 4 obeys commands ambulates with steady gait denies N/V/D  Comfort care & safety measures maintained  continue to monitor  0745 Pt has bed report given to Urban Monson to Marquis Poe  pt is stable to go to floor

## 2020-04-29 NOTE — H&P ADULT - PROBLEM SELECTOR PLAN 4
- Lovenox 40 mg daily  - Trend renal function - Lovenox 40 mg daily  - Trend renal function    Patient states that daughter, Mary, would make medical decisions for her if she is unable.

## 2020-04-29 NOTE — H&P ADULT - HISTORY OF PRESENT ILLNESS
Portions of this History and Physical Exam are adopted from ER Provider Notation per [facility] policy to limit healthcare provider exposure during COVID19 Pandemic    65F reporting no PMH who presented last night with complaint of fever/chills (home T-max 102 F), night sweats, cough, myalgias (mid & lower back), loss of taste and smell since Friday (still tastes sweet/salty), as well as 2 days of weakness and constipation. Yesterday she was taking a nap, woke up, and measured her pulse-ox at 89%. She called her PCP who recommended that she go to the emergency department.   Denied sick contacts, recent travel.     After initial period in the ED, she was transferred to the CDU for further monitoring prior to dispo decision. During her ED/CDU course, she developed fever of T-max 103.2 F, HR , BP (110-149)/(70-80). Her ED resting pulse-ox measured 92-94% with ambulatory pulse-ox 88-90% and non-labored breathing. Her symptoms worsened in the CDU, with note tachypnea (25-30) upon ambulating to the bathroom and tachycardia 120 bpm, with SpO2 88-90% on 6L NC, temporarily requiring NRB 8L.  Initial Labs: WBC 14.76, Hgb 13.3, BUN/Cr 7/.66, D-dimer 290, Ferritin 881, AST//90, Procalcitonin .26, COVID PCR POSITIVE.  Initial Imaging: X-ray Chest demonstrated patchy bilateral opacities    In the ED, she was given Azithromycin/Ceftriaxone, Tylenol 975 mg + Tylenol 650 mg

## 2020-04-29 NOTE — H&P ADULT - NSICDXPASTSURGICALHX_GEN_ALL_CORE_FT
PAST SURGICAL HISTORY:  H/O lithotripsy     History of appendectomy PAST SURGICAL HISTORY:  History of appendectomy     History of cholecystectomy

## 2020-04-29 NOTE — ED CDU PROVIDER DISPOSITION NOTE - NSFOLLOWUPINSTRUCTIONS_ED_ALL_ED_FT
Please read the information packet provided to you carefully.    We think you are safe for discharge and would like you to follow up in a two to three days with your doctor by phone or in person.   You should treat fevers by taking Tylenol as directed as needed.  You should stay hydrated and increase the amount of fluid you drink.  YOU SHOULD SELF-QUARANTINE FOR 14 DAYS - PARTICULARLY AVOID THE ELDERLY, ILL AND IMMUNOCOMPROMISED - TO AVOID POTENTIAL SPREAD OF THE CORONAVIRUS.   You should monitor your temperatures, and return to the Emergency Department if your shortness of breath becomes worse, you become weak or new, concerning symptoms develop.     What is a coronavirus?  Coronaviruses are a large family of viruses that cause illnesses ranging from the common cold to more severe diseases such as Middle East Respiratory Syndrome (MERS) and Severe Acute Respiratory Syndrome (SARS).    What is Novel Coronavirus (COVID-19)?  The Centers for Disease Control and Prevention (CDC) is closely monitoring the outbreak caused by COVID-19. For the latest information about COVID-19, visit the CDC website at CDC.gov/Coronavirus    How are coronaviruses spread?  Coronaviruses can be transmitted from person to person, usually after close contact with an infected  person (for example, in a household, workplace, or healthcare setting), via droplets that become airborne after a cough or sneeze. These droplets can then infect a nearby person. Transmission can also occur by touching recently contaminated surfaces.    Is there a treatment for a COVID-19?  There is no specific treatment for disease caused by COVID-19. However, many of the symptoms can be treated based on the patient’s clinical condition. Supportive care for infected persons can be highly effective.    What are the symptoms of coronavirus infection?  It depends on the virus, but common signs include fever and/or respiratory symptoms such as cough and shortness of breath. In more severe cases, infection can cause pneumonia, severe acute respiratory syndrome, kidney failure and even death. Fortunately, most cases of COVID-19 have an illness no different than the influenza (flu), with a majority of these patients having mild symptoms and overall mortality which appears to be not much different than the flu.    What can I do to protect myself?  The best precautionary measures:  – washing your hands  – covering your cough  – disinfecting surfaces  – it is also advisable to avoid close contact with anyone showing symptoms of respiratory illness such as coughing and sneezing  – those with symptoms should wear a surgical mask when around others    What can I do to protect those around me?  If you have been identified as someone who may be infected with COVID-19, we recommend you follow the self-isolation procedures outlined on the following page to protect those around you and to limit the spread of this virus.    We recommend the below precautionary steps from now until 14 days from when you returned from your travel or date of your last known possible contact:  — Do not go to work, school or public areas. Avoid using public transportation, ridesharing or taxis.  — As much as possible, separate yourself from other people in your home. If you can, you should stay in a room and away from other people. Also, you should use a separate bathroom if available.  — Wear the supplied mask whenever you are around other people.  — If you have a non-urgent medical appointment, please reschedule for a later date. If the appointment is urgent, please call the health care provider and tell them that you are on self-isolation for possible COVID-19. This will help the health care provider’s office take steps to keep other people from getting infected or exposed. If you can reschedule routine appointments, do so.  — Wash your hands often with soap and water for at least 15 to 20 seconds or clean your hands with an alcohol-based hand  that contains 60 to 95% alcohol, covering all surfaces of your hands and rubbing them together until they feel dry. Soap and water should be used preferentially if hands are visibly dirty.  — Cover your mouth and nose with a tissue when you cough or sneeze. Throw used tissues in a lined trash can. Immediately wash your hands.  — Avoid touching your eyes, nose, and mouth with your hands.  — Avoid sharing personal household items. You should not share dishes, drinking glasses, cups, eating utensils, towels, or bedding with other people or pets in your home. After using these items, they should be washed thoroughly with soap and water.  — Clean and disinfect all “high-touch” surfaces every day. High touch surfaces include counters, tabletops, doorknobs, light switches, remote controls, bathroom fixtures, toilets, phones, keyboards, tablets, and bedside tables. Also, clean any surfaces that may have blood, stool, or body fluids on them.

## 2020-04-30 NOTE — PROGRESS NOTE ADULT - ATTENDING COMMENTS
Patient seen and examined.   66 y/o/f with no PMHx with COVID 19 infection/ COVID 19 PNA / Acute Hypoxic respiratory failure/ Sepsis on admission ( WBC 14 and fever 103) / suspected superimposed bacterial PNA ( CXR reviewed and noted to have the Consolidation is more dense)   follow up on blood culture. continue Abx; continue oxygen and monitor puls ox

## 2020-04-30 NOTE — CHART NOTE - NSCHARTNOTEFT_GEN_A_CORE
Pt had an RRT called for hypoxia to the 70s. Pt also found to have a rectal temperature of 102.  Pt was given 1g IV tylenol, 40mg IV lasix push, and one metered albuterol. Pt's saturation improved to the 80s. Decision by MAR was to have the patient moved to a High Flow oxygen room to receive High flow.     Pt's family was notified about the RRT the plan of care and were in agreement to trial high flow.     After discussion with attending, Dr. Mcclendon, and his discussion with Dr. Haque, pt was ordered for Tocilizumab 400mg IVPB. Pt had an RRT called for hypoxia to the 70s. Pt also found to have a rectal temperature of 102.  Pt was given 1g IV tylenol, 40mg IV lasix push, and one metered albuterol. Pt's saturation improved to the 80s. Decision by MAR was to have the patient moved to a High Flow oxygen room to receive High flow.     Pt's family was notified about the RRT the plan of care and were in agreement to trial high flow.     After discussion with attending, Dr. Mcclendon, and his discussion with Dr. Oswald, pt was ordered for Tocilizumab 400mg IVPB.

## 2020-04-30 NOTE — PROGRESS NOTE ADULT - PROBLEM SELECTOR PLAN 1
COVID19 PCR collected and is POSITIVE  - Now up to 8L sating at 90%  - Touch base with regarding possibility of trials.  - f/u cbc with diff, CMP w/ lytes, coag, D-dimer, procalcitonin, CRP, LDH, Ferritin

## 2020-04-30 NOTE — PROGRESS NOTE ADULT - SUBJECTIVE AND OBJECTIVE BOX
[facility] Division of Medicine Progress Note  Roberta Raymond [phone]/ After 7 Page  [phone]    Patient is a 65y old  Female who presents with a chief complaint of COVID-19, Hypoxic respiratory failure (29 Apr 2020 09:05)      SUBJECTIVE / OVERNIGHT EVENTS:  ADDITIONAL REVIEW OF SYSTEMS:    MEDICATIONS  (STANDING):  azithromycin  IVPB 500 milliGRAM(s) IV Intermittent every 24 hours  cefTRIAXone   IVPB 1000 milliGRAM(s) IV Intermittent every 24 hours  enoxaparin Injectable 40 milliGRAM(s) SubCutaneous daily  sodium chloride 0.9% lock flush 3 milliLiter(s) IV Push every 8 hours    MEDICATIONS  (PRN):  acetaminophen   Tablet .. 650 milliGRAM(s) Oral every 6 hours PRN Temp greater or equal to 38C (100.4F), Mild Pain (1 - 3)      CAPILLARY BLOOD GLUCOSE        I&O's Summary      PHYSICAL EXAM:  Vital Signs Last 24 Hrs  T(C): 38.7 (30 Apr 2020 08:29), Max: 39.6 (29 Apr 2020 15:57)  T(F): 101.7 (30 Apr 2020 08:29), Max: 103.2 (29 Apr 2020 15:57)  HR: 90 (30 Apr 2020 08:29) (81 - 97)  BP: 116/70 (30 Apr 2020 08:29) (114/74 - 129/78)  BP(mean): --  RR: 20 (30 Apr 2020 08:57) (20 - 20)  SpO2: 90% (30 Apr 2020 08:57) (90% - 93%)  CONSTITUTIONAL: NAD, well-developed, well-groomed  ENMT: Moist oral mucosa, no pharyngeal injection or exudates; normal dentition  RESPIRATORY: Normal respiratory effort; lungs are clear to auscultation bilaterally  CARDIOVASCULAR: Regular rate and rhythm, normal S1 and S2, no murmur/rub/gallop; No lower extremity edema; Peripheral pulses are 2+ bilaterally  ABDOMEN: Nontender to palpation, normoactive bowel sounds, no rebound/guarding; No hepatosplenomegaly  PSYCH: A+O to person, place, and time; affect appropriate  NEUROLOGY: CN 2-12 are intact and symmetric; no gross sensory deficits   SKIN: No rashes; no palpable lesions    LABS:                        12.8   18.32 )-----------( 250      ( 30 Apr 2020 06:53 )             39.3     04-30    137  |  95<L>  |  9   ----------------------------<  102<H>  3.5   |  27  |  0.66    Ca    8.9      30 Apr 2020 06:51  Phos  2.4     04-30  Mg     2.4     04-30    TPro  7.2  /  Alb  3.2<L>  /  TBili  0.5  /  DBili  x   /  AST  61<H>  /  ALT  74<H>  /  AlkPhos  118  04-30    PT/INR - ( 30 Apr 2020 08:15 )   PT: 13.4 sec;   INR: 1.17 ratio         PTT - ( 30 Apr 2020 08:15 )  PTT:28.9 sec          COVID-19 PCR: Detected (04-28-20 @ 22:44)      RADIOLOGY & ADDITIONAL TESTS:  Imaging from Last 24 Hours:  Electrocardiogram/QTc Interval:    COORDINATION OF CARE:  Care Discussed with Consultants/Other Providers: [facility] Division of Medicine Progress Note  Roberta Raymond [phone]/ After 7 Page  [phone]    Patient is a 65y old  Female who presents with a chief complaint of COVID-19, Hypoxic respiratory failure (29 Apr 2020 09:05)    SUBJECTIVE / OVERNIGHT EVENTS:    No acute events overnight. Spiked fever up to 101.7 this morning and increasing oxygen requirement up to 8L from 6L sating at 90%.     ADDITIONAL REVIEW OF SYSTEMS:    MEDICATIONS  (STANDING):  azithromycin  IVPB 500 milliGRAM(s) IV Intermittent every 24 hours  cefTRIAXone   IVPB 1000 milliGRAM(s) IV Intermittent every 24 hours  enoxaparin Injectable 40 milliGRAM(s) SubCutaneous daily  sodium chloride 0.9% lock flush 3 milliLiter(s) IV Push every 8 hours    MEDICATIONS  (PRN):  acetaminophen   Tablet .. 650 milliGRAM(s) Oral every 6 hours PRN Temp greater or equal to 38C (100.4F), Mild Pain (1 - 3)      CAPILLARY BLOOD GLUCOSE        I&O's Summary      PHYSICAL EXAM:  Vital Signs Last 24 Hrs  T(C): 38.7 (30 Apr 2020 08:29), Max: 39.6 (29 Apr 2020 15:57)  T(F): 101.7 (30 Apr 2020 08:29), Max: 103.2 (29 Apr 2020 15:57)  HR: 90 (30 Apr 2020 08:29) (81 - 97)  BP: 116/70 (30 Apr 2020 08:29) (114/74 - 129/78)  BP(mean): --  RR: 20 (30 Apr 2020 08:57) (20 - 20)  SpO2: 90% (30 Apr 2020 08:57) (90% - 93%)  CONSTITUTIONAL: NAD, well-developed, well-groomed  ENMT: Moist oral mucosa, no pharyngeal injection or exudates; normal dentition  RESPIRATORY: Normal respiratory effort; lungs are clear to auscultation bilaterally  CARDIOVASCULAR: Regular rate and rhythm, normal S1 and S2, no murmur/rub/gallop; No lower extremity edema; Peripheral pulses are 2+ bilaterally  ABDOMEN: Nontender to palpation, normoactive bowel sounds, no rebound/guarding; No hepatosplenomegaly  PSYCH: A+O to person, place, and time; affect appropriate  NEUROLOGY: CN 2-12 are intact and symmetric; no gross sensory deficits   SKIN: No rashes; no palpable lesions    LABS:                        12.8   18.32 )-----------( 250      ( 30 Apr 2020 06:53 )             39.3     04-30    137  |  95<L>  |  9   ----------------------------<  102<H>  3.5   |  27  |  0.66    Ca    8.9      30 Apr 2020 06:51  Phos  2.4     04-30  Mg     2.4     04-30    TPro  7.2  /  Alb  3.2<L>  /  TBili  0.5  /  DBili  x   /  AST  61<H>  /  ALT  74<H>  /  AlkPhos  118  04-30    PT/INR - ( 30 Apr 2020 08:15 )   PT: 13.4 sec;   INR: 1.17 ratio         PTT - ( 30 Apr 2020 08:15 )  PTT:28.9 sec          COVID-19 PCR: Detected (04-28-20 @ 22:44)      RADIOLOGY & ADDITIONAL TESTS:  Imaging from Last 24 Hours:  Electrocardiogram/QTc Interval:    COORDINATION OF CARE:  Care Discussed with Consultants/Other Providers: [facility] Division of Medicine Progress Note  Roberta Raymond [phone]/ After 7 Page  [phone]    Patient is a 65y old  Female who presents with a chief complaint of COVID-19, Hypoxic respiratory failure (29 Apr 2020 09:05)    SUBJECTIVE / OVERNIGHT EVENTS:    No acute events overnight. Spiked fever up to 101.7 this morning and increasing oxygen requirement up to 8L from 6L sating at 90%.     ADDITIONAL REVIEW OF SYSTEMS:    MEDICATIONS  (STANDING):  azithromycin  IVPB 500 milliGRAM(s) IV Intermittent every 24 hours  cefTRIAXone   IVPB 1000 milliGRAM(s) IV Intermittent every 24 hours  enoxaparin Injectable 40 milliGRAM(s) SubCutaneous daily  sodium chloride 0.9% lock flush 3 milliLiter(s) IV Push every 8 hours    MEDICATIONS  (PRN):  acetaminophen   Tablet .. 650 milliGRAM(s) Oral every 6 hours PRN Temp greater or equal to 38C (100.4F), Mild Pain (1 - 3)      CAPILLARY BLOOD GLUCOSE        I&O's Summary      PHYSICAL EXAM:  Vital Signs Last 24 Hrs  T(C): 38.7 (30 Apr 2020 08:29), Max: 39.6 (29 Apr 2020 15:57)  T(F): 101.7 (30 Apr 2020 08:29), Max: 103.2 (29 Apr 2020 15:57)  HR: 90 (30 Apr 2020 08:29) (81 - 97)  BP: 116/70 (30 Apr 2020 08:29) (114/74 - 129/78)  BP(mean): --  RR: 20 (30 Apr 2020 08:57) (20 - 20)  SpO2: 90% (30 Apr 2020 08:57) (90% - 93%)    CONSTITUTIONAL: NAD,  ENMT: Moist oral mucosa,   RESPIRATORY: Normal respiratory effort; equal air entry + crackles at the bases   CARDIOVASCULAR: Regular rate and rhythm, normal S1 and S2, no murmur/rub/gallop; No lower extremity edema;   ABDOMEN: Nontender to palpation, normoactive bowel sounds, no rebound/guarding;   PSYCH: A+O to person, place, and time; affect appropriate  NEUROLOGY: No  gross neurological deficits   SKIN: No rashes; no palpable lesions    LABS:                        12.8   18.32 )-----------( 250      ( 30 Apr 2020 06:53 )             39.3     04-30    137  |  95<L>  |  9   ----------------------------<  102<H>  3.5   |  27  |  0.66    Ca    8.9      30 Apr 2020 06:51  Phos  2.4     04-30  Mg     2.4     04-30    TPro  7.2  /  Alb  3.2<L>  /  TBili  0.5  /  DBili  x   /  AST  61<H>  /  ALT  74<H>  /  AlkPhos  118  04-30    PT/INR - ( 30 Apr 2020 08:15 )   PT: 13.4 sec;   INR: 1.17 ratio         PTT - ( 30 Apr 2020 08:15 )  PTT:28.9 sec          COVID-19 PCR: Detected (04-28-20 @ 22:44)      RADIOLOGY & ADDITIONAL TESTS:  Imaging from Last 24 Hours:  Electrocardiogram/QTc Interval:    COORDINATION OF CARE:  Care Discussed with Consultants/Other Providers:

## 2020-04-30 NOTE — RAPID RESPONSE TEAM SUMMARY - NSSITUATIONBACKGROUNDRRT_GEN_ALL_CORE
64 y/o/f with no PMHx a/w COVID 19 infection/ COVID 19 PNA / Acute Hypoxic respiratory failure/ Sepsis on admission ( WBC 14 and fever 103) / suspected superimposed bacterial PNA; pt was to be started on remdesivir today. RRT called for hypoxia to 74% and worsening work of breathing. VS: HA=504 JW=053/76 O2sat = 78% (on RRT team arrival) RR= 34 T= 102 rectal FSBG= 112. Pt was proned and placed on NRB 15L. She was administered albuterol MDI x 2 puffs and 40 of furosemide IV. Also administered 1g IV acetaminophen. Tachycardia improved to 103-113, tachypnea decreased to 28, but pt still hypoxic to 84-86%. After discussion with family and attending, pt transferred to negative pressure room and placed on high-flow 40L 100%; O2sat increased to 88-90%, with improved work of breathing. Primary team at bedside; advised to monitor UOP and start standing albuterol. Attending also decided to administer tocilizumab tx starting today as well.    Albuterol MDI and furosemide were administered. After discussion with xxx, decision was made to intubate. Anesthesia was called; pt received xxx, and underwent endotracheal intubation. Final /102. Pt was transported to ICU. Family/MICU given signout. 66 y/o/f with no PMHx a/w COVID 19 infection/ COVID 19 PNA / Acute Hypoxic respiratory failure/ Sepsis on admission ( WBC 14 and fever 103) / suspected superimposed bacterial PNA; pt was to be started on remdesivir today. RRT called for hypoxia to 74% and worsening work of breathing. VS: LV=980 BR=990/76 O2sat = 78% (on RRT team arrival) RR= 34 T= 102 rectal FSBG= 112. Pt was proned and placed on NRB 15L. She was administered albuterol MDI x 2 puffs and 40 of furosemide IV. Also administered 1g IV acetaminophen. Tachycardia improved to 103-113, tachypnea decreased to 28, but pt still hypoxic to 84-86%. After discussion with family and attending, pt transferred to negative pressure room and placed on high-flow 40L 100%; O2sat increased to 88-90%, with improved work of breathing. Primary team at bedside; advised to monitor UOP and start standing albuterol. Attending also decided to administer tocilizumab tx starting today as well. 66 y/o/f with no PMHx a/w COVID 19 infection/ COVID 19 PNA / Acute Hypoxic respiratory failure/ Sepsis on admission ( WBC 14 and fever 103) / suspected superimposed bacterial PNA; pt was to be started on remdesivir today. RRT called for hypoxia to 74% and worsening work of breathing. VS: IW=634 AC=192/76 O2sat = 78% (on RRT team arrival) RR= 34 T= 102 rectal FSBG= 112. Pt was proned and placed on NRB 15L. She was administered albuterol MDI x 2 puffs and 40 of furosemide IV. Also administered 1g IV acetaminophen. Tachycardia improved to 103-113, tachypnea decreased to 28, but pt still hypoxic to 84-86%. After discussion with family and attending, pt transferred to negative pressure room and placed on high-flow 40L 100%; O2sat increased to 88-90%, with improved work of breathing, speaking in full sentences, awake and oriented with no apparent wheezing. Primary team at bedside; advised to monitor UOP and start standing albuterol. Attending also decided to administer tocilizumab tx starting today as well. 66 y/o/f with no PMHx a/w COVID 19 infection/ COVID 19 PNA / Acute Hypoxic respiratory failure/ Sepsis on admission ( WBC 14 and fever 103) / suspected superimposed bacterial PNA (on ctx and azithromycin); pt was to be started on remdesivir today. RRT called for hypoxia to 74% and worsening work of breathing. VS: QS=872 IL=759/76 O2sat = 78% (on RRT team arrival) RR= 34 T= 102 rectal FSBG= 112. Pt was proned and placed on NRB 15L. She was administered albuterol MDI x 2 puffs and 40 of furosemide IV. Also administered 1g IV acetaminophen. Tachycardia improved to 103-113, tachypnea decreased to 28, but pt still hypoxic to 84-86%. After discussion with family and attending, pt transferred to negative pressure room and placed on high-flow 40L 100%; O2sat increased to 88-90%, with improved work of breathing, speaking in full sentences, awake and oriented with no apparent wheezing. Primary team at bedside; advised to monitor UOP and start standing albuterol. Attending also decided to administer tocilizumab tx starting today as well.

## 2020-04-30 NOTE — CONSULT NOTE ADULT - SUBJECTIVE AND OBJECTIVE BOX
Patient is a 65y old  Female who presents with a chief complaint of COVID-19, Hypoxic respiratory failure (30 Apr 2020 09:59)      HPI:  Portions of this History and Physical Exam are adopted from ER Provider Notation per [facility] policy to limit healthcare provider exposure during COVID19 Pandemic    65F reporting no PMH who presented last night with complaint of fever/chills (home T-max 102 F), night sweats, cough, myalgias (mid & lower back), loss of taste and smell since Friday (still tastes sweet/salty), as well as 2 days of weakness and constipation. Yesterday she was taking a nap, woke up, and measured her pulse-ox at 89%. She called her PCP who recommended that she go to the emergency department.   Denied sick contacts, recent travel.     After initial period in the ED, she was transferred to the CDU for further monitoring prior to dispo decision. During her ED/CDU course, she developed fever of T-max 103.2 F, HR , BP (110-149)/(70-80). Her ED resting pulse-ox measured 92-94% with ambulatory pulse-ox 88-90% and non-labored breathing. Her symptoms worsened in the CDU, with note tachypnea (25-30) upon ambulating to the bathroom and tachycardia 120 bpm, with SpO2 88-90% on 6L NC, temporarily requiring NRB 8L.  Initial Labs: WBC 14.76, Hgb 13.3, BUN/Cr 7/.66, D-dimer 290, Ferritin 881, AST//90, Procalcitonin .26, COVID PCR POSITIVE.  Initial Imaging: X-ray Chest demonstrated patchy bilateral opacities    In the ED, she was given Azithromycin/Ceftriaxone, Tylenol 975 mg + Tylenol 650 mg (29 Apr 2020 09:05)          Pt seen and examined at bedside. Saturating at 96% on 15L via non-rebreather, up from 8L via NC this am.  Pt reports dry cough with occasional white phlegm, SOB, loss of taste and smell, back soreness, bilateral calf ache and headaches. Denies diarrhea, nausea, vomiting, or dizziness.        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  is also sick with COVID-19 and hospitalized.    Marital Status:   Occupation: Works at [employer] company  Lives with: Daughter and     Substance Use :  Tobacco Usage:  ( X  ) never smoked   (   ) former smoker   (   ) current smoker  (     ) pack year  (        ) last tobacco use date  Alcohol Usage: None  Travel: Went to [facility] in Nov 2019  Pets: None        FAMILY HISTORY:  No pertinent family history in first degree relatives  Both parents passed away; causes unknown      REVIEW OF SYSTEMS:    CONSTITUTIONAL: +Weakness, +fevers  EYES/ENT: No visual changes;  No vertigo or throat pain   NECK: No pain or stiffness  RESPIRATORY: +Cough,+Shortness of breath No wheezing, hemoptysis  CARDIOVASCULAR: No chest pain or palpitations  GASTROINTESTINAL: No abdominal or epigastric pain. No nausea, vomiting, or hematemesis; No diarrhea or constipation. No melena or hematochezia.  GENITOURINARY: No dysuria, frequency or hematuria  NEUROLOGICAL: No numbness or weakness  SKIN: No itching, rashes    Allergies    No Known Allergies    Intolerances        Antimicrobials:       MEDICATIONS  (prior antimicrobials ):    azithromycin  IVPB   250 mL/Hr IV Intermittent (04-29-20 @ 01:13)    azithromycin  IVPB   250 mL/Hr IV Intermittent (04-30-20 @ 00:41)    cefTRIAXone   IVPB   100 mL/Hr IV Intermittent (04-30-20 @ 00:02)    cefTRIAXone   IVPB   100 mL/Hr IV Intermittent (04-29-20 @ 00:08)    azithromycin  IVPB 500 milliGRAM(s) IV Intermittent once  azithromycin  IVPB      cefTRIAXone   IVPB 1000 milliGRAM(s) IV Intermittent every 24 hours      MEDICATIONS  (STANDING):  azithromycin  IVPB 500 milliGRAM(s) IV Intermittent once  azithromycin  IVPB      cefTRIAXone   IVPB 1000 milliGRAM(s) IV Intermittent every 24 hours  enoxaparin Injectable 40 milliGRAM(s) SubCutaneous daily  sodium chloride 0.9% lock flush 3 milliLiter(s) IV Push every 8 hours    MEDICATIONS  (PRN):  acetaminophen   Tablet .. 650 milliGRAM(s) Oral every 6 hours PRN Temp greater or equal to 38C (100.4F), Mild Pain (1 - 3)        Vital Signs Last 24 Hrs  T(C): 38.7 (30 Apr 2020 08:29), Max: 39.6 (29 Apr 2020 15:57)  T(F): 101.7 (30 Apr 2020 08:29), Max: 103.2 (29 Apr 2020 15:57)  HR: 90 (30 Apr 2020 08:29) (81 - 97)  BP: 116/70 (30 Apr 2020 08:29) (114/74 - 129/78)  BP(mean): --  RR: 20 (30 Apr 2020 12:50) (20 - 20)  SpO2: 92% (30 Apr 2020 12:50) (90% - 93%); during my evaluation, SpO2 was 96% on 15L via non-rebreather    PHYSICAL EXAM:    GENERAL: NAD, sitting in bed  HEAD:  Atraumatic, Normocephalic  EYES: EOMI, conjunctiva and sclera clear  CHEST/LUNG: Bilateral crackles; No rales, rhonchi, wheezing, or rubs. Unlabored respirations  HEART: Regular rate and rhythm; No murmurs, rubs, or gallops  ABDOMEN: Bowel sounds present; Soft, Nontender, Nondistended. No hepatomegaly  EXTREMITIES:  2+ Peripheral Pulses, brisk capillary refill. No clubbing, cyanosis, or edema  NERVOUS SYSTEM:  Alert & Oriented X3, speech clear. No deficits   SKIN: No rashes or lesions        LABS:                        12.8   18.32 )-----------( 250      ( 30 Apr 2020 06:53 )             39.3       04-30    137  |  95<L>  |  9   ----------------------------<  102<H>  3.5   |  27  |  0.66    Ca    8.9      30 Apr 2020 06:51  Phos  2.4     04-30  Mg     2.4     04-30    TPro  7.2  /  Alb  3.2<L>  /  TBili  0.5  /  DBili  x   /  AST  61<H>  /  ALT  74<H>  /  AlkPhos  118  04-30        INFLAMMATORY MARKERS:    D-Dimer Assay, Quantitative: 453 ng/mL DDU (04-30)  D-Dimer Assay, Quantitative: 290 ng/mL DDU (04-28)    Ferritin, Serum: 1060 ng/mL (04-30)  Ferritin, Serum: 881 ng/mL (04-29)      C-Reactive Protein, Serum: 33.15 mg/dL (04-30-20 @ 08:23)  C-Reactive Protein, Serum: 25.90 mg/dL (04-29-20 @ 01:35)      MICROBIOLOGY:    COVID-19 PCR . (04.28.20 @ 22:44)    COVID-19 PCR: Detected: This test has been validated by [employer] to be accurate;  though it has not been FDA cleared/approved by the usual pathway.  As with all laboratory tests, results should be correlated with clinical  findings.  https://www.fda.gov/media/954816/download  https://www.fda.gov/media/482363/download        Radiology:    < from: Xray Chest 1 View-PORTABLE IMMEDIATE (04.28.20 @ 22:18) >  INTERPRETATION:  CLINICAL INFORMATION: Shortness of Breath, Cough,  Fever    EXAM: Frontal radiograph of the chest    COMPARISON: None    FINDINGS:    Patchy bilateral opacities predominantly within the right upper lobe and left lower lung field.  There is no pleural effusion or pneumothorax.   The heart is normal in size.    IMPRESSION:    Right upper lobe and left lower lobe consolidations, concerning for viral pneumonia such as COVID 19.            < end of copied text >      Ordinal Scale: score : 3  1) Death  2) Hospitalized, on invasive mechanical ventilation or ECMO  3) Hospitalized, on non-invasive ventilation or high flow oxygen devices  4) Hospitalized, requiring low flow(<11l/min) supplemental oxygen  5) Hospitalized, not requiring supplemental oxygen - requiring ongoing medical care(COVID-19 related or otherwise)  6) Hospitalized, not requiring supplemental oxygen - no longer requires ongoing medical care(other than per protocol RDV administration)  7) Not hospitalized Patient is a 65y old  Female who presents with a chief complaint of COVID-19, Hypoxic respiratory failure (30 Apr 2020 09:59)      HPI:  Portions of this History and Physical Exam are adopted from ER Provider Notation per [facility] policy to limit healthcare provider exposure during COVID19 Pandemic    65F reporting no PMH who presented last night with complaint of fever/chills (home T-max 102 F), night sweats, cough, myalgias (mid & lower back), loss of taste and smell since Friday (still tastes sweet/salty), as well as 2 days of weakness and constipation. Yesterday she was taking a nap, woke up, and measured her pulse-ox at 89%. She called her PCP who recommended that she go to the emergency department.   Denied sick contacts, recent travel.     After initial period in the ED, she was transferred to the CDU for further monitoring prior to dispo decision. During her ED/CDU course, she developed fever of T-max 103.2 F, HR , BP (110-149)/(70-80). Her ED resting pulse-ox measured 92-94% with ambulatory pulse-ox 88-90% and non-labored breathing. Her symptoms worsened in the CDU, with note tachypnea (25-30) upon ambulating to the bathroom and tachycardia 120 bpm, with SpO2 88-90% on 6L NC, temporarily requiring NRB 8L.  Initial Labs: WBC 14.76, Hgb 13.3, BUN/Cr 7/.66, D-dimer 290, Ferritin 881, AST//90, Procalcitonin .26, COVID PCR POSITIVE.  Initial Imaging: X-ray Chest demonstrated patchy bilateral opacities    In the ED, she was given Azithromycin/Ceftriaxone, Tylenol 975 mg + Tylenol 650 mg (29 Apr 2020 09:05)        Pt seen and examined at bedside. Saturating at 96% on 15L via non-rebreather, up from 8L via NC this am.  Pt reports dry cough with occasional white phlegm, SOB, loss of taste and smell, back soreness, bilateral calf ache and headaches. Denies diarrhea, nausea, vomiting, or dizziness.        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  is also sick with COVID-19 and hospitalized.    Marital Status:   Occupation: Works at [employer] company  Lives with: Daughter and     Substance Use :  Tobacco Usage:  ( X  ) never smoked   (   ) former smoker   (   ) current smoker  (     ) pack year  (        ) last tobacco use date  Alcohol Usage: None  Travel: Went to [facility] in Nov 2019  Pets: None        FAMILY HISTORY:  No pertinent family history in first degree relatives  Both parents passed away; causes unknown      REVIEW OF SYSTEMS:    CONSTITUTIONAL: +Weakness, +fevers  EYES/ENT: No visual changes;  No vertigo or throat pain   NECK: No pain or stiffness  RESPIRATORY: +Cough,+Shortness of breath No wheezing, hemoptysis  CARDIOVASCULAR: No chest pain or palpitations  GASTROINTESTINAL: No abdominal or epigastric pain. No nausea, vomiting, or hematemesis; No diarrhea or constipation. No melena or hematochezia.  GENITOURINARY: No dysuria, frequency or hematuria  NEUROLOGICAL: No numbness or weakness  SKIN: No itching, rashes    Allergies    No Known Allergies    Intolerances        Antimicrobials:       MEDICATIONS  (prior antimicrobials ):    azithromycin  IVPB   250 mL/Hr IV Intermittent (04-29-20 @ 01:13)    azithromycin  IVPB   250 mL/Hr IV Intermittent (04-30-20 @ 00:41)    cefTRIAXone   IVPB   100 mL/Hr IV Intermittent (04-30-20 @ 00:02)    cefTRIAXone   IVPB   100 mL/Hr IV Intermittent (04-29-20 @ 00:08)    azithromycin  IVPB 500 milliGRAM(s) IV Intermittent once  azithromycin  IVPB      cefTRIAXone   IVPB 1000 milliGRAM(s) IV Intermittent every 24 hours      MEDICATIONS  (STANDING):  azithromycin  IVPB 500 milliGRAM(s) IV Intermittent once  azithromycin  IVPB      cefTRIAXone   IVPB 1000 milliGRAM(s) IV Intermittent every 24 hours  enoxaparin Injectable 40 milliGRAM(s) SubCutaneous daily  sodium chloride 0.9% lock flush 3 milliLiter(s) IV Push every 8 hours    MEDICATIONS  (PRN):  acetaminophen   Tablet .. 650 milliGRAM(s) Oral every 6 hours PRN Temp greater or equal to 38C (100.4F), Mild Pain (1 - 3)        Vital Signs Last 24 Hrs  T(C): 38.7 (30 Apr 2020 08:29), Max: 39.6 (29 Apr 2020 15:57)  T(F): 101.7 (30 Apr 2020 08:29), Max: 103.2 (29 Apr 2020 15:57)  HR: 90 (30 Apr 2020 08:29) (81 - 97)  BP: 116/70 (30 Apr 2020 08:29) (114/74 - 129/78)  BP(mean): --  RR: 20 (30 Apr 2020 12:50) (20 - 20)  SpO2: 92% (30 Apr 2020 12:50) (90% - 93%); during my evaluation, SpO2 was 96% on 15L via non-rebreather    PHYSICAL EXAM:    GENERAL: NAD, sitting in bed  HEAD:  Atraumatic, Normocephalic  EYES: EOMI, conjunctiva and sclera clear  CHEST/LUNG: Bilateral crackles; No rales, rhonchi, wheezing, or rubs. Unlabored respirations  HEART: Regular rate and rhythm; No murmurs, rubs, or gallops  ABDOMEN: Bowel sounds present; Soft, Nontender, Nondistended. No hepatomegaly  EXTREMITIES:  2+ Peripheral Pulses, brisk capillary refill. No clubbing, cyanosis, or edema  NERVOUS SYSTEM:  Alert & Oriented X3, speech clear. No deficits   SKIN: No rashes or lesions        LABS:                        12.8   18.32 )-----------( 250      ( 30 Apr 2020 06:53 )             39.3       04-30    137  |  95<L>  |  9   ----------------------------<  102<H>  3.5   |  27  |  0.66    Ca    8.9      30 Apr 2020 06:51  Phos  2.4     04-30  Mg     2.4     04-30    TPro  7.2  /  Alb  3.2<L>  /  TBili  0.5  /  DBili  x   /  AST  61<H>  /  ALT  74<H>  /  AlkPhos  118  04-30        INFLAMMATORY MARKERS:    D-Dimer Assay, Quantitative: 453 ng/mL DDU (04-30)  D-Dimer Assay, Quantitative: 290 ng/mL DDU (04-28)    Ferritin, Serum: 1060 ng/mL (04-30)  Ferritin, Serum: 881 ng/mL (04-29)      C-Reactive Protein, Serum: 33.15 mg/dL (04-30-20 @ 08:23)  C-Reactive Protein, Serum: 25.90 mg/dL (04-29-20 @ 01:35)      MICROBIOLOGY:    COVID-19 PCR . (04.28.20 @ 22:44)    COVID-19 PCR: Detected: This test has been validated by [employer] to be accurate;  though it has not been FDA cleared/approved by the usual pathway.  As with all laboratory tests, results should be correlated with clinical  findings.  https://www.fda.gov/media/791731/download  https://www.fda.gov/media/689580/download        Radiology:    < from: Xray Chest 1 View-PORTABLE IMMEDIATE (04.28.20 @ 22:18) >  INTERPRETATION:  CLINICAL INFORMATION: Shortness of Breath, Cough,  Fever    EXAM: Frontal radiograph of the chest    COMPARISON: None    FINDINGS:    Patchy bilateral opacities predominantly within the right upper lobe and left lower lung field.  There is no pleural effusion or pneumothorax.   The heart is normal in size.    IMPRESSION:    Right upper lobe and left lower lobe consolidations, concerning for viral pneumonia such as COVID 19.            < end of copied text >      Ordinal Scale: score : 3  1) Death  2) Hospitalized, on invasive mechanical ventilation or ECMO  3) Hospitalized, on non-invasive ventilation or high flow oxygen devices  4) Hospitalized, requiring low flow(<11l/min) supplemental oxygen  5) Hospitalized, not requiring supplemental oxygen - requiring ongoing medical care(COVID-19 related or otherwise)  6) Hospitalized, not requiring supplemental oxygen - no longer requires ongoing medical care(other than per protocol RDV administration)  7) Not hospitalized

## 2020-04-30 NOTE — CONSULT NOTE ADULT - ASSESSMENT
65F reporting no PMH who presented last night with complaint of fever/chills (home T-max 102 F), night sweats, cough, myalgias (mid & lower back), loss of taste and smell since Friday 4/24 (still tastes sweet/salty), as well as 2 days of weakness and constipation, found to be COVID positive on 4/28/20.    COVID positive 4/28/20  CRP 33.15  Ferritin 1060  Cr 0.66  Last fever 101.7F am today 4/30  Now on 15L non-rebreather  WBC 18.32    Pt has agreed to participate in a clinical trial for Covid -19  In order to participate in this clinical trial , the patient should not take concurrent treatment with other agents with actual or possible direct acting antiviral activity against SARS-CoV-2 such a chloroquine or hydroxychloroquine.  In addition, the patient should not participate in any other clinical trial of an experimental treatment for Covid-19    Pt's with Covid 19 tend to be hypercoagulable, please monitor d- dimer. DVT prophylaxis  Monitor markers of inflammation such as Ferritin, D-dimer, and CRP    # COVID-19 infection with viral PNA and superimposed bacterial PNA  - Currently saturating at 96% on 15L non-rebreather  - Will start Remdesivir today (4/30) - monitor renal function, LFTs, and inflammatory markers  - Given leukocytosis and CXR suspicious for lobar pneumonia, can start ceftriaxone and azithromycin for possible superimposed bacterial pneumonia  - Continue to wean off NC as tolerated    #Hypoxia  -Continue supportive oxygen  -Wean off NC as tolerated    # Elevated D-dimer, ferritin, CRP  - From COVID  - Continue to trend      Today is day #1 on study drug. Ordinal scale score: 3      Niki Caceres MD. Graduate Physician 65F reporting no PMH who presented last night with complaint of fever/chills (home T-max 102 F), night sweats, cough, myalgias (mid & lower back), loss of taste and smell since Friday 4/24 (still tastes sweet/salty), as well as 2 days of weakness and constipation, found to be COVID positive on 4/28/20.    COVID positive 4/28/20  CRP 33.15  Ferritin 1060  Cr 0.66  Last fever 101.7F am today 4/30  Now on 15L non-rebreather  WBC 18.32    Pt has agreed to participate in a clinical trial for Covid -19  In order to participate in this clinical trial , the patient should not take concurrent treatment with other agents with actual or possible direct acting antiviral activity against SARS-CoV-2 such a chloroquine or hydroxychloroquine.  In addition, the patient should not participate in any other clinical trial of an experimental treatment for Covid-19    Pt's with Covid 19 tend to be hypercoagulable, please monitor d- dimer. DVT prophylaxis  Monitor markers of inflammation such as Ferritin, D-dimer, and CRP    # COVID-19 infection with viral PNA and superimposed bacterial PNA  - Currently saturating at 96% on 15L non-rebreather  - Will start Remdesivir today (4/30) - monitor renal function, LFTs, and inflammatory markers  - Given leukocytosis and CXR suspicious for lobar pneumonia, pt was started on ceftriaxone and azithromycin for possible superimposed bacterial pneumonia  - Continue to wean off NC as tolerated    #Hypoxia  -Continue supportive oxygen  -Wean off xygen as tolerated    # Elevated D-dimer, ferritin, CRP  - From COVID  - Continue to trend      Today is day #1 on study drug. Ordinal scale score: 3      Niki Caceres MD. Graduate Physician

## 2020-04-30 NOTE — RAPID RESPONSE TEAM SUMMARY - NSTRANSFERTYPERRT_GEN_ALL_CORE
HPI:   Christina Lilly is a 34year old female who is here for complaints of back pain. 3 weeks ago she was attempting to lift her leg to her side and catch it with her hand when she felt a pop in the low back when she lifted leg.   Pain occurred at [place] without sciatica  Continue heat and anti-inflammatories. Take Flexeril at night. Side effects of medication reviewed. If symptoms persist over the next few weeks recommend beginning physical therapy. She will call with an update.     2. Reactive depress Other

## 2020-05-01 NOTE — PROGRESS NOTE ADULT - PROBLEM SELECTOR PLAN 2
CXR with prominent RUL and LLL consolidation. Elevated procalcitonin. Leukocytosis.   - C/w CTX/Azithro(4/28-). CXR with prominent RUL and LLL consolidation. Elevated procalcitonin. Leukocytosis improving.  - C/w CTX/Azithro(4/28-).

## 2020-05-01 NOTE — PROGRESS NOTE ADULT - ATTENDING COMMENTS
Patient seen and examined.   Events noted  feels improved on high flow NC 40 L  64 y/o/f with no PMHx with COVID 19 infection/ COVID 19 PNA / Acute Hypoxic respiratory failure/ Sepsis on admission ( WBC 14 and fever 103) / suspected superimposed bacterial PNA  blood culture negative .  s/p Tocilizumab and s/p remdesivir and on trial.   continue Abx; continue high flow oxygen and monitor puls ox   family was updated

## 2020-05-01 NOTE — CHART NOTE - NSCHARTNOTEFT_GEN_A_CORE
Nutrition Initial Assessment    Nutrition Consult Received: Yes [   ]  No [  X ]    Reason for Initial Nutrition Assessment: length of stay     Source of Information: Unable to conduct a fact to face interview due to limited contact restrictions related to pt's medical condition and isolation precautions. Unable to reach pt via in-room phone. Information obtained from a phone call with the pt's daughter and from the EMR.    Admitting Diagnosis: 65F reporting no PMH who presents w/ fever, cough, myalgias. loss of taste and smell, found to be hypoxic with COVID-19.    PAST MEDICAL & SURGICAL HISTORY:  No pertinent past medical history  History of cholecystectomy  History of appendectomy      Subjective Information: Per daughter, pt with good PO intake both PTA and in-house despite reported decreased appetite; states that pt reports consuming at least half of meals in-house. Denies any known food allergies or intolerances. Denies any current chewing/swallowing difficulty, self-feeding difficulty, nausea/vomiting, constipation, or diarrhea. last BM noted to be 4/30 per flowsheet. Daughter does state that pt reported of lack of taste and smell; unsure if it has been resolved or not. Daughter endorses Multivitamin supplementation at home and was requesting if that could be continued in-house. Food preferences noted. Daughter denies any recent weight changes. Reports a UBW of 155lbs; consistent with dosing weight.       GI Issues: none noted    Current Nutrition Order: Diet, Regular (04-29-20 @ 06:06)    PO Intake:   Good (%) [   ]    Fair (50-75%) [ X  ]    Poor (<50%) [   ]    Skin Integrity: no pressure injury documented     Labs:   05-01 Na138 mmol/L Glu 92 mg/dL K+ 3.1 mmol/L<L> Cr  0.49 mg/dL<L> BUN 7 mg/dL Phos n/a   Alb 3.0 g/dL<L> PAB n/a             Medications:  MEDICATIONS  (STANDING):  azithromycin  IVPB 500 milliGRAM(s) IV Intermittent every 24 hours  azithromycin  IVPB      cefTRIAXone   IVPB 1000 milliGRAM(s) IV Intermittent every 24 hours  enoxaparin Injectable 40 milliGRAM(s) SubCutaneous daily  potassium chloride    Tablet ER 40 milliEquivalent(s) Oral every 4 hours  remdesivir IVPB ([id number]) 100 milliGRAM(s) IV Intermittent daily  sodium chloride 0.9% lock flush 3 milliLiter(s) IV Push every 8 hours    MEDICATIONS  (PRN):  acetaminophen   Tablet .. 650 milliGRAM(s) Oral every 6 hours PRN Temp greater or equal to 38C (100.4F), Mild Pain (1 - 3)  diphenhydrAMINE   Injectable 50 milliGRAM(s) IV Push once PRN anaphylaxis to study drug  EPINEPHrine     1 mG/mL Injectable 0.3 milliGRAM(s) IntraMuscular once PRN anaphylaxis to study drug    Admitted Anthropometrics:    Height (cm): 157.48 (04-28-20 @ 21:02)  Weight (kg): 70.3 (04-28-20 @ 21:02)  BMI (kg/m2): 28.3 (04-28-20 @ 21:02)    Nutrition Focused Physical Exam: Unable to complete due to limited isolation contact precautions at this time.     Estimated Energy Needs (25-30 kcal/kg): 1758-2109kcal/day  Estimated Protein Needs ( 1-1.2g/kg): 70-84g protein/day  Based on weight of: 70.3kg (dosing)    [  ] Nutrition Diagnosis:  [ X ] No active nutrition diagnosis at this time  [  ] Current medical condition precludes nutrition intervention    Goal: Pt will meet >75% of estimated nutritional needs during hospital stay.     Nutrition Interventions:     Recommendations:  1) Continue to encourage po intake and obtain/honor food preferences as able.   2) Recommend Multivitamin daily to promote adequate nutrient intake. Provider made aware.  3) RD to provide mighty shake (200kcal, 6g protein/serving) 2x/day to promote adequate protein intake.   4) Monitor PO intake, diet tolerance, weight trends, labs, and skin integrity.      RD to follow-up per protocol.    Sukhjinder Alejandra RD pager# [phone]

## 2020-05-01 NOTE — PROGRESS NOTE ADULT - ASSESSMENT
65F reporting no PMH who presented last night with complaint of fever/chills (home T-max 102 F), night sweats, cough, myalgias (mid & lower back), loss of taste and smell since Friday 4/24 (still tastes sweet/salty), as well as 2 days of weakness and constipation, found to be COVID positive on 4/28/20.    COVID positive 4/28/20  CRP 33.15  Ferritin 1060  Cr 0.66 -> 0.49  No fevers 24 h  Now on 30L 100% high flow nasal cannula  WBC 18.32 -> 12.17    Pt has agreed to participate in a clinical trial for Covid -19  In order to participate in this clinical trial , the patient should not take concurrent treatment with other agents with actual or possible direct acting antiviral activity against SARS-CoV-2 such a chloroquine or hydroxychloroquine.  In addition, the patient should not participate in any other clinical trial of an experimental treatment for Covid-19    Pt's with Covid 19 tend to be hypercoagulable, please monitor d- dimer. DVT prophylaxis  Monitor markers of inflammation such as Ferritin, D-dimer, and CRP    # COVID-19 infection with viral PNA and superimposed bacterial PNA  - S/p RRT due to desaturation down to the 70s, given albuterol, furosemide, and acetominophen. transferred to negative pressure room with 40L 100% high flow oxygen, SpO2 increased to 88-90%. Currently saturating at 96.7% on 3L 100% high flow nasal cannula,  - Continue Remdesivir (started  4/30) - monitor renal function, LFTs, and inflammatory markers  - Continue ceftriaxone and azithromycin for possible superimposed bacterial pneumonia given leukocytosis, elevated procalcitonin, and CXR suspicious for lobar pneumonia (started 4/29); WBC trending down  - S/p tocilizumab 4/30  - Continue to wean off NC as tolerated    #Hypoxia  -Continue supportive oxygen  -Wean off oxygen as tolerated    # Elevated D-dimer, ferritin, CRP  - From COVID  - Continue to trend      Today is day #2 on study drug. Ordinal scale score: 3      Niki Caceres MD. Graduate Physician

## 2020-05-01 NOTE — PROGRESS NOTE ADULT - SUBJECTIVE AND OBJECTIVE BOX
Patient is a 65y old  Female who presents with a chief complaint of COVID-19, Hypoxic respiratory failure (01 May 2020 08:46)    Being followed by ID for management of COVID-19 and Remdesivir clinical trial.    Interval history:  Yesterday 4/30 evening pt had episode of hypoxia in the 70s before receiving 1st dose of Remdesivir, rapid response called, and pt was given albuterol, furosemide, and acetominophen. Pt transferred to negative pressure room with 40L 100% high flow oxygen, SpO2 increased to 88-90%. [place] was notified. Pt received Remdesivir and Tocilizumab after event.  Pt seen and examined at bedside this morning. Saturating at 96.7% on 3L 100% high flow nasal cannula. States that she feels better. Still endorses some shortness of breath and coughs. Denies fever/chills, chest pain, nausea/vomiting, diarrhea, constipation, headache, urinary complaints, or dizziness.    ROS:  + cough,SOB, No CP  No N/V/D  No abd pain  No urinary complaints  No HA  No muscle pain.  No other complaints    PAST MEDICAL & SURGICAL HISTORY:  No pertinent past medical history  History of cholecystectomy  History of appendectomy    Allergies    No Known Allergies    Intolerances      Antimicrobials:    azithromycin  IVPB 500 milliGRAM(s) IV Intermittent every 24 hours  azithromycin  IVPB      cefTRIAXone   IVPB 1000 milliGRAM(s) IV Intermittent every 24 hours    MEDICATIONS  (STANDING):  azithromycin  IVPB 500 milliGRAM(s) IV Intermittent every 24 hours  azithromycin  IVPB      cefTRIAXone   IVPB 1000 milliGRAM(s) IV Intermittent every 24 hours  enoxaparin Injectable 40 milliGRAM(s) SubCutaneous daily  remdesivir IVPB ([id number]) 100 milliGRAM(s) IV Intermittent daily  sodium chloride 0.9% lock flush 3 milliLiter(s) IV Push every 8 hours    MEDICATIONS  (PRN):  acetaminophen   Tablet .. 650 milliGRAM(s) Oral every 6 hours PRN Temp greater or equal to 38C (100.4F), Mild Pain (1 - 3)  diphenhydrAMINE   Injectable 50 milliGRAM(s) IV Push once PRN anaphylaxis to study drug  EPINEPHrine     1 mG/mL Injectable 0.3 milliGRAM(s) IntraMuscular once PRN anaphylaxis to study drug      Vital Signs Last 24 Hrs  T(C): 36.9 (05-01-20 @ 09:20), Max: 36.9 (04-30-20 @ 17:00)  T(F): 98.4 (05-01-20 @ 09:20), Max: 98.5 (04-30-20 @ 17:00)  HR: 80 (05-01-20 @ 09:20) (80 - 104)  BP: 148/77 (05-01-20 @ 09:20) (114/66 - 158/89)  BP(mean): --  RR: 26 (05-01-20 @ 09:20) (20 - 32)  SpO2: 95% (05-01-20 @ 09:20) (88% - 98%); during my evaluation, saturating at 96.7% on 3L 100% high flow nasal cannula.    Physical Exam:    Constitutional well preserved,comfortable,pleasant    HEENT PERRLA EOMI,No pallor or icterus    No oral exudate or erythema    Neck supple no JVD or LN    Chest Good AE,CTA    CVS RRR S1 S2 WNl No murmur or rub or gallop    Abd soft BS normal No tenderness no masses    Ext No cyanosis clubbing or edema    IV site no erythema tenderness or discharge    Joints no swelling or LOM    CNS AAO X 3 no focal    Lab Data:                          12.7   12.17 )-----------( 287      ( 01 May 2020 07:50 )             38.4       05-01    138  |  96  |  7   ----------------------------<  92  3.1<L>   |  28  |  0.49<L>    Ca    8.8      01 May 2020 07:50  Phos  2.4     04-30  Mg     2.4     04-30    TPro  7.3  /  Alb  3.0<L>  /  TBili  0.3  /  DBili  x   /  AST  88<H>  /  ALT  80<H>  /  AlkPhos  110  05-01          .Blood Blood-Peripheral  04-29-20   No growth to date.  --  --                  WBC Count: 12.17 (05-01-20 @ 07:50)  WBC Count: 18.32 (04-30-20 @ 06:53)  WBC Count: 17.27 (04-29-20 @ 17:29)  WBC Count: 14.76 (04-28-20 @ 22:45)    D-Dimer Assay, Quantitative: 966 ng/mL DDU (05-01)  D-Dimer Assay, Quantitative: 453 ng/mL DDU (04-30)  D-Dimer Assay, Quantitative: 290 ng/mL DDU (04-28)    Ferritin, Serum: 1060 ng/mL (04-30)  Ferritin, Serum: 881 ng/mL (04-29)          C-Reactive Protein, Serum: 33.15 mg/dL (04-30-20 @ 08:23)  C-Reactive Protein, Serum: 25.90 mg/dL (04-29-20 @ 01:35)      Ordinal Scale: score :   1) Death  2) Hospitalized, on invasive mechanical ventilation or ECMO  3) Hospitalized, on non-invasive ventilation or high flow oxygen devices  4) Hospitalized, requiring low flow(<11l/min) supplemental oxygen  5) Hospitalized, not requiring supplemental oxygen - requiring ongoing medical care(COVID-19 related or otherwise)  6) Hospitalized, not requiring supplemental oxygen - no longer requires ongoing medical care(other than per protocol RDV administration)  7) Not hospitalized Patient is a 65y old  Female who presents with a chief complaint of COVID-19, Hypoxic respiratory failure (01 May 2020 08:46)    Being followed by ID for management of COVID-19 and Remdesivir clinical trial.    Interval history:  Yesterday 4/30 evening pt had episode of hypoxia in the 70s before receiving 1st dose of Remdesivir, rapid response called, and pt was given albuterol, furosemide, and acetominophen. Pt transferred to negative pressure room with 40L 100% high flow oxygen, SpO2 increased to 88-90%. [place] was notified. Pt received Remdesivir and Tocilizumab after event.  Pt seen and examined at bedside this morning. Saturating at 96.7% on 3L 100% high flow nasal cannula. States that she feels better. Still endorses some shortness of breath and coughs. Denies fever/chills, chest pain, nausea/vomiting, diarrhea, constipation, headache, urinary complaints, or dizziness.    ROS:  + cough,SOB, No CP  No N/V/D  No abd pain  No urinary complaints  No HA  No muscle pain.  No other complaints    PAST MEDICAL & SURGICAL HISTORY:  No pertinent past medical history  History of cholecystectomy  History of appendectomy    Allergies    No Known Allergies    Intolerances      Antimicrobials:    azithromycin  IVPB 500 milliGRAM(s) IV Intermittent every 24 hours  azithromycin  IVPB      cefTRIAXone   IVPB 1000 milliGRAM(s) IV Intermittent every 24 hours    MEDICATIONS  (STANDING):  azithromycin  IVPB 500 milliGRAM(s) IV Intermittent every 24 hours  azithromycin  IVPB      cefTRIAXone   IVPB 1000 milliGRAM(s) IV Intermittent every 24 hours  enoxaparin Injectable 40 milliGRAM(s) SubCutaneous daily  remdesivir IVPB ([id number]) 100 milliGRAM(s) IV Intermittent daily  sodium chloride 0.9% lock flush 3 milliLiter(s) IV Push every 8 hours    MEDICATIONS  (PRN):  acetaminophen   Tablet .. 650 milliGRAM(s) Oral every 6 hours PRN Temp greater or equal to 38C (100.4F), Mild Pain (1 - 3)  diphenhydrAMINE   Injectable 50 milliGRAM(s) IV Push once PRN anaphylaxis to study drug  EPINEPHrine     1 mG/mL Injectable 0.3 milliGRAM(s) IntraMuscular once PRN anaphylaxis to study drug      Vital Signs Last 24 Hrs  T(C): 36.9 (05-01-20 @ 09:20), Max: 36.9 (04-30-20 @ 17:00)  T(F): 98.4 (05-01-20 @ 09:20), Max: 98.5 (04-30-20 @ 17:00)  HR: 80 (05-01-20 @ 09:20) (80 - 104)  BP: 148/77 (05-01-20 @ 09:20) (114/66 - 158/89)  BP(mean): --  RR: 26 (05-01-20 @ 09:20) (20 - 32)  SpO2: 95% (05-01-20 @ 09:20) (88% - 98%); during my evaluation, saturating at 96.7% on 3L 100% high flow nasal cannula.    Physical Exam:    GENERAL: NAD, lying in bed   HEAD:  Atraumatic, Normocephalic  EYES: EOMI, PERRLA, conjunctiva and sclera clear  ENT: Moist mucous membranes  NECK: Supple, No JVD  CHEST/LUNG: Bilateral crackles; No rales, rhonchi, wheezing, or rubs. Unlabored respirations  HEART: Regular rate and rhythm; No murmurs, rubs, or gallops  ABDOMEN: Bowel sounds present; Soft, Nontender, Nondistended. No hepatomegaly  EXTREMITIES:  2+ Peripheral Pulses, brisk capillary refill. No clubbing, cyanosis, or edema  NERVOUS SYSTEM:  Alert & Oriented X3, speech clear. No deficits   SKIN: No rashes or lesions    Lab Data:                          12.7   12.17 )-----------( 287      ( 01 May 2020 07:50 )             38.4       05-01    138  |  96  |  7   ----------------------------<  92  3.1<L>   |  28  |  0.49<L>    Ca    8.8      01 May 2020 07:50  Phos  2.4     04-30  Mg     2.4     04-30    TPro  7.3  /  Alb  3.0<L>  /  TBili  0.3  /  DBili  x   /  AST  88<H>  /  ALT  80<H>  /  AlkPhos  110  05-01          .Blood Blood-Peripheral  04-29-20   No growth to date.  --  --                  WBC Count: 12.17 (05-01-20 @ 07:50)  WBC Count: 18.32 (04-30-20 @ 06:53)  WBC Count: 17.27 (04-29-20 @ 17:29)  WBC Count: 14.76 (04-28-20 @ 22:45)    D-Dimer Assay, Quantitative: 966 ng/mL DDU (05-01)  D-Dimer Assay, Quantitative: 453 ng/mL DDU (04-30)  D-Dimer Assay, Quantitative: 290 ng/mL DDU (04-28)    Ferritin, Serum: 1060 ng/mL (04-30)  Ferritin, Serum: 881 ng/mL (04-29)          C-Reactive Protein, Serum: 33.15 mg/dL (04-30-20 @ 08:23)  C-Reactive Protein, Serum: 25.90 mg/dL (04-29-20 @ 01:35)      Ordinal Scale: score :   1) Death  2) Hospitalized, on invasive mechanical ventilation or ECMO  3) Hospitalized, on non-invasive ventilation or high flow oxygen devices  4) Hospitalized, requiring low flow(<11l/min) supplemental oxygen  5) Hospitalized, not requiring supplemental oxygen - requiring ongoing medical care(COVID-19 related or otherwise)  6) Hospitalized, not requiring supplemental oxygen - no longer requires ongoing medical care(other than per protocol RDV administration)  7) Not hospitalized Patient is a 65y old  Female who presents with a chief complaint of COVID-19, Hypoxic respiratory failure (01 May 2020 08:46)    Being followed by ID for management of COVID-19 and Remdesivir clinical trial.    Interval history:  Yesterday 4/30 evening pt had episode of hypoxia in the 70s before receiving 1st dose of Remdesivir, rapid response called, and pt was given albuterol, furosemide, and acetominophen. Pt transferred to negative pressure room with 40L 100% high flow oxygen, SpO2 increased to 88-90%. [place] was notified. Pt received Remdesivir and Tocilizumab after event.  Pt seen and examined at bedside this morning. Saturating at 96.7% on 3L 100% high flow nasal cannula. States that she feels better. Still endorses some shortness of breath and coughs. Denies fever/chills, chest pain, nausea/vomiting, diarrhea, constipation, headache, urinary complaints, or dizziness.    ROS:  + cough,SOB, No CP  No N/V/D  No abd pain  No urinary complaints  No HA  No muscle pain.  No other complaints    PAST MEDICAL & SURGICAL HISTORY:  No pertinent past medical history  History of cholecystectomy  History of appendectomy    Allergies    No Known Allergies    Intolerances      Antimicrobials:    azithromycin  IVPB 500 milliGRAM(s) IV Intermittent every 24 hours  azithromycin  IVPB      cefTRIAXone   IVPB 1000 milliGRAM(s) IV Intermittent every 24 hours    MEDICATIONS  (STANDING):  azithromycin  IVPB 500 milliGRAM(s) IV Intermittent every 24 hours  azithromycin  IVPB      cefTRIAXone   IVPB 1000 milliGRAM(s) IV Intermittent every 24 hours  enoxaparin Injectable 40 milliGRAM(s) SubCutaneous daily  remdesivir IVPB ([id number]) 100 milliGRAM(s) IV Intermittent daily  sodium chloride 0.9% lock flush 3 milliLiter(s) IV Push every 8 hours    MEDICATIONS  (PRN):  acetaminophen   Tablet .. 650 milliGRAM(s) Oral every 6 hours PRN Temp greater or equal to 38C (100.4F), Mild Pain (1 - 3)  diphenhydrAMINE   Injectable 50 milliGRAM(s) IV Push once PRN anaphylaxis to study drug  EPINEPHrine     1 mG/mL Injectable 0.3 milliGRAM(s) IntraMuscular once PRN anaphylaxis to study drug      Vital Signs Last 24 Hrs  T(C): 36.9 (05-01-20 @ 09:20), Max: 36.9 (04-30-20 @ 17:00)  T(F): 98.4 (05-01-20 @ 09:20), Max: 98.5 (04-30-20 @ 17:00)  HR: 80 (05-01-20 @ 09:20) (80 - 104)  BP: 148/77 (05-01-20 @ 09:20) (114/66 - 158/89)  BP(mean): --  RR: 26 (05-01-20 @ 09:20) (20 - 32)  SpO2: 95% (05-01-20 @ 09:20) (88% - 98%); during my evaluation, saturating at 96.7% on 3L 100% high flow nasal cannula.    Physical Exam:    GENERAL: NAD, lying in bed   HEAD:  Atraumatic, Normocephalic  EYES: EOMI, PERRLA, conjunctiva and sclera clear  ENT: Moist mucous membranes  NECK: Supple, No JVD  CHEST/LUNG: Bilateral crackles; No rales, rhonchi, wheezing, or rubs. Unlabored respirations  HEART: Regular rate and rhythm; No murmurs, rubs, or gallops  ABDOMEN: Bowel sounds present; Soft, Nontender, Nondistended. No hepatomegaly  EXTREMITIES:  2+ Peripheral Pulses, brisk capillary refill. No clubbing, cyanosis, or edema  NERVOUS SYSTEM:  Alert & Oriented X3, speech clear. No deficits   SKIN: No rashes or lesions    Lab Data:                          12.7   12.17 )-----------( 287      ( 01 May 2020 07:50 )             38.4       05-01    138  |  96  |  7   ----------------------------<  92  3.1<L>   |  28  |  0.49<L>    Ca    8.8      01 May 2020 07:50  Phos  2.4     04-30  Mg     2.4     04-30    TPro  7.3  /  Alb  3.0<L>  /  TBili  0.3  /  DBili  x   /  AST  88<H>  /  ALT  80<H>  /  AlkPhos  110  05-01          .Blood Blood-Peripheral  04-29-20   No growth to date.  --  --      WBC Count: 12.17 (05-01-20 @ 07:50)  WBC Count: 18.32 (04-30-20 @ 06:53)  WBC Count: 17.27 (04-29-20 @ 17:29)  WBC Count: 14.76 (04-28-20 @ 22:45)    D-Dimer Assay, Quantitative: 966 ng/mL DDU (05-01)  D-Dimer Assay, Quantitative: 453 ng/mL DDU (04-30)  D-Dimer Assay, Quantitative: 290 ng/mL DDU (04-28)    Ferritin, Serum: 1060 ng/mL (04-30)  Ferritin, Serum: 881 ng/mL (04-29)    C-Reactive Protein, Serum: 33.15 mg/dL (04-30-20 @ 08:23)  C-Reactive Protein, Serum: 25.90 mg/dL (04-29-20 @ 01:35)        Ordinal Scale: score : 3  1) Death  2) Hospitalized, on invasive mechanical ventilation or ECMO  3) Hospitalized, on non-invasive ventilation or high flow oxygen devices  4) Hospitalized, requiring low flow(<11l/min) supplemental oxygen  5) Hospitalized, not requiring supplemental oxygen - requiring ongoing medical care(COVID-19 related or otherwise)  6) Hospitalized, not requiring supplemental oxygen - no longer requires ongoing medical care(other than per protocol RDV administration)  7) Not hospitalized Patient is a 65y old  Female who presents with a chief complaint of COVID-19, Hypoxic respiratory failure (01 May 2020 08:46)    Being followed by ID for management of COVID-19 and Remdesivir clinical trial.    Interval history:  Yesterday 4/30 evening pt had episode of hypoxia in the 70s before receiving 1st dose of Remdesivir, rapid response called, and pt was given albuterol, furosemide, and acetominophen. Pt transferred to negative pressure room with 40L 100% high flow oxygen, SpO2 increased to 88-90%. [place] was notified. Pt received Remdesivir and Tocilizumab after event.  Pt seen and examined at bedside this morning. Saturating at 96.7% on 3L 100% high flow nasal cannula. States that she feels better. Still endorses some shortness of breath and coughs. Denies fever/chills, chest pain, nausea/vomiting, diarrhea, constipation, headache, urinary complaints, or dizziness.    ROS:  + cough,SOB, No CP  No N/V/D  No abd pain  No urinary complaints  No HA  No muscle pain.  No other complaints    PAST MEDICAL & SURGICAL HISTORY:  No pertinent past medical history  History of cholecystectomy  History of appendectomy    Allergies    No Known Allergies    Intolerances      Antimicrobials:    azithromycin  IVPB 500 milliGRAM(s) IV Intermittent every 24 hours  azithromycin  IVPB      cefTRIAXone   IVPB 1000 milliGRAM(s) IV Intermittent every 24 hours    MEDICATIONS  (STANDING):  azithromycin  IVPB 500 milliGRAM(s) IV Intermittent every 24 hours  azithromycin  IVPB      cefTRIAXone   IVPB 1000 milliGRAM(s) IV Intermittent every 24 hours  enoxaparin Injectable 40 milliGRAM(s) SubCutaneous daily  remdesivir IVPB ([id number]) 100 milliGRAM(s) IV Intermittent daily  sodium chloride 0.9% lock flush 3 milliLiter(s) IV Push every 8 hours    MEDICATIONS  (PRN):  acetaminophen   Tablet .. 650 milliGRAM(s) Oral every 6 hours PRN Temp greater or equal to 38C (100.4F), Mild Pain (1 - 3)  diphenhydrAMINE   Injectable 50 milliGRAM(s) IV Push once PRN anaphylaxis to study drug  EPINEPHrine     1 mG/mL Injectable 0.3 milliGRAM(s) IntraMuscular once PRN anaphylaxis to study drug      Vital Signs Last 24 Hrs  T(C): 36.9 (05-01-20 @ 09:20), Max: 36.9 (04-30-20 @ 17:00)  T(F): 98.4 (05-01-20 @ 09:20), Max: 98.5 (04-30-20 @ 17:00)  HR: 80 (05-01-20 @ 09:20) (80 - 104)  BP: 148/77 (05-01-20 @ 09:20) (114/66 - 158/89)  BP(mean): --  RR: 26 (05-01-20 @ 09:20) (20 - 32)  SpO2: 95% (05-01-20 @ 09:20) (88% - 98%); during my evaluation, saturating at 96.7% on 3L 100% high flow nasal cannula.    Physical Exam:    GENERAL: NAD, lying in bed   HEAD:  Atraumatic, Normocephalic  EYES: EOMI, PERRLA, conjunctiva and sclera clear  ENT: Moist mucous membranes  NECK: Supple, No JVD  CHEST/LUNG: Bilateral crackles; No rales, rhonchi, wheezing, or rubs. Unlabored respirations  HEART: Regular rate and rhythm; No murmurs, rubs, or gallops  ABDOMEN: Bowel sounds present; Soft, Nontender, Nondistended. No hepatomegaly  EXTREMITIES:  2+ Peripheral Pulses, brisk capillary refill. No clubbing, cyanosis, or edema  NERVOUS SYSTEM:  Alert & Oriented X3, speech clear. No deficits   SKIN: No rashes or lesions    Lab Data:                          12.7   12.17 )-----------( 287      ( 01 May 2020 07:50 )             38.4       05-01    138  |  96  |  7   ----------------------------<  92  3.1<L>   |  28  |  0.49<L>    Ca    8.8      01 May 2020 07:50  Phos  2.4     04-30  Mg     2.4     04-30    TPro  7.3  /  Alb  3.0<L>  /  TBili  0.3  /  DBili  x   /  AST  88<H>  /  ALT  80<H>  /  AlkPhos  110  05-01        .Blood Blood-Peripheral  04-29-20   No growth to date.  --  --      WBC Count: 12.17 (05-01-20 @ 07:50)  WBC Count: 18.32 (04-30-20 @ 06:53)  WBC Count: 17.27 (04-29-20 @ 17:29)  WBC Count: 14.76 (04-28-20 @ 22:45)    D-Dimer Assay, Quantitative: 966 ng/mL DDU (05-01)  D-Dimer Assay, Quantitative: 453 ng/mL DDU (04-30)  D-Dimer Assay, Quantitative: 290 ng/mL DDU (04-28)    Ferritin, Serum: 1060 ng/mL (04-30)  Ferritin, Serum: 881 ng/mL (04-29)    C-Reactive Protein, Serum: 33.15 mg/dL (04-30-20 @ 08:23)  C-Reactive Protein, Serum: 25.90 mg/dL (04-29-20 @ 01:35)        Ordinal Scale: score : 3  1) Death  2) Hospitalized, on invasive mechanical ventilation or ECMO  3) Hospitalized, on non-invasive ventilation or high flow oxygen devices  4) Hospitalized, requiring low flow(<11l/min) supplemental oxygen  5) Hospitalized, not requiring supplemental oxygen - requiring ongoing medical care(COVID-19 related or otherwise)  6) Hospitalized, not requiring supplemental oxygen - no longer requires ongoing medical care(other than per protocol RDV administration)  7) Not hospitalized

## 2020-05-01 NOTE — PROGRESS NOTE ADULT - PROBLEM SELECTOR PLAN 1
COVID19 PCR +.   - s/p RRT on 4/30 due to hypoxia down to high 70s. Tmax of 102. s/p 1g tylenol, IV lasix 40mg and 2 puff MDI albuterol. Transferred to negative pressure room with HF NC 40L -> O2 sat to 88-90%  - Wean HF NC down to 30L since O2 sat maintaining ~95%.  - s/p tocilizumab(4/30). Ramdesivir(4/30-)  - Trend inflam markers [id number] COVID19 PCR +.   - s/p RRT on 4/30 due to hypoxia down to high 70s. Tmax of 102. s/p 1g tylenol, IV lasix 40mg and 2 puff MDI albuterol. Transferred to negative pressure room with HF NC 40L -> O2 sat to 88-90%  - Wean HF NC down as tolerated.  - s/p tocilizumab(4/30). Ramdesivir(4/30-)  - Trend inflam markers [id number]

## 2020-05-01 NOTE — PROGRESS NOTE ADULT - SUBJECTIVE AND OBJECTIVE BOX
[facility] Division of Medicine Progress Note  Roberta Raymond [phone]/ After 7 Page  [phone]    Patient is a 65y old  Female who presents with a chief complaint of COVID-19, Hypoxic respiratory failure (30 Apr 2020 13:54)    SUBJECTIVE / OVERNIGHT EVENTS:    ADDITIONAL REVIEW OF SYSTEMS:    MEDICATIONS  (STANDING):  azithromycin  IVPB 500 milliGRAM(s) IV Intermittent every 24 hours  azithromycin  IVPB      cefTRIAXone   IVPB 1000 milliGRAM(s) IV Intermittent every 24 hours  enoxaparin Injectable 40 milliGRAM(s) SubCutaneous daily  remdesivir IVPB ([id number]) 100 milliGRAM(s) IV Intermittent daily  sodium chloride 0.9% lock flush 3 milliLiter(s) IV Push every 8 hours    MEDICATIONS  (PRN):  acetaminophen   Tablet .. 650 milliGRAM(s) Oral every 6 hours PRN Temp greater or equal to 38C (100.4F), Mild Pain (1 - 3)  diphenhydrAMINE   Injectable 50 milliGRAM(s) IV Push once PRN anaphylaxis to study drug  EPINEPHrine     1 mG/mL Injectable 0.3 milliGRAM(s) IntraMuscular once PRN anaphylaxis to study drug      CAPILLARY BLOOD GLUCOSE        I&O's Summary    30 Apr 2020 07:01  -  01 May 2020 07:00  --------------------------------------------------------  IN: 180 mL / OUT: 400 mL / NET: -220 mL        PHYSICAL EXAM:  Vital Signs Last 24 Hrs  T(C): 36.4 (01 May 2020 06:01), Max: 36.9 (30 Apr 2020 17:00)  T(F): 97.6 (01 May 2020 06:01), Max: 98.5 (30 Apr 2020 17:00)  HR: 94 (01 May 2020 06:01) (81 - 104)  BP: 158/89 (01 May 2020 06:01) (114/66 - 158/89)  BP(mean): --  RR: 30 (01 May 2020 06:01) (20 - 32)  SpO2: 94% (01 May 2020 06:01) (88% - 98%)  CONSTITUTIONAL: NAD, well-developed, well-groomed  ENMT: Moist oral mucosa, no pharyngeal injection or exudates; normal dentition  RESPIRATORY: Normal respiratory effort; lungs are clear to auscultation bilaterally  CARDIOVASCULAR: Regular rate and rhythm, normal S1 and S2, no murmur/rub/gallop; No lower extremity edema; Peripheral pulses are 2+ bilaterally  ABDOMEN: Nontender to palpation, normoactive bowel sounds, no rebound/guarding; No hepatosplenomegaly  PSYCH: A+O to person, place, and time; affect appropriate  NEUROLOGY: CN 2-12 are intact and symmetric; no gross sensory deficits   SKIN: No rashes; no palpable lesions    LABS:                        12.8   18.32 )-----------( 250      ( 30 Apr 2020 06:53 )             39.3     05-01    138  |  96  |  7   ----------------------------<  92  3.1<L>   |  28  |  0.49<L>    Ca    8.8      01 May 2020 07:50  Phos  2.4     04-30  Mg     2.4     04-30    TPro  7.3  /  Alb  3.0<L>  /  TBili  0.3  /  DBili  x   /  AST  88<H>  /  ALT  80<H>  /  AlkPhos  110  05-01    PT/INR - ( 30 Apr 2020 08:15 )   PT: 13.4 sec;   INR: 1.17 ratio         PTT - ( 30 Apr 2020 08:15 )  PTT:28.9 sec          Culture - Blood (collected 29 Apr 2020 22:07)  Source: .Blood Blood-Peripheral  Preliminary Report (30 Apr 2020 23:01):    No growth to date.      COVID-19 PCR: Detected (04-28-20 @ 22:44)      RADIOLOGY & ADDITIONAL TESTS:  Imaging from Last 24 Hours:  Electrocardiogram/QTc Interval:    COORDINATION OF CARE:  Care Discussed with Consultants/Other Providers: [facility] Division of Medicine Progress Note  Roberta Raymond [phone]/ After 7 Page  [phone]    Patient is a 65y old  Female who presents with a chief complaint of COVID-19, Hypoxic respiratory failure (30 Apr 2020 13:54)    SUBJECTIVE / OVERNIGHT EVENTS:    She had rapid response last evening due to hypoxia to high 70s. T-max of 102. Received 1g Tylenol IV, IV lasix 40mg x1, 2 puff MDI albuterol inhaler and improved to 84-86%. She was started on ramdesivir yesterday and received one dose of tocilizumab. Transferred to negative pressure room and was placed on high flow nasal cannula up to 40L. Overnight, denies any fever, chest pain. Still feeling mild shortness of breath and notable for tachypnea, but overall feeling better.     ADDITIONAL REVIEW OF SYSTEMS:    MEDICATIONS  (STANDING):  azithromycin  IVPB 500 milliGRAM(s) IV Intermittent every 24 hours  azithromycin  IVPB      cefTRIAXone   IVPB 1000 milliGRAM(s) IV Intermittent every 24 hours  enoxaparin Injectable 40 milliGRAM(s) SubCutaneous daily  remdesivir IVPB ([id number]) 100 milliGRAM(s) IV Intermittent daily  sodium chloride 0.9% lock flush 3 milliLiter(s) IV Push every 8 hours    MEDICATIONS  (PRN):  acetaminophen   Tablet .. 650 milliGRAM(s) Oral every 6 hours PRN Temp greater or equal to 38C (100.4F), Mild Pain (1 - 3)  diphenhydrAMINE   Injectable 50 milliGRAM(s) IV Push once PRN anaphylaxis to study drug  EPINEPHrine     1 mG/mL Injectable 0.3 milliGRAM(s) IntraMuscular once PRN anaphylaxis to study drug      CAPILLARY BLOOD GLUCOSE        I&O's Summary    30 Apr 2020 07:01  -  01 May 2020 07:00  --------------------------------------------------------  IN: 180 mL / OUT: 400 mL / NET: -220 mL        PHYSICAL EXAM:  Vital Signs Last 24 Hrs  T(C): 36.4 (01 May 2020 06:01), Max: 36.9 (30 Apr 2020 17:00)  T(F): 97.6 (01 May 2020 06:01), Max: 98.5 (30 Apr 2020 17:00)  HR: 94 (01 May 2020 06:01) (81 - 104)  BP: 158/89 (01 May 2020 06:01) (114/66 - 158/89)  BP(mean): --  RR: 30 (01 May 2020 06:01) (20 - 32)  SpO2: 94% (01 May 2020 06:01) (88% - 98%)  CONSTITUTIONAL: NAD, well-developed, well-groomed  ENMT: Moist oral mucosa, no pharyngeal injection or exudates; normal dentition  RESPIRATORY: Normal respiratory effort; lungs are clear to auscultation bilaterally  CARDIOVASCULAR: Regular rate and rhythm, normal S1 and S2, no murmur/rub/gallop; No lower extremity edema; Peripheral pulses are 2+ bilaterally  ABDOMEN: Nontender to palpation, normoactive bowel sounds, no rebound/guarding; No hepatosplenomegaly  PSYCH: A+O to person, place, and time; affect appropriate  NEUROLOGY: CN 2-12 are intact and symmetric; no gross sensory deficits   SKIN: No rashes; no palpable lesions    LABS:                        12.8   18.32 )-----------( 250      ( 30 Apr 2020 06:53 )             39.3     05-01    138  |  96  |  7   ----------------------------<  92  3.1<L>   |  28  |  0.49<L>    Ca    8.8      01 May 2020 07:50  Phos  2.4     04-30  Mg     2.4     04-30    TPro  7.3  /  Alb  3.0<L>  /  TBili  0.3  /  DBili  x   /  AST  88<H>  /  ALT  80<H>  /  AlkPhos  110  05-01    PT/INR - ( 30 Apr 2020 08:15 )   PT: 13.4 sec;   INR: 1.17 ratio         PTT - ( 30 Apr 2020 08:15 )  PTT:28.9 sec          Culture - Blood (collected 29 Apr 2020 22:07)  Source: .Blood Blood-Peripheral  Preliminary Report (30 Apr 2020 23:01):    No growth to date.      COVID-19 PCR: Detected (04-28-20 @ 22:44)      RADIOLOGY & ADDITIONAL TESTS:  Imaging from Last 24 Hours:  Electrocardiogram/QTc Interval:    COORDINATION OF CARE:  Care Discussed with Consultants/Other Providers: [facility] Division of Medicine Progress Note  Roberta Raymond [phone]/ After 7 Page  [phone]    Patient is a 65y old  Female who presents with a chief complaint of COVID-19, Hypoxic respiratory failure (30 Apr 2020 13:54)    SUBJECTIVE / OVERNIGHT EVENTS:    She had rapid response last evening due to hypoxia to high 70s. T-max of 102. Received 1g Tylenol IV, IV lasix 40mg x1, 2 puff MDI albuterol inhaler and improved to 84-86%. She was started on ramdesivir yesterday and received one dose of tocilizumab. Transferred to negative pressure room and was placed on high flow nasal cannula up to 40L. Overnight, denies any fever, chest pain. Still feeling mild shortness of breath and notable for tachypnea, but overall feeling better.     ADDITIONAL REVIEW OF SYSTEMS:    MEDICATIONS  (STANDING):  azithromycin  IVPB 500 milliGRAM(s) IV Intermittent every 24 hours  azithromycin  IVPB      cefTRIAXone   IVPB 1000 milliGRAM(s) IV Intermittent every 24 hours  enoxaparin Injectable 40 milliGRAM(s) SubCutaneous daily  remdesivir IVPB ([id number]) 100 milliGRAM(s) IV Intermittent daily  sodium chloride 0.9% lock flush 3 milliLiter(s) IV Push every 8 hours    MEDICATIONS  (PRN):  acetaminophen   Tablet .. 650 milliGRAM(s) Oral every 6 hours PRN Temp greater or equal to 38C (100.4F), Mild Pain (1 - 3)  diphenhydrAMINE   Injectable 50 milliGRAM(s) IV Push once PRN anaphylaxis to study drug  EPINEPHrine     1 mG/mL Injectable 0.3 milliGRAM(s) IntraMuscular once PRN anaphylaxis to study drug      CAPILLARY BLOOD GLUCOSE        I&O's Summary    30 Apr 2020 07:01  -  01 May 2020 07:00  --------------------------------------------------------  IN: 180 mL / OUT: 400 mL / NET: -220 mL        PHYSICAL EXAM:  Vital Signs Last 24 Hrs  T(C): 36.4 (01 May 2020 06:01), Max: 36.9 (30 Apr 2020 17:00)  T(F): 97.6 (01 May 2020 06:01), Max: 98.5 (30 Apr 2020 17:00)  HR: 94 (01 May 2020 06:01) (81 - 104)  BP: 158/89 (01 May 2020 06:01) (114/66 - 158/89)  BP(mean): --  RR: 30 (01 May 2020 06:01) (20 - 32)  SpO2: 94% (01 May 2020 06:01) (88% - 98%)  CONSTITUTIONAL: NAD, well-developed, well-groomed  ENMT: Moist oral mucosa,   RESPIRATORY: Normal respiratory effort; B/L Crackles   CARDIOVASCULAR: Regular rate and rhythm, normal S1 and S2, no murmur/rub/gallop; No lower extremity edema;   ABDOMEN: Nontender to palpation, normoactive bowel sounds, no rebound/guarding;  PSYCH: A+O to person, place, and time;   NEUROLOGY: No gross focal neurological deficits   SKIN: No rashes; no palpable lesions    LABS:                        12.8   18.32 )-----------( 250      ( 30 Apr 2020 06:53 )             39.3     05-01    138  |  96  |  7   ----------------------------<  92  3.1<L>   |  28  |  0.49<L>    Ca    8.8      01 May 2020 07:50  Phos  2.4     04-30  Mg     2.4     04-30    TPro  7.3  /  Alb  3.0<L>  /  TBili  0.3  /  DBili  x   /  AST  88<H>  /  ALT  80<H>  /  AlkPhos  110  05-01    PT/INR - ( 30 Apr 2020 08:15 )   PT: 13.4 sec;   INR: 1.17 ratio         PTT - ( 30 Apr 2020 08:15 )  PTT:28.9 sec          Culture - Blood (collected 29 Apr 2020 22:07)  Source: .Blood Blood-Peripheral  Preliminary Report (30 Apr 2020 23:01):    No growth to date.      COVID-19 PCR: Detected (04-28-20 @ 22:44)      RADIOLOGY & ADDITIONAL TESTS:  Imaging from Last 24 Hours:  Electrocardiogram/QTc Interval:    COORDINATION OF CARE:  Care Discussed with Consultants/Other Providers:

## 2020-05-02 NOTE — PROGRESS NOTE ADULT - ASSESSMENT
65F reporting no PMH who presented last night with complaint of fever/chills (home T-max 102 F), night sweats, cough, myalgias (mid & lower back), loss of taste and smell since Friday 4/24 (still tastes sweet/salty), as well as 2 days of weakness and constipation, found to be COVID positive on 4/28/20.    COVID positive 4/28/20  CRP 33.15  Ferritin 1060  Cr 0.52  No fevers  Now on 40L 100% high flow nasal cannula  WBC 18.32 -> 12.17 -> 10.68    Pt has agreed to participate in a clinical trial for Covid -19  In order to participate in this clinical trial , the patient should not take concurrent treatment with other agents with actual or possible direct acting antiviral activity against SARS-CoV-2 such a chloroquine or hydroxychloroquine.  In addition, the patient should not participate in any other clinical trial of an experimental treatment for Covid-19    Pt's with Covid 19 tend to be hypercoagulable, please monitor d- dimer. DVT prophylaxis  Monitor markers of inflammation such as Ferritin, D-dimer, and CRP    # COVID-19 infection with viral PNA and superimposed bacterial PNA  - S/p RRT on 4/30 due to desaturation down to the 70s, given albuterol, furosemide, and acetominophen. transferred to negative pressure room with 40L 100% high flow oxygen, SpO2 increased to 88-90%.   - Saturating at 96.5% on 40L 100% nasal cannula in prone position  - Continue Remdesivir (started  4/30) - monitor renal function, LFTs, and inflammatory markers  - Continue ceftriaxone and azithromycin (started 4/29) for possible superimposed bacterial pneumonia given leukocytosis, elevated procalcitonin, and CXR suspicious for lobar pneumonia; WBC trending down; procal 0.63  - S/p tocilizumab 4/30  - Continue to wean off NC as tolerated    #Hypoxia  -Continue supportive oxygen  -Wean off oxygen as tolerated    # Elevated D-dimer, ferritin, CRP  - From COVID  - Continue to trend      Today is day #3 on study drug. Ordinal scale score: 3      Niki Caceres MD. Graduate Physician

## 2020-05-02 NOTE — PROGRESS NOTE ADULT - PROBLEM SELECTOR PLAN 1
COVID19 PCR +.   - s/p RRT on 4/30 due to hypoxia down to high 70s.  Transferred to negative pressure room with HF NC 40L -> O2 to 94%  - Wean HF NC down as tolerated.  - s/p tocilizumab(4/30). Ramdesivir(4/30-)  - Trend inflam markers [id number]  -dose Lasix today (40mg IV)

## 2020-05-02 NOTE — PROGRESS NOTE ADULT - SUBJECTIVE AND OBJECTIVE BOX
Patient is a 65y old  Female who presents with a chief complaint of COVID-19, Hypoxic respiratory failure (02 May 2020 10:09)      SUBJECTIVE / OVERNIGHT EVENTS: no events, currently enrolled in remdesivir trial.  States she thinks her breathing is improved from yesterday.      MEDICATIONS  (STANDING):  azithromycin  IVPB 500 milliGRAM(s) IV Intermittent every 24 hours  azithromycin  IVPB      cefTRIAXone   IVPB 1000 milliGRAM(s) IV Intermittent every 24 hours  enoxaparin Injectable 40 milliGRAM(s) SubCutaneous daily  remdesivir IVPB ([id number]) 100 milliGRAM(s) IV Intermittent daily  sodium chloride 0.9% lock flush 3 milliLiter(s) IV Push every 8 hours    MEDICATIONS  (PRN):  acetaminophen   Tablet .. 650 milliGRAM(s) Oral every 6 hours PRN Temp greater or equal to 38C (100.4F), Mild Pain (1 - 3)  diphenhydrAMINE   Injectable 50 milliGRAM(s) IV Push once PRN anaphylaxis to study drug  EPINEPHrine     1 mG/mL Injectable 0.3 milliGRAM(s) IntraMuscular once PRN anaphylaxis to study drug      T(C): 36.7 (05-02-20 @ 11:50), Max: 37.1 (05-01-20 @ 16:06)  HR: 78 (05-02-20 @ 11:50) (72 - 96)  BP: 134/76 (05-02-20 @ 11:50) (119/52 - 159/84)  RR: 22 (05-02-20 @ 11:50) (22 - 26)  SpO2: 94% (05-02-20 @ 11:50) (90% - 94%)  CAPILLARY BLOOD GLUCOSE        I&O's Summary    01 May 2020 07:01  -  02 May 2020 07:00  --------------------------------------------------------  IN: 840 mL / OUT: 800 mL / NET: 40 mL    02 May 2020 07:01  -  02 May 2020 12:12  --------------------------------------------------------  IN: 250 mL / OUT: 0 mL / NET: 250 mL        PHYSICAL EXAM:  GENERAL: NAD, well-developed  HEAD:  Atraumatic, Normocephalic  EYES: EOMI, PERRLA, conjunctiva and sclera clear  NECK: Supple, No JVD  CHEST/LUNG: Clear to auscultation bilaterally; No wheeze  HEART: Regular rate and rhythm; No murmurs, rubs, or gallops  ABDOMEN: Soft, Nontender, Nondistended; Bowel sounds present  EXTREMITIES:  2+ Peripheral Pulses, No clubbing, cyanosis, or edema  PSYCH: AAOx3  NEUROLOGY: non-focal      LABS:                        13.9   10.68 )-----------( 282      ( 02 May 2020 06:38 )             44.6     05-02    138  |  99  |  11  ----------------------------<  106<H>  4.5   |  28  |  0.52    Ca    8.5      02 May 2020 06:38    TPro  6.4  /  Alb  2.9<L>  /  TBili  0.2  /  DBili  x   /  AST  61<H>  /  ALT  65<H>  /  AlkPhos  103  05-02              RADIOLOGY & ADDITIONAL TESTS:    Imaging Personally Reviewed:    Consultant(s) Notes Reviewed:      Care Discussed with Consultants/Other Providers:

## 2020-05-02 NOTE — PROGRESS NOTE ADULT - PROBLEM SELECTOR PLAN 3
CXR with prominent RUL and LLL consolidation. Elevated procalcitonin. Leukocytosis improving.  - C/w CTX/Azithro(4/28-).

## 2020-05-02 NOTE — PROGRESS NOTE ADULT - ASSESSMENT
65 year old female reporting no PMH who presents w/ fever, cough, myalgias, loss of taste and smell, found to be hypoxic with COVID-19

## 2020-05-02 NOTE — PROGRESS NOTE ADULT - SUBJECTIVE AND OBJECTIVE BOX
Patient is a 65y old  Female who presents with a chief complaint of COVID-19, Hypoxic respiratory failure (01 May 2020 08:46)    Being followed by ID for management of COVID-19 and Remdesivir clinical trial.    Interval history:  No acute events overnight. Pt seen and examined at bedside. Saturating at 96.5% on 40L 100% nasal cannula in prone position.   States that she feels similar to yesterday. Still endorses some shortness of breath and coughs. Has a mild 1/10 headache. Denies fever/chills, chest pain, nausea/vomiting, diarrhea, constipation, headache, urinary complaints, or dizziness.    ROS:  + cough,SOB, No CP  No N/V/D  No abd pain  No urinary complaints  No HA  No muscle pain.  No other complaints    PAST MEDICAL & SURGICAL HISTORY:  No pertinent past medical history  History of cholecystectomy  History of appendectomy    Allergies    No Known Allergies    Intolerances      MEDICATIONS  (STANDING):  azithromycin  IVPB 500 milliGRAM(s) IV Intermittent every 24 hours  azithromycin  IVPB      cefTRIAXone   IVPB 1000 milliGRAM(s) IV Intermittent every 24 hours  enoxaparin Injectable 40 milliGRAM(s) SubCutaneous daily  remdesivir IVPB ([id number]) 100 milliGRAM(s) IV Intermittent daily  sodium chloride 0.9% lock flush 3 milliLiter(s) IV Push every 8 hours    MEDICATIONS  (PRN):  acetaminophen   Tablet .. 650 milliGRAM(s) Oral every 6 hours PRN Temp greater or equal to 38C (100.4F), Mild Pain (1 - 3)  diphenhydrAMINE   Injectable 50 milliGRAM(s) IV Push once PRN anaphylaxis to study drug  EPINEPHrine     1 mG/mL Injectable 0.3 milliGRAM(s) IntraMuscular once PRN anaphylaxis to study drug      Vital Signs Last 24 Hrs  T(C): 36.7 (02 May 2020 04:22), Max: 37.1 (01 May 2020 16:06)  T(F): 98.1 (02 May 2020 04:22), Max: 98.7 (01 May 2020 16:06)  HR: 72 (02 May 2020 09:20) (72 - 96)  BP: 131/73 (02 May 2020 04:22) (125/70 - 159/84)  BP(mean): --  RR: 23 (02 May 2020 09:20) (23 - 26)  SpO2: 91% (02 May 2020 09:20) (90% - 93%); during my evaluation, saturating at 96.5% on 4L 100% high flow nasal cannula.      Physical Exam:    GENERAL: NAD, lying prone  HEAD:  Atraumatic, Normocephalic  EYES: EOMI, PERRLA, conjunctiva and sclera clear  ENT: Moist mucous membranes  NECK: Supple, No JVD  CHEST/LUNG: Clear to auscultation bilaterally; No rales, rhonchi, wheezing, or rubs. Unlabored respirations  HEART: Regular rate and rhythm; No murmurs, rubs, or gallops  ABDOMEN: Bowel sounds present; Soft, Nontender, Nondistended. No hepatomegaly  EXTREMITIES:  2+ Peripheral Pulses, brisk capillary refill. No clubbing, cyanosis, or edema  NERVOUS SYSTEM:  Alert & Oriented X3, speech clear. No deficits   SKIN: No rashes or lesions    Lab Data:                          13.9   10.68 )-----------( 282      ( 02 May 2020 06:38 )             44.6              05-02    138  |  99  |  11  ----------------------------<  106<H>  4.5   |  28  |  0.52    Ca    8.5      02 May 2020 06:38    TPro  6.4  /  Alb  2.9<L>  /  TBili  0.2  /  DBili  x   /  AST  61<H>  /  ALT  65<H>  /  AlkPhos  103  05-02        Culture - Blood (04.29.20 @ 22:07)    Specimen Source: .Blood Blood-Peripheral    Culture Results:   No growth to date.        WBC Count: 10.68 (05.02.20 @ 06:38)  WBC Count: 12.17 (05-01-20 @ 07:50)  WBC Count: 18.32 (04-30-20 @ 06:53)  WBC Count: 17.27 (04-29-20 @ 17:29)  WBC Count: 14.76 (04-28-20 @ 22:45)    D-Dimer Assay, Quantitative: 966 ng/mL DDU (05-01)  D-Dimer Assay, Quantitative: 453 ng/mL DDU (04-30)  D-Dimer Assay, Quantitative: 290 ng/mL DDU (04-28)    Ferritin, Serum: 1060 ng/mL (04-30)  Ferritin, Serum: 881 ng/mL (04-29)    C-Reactive Protein, Serum: 33.15 mg/dL (04-30-20 @ 08:23)  C-Reactive Protein, Serum: 25.90 mg/dL (04-29-20 @ 01:35)    Procalcitonin, Serum: 0.63: This assay is intended for use to determine the change of PCT over time  as an aid in assessing the cumulative 28-day risk of all-cause mortality  for patients diagnosed with severe sepsis or septic shock in the ICU, or  when obtained in the emergency department or other medical wards prior to  ICU admission. This assay was performed by the Roche [employer] PCT  assay. ng/mL (05.02.20 @ 06:38)          Ordinal Scale: score : 3  1) Death  2) Hospitalized, on invasive mechanical ventilation or ECMO  3) Hospitalized, on non-invasive ventilation or high flow oxygen devices  4) Hospitalized, requiring low flow(<11l/min) supplemental oxygen  5) Hospitalized, not requiring supplemental oxygen - requiring ongoing medical care(COVID-19 related or otherwise)  6) Hospitalized, not requiring supplemental oxygen - no longer requires ongoing medical care(other than per protocol RDV administration)  7) Not hospitalized Patient is a 65y old  Female who presents with a chief complaint of COVID-19, Hypoxic respiratory failure (01 May 2020 08:46)    Being followed by ID for management of COVID-19 and Remdesivir clinical trial.    Interval history:  No acute events overnight. Pt seen and examined at bedside. Saturating at 96.5% on 40L 100% nasal cannula in prone position.   States that she feels similar to yesterday. Still endorses some shortness of breath and coughs. Has a mild 1/10 headache. Denies fever/chills, chest pain, nausea/vomiting, diarrhea, constipation, headache, urinary complaints, or dizziness.    ROS:  + cough,SOB, No CP  No N/V/D  No abd pain  No urinary complaints  No HA  No muscle pain.  No other complaints    PAST MEDICAL & SURGICAL HISTORY:  No pertinent past medical history  History of cholecystectomy  History of appendectomy    Allergies    No Known Allergies    Intolerances      MEDICATIONS  (STANDING):  azithromycin  IVPB 500 milliGRAM(s) IV Intermittent every 24 hours  azithromycin  IVPB      cefTRIAXone   IVPB 1000 milliGRAM(s) IV Intermittent every 24 hours  enoxaparin Injectable 40 milliGRAM(s) SubCutaneous daily  remdesivir IVPB ([id number]) 100 milliGRAM(s) IV Intermittent daily  sodium chloride 0.9% lock flush 3 milliLiter(s) IV Push every 8 hours    MEDICATIONS  (PRN):  acetaminophen   Tablet .. 650 milliGRAM(s) Oral every 6 hours PRN Temp greater or equal to 38C (100.4F), Mild Pain (1 - 3)  diphenhydrAMINE   Injectable 50 milliGRAM(s) IV Push once PRN anaphylaxis to study drug  EPINEPHrine     1 mG/mL Injectable 0.3 milliGRAM(s) IntraMuscular once PRN anaphylaxis to study drug      Vital Signs Last 24 Hrs  T(C): 36.7 (02 May 2020 04:22), Max: 37.1 (01 May 2020 16:06)  T(F): 98.1 (02 May 2020 04:22), Max: 98.7 (01 May 2020 16:06)  HR: 72 (02 May 2020 09:20) (72 - 96)  BP: 131/73 (02 May 2020 04:22) (125/70 - 159/84)  BP(mean): --  RR: 23 (02 May 2020 09:20) (23 - 26)  SpO2: 91% (02 May 2020 09:20) (90% - 93%); during my evaluation, saturating at 96.5% on 40L 100% high flow nasal cannula.      Physical Exam:    GENERAL: NAD, lying prone  HEAD:  Atraumatic, Normocephalic  EYES: EOMI, PERRLA, conjunctiva and sclera clear  ENT: Moist mucous membranes  NECK: Supple, No JVD  CHEST/LUNG: Clear to auscultation bilaterally; No rales, rhonchi, wheezing, or rubs. Unlabored respirations  HEART: Regular rate and rhythm; No murmurs, rubs, or gallops  ABDOMEN: Bowel sounds present; Soft, Nontender, Nondistended. No hepatomegaly  EXTREMITIES:  2+ Peripheral Pulses, brisk capillary refill. No clubbing, cyanosis, or edema  NERVOUS SYSTEM:  Alert & Oriented X3, speech clear. No deficits   SKIN: No rashes or lesions    Lab Data:                          13.9   10.68 )-----------( 282      ( 02 May 2020 06:38 )             44.6              05-02    138  |  99  |  11  ----------------------------<  106<H>  4.5   |  28  |  0.52    Ca    8.5      02 May 2020 06:38    TPro  6.4  /  Alb  2.9<L>  /  TBili  0.2  /  DBili  x   /  AST  61<H>  /  ALT  65<H>  /  AlkPhos  103  05-02        Culture - Blood (04.29.20 @ 22:07)    Specimen Source: .Blood Blood-Peripheral    Culture Results:   No growth to date.        WBC Count: 10.68 (05.02.20 @ 06:38)  WBC Count: 12.17 (05-01-20 @ 07:50)  WBC Count: 18.32 (04-30-20 @ 06:53)  WBC Count: 17.27 (04-29-20 @ 17:29)  WBC Count: 14.76 (04-28-20 @ 22:45)    D-Dimer Assay, Quantitative: 966 ng/mL DDU (05-01)  D-Dimer Assay, Quantitative: 453 ng/mL DDU (04-30)  D-Dimer Assay, Quantitative: 290 ng/mL DDU (04-28)    Ferritin, Serum: 1060 ng/mL (04-30)  Ferritin, Serum: 881 ng/mL (04-29)    C-Reactive Protein, Serum: 33.15 mg/dL (04-30-20 @ 08:23)  C-Reactive Protein, Serum: 25.90 mg/dL (04-29-20 @ 01:35)    Procalcitonin, Serum: 0.63: This assay is intended for use to determine the change of PCT over time  as an aid in assessing the cumulative 28-day risk of all-cause mortality  for patients diagnosed with severe sepsis or septic shock in the ICU, or  when obtained in the emergency department or other medical wards prior to  ICU admission. This assay was performed by the Roche [employer] PCT  assay. ng/mL (05.02.20 @ 06:38)          Ordinal Scale: score : 3  1) Death  2) Hospitalized, on invasive mechanical ventilation or ECMO  3) Hospitalized, on non-invasive ventilation or high flow oxygen devices  4) Hospitalized, requiring low flow(<11l/min) supplemental oxygen  5) Hospitalized, not requiring supplemental oxygen - requiring ongoing medical care(COVID-19 related or otherwise)  6) Hospitalized, not requiring supplemental oxygen - no longer requires ongoing medical care(other than per protocol RDV administration)  7) Not hospitalized

## 2020-05-03 NOTE — PROGRESS NOTE ADULT - PROBLEM SELECTOR PLAN 1
COVID19 PCR +.   - s/p RRT on 4/30 due to hypoxia down to high 70s.  Transferred to negative pressure room with HF NC 40L -> O2 to 94%  - Wean HF NC down as tolerated.  - s/p tocilizumab(4/30). Ramdesivir(4/30-)  - Trend inflam markers [id number]  -dosed Lasix today (40mg IV)

## 2020-05-03 NOTE — CHART NOTE - NSCHARTNOTEFT_GEN_A_CORE
Called by RN for persistent desat while in the prone position. Pt with O2 of 80 while prone on hiFlo @40%  undergoing chest pt by RN with slow rise on o2sat over the course of ten mins to 89-90.   yesterdays events reviewed. appears pt improved after lasix therefore an additional lasix 40mg ivp x 1 ordered with iv apap.  will obtain full set of vitals  incentive spirometer.  will perform abg if dewsat cont.    will cont to monitor Called by RN for persistent desat while in the prone position. Pt with O2 of 80 while prone on hiFlo @40%  undergoing chest pt by RN with slow rise on o2sat over the course of ten mins to 89-90.   yesterdays events reviewed. appears pt improved after lasix therefore an additional lasix 40mg ivp x 1 ordered with iv apap.  will obtain full set of vitals  incentive spirometer.  will perform abg if desat cont.    will cont to monitor

## 2020-05-03 NOTE — PROGRESS NOTE ADULT - SUBJECTIVE AND OBJECTIVE BOX
Patient is a 65y old  Female who presents with a chief complaint of COVID-19, Hypoxic respiratory failure (01 May 2020 08:46)    Being followed by ID for management of COVID-19 and Remdesivir clinical trial.    Interval history:  No acute events overnight. Pt seen and examined at bedside. Saturating at 95% on 40L 100% nasal cannula in left decubitus position. Pt had persistent desat to the 80s% earlier this AM, improved to the 90% after chest PT and lasix. Pt reports no worsening symptoms during episode of desat.  States that she feels similar to yesterday. Still endorses some shortness of breath and coughs. Has decreased appetite. Denies fever/chills, chest pain, nausea/vomiting, diarrhea, constipation, headache, urinary complaints, or dizziness.    ROS:  + cough,SOB, No CP  No N/V/D  No abd pain  No urinary complaints  No HA  No muscle pain.  No other complaints    PAST MEDICAL & SURGICAL HISTORY:  No pertinent past medical history  History of cholecystectomy  History of appendectomy    Allergies    No Known Allergies    Intolerances      MEDICATIONS  (STANDING):  acetaminophen  IVPB .. 1000 milliGRAM(s) IV Intermittent once  azithromycin  IVPB 500 milliGRAM(s) IV Intermittent every 24 hours  azithromycin  IVPB      cefTRIAXone   IVPB 1000 milliGRAM(s) IV Intermittent every 24 hours  enoxaparin Injectable 40 milliGRAM(s) SubCutaneous daily  furosemide   Injectable 40 milliGRAM(s) IV Push once  remdesivir IVPB ([id number]) 100 milliGRAM(s) IV Intermittent daily  sodium chloride 0.9% lock flush 3 milliLiter(s) IV Push every 8 hours    MEDICATIONS  (PRN):  acetaminophen   Tablet .. 650 milliGRAM(s) Oral every 6 hours PRN Temp greater or equal to 38C (100.4F), Mild Pain (1 - 3)  ALBUTerol    90 MICROgram(s) HFA Inhaler 2 Puff(s) Inhalation every 4 hours PRN Shortness of Breath and/or Wheezing  diphenhydrAMINE   Injectable 50 milliGRAM(s) IV Push once PRN anaphylaxis to study drug  EPINEPHrine     1 mG/mL Injectable 0.3 milliGRAM(s) IntraMuscular once PRN anaphylaxis to study drug        Vital Signs Last 24 Hrs  T(C): 36.7 (03 May 2020 04:40), Max: 36.9 (02 May 2020 10:40)  T(F): 98.1 (03 May 2020 04:40), Max: 98.5 (02 May 2020 10:40)  HR: 76 (03 May 2020 04:48) (76 - 87)  BP: 103/51 (03 May 2020 04:40) (103/51 - 134/76)  BP(mean): --  RR: 24 (03 May 2020 04:48) (22 - 35)  SpO2: 90% (03 May 2020 04:48) (88% - 97%); during my evaluation, saturating at 95% on 40L 100% high flow nasal cannula.      Physical Exam:    GENERAL: NAD, lying left decubitus  HEAD:  Atraumatic, Normocephalic  EYES: EOMI, conjunctiva and sclera clear  CHEST/LUNG: Mild crackles at base; No rales, rhonchi, wheezing, or rubs. Unlabored respirations  HEART: Regular rate and rhythm; No murmurs, rubs, or gallops  ABDOMEN: Bowel sounds present; Soft, Nontender, Nondistended. No hepatomegaly  EXTREMITIES:  2+ Peripheral Pulses, brisk capillary refill. No clubbing, cyanosis, or edema  NERVOUS SYSTEM:  Alert & Oriented X3, speech clear. No deficits   SKIN: No rashes or lesions    Lab Data:                          13.0   18.37 )-----------( 403      ( 03 May 2020 06:10 )             38.7                05-03    134<L>  |  93<L>  |  11  ----------------------------<  110<H>  3.8   |  28  |  0.49<L>    Ca    8.5      03 May 2020 06:10  Phos  3.1     05-03  Mg     2.2     05-03    TPro  6.6  /  Alb  3.0<L>  /  TBili  0.3  /  DBili  x   /  AST  64<H>  /  ALT  57<H>  /  AlkPhos  112  05-03                 Culture - Blood (04.29.20 @ 22:07)    Specimen Source: .Blood Blood-Peripheral    Culture Results:   No growth to date.      WBC Count: 18.37 (05.03.20 @ 06:10)  WBC Count: 10.68 (05.02.20 @ 06:38)  WBC Count: 12.17 (05-01-20 @ 07:50)  WBC Count: 18.32 (04-30-20 @ 06:53)  WBC Count: 17.27 (04-29-20 @ 17:29)  WBC Count: 14.76 (04-28-20 @ 22:45)    D-Dimer Assay, Quantitative: 966 ng/mL DDU (05-01)  D-Dimer Assay, Quantitative: 453 ng/mL DDU (04-30)  D-Dimer Assay, Quantitative: 290 ng/mL DDU (04-28)    Ferritin, Serum: 1896 ng/mL (05-02)  Ferritin, Serum: 1060 ng/mL (04-30)  Ferritin, Serum: 881 ng/mL (04-29)    C-Reactive Protein, Serum: 15.76 mg/dL (05-02-20 @ 11:32)  C-Reactive Protein, Serum: 33.15 mg/dL (04-30-20 @ 08:23)  C-Reactive Protein, Serum: 25.90 mg/dL (04-29-20 @ 01:35)    Procalcitonin, Serum (05.02.20 @ 06:38)    Procalcitonin, Serum: 0.63: This assay is intended for use to determine the change of PCT over time  as an aid in assessing the cumulative 28-day risk of all-cause mortality  for patients diagnosed with severe sepsis or septic shock in the ICU, or  when obtained in the emergency department or other medical wards prior to  ICU admission. This assay was performed by the Roche [employer] [facility] [place] - 2S PCT  assay. ng/mL          Ordinal Scale: score : 3  1) Death  2) Hospitalized, on invasive mechanical ventilation or ECMO  3) Hospitalized, on non-invasive ventilation or high flow oxygen devices  4) Hospitalized, requiring low flow(<11l/min) supplemental oxygen  5) Hospitalized, not requiring supplemental oxygen - requiring ongoing medical care(COVID-19 related or otherwise)  6) Hospitalized, not requiring supplemental oxygen - no longer requires ongoing medical care(other than per protocol RDV administration)  7) Not hospitalized Patient is a 65y old  Female who presents with a chief complaint of COVID-19, Hypoxic respiratory failure (01 May 2020 08:46)    Being followed by ID for management of COVID-19 and Remdesivir clinical trial.    Interval history:  No acute events overnight. Pt seen and examined at bedside. Saturating at 95% on 40L 100% nasal cannula in left decubitus position. Pt had persistent desat to the 80s% earlier this AM, improved to the 90% after chest PT and lasix. Pt reports no worsening symptoms during episode of desat.  States that she feels similar to yesterday. Still endorses some shortness of breath and coughs. Has decreased appetite. Denies fever/chills, chest pain, nausea/vomiting, diarrhea, constipation, headache, urinary complaints, or dizziness.    ROS:  + cough,SOB, No CP  No N/V/D  No abd pain  No urinary complaints  No HA  No muscle pain.  No other complaints    PAST MEDICAL & SURGICAL HISTORY:  No pertinent past medical history  History of cholecystectomy  History of appendectomy    Allergies    No Known Allergies    Intolerances      MEDICATIONS  (STANDING):  acetaminophen  IVPB .. 1000 milliGRAM(s) IV Intermittent once  azithromycin  IVPB 500 milliGRAM(s) IV Intermittent every 24 hours  azithromycin  IVPB      cefTRIAXone   IVPB 1000 milliGRAM(s) IV Intermittent every 24 hours  enoxaparin Injectable 40 milliGRAM(s) SubCutaneous daily  furosemide   Injectable 40 milliGRAM(s) IV Push once  remdesivir IVPB ([id number]) 100 milliGRAM(s) IV Intermittent daily  sodium chloride 0.9% lock flush 3 milliLiter(s) IV Push every 8 hours    MEDICATIONS  (PRN):  acetaminophen   Tablet .. 650 milliGRAM(s) Oral every 6 hours PRN Temp greater or equal to 38C (100.4F), Mild Pain (1 - 3)  ALBUTerol    90 MICROgram(s) HFA Inhaler 2 Puff(s) Inhalation every 4 hours PRN Shortness of Breath and/or Wheezing  diphenhydrAMINE   Injectable 50 milliGRAM(s) IV Push once PRN anaphylaxis to study drug  EPINEPHrine     1 mG/mL Injectable 0.3 milliGRAM(s) IntraMuscular once PRN anaphylaxis to study drug        Vital Signs Last 24 Hrs  T(C): 36.7 (03 May 2020 04:40), Max: 36.9 (02 May 2020 10:40)  T(F): 98.1 (03 May 2020 04:40), Max: 98.5 (02 May 2020 10:40)  HR: 76 (03 May 2020 04:48) (76 - 87)  BP: 103/51 (03 May 2020 04:40) (103/51 - 134/76)  BP(mean): --  RR: 24 (03 May 2020 04:48) (22 - 35)  SpO2: 90% (03 May 2020 04:48) (88% - 97%); during my evaluation, saturating at 95% on 40L 100% high flow nasal cannula.      Physical Exam:    GENERAL: NAD, lying left decubitus  HEAD:  Atraumatic, Normocephalic  EYES: EOMI, conjunctiva and sclera clear  CHEST/LUNG: Mild crackles at base; No rales, rhonchi, wheezing, or rubs. Unlabored respirations  HEART: Regular rate and rhythm; No murmurs, rubs, or gallops  ABDOMEN: Bowel sounds present; Soft, Nontender, Nondistended. No hepatomegaly  EXTREMITIES:  2+ Peripheral Pulses, brisk capillary refill. No clubbing, cyanosis, or edema  NERVOUS SYSTEM:  Alert & Oriented X3, speech clear. No deficits   SKIN: No rashes or lesions    Lab Data:                          13.0   18.37 )-----------( 403      ( 03 May 2020 06:10 )             38.7              Procalcitonin, Serum (05.02.20 @ 06:38)    Procalcitonin, Serum: 0.63: This assay is intended for use to determine the change of PCT over time  as an aid in assessing the cumulative 28-day risk of all-cause mortality  for patients diagnosed with severe sepsis or septic shock in the ICU, or  when obtained in the emergency department or other medical wards prior to  ICU admission. This assay was performed by the Roche [employer] PCT  assay. ng/mL      05-03    134<L>  |  93<L>  |  11  ----------------------------<  110<H>  3.8   |  28  |  0.49<L>    Ca    8.5      03 May 2020 06:10  Phos  3.1     05-03  Mg     2.2     05-03    TPro  6.6  /  Alb  3.0<L>  /  TBili  0.3  /  DBili  x   /  AST  64<H>  /  ALT  57<H>  /  AlkPhos  112  05-03                 Culture - Blood (04.29.20 @ 22:07)    Specimen Source: .Blood Blood-Peripheral    Culture Results:   No growth to date.      WBC Count: 18.37 (05.03.20 @ 06:10)  WBC Count: 10.68 (05.02.20 @ 06:38)  WBC Count: 12.17 (05-01-20 @ 07:50)  WBC Count: 18.32 (04-30-20 @ 06:53)  WBC Count: 17.27 (04-29-20 @ 17:29)  WBC Count: 14.76 (04-28-20 @ 22:45)    D-Dimer Assay, Quantitative: 966 ng/mL DDU (05-01)  D-Dimer Assay, Quantitative: 453 ng/mL DDU (04-30)  D-Dimer Assay, Quantitative: 290 ng/mL DDU (04-28)    Ferritin, Serum: 1896 ng/mL (05-02)  Ferritin, Serum: 1060 ng/mL (04-30)  Ferritin, Serum: 881 ng/mL (04-29)    C-Reactive Protein, Serum: 15.76 mg/dL (05-02-20 @ 11:32)  C-Reactive Protein, Serum: 33.15 mg/dL (04-30-20 @ 08:23)  C-Reactive Protein, Serum: 25.90 mg/dL (04-29-20 @ 01:35)    Procalcitonin, Serum (05.02.20 @ 06:38)    Procalcitonin, Serum: 0.63: This assay is intended for use to determine the change of PCT over time  as an aid in assessing the cumulative 28-day risk of all-cause mortality  for patients diagnosed with severe sepsis or septic shock in the ICU, or  when obtained in the emergency department or other medical wards prior to  ICU admission. This assay was performed by the [employer] PCT  assay. ng/mL          Ordinal Scale: score : 3  1) Death  2) Hospitalized, on invasive mechanical ventilation or ECMO  3) Hospitalized, on non-invasive ventilation or high flow oxygen devices  4) Hospitalized, requiring low flow(<11l/min) supplemental oxygen  5) Hospitalized, not requiring supplemental oxygen - requiring ongoing medical care(COVID-19 related or otherwise)  6) Hospitalized, not requiring supplemental oxygen - no longer requires ongoing medical care(other than per protocol RDV administration)  7) Not hospitalized

## 2020-05-03 NOTE — PROGRESS NOTE ADULT - SUBJECTIVE AND OBJECTIVE BOX
Patient is a 65y old  Female who presents with a chief complaint of COVID-19, Hypoxic respiratory failure (03 May 2020 10:17)      SUBJECTIVE / OVERNIGHT EVENTS: episode of desaturation earlier today to the 80s, resolved with Lasix and chest PT.  Patient states it is easier for her to take deep breaths, no other new events     MEDICATIONS  (STANDING):  azithromycin  IVPB 500 milliGRAM(s) IV Intermittent every 24 hours  azithromycin  IVPB      cefTRIAXone   IVPB 1000 milliGRAM(s) IV Intermittent every 24 hours  enoxaparin Injectable 40 milliGRAM(s) SubCutaneous daily  remdesivir IVPB ([id number]) 100 milliGRAM(s) IV Intermittent daily  sodium chloride 0.9% lock flush 3 milliLiter(s) IV Push every 8 hours    MEDICATIONS  (PRN):  acetaminophen   Tablet .. 650 milliGRAM(s) Oral every 6 hours PRN Temp greater or equal to 38C (100.4F), Mild Pain (1 - 3)  ALBUTerol    90 MICROgram(s) HFA Inhaler 2 Puff(s) Inhalation every 4 hours PRN Shortness of Breath and/or Wheezing  diphenhydrAMINE   Injectable 50 milliGRAM(s) IV Push once PRN anaphylaxis to study drug  EPINEPHrine     1 mG/mL Injectable 0.3 milliGRAM(s) IntraMuscular once PRN anaphylaxis to study drug      T(C): 36.7 (05-03-20 @ 10:50), Max: 36.8 (05-03-20 @ 10:35)  HR: 82 (05-03-20 @ 10:50) (72 - 87)  BP: 118/69 (05-03-20 @ 10:50) (103/51 - 118/70)  RR: 24 (05-03-20 @ 10:50) (22 - 35)  SpO2: 90% (05-03-20 @ 10:50) (88% - 97%)  CAPILLARY BLOOD GLUCOSE        I&O's Summary    02 May 2020 07:01  -  03 May 2020 07:00  --------------------------------------------------------  IN: 1620 mL / OUT: 1950 mL / NET: -330 mL        PHYSICAL EXAM:  GENERAL: NAD, well-developed  HEAD:  Atraumatic, Normocephalic  EYES: EOMI, PERRLA, conjunctiva and sclera clear  NECK: Supple, No JVD  CHEST/LUNG: Clear to auscultation bilaterally; No wheeze  HEART: Regular rate and rhythm; No murmurs, rubs, or gallops  ABDOMEN: Soft, Nontender, Nondistended; Bowel sounds present  EXTREMITIES:  2+ Peripheral Pulses, No clubbing, cyanosis, or edema  PSYCH: AAOx3  NEUROLOGY: non-focal  SKIN: No rashes or lesions    LABS:                        13.0   18.37 )-----------( 403      ( 03 May 2020 06:10 )             38.7     05-03    134<L>  |  93<L>  |  11  ----------------------------<  110<H>  3.8   |  28  |  0.49<L>    Ca    8.5      03 May 2020 06:10  Phos  3.1     05-03  Mg     2.2     05-03    TPro  6.6  /  Alb  3.0<L>  /  TBili  0.3  /  DBili  x   /  AST  64<H>  /  ALT  57<H>  /  AlkPhos  112  05-03              RADIOLOGY & ADDITIONAL TESTS:    Imaging Personally Reviewed:    Consultant(s) Notes Reviewed:      Care Discussed with Consultants/Other Providers:

## 2020-05-03 NOTE — PROGRESS NOTE ADULT - ASSESSMENT
65F reporting no PMH who presented last night with complaint of fever/chills (home T-max 102 F), night sweats, cough, myalgias (mid & lower back), loss of taste and smell since Friday 4/24 (still tastes sweet/salty), as well as 2 days of weakness and constipation, found to be COVID positive on 4/28/20.    COVID positive 4/28/20  CRP 33.15 -> 15.76  Ferritin 1060 -> 1896  Cr 0.49  No fevers  Now on 40L 100% high flow nasal cannula  WBC 18.32 -> 12.17 -> 10.68 -> 18.37    Pt has agreed to participate in a clinical trial for Covid -19  In order to participate in this clinical trial , the patient should not take concurrent treatment with other agents with actual or possible direct acting antiviral activity against SARS-CoV-2 such a chloroquine or hydroxychloroquine.  In addition, the patient should not participate in any other clinical trial of an experimental treatment for Covid-19    Pt's with Covid 19 tend to be hypercoagulable, please monitor d- dimer. DVT prophylaxis  Monitor markers of inflammation such as Ferritin, D-dimer, and CRP    # COVID-19 infection with viral PNA and superimposed bacterial PNA  - S/p RRT on 4/30 due to desaturation down to the 70s, given albuterol, furosemide, and acetominophen. transferred to negative pressure room with 40L 100% high flow oxygen, SpO2 increased to 88-90%.   - Saturating at 95% on 40L 100% nasal cannula in left decubitus  - Continue Remdesivir (started  4/30) - monitor renal function, LFTs, and inflammatory markers  - Continue ceftriaxone and azithromycin (started 4/29) for possible superimposed bacterial pneumonia given leukocytosis, elevated procalcitonin, and CXR suspicious for lobar pneumonia; WBC trending up; procal 0.63  - S/p tocilizumab 4/30  - Continue to wean off NC as tolerated    #Hypoxia  -Continue supportive oxygen  -Wean off oxygen as tolerated    # Elevated D-dimer, ferritin, CRP  - From COVID  - Continue to trend      Today is day #4 on study drug. Ordinal scale score: 3      Niki Caceres MD. Graduate Physician 65F reporting no PMH who presented last night with complaint of fever/chills (home T-max 102 F), night sweats, cough, myalgias (mid & lower back), loss of taste and smell since Friday 4/24 (still tastes sweet/salty), as well as 2 days of weakness and constipation, found to be COVID positive on 4/28/20.    COVID positive 4/28/20  CRP 33.15 -> 15.76  Ferritin 1060 -> 1896  Cr 0.49  No fevers  Now on 40L 100% high flow nasal cannula  WBC 18.32 -> 12.17 -> 10.68 -> 18.37    Pt has agreed to participate in a clinical trial for Covid -19  In order to participate in this clinical trial , the patient should not take concurrent treatment with other agents with actual or possible direct acting antiviral activity against SARS-CoV-2 such a chloroquine or hydroxychloroquine.  In addition, the patient should not participate in any other clinical trial of an experimental treatment for Covid-19    Pt's with Covid 19 tend to be hypercoagulable, please monitor d- dimer. DVT prophylaxis  Monitor markers of inflammation such as Ferritin, D-dimer, and CRP    # COVID-19 infection with viral PNA and superimposed bacterial PNA  - S/p RRT on 4/30 due to desaturation down to the 70s, given albuterol, furosemide, and acetominophen. transferred to negative pressure room with 40L 100% high flow oxygen, SpO2 increased to 88-90%.   - Saturating at 95% on 40L 100% nasal cannula in left decubitus  - Continue Remdesivir (started  4/30) - monitor renal function, LFTs, and inflammatory markers  - Continue ceftriaxone and azithromycin (started 4/29) for possible superimposed bacterial pneumonia given leukocytosis, elevated procalcitonin, and CXR suspicious for lobar pneumonia; WBC trending up; procal 0.63.. COncerned that patient could be infected with these trends WOULD check BC x 2 sets, REPEAT CXR  check NASAL SWAB for MRSA and check fungitell ( pt has gotten tociluzimab ) and start vancomycin 750 mg IVSS Q 12 hours  - S/p tocilizumab 4/30  - Continue to wean off NC as tolerated    #Hypoxia  -Continue supportive oxygen  -Wean off oxygen as tolerated    # Elevated D-dimer, ferritin, CRP  - From COVID  - Continue to trend      Today is day #4 on study drug. Ordinal scale score: 3      Niki Caceres MD. Graduate Physician

## 2020-05-04 NOTE — PROGRESS NOTE ADULT - SUBJECTIVE AND OBJECTIVE BOX
Sheela Moeller MD  Pager  [phone]  [phone] (nights and weekends)    SUBJECTIVE:  pt remains on high flow oxygen   she states she feels slightly better   labs notable for elevated wbc count, d-dimer and Na downtrending         MEDICATIONS  (STANDING):  azithromycin  IVPB 500 milliGRAM(s) IV Intermittent every 24 hours  azithromycin  IVPB      enoxaparin Injectable 70 milliGRAM(s) SubCutaneous two times a day  meropenem  IVPB 1000 milliGRAM(s) IV Intermittent every 8 hours  remdesivir IVPB ([id number]) 100 milliGRAM(s) IV Intermittent daily  sodium chloride 0.9% lock flush 3 milliLiter(s) IV Push every 8 hours  vancomycin  IVPB 750 milliGRAM(s) IV Intermittent every 12 hours    MEDICATIONS  (PRN):  acetaminophen   Tablet .. 650 milliGRAM(s) Oral every 6 hours PRN Temp greater or equal to 38C (100.4F), Mild Pain (1 - 3)  diphenhydrAMINE   Injectable 50 milliGRAM(s) IV Push once PRN anaphylaxis to study drug  EPINEPHrine     1 mG/mL Injectable 0.3 milliGRAM(s) IntraMuscular once PRN anaphylaxis to study drug      Vital Signs Last 24 Hrs  T(C): 36.9 (04 May 2020 12:16), Max: 36.9 (04 May 2020 12:16)  T(F): 98.4 (04 May 2020 12:16), Max: 98.4 (04 May 2020 12:16)  HR: 87 (04 May 2020 12:24) (83 - 97)  BP: 104/61 (04 May 2020 12:16) (104/61 - 145/71)  BP(mean): --  RR: 34 (04 May 2020 12:24) (22 - 41)  SpO2: 91% (04 May 2020 12:24) (89% - 96%)    CAPILLARY BLOOD GLUCOSE        I&O's Summary    03 May 2020 07:01  -  04 May 2020 07:00  --------------------------------------------------------  IN: 200 mL / OUT: 0 mL / NET: 200 mL        PHYSICAL EXAM:  GENERAL: Looks stated age,   CARDIOVASCULAR: Normal S1, S2  PULMONARY: on High flow NC   GI: Abdomen non-distended, soft, Nontender.  Bowel sounds present  MSK/Ext:  No leg edema.  No calf tenderness bilaterally  PSYCH: Normal Affect. AAOx3      LABS:                        12.7   28.79 )-----------( 271      ( 04 May 2020 05:30 )             37.6     05-04    131<L>  |  90<L>  |  11  ----------------------------<  139<H>  3.8   |  26  |  0.45<L>    Ca    8.3<L>      04 May 2020 05:30  Phos  3.1     05-03  Mg     2.2     05-03    TPro  6.4  /  Alb  2.9<L>  /  TBili  0.4  /  DBili  x   /  AST  87<H>  /  ALT  56<H>  /  AlkPhos  169<H>  05-04                RADIOLOGY & ADDITIONAL TESTS:

## 2020-05-04 NOTE — PROGRESS NOTE ADULT - ASSESSMENT
65F reporting no PMH who presents w/ fever, cough, myalgias. loss of taste and smell, found to be hypoxic with COVID-19, now with increasing WBC count, CXR that is worsening, and very elevated D-dimer as compared to prior  s/p tocilizumab 5/3  now on remdesvir trial  CXR which appears to have GABBY consolidation and possibly RLL consolidation that appear bacterial in nature, elevated wbc count, on vanc and meropenem     # COVID19   - maintain SpO2 > 92%, goal 92-95% in COVID pts   -consider prone positioning if the pt is able to tolerate this to help with oxygenation   -Closely monitor respiratory status and escalate O2 therapy as necessary to maintain SpO2>92; per institution policy, will escalate NC -> 100% NRB -> intubation   --Continue now theraputic  lovenox given increased hypercoagulable state in COVID-19 patients--D-dimer markedly elevated today, increase to BID lovenox   -monitor inflammatory markers [id number] to monitor disease progression (procalcitonin, CRP, ESR, ferritin, coags, D-dimer, LDH)  --Monitor daily: CBC, CMP,   --Maintain isolation precautions with contact/airborne  --Limit IVF given risk of ARDS  appreciate ID reccs     #superimposed PNA/infection    CTX/Azithromycin in the ED w/fever 103.2. No blood cultures drawn. Symptoms may be limited to viral, however with leukocytosis 14.76, Procalcitonin >0.25 (0.26), and CXR which appears to have GABBY consolidation and possibly RLL consolidation that appear bacterial in nature.,   Patient received CTX/Azithromycin in the ED w/fever 103.2. No blood cultures drawn. Symptoms may be limited to viral, however with leukocytosis 14.76, Procalcitonin >0.25 (0.26),   -currently broadened to meropenem and on vancomycin   -vancomycin levels as per protocol   - No blood cultures drawn. Check BCx x2 if patient spikes fever      Care coordination  Patient states that daughter, Mary, would make medical decisions for her if she is unable. 65F reporting no PMH who presents w/ fever, cough, myalgias. loss of taste and smell, found to be hypoxic with COVID-19, now with increasing WBC count, CXR that is worsening, and very elevated D-dimer as compared to prior  s/p tocilizumab 5/3  now on remdesvir trial  CXR which appears to have GABBY consolidation and possibly RLL consolidation that appear bacterial in nature, elevated wbc count, on vanc and meropenem     # COVID19   - maintain SpO2 > 92%, goal 92-95% in COVID pts   -consider prone positioning if the pt is able to tolerate this to help with oxygenation   -Closely monitor respiratory status and escalate O2 therapy as necessary to maintain SpO2>92; per institution policy, will escalate NC -> 100% NRB -> intubation   --Continue now therapeutic  lovenox given increased hypercoagulable state in COVID-19 patients--D-dimer markedly elevated today, increase to BID lovenox   -monitor inflammatory markers [id number] to monitor disease progression (procalcitonin, CRP, ESR, ferritin, coags, D-dimer, LDH)  --Monitor daily: CBC, CMP  -check pro-BNP  --Maintain isolation precautions with contact/airborne  --Limit IVF given risk of ARDS  - if with further desatting or worsening respiratory status, can also trial and give IV lasix and reassess   appreciate ID reccs     #superimposed PNA/infection    CTX/Azithromycin in the ED w/fever 103.2. No blood cultures drawn. Symptoms may be limited to viral, however with leukocytosis 14.76, Procalcitonin >0.25 (0.26), and CXR which appears to have GABBY consolidation and possibly RLL consolidation that appear bacterial in nature.,   Patient received CTX/Azithromycin in the ED w/fever 103.2. No blood cultures drawn. Symptoms may be limited to viral, however with leukocytosis 14.76, Procalcitonin >0.25 (0.26),   -currently broadened to meropenem and on vancomycin   -vancomycin levels as per protocol   - No blood cultures drawn. Check BCx x2 if patient spikes fever      #elevated LFTS   likely secondary to covid   CTM and trend       #hyponatremia  baseline seems to be 135 /136  trend with labs, if hyponatremia worsens send UA for spec grav urine osms and Urine Na     Care coordination  Patient states that daughterMary, would make medical decisions for her if she is unable.

## 2020-05-04 NOTE — CHART NOTE - NSCHARTNOTEFT_GEN_A_CORE
: Caleb Read    INDICATION: COVID ARDS    PROCEDURE:  [X] LIMITED ECHO  [X] LIMITED CHEST  [ ] LIMITED RETROPERITONEAL  [ ] LIMITED ABDOMINAL  [X] LIMITED DVT  [ ] NEEDLE GUIDANCE VASCULAR  [ ] NEEDLE GUIDANCE THORACENTESIS  [ ] NEEDLE GUIDANCE PARACENTESIS  [ ] NEEDLE GUIDANCE PERICARDIOCENTESIS  [ ] OTHER    FINDINGS: Lung sliding bilaterally. Diffuse B lines anteriorly. LV systolic function bordering hyperdynamic. IVC 1.2cm. RV appears smaller than LV in size. Possible LV apical stunning. No DVT bilaterally.      INTERPRETATION: Consistent with ARDS, likely fluid responsive. No evidence for LE DVT or R heart strain.    Images stored on [facility].    Chris Read MD  Pulmonary & Critical Care Fellow  [phone] : Caleb Read    INDICATION: COVID ARDS    PROCEDURE:  [X] LIMITED ECHO  [X] LIMITED CHEST  [ ] LIMITED RETROPERITONEAL  [ ] LIMITED ABDOMINAL  [X] LIMITED DVT  [ ] NEEDLE GUIDANCE VASCULAR  [ ] NEEDLE GUIDANCE THORACENTESIS  [ ] NEEDLE GUIDANCE PARACENTESIS  [ ] NEEDLE GUIDANCE PERICARDIOCENTESIS  [ ] OTHER    FINDINGS: Lung sliding bilaterally. Diffuse B lines anteriorly. LV systolic function bordering hyperdynamic. IVC 1.2cm. RV appears smaller than LV in size. Possible LV apical hypocontractility. No DVT bilaterally.      INTERPRETATION: Consistent with ARDS, likely fluid responsive. No evidence for LE DVT or R heart strain.    Images stored on [employer] [facility].    Chris Read MD  Pulmonary & Critical Care Fellow  [phone]    Attending Attestation:  Agree with above. I was present for image acquisition and have reviewed all images.

## 2020-05-04 NOTE — CHART NOTE - NSCHARTNOTEFT_GEN_A_CORE
RRT called for acute hypoxemic respiratory failure 2/2 COVID-19.  Patient was previously saturating 89-91% on HFNC @ 40LPM this morning and acutely became hypoxemic @ 14:30 with oxygen saturations in the mid 30's on HFNC.  Patient was repositioned/proned and switched from HFNC to NRB, with minimal improvement in O2 saturations to 56%.  The patient was subsequently intubated by anesthesia and transferred to 8ICU.  Attending Dr. Moeller was notified as well as the patients daughter (Mary).      Meghna Johnson PA-C  [phone]

## 2020-05-04 NOTE — PROGRESS NOTE ADULT - SUBJECTIVE AND OBJECTIVE BOX
Patient is a 65y old  Female who presents with a chief complaint of COVID-19, Hypoxic respiratory failure (03 May 2020 11:56)    Being followed by ID for COVID,  clinical trial       Interval history:  Patient remains  afebrile. Patient states that she is feeling a little better today. Complaining of fatigue but otherwise comfortable on current O2 supplementation.  Having cough  productive of scant mucus. Denies chest pain, calf pain or tenderness. Tolerating PO without N/V/D. SaO2 90% on HFNC in R lateral decubitus position   No other acute events      ROS:  No CP, SOB +cough   No N/V/D  No abd pain  No urinary complaints  No HA  No joint or limb pain  No other complaints    PAST MEDICAL & SURGICAL HISTORY:  No pertinent past medical history  History of cholecystectomy  History of appendectomy    Allergies    No Known Allergies    Intolerances      Antimicrobials:    azithromycin  IVPB 500 milliGRAM(s) IV Intermittent every 24 hours  azithromycin  IVPB      meropenem  IVPB 1000 milliGRAM(s) IV Intermittent every 8 hours  vancomycin  IVPB 750 milliGRAM(s) IV Intermittent every 12 hours    MEDICATIONS  (STANDING):  azithromycin  IVPB 500 milliGRAM(s) IV Intermittent every 24 hours  azithromycin  IVPB      enoxaparin Injectable 70 milliGRAM(s) SubCutaneous two times a day  meropenem  IVPB 1000 milliGRAM(s) IV Intermittent every 8 hours  remdesivir IVPB ([id number]) 100 milliGRAM(s) IV Intermittent daily  sodium chloride 0.9% lock flush 3 milliLiter(s) IV Push every 8 hours  vancomycin  IVPB 750 milliGRAM(s) IV Intermittent every 12 hours    MEDICATIONS  (PRN):  acetaminophen   Tablet .. 650 milliGRAM(s) Oral every 6 hours PRN Temp greater or equal to 38C (100.4F), Mild Pain (1 - 3)  ALBUTerol    90 MICROgram(s) HFA Inhaler 2 Puff(s) Inhalation every 4 hours PRN Shortness of Breath and/or Wheezing  diphenhydrAMINE   Injectable 50 milliGRAM(s) IV Push once PRN anaphylaxis to study drug  EPINEPHrine     1 mG/mL Injectable 0.3 milliGRAM(s) IntraMuscular once PRN anaphylaxis to study drug      Vital Signs Last 24 Hrs  T(C): 36.7 (05-04-20 @ 09:06), Max: 36.8 (05-03-20 @ 10:35)  T(F): 98 (05-04-20 @ 09:06), Max: 98.3 (05-04-20 @ 01:13)  HR: 96 (05-04-20 @ 09:06) (81 - 97)  BP: 113/66 (05-04-20 @ 09:06) (110/54 - 145/71)  BP(mean): --  RR: 24 (05-04-20 @ 09:06) (22 - 41)  SpO2: 92% (05-04-20 @ 09:06) (90% - 96%)    Physical Exam:  Constitutional comfortable,pleasant. in no acute distress. conversant.  Chest Good AE bilaterally  CVS RRR S1 S2 WNl   Abd soft No tenderness  Ext No cyanosis clubbing or edema. No ca lf tenderness or swelling   IV site no erythema tenderness or discharge  CNS AAO X 3 no focal    Lab Data:                          12.7   28.79 )-----------( 271      ( 04 May 2020 05:30 )             37.6       05-04    131<L>  |  90<L>  |  11  ----------------------------<  139<H>  3.8   |  26  |  0.45<L>    Ca    8.3<L>      04 May 2020 05:30  Phos  3.1     05-03  Mg     2.2     05-03    TPro  6.4  /  Alb  2.9<L>  /  TBili  0.4  /  DBili  x   /  AST  87<H>  /  ALT  56<H>  /  AlkPhos  169<H>  05-04          .Blood Blood-Peripheral  04-29-20   No growth to date.  --  --        WBC Count: 28.79 (05-04-20 @ 05:30)  WBC Count: 18.37 (05-03-20 @ 06:10)  WBC Count: 10.68 (05-02-20 @ 06:38)  WBC Count: 12.17 (05-01-20 @ 07:50)  WBC Count: 18.32 (04-30-20 @ 06:53)  WBC Count: 17.27 (04-29-20 @ 17:29)  WBC Count: 14.76 (04-28-20 @ 22:45)    D-Dimer Assay, Quantitative: 96077 ng/mL DDU (05-04)  D-Dimer Assay, Quantitative: 966 ng/mL DDU (05-01)  D-Dimer Assay, Quantitative: 453 ng/mL DDU (04-30)  D-Dimer Assay, Quantitative: 290 ng/mL DDU (04-28)    Ferritin, Serum: 1549 ng/mL (05-04)  Ferritin, Serum: 1896 ng/mL (05-02)  Ferritin, Serum: 1060 ng/mL (04-30)  Ferritin, Serum: 881 ng/mL (04-29)        C-Reactive Protein, Serum: 5.26 mg/dL (05-04-20 @ 07:48)  C-Reactive Protein, Serum: 15.76 mg/dL (05-02-20 @ 11:32)  C-Reactive Protein, Serum: 33.15 mg/dL (04-30-20 @ 08:23)  C-Reactive Protein, Serum: 25.90 mg/dL (04-29-20 @ 01:35)      < from: Xray Chest 1 View- PORTABLE-Urgent (05.03.20 @ 14:57) >    EXAM:  XR CHEST PORTABLE URGENT 1V                            PROCEDURE DATE:  05/03/2020        INTERPRETATION:    CLINICAL INFORMATION: Fever.    A frontal view of the chest was obtained.     Comparison: 4/28/2020.     IMPRESSION:    The mediastinal cardiac silhouette is unremarkable.    Diffuse patchy and reticular opacities throughout both lungs more prominent since the previous exam.     No acute osseous finding.         AIRAM OLSON M.D., ATTENDING RADIOLOGIST  This document has been electronically signed. May  4 2020  9:09AM    < end of copied text >      Ordinal Scale: score : 3  1) Death  2) Hospitalized, on invasive mechanical ventilation or ECMO  3) Hospitalized, on non-invasive ventilation or high flow oxygen devices  4) Hospitalized, requiring low flow(<11l/min) supplemental oxygen  5) Hospitalized, not requiring supplemental oxygen - requiring ongoing medical care(COVID-19 related or otherwise)  6) Hospitalized, not requiring supplemental oxygen - no longer requires ongoing medical care(other than per protocol RDV administration)  7) Not hospitalized

## 2020-05-04 NOTE — PROGRESS NOTE ADULT - ASSESSMENT
Pt arrives with . Pt arrives with CC of laceration above the right eye. Pt states he was on a skate board and hit his head on the ground. Pt denies LOC. Waiting on permission to treat from parents who are on a flight at this time.    65F reporting no PMH who presented last night with complaint of fever/chills (home T-max 102 F), night sweats, cough, myalgias (mid & lower back), loss of taste and smell since Friday 4/24 (still tastes sweet/salty), as well as 2 days of weakness and constipation, found to be COVID positive on 4/28/20.    5/4  - Patient now on HFNC, comfortable with O2 supplementation. Given high O2 requirements, evaluating for other causes of hypoxia. Today, patient with elevated WBC and rapid increase in D-dimer. No significant findings on physical exam.     COVID positive 4/28/20  CRP 33.15 -> 15.76  Ferritin 1060 -> 1896  Cr 0.49  No fevers  Now on 40L 100% high flow nasal cannula  WBC 18.32 -> 12.17 -> 10.68 -> 18.37    Pt has agreed to participate in a clinical trial for Covid -19  In order to participate in this clinical trial , the patient should not take concurrent treatment with other agents with actual or possible direct acting antiviral activity against SARS-CoV-2 such a chloroquine or hydroxychloroquine.  In addition, the patient should not participate in any other clinical trial of an experimental treatment for Covid-19    #Acute hypoxic respiratory failure   - Likely multiactorial. Ddx for hypoxia at this time includes COVID19, bacterial PNA, and PE   - COVID19+ on 4/30, see below for detailed management plan   - Patient s/p tocilizumab with uptrending WBC and ?lobar PNA on initial xray (now multifocal, viral appearing) suggests possible coexisting bacterial PNA. Rec broadening abx from ceftriaxone to meropenem, continue vanc. s/p azithro. Bcx negative 4/29, rpt from 5/ 3 pending.   - D-dimer elevated today ([id number]) could suggest PE or microthrombi. Rec full therapeutic lovenox     # COVID-19 infection with viral PNA and superimposed bacterial PNA  - COVID+ 4/30 w/ CXR demonstrating infiltrates, now diffuse patchy opacities (5/3)    - Now on 40L 100% high flow oxygen. Continue to monitor O2 and wean as tolerated.   - Continue Remdesivir (started  4/30)   - LFTs mildly elevated, likely due to either COVID progression or study drug. Please continue to monitor LFTS and creatinine   - inflammatory markers elevated, please continue to monitor   - S/p tocilizumab 4/30    #VTE prophylaxis   - As above, pt with elevated d-dimer, please continue to monitor   - Now on full therapeutic dose of lovenox   - Further AC per primary team       Today is day #5 on study drug. Ordinal scale score: 3. SaO2 90% on 40L HFNC in R lateral decubitus position     Meghan Coe MD   Graduate Physician 65F reporting no PMH who presented last night with complaint of fever/chills (home T-max 102 F), night sweats, cough, myalgias (mid & lower back), loss of taste and smell since Friday 4/24 (still tastes sweet/salty), as well as 2 days of weakness and constipation, found to be COVID positive on 4/28/20.    5/4  - Patient now on HFNC, comfortable with O2 supplementation. Given high O2 requirements, evaluating for other causes of hypoxia. Today, patient with elevated WBC and rapid increase in D-dimer. No significant findings on physical exam.     COVID positive 4/28/20  CRP 33.15 -> 15.76  Ferritin 1060 -> 1896  Cr 0.49  No fevers  Now on 40L 100% high flow nasal cannula  WBC 18.32 -> 12.17 -> 10.68 -> 18.37    Pt has agreed to participate in a clinical trial for Covid -19  In order to participate in this clinical trial , the patient should not take concurrent treatment with other agents with actual or possible direct acting antiviral activity against SARS-CoV-2 such a chloroquine or hydroxychloroquine.  In addition, the patient should not participate in any other clinical trial of an experimental treatment for Covid-19    #Acute hypoxic respiratory failure   - Likely multiactorial. Ddx for hypoxia at this time includes COVID19, bacterial PNA, and PE   - COVID19+ on 4/30, see below for detailed management plan   - Patient s/p tocilizumab with uptrending WBC and ?lobar PNA on initial xray (now multifocal, viral appearing) suggests possible coexisting bacterial PNA. Rec broadening abx from ceftriaxone to meropenem, continue vanc. s/p azithro. Bcx negative 4/29, rpt from 5/ 3 pending.   - D-dimer elevated today ([id number]) could suggest PE or microthrombi. Rec full therapeutic lovenox  Team to evaluate    # COVID-19 infection with viral PNA and superimposed bacterial PNA  - COVID+ 4/30 w/ CXR demonstrating infiltrates, now diffuse patchy opacities (5/3)    - Now on 40L 100% high flow oxygen. Continue to monitor O2 and wean as tolerated.   - Continue Remdesivir (started  4/30)   - LFTs mildly elevated, likely due to either COVID progression or study drug. Please continue to monitor LFTS and creatinine   -could alk phos be from Rocephin ? or from Covid or from drug  . other LFTs as well  - inflammatory markers elevated, please continue to monitor   - S/p tocilizumab 4/30    #VTE prophylaxis   - As above, pt with elevated d-dimer, please continue to monitor   - Now on full therapeutic dose of lovenox   - Further AC per primary team       Today is day #5 on study drug. Ordinal scale score: 3. SaO2 90% on 40L HFNC in R lateral decubitus position     Meghan Coe MD   Graduate Physician

## 2020-05-04 NOTE — RAPID RESPONSE TEAM SUMMARY - NSSITUATIONBACKGROUNDRRT_GEN_ALL_CORE
65F reporting no PMH who presents w/ fever, cough, myalgias. loss of taste and smell, found to be hypoxic with COVID-19, now with increasing WBC count, CXR that is worsening, and very elevated D-dimer as compared to prior s/p tocilizumab 5/3  now on remdesvir trial. CXR which appears to have GABBY consolidation and possibly RLL consolidation that appear bacterial in nature, elevated wbc count, on vanc and meropenem 65F reporting no PMH who presents w/ fever, cough, myalgias. loss of taste and smell, found to be hypoxic with COVID-19, now with increasing WBC count, CXR that is worsening, and very elevated D-dimer as compared to prior. Pt is s/p tocilizumab 5/3  now accepted to remdesvir trial though not given yet. CXR which appears to have GABBY consolidation and possibly RLL consolidation that appear bacterial in nature, elevated wbc count, on vanc and meropenem.    RRT called for hypoxia to 20s% after she drank water and coughed. Was on RA, but placed proned and on 15L NRB with sat improved to 30s-40s%. Pt confirmed she is full code. D-dimer inc to 11,600 and full dose lovenox A/C ordered though not given yet. Daughter updated and pt was able to speak with her. Intubated by anesthesia with propofol 100, succinylcholine 160mg. Started on propofol gtt and levophed gtt. 65F reporting no PMH who presents w/ fever, cough, myalgias. loss of taste and smell, found to be hypoxic with COVID-19, now with increasing WBC count, CXR that is worsening, and very elevated D-dimer as compared to prior. Pt is s/p tocilizumab 5/3  now accepted to remdesvir trial though not given yet. CXR which appears to have GABBY consolidation and possibly RLL consolidation that appear bacterial in nature, elevated wbc count, on vanc and meropenem.    RRT called for hypoxia to 20s% after she drank water and coughed. Was on RA, but placed proned and on 15L NRB with sat improved to 30s-40s%. Pt confirmed she is full code. D-dimer inc to 11,600 and full dose lovenox A/C ordered though not given yet. Daughter updated and pt was able to speak with her. Intubated by anesthesia with propofol 100, succinylcholine 160mg. Placement confirmed with change of color on capnography and b/l breath sounds. Started on propofol gtt and levophed gtt.    -Give lovenox 70mg 65F reporting no PMH who presents w/ fever, cough, myalgias. loss of taste and smell, found to be hypoxic with COVID-19, now with increasing WBC count, CXR that is worsening, and very elevated D-dimer as compared to prior. Pt is s/p tocilizumab 5/3  now accepted to remdesvir trial though not given yet. CXR which appears to have GABBY consolidation and possibly RLL consolidation that appear bacterial in nature, elevated wbc count, on vanc and meropenem.    RRT called for hypoxia to 20s% after she drank water and coughed. Was on RA, but placed proned and on HFNC with sat improved to 30s-40s%. Attempted 15L NRB as pt was mouth breathing with no improvement of sat. Pt confirmed she is full code. D-dimer inc to 11,600 and full dose lovenox A/C ordered though not given yet. Daughter updated and pt was able to speak with her. Intubated by anesthesia with propofol 100, succinylcholine 160mg. Placement confirmed with change of color on capnography and b/l breath sounds. Started on propofol gtt and levophed gtt.    -Give lovenox 70mg 65F reporting no PMH who presents w/ fever, cough, myalgias. loss of taste and smell, found to be hypoxic with COVID-19, now with increasing WBC count, CXR that is worsening, and very elevated D-dimer as compared to prior. Pt is s/p tocilizumab 5/3  now accepted to remdesvir trial though not given yet. CXR which appears to have GABBY consolidation and possibly RLL consolidation that appear bacterial in nature, elevated wbc count, on vanc and meropenem.    RRT called for hypoxia to 20s% after she drank water and coughed. Was on RA, but placed proned, chest PT performed, and on HFNC with sat improved to 30s-40s%. Attempted 15L NRB as pt was mouth breathing with no improvement of sat. Pt confirmed she is full code. D-dimer inc to 11,600 and full dose lovenox A/C ordered though not given yet. Daughter updated and pt was able to speak with her. Intubated by anesthesia with propofol 100, succinylcholine 160mg. Placement confirmed with change of color on capnography and b/l breath sounds. Started on propofol gtt and levophed gtt.    -Give lovenox 70mg 65F reporting no PMH who presents w/ fever, cough, myalgias. loss of taste and smell, found to be hypoxic with COVID-19, now with increasing WBC count, CXR that is worsening, and very elevated D-dimer as compared to prior. Pt is s/p tocilizumab 5/3  now accepted to remdesvir trial though not given yet. CXR which appears to have GABBY consolidation and possibly RLL consolidation that appear bacterial in nature, elevated wbc count, on vanc and meropenem.    RRT called for hypoxia to 20s% after she drank water and coughed. Was on RA, but placed proned, chest PT performed, and on HFNC with sat improved to 30s-40s%. Attempted 15L NRB as pt was mouth breathing with no improvement of sat. Pt confirmed she is full code. D-dimer inc to 11,600 and full dose lovenox A/C ordered though not given yet. Daughter updated and pt was able to speak with her. Intubated by anesthesia with propofol 100, succinylcholine 160mg. Placement confirmed with change of color on capnography and b/l breath sounds. Started on propofol gtt and levophed gtt. Discussed with primary team PA.    -Give lovenox 70mg 65F reporting no PMH who presents w/ fever, cough, myalgias. loss of taste and smell, found to be hypoxic with COVID-19, now with increasing WBC count, CXR that is worsening, and very elevated D-dimer as compared to prior. Pt is s/p tocilizumab 5/3, resdemivir trial since 5/1. CXR which appears to have GABBY consolidation and possibly RLL consolidation that appear bacterial in nature, elevated wbc count, on vanc and meropenem.    RRT called for hypoxia to 20s% after she drank water and coughed. Was on RA, but placed proned, chest PT performed, and on HFNC with sat improved to 30s-40s%. Attempted 15L NRB as pt was mouth breathing with no improvement of sat. Pt confirmed she is full code. D-dimer inc to 11,600 and full dose lovenox A/C ordered though not given yet. Daughter updated and pt was able to speak with her. Intubated by anesthesia with propofol 100, succinylcholine 160mg. Placement confirmed with change of color on capnography and b/l breath sounds. Started on propofol gtt and levophed gtt. Discussed with primary team PA.    -Give lovenox 70mg

## 2020-05-04 NOTE — CHART NOTE - NSCHARTNOTEFT_GEN_A_CORE
MICU transfer note    66 y/o female with no significant pmhx admitted 4/28 with sob, cough, loss of smell and taste found COVID-19 positive 4/29; possible  superimposed bacterial pneumonia given leukocytosis, elevated procalcitonin. Initial CXR showed LLL/RUL PNA, was on 8L sating at 90%, started on Zithromax and ceftriaxone. 4/30 s/p RRT for worsening hypoxia, placed on HFNC and proned. s/p tocilizumab(4/30). Ramdesivir(4/30-). RRT called today for acute hypoxemic respiratory failure 2/2 COVID-19 and bacterial PNA.  Patient was previously saturating 89-91% on HFNC @ 40LPM this morning and acutely became hypoxemic @ 14:30 with oxygen saturations in the mid 30's on HFNC.  Patient was repositioned/proned and switched from HFNC to NRB, with minimal improvement in O2 saturations to 56%.  The patient was subsequently intubated by anesthesia and transferred to 8ICU. MICU transfer note    66 y/o female with no significant pmhx admitted 4/28 with sob, cough, loss of smell and taste; COVID-19 positive 4/29 with possible superimposed bacterial pneumonia given leukocytosis, elevated procalcitonin. Initial CXR showed LLL/RUL PNA, was on 8L sating at 90%, started on Zithromax and ceftriaxone. 4/30 s/p RRT for worsening hypoxia, placed on HFNC and proned. s/p tocilizumab(4/30). Ramdesivir(4/30-). RRT called today for acute hypoxemic  respiratory failure after drinking water; Patient was previously saturating 89-91% on HFNC @ 40LPM this morning and acutely became hypoxemic @ 14:30 with oxygen saturations in the mid 30's on HFNC.  Patient was repositioned/proned and switched from HFNC to NRB, with minimal improvement in O2 saturations to 56%.  The patient was subsequently intubated by anesthesia and transferred to 8ICU. MICU transfer note    66 y/o female with no significant pmhx admitted 4/28 with sob, cough, loss of smell and taste; COVID-19 positive 4/29 with possible superimposed bacterial pneumonia given leukocytosis, elevated procalcitonin. Initial CXR showed LLL/RUL PNA, was on 8L sating at 90%, started on Zithromax and ceftriaxone. 4/30 s/p RRT for worsening hypoxia, placed on HFNC and proned. s/p tocilizumab(4/30). Ramdesivir(4/30-). RRT called today for acute hypoxemic  respiratory failure after drinking water; Patient was previously saturating 89-91% on HFNC @ 40LPM this morning and acutely became hypoxemic @ 14:30 with oxygen saturations in the mid 30's on HFNC.  Patient was repositioned/proned and switched from HFNC to NRB, with minimal improvement in O2 saturations to 56%.  The patient was subsequently intubated by anesthesia and transferred to 8ICU. Lovenox 70mg SC BID started for D-dimer 11.577 MICU transfer note    64 y/o female with no significant pmhx admitted 4/28 with sob, cough, loss of smell and taste; COVID-19 positive 4/29 with possible superimposed bacterial pneumonia given leukocytosis, elevated procalcitonin. Initial CXR showed LLL/RUL PNA, was on 8L sating at 90%, started on Zithromax and ceftriaxone. 4/30 s/p RRT for worsening hypoxia, placed on HFNC and proned. s/p tocilizumab(4/30). Ramdesivir(4/30-). RRT called today for acute hypoxemic  respiratory failure after drinking water; Patient was previously saturating 89-91% on HFNC @ 40LPM this morning and acutely became hypoxemic @ 14:30 with oxygen saturations in the mid 30's on HFNC.  Patient was repositioned/proned and switched from HFNC to NRB, with minimal improvement in O2 saturations to 56%.  The patient was subsequently intubated by anesthesia and transferred to 8ICU. Lovenox 70mg SC BID started for D-dimer 11.577        MEDICATIONS  (STANDING):  chlorhexidine 0.12% Liquid 15 milliLiter(s) Oral Mucosa two times a day  cisatracurium Injectable 20 milliGRAM(s) IV Push once  enoxaparin Injectable 70 milliGRAM(s) SubCutaneous two times a day  fentaNYL   Infusion... 0.5 MICROgram(s)/kG/Hr (1.76 mL/Hr) IV Continuous <Continuous>  lactated ringers Bolus 500 milliLiter(s) IV Bolus once  meropenem  IVPB 1000 milliGRAM(s) IV Intermittent every 8 hours  midazolam Injectable 4 milliGRAM(s) IV Push once  norepinephrine Infusion 0.05 MICROgram(s)/kG/Min (3.3 mL/Hr) IV Continuous <Continuous>  remdesivir IVPB ([id number]) 100 milliGRAM(s) IV Intermittent daily  sodium chloride 0.9% lock flush 3 milliLiter(s) IV Push every 8 hours  vancomycin  IVPB 750 milliGRAM(s) IV Intermittent every 12 hours    MEDICATIONS  (PRN):  acetaminophen   Tablet .. 650 milliGRAM(s) Oral every 6 hours PRN Temp greater or equal to 38C (100.4F), Mild Pain (1 - 3)  diphenhydrAMINE   Injectable 50 milliGRAM(s) IV Push once PRN anaphylaxis to study drug  EPINEPHrine     1 mG/mL Injectable 0.3 milliGRAM(s) IntraMuscular once PRN anaphylaxis to study drug      Vitals:  Vital Signs Last 24 Hrs  T(C): 36.9 (04 May 2020 12:16), Max: 36.9 (04 May 2020 12:16)  T(F): 98.4 (04 May 2020 12:16), Max: 98.4 (04 May 2020 12:16)  HR: 128 (04 May 2020 14:49) (83 - 128)  BP: 104/61 (04 May 2020 12:16) (104/61 - 145/71)  BP(mean): --  RR: 34 (04 May 2020 12:24) (22 - 41)  SpO2: 84% (04 May 2020 14:49) (84% - 96%)    I&O's Summary    03 May 2020 07:01  -  04 May 2020 07:00  --------------------------------------------------------  IN: 200 mL / OUT: 0 mL / NET: 200 mL    04 May 2020 07:01  -  04 May 2020 16:38  --------------------------------------------------------  IN: 480 mL / OUT: 300 mL / NET: 180 mL        Labs:  COVID:  COVID-19 PCR: Detected (04-28-20 @ 22:44)    ABG - ( 04 May 2020 15:43 )  pH: 7.21  /  pCO2: 64    /  pO2: 150   / HCO3: 25    / Base Excess: -3.9  /  SaO2: 98                                12.7   28.79 )-----------( 271      ( 04 May 2020 05:30 )             37.6     05-04    131<L>  |  90<L>  |  11  ----------------------------<  139<H>  3.8   |  26  |  0.45<L>    Ca    8.3<L>      04 May 2020 05:30  Phos  3.1     05-03  Mg     2.2     05-03    TPro  6.4  /  Alb  2.9<L>  /  TBili  0.4  /  DBili  x   /  AST  87<H>  /  ALT  56<H>  /  AlkPhos  169<H>  05-04      Culture - Blood (collected 04-29-20 @ 22:07)  Source: .Blood Blood-Peripheral  Preliminary Report (04-30-20 @ 23:01):    No growth to date.    5/4 Lt TLC inserted   5/4 Left radial inserted     5/4/20   Radiology:  Diffuse airspace opacities are seen throughout both lungs which remain unchanged when compared to previous study done on May 3, 2020      #Neuro   -S/p Nimbex 20mg ivp/ and versed 4mg ivp   -Started  -Propofol  50 Fentanyl 2       #Pulmonary:  -Hypoxic respiratory failure 2/2 COVID pos 4/29 with possible superimposed bacterial pneumonia   -Intubated and sedated- current vent setting  AC 34/ / Peep 15, Fio2 100%  -ABG 7.34/64/25/ pco2 150   --continue Vancomycin and meropenum  -s/p tocilizumab(4/30). Ramdesivir(4/30-)  -Monitor inflammatory markers      #CV  -Levophed   -Goal MAP 65       #GI  Jevity TF started   Pepcid IV 20mg daily   Nutritional consult ordered       #IDL  -Hypoxic respiratory failure 2/2 COVID pos 4/29 with possible superimposed bacterial pneumonia   -Pro-calcitonin 0.63, Lactate 7.4, wbc 28.79  -continue Vancomycin and meropenum  -s/p tocilizumab(4/30). Ramdesivir(4/30-)  -Monitor inflammatory markers          #Renal -stable   LR 500ml bolus   Monitor I&O      #Heme   D-Dimer 11,577  - Lovenox 70mg BID MICU transfer note    66 y/o female with no significant pmhx admitted 4/28 with sob, cough, loss of smell and taste; COVID-19 positive 4/29 with possible superimposed bacterial pneumonia given leukocytosis, elevated procalcitonin. Initial CXR showed LLL/RUL PNA, was on 8L sating at 90%, started on Zithromax and ceftriaxone. 4/30 s/p RRT for worsening hypoxia, placed on HFNC and proned. s/p tocilizumab(4/30). Ramdesivir(4/30-). RRT called today for acute hypoxemic  respiratory failure after drinking water; Patient was previously saturating 89-91% on HFNC @ 40LPM this morning and acutely became hypoxemic @ 14:30 with oxygen saturations in the mid 30's on HFNC.  Patient was repositioned/proned and switched from HFNC to NRB, with minimal improvement in O2 saturations to 56%.  The patient was subsequently intubated by anesthesia and transferred to 8ICU. Lovenox 70mg SC BID started for D-dimer 11.577    PAST MEDICAL HISTORY:  No pertinent past medical history.    PAST SURGICAL HISTORY:  History of appendectomy     History of cholecystectomy.    FAMILY HISTORY:  No pertinent family history in first degree relatives.      MEDICATIONS  (STANDING):  chlorhexidine 0.12% Liquid 15 milliLiter(s) Oral Mucosa two times a day  cisatracurium Injectable 20 milliGRAM(s) IV Push once  enoxaparin Injectable 70 milliGRAM(s) SubCutaneous two times a day  fentaNYL   Infusion... 0.5 MICROgram(s)/kG/Hr (1.76 mL/Hr) IV Continuous <Continuous>  lactated ringers Bolus 500 milliLiter(s) IV Bolus once  meropenem  IVPB 1000 milliGRAM(s) IV Intermittent every 8 hours  midazolam Injectable 4 milliGRAM(s) IV Push once  norepinephrine Infusion 0.05 MICROgram(s)/kG/Min (3.3 mL/Hr) IV Continuous <Continuous>  remdesivir IVPB ([id number]) 100 milliGRAM(s) IV Intermittent daily  sodium chloride 0.9% lock flush 3 milliLiter(s) IV Push every 8 hours  vancomycin  IVPB 750 milliGRAM(s) IV Intermittent every 12 hours    MEDICATIONS  (PRN):  acetaminophen   Tablet .. 650 milliGRAM(s) Oral every 6 hours PRN Temp greater or equal to 38C (100.4F), Mild Pain (1 - 3)  diphenhydrAMINE   Injectable 50 milliGRAM(s) IV Push once PRN anaphylaxis to study drug  EPINEPHrine     1 mG/mL Injectable 0.3 milliGRAM(s) IntraMuscular once PRN anaphylaxis to study drug      Vitals:  Vital Signs Last 24 Hrs  T(C): 36.9 (04 May 2020 12:16), Max: 36.9 (04 May 2020 12:16)  T(F): 98.4 (04 May 2020 12:16), Max: 98.4 (04 May 2020 12:16)  HR: 128 (04 May 2020 14:49) (83 - 128)  BP: 104/61 (04 May 2020 12:16) (104/61 - 145/71)  BP(mean): --  RR: 34 (04 May 2020 12:24) (22 - 41)  SpO2: 84% (04 May 2020 14:49) (84% - 96%)    I&O's Summary    03 May 2020 07:01  -  04 May 2020 07:00  --------------------------------------------------------  IN: 200 mL / OUT: 0 mL / NET: 200 mL    04 May 2020 07:01  -  04 May 2020 16:38  --------------------------------------------------------  IN: 480 mL / OUT: 300 mL / NET: 180 mL        Labs:  COVID:  COVID-19 PCR: Detected (04-28-20 @ 22:44)    ABG - ( 04 May 2020 15:43 )  pH: 7.21  /  pCO2: 64    /  pO2: 150   / HCO3: 25    / Base Excess: -3.9  /  SaO2: 98                                12.7   28.79 )-----------( 271      ( 04 May 2020 05:30 )             37.6     05-04    131<L>  |  90<L>  |  11  ----------------------------<  139<H>  3.8   |  26  |  0.45<L>    Ca    8.3<L>      04 May 2020 05:30  Phos  3.1     05-03  Mg     2.2     05-03    TPro  6.4  /  Alb  2.9<L>  /  TBili  0.4  /  DBili  x   /  AST  87<H>  /  ALT  56<H>  /  AlkPhos  169<H>  05-04    Ferritin 1549  LDH-1504  CRP 5.26       Culture - Blood (collected 04-29-20 @ 22:07)  Source: .Blood Blood-Peripheral  Preliminary Report (04-30-20 @ 23:01):    No growth to date.    5/4 Lt TLC inserted   5/4 Left radial inserted     5/4/20   Radiology:  Diffuse airspace opacities are seen throughout both lungs which remain unchanged when compared to previous study done on May 3, 2020        Assessment and Recommendation:   · Assessment	  66 y/o female with no significant pmhx admitted 4/28 with sob, cough, loss of smell and taste; COVID-19 positive 4/29 with possible superimposed bacterial pneumonia given leukocytosis, elevated procalcitonin. Initial CXR showed LLL/RUL PNA, was on 8L sating at 90%, started on Zithromax and ceftriaxone. 4/30 s/p RRT for worsening hypoxia, placed on HFNC and proned. s/p tocilizumab(4/30). Ramdesivir(4/30-). S/RRT today for hypoxic respiratory failure 2/2 COVID and superimposing bacterial PNA; intubated by anesthesia and transferred to 8ICU.      #Neuro   -S/p Nimbex 20mg ivp/ and versed 4mg ivp   -Started  -Propofol  50 Fentanyl 2       #Pulmonary:  -Hypoxic respiratory failure 2/2 COVID pos 4/29 with possible superimposed bacterial pneumonia   -Intubated and sedated- current vent setting  AC 34/ / Peep 15, Fio2 80%  -ABG 7.34/64/25/ pco2 150   --continue Vancomycin and meropenum  -s/p tocilizumab(4/30). Ramdesivir(4/30-)  -Monitor inflammatory markers      #CV  -Levophed   -Goal MAP 65     #GI  Jevity TF started   Pepcid IV 20mg daily   Nutritional consult ordered       #ID  -Hypoxic respiratory failure 2/2 COVID pos 4/29 with possible superimposed bacterial pneumonia   -Pro-calcitonin 0.63, Lactate 7.4, wbc 28.79  -continue Vancomycin and meropenum  -s/p tocilizumab(4/30). Ramdesivir(4/30-)  -Monitor inflammatory markers          #Renal -stable   LR 500ml bolus   Monitor I&O      #Heme   D-Dimer 11,577  - Lovenox 70mg BID    Skin  - intact MICU transfer note    64 y/o female with no significant pmhx admitted 4/28 with sob, cough, loss of smell and taste; COVID-19 positive 4/29 with possible superimposed bacterial pneumonia given leukocytosis, elevated procalcitonin. Initial CXR showed LLL/RUL PNA, was on 8L sating at 90%, started on Zithromax and ceftriaxone. 4/30 s/p RRT for worsening hypoxia, placed on HFNC and proned. s/p tocilizumab(4/30). Ramdesivir(4/30-). RRT called today for acute hypoxemic  respiratory failure after drinking water; Patient was previously saturating 89-91% on HFNC @ 40LPM this morning and acutely became hypoxemic @ 14:30 with oxygen saturations in the mid 30's on HFNC.  Patient was repositioned/proned and switched from HFNC to NRB, with minimal improvement in O2 saturations to 56%.  The patient was subsequently intubated by anesthesia and transferred to 8ICU. Lovenox 70mg SC BID started for D-dimer 11.577    PAST MEDICAL HISTORY:  No pertinent past medical history.    PAST SURGICAL HISTORY:  History of appendectomy     History of cholecystectomy.    FAMILY HISTORY:  No pertinent family history in first degree relatives.      MEDICATIONS  (STANDING):  chlorhexidine 0.12% Liquid 15 milliLiter(s) Oral Mucosa two times a day  cisatracurium Injectable 20 milliGRAM(s) IV Push once  enoxaparin Injectable 70 milliGRAM(s) SubCutaneous two times a day  fentaNYL   Infusion... 0.5 MICROgram(s)/kG/Hr (1.76 mL/Hr) IV Continuous <Continuous>  lactated ringers Bolus 500 milliLiter(s) IV Bolus once  meropenem  IVPB 1000 milliGRAM(s) IV Intermittent every 8 hours  midazolam Injectable 4 milliGRAM(s) IV Push once  norepinephrine Infusion 0.05 MICROgram(s)/kG/Min (3.3 mL/Hr) IV Continuous <Continuous>  remdesivir IVPB ([id number]) 100 milliGRAM(s) IV Intermittent daily  sodium chloride 0.9% lock flush 3 milliLiter(s) IV Push every 8 hours  vancomycin  IVPB 750 milliGRAM(s) IV Intermittent every 12 hours    MEDICATIONS  (PRN):  acetaminophen   Tablet .. 650 milliGRAM(s) Oral every 6 hours PRN Temp greater or equal to 38C (100.4F), Mild Pain (1 - 3)  diphenhydrAMINE   Injectable 50 milliGRAM(s) IV Push once PRN anaphylaxis to study drug  EPINEPHrine     1 mG/mL Injectable 0.3 milliGRAM(s) IntraMuscular once PRN anaphylaxis to study drug      Vitals:  Vital Signs Last 24 Hrs  T(C): 36.9 (04 May 2020 12:16), Max: 36.9 (04 May 2020 12:16)  T(F): 98.4 (04 May 2020 12:16), Max: 98.4 (04 May 2020 12:16)  HR: 128 (04 May 2020 14:49) (83 - 128)  BP: 104/61 (04 May 2020 12:16) (104/61 - 145/71)  BP(mean): --  RR: 34 (04 May 2020 12:24) (22 - 41)  SpO2: 84% (04 May 2020 14:49) (84% - 96%)    I&O's Summary    03 May 2020 07:01  -  04 May 2020 07:00  --------------------------------------------------------  IN: 200 mL / OUT: 0 mL / NET: 200 mL    04 May 2020 07:01  -  04 May 2020 16:38  --------------------------------------------------------  IN: 480 mL / OUT: 300 mL / NET: 180 mL        Labs:  COVID:  COVID-19 PCR: Detected (04-28-20 @ 22:44)    ABG - ( 04 May 2020 15:43 )  pH: 7.21  /  pCO2: 64    /  pO2: 150   / HCO3: 25    / Base Excess: -3.9  /  SaO2: 98                                12.7   28.79 )-----------( 271      ( 04 May 2020 05:30 )             37.6     05-04    131<L>  |  90<L>  |  11  ----------------------------<  139<H>  3.8   |  26  |  0.45<L>    Ca    8.3<L>      04 May 2020 05:30  Phos  3.1     05-03  Mg     2.2     05-03    TPro  6.4  /  Alb  2.9<L>  /  TBili  0.4  /  DBili  x   /  AST  87<H>  /  ALT  56<H>  /  AlkPhos  169<H>  05-04    Ferritin 1549  LDH-1504  CRP 5.26       Culture - Blood (collected 04-29-20 @ 22:07)  Source: .Blood Blood-Peripheral  Preliminary Report (04-30-20 @ 23:01):    No growth to date.    5/4 Lt TLC inserted   5/4 Left radial inserted     5/4/20   Radiology:  Diffuse airspace opacities are seen throughout both lungs which remain unchanged when compared to previous study done on May 3, 2020        Assessment and Recommendation:   · Assessment	  64 y/o female with no significant pmhx admitted 4/28 with sob, cough, loss of smell and taste; COVID-19 positive 4/29 with possible superimposed bacterial pneumonia given leukocytosis, elevated procalcitonin. Initial CXR showed LLL/RUL PNA, was on 8L sating at 90%, started on Zithromax and ceftriaxone. 4/30 s/p RRT for worsening hypoxia, placed on HFNC and proned. s/p tocilizumab(4/30). Ramdesivir(4/30-). S/RRT today for hypoxic respiratory failure 2/2 COVID and superimposing bacterial PNA; intubated by anesthesia and transferred to 8ICU.      #Neuro   -S/p Nimbex 20mg ivp/ and versed 4mg ivp   -Started  -Propofol  50 Fentanyl 2       #Pulmonary:  -Hypoxic respiratory failure 2/2 COVID pos 4/29 with possible superimposed bacterial pneumonia   -Intubated and sedated- current vent setting  AC 34/ / Peep 15, Fio2 80%  -ABG 7.34/64/25/ pco2 150   --continue Vancomycin and meropenum  -s/p tocilizumab(4/30). Ramdesivir(4/30-)  -Monitor inflammatory markers      #CV  -Levophed   -Goal MAP 65     #GI  Jevity TF started   Pepcid IV 20mg daily   Nutritional consult ordered       #ID  -Hypoxic respiratory failure 2/2 COVID pos 4/29 with possible superimposed bacterial pneumonia   -Pro-calcitonin 0.63, Lactate 7.4, wbc 28.79  -continue Vancomycin and meropenum  -s/p tocilizumab(4/30). Ramdesivir(4/30-)  -Monitor inflammatory markers    -ID following         #Renal -stable   LR 500ml bolus   Monitor I&O      #Heme   D-Dimer 11,577  - Lovenox 70mg BID    Skin  - intact MICU transfer note    66 y/o female with no significant pmhx admitted 4/28 with sob, cough, loss of smell and taste; COVID-19 positive 4/29 with possible superimposed bacterial pneumonia given leukocytosis, elevated procalcitonin. Initial CXR showed LLL/RUL PNA, was on 8L sating at 90%, started on Zithromax and ceftriaxone. 4/30 s/p RRT for worsening hypoxia, placed on HFNC and proned. s/p tocilizumab(4/30). Ramdesivir(4/30-). RRT called today for acute hypoxemic  respiratory failure after drinking water; Patient was previously saturating 89-91% on HFNC @ 40LPM this morning and acutely became hypoxemic @ 14:30 with oxygen saturations in the mid 30's on HFNC.  Patient was repositioned/proned and switched from HFNC to NRB, with minimal improvement in O2 saturations to 56%.  The patient was subsequently intubated by anesthesia and transferred to 8ICU. Lovenox 70mg SC BID started for D-dimer 11.577    PAST MEDICAL HISTORY:  No pertinent past medical history.    PAST SURGICAL HISTORY:  History of appendectomy     History of cholecystectomy.    FAMILY HISTORY:  No pertinent family history in first degree relatives.      MEDICATIONS  (STANDING):  chlorhexidine 0.12% Liquid 15 milliLiter(s) Oral Mucosa two times a day  cisatracurium Injectable 20 milliGRAM(s) IV Push once  enoxaparin Injectable 70 milliGRAM(s) SubCutaneous two times a day  fentaNYL   Infusion... 0.5 MICROgram(s)/kG/Hr (1.76 mL/Hr) IV Continuous <Continuous>  lactated ringers Bolus 500 milliLiter(s) IV Bolus once  meropenem  IVPB 1000 milliGRAM(s) IV Intermittent every 8 hours  midazolam Injectable 4 milliGRAM(s) IV Push once  norepinephrine Infusion 0.05 MICROgram(s)/kG/Min (3.3 mL/Hr) IV Continuous <Continuous>  remdesivir IVPB ([id number]) 100 milliGRAM(s) IV Intermittent daily  sodium chloride 0.9% lock flush 3 milliLiter(s) IV Push every 8 hours  vancomycin  IVPB 750 milliGRAM(s) IV Intermittent every 12 hours    MEDICATIONS  (PRN):  acetaminophen   Tablet .. 650 milliGRAM(s) Oral every 6 hours PRN Temp greater or equal to 38C (100.4F), Mild Pain (1 - 3)  diphenhydrAMINE   Injectable 50 milliGRAM(s) IV Push once PRN anaphylaxis to study drug  EPINEPHrine     1 mG/mL Injectable 0.3 milliGRAM(s) IntraMuscular once PRN anaphylaxis to study drug      Vitals:  Vital Signs Last 24 Hrs  T(C): 36.9 (04 May 2020 12:16), Max: 36.9 (04 May 2020 12:16)  T(F): 98.4 (04 May 2020 12:16), Max: 98.4 (04 May 2020 12:16)  HR: 128 (04 May 2020 14:49) (83 - 128)  BP: 104/61 (04 May 2020 12:16) (104/61 - 145/71)  BP(mean): --  RR: 34 (04 May 2020 12:24) (22 - 41)  SpO2: 84% (04 May 2020 14:49) (84% - 96%)    I&O's Summary    03 May 2020 07:01  -  04 May 2020 07:00  --------------------------------------------------------  IN: 200 mL / OUT: 0 mL / NET: 200 mL    04 May 2020 07:01  -  04 May 2020 16:38  --------------------------------------------------------  IN: 480 mL / OUT: 300 mL / NET: 180 mL        Labs:  COVID:  COVID-19 PCR: Detected (04-28-20 @ 22:44)    ABG - ( 04 May 2020 15:43 )  pH: 7.21  /  pCO2: 64    /  pO2: 150   / HCO3: 25    / Base Excess: -3.9  /  SaO2: 98                                12.7   28.79 )-----------( 271      ( 04 May 2020 05:30 )             37.6     05-04    131<L>  |  90<L>  |  11  ----------------------------<  139<H>  3.8   |  26  |  0.45<L>    Ca    8.3<L>      04 May 2020 05:30  Phos  3.1     05-03  Mg     2.2     05-03    TPro  6.4  /  Alb  2.9<L>  /  TBili  0.4  /  DBili  x   /  AST  87<H>  /  ALT  56<H>  /  AlkPhos  169<H>  05-04    Ferritin 1549  LDH-1504  CRP 5.26       Culture - Blood (collected 04-29-20 @ 22:07)  Source: .Blood Blood-Peripheral  Preliminary Report (04-30-20 @ 23:01):    No growth to date.    5/4 Lt TLC inserted   5/4 Left radial inserted     5/4/20   Radiology:  Diffuse airspace opacities are seen throughout both lungs which remain unchanged when compared to previous study done on May 3, 2020        Assessment and Recommendation:   · Assessment	  66 y/o female with no significant pmhx admitted 4/28 with sob, cough, loss of smell and taste; COVID-19 positive 4/29 with possible superimposed bacterial pneumonia given leukocytosis, elevated procalcitonin. Initial CXR showed LLL/RUL PNA, was on 8L sating at 90%, started on Zithromax and ceftriaxone. 4/30 s/p RRT for worsening hypoxia, placed on HFNC and proned. s/p tocilizumab(4/30). Ramdesivir(4/30-). S/RRT today for hypoxic respiratory failure 2/2 COVID and superimposing bacterial PNA; intubated by anesthesia and transferred to 8ICU.      #Neuro   -S/p Nimbex 20mg ivp/ and versed 4mg ivp   -Started  -Propofol  50 Fentanyl 2       #Pulmonary:  -Hypoxic respiratory failure 2/2 COVID pos 4/29 with possible superimposed bacterial pneumonia   -Intubated and sedated- current vent setting  AC 34/ / Peep 15, Fio2 80%  -ABG 7.34/64/25/ po2 150   --continue Vancomycin and meropenum  -s/p tocilizumab(4/30). Ramdesivir(4/30-)  -Monitor inflammatory markers      #CV  -Levophed   -Goal MAP 65     #GI  Jevity TF started   Pepcid IV 20mg daily   Nutritional consult ordered       #ID  -Hypoxic respiratory failure 2/2 COVID pos 4/29 with possible superimposed bacterial pneumonia   -Pro-calcitonin 0.63, Lactate 7.4, wbc 28.79  -continue Vancomycin and meropenum  -s/p tocilizumab(4/30). Ramdesivir(4/30-)  -Monitor inflammatory markers    -ID following         #Renal -stable   LR 500ml bolus   Monitor I&O      #Heme   D-Dimer 11,577  - Lovenox 70mg BID    Skin  - intact MICU transfer note    66 y/o female with no significant pmhx admitted 4/28 with sob, cough, loss of smell and taste; COVID-19 positive 4/29 with possible superimposed bacterial pneumonia given leukocytosis, elevated procalcitonin. Initial CXR showed LLL/RUL PNA, was on 8L sating at 90%, started on Zithromax and ceftriaxone. 4/30 s/p RRT for worsening hypoxia, placed on HFNC and proned. s/p tocilizumab(4/30). Ramdesivir(4/30-). RRT called today for acute hypoxemic  respiratory failure after drinking water; Patient was previously saturating 89-91% on HFNC @ 40LPM this morning and acutely became hypoxemic @ 14:30 with oxygen saturations in the mid 30's on HFNC.  Patient was repositioned/proned and switched from HFNC to NRB, with minimal improvement in O2 saturations to 56%.  The patient was subsequently intubated by anesthesia and transferred to 8ICU. Lovenox 70mg SC BID started for D-dimer 89441    PAST MEDICAL HISTORY:  No pertinent past medical history.    PAST SURGICAL HISTORY:  History of appendectomy     History of cholecystectomy.    FAMILY HISTORY:  No pertinent family history in first degree relatives.      MEDICATIONS  (STANDING):  chlorhexidine 0.12% Liquid 15 milliLiter(s) Oral Mucosa two times a day  cisatracurium Injectable 20 milliGRAM(s) IV Push once  enoxaparin Injectable 70 milliGRAM(s) SubCutaneous two times a day  fentaNYL   Infusion... 0.5 MICROgram(s)/kG/Hr (1.76 mL/Hr) IV Continuous <Continuous>  lactated ringers Bolus 500 milliLiter(s) IV Bolus once  meropenem  IVPB 1000 milliGRAM(s) IV Intermittent every 8 hours  midazolam Injectable 4 milliGRAM(s) IV Push once  norepinephrine Infusion 0.05 MICROgram(s)/kG/Min (3.3 mL/Hr) IV Continuous <Continuous>  remdesivir IVPB ([id number]) 100 milliGRAM(s) IV Intermittent daily  sodium chloride 0.9% lock flush 3 milliLiter(s) IV Push every 8 hours  vancomycin  IVPB 750 milliGRAM(s) IV Intermittent every 12 hours    MEDICATIONS  (PRN):  acetaminophen   Tablet .. 650 milliGRAM(s) Oral every 6 hours PRN Temp greater or equal to 38C (100.4F), Mild Pain (1 - 3)  diphenhydrAMINE   Injectable 50 milliGRAM(s) IV Push once PRN anaphylaxis to study drug  EPINEPHrine     1 mG/mL Injectable 0.3 milliGRAM(s) IntraMuscular once PRN anaphylaxis to study drug      Vitals:  Vital Signs Last 24 Hrs  T(C): 36.9 (04 May 2020 12:16), Max: 36.9 (04 May 2020 12:16)  T(F): 98.4 (04 May 2020 12:16), Max: 98.4 (04 May 2020 12:16)  HR: 128 (04 May 2020 14:49) (83 - 128)  BP: 104/61 (04 May 2020 12:16) (104/61 - 145/71)  BP(mean): --  RR: 34 (04 May 2020 12:24) (22 - 41)  SpO2: 84% (04 May 2020 14:49) (84% - 96%)    I&O's Summary    03 May 2020 07:01  -  04 May 2020 07:00  --------------------------------------------------------  IN: 200 mL / OUT: 0 mL / NET: 200 mL    04 May 2020 07:01  -  04 May 2020 16:38  --------------------------------------------------------  IN: 480 mL / OUT: 300 mL / NET: 180 mL        Labs:  COVID:  COVID-19 PCR: Detected (04-28-20 @ 22:44)    ABG - ( 04 May 2020 15:43 )  pH: 7.21  /  pCO2: 64    /  pO2: 150   / HCO3: 25    / Base Excess: -3.9  /  SaO2: 98                                12.7   28.79 )-----------( 271      ( 04 May 2020 05:30 )             37.6     05-04    131<L>  |  90<L>  |  11  ----------------------------<  139<H>  3.8   |  26  |  0.45<L>    Ca    8.3<L>      04 May 2020 05:30  Phos  3.1     05-03  Mg     2.2     05-03    TPro  6.4  /  Alb  2.9<L>  /  TBili  0.4  /  DBili  x   /  AST  87<H>  /  ALT  56<H>  /  AlkPhos  169<H>  05-04    Ferritin 1549  LDH-1504  CRP 5.26       Culture - Blood (collected 04-29-20 @ 22:07)  Source: .Blood Blood-Peripheral  Preliminary Report (04-30-20 @ 23:01):    No growth to date.    5/4 Lt TLC inserted   5/4 Left radial inserted     5/4/20   Radiology:  Diffuse airspace opacities are seen throughout both lungs which remain unchanged when compared to previous study done on May 3, 2020        Assessment and Recommendation:   · Assessment	  66 y/o female with no significant pmhx admitted 4/28 with sob, cough, loss of smell and taste; COVID-19 positive 4/29 with possible superimposed bacterial pneumonia given leukocytosis, elevated procalcitonin. Initial CXR showed LLL/RUL PNA, was on 8L sating at 90%, started on Zithromax and ceftriaxone. 4/30 s/p RRT for worsening hypoxia, placed on HFNC and proned. s/p tocilizumab(4/30). Ramdesivir(4/30-). S/RRT today for hypoxic respiratory failure 2/2 COVID and superimposing bacterial PNA; intubated by anesthesia and transferred to 8ICU.      #Neuro   -S/p Nimbex 20mg ivp/ and versed 4mg ivp   -Started  -Propofol  50 Fentanyl 2       #Pulmonary:  -Hypoxic respiratory failure 2/2 COVID pos 4/29 with possible superimposed bacterial pneumonia   -Intubated and sedated- current vent setting  AC 34/ / Peep 15, Fio2 80%  -ABG 7.34/64/25/ po2 150   --continue Vancomycin and meropenum  -s/p tocilizumab(4/30). Ramdesivir(4/30-)  -Monitor inflammatory markers      #CV  -Levophed   -Goal MAP 65     #GI  Jevity TF started   Pepcid IV 20mg daily   Nutritional consult ordered       #ID  -Hypoxic respiratory failure 2/2 COVID pos 4/29 with possible superimposed bacterial pneumonia   -Pro-calcitonin 0.63, Lactate 7.4, wbc 28.79  -continue Vancomycin and meropenum  -s/p tocilizumab(4/30). Ramdesivir(4/30-)  -Monitor inflammatory markers    -ID following         #Renal -stable   LR 500ml bolus   Monitor I&O      #Heme   D-Dimer 11,577  - Lovenox 70mg BID    Skin  - intact      Attending Attestation:  Agree with above. Patient seen and examined. Laboratory data and imaging reviewed. Patient is critically ill requiring ICU care and frequent bedside visits with therapy change.     Patient is generally healthy and presented with COVID symptoms. She was started on Remdesivir but had progression of her clinical course ultimately requiring Tocilizumab. She unfortunately continued to progress and had significant increase in her D-dimer this AM. She was intubated today and transferred to 8ICU for further monitoring and care.    1. Acute Hypoxemic Respiratory Failure - s/p intubation in setting of COVID-19 ARDS. Check CXR to confirm ETT placement and ABG to monitor ventilation. Adjust ventilator based on ABG results. Lung protective ventilation. Sedation and paralytics to ensure vent synchrony.  2. Shock - continue vasopressors to maintain MAP > 65. Patient does NOT have an enlarged right heart on bedside echo therefore will hold off tPA. Continue therapeutic AC.  3. ID - COVID-19 received Remdesivir and Tocilizumab. Will add broad spectrum antibiotics. Check cultures. ID followup.  4. Renal failure - monitor urine output and creatinine.    Prognosis guarded.  I have provided 45 minutes of critical care time not including procedures and/or teaching services.

## 2020-05-04 NOTE — PROGRESS NOTE ADULT - PROBLEM SELECTOR PROBLEM 2
Hypoxia
Acute respiratory failure with hypoxia
Acute respiratory failure with hypoxia
PNA (pneumonia)
PNA (pneumonia)

## 2020-05-04 NOTE — PROGRESS NOTE ADULT - PROBLEM SELECTOR PROBLEM 3
PNA (pneumonia)
Need for prophylactic measure
PNA (pneumonia)
PNA (pneumonia)
Need for prophylactic measure

## 2020-05-05 NOTE — PROGRESS NOTE ADULT - ASSESSMENT
64 yo F with no significant PMhx COVID + and intubated on 5/4     Neuro   - fentanyl @ 5  propofol 50   monitor TRiglycerides    Resp  /36/70%/12  Peak 45 nad Pl 29   7.33/58/84/30/95%    Cardio   off pressors    GI  montior LFTs ( ALT 77)  tube feeds : vital AF 20cc/hr increase q 6 by 20 to goal of 55cc/24hrs   Famotidine 20 iV qd   senna and miralax added       CREAT stable    net + 629   100mL /hr urine output    ID   bacterial PNA on vanco  750mg  Q 8HRS and meropenem 1ooomg q 8hrs   next vanco level at 5am  on 5/6   remdesivir trial 5/1-5/9   5/3 bld cx negative   trend WBC   ID following     Heme   d-dimer 04823   heparin gtt PTT 78 goal (55-70) decrease gtt to 11mL/hr  PTT at 3pm       ENDO   check FS q 6h  ISS 66 yo F with no significant PMhx COVID + and intubated on 5/4     Neuro   - fentanyl @ 5  propofol 50   monitor TRiglycerides    Resp  /36/70%/12  Peak 45 nad Pl 29   7.33/58/84/30/95%  sating 88-91%    Cardio   levo at 1  MAP 57-61 had to add on albertina , now MAP 67     GI  montior LFTs ( ALT 77)  tube feeds : vital AF 20cc/hr increase q 6 by 20 to goal of 55cc/24hrs   Famotidine 20 iV qd   senna and miralax added       CREAT stable    net + 629   100mL /hr urine output    ID   Temp 38.5 (cooling blanket)   bacterial PNA on vanco  750mg  Q 8HRS and meropenem 1ooomg q 8hrs   next vanco level at 5am  on 5/6   remdesivir trial 5/1-5/9   5/3 bld cx negative   trend WBC   ID following     Heme   d-dimer 12825   heparin gtt PTT 78 goal (55-70) decrease gtt to 11mL/hr  PTT at 3pm       ENDO   check FS q 6h  ISS 66 yo F with no significant PMhx COVID + and intubated on 5/4     Neuro   - fentanyl @ 5  propofol 50   monitor TRiglycerides    Resp  /36/70%/12  Peak 45 nad Pl 29   7.33/58/84/30/95%  sating 88-91%    Cardio   levo at 1  MAP 57-61 had to add on albertina , now MAP 67   hyperdynamic L ventricle will give 1 L LR     GI  montior LFTs ( ALT 77)  tube feeds : vital AF 20cc/hr increase q 6 by 20 to goal of 55cc/24hrs   Famotidine 20 iV qd   senna and miralax added       CREAT stable    net + 629   100mL /hr urine output    ID (sepsis)  Temp 38.5 (cooling blanket)   bacterial PNA on vanco  750mg  Q 8HRS and meropenem 1ooomg q 8hrs   ID f/u appreciated - will start Caspofungin loading dose 70, then start 50mg daily tomorrow   next vanco level at 5am  on 5/6   remdesivir trial 5/1-5/9   5/3 bld cx negative   trend WBC   reculture  sputum cx   UA     Heme   d-dimer 25710   heparin gtt PTT 78 goal (55-70) decrease gtt to 11mL/hr  PTT at 3pm       ENDO   check FS q 6h  ISS

## 2020-05-05 NOTE — PROGRESS NOTE ADULT - SUBJECTIVE AND OBJECTIVE BOX
Patient is a 65y old  Female who presents with a chief complaint of COVID-19, Hypoxic respiratory failure (05 May 2020 08:51)    Being followed by ID for COVID, clinical trial       Interval history:  Pt transferred to ICU yesterday following RRT for hypoxia, now intubated, sedated, on pressors. Pt spiking fevers, Tmax this .5. Received acetaminophen and now on cooling blanket. SaO2 96% on ventilator.   No other acute events      ROS:  Unable to assess    PAST MEDICAL & SURGICAL HISTORY:  No pertinent past medical history  History of cholecystectomy  History of appendectomy    Allergies    No Known Allergies    Intolerances      Antimicrobials:    meropenem  IVPB 1000 milliGRAM(s) IV Intermittent every 8 hours  vancomycin  IVPB 750 milliGRAM(s) IV Intermittent every 8 hours    MEDICATIONS  (STANDING):  chlorhexidine 0.12% Liquid 15 milliLiter(s) Oral Mucosa two times a day  chlorhexidine 4% Liquid 1 Application(s) Topical <User Schedule>  famotidine Injectable 20 milliGRAM(s) IV Push daily  fentaNYL   Infusion... 0.5 MICROgram(s)/kG/Hr (1.76 mL/Hr) IV Continuous <Continuous>  heparin  Infusion 1200 Unit(s)/Hr (11 mL/Hr) IV Continuous <Continuous>  insulin lispro (HumaLOG) corrective regimen sliding scale   SubCutaneous every 6 hours  meropenem  IVPB 1000 milliGRAM(s) IV Intermittent every 8 hours  norepinephrine Infusion 0.05 MICROgram(s)/kG/Min (3.3 mL/Hr) IV Continuous <Continuous>  phenylephrine    Infusion 0.3 MICROgram(s)/kG/Min (3.95 mL/Hr) IV Continuous <Continuous>  polyethylene glycol 3350 17 Gram(s) Oral daily  propofol Infusion 50 MICROgram(s)/kG/Min (21.1 mL/Hr) IV Continuous <Continuous>  remdesivir IVPB ([id number]) 100 milliGRAM(s) IV Intermittent daily  senna 2 Tablet(s) Oral at bedtime  sodium chloride 0.9% lock flush 3 milliLiter(s) IV Push every 8 hours  vancomycin  IVPB 750 milliGRAM(s) IV Intermittent every 8 hours    MEDICATIONS  (PRN):  acetaminophen    Suspension .. 650 milliGRAM(s) Oral every 6 hours PRN Temp greater or equal to 38C (100.4F)  diphenhydrAMINE   Injectable 50 milliGRAM(s) IV Push once PRN anaphylaxis to study drug  EPINEPHrine     1 mG/mL Injectable 0.3 milliGRAM(s) IntraMuscular once PRN anaphylaxis to study drug  sodium chloride 0.9% lock flush 10 milliLiter(s) IV Push every 1 hour PRN Pre/post blood products, medications, blood draw, and to maintain line patency      Vital Signs Last 24 Hrs  T(C): 38.6 (05-05-20 @ 10:00), Max: 38.6 (05-05-20 @ 10:00)  T(F): 101.5 (05-05-20 @ 10:00), Max: 101.5 (05-05-20 @ 10:00)  HR: 123 (05-05-20 @ 10:29) (66 - 132)  BP: 99/50 (05-04-20 @ 16:00) (99/50 - 129/63)  BP(mean): --  RR: 36 (05-05-20 @ 10:29) (24 - 42)  SpO2: 95% (05-05-20 @ 10:29) (84% - 100%)    Physical Exam:  Constitutional sedated, on ventilator. Febrile   Chest vented, good AE with coarse breath sounds b/l   CVS tachy to 120s   Abd softly distended   tyler in place, making urine   Ext No cyanosis clubbing or edema  CNS sedated     Lab Data:                          13.3   32.69 )-----------( 277      ( 05 May 2020 07:56 )             40.0       05-04    134<L>  |  93<L>  |  8   ----------------------------<  139<H>  3.9   |  25  |  0.48<L>    Ca    8.4      04 May 2020 23:56  Phos  3.6     05-04  Mg     2.3     05-04    TPro  6.1  /  Alb  2.8<L>  /  TBili  0.7  /  DBili  x   /  AST  156<H>  /  ALT  77<H>  /  AlkPhos  263<H>  05-04      .Blood Blood-Peripheral  05-03-20   No growth to date.  --  --      .Blood Blood-Peripheral  04-29-20   No Growth Final  --  --      Vancomycin Level, Trough: <4.0 ug/mL (05-04-20 @ 23:56)      WBC Count: 32.69 (05-05-20 @ 07:56)  WBC Count: 34.76 (05-04-20 @ 23:56)  WBC Count: 32.55 (05-04-20 @ 18:11)  WBC Count: 28.79 (05-04-20 @ 05:30)  WBC Count: 18.37 (05-03-20 @ 06:10)  WBC Count: 10.68 (05-02-20 @ 06:38)  WBC Count: 12.17 (05-01-20 @ 07:50)  WBC Count: 18.32 (04-30-20 @ 06:53)  WBC Count: 17.27 (04-29-20 @ 17:29)  WBC Count: 14.76 (04-28-20 @ 22:45)    D-Dimer Assay, Quantitative: 58988 ng/mL DDU (05-04)  D-Dimer Assay, Quantitative: 33259 ng/mL DDU (05-04)  D-Dimer Assay, Quantitative: 966 ng/mL DDU (05-01)  D-Dimer Assay, Quantitative: 453 ng/mL DDU (04-30)    Ferritin, Serum: 1871 ng/mL (05-05)  Ferritin, Serum: 1781 ng/mL (05-04)  Ferritin, Serum: 1549 ng/mL (05-04)  Ferritin, Serum: 1896 ng/mL (05-02)      C-Reactive Protein, Serum: 5.39 mg/dL (05-05-20 @ 03:09)  C-Reactive Protein, Serum: 5.47 mg/dL (05-04-20 @ 18:45)  C-Reactive Protein, Serum: 5.26 mg/dL (05-04-20 @ 07:48)  C-Reactive Protein, Serum: 15.76 mg/dL (05-02-20 @ 11:32)      Ordinal Scale: score : 2  1) Death  2) Hospitalized, on invasive mechanical ventilation or ECMO  3) Hospitalized, on non-invasive ventilation or high flow oxygen devices  4) Hospitalized, requiring low flow(<11l/min) supplemental oxygen  5) Hospitalized, not requiring supplemental oxygen - requiring ongoing medical care(COVID-19 related or otherwise)  6) Hospitalized, not requiring supplemental oxygen - no longer requires ongoing medical care(other than per protocol RDV administration)  7) Not hospitalized Patient is a 65y old  Female who presents with a chief complaint of COVID-19, Hypoxic respiratory failure (05 May 2020 08:51)    Being followed by ID for COVID, clinical trial       Interval history:  Pt transferred to ICU yesterday following RRT for hypoxia, now intubated, sedated, on pressors. Pt spiking fevers, Tmax this .5. Received acetaminophen and now on cooling blanket. SaO2 96% on ventilator.   No other acute events      ROS:  Unable to assess    PAST MEDICAL & SURGICAL HISTORY:  No pertinent past medical history  History of cholecystectomy  History of appendectomy    Allergies    No Known Allergies    Intolerances      Antimicrobials:    meropenem  IVPB 1000 milliGRAM(s) IV Intermittent every 8 hours  vancomycin  IVPB 750 milliGRAM(s) IV Intermittent every 8 hours    MEDICATIONS  (STANDING):  chlorhexidine 0.12% Liquid 15 milliLiter(s) Oral Mucosa two times a day  chlorhexidine 4% Liquid 1 Application(s) Topical <User Schedule>  famotidine Injectable 20 milliGRAM(s) IV Push daily  fentaNYL   Infusion... 0.5 MICROgram(s)/kG/Hr (1.76 mL/Hr) IV Continuous <Continuous>  heparin  Infusion 1200 Unit(s)/Hr (11 mL/Hr) IV Continuous <Continuous>  insulin lispro (HumaLOG) corrective regimen sliding scale   SubCutaneous every 6 hours  meropenem  IVPB 1000 milliGRAM(s) IV Intermittent every 8 hours  norepinephrine Infusion 0.05 MICROgram(s)/kG/Min (3.3 mL/Hr) IV Continuous <Continuous>  phenylephrine    Infusion 0.3 MICROgram(s)/kG/Min (3.95 mL/Hr) IV Continuous <Continuous>  polyethylene glycol 3350 17 Gram(s) Oral daily  propofol Infusion 50 MICROgram(s)/kG/Min (21.1 mL/Hr) IV Continuous <Continuous>  remdesivir IVPB ([id number]) 100 milliGRAM(s) IV Intermittent daily  senna 2 Tablet(s) Oral at bedtime  sodium chloride 0.9% lock flush 3 milliLiter(s) IV Push every 8 hours  vancomycin  IVPB 750 milliGRAM(s) IV Intermittent every 8 hours    MEDICATIONS  (PRN):  acetaminophen    Suspension .. 650 milliGRAM(s) Oral every 6 hours PRN Temp greater or equal to 38C (100.4F)  diphenhydrAMINE   Injectable 50 milliGRAM(s) IV Push once PRN anaphylaxis to study drug  EPINEPHrine     1 mG/mL Injectable 0.3 milliGRAM(s) IntraMuscular once PRN anaphylaxis to study drug  sodium chloride 0.9% lock flush 10 milliLiter(s) IV Push every 1 hour PRN Pre/post blood products, medications, blood draw, and to maintain line patency      Vital Signs Last 24 Hrs  T(C): 38.6 (05-05-20 @ 10:00), Max: 38.6 (05-05-20 @ 10:00)  T(F): 101.5 (05-05-20 @ 10:00), Max: 101.5 (05-05-20 @ 10:00)  HR: 123 (05-05-20 @ 10:29) (66 - 132)  BP: 99/50 (05-04-20 @ 16:00) (99/50 - 129/63)  BP(mean): --  RR: 36 (05-05-20 @ 10:29) (24 - 42)  SpO2: 95% (05-05-20 @ 10:29) (84% - 100%)    Physical Exam:  Constitutional sedated, on ventilator. Febrile   Chest vented, good AE with coarse breath sounds b/l   CVS tachy to 120s   Abd softly distended   tyler in place, making urine   Ext No cyanosis clubbing or edema  CNS sedated     Lab Data:                          13.3   32.69 )-----------( 277      ( 05 May 2020 07:56 )             40.0       05-04    134<L>  |  93<L>  |  8   ----------------------------<  139<H>  3.9   |  25  |  0.48<L>    Ca    8.4      04 May 2020 23:56  Phos  3.6     05-04  Mg     2.3     05-04    TPro  6.1  /  Alb  2.8<L>  /  TBili  0.7  /  DBili  x   /  AST  156<H>  /  ALT  77<H>  /  AlkPhos  263<H>  05-04      .Blood Blood-Peripheral  05-03-20   No growth to date.  --  --      .Blood Blood-Peripheral  04-29-20   No Growth Final  --  --      Vancomycin Level, Trough: <4.0 ug/mL (05-04-20 @ 23:56)      WBC Count: 32.69 (05-05-20 @ 07:56)  WBC Count: 34.76 (05-04-20 @ 23:56)  WBC Count: 32.55 (05-04-20 @ 18:11)  WBC Count: 28.79 (05-04-20 @ 05:30)  WBC Count: 18.37 (05-03-20 @ 06:10)  WBC Count: 10.68 (05-02-20 @ 06:38)  WBC Count: 12.17 (05-01-20 @ 07:50)  WBC Count: 18.32 (04-30-20 @ 06:53)  WBC Count: 17.27 (04-29-20 @ 17:29)  WBC Count: 14.76 (04-28-20 @ 22:45)    D-Dimer Assay, Quantitative: 14311 ng/mL DDU (05-04)  D-Dimer Assay, Quantitative: 30149 ng/mL DDU (05-04)  D-Dimer Assay, Quantitative: 966 ng/mL DDU (05-01)  D-Dimer Assay, Quantitative: 453 ng/mL DDU (04-30)    Ferritin, Serum: 1871 ng/mL (05-05)  Ferritin, Serum: 1781 ng/mL (05-04)  Ferritin, Serum: 1549 ng/mL (05-04)  Ferritin, Serum: 1896 ng/mL (05-02)      C-Reactive Protein, Serum: 5.39 mg/dL (05-05-20 @ 03:09)  C-Reactive Protein, Serum: 5.47 mg/dL (05-04-20 @ 18:45)  C-Reactive Protein, Serum: 5.26 mg/dL (05-04-20 @ 07:48)  C-Reactive Protein, Serum: 15.76 mg/dL (05-02-20 @ 11:32)      Ordinal Scale: score : 2  1) Death  2) Hospitalized, on invasive mechanical ventilation or ECMO  3) Hospitalized, on non-invasive ventilation or high flow oxygen devices  4) Hospitalized, requiring low flow(<11l/min) supplemental oxygen  5) Hospitalized, not requiring supplemental oxygen - requiring ongoing medical care(COVID-19 related or otherwise)  6) Hospitalized, not requiring supplemental oxygen - no longer requires ongoing medical care(other than per protocol RDV administration)  7) Not hospitalized

## 2020-05-05 NOTE — PROGRESS NOTE ADULT - SUBJECTIVE AND OBJECTIVE BOX
PATIENT: LIVIER KRAMER   AGE:  65   Mode: AC/ CMV (Assist Control/ Continuous Mandatory Ventilation), RR (machine): 36, TV (machine): 320, FiO2: 80, PEEP: 12, ITime: 0.55, MAP: 19, PIP: 45o     CHIEF COMPLAINT:  Patient is a 65y old  Female who presents with a chief complaint of COVID-19, Hypoxic respiratory failure  intubated on 5/4       OVERNIGHT EVENTS:    SUBJECTIVE: Patient seen and examined at bedside intubated and sedated.       OBJECTIVE:    VITAL SIGNS:  ICU Vital Signs Last 24 Hrs  T(C): 38.1 (05 May 2020 07:00), Max: 38.1 (05 May 2020 07:00)  T(F): 100.6 (05 May 2020 07:00), Max: 100.6 (05 May 2020 07:00)  HR: 104 (05 May 2020 08:00) (66 - 132)  BP: 99/50 (04 May 2020 16:00) (99/50 - 129/63)  BP(mean): --  ABP: 97/41 (05 May 2020 08:00) (80/50 - 120/76)  ABP(mean): 59 (05 May 2020 08:00) (59 - 96)  RR: 36 (05 May 2020 08:00) (24 - 42)  SpO2: 91% (05 May 2020 08:00) (84% - 100%)    Mode: AC/ CMV (Assist Control/ Continuous Mandatory Ventilation), RR (machine): 36, TV (machine): 320, FiO2: 80, PEEP: 12, ITime: 0.55, MAP: 19, PIP: 45    05-04 @ 07:01  -  05-05 @ 07:00  --------------------------------------------------------  IN: 2279.5 mL / OUT: 1630 mL / NET: 649.5 mL    05-05 @ 07:01  -  05-05 @ 08:52  --------------------------------------------------------  IN: 108.4 mL / OUT: 25 mL / NET: 83.4 mL      CAPILLARY BLOOD GLUCOSE      POCT Blood Glucose.: 166 mg/dL (04 May 2020 14:11)      I/O SUMMARY:    05-04-20 @ 07:01  -  05-05-20 @ 07:00  --------------------------------------------------------  IN: 2279.5 mL / OUT: 1630 mL / NET: 649.5 mL    05-05-20 @ 07:01  - 05-05-20 @ 08:52  --------------------------------------------------------  IN: 108.4 mL / OUT: 25 mL / NET: 83.4 mL      PHYSICAL EXAM:    General: intubated and sedated     MEDICATIONS:  MEDICATIONS  (STANDING):  chlorhexidine 0.12% Liquid 15 milliLiter(s) Oral Mucosa two times a day  chlorhexidine 4% Liquid 1 Application(s) Topical <User Schedule>  famotidine Injectable 20 milliGRAM(s) IV Push daily  fentaNYL   Infusion... 0.5 MICROgram(s)/kG/Hr (1.76 mL/Hr) IV Continuous <Continuous>  heparin  Infusion 1200 Unit(s)/Hr (12 mL/Hr) IV Continuous <Continuous>  insulin lispro (HumaLOG) corrective regimen sliding scale   SubCutaneous every 6 hours  meropenem  IVPB 1000 milliGRAM(s) IV Intermittent every 8 hours  norepinephrine Infusion 0.05 MICROgram(s)/kG/Min (3.3 mL/Hr) IV Continuous <Continuous>  propofol Infusion 50 MICROgram(s)/kG/Min (21.1 mL/Hr) IV Continuous <Continuous>  remdesivir IVPB ([id number]) 100 milliGRAM(s) IV Intermittent daily  sodium chloride 0.9% lock flush 3 milliLiter(s) IV Push every 8 hours  vancomycin  IVPB 750 milliGRAM(s) IV Intermittent every 8 hours    MEDICATIONS  (PRN):  acetaminophen    Suspension .. 650 milliGRAM(s) Oral every 6 hours PRN Temp greater or equal to 38C (100.4F)  diphenhydrAMINE   Injectable 50 milliGRAM(s) IV Push once PRN anaphylaxis to study drug  EPINEPHrine     1 mG/mL Injectable 0.3 milliGRAM(s) IntraMuscular once PRN anaphylaxis to study drug  sodium chloride 0.9% lock flush 10 milliLiter(s) IV Push every 1 hour PRN Pre/post blood products, medications, blood draw, and to maintain line patency      ALLERGIES:  Allergies    No Known Allergies    Intolerances        LABS:                        13.3   32.69 )-----------( 277      ( 05 May 2020 07:56 )             40.0     05-04    134<L>  |  93<L>  |  8   ----------------------------<  139<H>  3.9   |  25  |  0.48<L>    Ca    8.4      04 May 2020 23:56  Phos  3.6     05-04  Mg     2.3     05-04    TPro  6.1  /  Alb  2.8<L>  /  TBili  0.7  /  DBili  x   /  AST  156<H>  /  ALT  77<H>  /  AlkPhos  263<H>  05-04    LIVER FUNCTIONS - ( 04 May 2020 23:56 )  Alb: 2.8 g/dL / Pro: 6.1 g/dL / ALK PHOS: 263 U/L / ALT: 77 U/L / AST: 156 U/L / GGT: x           COAGULATION STUDIES:     aPTT  78.2 sec    (05-05-20 @ 07:56)     PT     x        (05-05-20 @ 07:56)     INR    x             (05-05-20 @ 07:56), COAGULATION STUDIES:     aPTT  31.0 sec    (05-04-20 @ 23:56)     PT     16.2 sec    (05-04-20 @ 23:56)     INR    1.39 ratio         (05-04-20 @ 23:56)   PT/INR - ( 04 May 2020 23:56 )   PT: 16.2 sec;   INR: 1.39 ratio         PTT - ( 05 May 2020 07:56 )  PTT:78.2 sec    Mode: AC/ CMV (Assist Control/ Continuous Mandatory Ventilation), RR (machine): 36, TV (machine): 320, FiO2: 80, PEEP: 12, ITime: 0.55, MAP: 19, PIP: 45  ABG - ( 05 May 2020 05:08 )  pH, Arterial: 7.33  pH, Blood: x     /  pCO2: 58    /  pO2: 84    / HCO3: 30    / Base Excess: 2.9   /  SaO2: 95                POCT Blood Glucose.: 166 mg/dL (05-04-20 @ 14:11)        MICROBIOLOGY:    Culture - Blood (collected 05-03-20 @ 23:07)  Source: .Blood Blood-Peripheral  Preliminary Report (05-05-20 @ 01:02):    No growth to date.    Culture - Blood (collected 05-03-20 @ 23:07)  Source: .Blood Blood-Peripheral  Preliminary Report (05-05-20 @ 01:02):    No growth to date.        RADIOLOGY & ADDITIONAL TESTS: Reviewed.

## 2020-05-05 NOTE — PROGRESS NOTE ADULT - ASSESSMENT
65F reporting no PMH who presented last night with complaint of fever/chills (home T-max 102 F), night sweats, cough, myalgias (mid & lower back), loss of taste and smell since Friday 4/24 (still tastes sweet/salty), as well as 2 days of weakness and constipation, found to be COVID positive on 4/28/20.    Patient now requiring mechanical ventilation. Tachycardic and continuing to spike fevers. BCx from 5/3 remain negative. CXR today with worsening opacities.     COVID positive 4/28/20  CRP 33.15 -> 15.76 -> 5.39  Ferritin 1060 -> 1896 -> 1781 -> 1871  Cr 0.48  Tmax 101.6   WBC 18.32 -> 12.17 -> 10.68 -> 18.37 --> 34.76 -> 32.69    Pt has agreed to participate in a clinical trial for Covid -19  In order to participate in this clinical trial , the patient should not take concurrent treatment with other agents with actual or possible direct acting antiviral activity against SARS-CoV-2 such a chloroquine or hydroxychloroquine.  In addition, the patient should not participate in any other clinical trial of an experimental treatment for Covid-19    #Acute hypoxic respiratory failure   - Likely multiactorial. Ddx for hypoxia at this time includes COVID19, bacterial PNA, ?fungal PNA, and PE   - COVID19+ on 4/30, see below for detailed management plan   - Patient s/p tocilizumab with uptrending WBC and ?lobar PNA on initial xray (now multifocal, viral appearing) suggests possible coexisting bacterial PNA. Currently on matt and vanc. s/p azithro. Bcx negative 4/29, 5/3 NGTD. Pt spiking fevers today, rec rpt BCx. Fungitel pending.   - D-dimer remains elevated and uptrending, could suggest PE or microthrombi. Pt now on heparin drip. Continued AC per primary team.   -Trops negative, slight elevation in BNP, less likely myocarditis     # COVID-19 infection with viral PNA   - COVID+ 4/30 w/ CXR demonstrating infiltrates, now diffuse patchy opacities (5/3)    - Now requiring mechanical ventilation   - Continue Remdesivir (started  4/30)   - LFTs mildly elevated, likely due to either COVID progression or study drug. If LFTs continue to rise may need to d/c study drug. Please continue to monitor LFTS and creatinine daily   -Elevated alk phos could be from rocephin vs COVID19 progression, although. Please continue to monitor   - inflammatory markers elevated, please continue to monitor   - S/p tocilizumab 4/30    #VTE prophylaxis   - As above, pt with elevated d-dimer, please continue to monitor   - Now on heparin drip    - Further AC per primary team     Today is day #6 on study drug. Ordinal scale score: 3. 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    Patient now requiring mechanical ventilation. Tachycardic and continuing to spike fevers. BCx from 5/3 remain negative. CXR today with worsening opacities.     COVID positive 4/28/20  CRP 33.15 -> 15.76 -> 5.39  Ferritin 1060 -> 1896 -> 1781 -> 1871  Cr 0.48  Tmax 101.6   WBC 18.32 -> 12.17 -> 10.68 -> 18.37 --> 34.76 -> 32.69    Pt has agreed to participate in a clinical trial for Covid -19  In order to participate in this clinical trial , the patient should not take concurrent treatment with other agents with actual or possible direct acting antiviral activity against SARS-CoV-2 such a chloroquine or hydroxychloroquine.  In addition, the patient should not participate in any other clinical trial of an experimental treatment for Covid-19    #Acute hypoxic respiratory failure   - Likely multiactorial. Ddx for hypoxia at this time includes COVID19, bacterial PNA, ?fungal PNA, and PE   - COVID19+ on 4/30, see below for detailed management plan   - Patient s/p tocilizumab with uptrending WBC and ?lobar PNA on initial xray (now multifocal, viral appearing) suggests possible coexisting bacterial PNA. Currently on matt and vanc. s/p azithro. Bcx negative 4/29, 5/3 NGTD. Pt spiking fevers today, rec rpt BCx. Fungitel pending.   - D-dimer remains elevated and uptrending, could suggest PE or microthrombi. Pt now on heparin drip. Continued AC per primary team.   -Trops negative, slight elevation in BNP, less likely myocarditis     # COVID-19 infection with viral PNA   - COVID+ 4/30 w/ CXR demonstrating infiltrates, now diffuse patchy opacities (5/3)    - Now requiring mechanical ventilation, weaning as tolerated as per primary team   - Continue Remdesivir (started  4/30)   - LFTs mildly elevated, likely due to either COVID progression or study drug. If LFTs continue to rise may need to d/c study drug. Please continue to monitor LFTS and creatinine daily   -Elevated alk phos could be from rocephin vs COVID19 progression, although. Please continue to monitor   - inflammatory markers elevated, please continue to monitor   - S/p tocilizumab 4/30    #VTE prophylaxis   - As above, pt with elevated d-dimer, please continue to monitor   - Now on heparin drip    - Further AC per primary team     Today is day #6 on study drug. Ordinal scale score: 3. 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    Patient now requiring mechanical ventilation. Tachycardic and continuing to spike fevers. BCx from 5/3 remain negative. CXR today with worsening opacities.     COVID positive 4/28/20  CRP 33.15 -> 15.76 -> 5.39  Ferritin 1060 -> 1896 -> 1781 -> 1871  Cr 0.48  Tmax 101.6   WBC 18.32 -> 12.17 -> 10.68 -> 18.37 --> 34.76 -> 32.69    Pt has agreed to participate in a clinical trial for Covid -19  In order to participate in this clinical trial , the patient should not take concurrent treatment with other agents with actual or possible direct acting antiviral activity against SARS-CoV-2 such a chloroquine or hydroxychloroquine.  In addition, the patient should not participate in any other clinical trial of an experimental treatment for Covid-19    #Acute hypoxic respiratory failure   - Likely multiactorial. Ddx for hypoxia at this time includes COVID19, bacterial PNA, ?fungal PNA, and PE   - COVID19+ on 4/30, see below for detailed management plan   - Patient s/p tocilizumab with uptrending WBC and ?lobar PNA on initial xray (now multifocal, viral appearing) suggests possible coexisting bacterial PNA. Currently on matt and vanc. s/p azithro. Bcx negative 4/29, 5/3 NGTD. Pt spiking fevers today, rec rpt BCx. Fungitel pending, but patient with worsening clinical status and prior tocilizumab, rec adding fungal coverage while test is pending.   - D-dimer remains elevated and uptrending, could suggest PE or microthrombi. Pt now on heparin drip. Continued AC per primary team.   -Trops negative, slight elevation in BNP, less likely myocarditis   - Could get echo to assess cardiac function     # COVID-19 infection with viral PNA   - COVID+ 4/30 w/ CXR demonstrating infiltrates, now diffuse patchy opacities (5/3)    - Now requiring mechanical ventilation, weaning as tolerated as per primary team   - Continue Remdesivir (started  4/30)   - LFTs mildly elevated, likely due to either COVID progression or study drug. If LFTs continue to rise may need to d/c study drug. Please continue to monitor LFTS and creatinine daily   -Elevated alk phos could be from rocephin vs COVID19 progression, although. Please continue to monitor   - inflammatory markers elevated, please continue to monitor   - S/p tocilizumab 4/30    #VTE prophylaxis   - As above, pt with elevated d-dimer, please continue to monitor   - Now on heparin drip    - Further AC per primary team     Today is day #6 on study drug. Ordinal scale score: 3. 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    Patient now requiring mechanical ventilation. Tachycardic and continuing to spike fevers. BCx from 5/3 remain negative. CXR today with worsening opacities.     COVID positive 4/28/20  CRP 33.15 -> 15.76 -> 5.39  Ferritin 1060 -> 1896 -> 1781 -> 1871  Cr 0.48  Tmax 101.6   WBC 18.32 -> 12.17 -> 10.68 -> 18.37 --> 34.76 -> 32.69    Pt has agreed to participate in a clinical trial for Covid -19  In order to participate in this clinical trial , the patient should not take concurrent treatment with other agents with actual or possible direct acting antiviral activity against SARS-CoV-2 such a chloroquine or hydroxychloroquine.  In addition, the patient should not participate in any other clinical trial of an experimental treatment for Covid-19. Pt was given Tociluzimab by primary team on the day of enrollment as of concern for cytokine storm.     #Acute hypoxic respiratory failure   - Likely multiactorial. Ddx for hypoxia at this time includes COVID19, bacterial PNA, ?fungal PNA, and PE   - COVID19+ on 4/30, see below for detailed management plan   - Patient s/p tocilizumab with uptrending WBC and ?lobar PNA on initial xray (now multifocal, viral appearing) suggests possible coexisting bacterial PNA. Currently on matt and vanc. s/p azithro. Bcx negative 4/29, 5/3 NGTD. Pt spiking fevers today, rec rpt BCx. Fungitel pending, but patient with worsening clinical status and prior tocilizumab, rec adding fungal coverage while test is pending.   - D-dimer remains elevated and uptrending, could suggest PE or microthrombi. Pt now on heparin drip. Continued AC per primary team.   -Trops negative, slight elevation in BNP, less likely myocarditis   - Could get echo to assess cardiac function ,allegedly with echo that showed normal RV and LV function    # COVID-19 infection with viral PNA   - COVID+ 4/30 w/ CXR demonstrating infiltrates, now diffuse patchy opacities (5/3)    - Now requiring mechanical ventilation, weaning as tolerated as per primary team   - Continue Remdesivir (started  4/30)   - LFTs mildly elevated, likely due to either COVID progression or study drug or congestion or Rocephin or sepsis or other . If LFTs continue to rise may need to d/c study drug. Please continue to monitor LFTS and creatinine daily   -Elevated alk phos could be from rocephin vs COVID19 progression, although. Please continue to monitor   - inflammatory markers elevated, please continue to monitor   - S/p tocilizumab 4/30    #VTE prophylaxis   - As above, pt with elevated d-dimer, please continue to monitor   - Now on heparin drip    - Further AC per primary team     Today is day #6 on study drug. Ordinal scale score: 3. 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    Patient now requiring mechanical ventilation. Tachycardic and continuing to spike fevers. BCx from 5/3 remain negative. CXR today with worsening opacities.     COVID positive 4/28/20  CRP 33.15 -> 15.76 -> 5.39  Ferritin 1060 -> 1896 -> 1781 -> 1871  Cr 0.48  Tmax 101.6   WBC 18.32 -> 12.17 -> 10.68 -> 18.37 --> 34.76 -> 32.69    Pt has agreed to participate in a clinical trial for Covid -19  In order to participate in this clinical trial , the patient should not take concurrent treatment with other agents with actual or possible direct acting antiviral activity against SARS-CoV-2 such a chloroquine or hydroxychloroquine.  In addition, the patient should not participate in any other clinical trial of an experimental treatment for Covid-19. Pt was given Tociluzimab by primary team on the day of enrollment as of concern for cytokine storm.     #Acute hypoxic respiratory failure   - Likely multiactorial. Ddx for hypoxia at this time includes COVID19, bacterial PNA, ?fungal PNA, and PE   - COVID19+ on 4/30, see below for detailed management plan   - Patient s/p tocilizumab with uptrending WBC and ?lobar PNA on initial xray (now multifocal, viral appearing) suggests possible coexisting bacterial PNA. Currently on matt and vanc. s/p azithro. Bcx negative 4/29, 5/3 NGTD. Pt spiking fevers today, rec rpt BCx. Fungitel pending, but patient with worsening clinical status and prior tocilizumab, rec adding fungal coverage while test is pending.   - D-dimer remains elevated and uptrending, could suggest PE or microthrombi. Pt now on heparin drip. Continued AC per primary team.   -Trops negative, slight elevation in BNP, less likely myocarditis   - Could get echo to assess cardiac function ,allegedly with echo that showed normal RV and LV function    # COVID-19 infection with viral PNA   - COVID+ 4/30 w/ CXR demonstrating infiltrates, now diffuse patchy opacities (5/3)    - Now requiring mechanical ventilation, weaning as tolerated as per primary team   - Continue Remdesivir (started  4/30)   - LFTs mildly elevated, likely due to either COVID progression or study drug or congestion or Rocephin or sepsis or other . If LFTs continue to rise may need to d/c study drug. Please continue to monitor LFTS and creatinine daily   -Elevated alk phos could be from rocephin vs COVID19 progression, although. Please continue to monitor   - inflammatory markers elevated, please continue to monitor   - S/p tocilizumab 4/30    #VTE prophylaxis   - As above, pt with elevated d-dimer, please continue to monitor   - Now on heparin drip    - Further AC per primary team     Today is day #6 on study drug. Ordinal scale score: 2. SaO2 96& on mechanical ventilation     Meghan Coe MD   Graduate Physician

## 2020-05-05 NOTE — CHART NOTE - NSCHARTNOTEFT_GEN_A_CORE
Nutrition Follow Up Note   Patient seen for: nutrition follow up on COVID ICU     Source: medical record, communication with team. Unable to speak to pt due to current airborne isolation contact precautions related to COVID-19.     Chart reviewed, events noted. Pt transferred to ICU and intubated 5/4.    Diet Order: Diet, NPO with Tube Feed:   Tube Feeding Modality: Orogastric  Jevity 1.2 Cuauhtemoc (JEVITY1.2RTH)  Total Volume for 24 Hours (mL): 1080  Continuous  Starting Tube Feed Rate {mL per Hour}: 10  Increase Tube Feed Rate by (mL): 10     Every 6 hours  Until Goal Tube Feed Rate (mL per Hour): 45  Tube Feed Duration (in Hours): 24  Tube Feed Start Time: 17:00 (05-04-20 @ 17:10)    CURRENT EN ORDER PROVIDES: 1296 kcals, 60 gm protein    Nutrition Events: EN initiated yesterday, currently infusing at 20cc/hr. 24 hr proppofol intake 5/5 288 kcals    GI: no vomiting, no abd distention  last BM 5/4    Anthropometric Measurements:   Height (cm): 157.48 (04-28-20 @ 21:02)  Weight (kg): 70.3 (04-28-20 @ 21:02)  BMI (kg/m2): 28.3 (04-28-20 @ 21:02)  IBW: 50 kg      Medications: MEDICATIONS  (STANDING):  chlorhexidine 0.12% Liquid 15 milliLiter(s) Oral Mucosa two times a day  chlorhexidine 4% Liquid 1 Application(s) Topical <User Schedule>  famotidine Injectable 20 milliGRAM(s) IV Push daily  fentaNYL   Infusion... 0.5 MICROgram(s)/kG/Hr (1.76 mL/Hr) IV Continuous <Continuous>  heparin  Infusion 1200 Unit(s)/Hr (12 mL/Hr) IV Continuous <Continuous>  insulin lispro (HumaLOG) corrective regimen sliding scale   SubCutaneous every 6 hours  meropenem  IVPB 1000 milliGRAM(s) IV Intermittent every 8 hours  norepinephrine Infusion 0.05 MICROgram(s)/kG/Min (3.3 mL/Hr) IV Continuous <Continuous>  propofol Infusion 50 MICROgram(s)/kG/Min (21.1 mL/Hr) IV Continuous <Continuous>  remdesivir IVPB ([id number]) 100 milliGRAM(s) IV Intermittent daily  sodium chloride 0.9% lock flush 3 milliLiter(s) IV Push every 8 hours  vancomycin  IVPB 750 milliGRAM(s) IV Intermittent every 8 hours    MEDICATIONS  (PRN):  acetaminophen    Suspension .. 650 milliGRAM(s) Oral every 6 hours PRN Temp greater or equal to 38C (100.4F)  diphenhydrAMINE   Injectable 50 milliGRAM(s) IV Push once PRN anaphylaxis to study drug  EPINEPHrine     1 mG/mL Injectable 0.3 milliGRAM(s) IntraMuscular once PRN anaphylaxis to study drug  sodium chloride 0.9% lock flush 10 milliLiter(s) IV Push every 1 hour PRN Pre/post blood products, medications, blood draw, and to maintain line patency    Labs: 05-05 @ 07:56: Sodium --, Potassium --, Calcium --, Magnesium --, Phosphorus --, BUN --, Creatinine --, Glucose --, Alk Phos --, ALT/SGPT --, AST/SGOT --, Albumin --, Prealbumin --, Total Bilirubin --, Hemoglobin 13.3, Hematocrit 40.0, Ferritin --, C-Reactive Protein --, Creatine Kinase <<27>  05-05 @ 03:09: Sodium --, Potassium --, Calcium --, Magnesium --, Phosphorus --, BUN --, Creatinine --, Glucose --, Alk Phos --, ALT/SGPT --, AST/SGOT --, Albumin --, Prealbumin --, Total Bilirubin --, Hemoglobin --, Hematocrit --, Ferritin 1871<H>, C-Reactive Protein 5.39<H>, Creatine Kinase <<27>  05-04 @ 23:56: Sodium 134<L>, Potassium 3.9, Calcium 8.4, Magnesium 2.3, Phosphorus 3.6, BUN 8, Creatinine 0.48<L>, Glucose 139<H>, Alk Phos 263<H>, ALT/SGPT 77<H>, AST/SGOT 156<H>, Albumin 2.8<L>, Prealbumin --, Total Bilirubin 0.7, Hemoglobin 12.7, Hematocrit 38.4, Ferritin --, C-Reactive Protein --, Creatine Kinase <<27>    Triglycerides, Serum: 179 mg/dL (05-04-20 @ 23:56)    POCT Blood Glucose.: 166 mg/dL (05-04-20 @ 14:11)    Skin: no pressure injuries documented   Edema: none    Estimated Needs: based on dosing wt 70.3 kg  Energy: 3013-1530 kcals (20-25 kcals/kg)  Protein:  84-98 gm (1.2-1.4 gm/kg)    Previous Nutrition Diagnosis: none    New Nutrition Diagnosis:  none    Recommended Interventions:   1. Change EN to Vital AF (1.2) goal 55 cc/hr x 24 hrs to provide 1584 kcals, 99 gm protein, 1070cc free water meets 22 kcals/kg, 1.4 gm protein/kg dosing wt 70.3 kg      Monitoring and Evaluation:   Continue to monitor nutrition provision and tolerance, weights, labs, skin integrity.   RD remains available upon request and will follow up per protocol.

## 2020-05-05 NOTE — PROGRESS NOTE ADULT - ATTENDING COMMENTS
Agree with above. Patient seen and examined. Laboratory data and imaging reviewed. Patient is critically ill requiring ICU care and frequent bedside visits with therapy change.     Patient is generally healthy and presented with COVID symptoms. She was started on Remdesivir but had progression of her clinical course ultimately requiring Tocilizumab. She unfortunately continued to progress and had significant increase in her D-dimer this AM. She was intubated 5/4/2020 and transferred to 8ICU for further monitoring and care.    1. Acute Hypoxemic Respiratory Failure - s/p intubation in setting of COVID-19 ARDS. Lung protective ventilation. Adjust ventilator based on ABG results. Lung protective ventilation. Sedation and paralytics as needed to ensure vent synchrony.  2. Shock - continue vasopressors to maintain MAP > 65. Patient does NOT have an enlarged right heart on bedside echo therefore will hold off tPA. Continue therapeutic AC.  - Patient with hyperdynamic LV and virtual IVC - will give IVF bolus. Patient is having fevers and leukocytosis concerning for infection.  3. ID - COVID-19 received Remdesivir and Tocilizumab. Will continue antibiotics and add antifungal therapy. Check cultures. ID followup.  4. Renal function - monitor urine output and creatinine.    Prognosis guarded.

## 2020-05-06 NOTE — PROGRESS NOTE ADULT - SUBJECTIVE AND OBJECTIVE BOX
CHIEF COMPLAINT: Patient is a 65y old  Female who presents with a chief complaint of COVID-19, Hypoxic respiratory failure (06 May 2020 09:24)    Interval Events: Worsened oxygenation overnight. Required paralytics and proning at 8am. Ketamine started and prop stopped for hypertriglyceridemia. Started on Lasix 40mg BID overnight to maintain net even fluid balance. Continues to be febrile, 38.1 overnight but fever trend improving.      REVIEW OF SYSTEMS:  [x ] Unable to assess ROS because intubated/sedated    OBJECTIVE:  ICU Vital Signs Last 24 Hrs  T(C): 37.1 (06 May 2020 10:40), Max: 38.2 (06 May 2020 04:00)  T(F): 98.8 (06 May 2020 10:40), Max: 100.8 (06 May 2020 04:00)  HR: 106 (06 May 2020 11:16) (97 - 124)  BP: --  BP(mean): --  ABP: 152/58 (06 May 2020 11:16) (90/49 - 169/61)  ABP(mean): 92 (06 May 2020 11:16) (65 - 102)  RR: 36 (06 May 2020 11:16) (36 - 36)  SpO2: 98% (06 May 2020 11:16) (90% - 99%) on 100%    Mode: AC/ CMV (Assist Control/ Continuous Mandatory Ventilation), RR (machine): 36, TV (machine): 320, FiO2: 100, PEEP: 12, ITime: 0.61, MAP: 19, PIP: 40     @ 07: @ 07:00  --------------------------------------------------------  IN: 6217.5 mL / OUT: 2100 mL / NET: 4117.5 mL     @ 07:  -   @ 11:55  --------------------------------------------------------  IN: 951.9 mL / OUT: 800 mL / NET: 151.9 mL      CAPILLARY BLOOD GLUCOSE      POCT Blood Glucose.: 122 mg/dL (06 May 2020 10:50)      PHYSICAL EXAM:  General: Intubated  HEENT: WNL  Neck: WNL  Respiratory: Coarse bilaterally   Cardiovascular: RRR  Abdomen: Hypoactive BS, Last BM 5/4  Extremities: WNL   Skin: Intact  Neurological: sedated, paralyzed     HOSPITAL MEDICATIONS:  MEDICATIONS  (STANDING):  caspofungin IVPB      caspofungin IVPB 50 milliGRAM(s) IV Intermittent every 24 hours  chlorhexidine 0.12% Liquid 15 milliLiter(s) Oral Mucosa two times a day  chlorhexidine 4% Liquid 1 Application(s) Topical <User Schedule>  famotidine Injectable 20 milliGRAM(s) IV Push daily  fentaNYL   Infusion... 0.5 MICROgram(s)/kG/Hr (1.76 mL/Hr) IV Continuous <Continuous>  furosemide   Injectable 40 milliGRAM(s) IV Push every 12 hours  heparin  Infusion 1200 Unit(s)/Hr (9 mL/Hr) IV Continuous <Continuous>  insulin lispro (HumaLOG) corrective regimen sliding scale   SubCutaneous every 6 hours  ketamine Infusion. 0.25 mG/kG/Hr (1.76 mL/Hr) IV Continuous <Continuous>  meropenem  IVPB 1000 milliGRAM(s) IV Intermittent every 8 hours  norepinephrine Infusion 0.05 MICROgram(s)/kG/Min (3.3 mL/Hr) IV Continuous <Continuous>  phenylephrine    Infusion 0.3 MICROgram(s)/kG/Min (3.95 mL/Hr) IV Continuous <Continuous>  polyethylene glycol 3350 17 Gram(s) Oral daily  propofol Infusion 50 MICROgram(s)/kG/Min (21.1 mL/Hr) IV Continuous <Continuous>  remdesivir IVPB ([id number]) 100 milliGRAM(s) IV Intermittent daily  rocuronium Infusion 8 MICROgram(s)/kG/Min (6.75 mL/Hr) IV Continuous <Continuous>  senna 2 Tablet(s) Oral at bedtime  sodium chloride 0.9% lock flush 3 milliLiter(s) IV Push every 8 hours  vancomycin  IVPB 750 milliGRAM(s) IV Intermittent every 8 hours    MEDICATIONS  (PRN):  acetaminophen    Suspension .. 650 milliGRAM(s) Oral every 6 hours PRN Temp greater or equal to 38C (100.4F)  diphenhydrAMINE   Injectable 50 milliGRAM(s) IV Push once PRN anaphylaxis to study drug  EPINEPHrine     1 mG/mL Injectable 0.3 milliGRAM(s) IntraMuscular once PRN anaphylaxis to study drug  sodium chloride 0.9% lock flush 10 milliLiter(s) IV Push every 1 hour PRN Pre/post blood products, medications, blood draw, and to maintain line patency      LABS:  ( @ 00:15)                        12.5  33.56 )-----------( 279                 39.5    Neutrophils = -- (--%)  Lymphocytes = -- (--%)  Eosinophils = -- (--%)  Basophils = -- (--%)  Monocytes = -- (--%)  Bands = --%    WBC Trend: 33.56<--, 32.69<--, 34.76<--  Hb Trend: 12.5<--, 13.3<--, 12.7<--, 11.5<--, 12.7<--  Plt Trend: 279<--, 277<--, 250<--, 193<--, 271<--      135  |  99  |  14  ----------------------------<  158<H>  4.4   |  24  |  0.64    Ca    7.5<L>      06 May 2020 00:15  Phos  2.3       Mg     2.2         TPro  5.3<L>  /  Alb  2.2<L>  /  TBili  0.3  /  DBili  x   /  AST  138<H>  /  ALT  92<H>  /  AlkPhos  304<H>      Creatinine Trend: 0.64<--, 0.78<--, 0.48<--, 0.50<--, 0.48<--, 0.45<--  PT/INR - ( 06 May 2020 00:15 )   PT: 13.7 sec;   INR: 1.19 ratio         PTT - ( 06 May 2020 05:20 )  PTT:58.0 sec  Urinalysis Basic - ( 05 May 2020 13:11 )    Color: Yellow / Appearance: Slightly Turbid / S.029 / pH: x  Gluc: x / Ketone: Negative  / Bili: Negative / Urobili: Negative   Blood: x / Protein: 100 mg/dL / Nitrite: Positive   Leuk Esterase: Negative / RBC: >50 /hpf / WBC 6-10   Sq Epi: x / Non Sq Epi: 0 /hpf / Bacteria: Few      Arterial Blood Gas:   @ 10:56  7.23/76/124/31/98/1.4  ABG lactate: --  Arterial Blood Gas:   @ 06:11  7.24/69/65/28/90/-.3  ABG lactate: --  Arterial Blood Gas:   @ 00:11  7.24/68/82/28/95/-.3  ABG lactate: --  Arterial Blood Gas:   @ 16:46  7.24/67/76/28/93/-.4  ABG lactate: --  Arterial Blood Gas:   @ 13:06  7.24/63/91/26/96/-2.0  ABG lactate: --  Arterial Blood Gas:   @ 05:08  7.33/58/84/30/95/2.9  ABG lactate: --  Arterial Blood Gas:   @ 23:57  7.32/61/89/31/96/3.5  ABG lactate: --  Arterial Blood Gas:   @ 15:43  7.21/64/150/25/98/-3.9  ABG lactate: --      CARDIAC MARKERS ( 06 May 2020 00:15 )  Trop x     / CK 55 U/L / CKMB x       CARDIAC MARKERS ( 04 May 2020 23:56 )  Trop x     / CK 64 U/L / CKMB x       CARDIAC MARKERS ( 04 May 2020 15:52 )  Trop x     / CK 80 U/L / CKMB x             MICROBIOLOGY:   Blood Cx: Pending   Urine Cx: Pending   Sputum Cx: No growth

## 2020-05-06 NOTE — PROGRESS NOTE ADULT - ASSESSMENT
64 y/o F with no significant PMH presents 4/29 with hypoxic respiratory failure 2nd COVID PNA. s/p Tocilizumab (4/30), s/p empiric Ceftriaxone/Azithro (4/30-5/4) for ?superimposed CAP. Currently enrolled in Remdesivir trial (4/30). Intubated 5/4 for worsened hypoxic respiratory failure. Severe ARDS requiring proning on 5/6.     #Neuro   - Sedated and paralyzed with Rocuronium    - Propofol weaned off d/t worsened hypertriglyceridemia, continue to monitor     #CV  - Pressor requirements decreasing from yesterday  - c/w Nolan to maintain MAP>60, levo weaned to off  - Monitor triglycerides   - Elevated D-dimer: bedside POCUS with hyperdynamic LV function, no evidence of RV dysfunction   - CPK, troponin WNL    #Pulm:   -Hypoxic respiratory failure 2nd COVID PNA  -Worsened oxygenation overnight, now proned  -Will attempt to decrease fi02 to 80%  -Worsened hypercarbia, increase Tv to 340cc, plateau pressure 28 after proning     #GI  - Transaminitis with normal TBili, continue to monitor   - Trickle feeds while proned   - Last BM 5/4  - c/w Senna, Miralax   - Pepcid 20mg qd     #  - Net 4L positive yesterday  -Started on Lasix 40mg BID to target net even fluid balance   - Creat stable    #ID   -Leukocytosis, fever of unclear etiology   -c/w Meropenem, Vanco, Caspofungin empirically   -Fungitell negative   -f/u pan-cultures from 5/4  -Vanco dose increased yesterday to 750mg q8, trough 7.1  -Appreciate ID follow up    COVID:   -remdesivir trial 5/1-5/9, monitor LFT  -s/p Tocilizumab       #Heme   -Heparin gtt for uptrending D-dimer   - Goal aPTT 55-70    #Endo  -Humalog SS    #Ethics  -Full code

## 2020-05-06 NOTE — PROGRESS NOTE ADULT - ATTENDING COMMENTS
Agree with above. Patient seen and examined. Laboratory data and imaging reviewed. Patient is critically ill requiring ICU care and frequent bedside visits with therapy change.     Patient is generally healthy and presented with COVID symptoms. She was started on Remdesivir but had progression of her clinical course ultimately requiring Tocilizumab. She unfortunately continued to progress and had significant increase in her D-dimer. She was intubated 5/4/2020 and transferred to 8ICU for further monitoring and care.    1. Acute Hypoxemic Respiratory Failure - s/p intubation in setting of COVID-19 ARDS. Lung protective ventilation. Adjust ventilator based on ABG results. Lung protective ventilation. She has had persistent hypoxia therefore will paralyze and prone her to see if improvements in her P/F  - Plateau 28  2. Shock - continue vasopressors to maintain MAP > 65. Patient does NOT have an enlarged right heart on bedside echo therefore will hold off tPA. Continue therapeutic AC.  - Patient had improvement in vasopressor requirements with IVF and was 4L positive overnight. Now will aim for net even. She continues on Nolan but is off Levo.  3. ID - COVID-19 received Remdesivir and Tocilizumab. Will continue antibiotics and antifungal therapy. Check cultures. ID followup.  4. Renal function - monitor urine output and creatinine.    Prognosis guarded.

## 2020-05-06 NOTE — PROGRESS NOTE ADULT - ASSESSMENT
65F reporting no PMH who presented last night with complaint of fever/chills (home T-max 102 F), night sweats, cough, myalgias (mid & lower back), loss of taste and smell since Friday 4/24 (still tastes sweet/salty), as well as 2 days of weakness and constipation, found to be COVID positive on 4/28/20.    Patient now requiring mechanical ventilation. Tachycardic and continuing to spike fevers. BCx from 5/3 remain negative.    COVID positive 4/28/20  CRP 33.15 -> 15.76 -> 5.39  Ferritin 1060 -> 1896 -> 1781 -> 1871  Cr 0.48  Tmax 101.6   WBC 18.32 -> 12.17 -> 10.68 -> 18.37 --> 34.76 -> 32.69    Hospital course  4/29: Started Ceftriaxone and Azithromycin in ED for possible bacterial PNA  4/30: RRT called for desaturation in 70s before receiving 1st dose of Remdesivir and Tocilizumab  5/4: RRT called for desaturation to 20s after drinking water and coughing, intubated by anesthesia. Sedated and started full dose lovenox after D-dimer elevated to 7[address]. Transferred to ICU    Pt has agreed to participate in a clinical trial for Covid -19  In order to participate in this clinical trial , the patient should not take concurrent treatment with other agents with actual or possible direct acting antiviral activity against SARS-CoV-2 such a chloroquine or hydroxychloroquine.  In addition, the patient should not participate in any other clinical trial of an experimental treatment for Covid-19. Pt was given Tocilizumab by primary team on the day of enrollment as of concern for cytokine storm.     #Acute hypoxic respiratory failure   - Likely multiactorial. Ddx for hypoxia at this time includes COVID19, bacterial PNA, fungal PNA, aspiration PNA, and PE   - COVID19+ on 4/28, see below for detailed management plan   - Patient s/p tocilizumab with up-trending WBC and ?lobar PNA on initial xray (now multifocal, viral appearing) suggests possible coexisting bacterial PNA. Currently on Meropenem (5/4-), Vancomycin (5/3-), and Caspofungin (5/4-). s/p Ceftriaxone (4/29-5/3) and Azithromycin (4/29-5/4). Bcx negative 4/29, 5/3 NGTD. Pt spiking fevers today, rec repeat BCx. Fungitell negative.   - D-dimer remains elevated and uptrending, could suggest PE or microthrombi. Pt now on heparin drip. Continued AC per primary team.   - Trops negative, slight elevation in BNP, less likely myocarditis   - Could get echo to assess cardiac function, allegedly with echo that showed normal RV and LV function    # COVID-19 infection with viral PNA   - COVID+ 4/30 w/ CXR demonstrating infiltrates, now diffuse patchy opacities (5/3)    - Now requiring mechanical ventilation, weaning as tolerated as per primary team   - Continue Remdesivir (started  4/30)   - LFTs mildly elevated, likely due to either COVID progression or study drug or congestion or Rocephin or sepsis or other . If LFTs continue to rise may need to d/c study drug. Please continue to monitor LFTS and creatinine daily   -Elevated alk phos could be from rocephin vs COVID19 progression, although. Please continue to monitor   - inflammatory markers elevated, please continue to monitor   - S/p tocilizumab 4/30    #VTE prophylaxis   - As above, pt with elevated d-dimer, please continue to monitor   - Now on heparin drip    - Further AC per primary team     Today is day #6 on study drug. Ordinal scale score: 3. 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    Patient now requiring mechanical ventilation. Tachycardic and continuing to spike fevers. BCx from 5/3 remain negative.    COVID positive 4/28/20  CRP 33.15 -> 15.76 -> 5.39  Ferritin 1060 -> 1896 -> 1781 -> 1871  Cr 0.48  Tmax 101.6   WBC 18.32 -> 12.17 -> 10.68 -> 18.37 --> 34.76 -> 32.69    Hospital course  4/29: Started Ceftriaxone and Azithromycin in ED for possible bacterial PNA  4/30: RRT called for desaturation in 70s before receiving 1st dose of Remdesivir and Tocilizumab  5/4: RRT called for desaturation to 20s after drinking water and coughing, intubated by anesthesia. Sedated and started full dose lovenox after D-dimer elevated to [address]. Transferred to ICU    Pt has agreed to participate in a clinical trial for Covid -19  In order to participate in this clinical trial , the patient should not take concurrent treatment with other agents with actual or possible direct acting antiviral activity against SARS-CoV-2 such a chloroquine or hydroxychloroquine.  In addition, the patient should not participate in any other clinical trial of an experimental treatment for Covid-19. Pt was given Tocilizumab by primary team on the day of enrollment as of concern for cytokine storm.     #Acute hypoxic respiratory failure   - Likely multiactorial. Ddx for hypoxia at this time includes COVID19, bacterial PNA, fungal PNA, aspiration PNA, and PE   - COVID19+ on 4/28, see below for detailed management plan   - Patient s/p tocilizumab with up-trending WBC and ?lobar PNA on initial xray (now multifocal, viral appearing) suggests possible coexisting bacterial PNA. Currently on Meropenem (5/4-), Vancomycin (5/3-), and Caspofungin (5/4-). s/p Ceftriaxone (4/29-5/3) and Azithromycin (4/29-5/4). Bcx negative 4/29, 5/3 NGTD. Pt spiking fevers today, rec repeat BCx. Fungitell negative. Sputum cx showed no organism.   - Urinanalysis positive for WBC, bacteria, and nitrite. Rec ?Urine Cx   - D-dimer remains elevated and uptrending, could suggest PE or microthrombi. Pt now on heparin drip. Continued AC per primary team.   - Trops negative, slight elevation in BNP, less likely myocarditis   - Could get echo to assess cardiac function, allegedly with echo that showed normal RV and LV function    # COVID-19 infection with viral PNA   - COVID+ 4/28 w/ CXR demonstrating infiltrates, now diffuse patchy opacities (5/3)    - Now requiring mechanical ventilation, weaning as tolerated as per primary team   - Continue Remdesivir (started 4/30)   - LFTs mildly elevated, likely due to either COVID 19 vs Ceftriaxone vs Remdesivir vs sepsis . If LFTs continue to rise may need to d/c study drug. Please continue to monitor LFTs and creatinine daily   - inflammatory markers elevated, please continue to monitor   - S/p tocilizumab 4/30    # Elevated CRP/Ferritin/D-dimer  - CRP trending up, Ferritin and D-dimer trending down but still significantly elevated. Could be COVID related  - D-dimer remains elevated, could suggest PE or microthrombi. Pt now on heparin drip. Continued AC per primary team.     #VTE prophylaxis   - As above, pt with elevated d-dimer, please continue to monitor   - Now on heparin drip    - Further AC per primary team     Today is day #7 on study drug. Ordinal scale score: 2.       Niki Caceres MD. Graduate Physician 65F reporting no PMH who presented last night with complaint of fever/chills (home T-max 102 F), night sweats, cough, myalgias (mid & lower back), loss of taste and smell since Friday 4/24 (still tastes sweet/salty), as well as 2 days of weakness and constipation, found to be COVID positive on 4/28/20.    Pt has agreed to participate in a clinical trial for Covid -19  In order to participate in this clinical trial , the patient should not take concurrent treatment with other agents with actual or possible direct acting antiviral activity against SARS-CoV-2 such a chloroquine or hydroxychloroquine.  In addition, the patient should not participate in any other clinical trial of an experimental treatment for Covid-19. Pt was given Tocilizumab by primary team on the day of enrollment as of concern for cytokine storm.     Hospital course  4/29: Started Ceftriaxone and Azithromycin in ED for possible bacterial PNA  4/30: RRT called for desaturation in 70s before receiving 1st dose of Remdesivir and Tocilizumab  5/4: RRT called for desaturation to 20s after drinking water and coughing, intubated by anesthesia. Sedated and started full dose lovenox after D-dimer elevated to [address]. Transferred to ICU    # Sepsis   - Pt continues to be febrile, tachycardic, with up-trending WBC (33.56) and respiratory failure. Suspected source include pneumonia (viral, bacterial, fungal, or aspiration) vs bacteremia vs urosepsis  - Currently on Meropenem (5/4-), Vancomycin (5/3-), and Caspofungin (5/4-). s/p Tocilizumab (4/30), Ceftriaxone (4/29-5/3) and Azithromycin (4/29-5/4).   - Urinanalysis positive for WBC, bacteria, and nitrite suggesting UTI. Bcx negative 4/29, 5/3 NGTD. Fungitell negative. Sputum cx grew no organism.   - F/u repeat blood Cx and sputum Cx  - D-dimer remains elevated and up-trending, could suggest PE or microthrombi. Pt now on heparin drip. Continued AC per primary team.   - Trops negative, slight elevation in BNP, less likely myocarditis   - Could get echo to assess cardiac function, allegedly with echo that showed normal RV and LV function    # Acute hypoxic respiratory failure  - Multifactorial, likely secondary to COVID-19 infection with viral PNA, with possible superimposed bacterial vs fungal vs aspiration PNA and/or PE   - COVID+ 4/28 w/ CXR demonstrating infiltrates, now diffuse patchy opacities (5/3)    - Now requiring mechanical ventilation, weaning as tolerated as per primary team   - Continue Remdesivir (started 4/30)   - Inflammatory markers elevated, please continue to monitor   - S/p tocilizumab 4/30    # Elevated LFTs  - Likely due to either COVID 19 vs Ceftriaxone vs Remdesivir vs sepsis .   - If LFTs continue to rise may need to d/c study drug. Please continue to monitor LFTs and creatinine daily     # Elevated CRP/Ferritin/D-dimer  - CRP trending up, Ferritin and D-dimer trending down but still significantly elevated. Could be COVID related  - D-dimer remains elevated, could suggest PE or microthrombi. Pt now on heparin drip. Continued AC per primary team.     #VTE prophylaxis   - As above, pt with elevated d-dimer, please continue to monitor   - Now on heparin drip    - Further AC per primary team     Today is day #7 on study drug. Ordinal scale score: 2.       Niki Caceres MD. Graduate Physician 65F reporting no PMH who presented last night with complaint of fever/chills (home T-max 102 F), night sweats, cough, myalgias (mid & lower back), loss of taste and smell since Friday 4/24 (still tastes sweet/salty), as well as 2 days of weakness and constipation, found to be COVID positive on 4/28/20.    Pt has agreed to participate in a clinical trial for Covid -19  In order to participate in this clinical trial , the patient should not take concurrent treatment with other agents with actual or possible direct acting antiviral activity against SARS-CoV-2 such a chloroquine or hydroxychloroquine.  In addition, the patient should not participate in any other clinical trial of an experimental treatment for Covid-19. Pt was given Tocilizumab by primary team on the day of enrollment as of concern for cytokine storm.     Hospital course  4/29: Started Ceftriaxone and Azithromycin in ED for possible bacterial PNA  4/30: RRT called for desaturation in 70s before receiving 1st dose of Remdesivir and Tocilizumab  5/4: RRT called for desaturation to 20s after drinking water and coughing, intubated by anesthesia. Sedated and started full dose lovenox after D-dimer elevated to [address]. Transferred to ICU    # Sepsis   - Pt continues to be febrile, tachycardic, with up-trending WBC (33.56) and respiratory failure. Suspected source include pneumonia (viral, bacterial, fungal, or aspiration) vs bacteremia vs urosepsis vs myocarditis  - Currently on Meropenem (5/4-), Vancomycin (5/3-), and Caspofungin (5/4-). s/p Tocilizumab (4/30), Ceftriaxone (4/29-5/3) and Azithromycin (4/29-5/4).   - Urinanalysis positive for WBC, bacteria, and nitrite suggesting UTI. Bcx negative 4/29, 5/3 NGTD. Fungitell negative. Sputum cx grew no organism.   - F/u repeat blood Cx and sputum Cx  - Trops negative, though BNP trending up (410>1400). Would recommend getting echo to assess cardiac function to rule out myocarditis and/or heart failure (POCUS on 5/4 documented borderline LV hyperdynamic systolic function and possible LV apical hypocontractility)  - D-dimer remains elevated and up-trending, could suggest PE or microthrombi. Pt now on heparin drip. Continued AC per primary team.     # Acute hypoxic respiratory failure  - Multifactorial, likely secondary to COVID-19 infection with viral PNA, with possible superimposed bacterial vs fungal vs aspiration PNA and/or PE   - COVID+ 4/28 w/ CXR demonstrating infiltrates, now diffuse patchy opacities (5/3)    - Now requiring mechanical ventilation, weaning as tolerated as per primary team   - Continue Remdesivir (started 4/30)   - Inflammatory markers elevated, please continue to monitor   - S/p tocilizumab 4/30    # Elevated LFTs  - Likely due to either COVID 19 vs Ceftriaxone vs Remdesivir vs sepsis .   - If LFTs continue to rise may need to d/c study drug. Please continue to monitor LFTs and creatinine daily     # Elevated CRP/Ferritin/D-dimer  - CRP trending up, Ferritin and D-dimer trending down but still significantly elevated. Could be COVID related  - D-dimer remains elevated, could suggest PE or microthrombi. Pt now on heparin drip. Continued AC per primary team.     #VTE prophylaxis   - As above, pt with elevated d-dimer, please continue to monitor   - Now on heparin drip    - Further AC per primary team     Today is day #7 on study drug. Ordinal scale score: 2.       Niki Caceres MD. Graduate Physician 65F reporting no PMH who presented last night with complaint of fever/chills (home T-max 102 F), night sweats, cough, myalgias (mid & lower back), loss of taste and smell since Friday 4/24 (still tastes sweet/salty), as well as 2 days of weakness and constipation, found to be COVID positive on 4/28/20.    Pt has agreed to participate in a clinical trial for Covid -19  In order to participate in this clinical trial , the patient should not take concurrent treatment with other agents with actual or possible direct acting antiviral activity against SARS-CoV-2 such a chloroquine or hydroxychloroquine.  In addition, the patient should not participate in any other clinical trial of an experimental treatment for Covid-19. Pt was given Tocilizumab by primary team on the day of enrollment as of concern for cytokine storm.     Hospital course  4/29: Started Ceftriaxone and Azithromycin in ED for possible bacterial PNA  4/30: RRT called for desaturation in 70s before receiving 1st dose of Remdesivir and Tocilizumab  5/4: RRT called for desaturation to 20s after drinking water and coughing, intubated by anesthesia. Sedated and started full dose lovenox after D-dimer elevated to 8[address]. Transferred to ICU    # Sepsis   - Pt continues to be febrile, tachycardic, with up-trending WBC (33.56) and respiratory failure. Suspected source include pneumonia (viral, bacterial, fungal, or aspiration) vs bacteremia vs urosepsis vs myocarditis vs microthrombi with the elevated D-Dimer  - Currently on Meropenem (5/4-), Vancomycin (5/3-), and Caspofungin (5/4-). s/p Tocilizumab (4/30), Ceftriaxone (4/29-5/3) and Azithromycin (4/29-5/4).   - Urinanalysis positive for 6-10  WBC, bacteria, and nitrite . Bcx negative 4/29, 5/3 NGTD. Fungitell negative. Sputum cx grew no organism.   - F/u repeat blood Cx and sputum Cx and urine culture  - Trops negative, though BNP trending up (410>1400). Would recommend getting echo to assess cardiac function to rule out myocarditis and/or heart failure (POCUS on 5/4 documented borderline LV hyperdynamic systolic function and possible LV apical hypocontractility)  - D-dimer remains elevated and up-trending, could suggest PE or microthrombi. Pt now on heparin drip. Continued AC per primary team.     # Acute hypoxic respiratory failure  - Multifactorial, likely secondary to COVID-19 infection with viral PNA, with possible superimposed bacterial vs fungal vs aspiration PNA and/or PE   - COVID+ 4/28 w/ CXR demonstrating infiltrates, now diffuse patchy opacities (5/3)    - Now requiring mechanical ventilation, weaning as tolerated as per primary team   - Continue Remdesivir (started 4/30)   - Inflammatory markers elevated, please continue to monitor   - S/p tocilizumab 4/30  -consider V-V ECMO if patient is a candidate and doesn't improve with prone postioning    # Elevated LFTs  - Likely due to either COVID 19 vs Ceftriaxone vs Remdesivir vs sepsis .   - If LFTs continue to rise may need to d/c study drug. Please continue to monitor LFTs and creatinine daily     # Elevated CRP/Ferritin/D-dimer  - CRP trending up, Ferritin and D-dimer trending down but still significantly elevated. Could be COVID related  - D-dimer remains elevated, could suggest PE or microthrombi. Pt now on heparin drip. Continued AC per primary team.     #VTE prophylaxis   - As above, pt with elevated d-dimer, please continue to monitor   - Now on heparin drip    - Further AC per primary team     Today is day #7 on study drug. Ordinal scale score: 2.       Niki Caceres MD. Graduate Physician

## 2020-05-06 NOTE — PROGRESS NOTE ADULT - SUBJECTIVE AND OBJECTIVE BOX
Patient is a 65y old  Female who presents with a chief complaint of COVID-19, Hypoxic respiratory failure (05 May 2020 08:51)    Being followed by ID for COVID, clinical trial       Interval history:  Pt remains sedated, on pressors, and intubated with PEEP of 12 and 100% O2. Her SpO2 improved from 91% to 98% after proning this morning. Continue to spike fevers, Tmax 101.5. No other acute events  Per team not a candidate for VVECMO (age>60)      ROS:  Unable to assess    PAST MEDICAL & SURGICAL HISTORY:  No pertinent past medical history  History of cholecystectomy  History of appendectomy    Allergies    No Known Allergies    Intolerances        MEDICATIONS  (STANDING):  caspofungin IVPB      caspofungin IVPB 50 milliGRAM(s) IV Intermittent every 24 hours  chlorhexidine 0.12% Liquid 15 milliLiter(s) Oral Mucosa two times a day  chlorhexidine 4% Liquid 1 Application(s) Topical <User Schedule>  famotidine Injectable 20 milliGRAM(s) IV Push daily  fentaNYL   Infusion... 0.5 MICROgram(s)/kG/Hr (1.76 mL/Hr) IV Continuous <Continuous>  furosemide   Injectable 40 milliGRAM(s) IV Push every 12 hours  heparin  Infusion 1200 Unit(s)/Hr (9 mL/Hr) IV Continuous <Continuous>  insulin lispro (HumaLOG) corrective regimen sliding scale   SubCutaneous every 6 hours  ketamine Infusion. 0.25 mG/kG/Hr (1.76 mL/Hr) IV Continuous <Continuous>  meropenem  IVPB 1000 milliGRAM(s) IV Intermittent every 8 hours  norepinephrine Infusion 0.05 MICROgram(s)/kG/Min (3.3 mL/Hr) IV Continuous <Continuous>  phenylephrine    Infusion 0.3 MICROgram(s)/kG/Min (3.95 mL/Hr) IV Continuous <Continuous>  polyethylene glycol 3350 17 Gram(s) Oral daily  propofol Infusion 50 MICROgram(s)/kG/Min (21.1 mL/Hr) IV Continuous <Continuous>  remdesivir IVPB ([id number]) 100 milliGRAM(s) IV Intermittent daily  rocuronium Infusion 8 MICROgram(s)/kG/Min (6.75 mL/Hr) IV Continuous <Continuous>  senna 2 Tablet(s) Oral at bedtime  sodium chloride 0.9% lock flush 3 milliLiter(s) IV Push every 8 hours  vancomycin  IVPB 750 milliGRAM(s) IV Intermittent every 8 hours    MEDICATIONS  (PRN):  acetaminophen    Suspension .. 650 milliGRAM(s) Oral every 6 hours PRN Temp greater or equal to 38C (100.4F)  diphenhydrAMINE   Injectable 50 milliGRAM(s) IV Push once PRN anaphylaxis to study drug  EPINEPHrine     1 mG/mL Injectable 0.3 milliGRAM(s) IntraMuscular once PRN anaphylaxis to study drug  sodium chloride 0.9% lock flush 10 milliLiter(s) IV Push every 1 hour PRN Pre/post blood products, medications, blood draw, and to maintain line patency      ICU Vital Signs Last 24 Hrs  T(C): 37.9 (06 May 2020 07:00), Max: 38.6 (05 May 2020 10:00)  T(F): 100.2 (06 May 2020 07:00), Max: 101.5 (05 May 2020 10:00)  HR: 109 (06 May 2020 08:45) (100 - 126)  BP: --  BP(mean): --  ABP: 169/61 (06 May 2020 08:00) (90/42 - 169/61)  ABP(mean): 95 (06 May 2020 08:00) (58 - 95)  RR: 36 (06 May 2020 08:00) (36 - 36)  SpO2: 94% (06 May 2020 08:00) (85% - 98%); during my evaluation, SpO2 98% while prone    Physical Exam:  Constitutional sedated, on ventilator. Febrile   Chest vented, good AE with coarse breath sounds b/l with rare crackles  CVS tachy to 120s    tyler in place, making urine   Ext No cyanosis clubbing or edema  CNS sedated     Lab Data:                          12.5   33.56 )-----------( 279      ( 06 May 2020 00:15 )             39.5              05-06    135  |  99  |  14  ----------------------------<  158<H>  4.4   |  24  |  0.64    Ca    7.5<L>      06 May 2020 00:15  Phos  2.3     05-06  Mg     2.2     05-06    TPro  5.3<L>  /  Alb  2.2<L>  /  TBili  0.3  /  DBili  x   /  AST  138<H>  /  ALT  92<H>  /  AlkPhos  304<H>  05-06    Culture - Blood (05.03.20 @ 23:07)    Specimen Source: .Blood Blood-Peripheral    Culture Results:   No growth to date.    Fungitell (05.04.20 @ 05:24)    Fungitell: <31: Interpretation: The Fungitell assay does not detect certain fungal  species such as the genus Cryptococcus (Hira et al. 1991) which  produces very low levels of (1-3)-Beta-D-Glucan. The assay also does  not detect the Zygomycetes such as Absidia, Mucor and Rhizopus  (Hipolito et al. 1994) which are not known to produce  (1-3)-Beta-D-Glucan. In addition, the yeast phase of Blastomyces  dermatitidis produces little (1-3)-Beta-D-Glucan and may not be  detected by the assay (Madalyn et al. 2007).  Reference Range:  Less than 60 pg/mL. Glucan values of less than 60 pg/mL are  interpreted as negative.  Glucan values of 60 to 79 pg/mL are interpreted as indeterminate,  and suggest a possible fungal infection. Additional sampling and  testing of sera is required to interpret the results.  Glucan values of greater than or equal to 80 pg/mL are interpreted  as positive.  Due to the potential for environmental contamination when  transferred to pour-off tubes, which can lead to false positive  results, interpret positive results from samples provided in  pour-off tubes with caution.  Results should be used in conjunction  with clinical findings, and should not form the sole basis for a  diagnosis or treatment decision. The Fungitell test is approved or  cleared for in vitro diagnostic use by the U.S Food and Drug  Administration. Modifications to the approved package insert have  been made and the performance characteristics for these  modifications were determined by [facility].  Ifsample result is greater than 500 pg/mL, physician may order a  titer of the sample. Please contact [facility] if you would  like to order a retest of this sample to obtain an actual value.  Samples are held for 1 week after initial testing date.  ____________________________________________________________  Performed at:  [facility]  [address] [facility] [place] [address]  [phone]  : Henrietta Daley PhD HCLD([facility])  IA# [id number] pg/mL        Vancomycin Level, Trough (05.06.20 @ 04:19)    Vancomycin Level, Trough: 7.1: Vancomycin trough levels should be rapidly reached and maintained at  15-20 ug/ml for life threatening MRSA  infections such as sepsis, endocarditis, osteomyelitis and pneumonia. A  first trough level should be drawn  before the 3rd or 4th dose.  Risk of renal toxicity is increased for levels >15 ug/ml, in patients on  other nephrotoxic drugs, who are  hemodynamically unstable, have unstable renal function, or are on  Vancomycin therapy for >14 days. Renal function with  creatinine levels should be monitored for those patients. ug/mL      WBC Count: 33.56 (05.06.20 @ 00:15)  WBC Count: 32.69 (05-05-20 @ 07:56)  WBC Count: 34.76 (05-04-20 @ 23:56)  WBC Count: 32.55 (05-04-20 @ 18:11)  WBC Count: 28.79 (05-04-20 @ 05:30)  WBC Count: 18.37 (05-03-20 @ 06:10)  WBC Count: 10.68 (05-02-20 @ 06:38)  WBC Count: 12.17 (05-01-20 @ 07:50)  WBC Count: 18.32 (04-30-20 @ 06:53)  WBC Count: 17.27 (04-29-20 @ 17:29)  WBC Count: 14.76 (04-28-20 @ 22:45)    D-Dimer Assay, Quantitative: 41500 ng/mL DDU (05-06)  D-Dimer Assay, Quantitative: 00561 ng/mL DDU (05-04)  D-Dimer Assay, Quantitative: 50603 ng/mL DDU (05-04)  D-Dimer Assay, Quantitative: 966 ng/mL DDU (05-01)    Ferritin, Serum: 1747 ng/mL (05-06)  Ferritin, Serum: 1871 ng/mL (05-05)  Ferritin, Serum: 1781 ng/mL (05-04)  Ferritin, Serum: 1549 ng/mL (05-04)    C-Reactive Protein, Serum: 9.35 mg/dL (05-06-20 @ 02:35)  C-Reactive Protein, Serum: 5.39 mg/dL (05-05-20 @ 03:09)  C-Reactive Protein, Serum: 5.47 mg/dL (05-04-20 @ 18:45)  C-Reactive Protein, Serum: 5.26 mg/dL (05-04-20 @ 07:48)        Ordinal Scale: score : 2  1) Death  2) Hospitalized, on invasive mechanical ventilation or ECMO  3) Hospitalized, on non-invasive ventilation or high flow oxygen devices  4) Hospitalized, requiring low flow(<11l/min) supplemental oxygen  5) Hospitalized, not requiring supplemental oxygen - requiring ongoing medical care(COVID-19 related or otherwise)  6) Hospitalized, not requiring supplemental oxygen - no longer requires ongoing medical care(other than per protocol RDV administration)  7) Not hospitalized Patient is a 65y old  Female who presents with a chief complaint of COVID-19, Hypoxic respiratory failure (05 May 2020 08:51)    Being followed by ID for COVID, clinical trial       Interval history:  Pt remains sedated, on pressors, and intubated with PEEP of 12 and 100% O2. Her SpO2 improved from 91% to 98% after proning this morning. Continue to spike fevers, Tmax 101.5. No other acute events  Per team not a candidate for VVECMO (age>60)      ROS:  Unable to assess    PAST MEDICAL & SURGICAL HISTORY:  No pertinent past medical history  History of cholecystectomy  History of appendectomy    Allergies    No Known Allergies    Intolerances        MEDICATIONS  (STANDING):  caspofungin IVPB      caspofungin IVPB 50 milliGRAM(s) IV Intermittent every 24 hours  chlorhexidine 0.12% Liquid 15 milliLiter(s) Oral Mucosa two times a day  chlorhexidine 4% Liquid 1 Application(s) Topical <User Schedule>  famotidine Injectable 20 milliGRAM(s) IV Push daily  fentaNYL   Infusion... 0.5 MICROgram(s)/kG/Hr (1.76 mL/Hr) IV Continuous <Continuous>  furosemide   Injectable 40 milliGRAM(s) IV Push every 12 hours  heparin  Infusion 1200 Unit(s)/Hr (9 mL/Hr) IV Continuous <Continuous>  insulin lispro (HumaLOG) corrective regimen sliding scale   SubCutaneous every 6 hours  ketamine Infusion. 0.25 mG/kG/Hr (1.76 mL/Hr) IV Continuous <Continuous>  meropenem  IVPB 1000 milliGRAM(s) IV Intermittent every 8 hours  norepinephrine Infusion 0.05 MICROgram(s)/kG/Min (3.3 mL/Hr) IV Continuous <Continuous>  phenylephrine    Infusion 0.3 MICROgram(s)/kG/Min (3.95 mL/Hr) IV Continuous <Continuous>  polyethylene glycol 3350 17 Gram(s) Oral daily  propofol Infusion 50 MICROgram(s)/kG/Min (21.1 mL/Hr) IV Continuous <Continuous>  remdesivir IVPB ([id number]) 100 milliGRAM(s) IV Intermittent daily  rocuronium Infusion 8 MICROgram(s)/kG/Min (6.75 mL/Hr) IV Continuous <Continuous>  senna 2 Tablet(s) Oral at bedtime  sodium chloride 0.9% lock flush 3 milliLiter(s) IV Push every 8 hours  vancomycin  IVPB 750 milliGRAM(s) IV Intermittent every 8 hours    MEDICATIONS  (PRN):  acetaminophen    Suspension .. 650 milliGRAM(s) Oral every 6 hours PRN Temp greater or equal to 38C (100.4F)  diphenhydrAMINE   Injectable 50 milliGRAM(s) IV Push once PRN anaphylaxis to study drug  EPINEPHrine     1 mG/mL Injectable 0.3 milliGRAM(s) IntraMuscular once PRN anaphylaxis to study drug  sodium chloride 0.9% lock flush 10 milliLiter(s) IV Push every 1 hour PRN Pre/post blood products, medications, blood draw, and to maintain line patency      ICU Vital Signs Last 24 Hrs  T(C): 37.9 (06 May 2020 07:00), Max: 38.6 (05 May 2020 10:00)  T(F): 100.2 (06 May 2020 07:00), Max: 101.5 (05 May 2020 10:00)  HR: 109 (06 May 2020 08:45) (100 - 126)  BP: --  BP(mean): --  ABP: 169/61 (06 May 2020 08:00) (90/42 - 169/61)  ABP(mean): 95 (06 May 2020 08:00) (58 - 95)  RR: 36 (06 May 2020 08:00) (36 - 36)  SpO2: 94% (06 May 2020 08:00) (85% - 98%); during my evaluation, SpO2 98% while prone    Physical Exam:  Constitutional sedated, on ventilator. Febrile   Chest vented, good AE with coarse breath sounds b/l with rare crackles  CVS tachy to 120s    tyler in place, making urine   Ext No cyanosis clubbing or edema  CNS sedated     Lab Data:                          12.5   33.56 )-----------( 279      ( 06 May 2020 00:15 )             39.5              05-06    135  |  99  |  14  ----------------------------<  158<H>  4.4   |  24  |  0.64    Ca    7.5<L>      06 May 2020 00:15  Phos  2.3     05-06  Mg     2.2     05-06    TPro  5.3<L>  /  Alb  2.2<L>  /  TBili  0.3  /  DBili  x   /  AST  138<H>  /  ALT  92<H>  /  AlkPhos  304<H>  05-06    Culture - Blood (05.03.20 @ 23:07)    Specimen Source: .Blood Blood-Peripheral    Culture Results:   No growth to date.    Culture - Sputum . (05.05.20 @ 18:04)    Gram Stain:   Moderate polymorphonuclear leukocytes per low power field  No squamous epithelial cells per low power field  No organisms seen    Specimen Source: .Sputum Sputum    Urinalysis + Microscopic Examination (05.05.20 @ 13:11)    Urine Appearance: Slightly Turbid    Urobilinogen: Negative    Specific Gravity: 1.029    Protein, Urine: 100 mg/dL    pH Urine: 5.5    Leukocyte Esterase Concentration: Negative    Nitrite: Positive    Ketone - Urine: Negative    Bilirubin: Negative    Color: Yellow    Glucose Qualitative, Urine: Negative    Blood, Urine: Large    Red Blood Cell - Urine: >50 /hpf    White Blood Cell - Urine: 6-10    Epithelial Cells: 0 /hpf    Hyaline Casts: 0 /lpf    Bacteria: Few    Comment - Urine: Few Amorphous Urates      Fungitell (05.04.20 @ 05:24)    Fungitell: <31: Interpretation: The Fungitell assay does not detect certain fungal  species such as the genus Cryptococcus (Hira et al. 1991) which  produces very low levels of (1-3)-Beta-D-Glucan. The assay also does  not detect the Zygomycetes such as Absidia, Mucor and Rhizopus  (Hipolito et al. 1994) which are not known to produce  (1-3)-Beta-D-Glucan. In addition, the yeast phase of Blastomyces  dermatitidis produces little (1-3)-Beta-D-Glucan and may not be  detected by the assay (Madalyn et al. 2007).  Reference Range:  Less than 60 pg/mL. Glucan values of less than 60 pg/mL are  interpreted as negative.  Glucan values of 60 to 79 pg/mL are interpreted as indeterminate,  and suggest a possible fungal infection. Additional sampling and  testing of sera is required to interpret the results.  Glucan values of greater than or equal to 80 pg/mL are interpreted  as positive.  Due to the potential for environmental contamination when  transferred to pour-off tubes, which can lead to false positive  results, interpret positive results from samples provided in  pour-off tubes with caution.  Results should be used in conjunction  with clinical findings, and should not form the sole basis for a  diagnosis or treatment decision. The Fungitell test is approved or  cleared for in vitro diagnostic use by the U.S Food and Drug  Administration. Modifications to the approved package insert have  been made and the performance characteristics for these  modifications were determined by [facility].  Ifsample result is greater than 500 pg/mL, physician may order a  titer of the sample. Please contact [facility] if you would  like to order a retest of this sample to obtain an actual value.  Samples are held for 1 week after initial testing date.  ____________________________________________________________  Performed at:  [facility]  [address] [place] [address] [place] [address]  [phone]  : Henrietta Daley PhD HCLD([facility])  [facility]# [id number] pg/mL        Vancomycin Level, Trough (05.06.20 @ 04:19)    Vancomycin Level, Trough: 7.1: Vancomycin trough levels should be rapidly reached and maintained at  15-20 ug/ml for life threatening MRSA  infections such as sepsis, endocarditis, osteomyelitis and pneumonia. A  first trough level should be drawn  before the 3rd or 4th dose.  Risk of renal toxicity is increased for levels >15 ug/ml, in patients on  other nephrotoxic drugs, who are  hemodynamically unstable, have unstable renal function, or are on  Vancomycin therapy for >14 days. Renal function with  creatinine levels should be monitored for those patients. ug/mL      WBC Count: 33.56 (05.06.20 @ 00:15)  WBC Count: 32.69 (05-05-20 @ 07:56)  WBC Count: 34.76 (05-04-20 @ 23:56)  WBC Count: 32.55 (05-04-20 @ 18:11)  WBC Count: 28.79 (05-04-20 @ 05:30)  WBC Count: 18.37 (05-03-20 @ 06:10)  WBC Count: 10.68 (05-02-20 @ 06:38)  WBC Count: 12.17 (05-01-20 @ 07:50)  WBC Count: 18.32 (04-30-20 @ 06:53)  WBC Count: 17.27 (04-29-20 @ 17:29)  WBC Count: 14.76 (04-28-20 @ 22:45)    D-Dimer Assay, Quantitative: 99270 ng/mL DDU (05-06)  D-Dimer Assay, Quantitative: 22447 ng/mL DDU (05-04)  D-Dimer Assay, Quantitative: 42664 ng/mL DDU (05-04)  D-Dimer Assay, Quantitative: 966 ng/mL DDU (05-01)    Ferritin, Serum: 1747 ng/mL (05-06)  Ferritin, Serum: 1871 ng/mL (05-05)  Ferritin, Serum: 1781 ng/mL (05-04)  Ferritin, Serum: 1549 ng/mL (05-04)    C-Reactive Protein, Serum: 9.35 mg/dL (05-06-20 @ 02:35)  C-Reactive Protein, Serum: 5.39 mg/dL (05-05-20 @ 03:09)  C-Reactive Protein, Serum: 5.47 mg/dL (05-04-20 @ 18:45)  C-Reactive Protein, Serum: 5.26 mg/dL (05-04-20 @ 07:48)        Ordinal Scale: score : 2  1) Death  2) Hospitalized, on invasive mechanical ventilation or ECMO  3) Hospitalized, on non-invasive ventilation or high flow oxygen devices  4) Hospitalized, requiring low flow(<11l/min) supplemental oxygen  5) Hospitalized, not requiring supplemental oxygen - requiring ongoing medical care(COVID-19 related or otherwise)  6) Hospitalized, not requiring supplemental oxygen - no longer requires ongoing medical care(other than per protocol RDV administration)  7) Not hospitalized Patient is a 65y old  Female who presents with a chief complaint of COVID-19, Hypoxic respiratory failure (05 May 2020 08:51)    Being followed by ID for COVID, clinical trial       Interval history:  Pt remains sedated, on pressors, and intubated with PEEP of 12 and 100% O2. Her SpO2 improved from 91% to 98% after proning this morning. Continue to spike fevers, Tmax 101.5. No other acute events  Per team not a candidate for VVECMO (age>60)      ROS:  Unable to assess    PAST MEDICAL & SURGICAL HISTORY:  No pertinent past medical history  History of cholecystectomy  History of appendectomy    Allergies    No Known Allergies    Intolerances        MEDICATIONS  (STANDING):  caspofungin IVPB      caspofungin IVPB 50 milliGRAM(s) IV Intermittent every 24 hours  chlorhexidine 0.12% Liquid 15 milliLiter(s) Oral Mucosa two times a day  chlorhexidine 4% Liquid 1 Application(s) Topical <User Schedule>  famotidine Injectable 20 milliGRAM(s) IV Push daily  fentaNYL   Infusion... 0.5 MICROgram(s)/kG/Hr (1.76 mL/Hr) IV Continuous <Continuous>  furosemide   Injectable 40 milliGRAM(s) IV Push every 12 hours  heparin  Infusion 1200 Unit(s)/Hr (9 mL/Hr) IV Continuous <Continuous>  insulin lispro (HumaLOG) corrective regimen sliding scale   SubCutaneous every 6 hours  ketamine Infusion. 0.25 mG/kG/Hr (1.76 mL/Hr) IV Continuous <Continuous>  meropenem  IVPB 1000 milliGRAM(s) IV Intermittent every 8 hours  norepinephrine Infusion 0.05 MICROgram(s)/kG/Min (3.3 mL/Hr) IV Continuous <Continuous>  phenylephrine    Infusion 0.3 MICROgram(s)/kG/Min (3.95 mL/Hr) IV Continuous <Continuous>  polyethylene glycol 3350 17 Gram(s) Oral daily  propofol Infusion 50 MICROgram(s)/kG/Min (21.1 mL/Hr) IV Continuous <Continuous>  remdesivir IVPB ([id number]) 100 milliGRAM(s) IV Intermittent daily  rocuronium Infusion 8 MICROgram(s)/kG/Min (6.75 mL/Hr) IV Continuous <Continuous>  senna 2 Tablet(s) Oral at bedtime  sodium chloride 0.9% lock flush 3 milliLiter(s) IV Push every 8 hours  vancomycin  IVPB 750 milliGRAM(s) IV Intermittent every 8 hours    MEDICATIONS  (PRN):  acetaminophen    Suspension .. 650 milliGRAM(s) Oral every 6 hours PRN Temp greater or equal to 38C (100.4F)  diphenhydrAMINE   Injectable 50 milliGRAM(s) IV Push once PRN anaphylaxis to study drug  EPINEPHrine     1 mG/mL Injectable 0.3 milliGRAM(s) IntraMuscular once PRN anaphylaxis to study drug  sodium chloride 0.9% lock flush 10 milliLiter(s) IV Push every 1 hour PRN Pre/post blood products, medications, blood draw, and to maintain line patency      ICU Vital Signs Last 24 Hrs  T(C): 37.9 (06 May 2020 07:00), Max: 38.6 (05 May 2020 10:00)  T(F): 100.2 (06 May 2020 07:00), Max: 101.5 (05 May 2020 10:00)  HR: 109 (06 May 2020 08:45) (100 - 126)  BP: --  BP(mean): --  ABP: 169/61 (06 May 2020 08:00) (90/42 - 169/61)  ABP(mean): 95 (06 May 2020 08:00) (58 - 95)  RR: 36 (06 May 2020 08:00) (36 - 36)  SpO2: 94% (06 May 2020 08:00) (85% - 98%); during my evaluation, SpO2 98% while prone    Physical Exam:  Constitutional sedated, on ventilator. Febrile   Chest vented, good AE with coarse breath sounds b/l with rare crackles  CVS tachy to 120s    tyler in place, making urine   Ext No cyanosis clubbing or edema  CNS sedated     Lab Data:                          12.5   33.56 )-----------( 279      ( 06 May 2020 00:15 )             39.5              05-06    135  |  99  |  14  ----------------------------<  158<H>  4.4   |  24  |  0.64    Ca    7.5<L>      06 May 2020 00:15  Phos  2.3     05-06  Mg     2.2     05-06    TPro  5.3<L>  /  Alb  2.2<L>  /  TBili  0.3  /  DBili  x   /  AST  138<H>  /  ALT  92<H>  /  AlkPhos  304<H>  05-06    Culture - Blood (05.03.20 @ 23:07)    Specimen Source: .Blood Blood-Peripheral    Culture Results:   No growth to date.    Culture - Sputum . (05.05.20 @ 18:04)    Gram Stain:   Moderate polymorphonuclear leukocytes per low power field  No squamous epithelial cells per low power field  No organisms seen    Specimen Source: .Sputum Sputum    MRSA/MSSA PCR (05.03.20 @ 21:56)    MRSA PCR Result.: NotDetec: MRSA/MSSA PCR assay is a qualitative in vitro diagnostic test for the  direct detection and differentiation of methicillin-resistant  Staphylococcus aureus (MRSA) from Staphylococcus aureus (SA). The assay  detects DNA from nasal swabs in patients atrisk for nasal colonization.  It is not intended to diagnose MRSA or SA infections nor guide or monitor  treatment for MRSA/SA infections. A negative result does not preclude  nasal colonization. The assay is FDA-approved and its performance has  been established by [employer], [place] and the [place], [place].    Staph Aureus PCR Result: NotDetec      Urinalysis + Microscopic Examination (05.05.20 @ 13:11)    Urine Appearance: Slightly Turbid    Urobilinogen: Negative    Specific Gravity: 1.029    Protein, Urine: 100 mg/dL    pH Urine: 5.5    Leukocyte Esterase Concentration: Negative    Nitrite: Positive    Ketone - Urine: Negative    Bilirubin: Negative    Color: Yellow    Glucose Qualitative, Urine: Negative    Blood, Urine: Large    Red Blood Cell - Urine: >50 /hpf    White Blood Cell - Urine: 6-10    Epithelial Cells: 0 /hpf    Hyaline Casts: 0 /lpf    Bacteria: Few    Comment - Urine: Few Amorphous Urates      Fungitell (05.04.20 @ 05:24)    Fungitell: <31: Interpretation: The Fungitell assay does not detect certain fungal  species such as the genus Cryptococcus (Hira et al. 1991) which  produces very low levels of (1-3)-Beta-D-Glucan. The assay also does  not detect the Zygomycetes such as Absidia, Mucor and Rhizopus  (Hipolito et al. 1994) which are not known to produce  (1-3)-Beta-D-Glucan. In addition, the yeast phase of Blastomyces  dermatitidis produces little (1-3)-Beta-D-Glucan and may not be  detected by the assay (Madalyn et al. 2007).  Reference Range:  Less than 60 pg/mL. Glucan values of less than 60 pg/mL are  interpreted as negative.  Glucan values of 60 to 79 pg/mL are interpreted as indeterminate,  and suggest a possible fungal infection. Additional sampling and  testing of sera is required to interpret the results.  Glucan values of greater than or equal to 80 pg/mL are interpreted  as positive.  Due to the potential for environmental contamination when  transferred to pour-off tubes, which can lead to false positive  results, interpret positive results from samples provided in  pour-off tubes with caution.  Results should be used in conjunction  with clinical findings, and should not form the sole basis for a  diagnosis or treatment decision. The Fungitell test is approved or  cleared for in vitro diagnostic use by the U.S Food and Drug  Administration. Modifications to the approved package insert have  been made and the performance characteristics for these  modifications were determined by [facility].  Ifsample result is greater than 500 pg/mL, physician may order a  titer of the sample. Please contact [facility] if you would  like to order a retest of this sample to obtain an actual value.  Samples are held for 1 week after initial testing date.  ____________________________________________________________  Performed at:  [facility]  [address] [place], [place] [address] [phone]  : Henrietta Daley PhD HCLD([facility])  [place] [facility] [id number] pg/mL        Vancomycin Level, Trough (05.06.20 @ 04:19)    Vancomycin Level, Trough: 7.1: Vancomycin trough levels should be rapidly reached and maintained at  15-20 ug/ml for life threatening MRSA  infections such as sepsis, endocarditis, osteomyelitis and pneumonia. A  first trough level should be drawn  before the 3rd or 4th dose.  Risk of renal toxicity is increased for levels >15 ug/ml, in patients on  other nephrotoxic drugs, who are  hemodynamically unstable, have unstable renal function, or are on  Vancomycin therapy for >14 days. Renal function with  creatinine levels should be monitored for those patients. ug/mL      WBC Count: 33.56 (05.06.20 @ 00:15)  WBC Count: 32.69 (05-05-20 @ 07:56)  WBC Count: 34.76 (05-04-20 @ 23:56)  WBC Count: 32.55 (05-04-20 @ 18:11)  WBC Count: 28.79 (05-04-20 @ 05:30)  WBC Count: 18.37 (05-03-20 @ 06:10)  WBC Count: 10.68 (05-02-20 @ 06:38)  WBC Count: 12.17 (05-01-20 @ 07:50)  WBC Count: 18.32 (04-30-20 @ 06:53)  WBC Count: 17.27 (04-29-20 @ 17:29)  WBC Count: 14.76 (04-28-20 @ 22:45)    D-Dimer Assay, Quantitative: 68713 ng/mL DDU (05-06)  D-Dimer Assay, Quantitative: 63096 ng/mL DDU (05-04)  D-Dimer Assay, Quantitative: 58520 ng/mL DDU (05-04)  D-Dimer Assay, Quantitative: 966 ng/mL DDU (05-01)    Ferritin, Serum: 1747 ng/mL (05-06)  Ferritin, Serum: 1871 ng/mL (05-05)  Ferritin, Serum: 1781 ng/mL (05-04)  Ferritin, Serum: 1549 ng/mL (05-04)    C-Reactive Protein, Serum: 9.35 mg/dL (05-06-20 @ 02:35)  C-Reactive Protein, Serum: 5.39 mg/dL (05-05-20 @ 03:09)  C-Reactive Protein, Serum: 5.47 mg/dL (05-04-20 @ 18:45)  C-Reactive Protein, Serum: 5.26 mg/dL (05-04-20 @ 07:48)        Ordinal Scale: score : 2  1) Death  2) Hospitalized, on invasive mechanical ventilation or ECMO  3) Hospitalized, on non-invasive ventilation or high flow oxygen devices  4) Hospitalized, requiring low flow(<11l/min) supplemental oxygen  5) Hospitalized, not requiring supplemental oxygen - requiring ongoing medical care(COVID-19 related or otherwise)  6) Hospitalized, not requiring supplemental oxygen - no longer requires ongoing medical care(other than per protocol RDV administration)  7) Not hospitalized Patient is a 65y old  Female who presents with a chief complaint of COVID-19, Hypoxic respiratory failure (05 May 2020 08:51)    Being followed by ID for COVID, clinical trial       Interval history:  Pt remains sedated, on pressors, and intubated with PEEP of 12 and 100% O2. Her SpO2 improved from 91% to 98% after proning this morning. Continue to spike fevers, Tmax 101.5. No other acute events  Per team not a candidate for VVECMO (age>60)      ROS:  Unable to assess    PAST MEDICAL & SURGICAL HISTORY:  No pertinent past medical history  History of cholecystectomy  History of appendectomy    Allergies    No Known Allergies    Intolerances        MEDICATIONS  (STANDING):  caspofungin IVPB      caspofungin IVPB 50 milliGRAM(s) IV Intermittent every 24 hours  chlorhexidine 0.12% Liquid 15 milliLiter(s) Oral Mucosa two times a day  chlorhexidine 4% Liquid 1 Application(s) Topical <User Schedule>  famotidine Injectable 20 milliGRAM(s) IV Push daily  fentaNYL   Infusion... 0.5 MICROgram(s)/kG/Hr (1.76 mL/Hr) IV Continuous <Continuous>  furosemide   Injectable 40 milliGRAM(s) IV Push every 12 hours  heparin  Infusion 1200 Unit(s)/Hr (9 mL/Hr) IV Continuous <Continuous>  insulin lispro (HumaLOG) corrective regimen sliding scale   SubCutaneous every 6 hours  ketamine Infusion. 0.25 mG/kG/Hr (1.76 mL/Hr) IV Continuous <Continuous>  meropenem  IVPB 1000 milliGRAM(s) IV Intermittent every 8 hours  norepinephrine Infusion 0.05 MICROgram(s)/kG/Min (3.3 mL/Hr) IV Continuous <Continuous>  phenylephrine    Infusion 0.3 MICROgram(s)/kG/Min (3.95 mL/Hr) IV Continuous <Continuous>  polyethylene glycol 3350 17 Gram(s) Oral daily  propofol Infusion 50 MICROgram(s)/kG/Min (21.1 mL/Hr) IV Continuous <Continuous>  remdesivir IVPB ([id number]) 100 milliGRAM(s) IV Intermittent daily  rocuronium Infusion 8 MICROgram(s)/kG/Min (6.75 mL/Hr) IV Continuous <Continuous>  senna 2 Tablet(s) Oral at bedtime  sodium chloride 0.9% lock flush 3 milliLiter(s) IV Push every 8 hours  vancomycin  IVPB 750 milliGRAM(s) IV Intermittent every 8 hours    MEDICATIONS  (PRN):  acetaminophen    Suspension .. 650 milliGRAM(s) Oral every 6 hours PRN Temp greater or equal to 38C (100.4F)  diphenhydrAMINE   Injectable 50 milliGRAM(s) IV Push once PRN anaphylaxis to study drug  EPINEPHrine     1 mG/mL Injectable 0.3 milliGRAM(s) IntraMuscular once PRN anaphylaxis to study drug  sodium chloride 0.9% lock flush 10 milliLiter(s) IV Push every 1 hour PRN Pre/post blood products, medications, blood draw, and to maintain line patency      ICU Vital Signs Last 24 Hrs  T(C): 37.9 (06 May 2020 07:00), Max: 38.6 (05 May 2020 10:00)  T(F): 100.2 (06 May 2020 07:00), Max: 101.5 (05 May 2020 10:00)  HR: 109 (06 May 2020 08:45) (100 - 126)  BP: --  BP(mean): --  ABP: 169/61 (06 May 2020 08:00) (90/42 - 169/61)  ABP(mean): 95 (06 May 2020 08:00) (58 - 95)  RR: 36 (06 May 2020 08:00) (36 - 36)  SpO2: 94% (06 May 2020 08:00) (85% - 98%); during my evaluation, SpO2 98% while prone    Physical Exam:  Constitutional sedated, on ventilator. Febrile   Chest vented, good AE with coarse breath sounds b/l with rare crackles  CVS tachy to 100s    tyler in place, making urine   Ext No cyanosis clubbing or edema  CNS sedated     Lab Data:                          12.5   33.56 )-----------( 279      ( 06 May 2020 00:15 )             39.5              05-06    135  |  99  |  14  ----------------------------<  158<H>  4.4   |  24  |  0.64    Ca    7.5<L>      06 May 2020 00:15  Phos  2.3     05-06  Mg     2.2     05-06    TPro  5.3<L>  /  Alb  2.2<L>  /  TBili  0.3  /  DBili  x   /  AST  138<H>  /  ALT  92<H>  /  AlkPhos  304<H>  05-06    Procalcitonin, Serum (05.06.20 @ 00:15)    Procalcitonin, Serum: 1.18: This assay is intended for use to determine the change of PCT over time  as an aid in assessing the cumulative 28-day risk of all-cause mortality  for patients diagnosed with severe sepsis or septic shock in the ICU, or  when obtained in the emergency department or other medical wards prior to  ICU admission. This assay was performed by the Roche Elecsys BRAHMS PCT  assay. ng/mL      Culture - Blood (05.03.20 @ 23:07)    Specimen Source: .Blood Blood-Peripheral    Culture Results:   No growth to date.    Culture - Sputum . (05.05.20 @ 18:04)    Gram Stain:   Moderate polymorphonuclear leukocytes per low power field  No squamous epithelial cells per low power field  No organisms seen    Specimen Source: .Sputum Sputum    MRSA/MSSA PCR (05.03.20 @ 21:56)    MRSA PCR Result.: NotDetec: MRSA/MSSA PCR assay is a qualitative in vitro diagnostic test for the  direct detection and differentiation of methicillin-resistant  Staphylococcus aureus (MRSA) from Staphylococcus aureus (SA). The assay  detects DNA from nasal swabs in patients atrisk for nasal colonization.  It is not intended to diagnose MRSA or SA infections nor guide or monitor  treatment for MRSA/SA infections. A negative result does not preclude  nasal colonization. The assay is FDA-approved and its performance has  been established by [employer] [place] and the [facility], [place], [place].    Staph Aureus PCR Result: NotDetec      Urinalysis + Microscopic Examination (05.05.20 @ 13:11)    Urine Appearance: Slightly Turbid    Urobilinogen: Negative    Specific Gravity: 1.029    Protein, Urine: 100 mg/dL    pH Urine: 5.5    Leukocyte Esterase Concentration: Negative    Nitrite: Positive    Ketone - Urine: Negative    Bilirubin: Negative    Color: Yellow    Glucose Qualitative, Urine: Negative    Blood, Urine: Large    Red Blood Cell - Urine: >50 /hpf    White Blood Cell - Urine: 6-10    Epithelial Cells: 0 /hpf    Hyaline Casts: 0 /lpf    Bacteria: Few    Comment - Urine: Few Amorphous Urates      Fungitell (05.04.20 @ 05:24)    Fungitell: <31: Interpretation: The Fungitell assay does not detect certain fungal  species such as the genus Cryptococcus (Hira et al. 1991) which  produces very low levels of (1-3)-Beta-D-Glucan. The assay also does  not detect the Zygomycetes such as Absidia, Mucor and Rhizopus  (Hipolito et al. 1994) which are not known to produce  (1-3)-Beta-D-Glucan. In addition, the yeast phase of Blastomyces  dermatitidis produces little (1-3)-Beta-D-Glucan and may not be  detected by the assay (Madalyn et al. 2007).  Reference Range:  Less than 60 pg/mL. Glucan values of less than 60 pg/mL are  interpreted as negative.  Glucan values of 60 to 79 pg/mL are interpreted as indeterminate,  and suggest a possible fungal infection. Additional sampling and  testing of sera is required to interpret the results.  Glucan values of greater than or equal to 80 pg/mL are interpreted  as positive.  Due to the potential for environmental contamination when  transferred to pour-off tubes, which can lead to false positive  results, interpret positive results from samples provided in  pour-off tubes with caution.  Results should be used in conjunction  with clinical findings, and should not form the sole basis for a  diagnosis or treatment decision. The Fungitell test is approved or  cleared for in vitro diagnostic use by the U.[place] Food and Drug  Administration. Modifications to the approved package insert have  been made and the performance characteristics for these  modifications were determined by [facility].  Ifsample result is greater than 500 pg/mL, physician may order a  titer of the sample. Please contact [facility] if you would  like to order a retest of this sample to obtain an actual value.  Samples are held for 1 week after initial testing date.  ____________________________________________________________  Performed at:  [facility]  [address] [place] [address] [phone]  : Henrietta Daley PhD HCLD(ABB)  CLIA# [id number] pg/mL    Troponin T, High Sensitivity Result: 48: Rapid upward or downward changes in high-sensitivity troponin levels  suggest acute myocardial injury. Renal impairment may cause sustained  troponin elevations.  Normal: <6 - 14 ng/L  Indeterminate: 15-51 ng/L  Elevated: > 51 ng/L  See [email or web address]/test/[email or web address] on the [facility] intranet for more  information ng/L (05.06.20 @ 00:15)      Vancomycin Level, Trough (05.06.20 @ 04:19)    Vancomycin Level, Trough: 7.1: Vancomycin trough levels should be rapidly reached and maintained at  15-20 ug/ml for life threatening MRSA  infections such as sepsis, endocarditis, osteomyelitis and pneumonia. A  first trough level should be drawn  before the 3rd or 4th dose.  Risk of renal toxicity is increased for levels >15 ug/ml, in patients on  other nephrotoxic drugs, who are  hemodynamically unstable, have unstable renal function, or are on  Vancomycin therapy for >14 days. Renal function with  creatinine levels should be monitored for those patients. ug/mL      WBC Count: 33.56 (05.06.20 @ 00:15)  WBC Count: 32.69 (05-05-20 @ 07:56)  WBC Count: 34.76 (05-04-20 @ 23:56)  WBC Count: 32.55 (05-04-20 @ 18:11)  WBC Count: 28.79 (05-04-20 @ 05:30)  WBC Count: 18.37 (05-03-20 @ 06:10)  WBC Count: 10.68 (05-02-20 @ 06:38)  WBC Count: 12.17 (05-01-20 @ 07:50)  WBC Count: 18.32 (04-30-20 @ 06:53)  WBC Count: 17.27 (04-29-20 @ 17:29)  WBC Count: 14.76 (04-28-20 @ 22:45)    D-Dimer Assay, Quantitative: 38983 ng/mL DDU (05-06)  D-Dimer Assay, Quantitative: 24505 ng/mL DDU (05-04)  D-Dimer Assay, Quantitative: 05338 ng/mL DDU (05-04)  D-Dimer Assay, Quantitative: 966 ng/mL DDU (05-01)    Ferritin, Serum: 1747 ng/mL (05-06)  Ferritin, Serum: 1871 ng/mL (05-05)  Ferritin, Serum: 1781 ng/mL (05-04)  Ferritin, Serum: 1549 ng/mL (05-04)    C-Reactive Protein, Serum: 9.35 mg/dL (05-06-20 @ 02:35)  C-Reactive Protein, Serum: 5.39 mg/dL (05-05-20 @ 03:09)  C-Reactive Protein, Serum: 5.47 mg/dL (05-04-20 @ 18:45)  C-Reactive Protein, Serum: 5.26 mg/dL (05-04-20 @ 07:48)        RADIOLOGY  < from: Xray Chest 1 View- PORTABLE-Urgent (05.05.20 @ 10:36) >  INTERPRETATION:  A single chest x-ray was obtained on May 5, 2020.    Indication: Rule out free air below the diaphragm.    Impression:    The heart is normal in size. Diffuse airspace opacity seen throughout both lungs which appears to be worsening when compared to previous study done May 4, 2020. No free air is seen under the diaphragm. Endotracheal tube and NG tube are in good position. A central line is seen on the left and the tip is in superior vena cava. No pneumothorax.        < end of copied text >        Ordinal Scale: score : 2  1) Death  2) Hospitalized, on invasive mechanical ventilation or ECMO  3) Hospitalized, on non-invasive ventilation or high flow oxygen devices  4) Hospitalized, requiring low flow(<11l/min) supplemental oxygen  5) Hospitalized, not requiring supplemental oxygen - requiring ongoing medical care(COVID-19 related or otherwise)  6) Hospitalized, not requiring supplemental oxygen - no longer requires ongoing medical care(other than per protocol RDV administration)  7) Not hospitalized Patient is a 65y old  Female who presents with a chief complaint of COVID-19, Hypoxic respiratory failure (05 May 2020 08:51)    Being followed by ID for COVID, clinical trial       Interval history:  Pt remains sedated, on pressors, and intubated with PEEP of 12 and 100% O2. Her SpO2 improved from 91% to 98% after proning this morning. Continue to spike fevers, Tmax 101.5. No other acute events  Per team not a candidate for VVECMO (age>60)      ROS:  Unable to assess    PAST MEDICAL & SURGICAL HISTORY:  No pertinent past medical history  History of cholecystectomy  History of appendectomy    Allergies    No Known Allergies    Intolerances        MEDICATIONS  (STANDING):  caspofungin IVPB      caspofungin IVPB 50 milliGRAM(s) IV Intermittent every 24 hours  chlorhexidine 0.12% Liquid 15 milliLiter(s) Oral Mucosa two times a day  chlorhexidine 4% Liquid 1 Application(s) Topical <User Schedule>  famotidine Injectable 20 milliGRAM(s) IV Push daily  fentaNYL   Infusion... 0.5 MICROgram(s)/kG/Hr (1.76 mL/Hr) IV Continuous <Continuous>  furosemide   Injectable 40 milliGRAM(s) IV Push every 12 hours  heparin  Infusion 1200 Unit(s)/Hr (9 mL/Hr) IV Continuous <Continuous>  insulin lispro (HumaLOG) corrective regimen sliding scale   SubCutaneous every 6 hours  ketamine Infusion. 0.25 mG/kG/Hr (1.76 mL/Hr) IV Continuous <Continuous>  meropenem  IVPB 1000 milliGRAM(s) IV Intermittent every 8 hours  norepinephrine Infusion 0.05 MICROgram(s)/kG/Min (3.3 mL/Hr) IV Continuous <Continuous>  phenylephrine    Infusion 0.3 MICROgram(s)/kG/Min (3.95 mL/Hr) IV Continuous <Continuous>  polyethylene glycol 3350 17 Gram(s) Oral daily  propofol Infusion 50 MICROgram(s)/kG/Min (21.1 mL/Hr) IV Continuous <Continuous>  remdesivir IVPB ([id number]) 100 milliGRAM(s) IV Intermittent daily  rocuronium Infusion 8 MICROgram(s)/kG/Min (6.75 mL/Hr) IV Continuous <Continuous>  senna 2 Tablet(s) Oral at bedtime  sodium chloride 0.9% lock flush 3 milliLiter(s) IV Push every 8 hours  vancomycin  IVPB 750 milliGRAM(s) IV Intermittent every 8 hours    MEDICATIONS  (PRN):  acetaminophen    Suspension .. 650 milliGRAM(s) Oral every 6 hours PRN Temp greater or equal to 38C (100.4F)  diphenhydrAMINE   Injectable 50 milliGRAM(s) IV Push once PRN anaphylaxis to study drug  EPINEPHrine     1 mG/mL Injectable 0.3 milliGRAM(s) IntraMuscular once PRN anaphylaxis to study drug  sodium chloride 0.9% lock flush 10 milliLiter(s) IV Push every 1 hour PRN Pre/post blood products, medications, blood draw, and to maintain line patency      ICU Vital Signs Last 24 Hrs  T(C): 37.9 (06 May 2020 07:00), Max: 38.6 (05 May 2020 10:00)  T(F): 100.2 (06 May 2020 07:00), Max: 101.5 (05 May 2020 10:00)  HR: 109 (06 May 2020 08:45) (100 - 126)  BP: --  BP(mean): --  ABP: 169/61 (06 May 2020 08:00) (90/42 - 169/61)  ABP(mean): 95 (06 May 2020 08:00) (58 - 95)  RR: 36 (06 May 2020 08:00) (36 - 36)  SpO2: 94% (06 May 2020 08:00) (85% - 98%); during my evaluation, SpO2 98% while prone    Physical Exam:  Constitutional sedated, on ventilator. Febrile   Chest vented, good AE with coarse breath sounds b/l with rare crackles  CVS tachy to 100s    tyler in place, making urine   Ext No cyanosis clubbing or edema  CNS sedated     Lab Data:                          12.5   33.56 )-----------( 279      ( 06 May 2020 00:15 )             39.5              05-06    135  |  99  |  14  ----------------------------<  158<H>  4.4   |  24  |  0.64    Ca    7.5<L>      06 May 2020 00:15  Phos  2.3     05-06  Mg     2.2     05-06    TPro  5.3<L>  /  Alb  2.2<L>  /  TBili  0.3  /  DBili  x   /  AST  138<H>  /  ALT  92<H>  /  AlkPhos  304<H>  05-06    Procalcitonin, Serum (05.06.20 @ 00:15)    Procalcitonin, Serum: 1.18: This assay is intended for use to determine the change of PCT over time  as an aid in assessing the cumulative 28-day risk of all-cause mortality  for patients diagnosed with severe sepsis or septic shock in the ICU, or  when obtained in the emergency department or other medical wards prior to  ICU admission. This assay was performed by the Roche Elecsys BRAHMS PCT  assay. ng/mL      Culture - Blood (05.03.20 @ 23:07)    Specimen Source: .Blood Blood-Peripheral    Culture Results:   No growth to date.    Culture - Sputum . (05.05.20 @ 18:04)    Gram Stain:   Moderate polymorphonuclear leukocytes per low power field  No squamous epithelial cells per low power field  No organisms seen    Specimen Source: .Sputum Sputum    MRSA/MSSA PCR (05.03.20 @ 21:56)    MRSA PCR Result.: NotDetec: MRSA/MSSA PCR assay is a qualitative in vitro diagnostic test for the  direct detection and differentiation of methicillin-resistant  Staphylococcus aureus (MRSA) from Staphylococcus aureus (SA). The assay  detects DNA from nasal swabs in patients atrisk for nasal colonization.  It is not intended to diagnose MRSA or SA infections nor guide or monitor  treatment for MRSA/SA infections. A negative result does not preclude  nasal colonization. The assay is FDA-approved and its performance has  been established by [employer] [place] and the [facility], [place], [place].    Staph Aureus PCR Result: NotDetec      Urinalysis + Microscopic Examination (05.05.20 @ 13:11)    Urine Appearance: Slightly Turbid    Urobilinogen: Negative    Specific Gravity: 1.029    Protein, Urine: 100 mg/dL    pH Urine: 5.5    Leukocyte Esterase Concentration: Negative    Nitrite: Positive    Ketone - Urine: Negative    Bilirubin: Negative    Color: Yellow    Glucose Qualitative, Urine: Negative    Blood, Urine: Large    Red Blood Cell - Urine: >50 /hpf    White Blood Cell - Urine: 6-10    Epithelial Cells: 0 /hpf    Hyaline Casts: 0 /lpf    Bacteria: Few    Comment - Urine: Few Amorphous Urates      Fungitell (05.04.20 @ 05:24)    Fungitell: <31: Interpretation: The Fungitell assay does not detect certain fungal  species such as the genus Cryptococcus (Hira et al. 1991) which  produces very low levels of (1-3)-Beta-D-Glucan. The assay also does  not detect the Zygomycetes such as Absidia, Mucor and Rhizopus  (Hipolito et al. 1994) which are not known to produce  (1-3)-Beta-D-Glucan. In addition, the yeast phase of Blastomyces  dermatitidis produces little (1-3)-Beta-D-Glucan and may not be  detected by the assay (Madalyn et al. 2007).  Reference Range:  Less than 60 pg/mL. Glucan values of less than 60 pg/mL are  interpreted as negative.  Glucan values of 60 to 79 pg/mL are interpreted as indeterminate,  and suggest a possible fungal infection. Additional sampling and  testing of sera is required to interpret the results.  Glucan values of greater than or equal to 80 pg/mL are interpreted  as positive.  Due to the potential for environmental contamination when  transferred to pour-off tubes, which can lead to false positive  results, interpret positive results from samples provided in  pour-off tubes with caution.  Results should be used in conjunction  with clinical findings, and should not form the sole basis for a  diagnosis or treatment decision. The Fungitell test is approved or  cleared for in vitro diagnostic use by the U.S Food and Drug  Administration. Modifications to the approved package insert have  been made and the performance characteristics for these  modifications were determined by [facility].  Ifsample result is greater than 500 pg/mL, physician may order a  titer of the sample. Please contact [facility] if you would  like to order a retest of this sample to obtain an actual value.  Samples are held for 1 week after initial testing date.  ____________________________________________________________  Performed at:  [facility]  [address] [email or web address] [address] [phone]  : Henrietta Daley PhD HCLD(ABB)  CLIA# [id number] pg/mL    Troponin T, High Sensitivity Result: 48: Rapid upward or downward changes in high-sensitivity troponin levels  suggest acute myocardial injury. Renal impairment may cause sustained  troponin elevations.  Normal: <6 - 14 ng/L  Indeterminate: 15-51 ng/L  Elevated: > 51 ng/L  See [email or web address] on the [facility] intranet for more  information ng/L (05.06.20 @ 00:15)    Serum Pro-Brain Natriuretic Peptide (05.06.20 @ 00:15)    Serum Pro-Brain Natriuretic Peptide: 1453 pg/mL    Serum Pro-Brain Natriuretic Peptide (05.04.20 @ 23:56)    Serum Pro-Brain Natriuretic Peptide: 410 pg/mL        Vancomycin Level, Trough (05.06.20 @ 04:19)    Vancomycin Level, Trough: 7.1: Vancomycin trough levels should be rapidly reached and maintained at  15-20 ug/ml for life threatening MRSA  infections such as sepsis, endocarditis, osteomyelitis and pneumonia. A  first trough level should be drawn  before the 3rd or 4th dose.  Risk of renal toxicity is increased for levels >15 ug/ml, in patients on  other nephrotoxic drugs, who are  hemodynamically unstable, have unstable renal function, or are on  Vancomycin therapy for >14 days. Renal function with  creatinine levels should be monitored for those patients. ug/mL      WBC Count: 33.56 (05.06.20 @ 00:15)  WBC Count: 32.69 (05-05-20 @ 07:56)  WBC Count: 34.76 (05-04-20 @ 23:56)  WBC Count: 32.55 (05-04-20 @ 18:11)  WBC Count: 28.79 (05-04-20 @ 05:30)  WBC Count: 18.37 (05-03-20 @ 06:10)  WBC Count: 10.68 (05-02-20 @ 06:38)  WBC Count: 12.17 (05-01-20 @ 07:50)  WBC Count: 18.32 (04-30-20 @ 06:53)  WBC Count: 17.27 (04-29-20 @ 17:29)  WBC Count: 14.76 (04-28-20 @ 22:45)    D-Dimer Assay, Quantitative: 46883 ng/mL DDU (05-06)  D-Dimer Assay, Quantitative: 86660 ng/mL DDU (05-04)  D-Dimer Assay, Quantitative: 61327 ng/mL DDU (05-04)  D-Dimer Assay, Quantitative: 966 ng/mL DDU (05-01)    Ferritin, Serum: 1747 ng/mL (05-06)  Ferritin, Serum: 1871 ng/mL (05-05)  Ferritin, Serum: 1781 ng/mL (05-04)  Ferritin, Serum: 1549 ng/mL (05-04)    C-Reactive Protein, Serum: 9.35 mg/dL (05-06-20 @ 02:35)  C-Reactive Protein, Serum: 5.39 mg/dL (05-05-20 @ 03:09)  C-Reactive Protein, Serum: 5.47 mg/dL (05-04-20 @ 18:45)  C-Reactive Protein, Serum: 5.26 mg/dL (05-04-20 @ 07:48)        RADIOLOGY  < from: Xray Chest 1 View- PORTABLE-Urgent (05.05.20 @ 10:36) >  INTERPRETATION:  A single chest x-ray was obtained on May 5, 2020.    Indication: Rule out free air below the diaphragm.    Impression:    The heart is normal in size. Diffuse airspace opacity seen throughout both lungs which appears to be worsening when compared to previous study done May 4, 2020. No free air is seen under the diaphragm. Endotracheal tube and NG tube are in good position. A central line is seen on the left and the tip is in superior vena cava. No pneumothorax.        < end of copied text >        Ordinal Scale: score : 2  1) Death  2) Hospitalized, on invasive mechanical ventilation or ECMO  3) Hospitalized, on non-invasive ventilation or high flow oxygen devices  4) Hospitalized, requiring low flow(<11l/min) supplemental oxygen  5) Hospitalized, not requiring supplemental oxygen - requiring ongoing medical care(COVID-19 related or otherwise)  6) Hospitalized, not requiring supplemental oxygen - no longer requires ongoing medical care(other than per protocol RDV administration)  7) Not hospitalized Patient is a 65y old  Female who presents with a chief complaint of COVID-19, Hypoxic respiratory failure (05 May 2020 08:51)    Being followed by ID for COVID, clinical trial       Interval history:  Pt remains sedated, on pressors, and intubated with PEEP of 12 and 100% O2. Her SpO2 improved from 91% to 98% after proning this morning. Continue to spike fevers, Tmax 101.5. No other acute events  Per team not a candidate for VVECMO (age>60)      ROS:  Unable to assess    PAST MEDICAL & SURGICAL HISTORY:  No pertinent past medical history  History of cholecystectomy  History of appendectomy    Allergies    No Known Allergies    Intolerances        MEDICATIONS  (STANDING):  caspofungin IVPB      caspofungin IVPB 50 milliGRAM(s) IV Intermittent every 24 hours  chlorhexidine 0.12% Liquid 15 milliLiter(s) Oral Mucosa two times a day  chlorhexidine 4% Liquid 1 Application(s) Topical <User Schedule>  famotidine Injectable 20 milliGRAM(s) IV Push daily  fentaNYL   Infusion... 0.5 MICROgram(s)/kG/Hr (1.76 mL/Hr) IV Continuous <Continuous>  furosemide   Injectable 40 milliGRAM(s) IV Push every 12 hours  heparin  Infusion 1200 Unit(s)/Hr (9 mL/Hr) IV Continuous <Continuous>  insulin lispro (HumaLOG) corrective regimen sliding scale   SubCutaneous every 6 hours  ketamine Infusion. 0.25 mG/kG/Hr (1.76 mL/Hr) IV Continuous <Continuous>  meropenem  IVPB 1000 milliGRAM(s) IV Intermittent every 8 hours  norepinephrine Infusion 0.05 MICROgram(s)/kG/Min (3.3 mL/Hr) IV Continuous <Continuous>  phenylephrine    Infusion 0.3 MICROgram(s)/kG/Min (3.95 mL/Hr) IV Continuous <Continuous>  polyethylene glycol 3350 17 Gram(s) Oral daily  propofol Infusion 50 MICROgram(s)/kG/Min (21.1 mL/Hr) IV Continuous <Continuous>  remdesivir IVPB ([id number]) 100 milliGRAM(s) IV Intermittent daily  rocuronium Infusion 8 MICROgram(s)/kG/Min (6.75 mL/Hr) IV Continuous <Continuous>  senna 2 Tablet(s) Oral at bedtime  sodium chloride 0.9% lock flush 3 milliLiter(s) IV Push every 8 hours  vancomycin  IVPB 750 milliGRAM(s) IV Intermittent every 8 hours    MEDICATIONS  (PRN):  acetaminophen    Suspension .. 650 milliGRAM(s) Oral every 6 hours PRN Temp greater or equal to 38C (100.4F)  diphenhydrAMINE   Injectable 50 milliGRAM(s) IV Push once PRN anaphylaxis to study drug  EPINEPHrine     1 mG/mL Injectable 0.3 milliGRAM(s) IntraMuscular once PRN anaphylaxis to study drug  sodium chloride 0.9% lock flush 10 milliLiter(s) IV Push every 1 hour PRN Pre/post blood products, medications, blood draw, and to maintain line patency      ICU Vital Signs Last 24 Hrs  T(C): 37.9 (06 May 2020 07:00), Max: 38.6 (05 May 2020 10:00)  T(F): 100.2 (06 May 2020 07:00), Max: 101.5 (05 May 2020 10:00)  HR: 109 (06 May 2020 08:45) (100 - 126)  BP: --  BP(mean): --  ABP: 169/61 (06 May 2020 08:00) (90/42 - 169/61)  ABP(mean): 95 (06 May 2020 08:00) (58 - 95)  RR: 36 (06 May 2020 08:00) (36 - 36)  SpO2: 94% (06 May 2020 08:00) (85% - 98%); during my evaluation, SpO2 98% while prone    Physical Exam:  Constitutional sedated, on ventilator. Febrile   Chest vented, good AE with coarse breath sounds b/l with rare crackles  CVS tachy to 100s    tyler in place, making urine   Ext No cyanosis clubbing or edema  CNS sedated     Lab Data:                          12.5   33.56 )-----------( 279      ( 06 May 2020 00:15 )             39.5              05-06    135  |  99  |  14  ----------------------------<  158<H>  4.4   |  24  |  0.64    Ca    7.5<L>      06 May 2020 00:15  Phos  2.3     05-06  Mg     2.2     05-06    TPro  5.3<L>  /  Alb  2.2<L>  /  TBili  0.3  /  DBili  x   /  AST  138<H>  /  ALT  92<H>  /  AlkPhos  304<H>  05-06    Procalcitonin, Serum (05.06.20 @ 00:15)    Procalcitonin, Serum: 1.18: This assay is intended for use to determine the change of PCT over time  as an aid in assessing the cumulative 28-day risk of all-cause mortality  for patients diagnosed with severe sepsis or septic shock in the ICU, or  when obtained in the emergency department or other medical wards prior to  ICU admission. This assay was performed by the Roche [employer] BRAHMS PCT  assay. ng/mL      Culture - Blood (05.03.20 @ 23:07)    Specimen Source: .Blood Blood-Peripheral    Culture Results:   No growth to date.    Culture - Sputum . (05.05.20 @ 18:04)    Gram Stain:   Moderate polymorphonuclear leukocytes per low power field  No squamous epithelial cells per low power field  No organisms seen    Specimen Source: .Sputum Sputum    MRSA/MSSA PCR (05.03.20 @ 21:56)    MRSA PCR Result.: NotDetec: MRSA/MSSA PCR assay is a qualitative in vitro diagnostic test for the  direct detection and differentiation of methicillin-resistant  Staphylococcus aureus (MRSA) from Staphylococcus aureus (SA). The assay  detects DNA from nasal swabs in patients atrisk for nasal colonization.  It is not intended to diagnose MRSA or SA infections nor guide or monitor  treatment for MRSA/SA infections. A negative result does not preclude  nasal colonization. The assay is FDA-approved and its performance has  been established by [employer] [place] and the [facility], [place], [place].    Staph Aureus PCR Result: NotDetec      Urinalysis + Microscopic Examination (05.05.20 @ 13:11)    Urine Appearance: Slightly Turbid    Urobilinogen: Negative    Specific Gravity: 1.029    Protein, Urine: 100 mg/dL    pH Urine: 5.5    Leukocyte Esterase Concentration: Negative    Nitrite: Positive    Ketone - Urine: Negative    Bilirubin: Negative    Color: Yellow    Glucose Qualitative, Urine: Negative    Blood, Urine: Large    Red Blood Cell - Urine: >50 /hpf    White Blood Cell - Urine: 6-10    Epithelial Cells: 0 /hpf    Hyaline Casts: 0 /lpf    Bacteria: Few    Comment - Urine: Few Amorphous Urates      Fungitell (05.04.20 @ 05:24)    Fungitell: <31: Interpretation: The Fungitell assay does not detect certain fungal  species such as the genus Cryptococcus (Hira et al. 1991) which  produces very low levels of (1-3)-Beta-D-Glucan. The assay also does  not detect the Zygomycetes such as Absidia, Mucor and Rhizopus  (Hipolito et al. 1994) which are not known to produce  (1-3)-Beta-D-Glucan. In addition, the yeast phase of Blastomyces  dermatitidis produces little (1-3)-Beta-D-Glucan and may not be  detected by the assay (Madalyn et al. 2007).  Reference Range:  Less than 60 pg/mL. Glucan values of less than 60 pg/mL are  interpreted as negative.  Glucan values of 60 to 79 pg/mL are interpreted as indeterminate,  and suggest a possible fungal infection. Additional sampling and  testing of sera is required to interpret the results.  Glucan values of greater than or equal to 80 pg/mL are interpreted  as positive.  Due to the potential for environmental contamination when  transferred to pour-off tubes, which can lead to false positive  results, interpret positive results from samples provided in  pour-off tubes with caution.  Results should be used in conjunction  with clinical findings, and should not form the sole basis for a  diagnosis or treatment decision. The Fungitell test is approved or  cleared for in vitro diagnostic use by the U.[place] Food and Drug  Administration. Modifications to the approved package insert have  been made and the performance characteristics for these  modifications were determined by [facility].  Ifsample result is greater than 500 pg/mL, physician may order a  titer of the sample. Please contact [facility] if you would  like to order a retest of this sample to obtain an actual value.  Samples are held for 1 week after initial testing date.  ____________________________________________________________  Performed at:  [facility]  [address] [place] [address] [phone]  : Henrietta Daley PhD HCLD(ABB)  CLIA# [id number] pg/mL    Troponin T, High Sensitivity Result: 48: Rapid upward or downward changes in high-sensitivity troponin levels  suggest acute myocardial injury. Renal impairment may cause sustained  troponin elevations.  Normal: <6 - 14 ng/L  Indeterminate: 15-51 ng/L  Elevated: > 51 ng/L  See http://labs/test/TROPTHS on the [facility] intranet for more  information ng/L (05.06.20 @ 00:15)    Serum Pro-Brain Natriuretic Peptide (05.06.20 @ 00:15)    Serum Pro-Brain Natriuretic Peptide: 1453 pg/mL    Serum Pro-Brain Natriuretic Peptide (05.04.20 @ 23:56)    Serum Pro-Brain Natriuretic Peptide: 410 pg/mL        Vancomycin Level, Trough (05.06.20 @ 04:19)    Vancomycin Level, Trough: 7.1: Vancomycin trough levels should be rapidly reached and maintained at  15-20 ug/ml for life threatening MRSA  infections such as sepsis, endocarditis, osteomyelitis and pneumonia. A  first trough level should be drawn  before the 3rd or 4th dose.  Risk of renal toxicity is increased for levels >15 ug/ml, in patients on  other nephrotoxic drugs, who are  hemodynamically unstable, have unstable renal function, or are on  Vancomycin therapy for >14 days. Renal function with  creatinine levels should be monitored for those patients. ug/mL      WBC Count: 33.56 (05.06.20 @ 00:15)  WBC Count: 32.69 (05-05-20 @ 07:56)  WBC Count: 34.76 (05-04-20 @ 23:56)  WBC Count: 32.55 (05-04-20 @ 18:11)  WBC Count: 28.79 (05-04-20 @ 05:30)  WBC Count: 18.37 (05-03-20 @ 06:10)  WBC Count: 10.68 (05-02-20 @ 06:38)  WBC Count: 12.17 (05-01-20 @ 07:50)  WBC Count: 18.32 (04-30-20 @ 06:53)  WBC Count: 17.27 (04-29-20 @ 17:29)  WBC Count: 14.76 (04-28-20 @ 22:45)    D-Dimer Assay, Quantitative: 42186 ng/mL DDU (05-06)  D-Dimer Assay, Quantitative: 81757 ng/mL DDU (05-04)  D-Dimer Assay, Quantitative: 05016 ng/mL DDU (05-04)  D-Dimer Assay, Quantitative: 966 ng/mL DDU (05-01)    Ferritin, Serum: 1747 ng/mL (05-06)  Ferritin, Serum: 1871 ng/mL (05-05)  Ferritin, Serum: 1781 ng/mL (05-04)  Ferritin, Serum: 1549 ng/mL (05-04)    C-Reactive Protein, Serum: 9.35 mg/dL (05-06-20 @ 02:35)  C-Reactive Protein, Serum: 5.39 mg/dL (05-05-20 @ 03:09)  C-Reactive Protein, Serum: 5.47 mg/dL (05-04-20 @ 18:45)  C-Reactive Protein, Serum: 5.26 mg/dL (05-04-20 @ 07:48)        RADIOLOGY  < from: Xray Chest 1 View- PORTABLE-Urgent (05.05.20 @ 10:36) >  INTERPRETATION:  A single chest x-ray was obtained on May 5, 2020.    Indication: Rule out free air below the diaphragm.    Impression:    The heart is normal in size. Diffuse airspace opacity seen throughout both lungs which appears to be worsening when compared to previous study done May 4, 2020. No free air is seen under the diaphragm. Endotracheal tube and NG tube are in good position. A central line is seen on the left and the tip is in superior vena cava. No pneumothorax.        < end of copied text >        Ordinal Scale: score : 2  1) Death  2) Hospitalized, on invasive mechanical ventilation or ECMO  3) Hospitalized, on non-invasive ventilation or high flow oxygen devices  4) Hospitalized, requiring low flow(<11l/min) supplemental oxygen  5) Hospitalized, not requiring supplemental oxygen - requiring ongoing medical care(COVID-19 related or otherwise)  6) Hospitalized, not requiring supplemental oxygen - no longer requires ongoing medical care(other than per protocol RDV administration)  7) Not hospitalized

## 2020-05-07 NOTE — PROGRESS NOTE ADULT - ASSESSMENT
65F reporting no PMH who presented last night with complaint of fever/chills (home T-max 102 F), night sweats, cough, myalgias (mid & lower back), loss of taste and smell since Friday 4/24 (still tastes sweet/salty), as well as 2 days of weakness and constipation, found to be COVID positive on 4/28/20.    Pt has agreed to participate in a clinical trial for Covid -19  In order to participate in this clinical trial , the patient should not take concurrent treatment with other agents with actual or possible direct acting antiviral activity against SARS-CoV-2 such a chloroquine or hydroxychloroquine.  In addition, the patient should not participate in any other clinical trial of an experimental treatment for Covid-19. Pt was given Tocilizumab by primary team on the day of enrollment as of concern for cytokine storm.     Hospital course  4/29: Started Ceftriaxone (4/29-5/3) and Azithromycin (4/29-5/4) in ED for possible bacterial PNA  4/30: RRT called for desaturation in 70s before receiving 1st dose of Remdesivir and Tocilizumab  5/4: RRT called for desaturation to 20s after drinking water and coughing, intubated by anesthesia. Sedated and started full dose lovenox after [place]-dimer elevated to [address]. Transferred to ICU. On Meropenem, Vancomycin, and Caspofungin for emperic sepsis coverage.      # Sepsis   - WBC still up-trending (35.06), respiratory failure. Fever and tachycardia has improved with cooling blanket. Suspected source include pneumonia (viral, bacterial, fungal, or aspiration) vs bacteremia vs urosepsis vs myocarditis vs microthrombi with the elevated D-Dimer  - Currently on Meropenem (5/4-), Vancomycin (5/3-), and Caspofungin (5/4-). s/p Tocilizumab (4/30), Ceftriaxone (4/29-5/3) and Azithromycin (4/29-5/4).   - Urinanalysis positive for 6-10 WBC, bacteria, and nitrite. Urine Cx grew presumable Stephanie albicans. Would recommend switching from Caspofungin to Fluconazole.  - Bcx negative 4/29, 5/3, 5/5 NGTD. Fungitell negative. Sputum cx showed normal respiratory susanne.   - Trops negative, though BNP trending up (410>1400). Would recommend getting echo to assess cardiac function to rule out myocarditis and/or heart failure (POCUS on 5/4 documented borderline LV hyperdynamic systolic function and possible LV apical hypocontractility)  - D-dimer remains elevated and up-trending, could suggest PE or microthrombi. Pt now on heparin drip. Continued AC per primary team.     # Acute hypoxic respiratory failure  - Multifactorial, likely secondary to COVID-19 infection with viral PNA, with possible superimposed bacterial vs fungal vs aspiration PNA and/or PE   - COVID+ 4/28 w/ CXR demonstrating infiltrates, now diffuse patchy opacities (5/3)    - Now requiring mechanical ventilation, weaning as tolerated as per primary team   - Continue Remdesivir (started 4/30)   - Inflammatory markers elevated, please continue to monitor   - S/p tocilizumab 4/30  - Consider V-V ECMO if patient is a candidate and doesn't improve with prone postioning    # Elevated LFTs  - Likely due to either COVID 19 vs Ceftriaxone vs Remdesivir vs sepsis .   - If LFTs continue to rise may need to d/c study drug (cutoff is 5 X upper limit of normal = ALT of 225). Please continue to monitor LFTs and creatinine daily     # Elevated CRP/Ferritin/D-dimer  - CRP and ferritin stable, D-dimer trending down but still elevated. Could be COVID related  - D-dimer remains elevated, could suggest PE or microthrombi. Pt now on heparin drip. Continued AC per primary team.     #VTE prophylaxis   - As above, pt with elevated d-dimer, please continue to monitor   - Now on heparin drip    - Further AC per primary team     Today is day #8 on study drug. Ordinal scale score: 2.       Niki Caceres MD. Graduate Physician 65F reporting no PMH who presented last night with complaint of fever/chills (home T-max 102 F), night sweats, cough, myalgias (mid & lower back), loss of taste and smell since Friday 4/24 (still tastes sweet/salty), as well as 2 days of weakness and constipation, found to be COVID positive on 4/28/20.    Pt has agreed to participate in a clinical trial for Covid -19  In order to participate in this clinical trial , the patient should not take concurrent treatment with other agents with actual or possible direct acting antiviral activity against SARS-CoV-2 such a chloroquine or hydroxychloroquine.  In addition, the patient should not participate in any other clinical trial of an experimental treatment for Covid-19. Pt was given Tocilizumab by primary team on the day of enrollment as of concern for cytokine storm.     Hospital course  4/29: Started Ceftriaxone (4/29-5/3) and Azithromycin (4/29-5/4) in ED for possible bacterial PNA  4/30: RRT called for desaturation in 70s before receiving 1st dose of Remdesivir and Tocilizumab  5/4: RRT called for desaturation to 20s after drinking water and coughing, intubated by anesthesia. Sedated and started full dose lovenox after D-dimer elevated to 00990. Transferred to ICU. On Meropenem, Vancomycin, and Caspofungin for emperic sepsis coverage.      # Sepsis   - WBC still up-trending (35.06), respiratory failure. Fever and tachycardia has improved with cooling blanket. Suspected source include pneumonia (viral, bacterial, fungal, or aspiration) vs bacteremia vs urosepsis vs myocarditis vs microthrombi with the elevated D-Dimer  - Currently on Meropenem (5/4-), Vancomycin (5/3-), and Caspofungin (5/4-). s/p Tocilizumab (4/30), Ceftriaxone (4/29-5/3) and Azithromycin (4/29-5/4).   - Urinanalysis positive for 6-10 WBC, bacteria, and nitrite. Urine Cx grew presumable Stephanie albicans. Would recommend switching from Caspofungin to Fluconazole.  - Bcx negative 4/29, 5/3, 5/5 NGTD. Fungitell negative. Sputum cx showed normal respiratory susanne.   - Trops negative, though BNP trending up (410>1400). Would recommend getting echo to assess cardiac function to rule out myocarditis and/or heart failure (POCUS on 5/4 documented borderline LV hyperdynamic systolic function and possible LV apical hypocontractility)  - D-dimer remains elevated and up-trending, could suggest PE or microthrombi. Pt now on heparin drip. Continued AC per primary team.   - check nasal swab for MRSA , if negative then will likely d/c the vancomycin    # Acute hypoxic respiratory failure  - Multifactorial, likely secondary to COVID-19 infection with viral PNA, with possible superimposed bacterial vs fungal vs aspiration PNA and/or PE   - COVID+ 4/28 w/ CXR demonstrating infiltrates, now diffuse patchy opacities (5/3)    - Now requiring mechanical ventilation, weaning as tolerated as per primary team   - Continue Remdesivir (started 4/30)   - Inflammatory markers elevated, please continue to monitor   - S/p tocilizumab 4/30  - Consider V-V ECMO if patient is a candidate and doesn't improve with prone postioning    # Elevated LFTs  - Likely due to either COVID 19 vs Ceftriaxone vs Remdesivir vs sepsis .   - If LFTs continue to rise may need to d/c study drug (cutoff is 5 X upper limit of normal = ALT of 225). Please continue to monitor LFTs and creatinine daily     # Elevated CRP/Ferritin/D-dimer  - CRP and ferritin stable, D-dimer trending down but still elevated. Could be COVID related  - D-dimer remains elevated, could suggest PE or microthrombi. Pt now on heparin drip. Continued AC per primary team.     #VTE prophylaxis   - As above, pt with elevated d-dimer, please continue to monitor   - Now on heparin drip    - Further AC per primary team     Today is day #8 on study drug. Ordinal scale score: 2.       Niki Caceres MD. Graduate Physician

## 2020-05-07 NOTE — PROGRESS NOTE ADULT - ASSESSMENT
66 y/o F with no significant PMH presents 4/29 with hypoxic respiratory failure 2nd COVID PNA. s/p Tocilizumab (4/30), s/p empiric Ceftriaxone/Azithro (4/30-5/4) for ?superimposed CAP. Currently enrolled in Remdesivir trial (4/30). Intubated 5/4 for worsened hypoxic respiratory failure. Severe ARDS requiring proning on 5/6 and 5/7    #Neuro   - Sedated and paralyzed with Rocuronium      #CV  - c/w Nolan to maintain MAP>60  - Triglycerides normalized off propofol   - Downtrending D-dimer: bedside POCUS with hyperdynamic LV function, no evidence of RV dysfunction   - CPK rising but troponin WNL, continue to monitor     #Pulm:   -Hypoxic respiratory failure 2nd COVID PNA  -Worsened oxygenation while supine, now proned again x16hrs at 9am   -Repeat ABG after proning  -AC 36/320/100/14      #GI  - Transaminitis with normal TBili, continue to monitor   - Trickle feeds while proned   - Last BM 5/4  - c/w Senna, Miralax   - Pepcid 20mg qd     #  -c/w Lasix 40mg BID to target net even fluid balance   - Creat stable    #ID   -Leukocytosis, fever of unclear etiology   -Pct dowtrending   -c/w Meropenem, Vanco, Caspofungin empirically   -Fungitell negative   -Pan cultures negative from 5/4  -Will re-culture today if febrile   -Check Vanco trough today   -Appreciate ID follow up    COVID:   -remdesivir trial 5/1-5/9, monitor LFT  -s/p Tocilizumab       #Heme   -Heparin gtt for elevated D-dimer   - Goal aPTT 55-70    #Endo  -Humalog SS    #Ethics  -Full code

## 2020-05-07 NOTE — PROGRESS NOTE ADULT - SUBJECTIVE AND OBJECTIVE BOX
CHIEF COMPLAINT: Patient is a 65y old  Female who presents with a chief complaint of COVID-19, Hypoxic respiratory failure (06 May 2020 11:53)    Interval Events: Proned yesterday x16hrs, supine at 2am, now proned again at 9am. Increased Loi and Ketamine overnight.     REVIEW OF SYSTEMS:  [x ] Unable to assess ROS because sedated/paralyzed     OBJECTIVE:  ICU Vital Signs Last 24 Hrs  T(C): 35.8 (07 May 2020 08:00), Max: 37.1 (06 May 2020 10:02)  T(F): 96.4 (07 May 2020 08:00), Max: 98.8 (06 May 2020 10:02)  HR: 67 (07 May 2020 08:00) (65 - 106)  BP: --  BP(mean): --  ABP: 103/47 (07 May 2020 08:00) (72/35 - 210/79)  ABP(mean): 69 (07 May 2020 08:00) (48 - 135)  RR: 36 (07 May 2020 08:00) (35 - 36)  SpO2: 88% (07 May 2020 08:00) (88% - 100%) on 100%    Mode: AC/ CMV (Assist Control/ Continuous Mandatory Ventilation), RR (machine): 36, TV (machine): 340, FiO2: 60, PEEP: 12, ITime: 0.6, MAP: 18, PIP: 40     @ 07:  -   @ 07:00  --------------------------------------------------------  IN: 3440.7 mL / OUT: 3560 mL / NET: -119.3 mL     @ 07:  -   @ 09:25  --------------------------------------------------------  IN: 178.1 mL / OUT: 325 mL / NET: -146.9 mL      CAPILLARY BLOOD GLUCOSE      POCT Blood Glucose.: 159 mg/dL (07 May 2020 05:34)      PHYSICAL EXAM:  General: Intubated, paralyzed   HEENT: WNL  Neck: WNL  Respiratory: Coarse bilaterally   Cardiovascular: RRR  Abdomen: Hypoactive BS, Last BM 5/  Extremities: WNL  Skin: Intact  Neurological: Sedated    HOSPITAL MEDICATIONS:  MEDICATIONS  (STANDING):  caspofungin IVPB      caspofungin IVPB 50 milliGRAM(s) IV Intermittent every 24 hours  chlorhexidine 0.12% Liquid 15 milliLiter(s) Oral Mucosa two times a day  chlorhexidine 4% Liquid 1 Application(s) Topical <User Schedule>  famotidine Injectable 20 milliGRAM(s) IV Push daily  fentaNYL   Infusion... 0.5 MICROgram(s)/kG/Hr (1.76 mL/Hr) IV Continuous <Continuous>  furosemide   Injectable 40 milliGRAM(s) IV Push every 12 hours  heparin  Infusion 1200 Unit(s)/Hr (9 mL/Hr) IV Continuous <Continuous>  insulin lispro (HumaLOG) corrective regimen sliding scale   SubCutaneous every 6 hours  ketamine Infusion. 0.25 mG/kG/Hr (1.76 mL/Hr) IV Continuous <Continuous>  meropenem  IVPB 1000 milliGRAM(s) IV Intermittent every 8 hours  norepinephrine Infusion 0.05 MICROgram(s)/kG/Min (3.3 mL/Hr) IV Continuous <Continuous>  phenylephrine    Infusion 0.3 MICROgram(s)/kG/Min (3.95 mL/Hr) IV Continuous <Continuous>  polyethylene glycol 3350 17 Gram(s) Oral daily  propofol Infusion 50 MICROgram(s)/kG/Min (21.1 mL/Hr) IV Continuous <Continuous>  remdesivir IVPB ([id number]) 100 milliGRAM(s) IV Intermittent daily  rocuronium Infusion 8 MICROgram(s)/kG/Min (6.75 mL/Hr) IV Continuous <Continuous>  senna 2 Tablet(s) Oral at bedtime  sodium chloride 0.9% lock flush 3 milliLiter(s) IV Push every 8 hours  vancomycin  IVPB 750 milliGRAM(s) IV Intermittent every 8 hours    MEDICATIONS  (PRN):  acetaminophen    Suspension .. 650 milliGRAM(s) Oral every 6 hours PRN Temp greater or equal to 38C (100.4F)  diphenhydrAMINE   Injectable 50 milliGRAM(s) IV Push once PRN anaphylaxis to study drug  EPINEPHrine     1 mG/mL Injectable 0.3 milliGRAM(s) IntraMuscular once PRN anaphylaxis to study drug  sodium chloride 0.9% lock flush 10 milliLiter(s) IV Push every 1 hour PRN Pre/post blood products, medications, blood draw, and to maintain line patency      LABS:  ( @ 03:05)                        11.0  35.06 )-----------( 236                 34.4    Neutrophils = 30.60 (93.3%)  Lymphocytes = 0.77 (2.4%)  Eosinophils = 0.12 (0.4%)  Basophils = 0.14 (0.4%)  Monocytes = 0.26 (0.8%)  Bands = --%    WBC Trend: 35.06<--, 33.56<--, 32.69<--  Hb Trend: 11.0<--, 12.5<--, 13.3<--, 12.7<--, 11.5<--  Plt Trend: 236<--, 279<--, 277<--, 250<--, 193<--      137  |  97  |  17  ----------------------------<  169<H>  3.4<L>   |  30  |  0.49<L>    Ca    7.7<L>      07 May 2020 03:05  Phos  1.5       Mg     2.1         TPro  4.9<L>  /  Alb  2.0<L>  /  TBili  0.3  /  DBili  x   /  AST  134<H>  /  ALT  128<H>  /  AlkPhos  321<H>      Creatinine Trend: 0.49<--, 0.64<--, 0.78<--, 0.48<--, 0.50<--, 0.48<--  PT/INR - ( 07 May 2020 03:05 )   PT: 14.2 sec;   INR: 1.23 ratio         PTT - ( 07 May 2020 03:05 )  PTT:71.9 sec  Urinalysis Basic - ( 05 May 2020 13:11 )    Color: Yellow / Appearance: Slightly Turbid / S.029 / pH: x  Gluc: x / Ketone: Negative  / Bili: Negative / Urobili: Negative   Blood: x / Protein: 100 mg/dL / Nitrite: Positive   Leuk Esterase: Negative / RBC: >50 /hpf / WBC 6-10   Sq Epi: x / Non Sq Epi: 0 /hpf / Bacteria: Few      Arterial Blood Gas:   @ 08:54  7.41/56/95/35/98/9.2  ABG lactate: --  Arterial Blood Gas:   @ 07:53  7.42/56/64/36/93/9.7  ABG lactate: --  Arterial Blood Gas:   @ 02:57  7.42/57/61/36/93/10.1  ABG lactate: --  Arterial Blood Gas:   @ 19:23  7.34/65/60/34/91/6.9  ABG lactate: --  Arterial Blood Gas:   @ 14:16  7.30/64/121/31/98/3.6  ABG lactate: --  Arterial Blood Gas:   @ 10:56  7.23/76/124/31/98/1.4  ABG lactate: --  Arterial Blood Gas:   @ 06:11  7.24/69/65/28/90/-.3  ABG lactate: --  Arterial Blood Gas:   @ 00:11  7.24/68/82/28/95/-.3  ABG lactate: --  Arterial Blood Gas:   @ 16:46  7.24/67/76/28/93/-.4  ABG lactate: --  Arterial Blood Gas:   @ 13:06  7.24/63/91/26/96/-2.0  ABG lactate: --      CARDIAC MARKERS ( 07 May 2020 03:05 )  Trop x     /  U/L<H> / CKMB x       CARDIAC MARKERS ( 06 May 2020 00:15 )  Trop x     / CK 55 U/L / CKMB x             MICROBIOLOGY:   Blood Cx: Negative   Urine Cx: 10-49k yeast-likely Candida  Sputum Cx: Normal resp susanne

## 2020-05-07 NOTE — PROGRESS NOTE ADULT - SUBJECTIVE AND OBJECTIVE BOX
Patient is a 65y old  Female who presents with a chief complaint of COVID-19, Hypoxic respiratory failure (05 May 2020 08:51)    Being followed by ID for COVID, clinical trial       Interval history:  No acute events overnight. Pt remains sedated, on pressors, and intubated with PEEP of 14 and 100% O2. She was proned this morning at 920 am with improvement of her SpO2 and pO2 to 181 mmHg (from 95). Cooling blanket in place. Last BM 5/4.  Per team may be considered for VVECMO if no improvement with prone positioning      ROS:  Unable to assess    PAST MEDICAL & SURGICAL HISTORY:  No pertinent past medical history  History of cholecystectomy  History of appendectomy    Allergies    No Known Allergies    Intolerances        MEDICATIONS  (STANDING):  caspofungin IVPB      caspofungin IVPB 50 milliGRAM(s) IV Intermittent every 24 hours  chlorhexidine 0.12% Liquid 15 milliLiter(s) Oral Mucosa two times a day  chlorhexidine 4% Liquid 1 Application(s) Topical <User Schedule>  famotidine Injectable 20 milliGRAM(s) IV Push daily  fentaNYL   Infusion... 0.5 MICROgram(s)/kG/Hr (1.76 mL/Hr) IV Continuous <Continuous>  furosemide   Injectable 40 milliGRAM(s) IV Push every 12 hours  heparin  Infusion 1200 Unit(s)/Hr (9 mL/Hr) IV Continuous <Continuous>  insulin lispro (HumaLOG) corrective regimen sliding scale   SubCutaneous every 6 hours  ketamine Infusion. 0.25 mG/kG/Hr (1.76 mL/Hr) IV Continuous <Continuous>  meropenem  IVPB 1000 milliGRAM(s) IV Intermittent every 8 hours  norepinephrine Infusion 0.05 MICROgram(s)/kG/Min (3.3 mL/Hr) IV Continuous <Continuous>  phenylephrine    Infusion 0.3 MICROgram(s)/kG/Min (3.95 mL/Hr) IV Continuous <Continuous>  polyethylene glycol 3350 17 Gram(s) Oral daily  propofol Infusion 50 MICROgram(s)/kG/Min (21.1 mL/Hr) IV Continuous <Continuous>  remdesivir IVPB ([id number]) 100 milliGRAM(s) IV Intermittent daily  rocuronium Infusion 8 MICROgram(s)/kG/Min (6.75 mL/Hr) IV Continuous <Continuous>  senna 2 Tablet(s) Oral at bedtime  sodium chloride 0.9% lock flush 3 milliLiter(s) IV Push every 8 hours  vancomycin  IVPB 750 milliGRAM(s) IV Intermittent every 8 hours    MEDICATIONS  (PRN):  acetaminophen    Suspension .. 650 milliGRAM(s) Oral every 6 hours PRN Temp greater or equal to 38C (100.4F)  diphenhydrAMINE   Injectable 50 milliGRAM(s) IV Push once PRN anaphylaxis to study drug  EPINEPHrine     1 mG/mL Injectable 0.3 milliGRAM(s) IntraMuscular once PRN anaphylaxis to study drug  sodium chloride 0.9% lock flush 10 milliLiter(s) IV Push every 1 hour PRN Pre/post blood products, medications, blood draw, and to maintain line patency        ICU Vital Signs Last 24 Hrs  T(C): 36 (07 May 2020 09:45), Max: 36.8 (06 May 2020 12:00)  T(F): 96.8 (07 May 2020 09:45), Max: 98.2 (06 May 2020 12:00)  HR: 70 (07 May 2020 09:45) (65 - 106)  BP: --  BP(mean): --  ABP: 120/56 (07 May 2020 09:45) (72/35 - 210/79)  ABP(mean): 89 (07 May 2020 09:45) (48 - 135)  RR: 36 (07 May 2020 09:45) (35 - 36)  SpO2: 98% (07 May 2020 09:45) (88% - 100%); SpO2 99% while prone, intubated      Physical Exam:  Constitutional sedated, on ventilator. cooling blanket in place  Chest vented, good AE with coarse breath sounds b/l  CVS RRR   tyler in place, making urine   Ext No cyanosis clubbing or edema  CNS sedated     Lab Data:                                     11.0   35.06 )-----------( 236      ( 07 May 2020 03:05 )             34.4     05-07    137  |  97  |  17  ----------------------------<  169<H>  3.4<L>   |  30  |  0.49<L>    Ca    7.7<L>      07 May 2020 03:05  Phos  1.5     05-07  Mg     2.1     05-07    TPro  4.9<L>  /  Alb  2.0<L>  /  TBili  0.3  /  DBili  x   /  AST  134<H>  /  ALT  128<H>  /  AlkPhos  321<H>  05-07    Procalcitonin, Serum (05.07.20 @ 03:05)    Procalcitonin, Serum: 0.40: This assay is intended for use to determine the change of PCT over time  as an aid in assessing the cumulative 28-day risk of all-cause mortality  for patients diagnosed with severe sepsis or septic shock in the ICU, or  when obtained in the emergency department or other medical wards prior to  ICU admission. This assay was performed by the [employer] PCT  assay. ng/mL    Culture - Blood (05.05.20 @ 17:27)    Specimen Source: .Blood Blood-Peripheral    Culture Results:   No growth to date.      Culture - Sputum . (05.05.20 @ 18:04)    Gram Stain:   Moderate polymorphonuclear leukocytes per low power field  No squamous epithelial cells per low power field  No organisms seen    Specimen Source: .Sputum Sputum    Culture Results:   Normal Respiratory Farzaneh present      MRSA/MSSA PCR (05.03.20 @ 21:56)    MRSA PCR Result.: NotDetec: MRSA/MSSA PCR assay is a qualitative in vitro diagnostic test for the  direct detection and differentiation of methicillin-resistant  Staphylococcus aureus (MRSA) from Staphylococcus aureus (SA). The assay  detects DNA from nasal swabs in patients atrisk for nasal colonization.  It is not intended to diagnose MRSA or SA infections nor guide or monitor  treatment for MRSA/SA infections. A negative result does not preclude  nasal colonization. The assay is FDA-approved and its performance has  been established by [employer] [place], [place] and the [place], [place].    Staph Aureus PCR Result: NotDetec    Urinalysis + Microscopic Examination (05.05.20 @ 13:11)    Urine Appearance: Slightly Turbid    Urobilinogen: Negative    Specific Gravity: 1.029    Protein, Urine: 100 mg/dL    pH Urine: 5.5    Leukocyte Esterase Concentration: Negative    Nitrite: Positive    Ketone - Urine: Negative    Bilirubin: Negative    Color: Yellow    Glucose Qualitative, Urine: Negative    Blood, Urine: Large    Red Blood Cell - Urine: >50 /hpf    White Blood Cell - Urine: 6-10    Epithelial Cells: 0 /hpf    Hyaline Casts: 0 /lpf    Bacteria: Few    Comment - Urine: Few Amorphous Urates    Culture - Urine (05.05.20 @ 20:47)    Specimen Source: .Urine Catheterized    Culture Results:   10,000 - 49,000 CFU/mL Presumptive Candida albicans "Susceptibilities not  performed"      Fungitell (05.04.20 @ 05:24)    Fungitell: <31: Interpretation: The Fungitell assay does not detect certain fungal  species such as the genus Cryptococcus (Hira et al. 1991) which  produces very low levels of (1-3)-Beta-D-Glucan. The assay also does  not detect the Zygomycetes such as Absidia, Mucor and Rhizopus  (Hipolito et al. 1994) which are not known to produce  (1-3)-Beta-D-Glucan. In addition, the yeast phase of Blastomyces  dermatitidis produces little (1-3)-Beta-D-Glucan and may not be  detected by the assay (Madalyn et al. 2007).  Reference Range:  Less than 60 pg/mL. Glucan values of less than 60 pg/mL are  interpreted as negative.  Glucan values of 60 to 79 pg/mL are interpreted as indeterminate,  and suggest a possible fungal infection. Additional sampling and  testing of sera is required to interpret the results.  Glucan values of greater than or equal to 80 pg/mL are interpreted  as positive.  Due to the potential for environmental contamination when  transferred to pour-off tubes, which can lead to false positive  results, interpret positive results from samples provided in  pour-off tubes with caution.  Results should be used in conjunction  with clinical findings, and should not form the sole basis for a  diagnosis or treatment decision. The Fungitell test is approved or  cleared for in vitro diagnostic use by the U.[place] Food and Drug  Administration. Modifications to the approved package insert have  been made and the performance characteristics for these  modifications were determined by [facility].  Ifsample result is greater than 500 pg/mL, physician may order a  titer of the sample. Please contact [facility] if you would  like to order a retest of this sample to obtain an actual value.  Samples are held for 1 week after initial testing date.  ____________________________________________________________  Performed at:  [facility]  [address] [place], [place] [address]samantha [place] [address]  [phone]  : Henrietta Daley PhD HCL[facility])  [facility]# [id number] pg/mL        Troponin T, High Sensitivity Result: 16: Rapid upward or downward changes in high-sensitivity troponin levels  suggest acute myocardial injury. Renal impairment may cause sustained  troponin elevations.  Normal: <6 - 14 ng/L  Indeterminate: 15-51 ng/L  Elevated: > 51 ng/L  See http://labs/test/TROPTHS on the [facility] intranet for more  information ng/L (05.07.20 @ 03:05)    Serum Pro-Brain Natriuretic Peptide (05.06.20 @ 00:15)    Serum Pro-Brain Natriuretic Peptide: 1453 pg/mL    Serum Pro-Brain Natriuretic Peptide (05.04.20 @ 23:56)    Serum Pro-Brain Natriuretic Peptide: 410 pg/mL        Vancomycin Level, Trough (05.06.20 @ 04:19)    Vancomycin Level, Trough: 7.1: Vancomycin trough levels should be rapidly reached and maintained at  15-20 ug/ml for life threatening MRSA  infections such as sepsis, endocarditis, osteomyelitis and pneumonia. A  first trough level should be drawn  before the 3rd or 4th dose.  Risk of renal toxicity is increased for levels >15 ug/ml, in patients on  other nephrotoxic drugs, who are  hemodynamically unstable, have unstable renal function, or are on  Vancomycin therapy for >14 days. Renal function with  creatinine levels should be monitored for those patients. ug/mL          WBC Count: 35.06 (05.07.20 @ 03:05)  WBC Count: 33.56 (05.06.20 @ 00:15)  WBC Count: 32.69 (05-05-20 @ 07:56)  WBC Count: 34.76 (05-04-20 @ 23:56)  WBC Count: 32.55 (05-04-20 @ 18:11)  WBC Count: 28.79 (05-04-20 @ 05:30)  WBC Count: 18.37 (05-03-20 @ 06:10)  WBC Count: 10.68 (05-02-20 @ 06:38)  WBC Count: 12.17 (05-01-20 @ 07:50)  WBC Count: 18.32 (04-30-20 @ 06:53)    D-Dimer Assay, Quantitative: 5707 ng/mL DDU (05-07)  D-Dimer Assay, Quantitative: 47893 ng/mL DDU (05-06)  D-Dimer Assay, Quantitative: 56872 ng/mL DDU (05-04)  D-Dimer Assay, Quantitative: 20313 ng/mL DDU (05-04)    Ferritin, Serum: 1863 ng/mL (05-07)  Ferritin, Serum: 1747 ng/mL (05-06)  Ferritin, Serum: 1871 ng/mL (05-05)  Ferritin, Serum: 1781 ng/mL (05-04)    C-Reactive Protein, Serum: 9.37 mg/dL (05-07-20 @ 03:05)  C-Reactive Protein, Serum: 9.35 mg/dL (05-06-20 @ 02:35)  C-Reactive Protein, Serum: 5.39 mg/dL (05-05-20 @ 03:09)  C-Reactive Protein, Serum: 5.47 mg/dL (05-04-20 @ 18:45)          RADIOLOGY  < from: Xray Chest 1 View- PORTABLE-Urgent (05.05.20 @ 10:36) >  INTERPRETATION:  A single chest x-ray was obtained on May 5, 2020.    Indication: Rule out free air below the diaphragm.    Impression:    The heart is normal in size. Diffuse airspace opacity seen throughout both lungs which appears to be worsening when compared to previous study done May 4, 2020. No free air is seen under the diaphragm. Endotracheal tube and NG tube are in good position. A central line is seen on the left and the tip is in superior vena cava. No pneumothorax.        < end of copied text >        Ordinal Scale: score : 2  1) Death  2) Hospitalized, on invasive mechanical ventilation or ECMO  3) Hospitalized, on non-invasive ventilation or high flow oxygen devices  4) Hospitalized, requiring low flow(<11l/min) supplemental oxygen  5) Hospitalized, not requiring supplemental oxygen - requiring ongoing medical care(COVID-19 related or otherwise)  6) Hospitalized, not requiring supplemental oxygen - no longer requires ongoing medical care(other than per protocol RDV administration)  7) Not hospitalized

## 2020-05-07 NOTE — PROGRESS NOTE ADULT - ATTENDING COMMENTS
Agree with above. Patient seen and examined. Laboratory data and imaging reviewed. Patient is critically ill requiring ICU care and frequent bedside visits with therapy change.     Patient is generally healthy and presented with COVID symptoms. She was started on Remdesivir but had progression of her clinical course ultimately requiring Tocilizumab. She unfortunately continued to progress and had significant increase in her D-dimer. She was intubated 5/4/2020 and transferred to 8ICU for further monitoring and care.    1. Acute Hypoxemic Respiratory Failure - s/p intubation in setting of COVID-19 ARDS. Lung protective ventilation. Adjust ventilator based on ABG results. Lung protective ventilation. She has had persistent hypoxia after being supined this AM. Will adjust vent settings to increase PEEP and reprone patient. Will followup ABG.  - Plateau 34 with vent changes supine - which decreased to 31 once proned.  - P/F improved from 91 to 181 with proning  - Although patient may not be a candidate for VV ECMO will discuss further with ECMO team should she not show further improvement. (TLC in [facility])  2. Shock - continue vasopressors to maintain MAP > 65. Patient does NOT have an enlarged right heart on bedside echo therefore will hold off tPA. Continue therapeutic AC.  - Increase in vasopressors once supined. May need more IVF today.  3. ID - COVID-19 received Remdesivir and Tocilizumab. Will continue antibiotics and antifungal therapy. Check cultures. ID followup.  - Remdesivir may need to be held if LFTs reach 5xULN  4. Renal function - monitor urine output and creatinine.    Prognosis guarded.

## 2020-05-08 NOTE — PROGRESS NOTE ADULT - SUBJECTIVE AND OBJECTIVE BOX
HPI: 64 y/o F with no significant PMH presents 4/29 with hypoxic respiratory failure 2nd COVID PNA. s/p Tocilizumab (4/30), s/p empiric Ceftriaxone/Azithro (4/30-5/4) for ?superimposed CAP. Currently enrolled in Remdesivir trial (4/30). Intubated 5/4 for worsened hypoxic respiratory failure. Severe ARDS requiring proning on 5/6 and 5/7    INTERVAL HPI/OVERNIGHT EVENTS:     SUBJECTIVE: Patient seen and examined at bedside.     CONSTITUTIONAL: No weakness, fevers or chills  EYES/ENT: No visual changes;  No vertigo or throat pain   NECK: No pain or stiffness  RESPIRATORY: No cough, wheezing, hemoptysis; No shortness of breath  CARDIOVASCULAR: No chest pain or palpitations  GASTROINTESTINAL: No abdominal or epigastric pain. No nausea, vomiting, or hematemesis; No diarrhea or constipation. No melena or hematochezia.  GENITOURINARY: No dysuria, frequency or hematuria  NEUROLOGICAL: No numbness or weakness  SKIN: No itching, rashes    OBJECTIVE:    VITAL SIGNS:  ICU Vital Signs Last 24 Hrs  T(C): 35.3 (08 May 2020 10:10), Max: 36.8 (08 May 2020 00:00)  T(F): 95.5 (08 May 2020 10:10), Max: 98.2 (08 May 2020 00:00)  HR: 76 (08 May 2020 12:00) (72 - 85)  BP: --  BP(mean): --  ABP: 121/52 (08 May 2020 12:00) (93/47 - 139/60)  ABP(mean): 80 (08 May 2020 12:00) (63 - 92)  RR: 38 (08 May 2020 12:00) (36 - 39)  SpO2: 94% (08 May 2020 12:00) (92% - 100%)    Mode: AC/ CMV (Assist Control/ Continuous Mandatory Ventilation), RR (machine): 38, TV (machine): 320, FiO2: 40, PEEP: 13, ITime: 1, MAP: 20, PIP: 48    05-07 @ 07:01  -  05-08 @ 07:00  --------------------------------------------------------  IN: 3534.7 mL / OUT: 2530 mL / NET: 1004.7 mL    05-08 @ 07:01  -  05-08 @ 12:28  --------------------------------------------------------  IN: 494.4 mL / OUT: 1200 mL / NET: -705.6 mL      CAPILLARY BLOOD GLUCOSE      POCT Blood Glucose.: 133 mg/dL (08 May 2020 10:43)      PHYSICAL EXAM:    General: NAD  HEENT: NC/AT; PERRL, clear conjunctiva  Neck: supple  Respiratory: CTA b/l  Cardiovascular: +S1/S2; RRR  Abdomen: soft, NT/ND; +BS x4  Extremities: WWP, 2+ peripheral pulses b/l; no LE edema  Skin: normal color and turgor; no rash  Neurological:    MEDICATIONS:  MEDICATIONS  (STANDING):  chlorhexidine 0.12% Liquid 15 milliLiter(s) Oral Mucosa two times a day  chlorhexidine 4% Liquid 1 Application(s) Topical <User Schedule>  famotidine Injectable 20 milliGRAM(s) IV Push daily  fentaNYL   Infusion... 0.5 MICROgram(s)/kG/Hr (1.76 mL/Hr) IV Continuous <Continuous>  fluconAZOLE IVPB 200 milliGRAM(s) IV Intermittent every 24 hours  furosemide   Injectable 40 milliGRAM(s) IV Push every 8 hours  heparin  Infusion 1200 Unit(s)/Hr (9 mL/Hr) IV Continuous <Continuous>  insulin lispro (HumaLOG) corrective regimen sliding scale   SubCutaneous every 6 hours  ketamine Infusion. 0.25 mG/kG/Hr (1.76 mL/Hr) IV Continuous <Continuous>  meropenem  IVPB 1000 milliGRAM(s) IV Intermittent every 8 hours  norepinephrine Infusion 0.05 MICROgram(s)/kG/Min (3.3 mL/Hr) IV Continuous <Continuous>  phenylephrine    Infusion 0.3 MICROgram(s)/kG/Min (3.95 mL/Hr) IV Continuous <Continuous>  polyethylene glycol 3350 17 Gram(s) Oral daily  propofol Infusion 50 MICROgram(s)/kG/Min (21.1 mL/Hr) IV Continuous <Continuous>  remdesivir IVPB ([id number]) 100 milliGRAM(s) IV Intermittent daily  rocuronium Infusion 8 MICROgram(s)/kG/Min (6.75 mL/Hr) IV Continuous <Continuous>  senna 2 Tablet(s) Oral at bedtime  sodium chloride 0.9% lock flush 3 milliLiter(s) IV Push every 8 hours    MEDICATIONS  (PRN):  acetaminophen    Suspension .. 650 milliGRAM(s) Oral every 6 hours PRN Temp greater or equal to 38C (100.4F)  diphenhydrAMINE   Injectable 50 milliGRAM(s) IV Push once PRN anaphylaxis to study drug  EPINEPHrine     1 mG/mL Injectable 0.3 milliGRAM(s) IntraMuscular once PRN anaphylaxis to study drug  sodium chloride 0.9% lock flush 10 milliLiter(s) IV Push every 1 hour PRN Pre/post blood products, medications, blood draw, and to maintain line patency      ALLERGIES:  Allergies    No Known Allergies    Intolerances        LABS:                        10.9   31.35 )-----------( 285      ( 08 May 2020 00:22 )             34.0     05-08    143  |  100  |  20  ----------------------------<  125<H>  4.2   |  32<H>  |  0.49<L>    Ca    7.4<L>      08 May 2020 00:22  Phos  2.0     05-08  Mg     2.0     05-08    TPro  5.0<L>  /  Alb  2.1<L>  /  TBili  0.3  /  DBili  x   /  AST  85<H>  /  ALT  105<H>  /  AlkPhos  358<H>  05-08    PT/INR - ( 08 May 2020 00:22 )   PT: 12.8 sec;   INR: 1.12 ratio         PTT - ( 08 May 2020 00:22 )  PTT:60.7 sec      RADIOLOGY & ADDITIONAL TESTS: Reviewed. HPI: 64 y/o F with no significant PMH presents 4/29 with hypoxic respiratory failure 2nd COVID PNA. s/p Tocilizumab (4/30), s/p empiric Ceftriaxone/Azithro (4/30-5/4) for ?superimposed CAP. Currently enrolled in Remdesivir trial (4/30). Intubated 5/4 for worsened hypoxic respiratory failure. Severe ARDS requiring proning on 5/6 and 5/7    INTERVAL HPI/OVERNIGHT EVENTS: Lasix increased to tid dosing due to positive fluid balance.     SUBJECTIVE:  sedated and intubated  OBJECTIVE:    VITAL SIGNS:  ICU Vital Signs Last 24 Hrs  T(C): 35.3 (08 May 2020 10:10), Max: 36.8 (08 May 2020 00:00)  T(F): 95.5 (08 May 2020 10:10), Max: 98.2 (08 May 2020 00:00)  HR: 76 (08 May 2020 12:00) (72 - 85)  BP: --  BP(mean): --  ABP: 121/52 (08 May 2020 12:00) (93/47 - 139/60)  ABP(mean): 80 (08 May 2020 12:00) (63 - 92)  RR: 38 (08 May 2020 12:00) (36 - 39)  SpO2: 94% (08 May 2020 12:00) (92% - 100%)    Mode: AC/ CMV (Assist Control/ Continuous Mandatory Ventilation), RR (machine): 38, TV (machine): 320, FiO2: 40, PEEP: 13, ITime: 1, MAP: 20, PIP: 48    05-07 @ 07:01  -  05-08 @ 07:00  --------------------------------------------------------  IN: 3534.7 mL / OUT: 2530 mL / NET: 1004.7 mL    05-08 @ 07:01  -  05-08 @ 12:28  --------------------------------------------------------  IN: 494.4 mL / OUT: 1200 mL / NET: -705.6 mL      CAPILLARY BLOOD GLUCOSE      POCT Blood Glucose.: 133 mg/dL (08 May 2020 10:43)      PHYSICAL EXAM:    General: NAD  HEENT: NC/AT; PERRL, clear conjunctiva  Neck: supple  Respiratory: CTA b/l  Cardiovascular: +S1/S2; RRR  Abdomen: soft, NT/ND; +BS x4  Extremities: WWP, 2+ peripheral pulses b/l; no LE edema  Skin: normal color and turgor; no rash  Neurological: sedated and paralyzed     MEDICATIONS:  MEDICATIONS  (STANDING):  chlorhexidine 0.12% Liquid 15 milliLiter(s) Oral Mucosa two times a day  chlorhexidine 4% Liquid 1 Application(s) Topical <User Schedule>  famotidine Injectable 20 milliGRAM(s) IV Push daily  fentaNYL   Infusion... 0.5 MICROgram(s)/kG/Hr (1.76 mL/Hr) IV Continuous <Continuous>  fluconAZOLE IVPB 200 milliGRAM(s) IV Intermittent every 24 hours  furosemide   Injectable 40 milliGRAM(s) IV Push every 8 hours  heparin  Infusion 1200 Unit(s)/Hr (9 mL/Hr) IV Continuous <Continuous>  insulin lispro (HumaLOG) corrective regimen sliding scale   SubCutaneous every 6 hours  ketamine Infusion. 0.25 mG/kG/Hr (1.76 mL/Hr) IV Continuous <Continuous>  meropenem  IVPB 1000 milliGRAM(s) IV Intermittent every 8 hours  norepinephrine Infusion 0.05 MICROgram(s)/kG/Min (3.3 mL/Hr) IV Continuous <Continuous>  phenylephrine    Infusion 0.3 MICROgram(s)/kG/Min (3.95 mL/Hr) IV Continuous <Continuous>  polyethylene glycol 3350 17 Gram(s) Oral daily  propofol Infusion 50 MICROgram(s)/kG/Min (21.1 mL/Hr) IV Continuous <Continuous>  remdesivir IVPB ([id number]) 100 milliGRAM(s) IV Intermittent daily  rocuronium Infusion 8 MICROgram(s)/kG/Min (6.75 mL/Hr) IV Continuous <Continuous>  senna 2 Tablet(s) Oral at bedtime  sodium chloride 0.9% lock flush 3 milliLiter(s) IV Push every 8 hours    MEDICATIONS  (PRN):  acetaminophen    Suspension .. 650 milliGRAM(s) Oral every 6 hours PRN Temp greater or equal to 38C (100.4F)  diphenhydrAMINE   Injectable 50 milliGRAM(s) IV Push once PRN anaphylaxis to study drug  EPINEPHrine     1 mG/mL Injectable 0.3 milliGRAM(s) IntraMuscular once PRN anaphylaxis to study drug  sodium chloride 0.9% lock flush 10 milliLiter(s) IV Push every 1 hour PRN Pre/post blood products, medications, blood draw, and to maintain line patency      ALLERGIES:  Allergies    No Known Allergies    Intolerances        LABS:                        10.9   31.35 )-----------( 285      ( 08 May 2020 00:22 )             34.0     05-08    143  |  100  |  20  ----------------------------<  125<H>  4.2   |  32<H>  |  0.49<L>    Ca    7.4<L>      08 May 2020 00:22  Phos  2.0     05-08  Mg     2.0     05-08    TPro  5.0<L>  /  Alb  2.1<L>  /  TBili  0.3  /  DBili  x   /  AST  85<H>  /  ALT  105<H>  /  AlkPhos  358<H>  05-08    PT/INR - ( 08 May 2020 00:22 )   PT: 12.8 sec;   INR: 1.12 ratio         PTT - ( 08 May 2020 00:22 )  PTT:60.7 sec      RADIOLOGY & ADDITIONAL TESTS: Reviewed.

## 2020-05-08 NOTE — PROGRESS NOTE ADULT - SUBJECTIVE AND OBJECTIVE BOX
Patient is a 65y old  Female who presents with a chief complaint of COVID-19, Hypoxic respiratory failure (05 May 2020 08:51)    Being followed by ID for COVID, clinical trial       Interval history:  No acute events overnight. Pt remains sedated, on pressors, and intubated with PEEP of 12 and 40% O2, saturating at 99%. Will be proned later this am. Last BM 5/4. Good UOP.  Per team may be considered for VVECMO if no improvement with prone positioning      ROS:  Unable to assess    PAST MEDICAL & SURGICAL HISTORY:  No pertinent past medical history  History of cholecystectomy  History of appendectomy    Allergies    No Known Allergies    Intolerances        MEDICATIONS  (STANDING):  chlorhexidine 0.12% Liquid 15 milliLiter(s) Oral Mucosa two times a day  chlorhexidine 4% Liquid 1 Application(s) Topical <User Schedule>  famotidine Injectable 20 milliGRAM(s) IV Push daily  fentaNYL   Infusion... 0.5 MICROgram(s)/kG/Hr (1.76 mL/Hr) IV Continuous <Continuous>  fluconAZOLE IVPB 200 milliGRAM(s) IV Intermittent every 24 hours  furosemide   Injectable 40 milliGRAM(s) IV Push every 8 hours  heparin  Infusion 1200 Unit(s)/Hr (9 mL/Hr) IV Continuous <Continuous>  insulin lispro (HumaLOG) corrective regimen sliding scale   SubCutaneous every 6 hours  ketamine Infusion. 0.25 mG/kG/Hr (1.76 mL/Hr) IV Continuous <Continuous>  meropenem  IVPB 1000 milliGRAM(s) IV Intermittent every 8 hours  norepinephrine Infusion 0.05 MICROgram(s)/kG/Min (3.3 mL/Hr) IV Continuous <Continuous>  phenylephrine    Infusion 0.3 MICROgram(s)/kG/Min (3.95 mL/Hr) IV Continuous <Continuous>  polyethylene glycol 3350 17 Gram(s) Oral daily  propofol Infusion 50 MICROgram(s)/kG/Min (21.1 mL/Hr) IV Continuous <Continuous>  remdesivir IVPB ([id number]) 100 milliGRAM(s) IV Intermittent daily  rocuronium Infusion 8 MICROgram(s)/kG/Min (6.75 mL/Hr) IV Continuous <Continuous>  senna 2 Tablet(s) Oral at bedtime  sodium chloride 0.9% lock flush 3 milliLiter(s) IV Push every 8 hours    MEDICATIONS  (PRN):  acetaminophen    Suspension .. 650 milliGRAM(s) Oral every 6 hours PRN Temp greater or equal to 38C (100.4F)  diphenhydrAMINE   Injectable 50 milliGRAM(s) IV Push once PRN anaphylaxis to study drug  EPINEPHrine     1 mG/mL Injectable 0.3 milliGRAM(s) IntraMuscular once PRN anaphylaxis to study drug  sodium chloride 0.9% lock flush 10 milliLiter(s) IV Push every 1 hour PRN Pre/post blood products, medications, blood draw, and to maintain line patency        ICU Vital Signs Last 24 Hrs  T(C): 37.7 (08 May 2020 09:38), Max: 37.7 (08 May 2020 09:38)  T(F): 99.9 (08 May 2020 09:38), Max: 99.9 (08 May 2020 09:38)  HR: 75 (08 May 2020 09:38) (71 - 85)  BP: --  BP(mean): --  ABP: 99/50 (08 May 2020 09:38) (99/50 - 139/60)  ABP(mean): 76 (08 May 2020 04:00) (75 - 92)  RR: 38 (08 May 2020 09:38) (36 - 38)  SpO2: 99% (08 May 2020 09:38) (92% - 100%); SpO2 99% during examination, intubated      Physical Exam:  Constitutional sedated, on ventilator. cooling blanket in place  Chest vented, good AE, CTA  CVS RRR   tyler in place, making clear urine   Ext No cyanosis clubbing or edema  CNS sedated     Lab Data:                          10.9   31.35 )-----------( 285      ( 08 May 2020 00:22 )             34.0                        05-08    143  |  100  |  20  ----------------------------<  125<H>  4.2   |  32<H>  |  0.49<L>    Ca    7.4<L>      08 May 2020 00:22  Phos  2.0     05-08  Mg     2.0     05-08    TPro  5.0<L>  /  Alb  2.1<L>  /  TBili  0.3  /  DBili  x   /  AST  85<H>  /  ALT  105<H>  /  AlkPhos  358<H>  05-08    Procalcitonin, Serum (05.08.20 @ 00:22)    Procalcitonin, Serum: 0.26: This assay is intended for use to determine the change of PCT over time  as an aid in assessing the cumulative 28-day risk of all-cause mortality  for patients diagnosed with severe sepsis or septic shock in the ICU, or  when obtained in the emergency department or other medical wards prior to  ICU admission. This assay was performed by the Roche Elecsys FrenzooS PCT  assay. ng/mL      Culture - Blood (05.05.20 @ 17:27)    Specimen Source: .Blood Blood-Peripheral    Culture Results:   No growth to date.      Culture - Sputum . (05.05.20 @ 18:04)    Gram Stain:   Moderate polymorphonuclear leukocytes per low power field  No squamous epithelial cells per low power field  No organisms seen    Specimen Source: .Sputum Sputum    Culture Results:   Normal Respiratory Farzaneh present      MRSA/MSSA PCR (05.03.20 @ 21:56)    MRSA PCR Result.: NotDetec: MRSA/MSSA PCR assay is a qualitative in vitro diagnostic test for the  direct detection and differentiation of methicillin-resistant  Staphylococcus aureus (MRSA) from Staphylococcus aureus (SA). The assay  detects DNA from nasal swabs in patients atrisk for nasal colonization.  It is not intended to diagnose MRSA or SA infections nor guide or monitor  treatment for MRSA/SA infections. A negative result does not preclude  nasal colonization. The assay is FDA-approved and its performance has  been established by [employer] [place], [place] and the [facility], [place], [place].    Staph Aureus PCR Result: NotDetec    Urinalysis + Microscopic Examination (05.05.20 @ 13:11)    Urine Appearance: Slightly Turbid    Urobilinogen: Negative    Specific Gravity: 1.029    Protein, Urine: 100 mg/dL    pH Urine: 5.5    Leukocyte Esterase Concentration: Negative    Nitrite: Positive    Ketone - Urine: Negative    Bilirubin: Negative    Color: Yellow    Glucose Qualitative, Urine: Negative    Blood, Urine: Large    Red Blood Cell - Urine: >50 /hpf    White Blood Cell - Urine: 6-10    Epithelial Cells: 0 /hpf    Hyaline Casts: 0 /lpf    Bacteria: Few    Comment - Urine: Few Amorphous Urates    Culture - Urine (05.05.20 @ 20:47)    Specimen Source: .Urine Catheterized    Culture Results:   10,000 - 49,000 CFU/mL Presumptive Candida albicans "Susceptibilities not  performed"      Fungitell (05.04.20 @ 05:24)    Fungitell: <31: Interpretation: The Fungitell assay does not detect certain fungal  species such as the genus Cryptococcus (Hira et al. 1991) which  produces very low levels of (1-3)-Beta-D-Glucan. The assay also does  not detect the Zygomycetes such as Absidia, Mucor and Rhizopus  (Hipolito et al. 1994) which are not known to produce  (1-3)-Beta-D-Glucan. In addition, the yeast phase of Blastomyces  dermatitidis produces little (1-3)-Beta-D-Glucan and may not be  detected by the assay (Madalyn et al. 2007).  Reference Range:  Less than 60 pg/mL. Glucan values of less than 60 pg/mL are  interpreted as negative.  Glucan values of 60 to 79 pg/mL are interpreted as indeterminate,  and suggest a possible fungal infection. Additional sampling and  testing of sera is required to interpret the results.  Glucan values of greater than or equal to 80 pg/mL are interpreted  as positive.  Due to the potential for environmental contamination when  transferred to pour-off tubes, which can lead to false positive  results, interpret positive results from samples provided in  pour-off tubes with caution.  Results should be used in conjunction  with clinical findings, and should not form the sole basis for a  diagnosis or treatment decision. The Fungitell test is approved or  cleared for in vitro diagnostic use by the U.S Food and Drug  Administration. Modifications to the approved package insert have  been made and the performance characteristics for these  modifications were determined by [facility].  Ifsample result is greater than 500 pg/mL, physician may order a  titer of the sample. Please contact [facility] if you would  like to order a retest of this sample to obtain an actual value.  Samples are held for 1 week after initial testing date.  ____________________________________________________________  Performed at:  [facility]  [address] [employer] [facility] [place] [address] [phone]  : Henrietta Daley PhD HCLD(ABB)  CLIA# [id number] pg/mL      Troponin T, High Sensitivity (05.08.20 @ 00:22)    Troponin T, High Sensitivity Result: 17: Rapid upward or downward changes in high-sensitivity troponin levels  suggest acute myocardial injury. Renal impairment may cause sustained  troponin elevations.  Normal: <6 - 14 ng/L  Indeterminate: 15-51 ng/L  Elevated: > 51 ng/L  See http://labs/test/TROPTHS on the [facility] intranet for more  information ng/L      Serum Pro-Brain Natriuretic Peptide (05.08.20 @ 00:22)    Serum Pro-Brain Natriuretic Peptide: 882 pg/mL  Serum Pro-Brain Natriuretic Peptide (05.06.20 @ 00:15)    Serum Pro-Brain Natriuretic Peptide: 1453 pg/mL  Serum Pro-Brain Natriuretic Peptide (05.04.20 @ 23:56)    Serum Pro-Brain Natriuretic Peptide: 410 pg/mL        Vancomycin Level, Trough (05.06.20 @ 04:19)    Vancomycin Level, Trough: 7.1: Vancomycin trough levels should be rapidly reached and maintained at  15-20 ug/ml for life threatening MRSA  infections such as sepsis, endocarditis, osteomyelitis and pneumonia. A  first trough level should be drawn  before the 3rd or 4th dose.  Risk of renal toxicity is increased for levels >15 ug/ml, in patients on  other nephrotoxic drugs, who are  hemodynamically unstable, have unstable renal function, or are on  Vancomycin therapy for >14 days. Renal function with  creatinine levels should be monitored for those patients. ug/mL        WBC Count: 31.35 (05.08.20 @ 00:22)  WBC Count: 35.06 (05.07.20 @ 03:05)  WBC Count: 33.56 (05.06.20 @ 00:15)  WBC Count: 32.69 (05-05-20 @ 07:56)  WBC Count: 34.76 (05-04-20 @ 23:56)  WBC Count: 32.55 (05-04-20 @ 18:11)  WBC Count: 28.79 (05-04-20 @ 05:30)  WBC Count: 18.37 (05-03-20 @ 06:10)  WBC Count: 10.68 (05-02-20 @ 06:38)    D-Dimer Assay, Quantitative: 3610 ng/mL DDU (05-08)  D-Dimer Assay, Quantitative: 5707 ng/mL DDU (05-07)  D-Dimer Assay, Quantitative: 77942 ng/mL DDU (05-06)  D-Dimer Assay, Quantitative: 35776 ng/mL DDU (05-04)    Ferritin, Serum: 1460 ng/mL (05-08)  Ferritin, Serum: 1863 ng/mL (05-07)  Ferritin, Serum: 1747 ng/mL (05-06)  Ferritin, Serum: 1871 ng/mL (05-05)    C-Reactive Protein, Serum: 6.58 ug/mL (05-08-20 @ 00:22)  C-Reactive Protein, Serum: 9.37 mg/dL (05-07-20 @ 03:05)  C-Reactive Protein, Serum: 9.35 mg/dL (05-06-20 @ 02:35)  C-Reactive Protein, Serum: 5.39 mg/dL (05-05-20 @ 03:09)          RADIOLOGY:  < from: Xray Chest 1 View- PORTABLE-Urgent (05.08.20 @ 04:51) >  EXAM:  XR CHEST PORTABLE URGENT 1V                            PROCEDURE DATE:  05/08/2020            INTERPRETATION:    Examination: XR CHEST URGENT    History: ADMDIAG1: U07.1 FEVER/, ET tube placement    Findings:  Left-sided central line with its tip overlying the SVC. ET tube and NG tube appear in good location. EKG wires overlying the chest. Bilateral diffuse mild to moderate airspace disease does not appear significantly changed when accounting for technical differences. Heart size is normal. No pneumothorax seen.    Impression:  1.  No significant change.      DISCRETE X-RAY DATA:  Percent of LEFT lung opacification: %  Percent of RIGHT lung opacification: %  Change in lung opacification from most recent x-ray (<=3 days):Stable  Change from prior dated 3 or more days (same admission): Increase        < end of copied text >        Ordinal Scale: score : 2  1) Death  2) Hospitalized, on invasive mechanical ventilation or ECMO  3) Hospitalized, on non-invasive ventilation or high flow oxygen devices  4) Hospitalized, requiring low flow(<11l/min) supplemental oxygen  5) Hospitalized, not requiring supplemental oxygen - requiring ongoing medical care(COVID-19 related or otherwise)  6) Hospitalized, not requiring supplemental oxygen - no longer requires ongoing medical care(other than per protocol RDV administration)  7) Not hospitalized Patient is a 65y old  Female who presents with a chief complaint of COVID-19, Hypoxic respiratory failure (05 May 2020 08:51)    Being followed by ID for COVID, clinical trial       Interval history:  No acute events overnight. Pt remains sedated, on pressors, and intubated with PEEP of 12 and 40% O2, saturating at 99%. Will be proned later this am. Last BM 5/4. Good UOP.  Per team may be considered for VVECMO if no improvement with prone positioning. More secretions from mouth, minimal from ET tube      ROS:  Unable to assess    PAST MEDICAL & SURGICAL HISTORY:  No pertinent past medical history  History of cholecystectomy  History of appendectomy    Allergies    No Known Allergies    Intolerances        MEDICATIONS  (STANDING):  chlorhexidine 0.12% Liquid 15 milliLiter(s) Oral Mucosa two times a day  chlorhexidine 4% Liquid 1 Application(s) Topical <User Schedule>  famotidine Injectable 20 milliGRAM(s) IV Push daily  fentaNYL   Infusion... 0.5 MICROgram(s)/kG/Hr (1.76 mL/Hr) IV Continuous <Continuous>  fluconAZOLE IVPB 200 milliGRAM(s) IV Intermittent every 24 hours  furosemide   Injectable 40 milliGRAM(s) IV Push every 8 hours  heparin  Infusion 1200 Unit(s)/Hr (9 mL/Hr) IV Continuous <Continuous>  insulin lispro (HumaLOG) corrective regimen sliding scale   SubCutaneous every 6 hours  ketamine Infusion. 0.25 mG/kG/Hr (1.76 mL/Hr) IV Continuous <Continuous>  meropenem  IVPB 1000 milliGRAM(s) IV Intermittent every 8 hours  norepinephrine Infusion 0.05 MICROgram(s)/kG/Min (3.3 mL/Hr) IV Continuous <Continuous>  phenylephrine    Infusion 0.3 MICROgram(s)/kG/Min (3.95 mL/Hr) IV Continuous <Continuous>  polyethylene glycol 3350 17 Gram(s) Oral daily  propofol Infusion 50 MICROgram(s)/kG/Min (21.1 mL/Hr) IV Continuous <Continuous>  remdesivir IVPB ([id number]) 100 milliGRAM(s) IV Intermittent daily  rocuronium Infusion 8 MICROgram(s)/kG/Min (6.75 mL/Hr) IV Continuous <Continuous>  senna 2 Tablet(s) Oral at bedtime  sodium chloride 0.9% lock flush 3 milliLiter(s) IV Push every 8 hours    MEDICATIONS  (PRN):  acetaminophen    Suspension .. 650 milliGRAM(s) Oral every 6 hours PRN Temp greater or equal to 38C (100.4F)  diphenhydrAMINE   Injectable 50 milliGRAM(s) IV Push once PRN anaphylaxis to study drug  EPINEPHrine     1 mG/mL Injectable 0.3 milliGRAM(s) IntraMuscular once PRN anaphylaxis to study drug  sodium chloride 0.9% lock flush 10 milliLiter(s) IV Push every 1 hour PRN Pre/post blood products, medications, blood draw, and to maintain line patency        ICU Vital Signs Last 24 Hrs  T(C): 37.7 (08 May 2020 09:38), Max: 37.7 (08 May 2020 09:38)  T(F): 99.9 (08 May 2020 09:38), Max: 99.9 (08 May 2020 09:38)  HR: 75 (08 May 2020 09:38) (71 - 85)  BP: --  BP(mean): --  ABP: 99/50 (08 May 2020 09:38) (99/50 - 139/60)  ABP(mean): 76 (08 May 2020 04:00) (75 - 92)  RR: 38 (08 May 2020 09:38) (36 - 38)  SpO2: 99% (08 May 2020 09:38) (92% - 100%); SpO2 99% during examination, intubated      Physical Exam:  Constitutional sedated, on ventilator. cooling blanket in place  Chest vented, good AE, CTA  CVS RRR   tyler in place, making clear urine   Ext No cyanosis clubbing or edema  CNS sedated     Lab Data:                          10.9   31.35 )-----------( 285      ( 08 May 2020 00:22 )             34.0                        05-08    143  |  100  |  20  ----------------------------<  125<H>  4.2   |  32<H>  |  0.49<L>    Ca    7.4<L>      08 May 2020 00:22  Phos  2.0     05-08  Mg     2.0     05-08    TPro  5.0<L>  /  Alb  2.1<L>  /  TBili  0.3  /  DBili  x   /  AST  85<H>  /  ALT  105<H>  /  AlkPhos  358<H>  05-08    Procalcitonin, Serum (05.08.20 @ 00:22)    Procalcitonin, Serum: 0.26: This assay is intended for use to determine the change of PCT over time  as an aid in assessing the cumulative 28-day risk of all-cause mortality  for patients diagnosed with severe sepsis or septic shock in the ICU, or  when obtained in the emergency department or other medical wards prior to  ICU admission. This assay was performed by the Roche Elecsys StockCastrHMS PCT  assay. ng/mL      Culture - Blood (05.05.20 @ 17:27)    Specimen Source: .Blood Blood-Peripheral    Culture Results:   No growth to date.      Culture - Sputum . (05.05.20 @ 18:04)    Gram Stain:   Moderate polymorphonuclear leukocytes per low power field  No squamous epithelial cells per low power field  No organisms seen    Specimen Source: .Sputum Sputum    Culture Results:   Normal Respiratory Farzaneh present      MRSA/MSSA PCR (05.03.20 @ 21:56)    MRSA PCR Result.: NotDetec: MRSA/MSSA PCR assay is a qualitative in vitro diagnostic test for the  direct detection and differentiation of methicillin-resistant  Staphylococcus aureus (MRSA) from Staphylococcus aureus (SA). The assay  detects DNA from nasal swabs in patients atrisk for nasal colonization.  It is not intended to diagnose MRSA or SA infections nor guide or monitor  treatment for MRSA/SA infections. A negative result does not preclude  nasal colonization. The assay is FDA-approved and its performance has  been established by [employer] [place], [place] and the [facility], [place], [place].    Staph Aureus PCR Result: NotDetec    Urinalysis + Microscopic Examination (05.05.20 @ 13:11)    Urine Appearance: Slightly Turbid    Urobilinogen: Negative    Specific Gravity: 1.029    Protein, Urine: 100 mg/dL    pH Urine: 5.5    Leukocyte Esterase Concentration: Negative    Nitrite: Positive    Ketone - Urine: Negative    Bilirubin: Negative    Color: Yellow    Glucose Qualitative, Urine: Negative    Blood, Urine: Large    Red Blood Cell - Urine: >50 /hpf    White Blood Cell - Urine: 6-10    Epithelial Cells: 0 /hpf    Hyaline Casts: 0 /lpf    Bacteria: Few    Comment - Urine: Few Amorphous Urates    Culture - Urine (05.05.20 @ 20:47)    Specimen Source: .Urine Catheterized    Culture Results:   10,000 - 49,000 CFU/mL Presumptive Candida albicans "Susceptibilities not  performed"      Fungitell (05.04.20 @ 05:24)    Fungitell: <31: Interpretation: The Fungitell assay does not detect certain fungal  species such as the genus Cryptococcus (Hira et al. 1991) which  produces very low levels of (1-3)-Beta-D-Glucan. The assay also does  not detect the Zygomycetes such as Absidia, Mucor and Rhizopus  (Hipolito et al. 1994) which are not known to produce  (1-3)-Beta-D-Glucan. In addition, the yeast phase of Blastomyces  dermatitidis produces little (1-3)-Beta-D-Glucan and may not be  detected by the assay (Madalyn et al. 2007).  Reference Range:  Less than 60 pg/mL. Glucan values of less than 60 pg/mL are  interpreted as negative.  Glucan values of 60 to 79 pg/mL are interpreted as indeterminate,  and suggest a possible fungal infection. Additional sampling and  testing of sera is required to interpret the results.  Glucan values of greater than or equal to 80 pg/mL are interpreted  as positive.  Due to the potential for environmental contamination when  transferred to pour-off tubes, which can lead to false positive  results, interpret positive results from samples provided in  pour-off tubes with caution.  Results should be used in conjunction  with clinical findings, and should not form the sole basis for a  diagnosis or treatment decision. The Fungitell test is approved or  cleared for in vitro diagnostic use by the U.S Food and Drug  Administration. Modifications to the approved package insert have  been made and the performance characteristics for these  modifications were determined by [facility].  Ifsample result is greater than 500 pg/mL, physician may order a  titer of the sample. Please contact [facility] if you would  like to order a retest of this sample to obtain an actual value.  Samples are held for 1 week after initial testing date.  ____________________________________________________________  Performed at:  [facility]  [address] [place] [address] [address] [place] [id number] [phone]  : Henrietta Daley PhD HCLD(ABB)  CLIA# [id number] pg/mL      Troponin T, High Sensitivity (05.08.20 @ 00:22)    Troponin T, High Sensitivity Result: 17: Rapid upward or downward changes in high-sensitivity troponin levels  suggest acute myocardial injury. Renal impairment may cause sustained  troponin elevations.  Normal: <6 - 14 ng/L  Indeterminate: 15-51 ng/L  Elevated: > 51 ng/L  See http://labs/test/TROPTHS on the [facility] intranet for more  information ng/L      Serum Pro-Brain Natriuretic Peptide (05.08.20 @ 00:22)    Serum Pro-Brain Natriuretic Peptide: 882 pg/mL  Serum Pro-Brain Natriuretic Peptide (05.06.20 @ 00:15)    Serum Pro-Brain Natriuretic Peptide: 1453 pg/mL  Serum Pro-Brain Natriuretic Peptide (05.04.20 @ 23:56)    Serum Pro-Brain Natriuretic Peptide: 410 pg/mL        Vancomycin Level, Trough (05.06.20 @ 04:19)    Vancomycin Level, Trough: 7.1: Vancomycin trough levels should be rapidly reached and maintained at  15-20 ug/ml for life threatening MRSA  infections such as sepsis, endocarditis, osteomyelitis and pneumonia. A  first trough level should be drawn  before the 3rd or 4th dose.  Risk of renal toxicity is increased for levels >15 ug/ml, in patients on  other nephrotoxic drugs, who are  hemodynamically unstable, have unstable renal function, or are on  Vancomycin therapy for >14 days. Renal function with  creatinine levels should be monitored for those patients. ug/mL        WBC Count: 31.35 (05.08.20 @ 00:22)  WBC Count: 35.06 (05.07.20 @ 03:05)  WBC Count: 33.56 (05.06.20 @ 00:15)  WBC Count: 32.69 (05-05-20 @ 07:56)  WBC Count: 34.76 (05-04-20 @ 23:56)  WBC Count: 32.55 (05-04-20 @ 18:11)  WBC Count: 28.79 (05-04-20 @ 05:30)  WBC Count: 18.37 (05-03-20 @ 06:10)  WBC Count: 10.68 (05-02-20 @ 06:38)    D-Dimer Assay, Quantitative: 3610 ng/mL DDU (05-08)  D-Dimer Assay, Quantitative: 5707 ng/mL DDU (05-07)  D-Dimer Assay, Quantitative: 15020 ng/mL DDU (05-06)  D-Dimer Assay, Quantitative: 41126 ng/mL DDU (05-04)    Ferritin, Serum: 1460 ng/mL (05-08)  Ferritin, Serum: 1863 ng/mL (05-07)  Ferritin, Serum: 1747 ng/mL (05-06)  Ferritin, Serum: 1871 ng/mL (05-05)    C-Reactive Protein, Serum: 6.58 ug/mL (05-08-20 @ 00:22)  C-Reactive Protein, Serum: 9.37 mg/dL (05-07-20 @ 03:05)  C-Reactive Protein, Serum: 9.35 mg/dL (05-06-20 @ 02:35)  C-Reactive Protein, Serum: 5.39 mg/dL (05-05-20 @ 03:09)          RADIOLOGY:  < from: Xray Chest 1 View- PORTABLE-Urgent (05.08.20 @ 04:51) >  EXAM:  XR CHEST PORTABLE URGENT 1V                            PROCEDURE DATE:  05/08/2020            INTERPRETATION:    Examination: XR CHEST URGENT    History: ADMDIAG1: U07.1 FEVER/, ET tube placement    Findings:  Left-sided central line with its tip overlying the SVC. ET tube and NG tube appear in good location. EKG wires overlying the chest. Bilateral diffuse mild to moderate airspace disease does not appear significantly changed when accounting for technical differences. Heart size is normal. No pneumothorax seen.    Impression:  1.  No significant change.      DISCRETE X-RAY DATA:  Percent of LEFT lung opacification: %  Percent of RIGHT lung opacification: %  Change in lung opacification from most recent x-ray (<=3 days):Stable  Change from prior dated 3 or more days (same admission): Increase        < end of copied text >        Ordinal Scale: score : 2  1) Death  2) Hospitalized, on invasive mechanical ventilation or ECMO  3) Hospitalized, on non-invasive ventilation or high flow oxygen devices  4) Hospitalized, requiring low flow(<11l/min) supplemental oxygen  5) Hospitalized, not requiring supplemental oxygen - requiring ongoing medical care(COVID-19 related or otherwise)  6) Hospitalized, not requiring supplemental oxygen - no longer requires ongoing medical care(other than per protocol RDV administration)  7) Not hospitalized

## 2020-05-08 NOTE — PROGRESS NOTE ADULT - ATTENDING COMMENTS
Agree with above. Patient seen and examined. Laboratory data and imaging reviewed. Patient is critically ill requiring ICU care and frequent bedside visits with therapy change.     Patient is generally healthy and presented with COVID symptoms. She was started on Remdesivir but had progression of her clinical course ultimately requiring Tocilizumab. She unfortunately continued to progress and had significant increase in her D-dimer. She was intubated 5/4/2020 and transferred to 8ICU for further monitoring and care.    1. Acute Hypoxemic Respiratory Failure - s/p intubation in setting of COVID-19 ARDS. Lung protective ventilation. Adjust ventilator based on ABG results. Lung protective ventilation. She has had persistent hypoxia after being supined this AM. Will adjust vent settings to increase PEEP and reprone patient.   - Patient has had significant improvement with proning and P/F 150 this AM and down to 40% FiO2  - P/F has improved markedly with proning - therefore will try proning one more time today  - Although patient may not be a candidate for VV ECMO will discuss further with ECMO team should she not show further improvement. (TLC in [facility])  2. Shock - continue vasopressors to maintain MAP > 65. Patient does NOT have an enlarged right heart on bedside echo therefore will hold off tPA. Continue therapeutic AC.  - Diuresis as able  3. ID - COVID-19 received Remdesivir and Tocilizumab. Will continue antibiotics and antifungal therapy. Check cultures. ID followup.  - Remdesivir may need to be held if LFTs reach 5xULN  4. Renal function - monitor urine output and creatinine.    Prognosis guarded.

## 2020-05-08 NOTE — CHART NOTE - NSCHARTNOTEFT_GEN_A_CORE
Nutrition Follow Up Note   Patient seen for: nutrition follow up on COVID ICU     Source: medical record, communication with team. Unable to speak to pt due to current airborne isolation contact precautions related to COVID-19.     Chart reviewed, events noted. Pt remains intubated. Severe ARDS requiring proning 5/6, 5/7    Diet Order: Diet, NPO with Tube Feed:   Tube Feeding Modality: Nasogastric  Vital AF (VITALAFRTH)  Total Volume for 24 Hours (mL): 1320  Continuous  Starting Tube Feed Rate {mL per Hour}: 20  Increase Tube Feed Rate by (mL): 20     Every 6 hours  Until Goal Tube Feed Rate (mL per Hour): 55  Tube Feed Duration (in Hours): 24  Tube Feed Start Time: 09:10 (05-05-20 @ 09:07)    CURRENT EN ORDER PROVIDES: 1584 kcals, 99 gm protein    Nutrition Events: Average EN intake over past 3 days of 524 kcals, 33 gm protein, noted pt has been on trickle feeds 2/2 need for proning 5/6 and  5/7    GI: no vomiting, no abd distention  last BM 5/8 (senna, miralax rx)    Anthropometric Measurements:   Height (cm): 157.48 (04-28-20 @ 21:02)  Weight (kg): 70.3 (04-28-20 @ 21:02)  BMI (kg/m2): 28.3 (04-28-20 @ 21:02)  no recent wt      Medications: MEDICATIONS  (STANDING):  chlorhexidine 0.12% Liquid 15 milliLiter(s) Oral Mucosa two times a day  chlorhexidine 4% Liquid 1 Application(s) Topical <User Schedule>  famotidine Injectable 20 milliGRAM(s) IV Push daily  fentaNYL   Infusion... 0.5 MICROgram(s)/kG/Hr (1.76 mL/Hr) IV Continuous <Continuous>  fluconAZOLE IVPB 200 milliGRAM(s) IV Intermittent every 24 hours  furosemide   Injectable 40 milliGRAM(s) IV Push every 8 hours  heparin  Infusion 1200 Unit(s)/Hr (9 mL/Hr) IV Continuous <Continuous>  insulin lispro (HumaLOG) corrective regimen sliding scale   SubCutaneous every 6 hours  ketamine Infusion. 0.25 mG/kG/Hr (1.76 mL/Hr) IV Continuous <Continuous>  meropenem  IVPB 1000 milliGRAM(s) IV Intermittent every 8 hours  norepinephrine Infusion 0.05 MICROgram(s)/kG/Min (3.3 mL/Hr) IV Continuous <Continuous>  phenylephrine    Infusion 0.3 MICROgram(s)/kG/Min (3.95 mL/Hr) IV Continuous <Continuous>  polyethylene glycol 3350 17 Gram(s) Oral daily  propofol Infusion 50 MICROgram(s)/kG/Min (21.1 mL/Hr) IV Continuous <Continuous>  remdesivir IVPB ([id number]) 100 milliGRAM(s) IV Intermittent daily  rocuronium Infusion 8 MICROgram(s)/kG/Min (6.75 mL/Hr) IV Continuous <Continuous>  senna 2 Tablet(s) Oral at bedtime  sodium chloride 0.9% lock flush 3 milliLiter(s) IV Push every 8 hours    MEDICATIONS  (PRN):  acetaminophen    Suspension .. 650 milliGRAM(s) Oral every 6 hours PRN Temp greater or equal to 38C (100.4F)  diphenhydrAMINE   Injectable 50 milliGRAM(s) IV Push once PRN anaphylaxis to study drug  EPINEPHrine     1 mG/mL Injectable 0.3 milliGRAM(s) IntraMuscular once PRN anaphylaxis to study drug  sodium chloride 0.9% lock flush 10 milliLiter(s) IV Push every 1 hour PRN Pre/post blood products, medications, blood draw, and to maintain line patency    Labs: 05-08 @ 02:29: Sodium --, Potassium --, Calcium --, Magnesium --, Phosphorus --, BUN --, Creatinine --, Glucose --, Alk Phos --, ALT/SGPT --, AST/SGOT --, Albumin --, Prealbumin --, Total Bilirubin --, Hemoglobin --, Hematocrit --, Ferritin 1460<H>, C-Reactive Protein --, Creatine Kinase <<27>  05-08 @ 00:22: Sodium 143, Potassium 4.2, Calcium 7.4<L>, Magnesium 2.0, Phosphorus 2.0<L>, BUN 20, Creatinine 0.49<L>, Glucose 125<H>, Alk Phos 358<H>, ALT/SGPT 105<H>, AST/SGOT 85<H>, Albumin 2.1<L>, Prealbumin --, Total Bilirubin 0.3, Hemoglobin 10.9<L>, Hematocrit 34.0<L>, Ferritin --, C-Reactive Protein 6.58, Creatine Kinase <<27>      Triglycerides, Serum: 121 mg/dL (05-08-20 @ 00:22)  Triglycerides, Serum: 89 mg/dL (05-07-20 @ 03:05)  Triglycerides, Serum: 532 mg/dL (05-06-20 @ 00:15)  Triglycerides, Serum: 179 mg/dL (05-04-20 @ 23:56)    POCT Blood Glucose.: 133 mg/dL (05-08-20 @ 10:43)  POCT Blood Glucose.: 110 mg/dL (05-08-20 @ 05:18)  POCT Blood Glucose.: 124 mg/dL (05-07-20 @ 23:13)  POCT Blood Glucose.: 116 mg/dL (05-07-20 @ 17:20)    Skin: no pressure injuries documented   Edema: 2+ B/L wrist, hands    Estimated Needs: based on dosing wt 70.3 kg  Energy: 6058-9468 kcals (20-25 kcals/kg)  Protein:  84-98 gm (1.2-1.4 gm/kg)    Previous Nutrition Diagnosis: none  Nutrition Diagnosis is:     New Nutrition Diagnosis:  inadequate protein, energy intake 2/2 suboptimal EN infusion (related to positioning needs) as evidenced by meeting <80% nutrition needs > 3 days     Recommended Interventions:   1. Increasing EN as tolerated: Vital AF (1.2) goal 55 cc/hr x 24 hrs to provide 1584 kcals, 99 gm protein, 1070cc free water meets 22 kcals/kg, 1.4 gm protein/kg dosing wt 70.3 kg        Monitoring and Evaluation:   Continue to monitor nutrition provision and tolerance, weights, labs, skin integrity.   RD remains available upon request and will follow up per protocol.

## 2020-05-08 NOTE — PROGRESS NOTE ADULT - ASSESSMENT
66 y/o F with no significant PMH presents 4/29 with hypoxic respiratory failure 2nd COVID PNA. s/p Tocilizumab (4/30), s/p empiric Ceftriaxone/Azithro (4/30-5/4) for ?superimposed CAP. Currently enrolled in Remdesivir trial (4/30). Intubated 5/4 for worsened hypoxic respiratory failure. Severe ARDS requiring proning on 5/6 and 5/7    #Neuro   - Sedated and paralyzed with Rocuronium      #CV  - c/w Nolan to maintain MAP>60  - Triglycerides normalized off propofol   - Downtrending D-dimer: bedside POCUS with hyperdynamic LV function, no evidence of RV dysfunction   - CPK rising but troponin WNL, continue to monitor     #Pulm:   -Hypoxic respiratory failure 2nd COVID PNA  -Worsened oxygenation while supine, now proned again x16hrs at 9am   -Repeat ABG after proning  -AC 36/320/100/14      #GI  - Transaminitis with normal TBili, continue to monitor   - Trickle feeds while proned   - Last BM 5/4  - c/w Senna, Miralax   - Pepcid 20mg qd     #  -c/w Lasix 40mg BID to target net even fluid balance   - Creat stable    #ID   -Leukocytosis, fever of unclear etiology   -Pct dowtrending   -c/w Meropenem, Vanco, Caspofungin empirically   -Fungitell negative   -Pan cultures negative from 5/4  -Will re-culture today if febrile   -Check Vanco trough today   -Appreciate ID follow up    COVID:   -remdesivir trial 5/1-5/9, monitor LFT  -s/p Tocilizumab       #Heme   -Heparin gtt for elevated D-dimer   - Goal aPTT 55-70    #Endo  -Humalog SS    #Ethics  -Full code 64 y/o F with no significant PMH presents 4/29 with hypoxic respiratory failure 2nd COVID PNA. s/p Tocilizumab (4/30), s/p empiric Ceftriaxone/Azithro (4/30-5/4) for ?superimposed CAP. Currently enrolled in Remdesivir trial (4/30). Intubated 5/4 for worsened hypoxic respiratory failure. Severe ARDS requiring proning on 5/6 and 5/7    #Neuro   - Sedated and paralyzed with Rocuronium  12, Fentanyl 5 and Ketamine 4  - Keep RASS -4    #CV  - c/w Nolan to maintain MAP>60  - Triglycerides normalized off propofol   -  bedside POCUS with hyperdynamic LV function, no evidence of RV dysfunction on 5/7  - CPK and pro BNP downtrending today , Trop 17    #Pulm:   -Hypoxic respiratory failure 2nd COVID PNA  - Proned again at 10:30 AM due to improving oxygenation after proning last 2 days and P/F ratio 150    - Repeat ABG after proning  - AC 38/320/40%/+ 13    #GI  - Transaminitis with normal TBili, continue to monitor   - Trickle feeds while proned   - Last BM 5/4  - c/w Senna, Miralax : will add lactulose  - Pepcid 20mg qd     #  - c/w Lasix 40mg TID to target net even fluid balance   - Creat stable    #ID   - Leukocytosis down trending; Afebrile    - Pct dowtrending   - c/w Meropenem ( 5/4- )  Flucanazole/ Caspofungin  ( 5/4 -  )   - Vancomycin d/c'ed on 5/7 as MRSA swab neg  -  Urine Cx  5/5 + Candida albicans; Urinanalysis positive for 6-10 WBC, bacteria, and nitrite  - Bcx negative 4/29, 5/3, 5/5 NGTD. Fungitell negative. Sputum cx showed normal respiratory susanne.   - Appreciate ID follow up    COVID: + on 4/28   - remdesivir trial  ( 4/30 - )  D # 9 today:   monitor LFT  - s/p Tocilizumab on 4/30  - inflammatory markers down trending    #  LINES  - L radial Opal from 5/4  - [facility] TLC from 5/4     #Heme   - Heparin gtt for elevated D-dimer   - Goal aPTT 55-70    #Endo  -  Blood glucose controlled on Humalog  Low SS    #Ethics  -Full code    Spoke to Dr Hernandez and updated

## 2020-05-08 NOTE — PROGRESS NOTE ADULT - ASSESSMENT
65F reporting no PMH who presented last night with complaint of fever/chills (home T-max 102 F), night sweats, cough, myalgias (mid & lower back), loss of taste and smell since Friday 4/24 (still tastes sweet/salty), as well as 2 days of weakness and constipation, found to be COVID positive on 4/28/20.    Pt has agreed to participate in a clinical trial for Covid -19  Please touch base with ID if planning on taking concurrent treatment with other agents with actual or possible direct acting antiviral activity against SARS-CoV-2 such a chloroquine or hydroxychloroquine. Pt was given Tocilizumab by primary team on the day of enrollment as of concern for cytokine storm.     Hospital course  4/29: Started Ceftriaxone (4/29-5/3) and Azithromycin (4/29-5/4) in ED for possible bacterial PNA  4/30: RRT called for desaturation in 70s before receiving 1st dose of Remdesivir and Tocilizumab  5/4: RRT called for desaturation to 20s after drinking water and coughing, intubated by anesthesia. Sedated and started full dose lovenox after D-dimer elevated to [address]. Transferred to ICU. On Meropenem, Vancomycin, and Caspofungin for emperic sepsis coverage.      # Sepsis   - WBC down-trending (31.35), respiratory failure. Fever and tachycardia has improved with cooling blanket. Suspected source include pneumonia (viral, bacterial, fungal, or aspiration) vs bacteremia vs urosepsis vs myocarditis vs microthrombi with the elevated D-Dimer  - Currently on Meropenem (5/4-) and Fluconazole (5/8-). s/p Tocilizumab (4/30), Ceftriaxone (4/29-5/3), Azithromycin (4/29-5/4), Vancomycin (5/3-5/7), and Caspofungin (5/4-5/7).  - Vancomycin d/c'ed on 5/7 as MRSA swab neg  - Caspfungin was switched to Fluconazole 5/7 as Urine Cx grew presumable Candida albicans; Urinanalysis positive for 6-10 WBC, bacteria, and nitrite  - Bcx negative 4/29, 5/3, 5/5 NGTD. Fungitell negative. Sputum cx showed normal respiratory susanne.   - Trops negative, BNP elevated. Can consider echo to assess cardiac function to rule out myocarditis and/or heart failure (POCUS on 5/4 documented borderline LV hyperdynamic systolic function and possible LV apical hypocontractility)  - D-dimer remains elevated, could suggest PE or microthrombi. Pt now on heparin drip. Continued AC per primary team.     # Acute hypoxic respiratory failure  - Multifactorial, likely secondary to COVID-19 infection with viral PNA, with possible superimposed bacterial vs fungal vs aspiration PNA and/or PE   - COVID+ 4/28 w/ CXR demonstrating infiltrates, now diffuse patchy opacities (5/3)    - Now requiring mechanical ventilation, weaning as tolerated as per primary team   - Continue Remdesivir (started 4/30)   - Inflammatory markers elevated, please continue to monitor   - S/p tocilizumab 4/30  - Consider V-V ECMO if patient is a candidate and doesn't improve with prone postioning    # Elevated LFTs  - Likely due to either COVID 19 vs Ceftriaxone vs Remdesivir vs sepsis .   - If LFTs continue to rise may need to d/c study drug (cutoff is 5 X upper limit of normal = ALT of 225). Please continue to monitor LFTs and creatinine daily     # Elevated CRP/Ferritin/D-dimer  - Could be COVID-related. CRP, ferritin, and D-dimer all trending down.   - D-dimer remains elevated, could suggest PE or microthrombi. Pt now on heparin drip. Continued AC per primary team.     #VTE prophylaxis   - As above, pt with elevated d-dimer, please continue to monitor   - Now on heparin drip    - Further AC per primary team     Today is day #9 on study drug. Ordinal scale score: 2.       Niki Caceres MD. Graduate Physician

## 2020-05-09 NOTE — PROGRESS NOTE ADULT - SUBJECTIVE AND OBJECTIVE BOX
HPI: 64 y/o F with no significant PMH presents 4/29 with hypoxic respiratory failure 2nd COVID PNA. s/p Tocilizumab (4/30), s/p empiric Ceftriaxone/Azithro (4/30-5/4) for ?superimposed CAP. Currently enrolled in Remdesivir trial (4/30). Intubated 5/4 for worsened hypoxic respiratory failure. Severe ARDS requiring proning on 5/6,  5/7 and 5/8    INTERVAL HPI/OVERNIGHT EVENTS: supine at 5 AM. Lasix dose reduced to bid due to worsening met alkalosis.     SUBJECTIVE: sedated and intubated  OBJECTIVE:    VITAL SIGNS:  ICU Vital Signs Last 24 Hrs  T(C): 36.3 (09 May 2020 07:00), Max: 37.6 (08 May 2020 20:00)  T(F): 97.3 (09 May 2020 07:00), Max: 99.7 (08 May 2020 20:00)  HR: 89 (09 May 2020 07:00) (72 - 104)  BP: --  BP(mean): --  ABP: 135/56 (09 May 2020 07:00) (93/47 - 143/60)  ABP(mean): 87 (09 May 2020 07:00) (64 - 95)  RR: 34 (09 May 2020 07:00) (34 - 69)  SpO2: 100% (09 May 2020 07:00) (88% - 100%)    Mode: AC/ CMV (Assist Control/ Continuous Mandatory Ventilation), RR (machine): 34, TV (machine): 290, FiO2: 50, PEEP: 15, ITime: 0.5, MAP: 20, PIP: 39    05-08 @ 07:01  -  05-09 @ 07:00  --------------------------------------------------------  IN: 2846.4 mL / OUT: 4675 mL / NET: -1828.6 mL      CAPILLARY BLOOD GLUCOSE      POCT Blood Glucose.: 133 mg/dL (09 May 2020 04:36)      PHYSICAL EXAM:    General: NAD  HEENT: NC/AT; PERRL, clear conjunctiva  Neck: supple  Respiratory: CTA b/l  Cardiovascular: +S1/S2; RRR  Abdomen: soft, NT/ND; +BS x4  Extremities: WWP, 2+ peripheral pulses b/l; no LE edema  Skin: normal color and turgor; no rash  Neurological:    MEDICATIONS:  MEDICATIONS  (STANDING):  BACItracin   Ointment 1 Application(s) Topical two times a day  chlorhexidine 0.12% Liquid 15 milliLiter(s) Oral Mucosa two times a day  chlorhexidine 4% Liquid 1 Application(s) Topical <User Schedule>  famotidine Injectable 20 milliGRAM(s) IV Push daily  fentaNYL   Infusion... 0.5 MICROgram(s)/kG/Hr (1.76 mL/Hr) IV Continuous <Continuous>  fluconAZOLE IVPB 200 milliGRAM(s) IV Intermittent every 24 hours  furosemide   Injectable 40 milliGRAM(s) IV Push every 12 hours  heparin  Infusion 1200 Unit(s)/Hr (9 mL/Hr) IV Continuous <Continuous>  insulin lispro (HumaLOG) corrective regimen sliding scale   SubCutaneous every 6 hours  ketamine Infusion. 0.25 mG/kG/Hr (1.76 mL/Hr) IV Continuous <Continuous>  meropenem  IVPB 1000 milliGRAM(s) IV Intermittent every 8 hours  phenylephrine    Infusion 0.3 MICROgram(s)/kG/Min (3.95 mL/Hr) IV Continuous <Continuous>  polyethylene glycol 3350 17 Gram(s) Oral daily  potassium chloride   Solution 40 milliEquivalent(s) Enteral Tube once  potassium phosphate IVPB 30 milliMole(s) IV Intermittent once  remdesivir IVPB ([id number]) 100 milliGRAM(s) IV Intermittent daily  rocuronium Infusion 8 MICROgram(s)/kG/Min (6.75 mL/Hr) IV Continuous <Continuous>  senna 2 Tablet(s) Oral at bedtime  sodium chloride 0.9% lock flush 3 milliLiter(s) IV Push every 8 hours    MEDICATIONS  (PRN):  acetaminophen    Suspension .. 650 milliGRAM(s) Oral every 6 hours PRN Temp greater or equal to 38C (100.4F)  diphenhydrAMINE   Injectable 50 milliGRAM(s) IV Push once PRN anaphylaxis to study drug  EPINEPHrine     1 mG/mL Injectable 0.3 milliGRAM(s) IntraMuscular once PRN anaphylaxis to study drug  sodium chloride 0.9% lock flush 10 milliLiter(s) IV Push every 1 hour PRN Pre/post blood products, medications, blood draw, and to maintain line patency      ALLERGIES:  Allergies    No Known Allergies    Intolerances        LABS:                        10.5   19.45 )-----------( 275      ( 09 May 2020 00:25 )             34.2     05-09    142  |  97  |  25<H>  ----------------------------<  133<H>  4.0   |  37<H>  |  0.49<L>    Ca    7.9<L>      09 May 2020 00:25  Phos  2.0     05-09  Mg     2.3     05-09    TPro  5.3<L>  /  Alb  2.2<L>  /  TBili  0.2  /  DBili  x   /  AST  49<H>  /  ALT  77<H>  /  AlkPhos  315<H>  05-09    PT/INR - ( 09 May 2020 00:25 )   PT: 13.0 sec;   INR: 1.13 ratio         PTT - ( 09 May 2020 00:25 )  PTT:68.4 sec      RADIOLOGY & ADDITIONAL TESTS: Reviewed. HPI: 64 y/o F with no significant PMH presents 4/29 with hypoxic respiratory failure 2nd COVID PNA. s/p Tocilizumab (4/30), s/p empiric Ceftriaxone/Azithro (4/30-5/4) for ?superimposed CAP. Currently enrolled in Remdesivir trial (4/30). Intubated 5/4 for worsened hypoxic respiratory failure. Severe ARDS requiring proning on 5/6,  5/7 and 5/8    INTERVAL HPI/OVERNIGHT EVENTS: supine at 5 AM. Lasix dose reduced to bid due to worsening met alkalosis.     SUBJECTIVE: sedated and intubated  OBJECTIVE:    VITAL SIGNS:  ICU Vital Signs Last 24 Hrs  T(C): 36.3 (09 May 2020 07:00), Max: 37.6 (08 May 2020 20:00)  T(F): 97.3 (09 May 2020 07:00), Max: 99.7 (08 May 2020 20:00)  HR: 89 (09 May 2020 07:00) (72 - 104)  BP: --  BP(mean): --  ABP: 135/56 (09 May 2020 07:00) (93/47 - 143/60)  ABP(mean): 87 (09 May 2020 07:00) (64 - 95)  RR: 34 (09 May 2020 07:00) (34 - 69)  SpO2: 100% (09 May 2020 07:00) (88% - 100%)    Mode: AC/ CMV (Assist Control/ Continuous Mandatory Ventilation), RR (machine): 34, TV (machine): 290, FiO2: 50, PEEP: 15, ITime: 0.5, MAP: 20, PIP: 39    05-08 @ 07:01  -  05-09 @ 07:00  --------------------------------------------------------  IN: 2846.4 mL / OUT: 4675 mL / NET: -1828.6 mL      CAPILLARY BLOOD GLUCOSE      POCT Blood Glucose.: 133 mg/dL (09 May 2020 04:36)      PHYSICAL EXAM:    General: NAD  HEENT: NC/AT; PERRL, clear conjunctiva  Neck: supple  Respiratory: CTA b/l  Cardiovascular: +S1/S2; RRR  Abdomen: soft, NT/ND; +BS x4  Extremities: WWP, 2+ peripheral pulses b/l;   Skin: normal color and turgor; no rash  Neurological:    MEDICATIONS:  MEDICATIONS  (STANDING):  BACItracin   Ointment 1 Application(s) Topical two times a day  chlorhexidine 0.12% Liquid 15 milliLiter(s) Oral Mucosa two times a day  chlorhexidine 4% Liquid 1 Application(s) Topical <User Schedule>  famotidine Injectable 20 milliGRAM(s) IV Push daily  fentaNYL   Infusion... 0.5 MICROgram(s)/kG/Hr (1.76 mL/Hr) IV Continuous <Continuous>  fluconAZOLE IVPB 200 milliGRAM(s) IV Intermittent every 24 hours  furosemide   Injectable 40 milliGRAM(s) IV Push every 12 hours  heparin  Infusion 1200 Unit(s)/Hr (9 mL/Hr) IV Continuous <Continuous>  insulin lispro (HumaLOG) corrective regimen sliding scale   SubCutaneous every 6 hours  ketamine Infusion. 0.25 mG/kG/Hr (1.76 mL/Hr) IV Continuous <Continuous>  meropenem  IVPB 1000 milliGRAM(s) IV Intermittent every 8 hours  phenylephrine    Infusion 0.3 MICROgram(s)/kG/Min (3.95 mL/Hr) IV Continuous <Continuous>  polyethylene glycol 3350 17 Gram(s) Oral daily  potassium chloride   Solution 40 milliEquivalent(s) Enteral Tube once  potassium phosphate IVPB 30 milliMole(s) IV Intermittent once  remdesivir IVPB ([id number]) 100 milliGRAM(s) IV Intermittent daily  rocuronium Infusion 8 MICROgram(s)/kG/Min (6.75 mL/Hr) IV Continuous <Continuous>  senna 2 Tablet(s) Oral at bedtime  sodium chloride 0.9% lock flush 3 milliLiter(s) IV Push every 8 hours    MEDICATIONS  (PRN):  acetaminophen    Suspension .. 650 milliGRAM(s) Oral every 6 hours PRN Temp greater or equal to 38C (100.4F)  diphenhydrAMINE   Injectable 50 milliGRAM(s) IV Push once PRN anaphylaxis to study drug  EPINEPHrine     1 mG/mL Injectable 0.3 milliGRAM(s) IntraMuscular once PRN anaphylaxis to study drug  sodium chloride 0.9% lock flush 10 milliLiter(s) IV Push every 1 hour PRN Pre/post blood products, medications, blood draw, and to maintain line patency      ALLERGIES:  Allergies    No Known Allergies    Intolerances        LABS:                        10.5   19.45 )-----------( 275      ( 09 May 2020 00:25 )             34.2     05-09    142  |  97  |  25<H>  ----------------------------<  133<H>  4.0   |  37<H>  |  0.49<L>    Ca    7.9<L>      09 May 2020 00:25  Phos  2.0     05-09  Mg     2.3     05-09    TPro  5.3<L>  /  Alb  2.2<L>  /  TBili  0.2  /  DBili  x   /  AST  49<H>  /  ALT  77<H>  /  AlkPhos  315<H>  05-09    PT/INR - ( 09 May 2020 00:25 )   PT: 13.0 sec;   INR: 1.13 ratio         PTT - ( 09 May 2020 00:25 )  PTT:68.4 sec      RADIOLOGY & ADDITIONAL TESTS: Reviewed.

## 2020-05-09 NOTE — PROGRESS NOTE ADULT - ASSESSMENT
66 y/o F with no significant PMH presents 4/29 with hypoxic respiratory failure 2nd COVID PNA. s/p Tocilizumab (4/30), s/p empiric Ceftriaxone/Azithro (4/30-5/4) for ?superimposed CAP. Currently enrolled in Remdesivir trial (4/30). Intubated 5/4 for worsened hypoxic respiratory failure. Severe ARDS requiring proning on 5/6 and 5/7    #Neuro   - Sedated and paralyzed with Rocuronium  12, Fentanyl 5 and Ketamine 4  - Lowered NEGAR to 6 and will start titrating down fentanyl as tolerated  - Keep RASS -4    #CV  - OFF  Neosynephrine  -  bedside POCUS with hyperdynamic LV function and  no evidence of RV dysfunction on 5/7  - CPK  205 and pro  :  downtrending today     #Pulm:   - Hypoxic respiratory failure 2nd COVID PNA  - pt proned on 5/6,  5/7, 5/8 : supine from 5 AM   - AC 34/300/50%/+ 13 ( peep reduced from 15 )  - Rpt ABG in 1 hour  - P/F ratio 210     #GI  - Transaminitis trending down   - Tolerating Vital @ 20 /hr: Increase to goal of 55 as tolerated   - Last BM 5/9  - c/w Senna, Miralax   - Pepcid 20mg qd     #  - c/w Lasix 40mg  bid to target net negative fluid balance  - Rpt BMP at noon to evaluate for any worsening of metabolic alkalosis    #ID   - Leukocytosis, PCT, lactate downtrending with no fever    -  c/w Meropenem ( 5/4- )  Caspofungin  ( 5/4 - 5/7  ) Flucanazole ( 5/7- )  - Vancomycin d/c'ed on 5/7 as MRSA swab neg  -  Urine Cx  5/5 + Candida albicans; Urinanalysis positive for 6-10 WBC, bacteria, and nitrite  - Bcx negative 4/29, 5/3, 5/5 NGTD. Fungitell negative. Sputum cx showed normal respiratory susanne.   - Appreciate ID follow up    COVID: + on 4/28   - remdesivir trial  ( 4/30 - )  D # 10 today:   monitor LFT  - s/p Tocilizumab on 4/30  - inflammatory markers down trending    #  LINES  - L radial Opal from 5/4  - LI[facility] TLC from 5/4     #Heme   - Heparin gtt for elevated D-dimer   - Goal aPTT 55-70    #Endo  -  Blood glucose controlled on Humalog  Low SS    #Ethics  -Full code    Spoke to Dr Hernandez and updated 64 y/o F with no significant PMH presents 4/29 with hypoxic respiratory failure 2nd COVID PNA. s/p Tocilizumab (4/30), s/p empiric Ceftriaxone/Azithro (4/30-5/4) for ?superimposed CAP. Currently enrolled in Remdesivir trial (4/30). Intubated 5/4 for worsened hypoxic respiratory failure. Severe ARDS requiring proning on 5/6 and 5/7    #Neuro   - Sedated and paralyzed with Rocuronium  12, Fentanyl 5 and Ketamine 4  - Lowered NEGAR to 6 and will start titrating down fentanyl as tolerated  - Keep RASS -4    #CV  - OFF  Neosynephrine  -  bedside POCUS with hyperdynamic LV function and  no evidence of RV dysfunction on 5/7  - CPK  205 and pro  :  downtrending today     #Pulm:   - Hypoxic respiratory failure 2nd COVID PNA  - pt proned on 5/6,  5/7, 5/8 : supine from 5 AM   - AC 34/300/50%/+ 13 ( peep reduced from 15 )  - Rpt ABG in 1 hour  - P/F ratio 210     #GI  - Transaminitis trending down   - Tolerating Vital @ 20 /hr: Increase to goal of 55 as tolerated   - Last BM 5/9  - c/w Senna, Miralax   - Pepcid 20mg qd     #  - c/w Lasix 40mg  bid to target net negative fluid balance  - Rpt BMP at noon to evaluate for any worsening of metabolic alkalosis    #ID   - Leukocytosis, PCT, lactate downtrending with no fever    -  c/w Meropenem ( 5/4- )  Caspofungin  ( 5/4 - 5/7  ) Flucanazole ( 5/7- )  - Vancomycin d/c'ed on 5/7 as MRSA swab neg  -  Urine Cx  5/5 + Candida albicans; Urinanalysis positive for 6-10 WBC, bacteria, and nitrite  - Bcx negative 4/29, 5/3, 5/5 NGTD. Fungitell negative. Sputum cx showed normal respiratory susanne.   - Appreciate ID follow up: will repeat cultures in AM as WBC still elevated    COVID: + on 4/28   - remdesivir trial  ( 4/30 - )  D # 10 today:   monitor LFT  - s/p Tocilizumab on 4/30  - inflammatory markers down trending    #  LINES  - L radial Violet from 5/4  - LI[facility] TLC from 5/4     #Heme   - Heparin gtt for elevated D-dimer   - Goal aPTT 55-70    #Endo  -  Blood glucose controlled on Humalog  Low SS    #Ethics  -Full code    Spoke to Dr Hernandez and updated

## 2020-05-09 NOTE — PROGRESS NOTE ADULT - SUBJECTIVE AND OBJECTIVE BOX
Patient is a 65y old  Female who presents with a chief complaint of COVID-19, Hypoxic respiratory failure (05 May 2020 08:51)    Being followed by ID for COVID, clinical trial       Interval history:  No acute events overnight. Pt remains sedated and intubated with PEEP of 15 and 50% O2, saturating at 100% lying on her back. Was proned yesterday and will be proned again today. Has facial ulcers on both cheeks from proning. Last BM 5/8.  Per team may be considered for VVECMO if no improvement with prone positioning.       ROS:  Unable to assess    PAST MEDICAL & SURGICAL HISTORY:  No pertinent past medical history  History of cholecystectomy  History of appendectomy    Allergies    No Known Allergies    Intolerances        MEDICATIONS  (STANDING):  BACItracin   Ointment 1 Application(s) Topical two times a day  chlorhexidine 0.12% Liquid 15 milliLiter(s) Oral Mucosa two times a day  chlorhexidine 4% Liquid 1 Application(s) Topical <User Schedule>  famotidine Injectable 20 milliGRAM(s) IV Push daily  fentaNYL   Infusion... 0.5 MICROgram(s)/kG/Hr (1.76 mL/Hr) IV Continuous <Continuous>  fluconAZOLE IVPB 200 milliGRAM(s) IV Intermittent every 24 hours  furosemide   Injectable 40 milliGRAM(s) IV Push every 12 hours  heparin  Infusion 1200 Unit(s)/Hr (9 mL/Hr) IV Continuous <Continuous>  insulin lispro (HumaLOG) corrective regimen sliding scale   SubCutaneous every 6 hours  ketamine Infusion. 0.25 mG/kG/Hr (1.76 mL/Hr) IV Continuous <Continuous>  meropenem  IVPB 1000 milliGRAM(s) IV Intermittent every 8 hours  phenylephrine    Infusion 0.3 MICROgram(s)/kG/Min (3.95 mL/Hr) IV Continuous <Continuous>  polyethylene glycol 3350 17 Gram(s) Oral daily  potassium phosphate IVPB 30 milliMole(s) IV Intermittent once  remdesivir IVPB ([id number]) 100 milliGRAM(s) IV Intermittent daily  rocuronium Infusion 8 MICROgram(s)/kG/Min (6.75 mL/Hr) IV Continuous <Continuous>  senna 2 Tablet(s) Oral at bedtime  sodium chloride 0.9% lock flush 3 milliLiter(s) IV Push every 8 hours    MEDICATIONS  (PRN):  acetaminophen    Suspension .. 650 milliGRAM(s) Oral every 6 hours PRN Temp greater or equal to 38C (100.4F)  diphenhydrAMINE   Injectable 50 milliGRAM(s) IV Push once PRN anaphylaxis to study drug  EPINEPHrine     1 mG/mL Injectable 0.3 milliGRAM(s) IntraMuscular once PRN anaphylaxis to study drug  sodium chloride 0.9% lock flush 10 milliLiter(s) IV Push every 1 hour PRN Pre/post blood products, medications, blood draw, and to maintain line patency        ICU Vital Signs Last 24 Hrs  T(C): 36.3 (09 May 2020 08:00), Max: 37.6 (08 May 2020 20:00)  T(F): 97.3 (09 May 2020 08:00), Max: 99.7 (08 May 2020 20:00)  HR: 91 (09 May 2020 08:00) (72 - 104)  BP: --  BP(mean): --  ABP: 136/59 (09 May 2020 08:00) (99/50 - 143/60)  ABP(mean): 90 (09 May 2020 08:00) (68 - 95)  RR: 34 (09 May 2020 08:00) (34 - 69)  SpO2: 100% (09 May 2020 08:00) (88% - 100%); SpO2 100% during examination, intubated      Physical Exam:  Constitutional sedated, on ventilator  Chest vented, good AE, CTA  CVS RRR  GI bowel sounds present, soft, nontender   tyler in place, making clear urine   Ext No cyanosis clubbing or edema  CNS sedated, pupils 2mm equal bilaterally    Lab Data:                          10.5   19.45 )-----------( 275      ( 09 May 2020 00:25 )             34.2     05-09    142  |  97  |  25<H>  ----------------------------<  133<H>  4.0   |  37<H>  |  0.49<L>    Ca    7.9<L>      09 May 2020 00:25  Phos  2.0     05-09  Mg     2.3     05-09    TPro  5.3<L>  /  Alb  2.2<L>  /  TBili  0.2  /  DBili  x   /  AST  49<H>  /  ALT  77<H>  /  AlkPhos  315<H>  05-09            Procalcitonin, Serum (05.09.20 @ 00:25)    Procalcitonin, Serum: 0.15: This assay is intended for use to determine the change of PCT over time  as an aid in assessing the cumulative 28-day risk of all-cause mortality  for patients diagnosed with severe sepsis or septic shock in the ICU, or  when obtained in the emergency department or other medical wards prior to  ICU admission. This assay was performed by the Roche Elecsys Sterio.meS PCT  assay. ng/mL      Culture - Blood (05.05.20 @ 17:27)    Specimen Source: .Blood Blood-Peripheral    Culture Results:   No growth to date.      Culture - Sputum . (05.05.20 @ 18:04)    Gram Stain:   Moderate polymorphonuclear leukocytes per low power field  No squamous epithelial cells per low power field  No organisms seen    Specimen Source: .Sputum Sputum    Culture Results:   Normal Respiratory Farzaneh present      MRSA/MSSA PCR (05.03.20 @ 21:56)    MRSA PCR Result.: NotDetec: MRSA/MSSA PCR assay is a qualitative in vitro diagnostic test for the  direct detection and differentiation of methicillin-resistant  Staphylococcus aureus (MRSA) from Staphylococcus aureus (SA). The assay  detects DNA from nasal swabs in patients atrisk for nasal colonization.  It is not intended to diagnose MRSA or SA infections nor guide or monitor  treatment for MRSA/SA infections. A negative result does not preclude  nasal colonization. The assay is FDA-approved and its performance has  been established by [employer] [place] and the [facility], [place], [place].    Staph Aureus PCR Result: NotDetec    Urinalysis + Microscopic Examination (05.05.20 @ 13:11)    Urine Appearance: Slightly Turbid    Urobilinogen: Negative    Specific Gravity: 1.029    Protein, Urine: 100 mg/dL    pH Urine: 5.5    Leukocyte Esterase Concentration: Negative    Nitrite: Positive    Ketone - Urine: Negative    Bilirubin: Negative    Color: Yellow    Glucose Qualitative, Urine: Negative    Blood, Urine: Large    Red Blood Cell - Urine: >50 /hpf    White Blood Cell - Urine: 6-10    Epithelial Cells: 0 /hpf    Hyaline Casts: 0 /lpf    Bacteria: Few    Comment - Urine: Few Amorphous Urates    Culture - Urine (05.05.20 @ 20:47)    Specimen Source: .Urine Catheterized    Culture Results:   10,000 - 49,000 CFU/mL Presumptive Candida albicans "Susceptibilities not  performed"      Fungitell (05.04.20 @ 05:24)    Fungitell: <31: Interpretation: The Fungitell assay does not detect certain fungal  species such as the genus Cryptococcus (Hira et al. 1991) which  produces very low levels of (1-3)-Beta-D-Glucan. The assay also does  not detect the Zygomycetes such as Absidia, Mucor and Rhizopus  (Hipolito et al. 1994) which are not known to produce  (1-3)-Beta-D-Glucan. In addition, the yeast phase of Blastomyces  dermatitidis produces little (1-3)-Beta-D-Glucan and may not be  detected by the assay (Madalyn et al. 2007).  Reference Range:  Less than 60 pg/mL. Glucan values of less than 60 pg/mL are  interpreted as negative.  Glucan values of 60 to 79 pg/mL are interpreted as indeterminate,  and suggest a possible fungal infection. Additional sampling and  testing of sera is required to interpret the results.  Glucan values of greater than or equal to 80 pg/mL are interpreted  as positive.  Due to the potential for environmental contamination when  transferred to pour-off tubes, which can lead to false positive  results, interpret positive results from samples provided in  pour-off tubes with caution.  Results should be used in conjunction  with clinical findings, and should not form the sole basis for a  diagnosis or treatment decision. The Fungitell test is approved or  cleared for in vitro diagnostic use by the U.[place] Food and Drug  Administration. Modifications to the approved package insert have  been made and the performance characteristics for these  modifications were determined by [facility].  Ifsample result is greater than 500 pg/mL, physician may order a  titer of the sample. Please contact [facility] if you would  like to order a retest of this sample to obtain an actual value.  Samples are held for 1 week after initial testing date.  ____________________________________________________________  Performed at:  [facility]  [address] [place] [id number] [phone]  : Henrietta Daley PhD HCLD(ABB)  TOSHA# [id number] pg/mL    Troponin T, High Sensitivity (05.09.20 @ 00:25)    Troponin T, High Sensitivity Result: 13: Rapid upward or downward changes in high-sensitivity troponin levels  suggest acute myocardial injury. Renal impairment may cause sustained  troponin elevations.  Normal: <6 - 14 ng/L  Indeterminate: 15-51 ng/L  Elevated: > 51 ng/L  See http://labs/test/TROPTHS on the [facility] intranet for more  information ng/L      Serum Pro-Brain Natriuretic Peptide (05.09.20 @ 00:25)    Serum Pro-Brain Natriuretic Peptide: 448 pg/mL  Serum Pro-Brain Natriuretic Peptide (05.08.20 @ 00:22)    Serum Pro-Brain Natriuretic Peptide: 882 pg/mL  Serum Pro-Brain Natriuretic Peptide (05.06.20 @ 00:15)    Serum Pro-Brain Natriuretic Peptide: 1453 pg/mL      Vancomycin Level, Trough (05.06.20 @ 04:19)    Vancomycin Level, Trough: 7.1: Vancomycin trough levels should be rapidly reached and maintained at  15-20 ug/ml for life threatening MRSA  infections such as sepsis, endocarditis, osteomyelitis and pneumonia. A  first trough level should be drawn  before the 3rd or 4th dose.  Risk of renal toxicity is increased for levels >15 ug/ml, in patients on  other nephrotoxic drugs, who are  hemodynamically unstable, have unstable renal function, or are on  Vancomycin therapy for >14 days. Renal function with  creatinine levels should be monitored for those patients. ug/mL      WBC Count: 19.45 (05.09.20 @ 00:25)  WBC Count: 31.35 (05.08.20 @ 00:22)  WBC Count: 35.06 (05.07.20 @ 03:05)  WBC Count: 33.56 (05.06.20 @ 00:15)  WBC Count: 32.69 (05-05-20 @ 07:56)  WBC Count: 34.76 (05-04-20 @ 23:56)  WBC Count: 32.55 (05-04-20 @ 18:11)  WBC Count: 28.79 (05-04-20 @ 05:30)  WBC Count: 18.37 (05-03-20 @ 06:10)  WBC Count: 10.68 (05-02-20 @ 06:38)    D-Dimer Assay, Quantitative: 2662 ng/mL DDU (05-09)  D-Dimer Assay, Quantitative: 3610 ng/mL DDU (05-08)  D-Dimer Assay, Quantitative: 5707 ng/mL DDU (05-07)  D-Dimer Assay, Quantitative: 53242 ng/mL DDU (05-06)    Ferritin, Serum: 982 ng/mL (05-09)  Ferritin, Serum: 1460 ng/mL (05-08)  Ferritin, Serum: 1863 ng/mL (05-07)  Ferritin, Serum: 1747 ng/mL (05-06)    C-Reactive Protein, Serum: 4.73 ug/mL (05-09-20 @ 00:25)  C-Reactive Protein, Serum: 6.58 ug/mL (05-08-20 @ 00:22)  C-Reactive Protein, Serum: 9.37 mg/dL (05-07-20 @ 03:05)  C-Reactive Protein, Serum: 9.35 mg/dL (05-06-20 @ 02:35)          RADIOLOGY:  < from: Xray Chest 1 View- PORTABLE-Urgent (05.08.20 @ 04:51) >  EXAM:  XR CHEST PORTABLE URGENT 1V                            PROCEDURE DATE:  05/08/2020            INTERPRETATION:    Examination: XR CHEST URGENT    History: ADMDIAG1: U07.1 FEVER/, ET tube placement    Findings:  Left-sided central line with its tip overlying the SVC. ET tube and NG tube appear in good location. EKG wires overlying the chest. Bilateral diffuse mild to moderate airspace disease does not appear significantly changed when accounting for technical differences. Heart size is normal. No pneumothorax seen.    Impression:  1.  No significant change.      DISCRETE X-RAY DATA:  Percent of LEFT lung opacification: %  Percent of RIGHT lung opacification: %  Change in lung opacification from most recent x-ray (<=3 days):Stable  Change from prior dated 3 or more days (same admission): Increase        < end of copied text >        Ordinal Scale: score : 2  1) Death  2) Hospitalized, on invasive mechanical ventilation or ECMO  3) Hospitalized, on non-invasive ventilation or high flow oxygen devices  4) Hospitalized, requiring low flow(<11l/min) supplemental oxygen  5) Hospitalized, not requiring supplemental oxygen - requiring ongoing medical care(COVID-19 related or otherwise)  6) Hospitalized, not requiring supplemental oxygen - no longer requires ongoing medical care(other than per protocol RDV administration)  7) Not hospitalized Patient is a 65y old  Female who presents with a chief complaint of COVID-19, Hypoxic respiratory failure (05 May 2020 08:51)    Being followed by ID for COVID, clinical trial       Interval history:  No acute events overnight. Pt remains sedated and intubated with PEEP of 15 and 50% O2, saturating at 100% lying on her back. Was proned yesterday and will be proned again today. Has facial ulcers on both cheeks from proning. Last BM 5/8.  Per team may be considered for VVECMO if no improvement with prone positioning.       ROS:  Unable to assess    PAST MEDICAL & SURGICAL HISTORY:  No pertinent past medical history  History of cholecystectomy  History of appendectomy    Allergies    No Known Allergies    Intolerances        MEDICATIONS  (STANDING):  BACItracin   Ointment 1 Application(s) Topical two times a day  chlorhexidine 0.12% Liquid 15 milliLiter(s) Oral Mucosa two times a day  chlorhexidine 4% Liquid 1 Application(s) Topical <User Schedule>  famotidine Injectable 20 milliGRAM(s) IV Push daily  fentaNYL   Infusion... 0.5 MICROgram(s)/kG/Hr (1.76 mL/Hr) IV Continuous <Continuous>  fluconAZOLE IVPB 200 milliGRAM(s) IV Intermittent every 24 hours  furosemide   Injectable 40 milliGRAM(s) IV Push every 12 hours  heparin  Infusion 1200 Unit(s)/Hr (9 mL/Hr) IV Continuous <Continuous>  insulin lispro (HumaLOG) corrective regimen sliding scale   SubCutaneous every 6 hours  ketamine Infusion. 0.25 mG/kG/Hr (1.76 mL/Hr) IV Continuous <Continuous>  meropenem  IVPB 1000 milliGRAM(s) IV Intermittent every 8 hours  phenylephrine    Infusion 0.3 MICROgram(s)/kG/Min (3.95 mL/Hr) IV Continuous <Continuous>  polyethylene glycol 3350 17 Gram(s) Oral daily  potassium phosphate IVPB 30 milliMole(s) IV Intermittent once  remdesivir IVPB ([id number]) 100 milliGRAM(s) IV Intermittent daily  rocuronium Infusion 8 MICROgram(s)/kG/Min (6.75 mL/Hr) IV Continuous <Continuous>  senna 2 Tablet(s) Oral at bedtime  sodium chloride 0.9% lock flush 3 milliLiter(s) IV Push every 8 hours    MEDICATIONS  (PRN):  acetaminophen    Suspension .. 650 milliGRAM(s) Oral every 6 hours PRN Temp greater or equal to 38C (100.4F)  diphenhydrAMINE   Injectable 50 milliGRAM(s) IV Push once PRN anaphylaxis to study drug  EPINEPHrine     1 mG/mL Injectable 0.3 milliGRAM(s) IntraMuscular once PRN anaphylaxis to study drug  sodium chloride 0.9% lock flush 10 milliLiter(s) IV Push every 1 hour PRN Pre/post blood products, medications, blood draw, and to maintain line patency        ICU Vital Signs Last 24 Hrs  T(C): 36.3 (09 May 2020 08:00), Max: 37.6 (08 May 2020 20:00)  T(F): 97.3 (09 May 2020 08:00), Max: 99.7 (08 May 2020 20:00)  HR: 91 (09 May 2020 08:00) (72 - 104)  BP: --  BP(mean): --  ABP: 136/59 (09 May 2020 08:00) (99/50 - 143/60)  ABP(mean): 90 (09 May 2020 08:00) (68 - 95)  RR: 34 (09 May 2020 08:00) (34 - 69)  SpO2: 100% (09 May 2020 08:00) (88% - 100%); SpO2 100% during examination, intubated      Physical Exam:  Constitutional sedated, on ventilator. Facial ulcers on both cheeks  Chest vented, good AE, CTA  CVS RRR  GI bowel sounds present, soft, nontender   tyler in place, making clear urine   Ext No cyanosis clubbing or edema  CNS sedated, pupils 2mm equal bilaterally    Lab Data:                          10.5   19.45 )-----------( 275      ( 09 May 2020 00:25 )             34.2     05-09    142  |  97  |  25<H>  ----------------------------<  133<H>  4.0   |  37<H>  |  0.49<L>    Ca    7.9<L>      09 May 2020 00:25  Phos  2.0     05-09  Mg     2.3     05-09    TPro  5.3<L>  /  Alb  2.2<L>  /  TBili  0.2  /  DBili  x   /  AST  49<H>  /  ALT  77<H>  /  AlkPhos  315<H>  05-09            Procalcitonin, Serum (05.09.20 @ 00:25)    Procalcitonin, Serum: 0.15: This assay is intended for use to determine the change of PCT over time  as an aid in assessing the cumulative 28-day risk of all-cause mortality  for patients diagnosed with severe sepsis or septic shock in the ICU, or  when obtained in the emergency department or other medical wards prior to  ICU admission. This assay was performed by the Roche Elecsys [employer] PCT  assay. ng/mL      Culture - Blood (05.05.20 @ 17:27)    Specimen Source: .Blood Blood-Peripheral    Culture Results:   No growth to date.      Culture - Sputum . (05.05.20 @ 18:04)    Gram Stain:   Moderate polymorphonuclear leukocytes per low power field  No squamous epithelial cells per low power field  No organisms seen    Specimen Source: .Sputum Sputum    Culture Results:   Normal Respiratory Farzaneh present      MRSA/MSSA PCR (05.03.20 @ 21:56)    MRSA PCR Result.: NotDetec: MRSA/MSSA PCR assay is a qualitative in vitro diagnostic test for the  direct detection and differentiation of methicillin-resistant  Staphylococcus aureus (MRSA) from Staphylococcus aureus (SA). The assay  detects DNA from nasal swabs in patients atrisk for nasal colonization.  It is not intended to diagnose MRSA or SA infections nor guide or monitor  treatment for MRSA/SA infections. A negative result does not preclude  nasal colonization. The assay is FDA-approved and its performance has  been established by [employer] [place] and the [facility] [place], [place].    Staph Aureus PCR Result: NotDetec    Urinalysis + Microscopic Examination (05.05.20 @ 13:11)    Urine Appearance: Slightly Turbid    Urobilinogen: Negative    Specific Gravity: 1.029    Protein, Urine: 100 mg/dL    pH Urine: 5.5    Leukocyte Esterase Concentration: Negative    Nitrite: Positive    Ketone - Urine: Negative    Bilirubin: Negative    Color: Yellow    Glucose Qualitative, Urine: Negative    Blood, Urine: Large    Red Blood Cell - Urine: >50 /hpf    White Blood Cell - Urine: 6-10    Epithelial Cells: 0 /hpf    Hyaline Casts: 0 /lpf    Bacteria: Few    Comment - Urine: Few Amorphous Urates    Culture - Urine (05.05.20 @ 20:47)    Specimen Source: .Urine Catheterized    Culture Results:   10,000 - 49,000 CFU/mL Presumptive Candida albicans "Susceptibilities not  performed"      Fungitell (05.04.20 @ 05:24)    Fungitell: <31: Interpretation: The Fungitell assay does not detect certain fungal  species such as the genus Cryptococcus (Hira et al. 1991) which  produces very low levels of (1-3)-Beta-D-Glucan. The assay also does  not detect the Zygomycetes such as Absidia, Mucor and Rhizopus  (Hipolito et al. 1994) which are not known to produce  (1-3)-Beta-D-Glucan. In addition, the yeast phase of Blastomyces  dermatitidis produces little (1-3)-Beta-D-Glucan and may not be  detected by the assay (Madalyn et al. 2007).  Reference Range:  Less than 60 pg/mL. Glucan values of less than 60 pg/mL are  interpreted as negative.  Glucan values of 60 to 79 pg/mL are interpreted as indeterminate,  and suggest a possible fungal infection. Additional sampling and  testing of sera is required to interpret the results.  Glucan values of greater than or equal to 80 pg/mL are interpreted  as positive.  Due to the potential for environmental contamination when  transferred to pour-off tubes, which can lead to false positive  results, interpret positive results from samples provided in  pour-off tubes with caution.  Results should be used in conjunction  with clinical findings, and should not form the sole basis for a  diagnosis or treatment decision. The Fungitell test is approved or  cleared for in vitro diagnostic use by the U.S Food and Drug  Administration. Modifications to the approved package insert have  been made and the performance characteristics for these  modifications were determined by [facility].  Ifsample result is greater than 500 pg/mL, physician may order a  titer of the sample. Please contact [facility] if you would  like to order a retest of this sample to obtain an actual value.  Samples are held for 1 week after initial testing date.  ____________________________________________________________  Performed at:  [facility]  [address] [place], [place] [address] [phone]  : Henrietta Daley PhD HCLD(ABB)  CLIA# [id number] pg/mL    Troponin T, High Sensitivity (05.09.20 @ 00:25)    Troponin T, High Sensitivity Result: 13: Rapid upward or downward changes in high-sensitivity troponin levels  suggest acute myocardial injury. Renal impairment may cause sustained  troponin elevations.  Normal: <6 - 14 ng/L  Indeterminate: 15-51 ng/L  Elevated: > 51 ng/L  See http://labs/test/[email or web address] on the [facility] intranet for more  information ng/L      Serum Pro-Brain Natriuretic Peptide (05.09.20 @ 00:25)    Serum Pro-Brain Natriuretic Peptide: 448 pg/mL  Serum Pro-Brain Natriuretic Peptide (05.08.20 @ 00:22)    Serum Pro-Brain Natriuretic Peptide: 882 pg/mL  Serum Pro-Brain Natriuretic Peptide (05.06.20 @ 00:15)    Serum Pro-Brain Natriuretic Peptide: 1453 pg/mL      Vancomycin Level, Trough (05.06.20 @ 04:19)    Vancomycin Level, Trough: 7.1: Vancomycin trough levels should be rapidly reached and maintained at  15-20 ug/ml for life threatening MRSA  infections such as sepsis, endocarditis, osteomyelitis and pneumonia. A  first trough level should be drawn  before the 3rd or 4th dose.  Risk of renal toxicity is increased for levels >15 ug/ml, in patients on  other nephrotoxic drugs, who are  hemodynamically unstable, have unstable renal function, or are on  Vancomycin therapy for >14 days. Renal function with  creatinine levels should be monitored for those patients. ug/mL      WBC Count: 19.45 (05.09.20 @ 00:25)  WBC Count: 31.35 (05.08.20 @ 00:22)  WBC Count: 35.06 (05.07.20 @ 03:05)  WBC Count: 33.56 (05.06.20 @ 00:15)  WBC Count: 32.69 (05-05-20 @ 07:56)  WBC Count: 34.76 (05-04-20 @ 23:56)  WBC Count: 32.55 (05-04-20 @ 18:11)  WBC Count: 28.79 (05-04-20 @ 05:30)  WBC Count: 18.37 (05-03-20 @ 06:10)  WBC Count: 10.68 (05-02-20 @ 06:38)    D-Dimer Assay, Quantitative: 2662 ng/mL DDU (05-09)  D-Dimer Assay, Quantitative: 3610 ng/mL DDU (05-08)  D-Dimer Assay, Quantitative: 5707 ng/mL DDU (05-07)  D-Dimer Assay, Quantitative: 95954 ng/mL DDU (05-06)    Ferritin, Serum: 982 ng/mL (05-09)  Ferritin, Serum: 1460 ng/mL (05-08)  Ferritin, Serum: 1863 ng/mL (05-07)  Ferritin, Serum: 1747 ng/mL (05-06)    C-Reactive Protein, Serum: 4.73 ug/mL (05-09-20 @ 00:25)  C-Reactive Protein, Serum: 6.58 ug/mL (05-08-20 @ 00:22)  C-Reactive Protein, Serum: 9.37 mg/dL (05-07-20 @ 03:05)  C-Reactive Protein, Serum: 9.35 mg/dL (05-06-20 @ 02:35)          RADIOLOGY:  < from: Xray Chest 1 View- PORTABLE-Urgent (05.08.20 @ 04:51) >  EXAM:  XR CHEST PORTABLE URGENT 1V                            PROCEDURE DATE:  05/08/2020            INTERPRETATION:    Examination: XR CHEST URGENT    History: ADMDIAG1: U07.1 FEVER/, ET tube placement    Findings:  Left-sided central line with its tip overlying the SVC. ET tube and NG tube appear in good location. EKG wires overlying the chest. Bilateral diffuse mild to moderate airspace disease does not appear significantly changed when accounting for technical differences. Heart size is normal. No pneumothorax seen.    Impression:  1.  No significant change.      DISCRETE X-RAY DATA:  Percent of LEFT lung opacification: %  Percent of RIGHT lung opacification: %  Change in lung opacification from most recent x-ray (<=3 days):Stable  Change from prior dated 3 or more days (same admission): Increase        < end of copied text >        Ordinal Scale: score : 2  1) Death  2) Hospitalized, on invasive mechanical ventilation or ECMO  3) Hospitalized, on non-invasive ventilation or high flow oxygen devices  4) Hospitalized, requiring low flow(<11l/min) supplemental oxygen  5) Hospitalized, not requiring supplemental oxygen - requiring ongoing medical care(COVID-19 related or otherwise)  6) Hospitalized, not requiring supplemental oxygen - no longer requires ongoing medical care(other than per protocol RDV administration)  7) Not hospitalized

## 2020-05-09 NOTE — PROGRESS NOTE ADULT - ATTENDING COMMENTS
Agree with above. Patient seen and examined. Laboratory data and imaging reviewed. Patient is critically ill requiring ICU care and frequent bedside visits with therapy change.     Patient is generally healthy and presented with COVID symptoms. She was started on Remdesivir but had progression of her clinical course ultimately requiring Tocilizumab. She unfortunately continued to progress and had significant increase in her D-dimer. She was intubated 5/4/2020 and transferred to 8ICU for further monitoring and care.    1. Acute Hypoxemic Respiratory Failure - s/p intubation in setting of COVID-19 ARDS. Lung protective ventilation. Adjust ventilator based on ABG results. Lung protective ventilation. She did well with proning and has had significant improvement in her P/F ratio.  - She does not require reproning at this time - we will start to lighten her sedation/paralytics and decrease her vent settings as able  2. Shock - continue vasopressors as needed to maintain MAP > 65. Her vasopressor requirements have improved  - Diuresis as able  - Continue AC - she has not had evidence of an enlarged right heart on POCUS   3. ID - COVID-19 received Remdesivir and Tocilizumab. Will continue antibiotics and antifungal therapy. Check cultures. ID followup.  - Remdesivir may need to be held if LFTs reach 5xULN  4. Renal function - monitor urine output and creatinine. Will decrease diuretic dose    Prognosis guarded.

## 2020-05-09 NOTE — PROGRESS NOTE ADULT - ASSESSMENT
65F reporting no PMH who presented last night with complaint of fever/chills (home T-max 102 F), night sweats, cough, myalgias (mid & lower back), loss of taste and smell since Friday 4/24 (still tastes sweet/salty), as well as 2 days of weakness and constipation, found to be COVID positive on 4/28/20.    Pt has agreed to participate in a clinical trial for Covid -19  Please discuss with ID team if planning on taking concurrent treatment with other agents with actual or possible direct acting antiviral activity against SARS-CoV-2 such a chloroquine or hydroxychloroquine. Pt was given Tocilizumab by primary team on the day of enrollment as of concern for cytokine storm.     Hospital course  4/29: Started Ceftriaxone (4/29-5/3) and Azithromycin (4/29-5/4) in ED for possible bacterial PNA  4/30: RRT called for desaturation in 70s before receiving 1st dose of Remdesivir and Tocilizumab  5/4: RRT called for desaturation to 20s after drinking water and coughing, intubated by anesthesia. Sedated and started full dose lovenox after D-dimer elevated to [address]. Transferred to ICU. On Meropenem, Vancomycin, and Caspofungin for emperic sepsis coverage.  5/7: Caspofungin switched to Fluconazole for growth of candida albicans in urine; Vancomycin d/c'ed for negative MRSA swab    # Sepsis   - WBC down-trending (19.45), respiratory failure. Fever and tachycardia has improved with cooling blanket. Suspected source include pneumonia (viral, bacterial, fungal, or aspiration) vs bacteremia vs urosepsis vs myocarditis vs microthrombi with the elevated D-Dimer  - Currently on Meropenem (5/4-) and Fluconazole (5/8-). s/p Tocilizumab (4/30), Ceftriaxone (4/29-5/3), Azithromycin (4/29-5/4), Vancomycin (5/3-5/7), and Caspofungin (5/4-5/7).  - Vancomycin d/c'ed on 5/7 as MRSA swab neg  - Caspfungin was switched to Fluconazole 5/7 as Urine Cx grew presumable Candida albicans; Urinanalysis positive for 6-10 WBC, bacteria, and nitrite  - Bcx negative 4/29, 5/3, 5/5 NGTD. Fungitell negative. Sputum cx showed normal respiratory susanne.   - Repeat Fungitell  - Trops negative, BNP elevated. Can consider echo to assess cardiac function to rule out myocarditis and/or heart failure (POCUS on 5/4 documented borderline LV hyperdynamic systolic function and possible LV apical hypocontractility)  - D-dimer remains elevated, could suggest PE or microthrombi. Pt now on heparin drip. Continued AC per primary team.     # Acute hypoxic respiratory failure  - Multifactorial, likely secondary to COVID-19 infection with viral PNA, with possible superimposed bacterial vs fungal vs aspiration PNA and/or PE   - COVID+ 4/28 w/ CXR demonstrating infiltrates, now diffuse patchy opacities (5/3)    - Intubated 5/4 for worsened hypoxic respiratory failure, requiring proning on 5/6, 5/7, and 5/8  - Continue Remdesivir (started 4/30, today is day 10 of 10)   - Inflammatory markers elevated, please continue to monitor   - S/p tocilizumab 4/30  - Consider V-V ECMO if patient is a candidate and doesn't improve with prone postioning    # Elevated LFTs  - Likely due to either COVID 19 vs Ceftriaxone vs Remdesivir vs sepsis .   - If LFTs continue to rise may need to d/c study drug (cutoff is 5 X upper limit of normal = ALT of 225). Please continue to monitor LFTs and creatinine daily     # Elevated CRP/Ferritin/D-dimer  - Could be COVID-related. CRP, ferritin, and D-dimer all trending down.   - D-dimer remains elevated, could suggest PE or microthrombi. Pt now on heparin drip. Continued AC per primary team.     #VTE prophylaxis   - As above, pt with elevated d-dimer, please continue to monitor   - Now on heparin drip    - Further AC per primary team     Today is day #10 on study drug. Ordinal scale score: 2.       Niki Caceres MD. Graduate Physician 65F reporting no PMH who presented last night with complaint of fever/chills (home T-max 102 F), night sweats, cough, myalgias (mid & lower back), loss of taste and smell since Friday 4/24 (still tastes sweet/salty), as well as 2 days of weakness and constipation, found to be COVID positive on 4/28/20.    Pt has agreed to participate in a clinical trial for Covid -19  Please discuss with ID team if planning on taking concurrent treatment with other agents with actual or possible direct acting antiviral activity against SARS-CoV-2 such a chloroquine or hydroxychloroquine. Pt was given Tocilizumab by primary team on the day of enrollment as of concern for cytokine storm.     Hospital course  4/29: Started Ceftriaxone (4/29-5/3) and Azithromycin (4/29-5/4) in ED for possible bacterial PNA  4/30: RRT called for desaturation in 70s before receiving 1st dose of Remdesivir and Tocilizumab  5/4: RRT called for desaturation to 20s after drinking water and coughing, intubated by anesthesia. Sedated and started full dose lovenox after D-dimer elevated to [address]. Transferred to ICU. On Meropenem, Vancomycin, and Caspofungin for emperic sepsis coverage.  5/7: Caspofungin switched to Fluconazole for growth of candida albicans in urine; Vancomycin d/c'ed for negative MRSA swab    # Sepsis   - WBC down-trending (19.45), respiratory failure. Fever and tachycardia has improved with cooling blanket. Suspected source include pneumonia (viral, bacterial, fungal, or aspiration) vs bacteremia vs urosepsis vs myocarditis vs microthrombi with the elevated D-Dimer  - Currently on Meropenem (5/4-) and Fluconazole (5/8-). s/p Tocilizumab (4/30), Ceftriaxone (4/29-5/3), Azithromycin (4/29-5/4), Vancomycin (5/3-5/7), and Caspofungin (5/4-5/7). DAy # 6 of abs- likely 7 day course? will discuss with ICU team. Pt did  receive Tocilizumab which increases risk of infection, patient should be recultured tomorrow  - Vancomycin d/c'ed on 5/7 as MRSA swab neg  - Caspfungin was switched to Fluconazole 5/7 as Urine Cx grew presumable Candida albicans; Urinanalysis positive for 6-10 WBC, bacteria, and nitrite- day #3  - Bcx negative 4/29, 5/3, 5/5 NGTD. Fungitell negative. Sputum cx showed normal respiratory susanne.   - Repeat Fungitell and blood cultures tomorrow . ALthough WBC improving, it is still elevated  - Trops negative, BNP elevated. Can consider repeat cardiac  echo to assess cardiac function to rule out myocarditis and/or heart failure (POCUS on 5/4 documented borderline LV hyperdynamic systolic function and possible LV apical hypocontractility)  - D-dimer remains elevated, could suggest PE or microthrombi. Pt now on heparin drip. Continued AC per primary team.     # Acute hypoxic respiratory failure  - Multifactorial, likely secondary to COVID-19 infection with viral PNA, with possible superimposed bacterial vs fungal vs aspiration PNA and/or PE   - COVID+ 4/28 w/ CXR demonstrating infiltrates, now diffuse patchy opacities (5/3)    - Intubated 5/4 for worsened hypoxic respiratory failure, requiring proning on 5/6, 5/7, and 5/8  - Continue Remdesivir (started 4/30, today is day 10 of 10)   - Inflammatory markers elevated, please continue to monitor   - S/p tocilizumab 4/30  - Consider V-V ECMO if patient is a candidate and doesn't improve with prone postioning    # Elevated LFTs  - Likely due to either COVID 19 vs Ceftriaxone vs Remdesivir vs sepsis .   - If LFTs continue to rise may need to d/c study drug (cutoff is 5 X upper limit of normal = ALT of 225). Please continue to monitor LFTs and creatinine daily     # Elevated CRP/Ferritin/D-dimer  - Could be COVID-related. CRP, ferritin, and D-dimer all trending down.   - D-dimer remains elevated, could suggest PE or microthrombi. Pt now on heparin drip. Continued AC per primary team.     #VTE prophylaxis   - As above, pt with elevated d-dimer, please continue to monitor   - Now on heparin drip    - Further AC per primary team     Today is day #10 on study drug. Ordinal scale score: 2.       Niki Caceres MD. Graduate Physician

## 2020-05-10 NOTE — PROGRESS NOTE ADULT - ASSESSMENT
65F reporting no PMH who presented last night with complaint of fever/chills (home T-max 102 F), night sweats, cough, myalgias (mid & lower back), loss of taste and smell since Friday 4/24 (still tastes sweet/salty), as well as 2 days of weakness and constipation, found to be COVID positive on 4/28/20.    Pt has agreed to participate in a clinical trial for Covid -19  Please discuss with ID team if planning on taking concurrent treatment with other agents with actual or possible direct acting antiviral activity against SARS-CoV-2 such a chloroquine or hydroxychloroquine. Pt was given Tocilizumab by primary team on the day of enrollment as of concern for cytokine storm.     Hospital course  4/29: Started Ceftriaxone (4/29-5/3) and Azithromycin (4/29-5/4) in ED for possible bacterial PNA  4/30: RRT called for desaturation in 70s before receiving 1st dose of Remdesivir and Tocilizumab  5/4: RRT called for desaturation to 20s after drinking water and coughing, intubated by anesthesia. Sedated and started full dose lovenox after D-dimer elevated to [address]. Transferred to ICU. On Meropenem, Vancomycin, and Caspofungin for emperic sepsis coverage.  5/7: Caspofungin switched to Fluconazole for growth of candida albicans in urine; Vancomycin d/c'ed for negative MRSA swab  5/10: Started CPAP trials    # Sepsis   - WBC elevated but improving, respiratory failure. Fever and tachycardia has improved with cooling blanket. Suspected source include pneumonia (viral, bacterial, fungal, or aspiration) vs bacteremia vs urosepsis vs myocarditis vs microthrombi with the elevated D-Dimer  - Currently on Meropenem (5/4-) and Fluconazole (5/8-).  Day # 7 of planned 7 days of abx, will discuss with ICU team possibility of monitoring off abx. Pt did  receive Tocilizumab which increases risk of infection, f/u repeat BCx from this AM    - s/p Tocilizumab (4/30), Ceftriaxone (4/29-5/3), Azithromycin (4/29-5/4), Vancomycin (5/3-5/7), and Caspofungin (5/4-5/7).  - Vancomycin d/c'ed on 5/7 as MRSA swab neg  - Caspofungin was switched to Fluconazole 5/7 as Urine Cx grew presumable Candida albicans; Urinanalysis positive for 6-10 WBC, bacteria, and nitrite- day #4  - Bcx negative 4/29, 5/3, 5/5 NGTD. Fungitell negative. Sputum cx showed normal respiratory susanne.   - F/u Fungitell and blood cultures  - Trops negative, BNP elevated. Can consider repeat cardiac  echo to assess cardiac function to rule out myocarditis and/or heart failure (POCUS on 5/4 documented borderline LV hyperdynamic systolic function and possible LV apical hypocontractility)  - D-dimer remains elevated, could suggest PE or microthrombi. Pt now on heparin drip. Continued AC per primary team.     # Acute hypoxic respiratory failure  - Multifactorial, likely secondary to COVID-19 infection with viral PNA, with possible superimposed bacterial vs fungal vs aspiration PNA and/or PE   - COVID+ 4/28 w/ CXR demonstrating infiltrates, now diffuse patchy opacities (5/3)    - Intubated 5/4 for worsened hypoxic respiratory failure, requiring proning. Now tolerating CPAP trial   - s/p 10 day course of remdesivir   - Inflammatory markers elevated but now downtrending, please continue to monitor   - S/p tocilizumab 4/30  - Team considering V-V ecmo if respiratory status does not improve     # Elevated LFTs  - Likely due to either COVID 19 vs Ceftriaxone vs Remdesivir vs sepsis .   - Now finished with study drug   - Please continue to monitor LFTs and creatinine daily     # Elevated CRP/Ferritin/D-dimer  - Could be COVID-related. CRP, ferritin, and D-dimer all trending down.   - D-dimer remains elevated, could suggest PE or microthrombi. Pt now on heparin drip. Continued AC per primary team.     #VTE prophylaxis   - As above, pt with elevated d-dimer, please continue to monitor   - Now on heparin drip    - Further AC per primary team     Today is day #11 of study. Ordinal scale score: 2.       Meghan Coe MD  Graduate Physician 65F reporting no PMH who presented last night with complaint of fever/chills (home T-max 102 F), night sweats, cough, myalgias (mid & lower back), loss of taste and smell since Friday 4/24 (still tastes sweet/salty), as well as 2 days of weakness and constipation, found to be COVID positive on 4/28/20.    Pt has agreed to participate in a clinical trial for Covid -19  Please discuss with ID team if planning on taking concurrent treatment with other agents with actual or possible direct acting antiviral activity against SARS-CoV-2 such a chloroquine or hydroxychloroquine. Pt was given Tocilizumab by primary team on the day of enrollment as of concern for cytokine storm.     Hospital course  4/29: Started Ceftriaxone (4/29-5/3) and Azithromycin (4/29-5/4) in ED for possible bacterial PNA  4/30: RRT called for desaturation in 70s before receiving 1st dose of Remdesivir and Tocilizumab  5/4: RRT called for desaturation to 20s after drinking water and coughing, intubated by anesthesia. Sedated and started full dose lovenox after D-dimer elevated to [address]. Transferred to ICU. On Meropenem, Vancomycin, and Caspofungin for emperic sepsis coverage.  5/7: Caspofungin switched to Fluconazole for growth of candida albicans in urine; Vancomycin d/c'ed for negative MRSA swab  5/10: Started CPAP trials    # Sepsis   - WBC elevated but improving, respiratory failure. Fever and tachycardia has improved with cooling blanket. Suspected source include pneumonia (viral, bacterial, fungal, or aspiration) vs bacteremia vs urosepsis vs myocarditis vs microthrombi with the elevated D-Dimer  - Currently on Meropenem (5/4-) and Fluconazole (5/8-).  Day # 7 of planned 7 days of abx, will discuss with ICU team possibility of monitoring off abx. Pt did  receive Tocilizumab which increases risk of infection, f/u repeat BCx from this AM    - s/p Tocilizumab (4/30), Ceftriaxone (4/29-5/3), Azithromycin (4/29-5/4), Vancomycin (5/3-5/7), and Caspofungin (5/4-5/7).  - Vancomycin d/c'ed on 5/7 as MRSA swab neg  - Caspofungin was switched to Fluconazole 5/7 as Urine Cx grew presumable Candida albicans; Urinanalysis positive for 6-10 WBC, bacteria, and nitrite- day #4  - Bcx negative 4/29, 5/3, 5/5 NGTD. Fungitell negative. Sputum cx showed normal respiratory susanne.   - F/u Fungitell and blood cultures  - Trops negative, BNP elevated. Can consider repeat cardiac  echo to assess cardiac function to rule out myocarditis and/or heart failure (POCUS on 5/4 documented borderline LV hyperdynamic systolic function and possible LV apical hypocontractility)  - D-dimer remains elevated, could suggest PE or microthrombi. Pt now on heparin drip. Continued AC per primary team.   DAY # 7 of meropenem- to complete today and then will see how does and follow repeat cultures    # Acute hypoxic respiratory failure  - Multifactorial, likely secondary to COVID-19 infection with viral PNA, with possible superimposed bacterial vs fungal vs aspiration PNA and/or PE   - COVID+ 4/28 w/ CXR demonstrating infiltrates, now diffuse patchy opacities (5/3)    - Intubated 5/4 for worsened hypoxic respiratory failure, requiring proning. Now tolerating CPAP trial   - s/p 10 day course of remdesivir   - Inflammatory markers elevated but now downtrending, please continue to monitor   - S/p tocilizumab 4/30  - Team considering V-V ecmo if respiratory status does not improve     # Elevated LFTs  - Likely due to either COVID 19 vs Ceftriaxone vs Remdesivir vs sepsis .   - Now finished with study drug   - Please continue to monitor LFTs and creatinine daily     # Elevated CRP/Ferritin/D-dimer  - Could be COVID-related. CRP, ferritin, and D-dimer all trending down.   - D-dimer remains elevated, could suggest PE or microthrombi. Pt now on heparin drip. Continued AC per primary team.     #VTE prophylaxis   - As above, pt with elevated d-dimer, please continue to monitor   - Now on heparin drip    - Further AC per primary team     Today is day #11 of study. Ordinal scale score: 2.       Meghan Coe MD  Graduate Physician

## 2020-05-10 NOTE — PROGRESS NOTE ADULT - ASSESSMENT
64 y/o F with no significant PMH presents 4/29 with hypoxic respiratory failure 2nd COVID PNA. s/p Tocilizumab (4/30), s/p empiric Ceftriaxone/Azithro (4/30-5/4) for ?superimposed CAP. Currently enrolled in Remdesivir trial (4/30). Intubated 5/4 for worsened hypoxic respiratory failure. Severe ARDS requiring proning on 5/6 and 5/7    #Neuro   - Weaned fent and ketamine to off  - Sedation with precedex as needed  - Keep RASS -1    #CV  -  Now hypertensive  - Started on Hydralazine 25mg q8 overnight after weaning off sedation.   - Labetalol IVP PRN   - bedside POCUS with hyperdynamic LV function and  no evidence of RV dysfunction on 5/7    #Pulm:   - Hypoxic respiratory failure 2nd COVID PNA  - Required proning on 5/6, 5/7, 5/8  - AC 30/310/40/5  - Pressure support trials this AM for exercise. Await improvement in mental status prior to attempting extubation   - Rpt ABG in 1 hour    #GI  - Transaminitis improving   - c/w Vital AF at goal rate  - Last BM 5/10  - c/w Senna, Miralax   - Pepcid 20mg qd     #  - Compensated mixed respiratory acidosis and metabolic alkalosis   - Hold Lasix today   - Diamox 500mg x1 today   - Goal net even fluid balance    #ID   - Leukocytosis, procalcitonin downtrending  - Repeat BC x2 and fungitell sent overnight per ID  - Likely d/c Meropenem after today's dosing (Day 7/7 of empiric coverage)   - Caspofungin/Diflucan (Day 4) for candida in urine, length per ID    COVID: + on 4/28   - s/p remdesivir trial  (4/30-5/9)   - s/p Tocilizumab on 4/30  - inflammatory markers down trending    #  LINES  - L radial Opal from 5/4  - LIJ TLC from 5/4     #Heme   - Heparin gtt for elevated D-dimer   - Goal aPTT 58-99    #Endo  -  Blood glucose controlled on Humalog  Low SS    #Ethics  -Full code

## 2020-05-10 NOTE — PROGRESS NOTE ADULT - SUBJECTIVE AND OBJECTIVE BOX
CHIEF COMPLAINT: Patient is a 65y old  Female who presents with a chief complaint of COVID-19, Hypoxic respiratory failure (10 May 2020 09:42)    Interval Events: weaned off fent/ketamine, placed on precedex. PS trials this morning on 5/12.     REVIEW OF SYSTEMS:  [x ] Unable to assess ROS because intubated/sedated    OBJECTIVE:  ICU Vital Signs Last 24 Hrs  T(C): 37.8 (10 May 2020 07:00), Max: 37.8 (10 May 2020 07:00)  T(F): 100 (10 May 2020 07:00), Max: 100 (10 May 2020 07:00)  HR: 70 (10 May 2020 10:00) (57 - 95)  BP: --  BP(mean): --  ABP: 135/52 (10 May 2020 10:00) (105/46 - 202/80)  ABP(mean): 84 (10 May 2020 10:00) (68 - 134)  RR: 20 (10 May 2020 10:00) (20 - 35)  SpO2: 97% (10 May 2020 10:00) (91% - 100%) on 40%    Mode: CPAP with PS,pt placed on cpap by MD during morning rounds, FiO2: 40, PEEP: 5, PS: 12, MAP: 8    05-09 @ 07:01  -  05-10 @ 07:00  --------------------------------------------------------  IN: 3642.4 mL / OUT: 2930 mL / NET: 712.4 mL    05-10 @ 07:01  -  05-10 @ 11:11  --------------------------------------------------------  IN: 244.8 mL / OUT: 225 mL / NET: 19.8 mL      CAPILLARY BLOOD GLUCOSE      POCT Blood Glucose.: 153 mg/dL (10 May 2020 05:04)      PHYSICAL EXAM:  General: Intubated, sedated  HEENT: WNL  Neck: WNL  Respiratory: Coarse bilaterally   Cardiovascular: RRR  Abdomen: +BS, ND  Extremities: WNL  Skin: facial decub  Neurological: deeply sedated    HOSPITAL MEDICATIONS:  MEDICATIONS  (STANDING):  acetaZOLAMIDE Injectable 500 milliGRAM(s) IV Push once  BACItracin   Ointment 1 Application(s) Topical two times a day  chlorhexidine 0.12% Liquid 15 milliLiter(s) Oral Mucosa two times a day  chlorhexidine 4% Liquid 1 Application(s) Topical <User Schedule>  dexMEDEtomidine Infusion 1.5 MICROgram(s)/kG/Hr (26.4 mL/Hr) IV Continuous <Continuous>  famotidine Injectable 20 milliGRAM(s) IV Push daily  fluconAZOLE IVPB 200 milliGRAM(s) IV Intermittent every 24 hours  heparin  Infusion. 900 Unit(s)/Hr (9 mL/Hr) IV Continuous <Continuous>  hydrALAZINE 25 milliGRAM(s) Oral three times a day  insulin lispro (HumaLOG) corrective regimen sliding scale   SubCutaneous every 6 hours  meropenem  IVPB 1000 milliGRAM(s) IV Intermittent every 8 hours  polyethylene glycol 3350 17 Gram(s) Oral daily  senna 2 Tablet(s) Oral at bedtime  sodium chloride 0.9% lock flush 3 milliLiter(s) IV Push every 8 hours    MEDICATIONS  (PRN):  acetaminophen    Suspension .. 650 milliGRAM(s) Oral every 6 hours PRN Temp greater or equal to 38C (100.4F)  diphenhydrAMINE   Injectable 50 milliGRAM(s) IV Push once PRN anaphylaxis to study drug  EPINEPHrine     1 mG/mL Injectable 0.3 milliGRAM(s) IntraMuscular once PRN anaphylaxis to study drug  labetalol Injectable 10 milliGRAM(s) IV Push every 20 minutes PRN Systolic blood pressure >  sodium chloride 0.9% lock flush 10 milliLiter(s) IV Push every 1 hour PRN Pre/post blood products, medications, blood draw, and to maintain line patency      LABS:  (05-10 @ 00:27)                        10.8  18.02 )-----------( 288                 35.1    Neutrophils = -- (--%)  Lymphocytes = -- (--%)  Eosinophils = -- (--%)  Basophils = -- (--%)  Monocytes = -- (--%)  Bands = --%    WBC Trend: 18.02<--, 19.45<--, 31.35<--  Hb Trend: 10.8<--, 10.5<--, 10.9<--, 11.0<--, 12.5<--  Plt Trend: 288<--, 275<--, 285<--, 236<--, 279<--  05-10    145  |  98  |  34<H>  ----------------------------<  168<H>  4.3   |  40<H>  |  0.44<L>    Ca    8.1<L>      10 May 2020 00:27  Phos  1.7     05-10  Mg     2.5     05-10    TPro  5.6<L>  /  Alb  2.4<L>  /  TBili  0.2  /  DBili  x   /  AST  41<H>  /  ALT  59<H>  /  AlkPhos  331<H>  05-10    Creatinine Trend: 0.44<--, 0.49<--, 0.49<--, 0.49<--, 0.49<--, 0.64<--  PT/INR - ( 10 May 2020 00:27 )   PT: 12.2 sec;   INR: 1.07 ratio         PTT - ( 10 May 2020 00:27 )  PTT:83.2 sec    Arterial Blood Gas:  05-10 @ 02:38  7.41/72/84/45/96/17.3  ABG lactate: --  Arterial Blood Gas:  05-09 @ 20:14  7.49/58/75/44/95/17.5  ABG lactate: --  Arterial Blood Gas:  05-09 @ 11:56  7.34/82/96/43/98/14.4  ABG lactate: --  Arterial Blood Gas:  05-09 @ 06:16  7.36/82/117/45/98/16.5  ABG lactate: --  Arterial Blood Gas:  05-09 @ 00:15  7.40/69/92/42/97/14.9  ABG lactate: --  Arterial Blood Gas:  05-08 @ 15:05  7.40/66/72/40/95/13.3  ABG lactate: --  Arterial Blood Gas:  05-08 @ 13:19  7.47/53/62/38/93/12.8  ABG lactate: --      CARDIAC MARKERS ( 10 May 2020 00:27 )  Trop x     / CK 97 U/L / CKMB x       CARDIAC MARKERS ( 09 May 2020 00:25 )  Trop x     /  U/L<H> / CKMB x             MICROBIOLOGY:   Blood Cx: Pending 5/10  Urine Cx: 10-50k yeast like cells- presumptively Candida   Sputum Cx: 5/8: no growth

## 2020-05-10 NOTE — PROGRESS NOTE ADULT - ATTENDING COMMENTS
Agree with above. Patient seen and examined. Laboratory data and imaging reviewed. Patient is critically ill requiring ICU care and frequent bedside visits with therapy change.     Patient is generally healthy and presented with COVID symptoms. She was started on Remdesivir but had progression of her clinical course ultimately requiring Tocilizumab. She unfortunately continued to progress and had significant increase in her D-dimer. She was intubated 5/4/2020 and transferred to 8ICU for further monitoring and care.    1. Acute Hypoxemic Respiratory Failure - s/p intubation in setting of COVID-19 ARDS. Lung protective ventilation. Adjust ventilator based on ABG results. Lung protective ventilation. She did well with proning and has had significant improvement in her P/F ratio.  - PaO2 84 on 40% this AM  - Will attempt to lighten sedation and start PS trials  2. Shock - continue vasopressors as needed to maintain MAP > 65. Her vasopressor requirements have resolved and she is now hypertensive  - Diuresis as able  - Continue AC  3. ID - COVID-19 received Remdesivir and Tocilizumab. Will continue antibiotics and antifungal therapy. Check cultures if febrile. ID followup.  4. Renal function - monitor urine output and creatinine. Contraction alkalosis    Prognosis guarded.

## 2020-05-10 NOTE — PROGRESS NOTE ADULT - SUBJECTIVE AND OBJECTIVE BOX
Patient is a 65y old  Female who presents with a chief complaint of COVID-19, Hypoxic respiratory failure (09 May 2020 09:20)    Being followed by ID for COVID, clinical trial       Interval history:  Pt remains intubated, sedated with precedex. Pt being trialed on CPAP/PS today, tolerating. Afebrile overnight. SaO2 98% on current vent settings.   No other acute events      ROS:  Unable to assess     PAST MEDICAL & SURGICAL HISTORY:  No pertinent past medical history  History of cholecystectomy  History of appendectomy    Allergies    No Known Allergies    Intolerances      Antimicrobials:    fluconAZOLE IVPB 200 milliGRAM(s) IV Intermittent every 24 hours  meropenem  IVPB 1000 milliGRAM(s) IV Intermittent every 8 hours    MEDICATIONS  (STANDING):  acetaZOLAMIDE Injectable 500 milliGRAM(s) IV Push once  BACItracin   Ointment 1 Application(s) Topical two times a day  chlorhexidine 0.12% Liquid 15 milliLiter(s) Oral Mucosa two times a day  chlorhexidine 4% Liquid 1 Application(s) Topical <User Schedule>  dexMEDEtomidine Infusion 1.5 MICROgram(s)/kG/Hr (26.4 mL/Hr) IV Continuous <Continuous>  famotidine Injectable 20 milliGRAM(s) IV Push daily  fluconAZOLE IVPB 200 milliGRAM(s) IV Intermittent every 24 hours  heparin  Infusion. 900 Unit(s)/Hr (9 mL/Hr) IV Continuous <Continuous>  hydrALAZINE 25 milliGRAM(s) Oral three times a day  insulin lispro (HumaLOG) corrective regimen sliding scale   SubCutaneous every 6 hours  meropenem  IVPB 1000 milliGRAM(s) IV Intermittent every 8 hours  polyethylene glycol 3350 17 Gram(s) Oral daily  senna 2 Tablet(s) Oral at bedtime  sodium chloride 0.9% lock flush 3 milliLiter(s) IV Push every 8 hours    MEDICATIONS  (PRN):  acetaminophen    Suspension .. 650 milliGRAM(s) Oral every 6 hours PRN Temp greater or equal to 38C (100.4F)  diphenhydrAMINE   Injectable 50 milliGRAM(s) IV Push once PRN anaphylaxis to study drug  EPINEPHrine     1 mG/mL Injectable 0.3 milliGRAM(s) IntraMuscular once PRN anaphylaxis to study drug  labetalol Injectable 10 milliGRAM(s) IV Push every 20 minutes PRN Systolic blood pressure >  sodium chloride 0.9% lock flush 10 milliLiter(s) IV Push every 1 hour PRN Pre/post blood products, medications, blood draw, and to maintain line patency      Vital Signs Last 24 Hrs  T(C): 37.8 (05-10-20 @ 07:00), Max: 37.8 (05-10-20 @ 07:00)  T(F): 100 (05-10-20 @ 07:00), Max: 100 (05-10-20 @ 07:00)  HR: 61 (05-10-20 @ 08:44) (57 - 95)  BP: --  BP(mean): --  RR: 34 (05-10-20 @ 08:00) (30 - 35)  SpO2: 100% (05-10-20 @ 08:44) (91% - 100%)    Physical Exam:    Constitutional sedated, on vent   HEENT pressure ulcers on b/l cheeks from proning   Chest Good AE, CTA, vented   CVS RRR S1 S2 WNl   Abd soft BS normal   Ext No cyanosis clubbing or edema, 2+ pulses b/l   IV site no erythema tenderness or discharge  CNS sedated     Lab Data:                          10.8   18.02 )-----------( 288      ( 10 May 2020 00:27 )             35.1       05-10    145  |  98  |  34<H>  ----------------------------<  168<H>  4.3   |  40<H>  |  0.44<L>    Ca    8.1<L>      10 May 2020 00:27  Phos  1.7     05-10  Mg     2.5     05-10    TPro  5.6<L>  /  Alb  2.4<L>  /  TBili  0.2  /  DBili  x   /  AST  41<H>  /  ALT  59<H>  /  AlkPhos  331<H>  05-10        .Sputum Sputum  05-08-20   No growth at 48 hours  --    Few polymorphonuclear leukocytes per low power field  Few Squamous epithelial cells per low power field  No organisms seen per oil power field      .Urine Catheterized  05-05-20   10,000 - 49,000 CFU/mL Presumptive Candida albicans "Susceptibilities not  performed"  --  --      .Sputum Sputum  05-05-20   Normal Respiratory Farzaneh present  --    Moderate polymorphonuclear leukocytes per low power field  No squamous epithelial cells per low power field  No organisms seen      .Blood Blood-Peripheral  05-05-20   No growth to date.  --  --      .Blood Blood-Peripheral  05-03-20   No Growth Final  --  --      WBC Count: 18.02 (05-10-20 @ 00:27)  WBC Count: 19.45 (05-09-20 @ 00:25)  WBC Count: 31.35 (05-08-20 @ 00:22)  WBC Count: 35.06 (05-07-20 @ 03:05)  WBC Count: 33.56 (05-06-20 @ 00:15)  WBC Count: 32.69 (05-05-20 @ 07:56)  WBC Count: 34.76 (05-04-20 @ 23:56)  WBC Count: 32.55 (05-04-20 @ 18:11)  WBC Count: 28.79 (05-04-20 @ 05:30)    D-Dimer Assay, Quantitative: 2020 ng/mL DDU (05-10)  D-Dimer Assay, Quantitative: 2662 ng/mL DDU (05-09)  D-Dimer Assay, Quantitative: 3610 ng/mL DDU (05-08)  D-Dimer Assay, Quantitative: 5707 ng/mL DDU (05-07)    Ferritin, Serum: 933 ng/mL (05-10)  Ferritin, Serum: 982 ng/mL (05-09)  Ferritin, Serum: 1460 ng/mL (05-08)  Ferritin, Serum: 1863 ng/mL (05-07)      C-Reactive Protein, Serum: 2.46 ug/mL (05-10-20 @ 00:27)  C-Reactive Protein, Serum: 4.73 ug/mL (05-09-20 @ 00:25)  C-Reactive Protein, Serum: 6.58 ug/mL (05-08-20 @ 00:22)  C-Reactive Protein, Serum: 9.37 mg/dL (05-07-20 @ 03:05)      Ordinal Scale: score : 2  1) Death  2) Hospitalized, on invasive mechanical ventilation or ECMO  3) Hospitalized, on non-invasive ventilation or high flow oxygen devices  4) Hospitalized, requiring low flow(<11l/min) supplemental oxygen  5) Hospitalized, not requiring supplemental oxygen - requiring ongoing medical care(COVID-19 related or otherwise)  6) Hospitalized, not requiring supplemental oxygen - no longer requires ongoing medical care(other than per protocol RDV administration)  7) Not hospitalized Patient is a 65y old  Female who presents with a chief complaint of COVID-19, Hypoxic respiratory failure (09 May 2020 09:20)    Being followed by ID for COVID, clinical trial       Interval history:  Pt remains intubated, sedated with precedex. Pt being trialed on CPAP/PS today, tolerating. Afebrile overnight. SaO2 98% on current vent settings.   moderate secretions  had bowel movements  No other acute events      ROS:  Unable to assess     PAST MEDICAL & SURGICAL HISTORY:  No pertinent past medical history  History of cholecystectomy  History of appendectomy    Allergies    No Known Allergies    Intolerances      Antimicrobials:    fluconAZOLE IVPB 200 milliGRAM(s) IV Intermittent every 24 hours  meropenem  IVPB 1000 milliGRAM(s) IV Intermittent every 8 hours    MEDICATIONS  (STANDING):  acetaZOLAMIDE Injectable 500 milliGRAM(s) IV Push once  BACItracin   Ointment 1 Application(s) Topical two times a day  chlorhexidine 0.12% Liquid 15 milliLiter(s) Oral Mucosa two times a day  chlorhexidine 4% Liquid 1 Application(s) Topical <User Schedule>  dexMEDEtomidine Infusion 1.5 MICROgram(s)/kG/Hr (26.4 mL/Hr) IV Continuous <Continuous>  famotidine Injectable 20 milliGRAM(s) IV Push daily  fluconAZOLE IVPB 200 milliGRAM(s) IV Intermittent every 24 hours  heparin  Infusion. 900 Unit(s)/Hr (9 mL/Hr) IV Continuous <Continuous>  hydrALAZINE 25 milliGRAM(s) Oral three times a day  insulin lispro (HumaLOG) corrective regimen sliding scale   SubCutaneous every 6 hours  meropenem  IVPB 1000 milliGRAM(s) IV Intermittent every 8 hours  polyethylene glycol 3350 17 Gram(s) Oral daily  senna 2 Tablet(s) Oral at bedtime  sodium chloride 0.9% lock flush 3 milliLiter(s) IV Push every 8 hours    MEDICATIONS  (PRN):  acetaminophen    Suspension .. 650 milliGRAM(s) Oral every 6 hours PRN Temp greater or equal to 38C (100.4F)  diphenhydrAMINE   Injectable 50 milliGRAM(s) IV Push once PRN anaphylaxis to study drug  EPINEPHrine     1 mG/mL Injectable 0.3 milliGRAM(s) IntraMuscular once PRN anaphylaxis to study drug  labetalol Injectable 10 milliGRAM(s) IV Push every 20 minutes PRN Systolic blood pressure >  sodium chloride 0.9% lock flush 10 milliLiter(s) IV Push every 1 hour PRN Pre/post blood products, medications, blood draw, and to maintain line patency      Vital Signs Last 24 Hrs  T(C): 37.8 (05-10-20 @ 07:00), Max: 37.8 (05-10-20 @ 07:00)  T(F): 100 (05-10-20 @ 07:00), Max: 100 (05-10-20 @ 07:00)  HR: 61 (05-10-20 @ 08:44) (57 - 95)  BP: --  BP(mean): --  RR: 34 (05-10-20 @ 08:00) (30 - 35)  SpO2: 100% (05-10-20 @ 08:44) (91% - 100%)    Physical Exam:    Constitutional sedated, on vent   HEENT pressure ulcers on b/l cheeks from proning   Chest Good AE, CTA, vented   CVS RRR S1 S2 WNl   Abd soft BS normal   Ext No cyanosis clubbing or edema, 2+ pulses b/l   IV site no erythema tenderness or discharge  CNS sedated     Lab Data:                          10.8 18.02 )-----------( 288      ( 10 May 2020 00:27 )             35.1       05-10    145  |  98  |  34<H>  ----------------------------<  168<H>  4.3   |  40<H>  |  0.44<L>    Ca    8.1<L>      10 May 2020 00:27  Phos  1.7     05-10  Mg     2.5     05-10    TPro  5.6<L>  /  Alb  2.4<L>  /  TBili  0.2  /  DBili  x   /  AST  41<H>  /  ALT  59<H>  /  AlkPhos  331<H>  05-10        .Sputum Sputum  05-08-20   No growth at 48 hours  --    Few polymorphonuclear leukocytes per low power field  Few Squamous epithelial cells per low power field  No organisms seen per oil power field      .Urine Catheterized  05-05-20   10,000 - 49,000 CFU/mL Presumptive Candida albicans "Susceptibilities not  performed"  --  --      .Sputum Sputum  05-05-20   Normal Respiratory Farzaneh present  --    Moderate polymorphonuclear leukocytes per low power field  No squamous epithelial cells per low power field  No organisms seen      .Blood Blood-Peripheral  05-05-20   No growth to date.  --  --      .Blood Blood-Peripheral  05-03-20   No Growth Final  --  --      WBC Count: 18.02 (05-10-20 @ 00:27)  WBC Count: 19.45 (05-09-20 @ 00:25)  WBC Count: 31.35 (05-08-20 @ 00:22)  WBC Count: 35.06 (05-07-20 @ 03:05)  WBC Count: 33.56 (05-06-20 @ 00:15)  WBC Count: 32.69 (05-05-20 @ 07:56)  WBC Count: 34.76 (05-04-20 @ 23:56)  WBC Count: 32.55 (05-04-20 @ 18:11)  WBC Count: 28.79 (05-04-20 @ 05:30)    D-Dimer Assay, Quantitative: 2020 ng/mL DDU (05-10)  D-Dimer Assay, Quantitative: 2662 ng/mL DDU (05-09)  D-Dimer Assay, Quantitative: 3610 ng/mL DDU (05-08)  D-Dimer Assay, Quantitative: 5707 ng/mL DDU (05-07)    Ferritin, Serum: 933 ng/mL (05-10)  Ferritin, Serum: 982 ng/mL (05-09)  Ferritin, Serum: 1460 ng/mL (05-08)  Ferritin, Serum: 1863 ng/mL (05-07)      C-Reactive Protein, Serum: 2.46 ug/mL (05-10-20 @ 00:27)  C-Reactive Protein, Serum: 4.73 ug/mL (05-09-20 @ 00:25)  C-Reactive Protein, Serum: 6.58 ug/mL (05-08-20 @ 00:22)  C-Reactive Protein, Serum: 9.37 mg/dL (05-07-20 @ 03:05)      Ordinal Scale: score : 2  1) Death  2) Hospitalized, on invasive mechanical ventilation or ECMO  3) Hospitalized, on non-invasive ventilation or high flow oxygen devices  4) Hospitalized, requiring low flow(<11l/min) supplemental oxygen  5) Hospitalized, not requiring supplemental oxygen - requiring ongoing medical care(COVID-19 related or otherwise)  6) Hospitalized, not requiring supplemental oxygen - no longer requires ongoing medical care(other than per protocol RDV administration)  7) Not hospitalized

## 2020-05-11 NOTE — PROGRESS NOTE ADULT - ATTENDING COMMENTS
1. Acute hypoxemic respiratory failure from ARDS, SARS-2 Covid pneumonia. Continue current AC vent settings, Pt s/p Tocilizumab. Currently receiving Remdesivir. Decrease sedation to evaluate mental status.   2. HTN Cardene drip as needed. Restart hydralazine.  3. ID. Candida UTI. Continue Diflucan.  Elevated wbc. Continue meropenem. Add Vancomycin.

## 2020-05-11 NOTE — PROGRESS NOTE ADULT - ASSESSMENT
66 y/o F with no significant PMH presents 4/29 with hypoxic respiratory failure 2nd COVID PNA. s/p Tocilizumab (4/30), s/p empiric Ceftriaxone/Azithro (4/30-5/4) for ?superimposed CAP. Currently enrolled in Remdesivir trial (4/30). Intubated 5/4 for worsened hypoxic respiratory failure. Severe ARDS requiring proning on 5/6, 5/7 and 5/8    #Neuro   - Sedation with precedex as needed  - Methadone 5mg q8 started for withdrawal   - Keep RASS -1    #CV  - Restarted Hydralazine 25mg q8 for HTN   - Cardene gtt as needed for SBP>160mmHg  - Labetalol IVP PRN   - bedside POCUS with hyperdynamic LV function     #Pulm:   - Hypoxic respiratory failure 2nd COVID PNA  - Required proning on 5/6, 5/7, 5/8  - AC 28/400/50/5  - Repeat ABG in 1hr after vent changes   - L>R effusion/atelectasis on POCUS    #GI  - Transaminitis stable  - c/w Vital AF at goal rate  - Last BM 5/11  - Check CDiff given leukocytosis without clear source  - Pepcid 20mg qd     #  - Compensated mixed respiratory acidosis and metabolic alkalosis   - Improved contraction alkalosis with Diamox 500mg x1 yesterday   - Increase Free water 300 q4 for hypernatremia  - Change IVF to 0.45% NS@ 50cc/hr    #ID   - Leukocytosis uptrended overnight, with TMax 38.7  - f/u BC x2 and fungitell, sputum culture prelim negative  - Restart Vanco 1g BID   - Check CDiff  - c/w Meropenem (Day 8)  - Caspofungin/Diflucan (Day 5) for candida in urine, length per ID  - Change tyler and re-send UA, urine culture today     COVID: + on 4/28   - s/p remdesivir trial  (4/30-5/9)   - s/p Tocilizumab on 4/30  - inflammatory markers down trending    #  LINES  - L radial Opal from 5/4  - LIJ TLC from 5/4     #Heme   - Heparin gtt for elevated D-dimer   - Goal aPTT 58-99    #Endo  -  Blood glucose controlled on Humalog  Low SS    #Ethics  -Full code

## 2020-05-11 NOTE — PROGRESS NOTE ADULT - SUBJECTIVE AND OBJECTIVE BOX
CHIEF COMPLAINT: Patient is a 65y old  Female who presents with a chief complaint of COVID-19, Hypoxic respiratory failure (11 May 2020 10:02)    Interval Events: Hypertensive overnight, started on Cardene gtt. Hypernatremic and started on free water/IVF. Now back on Precedex, off Ketamine/Prop. Uptrending leukocytosis with TMax 38.7- no clear source of infection.     REVIEW OF SYSTEMS:  [x ] Unable to obtain ROS, patient intubated/sedated    OBJECTIVE:  ICU Vital Signs Last 24 Hrs  T(C): 37 (11 May 2020 08:00), Max: 38.7 (10 May 2020 17:00)  T(F): 98.6 (11 May 2020 08:00), Max: 101.7 (10 May 2020 17:00)  HR: 91 (11 May 2020 10:00) (50 - 101)  BP: --  BP(mean): --  ABP: 148/61 (11 May 2020 10:00) (103/43 - 228/83)  ABP(mean): 92 (11 May 2020 10:00) (65 - 146)  RR: 37 (11 May 2020 10:00) (24 - 47)  SpO2: 96% (11 May 2020 10:00) (89% - 99%) on 50%    Mode: AC/ CMV (Assist Control/ Continuous Mandatory Ventilation), RR (machine): 34, TV (machine): 320, FiO2: 55, PEEP: 6, ITime: 0.9, MAP: 16, PIP: 40    05-10 @ 07:01 - 05-11 @ 07:00  --------------------------------------------------------  IN: 2908.2 mL / OUT: 2905 mL / NET: 3.2 mL    05-11 @ 07:01  -  05-11 @ 12:15  --------------------------------------------------------  IN: 590.1 mL / OUT: 555 mL / NET: 35.1 mL      CAPILLARY BLOOD GLUCOSE      POCT Blood Glucose.: 154 mg/dL (11 May 2020 11:17)      PHYSICAL EXAM:  General:   HEENT:   Lymph Nodes:  Neck:   Respiratory:   Cardiovascular:   Abdomen:   Extremities:   Skin:   Neurological:  Psychiatry:    HOSPITAL MEDICATIONS:  MEDICATIONS  (STANDING):  BACItracin   Ointment 1 Application(s) Topical two times a day  chlorhexidine 0.12% Liquid 15 milliLiter(s) Oral Mucosa two times a day  chlorhexidine 4% Liquid 1 Application(s) Topical <User Schedule>  dexMEDEtomidine Infusion 0.5 MICROgram(s)/kG/Hr (8.79 mL/Hr) IV Continuous <Continuous>  famotidine Injectable 20 milliGRAM(s) IV Push daily  fluconAZOLE IVPB 200 milliGRAM(s) IV Intermittent every 24 hours  heparin  Infusion. 900 Unit(s)/Hr (9 mL/Hr) IV Continuous <Continuous>  hydrALAZINE 25 milliGRAM(s) Oral every 8 hours  insulin lispro (HumaLOG) corrective regimen sliding scale   SubCutaneous every 6 hours  meropenem  IVPB 1000 milliGRAM(s) IV Intermittent every 8 hours  methadone   Solution 5 milliGRAM(s) Oral every 8 hours  niCARdipine Infusion 5 mG/Hr (25 mL/Hr) IV Continuous <Continuous>  polyethylene glycol 3350 17 Gram(s) Oral daily  propofol Infusion 20 MICROgram(s)/kG/Min (8.44 mL/Hr) IV Continuous <Continuous>  senna 2 Tablet(s) Oral at bedtime  sodium chloride 0.45%. 1000 milliLiter(s) (50 mL/Hr) IV Continuous <Continuous>  sodium chloride 0.9% lock flush 3 milliLiter(s) IV Push every 8 hours  vancomycin  IVPB 1000 milliGRAM(s) IV Intermittent every 12 hours    MEDICATIONS  (PRN):  acetaminophen    Suspension .. 650 milliGRAM(s) Oral every 6 hours PRN Temp greater or equal to 38C (100.4F)  ALBUTerol    90 MICROgram(s) HFA Inhaler 2 Puff(s) Inhalation every 6 hours PRN Bronchospasm  diphenhydrAMINE   Injectable 50 milliGRAM(s) IV Push once PRN anaphylaxis to study drug  EPINEPHrine     1 mG/mL Injectable 0.3 milliGRAM(s) IntraMuscular once PRN anaphylaxis to study drug  sodium chloride 0.9% lock flush 10 milliLiter(s) IV Push every 1 hour PRN Pre/post blood products, medications, blood draw, and to maintain line patency      LABS:  (05-11 @ 00:33)                        11.4  25.80 )-----------( 322                 37.1    Neutrophils = -- (--%)  Lymphocytes = -- (--%)  Eosinophils = -- (--%)  Basophils = -- (--%)  Monocytes = -- (--%)  Bands = --%    WBC Trend: 25.80<--, 18.02<--, 19.45<--  Hb Trend: 11.4<--, 10.8<--, 10.5<--, 10.9<--, 11.0<--  Plt Trend: 322<--, 288<--, 275<--, 285<--, 236<--  05-11    151<H>  |  110<H>  |  35<H>  ----------------------------<  172<H>  3.8   |  33<H>  |  0.31<L>    Ca    8.6      11 May 2020 10:27  Phos  3.3     05-11  Mg     2.7     05-11    TPro  5.8<L>  /  Alb  2.5<L>  /  TBili  0.3  /  DBili  x   /  AST  61<H>  /  ALT  52<H>  /  AlkPhos  343<H>  05-11    Creatinine Trend: 0.31<--, 0.38<--, 0.44<--, 0.49<--, 0.49<--, 0.49<--  PT/INR - ( 11 May 2020 00:33 )   PT: 12.7 sec;   INR: 1.10 ratio         PTT - ( 11 May 2020 00:33 )  PTT:96.8 sec    Arterial Blood Gas:  05-11 @ 04:55  7.47/52/77/37/96/11.9  ABG lactate: --  Arterial Blood Gas:  05-11 @ 00:47  7.32/82/84/41/94/12.4  ABG lactate: --  Arterial Blood Gas:  05-10 @ 11:32  7.46/65/58/45/90/18.1  ABG lactate: --  Arterial Blood Gas:  05-10 @ 02:38  7.41/72/84/45/96/17.3  ABG lactate: --  Arterial Blood Gas:  05-09 @ 20:14  7.49/58/75/44/95/17.5  ABG lactate: --      CARDIAC MARKERS ( 11 May 2020 00:33 )  Trop x     / CK 79 U/L / CKMB x       CARDIAC MARKERS ( 10 May 2020 00:27 )  Trop x     / CK 97 U/L / CKMB x             MICROBIOLOGY:   Blood Cx: Prelim negative  Urine Cx: Pending  Sputum Cx: Prelim no growth     RADIOLOGY:  X Ray: Increased LLL effusion/atelectasis vs opacity

## 2020-05-11 NOTE — CHART NOTE - NSCHARTNOTEFT_GEN_A_CORE
: Jacek    INDICATION: ARDS    PROCEDURE:  [X] LIMITED ECHO  [X] LIMITED CHEST  [ ] LIMITED RETROPERITONEAL  [ ] LIMITED ABDOMINAL  [ ] LIMITED DVT  [ ] NEEDLE GUIDANCE VASCULAR  [ ] NEEDLE GUIDANCE THORACENTESIS  [ ] NEEDLE GUIDANCE PARACENTESIS  [ ] NEEDLE GUIDANCE PERICARDIOCENTESIS  [ ] OTHER    FINDINGS: BIlateral diffuse B lines. Bilateral small pleural effusions, simple. Bilateral posterior consolidation pattern L>R. LV appears hyperdynamic.      INTERPRETATION: Bilateral pleural effusion and pneumonia. ARDS. Hyperdynamic LV.    Images stored on [employer].    Chris Read MD  Pulmonary & Critical Care Fellow  [phone] : Jacek    INDICATION: ARDS    PROCEDURE:  [X] LIMITED ECHO  [X] LIMITED CHEST  [ ] LIMITED RETROPERITONEAL  [ ] LIMITED ABDOMINAL  [ ] LIMITED DVT  [ ] NEEDLE GUIDANCE VASCULAR  [ ] NEEDLE GUIDANCE THORACENTESIS  [ ] NEEDLE GUIDANCE PARACENTESIS  [ ] NEEDLE GUIDANCE PERICARDIOCENTESIS  [ ] OTHER    FINDINGS: BIlateral diffuse B lines. Bilateral small pleural effusions, simple. Bilateral posterior consolidation pattern L>R. LV appears hyperdynamic.      INTERPRETATION: Bilateral pleural effusion and pneumonia. ARDS. Hyperdynamic LV.    Images stored on [employer].    Chris Read MD  Pulmonary & Critical Care Fellow  [phone]       I have supervised and participated with above procedure. I was present for entire procedure.    Caleb Mast MD

## 2020-05-11 NOTE — PROGRESS NOTE ADULT - ASSESSMENT
65F reporting no PMH who presented last night with complaint of fever/chills (home T-max 102 F), night sweats, cough, myalgias (mid & lower back), loss of taste and smell since Friday 4/24 (still tastes sweet/salty), as well as 2 days of weakness and constipation, found to be COVID positive on 4/28/20.    Pt has agreed to participate in a clinical trial for Covid -19  Please discuss with ID team if planning on taking concurrent treatment with other agents with actual or possible direct acting antiviral activity against SARS-CoV-2 such a chloroquine or hydroxychloroquine. Pt was given Tocilizumab by primary team on the day of enrollment as of concern for cytokine storm.     Hospital course  4/29: Started Ceftriaxone (4/29-5/3) and Azithromycin (4/29-5/4) in ED for possible bacterial PNA  4/30: RRT called for desaturation in 70s before receiving 1st dose of Remdesivir and Tocilizumab  5/4: RRT called for desaturation to 20s after drinking water and coughing, intubated by anesthesia. Sedated and started full dose lovenox after D-dimer elevated to [address]. Transferred to ICU. On Meropenem, Vancomycin, and Caspofungin for emperic sepsis coverage.  5/7: Caspofungin switched to Fluconazole for growth of candida albicans in urine; Vancomycin d/c'ed for negative MRSA swab  5/10: Started CPAP trials    # Sepsis   - WBC elevated but improving, respiratory failure. Fever and tachycardia has improved with cooling blanket. Suspected source include pneumonia (viral, bacterial, fungal, or aspiration) vs bacteremia vs urosepsis vs myocarditis vs microthrombi with the elevated D-Dimer  - Currently on Meropenem (5/4-) and Fluconazole (5/8-).  Day # 7 of planned 7 days of abx, will discuss with ICU team possibility of monitoring off abx. Pt did  receive Tocilizumab which increases risk of infection, f/u repeat BCx from this AM    - s/p Tocilizumab (4/30), Ceftriaxone (4/29-5/3), Azithromycin (4/29-5/4), Vancomycin (5/3-5/7), and Caspofungin (5/4-5/7).  - Vancomycin d/c'ed on 5/7 as MRSA swab neg  - Caspofungin was switched to Fluconazole 5/7 as Urine Cx grew presumable Candida albicans; Urinanalysis positive for 6-10 WBC, bacteria, and nitrite- day #4  - Bcx negative 4/29, 5/3, 5/5 NGTD. Fungitell negative. Sputum cx showed normal respiratory susanne.   - F/u Fungitell and blood cultures  - Trops negative, BNP elevated. Can consider repeat cardiac  echo to assess cardiac function to rule out myocarditis and/or heart failure (POCUS on 5/4 documented borderline LV hyperdynamic systolic function and possible LV apical hypocontractility)  - D-dimer remains elevated, could suggest PE or microthrombi. Pt now on heparin drip. Continued AC per primary team.   DAY # 7 of meropenem- to complete today and then will see how does and follow repeat cultures    # Acute hypoxic respiratory failure  - Multifactorial, likely secondary to COVID-19 infection with viral PNA, with possible superimposed bacterial vs fungal vs aspiration PNA and/or PE   - COVID+ 4/28 w/ CXR demonstrating infiltrates, now diffuse patchy opacities (5/3)    - Intubated 5/4 for worsened hypoxic respiratory failure, requiring proning. Now tolerating CPAP trial   - s/p 10 day course of remdesivir   - Inflammatory markers elevated but now downtrending, please continue to monitor   - S/p tocilizumab 4/30  - Team considering V-V ecmo if respiratory status does not improve     # Elevated LFTs  - Likely due to either COVID 19 vs Ceftriaxone vs Remdesivir vs sepsis .   - Now finished with study drug   - Please continue to monitor LFTs and creatinine daily     # Elevated CRP/Ferritin/D-dimer  - Could be COVID-related. CRP, ferritin, and D-dimer all trending down.   - D-dimer remains elevated, could suggest PE or microthrombi. Pt now on heparin drip. Continued AC per primary team.     #VTE prophylaxis   - As above, pt with elevated d-dimer, please continue to monitor   - Now on heparin drip    - Further AC per primary team     Today is day #11 of study. Ordinal scale score: 2.       Meghan Coe MD  Graduate Physician 65F reporting no PMH who presented last night with complaint of fever/chills (home T-max 102 F), night sweats, cough, myalgias (mid & lower back), loss of taste and smell since Friday 4/24 (still tastes sweet/salty), as well as 2 days of weakness and constipation, found to be COVID positive on 4/28/20.    Pt has agreed to participate in a clinical trial for Covid -19  Please discuss with ID team if planning on taking concurrent treatment with other agents with actual or possible direct acting antiviral activity against SARS-CoV-2 such a chloroquine or hydroxychloroquine. Pt was given Tocilizumab by primary team on the day of enrollment as of concern for cytokine storm.     Hospital course  4/29: Started Ceftriaxone (4/29-5/3) and Azithromycin (4/29-5/4) in ED for possible bacterial PNA  4/30: RRT called for desaturation in 70s before receiving 1st dose of Remdesivir and Tocilizumab  5/4: RRT called for desaturation to 20s after drinking water and coughing, intubated by anesthesia. Sedated and started full dose lovenox after D-dimer elevated to [address]. Transferred to ICU. On Meropenem, Vancomycin, and Caspofungin for emperic sepsis coverage.  5/7: Caspofungin switched to Fluconazole for growth of candida albicans in urine; Vancomycin d/c'ed for negative MRSA swab  5/10: Started CPAP trials    # Sepsis   - WBC had an increase today from yesterday (18->25), still w/ respiratory failure, still spiking fevers (Tmax 101.7). Suspected source include pneumonia (viral, bacterial, fungal, or aspiration) vs bacteremia vs urosepsis vs lines vs myocarditis vs microthrombi with the elevated D-Dimer  - Currently on Meropenem (5/4-) and Fluconazole (5/8-).  Today is day #8 of abx, however given increasing WBC and pt continues to spike fevers, will continue abx and recommend adding Vancomycin. Pt is s/p Tocilizumab which increases risk of infection, f/u repeat BCx    - Send C. diff if pt has loose stool again  - Lung opacities appear worsened than before, would increase suctioning and f/u sputum culture  - s/p Tocilizumab (4/30), Ceftriaxone (4/29-5/3), Azithromycin (4/29-5/4), Vancomycin (5/3-5/7), and Caspofungin (5/4-5/7).  - Caspofungin was switched to Fluconazole 5/7 as Urine Cx grew presumable Candida albicans; Urinanalysis positive for 6-10 WBC, bacteria, and nitrite- day #4  - Bcx negative 4/29, 5/3, 5/5 NGTD. Fungitell negative. Sputum cx showed normal respiratory susanne.   - Trops negative, BNP increased from 378 yesterday to 1537 today. Can consider repeat cardiac  echo to assess cardiac function to rule out myocarditis and/or heart failure (POCUS on 5/4 documented borderline LV hyperdynamic systolic function and possible LV apical hypocontractility)  - L IJ triple lumen and L radial A line in place, placed 5/4  - D-dimer remains elevated, could suggest PE or microthrombi. Pt now on heparin drip. Continued AC per primary team.     # Acute hypoxic respiratory failure  - Multifactorial, likely secondary to COVID-19 infection with viral PNA, with possible superimposed bacterial vs fungal vs aspiration PNA and/or PE   - COVID+ 4/28 w/ CXR demonstrating infiltrates, now diffuse patchy opacities (5/3)    - Intubated 5/4 for worsened hypoxic respiratory failure, requiring proning. Now tolerating CPAP trial   - s/p 10 day course of remdesivir   - Inflammatory markers elevated but now downtrending, please continue to monitor   - S/p tocilizumab 4/30  - Team considering V-V ecmo if respiratory status does not improve     # Elevated LFTs  - Likely due to either COVID 19 vs Ceftriaxone vs Remdesivir vs sepsis .   - Now finished with study drug   - Please continue to monitor LFTs and creatinine daily     # Elevated CRP/Ferritin/D-dimer  - Could be COVID-related. CRP, ferritin, and D-dimer all trending down.   - D-dimer remains elevated, could suggest PE or microthrombi. Pt now on heparin drip. Continued AC per primary team.     # FENGI  - Hypernatremia and hypokalemia, could be secondary to diarrhea or diuresis  - Continue supportive measures and supplementation per primary    #VTE prophylaxis   - As above, pt with elevated d-dimer, please continue to monitor   - Now on heparin drip    - Further AC per primary team     Today is day #12 of study. Ordinal scale score: 2.       Niki Caceres MD. Graduate Physician

## 2020-05-11 NOTE — PROGRESS NOTE ADULT - SUBJECTIVE AND OBJECTIVE BOX
Patient is a 65y old  Female who presents with a chief complaint of COVID-19, Hypoxic respiratory failure (09 May 2020 09:20)    Being followed by ID for COVID, clinical trial       Interval history:  Pt remains intubated, sedated. Afebrile overnight. SaO2 96% on 55% FiO2 and PEEP of 6.   Minimal secretions  had bowel movements yesterday (was documented as liquid on 5/9)  No other acute events      ROS:  Unable to assess     PAST MEDICAL & SURGICAL HISTORY:  No pertinent past medical history  History of cholecystectomy  History of appendectomy    Allergies    No Known Allergies    Intolerances      MEDICATIONS  (STANDING):  BACItracin   Ointment 1 Application(s) Topical two times a day  chlorhexidine 0.12% Liquid 15 milliLiter(s) Oral Mucosa two times a day  chlorhexidine 4% Liquid 1 Application(s) Topical <User Schedule>  famotidine Injectable 20 milliGRAM(s) IV Push daily  fluconAZOLE IVPB 200 milliGRAM(s) IV Intermittent every 24 hours  heparin  Infusion. 900 Unit(s)/Hr (9 mL/Hr) IV Continuous <Continuous>  hydrALAZINE 25 milliGRAM(s) Oral every 8 hours  insulin lispro (HumaLOG) corrective regimen sliding scale   SubCutaneous every 6 hours  ketamine Infusion. 0.25 mG/kG/Hr (1.76 mL/Hr) IV Continuous <Continuous>  meropenem  IVPB 1000 milliGRAM(s) IV Intermittent every 8 hours  niCARdipine Infusion 5 mG/Hr (25 mL/Hr) IV Continuous <Continuous>  polyethylene glycol 3350 17 Gram(s) Oral daily  propofol Infusion 20 MICROgram(s)/kG/Min (8.44 mL/Hr) IV Continuous <Continuous>  senna 2 Tablet(s) Oral at bedtime  sodium chloride 0.9% lock flush 3 milliLiter(s) IV Push every 8 hours  sodium chloride 0.9%. 1000 milliLiter(s) (75 mL/Hr) IV Continuous <Continuous>    MEDICATIONS  (PRN):  acetaminophen    Suspension .. 650 milliGRAM(s) Oral every 6 hours PRN Temp greater or equal to 38C (100.4F)  ALBUTerol    90 MICROgram(s) HFA Inhaler 2 Puff(s) Inhalation every 6 hours PRN Bronchospasm  diphenhydrAMINE   Injectable 50 milliGRAM(s) IV Push once PRN anaphylaxis to study drug  EPINEPHrine     1 mG/mL Injectable 0.3 milliGRAM(s) IntraMuscular once PRN anaphylaxis to study drug  sodium chloride 0.9% lock flush 10 milliLiter(s) IV Push every 1 hour PRN Pre/post blood products, medications, blood draw, and to maintain line patency      ICU Vital Signs Last 24 Hrs  T(C): 37 (11 May 2020 08:00), Max: 38.7 (10 May 2020 17:00)  T(F): 98.6 (11 May 2020 08:00), Max: 101.7 (10 May 2020 17:00)  HR: 91 (11 May 2020 10:00) (50 - 101)  BP: --  BP(mean): --  ABP: 148/61 (11 May 2020 10:00) (103/43 - 228/83)  ABP(mean): 92 (11 May 2020 10:00) (65 - 146)  RR: 37 (11 May 2020 10:00) (21 - 47)  SpO2: 96% (11 May 2020 10:00) (89% - 99%); SaO2 96% on 55% FiO2 and PEEP of 6      Physical Exam:    Constitutional sedated, on vent   HEENT pressure ulcers on b/l cheeks from proning   Chest Good AE, CTA, vented   CVS RRR S1 S2 WNl   Abd soft BS normal   Ext No cyanosis clubbing or edema, 2+ pulses b/l    left triple lumen and A line in place, IV site no erythema tenderness or discharge  CNS sedated     Lab Data:                          10.8   18.02 )-----------( 288      ( 10 May 2020 00:27 )             35.1       05-10    145  |  98  |  34<H>  ----------------------------<  168<H>  4.3   |  40<H>  |  0.44<L>    Ca    8.1<L>      10 May 2020 00:27  Phos  1.7     05-10  Mg     2.5     05-10    TPro  5.6<L>  /  Alb  2.4<L>  /  TBili  0.2  /  DBili  x   /  AST  41<H>  /  ALT  59<H>  /  AlkPhos  331<H>  05-10        .Sputum Sputum  05-08-20   No growth at 48 hours  --    Few polymorphonuclear leukocytes per low power field  Few Squamous epithelial cells per low power field  No organisms seen per oil power field      .Urine Catheterized  05-05-20   10,000 - 49,000 CFU/mL Presumptive Candida albicans "Susceptibilities not  performed"  --  --      .Sputum Sputum  05-05-20   Normal Respiratory Farzaneh present  --    Moderate polymorphonuclear leukocytes per low power field  No squamous epithelial cells per low power field  No organisms seen      .Blood Blood-Peripheral  05-05-20   No growth to date.  --  --      .Blood Blood-Peripheral  05-03-20   No Growth Final  --  --      WBC Count: 18.02 (05-10-20 @ 00:27)  WBC Count: 19.45 (05-09-20 @ 00:25)  WBC Count: 31.35 (05-08-20 @ 00:22)  WBC Count: 35.06 (05-07-20 @ 03:05)  WBC Count: 33.56 (05-06-20 @ 00:15)  WBC Count: 32.69 (05-05-20 @ 07:56)  WBC Count: 34.76 (05-04-20 @ 23:56)  WBC Count: 32.55 (05-04-20 @ 18:11)  WBC Count: 28.79 (05-04-20 @ 05:30)    D-Dimer Assay, Quantitative: 2020 ng/mL DDU (05-10)  D-Dimer Assay, Quantitative: 2662 ng/mL DDU (05-09)  D-Dimer Assay, Quantitative: 3610 ng/mL DDU (05-08)  D-Dimer Assay, Quantitative: 5707 ng/mL DDU (05-07)    Ferritin, Serum: 933 ng/mL (05-10)  Ferritin, Serum: 982 ng/mL (05-09)  Ferritin, Serum: 1460 ng/mL (05-08)  Ferritin, Serum: 1863 ng/mL (05-07)      C-Reactive Protein, Serum: 2.46 ug/mL (05-10-20 @ 00:27)  C-Reactive Protein, Serum: 4.73 ug/mL (05-09-20 @ 00:25)  C-Reactive Protein, Serum: 6.58 ug/mL (05-08-20 @ 00:22)  C-Reactive Protein, Serum: 9.37 mg/dL (05-07-20 @ 03:05)      Ordinal Scale: score : 2  1) Death  2) Hospitalized, on invasive mechanical ventilation or ECMO  3) Hospitalized, on non-invasive ventilation or high flow oxygen devices  4) Hospitalized, requiring low flow(<11l/min) supplemental oxygen  5) Hospitalized, not requiring supplemental oxygen - requiring ongoing medical care(COVID-19 related or otherwise)  6) Hospitalized, not requiring supplemental oxygen - no longer requires ongoing medical care(other than per protocol RDV administration)  7) Not hospitalized Patient is a 65y old  Female who presents with a chief complaint of COVID-19, Hypoxic respiratory failure (09 May 2020 09:20)    Being followed by ID for COVID, clinical trial       Interval history:  Pt remains intubated, sedated. Tmax 101.7 yesterday pm, afebrile this morning. SaO2 96% on 55% FiO2 and PEEP of 6.   Minimal secretions  had bowel movements yesterday (was documented as liquid on 5/9)  No other acute events      ROS:  Unable to assess     PAST MEDICAL & SURGICAL HISTORY:  No pertinent past medical history  History of cholecystectomy  History of appendectomy    Allergies    No Known Allergies    Intolerances      MEDICATIONS  (STANDING):  BACItracin   Ointment 1 Application(s) Topical two times a day  chlorhexidine 0.12% Liquid 15 milliLiter(s) Oral Mucosa two times a day  chlorhexidine 4% Liquid 1 Application(s) Topical <User Schedule>  famotidine Injectable 20 milliGRAM(s) IV Push daily  fluconAZOLE IVPB 200 milliGRAM(s) IV Intermittent every 24 hours  heparin  Infusion. 900 Unit(s)/Hr (9 mL/Hr) IV Continuous <Continuous>  hydrALAZINE 25 milliGRAM(s) Oral every 8 hours  insulin lispro (HumaLOG) corrective regimen sliding scale   SubCutaneous every 6 hours  ketamine Infusion. 0.25 mG/kG/Hr (1.76 mL/Hr) IV Continuous <Continuous>  meropenem  IVPB 1000 milliGRAM(s) IV Intermittent every 8 hours  niCARdipine Infusion 5 mG/Hr (25 mL/Hr) IV Continuous <Continuous>  polyethylene glycol 3350 17 Gram(s) Oral daily  propofol Infusion 20 MICROgram(s)/kG/Min (8.44 mL/Hr) IV Continuous <Continuous>  senna 2 Tablet(s) Oral at bedtime  sodium chloride 0.9% lock flush 3 milliLiter(s) IV Push every 8 hours  sodium chloride 0.9%. 1000 milliLiter(s) (75 mL/Hr) IV Continuous <Continuous>    MEDICATIONS  (PRN):  acetaminophen    Suspension .. 650 milliGRAM(s) Oral every 6 hours PRN Temp greater or equal to 38C (100.4F)  ALBUTerol    90 MICROgram(s) HFA Inhaler 2 Puff(s) Inhalation every 6 hours PRN Bronchospasm  diphenhydrAMINE   Injectable 50 milliGRAM(s) IV Push once PRN anaphylaxis to study drug  EPINEPHrine     1 mG/mL Injectable 0.3 milliGRAM(s) IntraMuscular once PRN anaphylaxis to study drug  sodium chloride 0.9% lock flush 10 milliLiter(s) IV Push every 1 hour PRN Pre/post blood products, medications, blood draw, and to maintain line patency      ICU Vital Signs Last 24 Hrs  T(C): 37 (11 May 2020 08:00), Max: 38.7 (10 May 2020 17:00)  T(F): 98.6 (11 May 2020 08:00), Max: 101.7 (10 May 2020 17:00)  HR: 91 (11 May 2020 10:00) (50 - 101)  BP: --  BP(mean): --  ABP: 148/61 (11 May 2020 10:00) (103/43 - 228/83)  ABP(mean): 92 (11 May 2020 10:00) (65 - 146)  RR: 37 (11 May 2020 10:00) (21 - 47)  SpO2: 96% (11 May 2020 10:00) (89% - 99%); SaO2 96% on 55% FiO2 and PEEP of 6      Physical Exam:    Constitutional sedated, on vent   HEENT pressure ulcers on b/l cheeks from proning   Chest Good AE, CTA, vented   CVS RRR S1 S2 WNl   Abd soft BS normal   Ext No cyanosis clubbing or edema, 2+ pulses b/l    left IJ triple lumen and radial A line in place, IV site no erythema tenderness or discharge  CNS sedated     Lab Data:                           11.4   25.80 )-----------( 322      ( 11 May 2020 00:33 )             37.1                 05-11    150<H>  |  105  |  41<H>  ----------------------------<  238<H>  3.1<L>   |  37<H>  |  0.38<L>    Ca    8.1<L>      11 May 2020 00:33  Phos  3.3     05-11  Mg     2.7     05-11    TPro  5.8<L>  /  Alb  2.5<L>  /  TBili  0.3  /  DBili  x   /  AST  61<H>  /  ALT  52<H>  /  AlkPhos  343<H>  05-11      Culture - Sputum . (05.11.20 @ 00:20)    Gram Stain:   Few polymorphonuclear leukocytes per low power field  No Squamous epithelial cells per low power field  No organisms seen    Specimen Source: .Sputum Sputum    Culture - Blood (05.10.20 @ 04:23)    Specimen Source: .Blood Blood-Catheter    Culture Results:   No growth to date.    Culture - Blood (05.10.20 @ 04:23)    Specimen Source: .Blood Blood-Peripheral    Culture Results:   No growth to date.    Culture - Sputum . (05.08.20 @ 01:04)    Gram Stain:   Few polymorphonuclear leukocytes per low power field  Few Squamous epithelial cells per low power field  No organisms seen per oil power field    Specimen Source: .Sputum Sputum    Culture Results:   No growth at 48 hours      .Urine Catheterized  05-05-20   10,000 - 49,000 CFU/mL Presumptive Candida albicans "Susceptibilities not  performed"  --  --        WBC Count: 25.80 (05.11.20 @ 00:33)  WBC Count: 18.02 (05-10-20 @ 00:27)  WBC Count: 19.45 (05-09-20 @ 00:25)  WBC Count: 31.35 (05-08-20 @ 00:22)  WBC Count: 35.06 (05-07-20 @ 03:05)  WBC Count: 33.56 (05-06-20 @ 00:15)  WBC Count: 32.69 (05-05-20 @ 07:56)  WBC Count: 34.76 (05-04-20 @ 23:56)  WBC Count: 32.55 (05-04-20 @ 18:11)  WBC Count: 28.79 (05-04-20 @ 05:30)    D-Dimer Assay, Quantitative: 2635 ng/mL DDU (05-11)  D-Dimer Assay, Quantitative: 2020 ng/mL DDU (05-10)  D-Dimer Assay, Quantitative: 2662 ng/mL DDU (05-09)  D-Dimer Assay, Quantitative: 3610 ng/mL DDU (05-08)    Ferritin, Serum: 1068 ng/mL (05-11)  Ferritin, Serum: 933 ng/mL (05-10)  Ferritin, Serum: 982 ng/mL (05-09)  Ferritin, Serum: 1460 ng/mL (05-08)    C-Reactive Protein, Serum: 1.35 ug/mL (05-11-20 @ 00:33)  C-Reactive Protein, Serum: 2.46 ug/mL (05-10-20 @ 00:27)  C-Reactive Protein, Serum: 4.73 ug/mL (05-09-20 @ 00:25)  C-Reactive Protein, Serum: 6.58 ug/mL (05-08-20 @ 00:22)      Serum Pro-Brain Natriuretic Peptide: 1537 pg/mL (05.11.20 @ 00:33)  Serum Pro-Brain Natriuretic Peptide: 378 pg/mL (05.10.20 @ 00:27)    Troponin T, High Sensitivity (05.11.20 @ 00:33)    Troponin T, High Sensitivity Result: 12: Rapid upward or downward changes in high-sensitivity troponin levels  suggest acute myocardial injury. Renal impairment may cause sustained  troponin elevations.  Normal: <6 - 14 ng/L  Indeterminate: 15-51 ng/L  Elevated: > 51 ng/L  See http://labs/test/TROPTHS on the [facility] intranet for more  information ng/L    Procalcitonin, Serum (05.11.20 @ 00:33)    Procalcitonin, Serum: 0.08: This assay is intended for use to determine the change of PCT over time  as an aid in assessing the cumulative 28-day risk of all-cause mortality  for patients diagnosed with severe sepsis or septic shock in the ICU, or  when obtained in the emergency department or other medical wards prior to  ICU admission. This assay was performed by the [employer] PCT  assay. ng/mL          Ordinal Scale: score : 2  1) Death  2) Hospitalized, on invasive mechanical ventilation or ECMO  3) Hospitalized, on non-invasive ventilation or high flow oxygen devices  4) Hospitalized, requiring low flow(<11l/min) supplemental oxygen  5) Hospitalized, not requiring supplemental oxygen - requiring ongoing medical care(COVID-19 related or otherwise)  6) Hospitalized, not requiring supplemental oxygen - no longer requires ongoing medical care(other than per protocol RDV administration)  7) Not hospitalized Patient is a 65y old  Female who presents with a chief complaint of COVID-19, Hypoxic respiratory failure (09 May 2020 09:20)    Being followed by ID for COVID, clinical trial       Interval history:  Pt remains intubated, sedated. Tmax 101.7 yesterday pm, afebrile this morning. SaO2 96% on 55% FiO2 and PEEP of 6.   Minimal secretions  had bowel movements yesterday (was documented as liquid on 5/9)  No other acute events      ROS:  Unable to assess     PAST MEDICAL & SURGICAL HISTORY:  No pertinent past medical history  History of cholecystectomy  History of appendectomy    Allergies    No Known Allergies    Intolerances      MEDICATIONS  (STANDING):  BACItracin   Ointment 1 Application(s) Topical two times a day  chlorhexidine 0.12% Liquid 15 milliLiter(s) Oral Mucosa two times a day  chlorhexidine 4% Liquid 1 Application(s) Topical <User Schedule>  famotidine Injectable 20 milliGRAM(s) IV Push daily  fluconAZOLE IVPB 200 milliGRAM(s) IV Intermittent every 24 hours  heparin  Infusion. 900 Unit(s)/Hr (9 mL/Hr) IV Continuous <Continuous>  hydrALAZINE 25 milliGRAM(s) Oral every 8 hours  insulin lispro (HumaLOG) corrective regimen sliding scale   SubCutaneous every 6 hours  ketamine Infusion. 0.25 mG/kG/Hr (1.76 mL/Hr) IV Continuous <Continuous>  meropenem  IVPB 1000 milliGRAM(s) IV Intermittent every 8 hours  niCARdipine Infusion 5 mG/Hr (25 mL/Hr) IV Continuous <Continuous>  polyethylene glycol 3350 17 Gram(s) Oral daily  propofol Infusion 20 MICROgram(s)/kG/Min (8.44 mL/Hr) IV Continuous <Continuous>  senna 2 Tablet(s) Oral at bedtime  sodium chloride 0.9% lock flush 3 milliLiter(s) IV Push every 8 hours  sodium chloride 0.9%. 1000 milliLiter(s) (75 mL/Hr) IV Continuous <Continuous>    MEDICATIONS  (PRN):  acetaminophen    Suspension .. 650 milliGRAM(s) Oral every 6 hours PRN Temp greater or equal to 38C (100.4F)  ALBUTerol    90 MICROgram(s) HFA Inhaler 2 Puff(s) Inhalation every 6 hours PRN Bronchospasm  diphenhydrAMINE   Injectable 50 milliGRAM(s) IV Push once PRN anaphylaxis to study drug  EPINEPHrine     1 mG/mL Injectable 0.3 milliGRAM(s) IntraMuscular once PRN anaphylaxis to study drug  sodium chloride 0.9% lock flush 10 milliLiter(s) IV Push every 1 hour PRN Pre/post blood products, medications, blood draw, and to maintain line patency      ICU Vital Signs Last 24 Hrs  T(C): 37 (11 May 2020 08:00), Max: 38.7 (10 May 2020 17:00)  T(F): 98.6 (11 May 2020 08:00), Max: 101.7 (10 May 2020 17:00)  HR: 91 (11 May 2020 10:00) (50 - 101)  BP: --  BP(mean): --  ABP: 148/61 (11 May 2020 10:00) (103/43 - 228/83)  ABP(mean): 92 (11 May 2020 10:00) (65 - 146)  RR: 37 (11 May 2020 10:00) (21 - 47)  SpO2: 96% (11 May 2020 10:00) (89% - 99%); SaO2 96% on 55% FiO2 and PEEP of 6      Physical Exam:    Constitutional sedated, on vent   HEENT pressure ulcers on b/l cheeks from proning   Chest Good AE, CTA, vented   CVS RRR S1 S2 WNl   Abd soft BS normal   Ext No cyanosis clubbing or edema, 2+ pulses b/l    left IJ triple lumen and radial A line in place, IV site no erythema tenderness or discharge  CNS sedated     Lab Data:                           11.4   25.80 )-----------( 322      ( 11 May 2020 00:33 )             37.1                 05-11    150<H>  |  105  |  41<H>  ----------------------------<  238<H>  3.1<L>   |  37<H>  |  0.38<L>    Ca    8.1<L>      11 May 2020 00:33  Phos  3.3     05-11  Mg     2.7     05-11    TPro  5.8<L>  /  Alb  2.5<L>  /  TBili  0.3  /  DBili  x   /  AST  61<H>  /  ALT  52<H>  /  AlkPhos  343<H>  05-11      Culture - Sputum . (05.11.20 @ 00:20)    Gram Stain:   Few polymorphonuclear leukocytes per low power field  No Squamous epithelial cells per low power field  No organisms seen    Specimen Source: .Sputum Sputum    Culture - Blood (05.10.20 @ 04:23)    Specimen Source: .Blood Blood-Catheter    Culture Results:   No growth to date.    Culture - Blood (05.10.20 @ 04:23)    Specimen Source: .Blood Blood-Peripheral    Culture Results:   No growth to date.    Culture - Sputum . (05.08.20 @ 01:04)    Gram Stain:   Few polymorphonuclear leukocytes per low power field  Few Squamous epithelial cells per low power field  No organisms seen per oil power field    Specimen Source: .Sputum Sputum    Culture Results:   No growth at 48 hours      .Urine Catheterized  05-05-20   10,000 - 49,000 CFU/mL Presumptive Candida albicans "Susceptibilities not  performed"  --  --    Fungitell (05.04.20 @ 05:24)    Fungitell: <31: Interpretation: The Fungitell assay does not detect certain fungal  species such as the genus Cryptococcus (Hira et al. 1991) which  produces very low levels of (1-3)-Beta-D-Glucan. The assay also does  not detect the Zygomycetes such as Absidia, Mucor and Rhizopus  (Hipolito et al. 1994) which are not known to produce  (1-3)-Beta-D-Glucan. In addition, the yeast phase of Blastomyces  dermatitidis produces little (1-3)-Beta-D-Glucan and may not be  detected by the assay (Madalyn et al. 2007).  Reference Range:  Less than 60 pg/mL. Glucan values of less than 60 pg/mL are  interpreted as negative.  Glucan values of 60 to 79 pg/mL are interpreted as indeterminate,  and suggest a possible fungal infection. Additional sampling and  testing of sera is required to interpret the results.  Glucan values of greater than or equal to 80 pg/mL are interpreted  as positive.  Due to the potential for environmental contamination when  transferred to pour-off tubes, which can lead to false positive  results, interpret positive results from samples provided in  pour-off tubes with caution.  Results should be used in conjunction  with clinical findings, and should not form the sole basis for a  diagnosis or treatment decision. The Fungitell test is approved or  cleared for in vitro diagnostic use by the U.[place] Food and Drug  Administration. Modifications to the approved package insert have  been made and the performance characteristics for these  modifications were determined by [facility].  Ifsample result is greater than 500 pg/mL, physician may order a  titer of the sample. Please contact [facility] if you would  like to order a retest of this sample to obtain an actual value.  Samples are held for 1 week after initial testing date.  ____________________________________________________________  Performed at:  [facility]  [address] [place] [id number] [phone]  : Henrietta Daley PhD HCLD(ABB)  CLIA# [id number] pg/mL        WBC Count: 25.80 (05.11.20 @ 00:33)  WBC Count: 18.02 (05-10-20 @ 00:27)  WBC Count: 19.45 (05-09-20 @ 00:25)  WBC Count: 31.35 (05-08-20 @ 00:22)  WBC Count: 35.06 (05-07-20 @ 03:05)  WBC Count: 33.56 (05-06-20 @ 00:15)  WBC Count: 32.69 (05-05-20 @ 07:56)  WBC Count: 34.76 (05-04-20 @ 23:56)  WBC Count: 32.55 (05-04-20 @ 18:11)  WBC Count: 28.79 (05-04-20 @ 05:30)    D-Dimer Assay, Quantitative: 2635 ng/mL DDU (05-11)  D-Dimer Assay, Quantitative: 2020 ng/mL DDU (05-10)  D-Dimer Assay, Quantitative: 2662 ng/mL DDU (05-09)  D-Dimer Assay, Quantitative: 3610 ng/mL DDU (05-08)    Ferritin, Serum: 1068 ng/mL (05-11)  Ferritin, Serum: 933 ng/mL (05-10)  Ferritin, Serum: 982 ng/mL (05-09)  Ferritin, Serum: 1460 ng/mL (05-08)    C-Reactive Protein, Serum: 1.35 ug/mL (05-11-20 @ 00:33)  C-Reactive Protein, Serum: 2.46 ug/mL (05-10-20 @ 00:27)  C-Reactive Protein, Serum: 4.73 ug/mL (05-09-20 @ 00:25)  C-Reactive Protein, Serum: 6.58 ug/mL (05-08-20 @ 00:22)      Serum Pro-Brain Natriuretic Peptide: 1537 pg/mL (05.11.20 @ 00:33)  Serum Pro-Brain Natriuretic Peptide: 378 pg/mL (05.10.20 @ 00:27)    Troponin T, High Sensitivity (05.11.20 @ 00:33)    Troponin T, High Sensitivity Result: 12: Rapid upward or downward changes in high-sensitivity troponin levels  suggest acute myocardial injury. Renal impairment may cause sustained  troponin elevations.  Normal: <6 - 14 ng/L  Indeterminate: 15-51 ng/L  Elevated: > 51 ng/L  See http://labs/test/TROPTHS on the [facility] intranet for more  information ng/L    Procalcitonin, Serum (05.11.20 @ 00:33)    Procalcitonin, Serum: 0.08: This assay is intended for use to determine the change of PCT over time  as an aid in assessing the cumulative 28-day risk of all-cause mortality  for patients diagnosed with severe sepsis or septic shock in the ICU, or  when obtained in the emergency department or other medical wards prior to  ICU admission. This assay was performed by the [employer] TRiQS PCT  assay. ng/mL          Ordinal Scale: score : 2  1) Death  2) Hospitalized, on invasive mechanical ventilation or ECMO  3) Hospitalized, on non-invasive ventilation or high flow oxygen devices  4) Hospitalized, requiring low flow(<11l/min) supplemental oxygen  5) Hospitalized, not requiring supplemental oxygen - requiring ongoing medical care(COVID-19 related or otherwise)  6) Hospitalized, not requiring supplemental oxygen - no longer requires ongoing medical care(other than per protocol RDV administration)  7) Not hospitalized

## 2020-05-12 NOTE — CHART NOTE - NSCHARTNOTEFT_GEN_A_CORE
Nutrition Follow Up Note   Patient seen for: nutrition follow up on COVID ICU     Source: medical record, communication with team. Unable to speak to pt due to current airborne isolation contact precautions related to COVID-19.     Chart reviewed, events noted. Pt remains intubated. Free Water 250cc q 6 h    Diet Order: Diet, NPO with Tube Feed:   Tube Feeding Modality: Orogastric  Vital AF (VITALAFRTH)  Total Volume for 24 Hours (mL): 1320  Continuous  Starting Tube Feed Rate {mL per Hour}: 20  Increase Tube Feed Rate by (mL): 10     Every 4 hours  Until Goal Tube Feed Rate (mL per Hour): 55  Tube Feed Duration (in Hours): 24  Tube Feed Start Time: 14:00 (05-09-20 @ 13:29)    CURRENT EN ORDER PROVIDES: 1584 kcals, 99 gm protein     Nutrition Events:  EN at goal rate over past 2 days, 62% goal met over past 5 days    GI: no vomiting, no abd distention,  had liquid BM x 2 today, sending stool for c.diff    Anthropometric Measurements:   Height (cm): 157.48 (04-28-20 @ 21:02)  Weight (kg): 70.3 (04-28-20 @ 21:02)  BMI (kg/m2): 28.3 (04-28-20 @ 21:02)  no recent wt       Medications: MEDICATIONS  (STANDING):  BACItracin   Ointment 1 Application(s) Topical two times a day  chlorhexidine 0.12% Liquid 15 milliLiter(s) Oral Mucosa two times a day  chlorhexidine 4% Liquid 1 Application(s) Topical <User Schedule>  dexMEDEtomidine Infusion 0.5 MICROgram(s)/kG/Hr (8.79 mL/Hr) IV Continuous <Continuous>  famotidine Injectable 20 milliGRAM(s) IV Push daily  heparin  Infusion. 900 Unit(s)/Hr (9 mL/Hr) IV Continuous <Continuous>  hydrALAZINE 50 milliGRAM(s) Oral every 8 hours  insulin lispro (HumaLOG) corrective regimen sliding scale   SubCutaneous every 6 hours  meropenem  IVPB 1000 milliGRAM(s) IV Intermittent every 8 hours  methadone   Solution 5 milliGRAM(s) Oral every 8 hours  niCARdipine Infusion 5 mG/Hr (25 mL/Hr) IV Continuous <Continuous>  sodium chloride 0.9% lock flush 3 milliLiter(s) IV Push every 8 hours  vancomycin  IVPB 1000 milliGRAM(s) IV Intermittent every 12 hours    MEDICATIONS  (PRN):  acetaminophen    Suspension .. 650 milliGRAM(s) Oral every 6 hours PRN Temp greater or equal to 38C (100.4F)  ALBUTerol    90 MICROgram(s) HFA Inhaler 2 Puff(s) Inhalation every 6 hours PRN Bronchospasm  diphenhydrAMINE   Injectable 50 milliGRAM(s) IV Push once PRN anaphylaxis to study drug  EPINEPHrine     1 mG/mL Injectable 0.3 milliGRAM(s) IntraMuscular once PRN anaphylaxis to study drug  sodium chloride 0.9% lock flush 10 milliLiter(s) IV Push every 1 hour PRN Pre/post blood products, medications, blood draw, and to maintain line patency    Labs: 05-12 @ 05:57: Sodium 144, Potassium 3.8, Calcium 8.1<L>, Magnesium --, Phosphorus --, BUN 30<H>, Creatinine <0.30<L>, Glucose 149<H>, Alk Phos --, ALT/SGPT --, AST/SGOT --, Albumin --, Prealbumin --, Total Bilirubin --, Hemoglobin --, Hematocrit --, Ferritin --, C-Reactive Protein --, Creatine Kinase <<27>  05-12 @ 02:07: Sodium --, Potassium --, Calcium --, Magnesium --, Phosphorus --, BUN --, Creatinine --, Glucose --, Alk Phos --, ALT/SGPT --, AST/SGOT --, Albumin --, Prealbumin --, Total Bilirubin --, Hemoglobin --, Hematocrit --, Ferritin 1093<H>, C-Reactive Protein --, Creatine Kinase <<27>  05-12 @ 00:11: Sodium 146<H>, Potassium 3.7, Calcium 8.4, Magnesium 2.6, Phosphorus 1.6<L>, BUN 33<H>, Creatinine 0.31<L>, Glucose 214<H>, Alk Phos 294<H>, ALT/SGPT 49<H>, AST/SGOT 70<H>, Albumin 2.7<L>, Prealbumin --, Total Bilirubin 0.4, Hemoglobin 11.1<L>, Hematocrit 35.6, Ferritin --, C-Reactive Protein 0.76<H>, Creatine Kinase <<27>  05-11 @ 16:42: Sodium 150<H>, Potassium 3.8, Calcium 7.9<L>, Magnesium --, Phosphorus --, BUN 35<H>, Creatinine 0.33<L>, Glucose 167<H>, Alk Phos --, ALT/SGPT --, AST/SGOT --, Albumin --, Prealbumin --, Total Bilirubin --, Hemoglobin --, Hematocrit --, Ferritin --, C-Reactive Protein --, Creatine Kinase <<27>      Triglycerides, Serum: 210 mg/dL (05-12-20 @ 00:11)  Triglycerides, Serum: 183 mg/dL (05-11-20 @ 00:33)  Triglycerides, Serum: 139 mg/dL (05-10-20 @ 00:27)  Triglycerides, Serum: 106 mg/dL (05-09-20 @ 00:25)  Triglycerides, Serum: 121 mg/dL (05-08-20 @ 00:22)  Triglycerides, Serum: 89 mg/dL (05-07-20 @ 03:05)  Triglycerides, Serum: 532 mg/dL (05-06-20 @ 00:15)  Triglycerides, Serum: 179 mg/dL (05-04-20 @ 23:56)    POCT Blood Glucose.: 137 mg/dL (05-12-20 @ 12:44)    Skin: no pressure injuries documented   Edema: 2+ head, generalized, dependent     Estimated Needs: based on dosing wt 70.3 kg  Energy: 7809-9652 kcals (20-25 kcals/kg)  Protein:  84-98 gm (1.2-1.4 gm/kg)    Previous Nutrition Diagnosis: inadequate protein, energy intake  Nutrition Diagnosis is: ongoing, addressed w/ EN    New Nutrition Diagnosis:  moderate acute malnutrition related to suboptimal EN intake as evidenced by <75% nutrition needs met > 7 days, 2+ edema    Recommended Interventions:   1. Continue Vital AF (1.2) goal 55 cc/hr x 24 hrs to provide 1584 kcals, 99 gm protein, 1070cc free water meets 22 kcals/kg, 1.4 gm protein/kg dosing wt 70.3 kg    Monitoring and Evaluation:   Continue to monitor nutrition provision and tolerance, weights, labs, skin integrity.   RD remains available upon request and will follow up per protocol.

## 2020-05-12 NOTE — PROGRESS NOTE ADULT - SUBJECTIVE AND OBJECTIVE BOX
INTERVAL HPI/OVERNIGHT EVENTS:    SUBJECTIVE: Patient seen and examined at bedside.     CONSTITUTIONAL: No weakness, fevers or chills  EYES/ENT: No visual changes;  No vertigo or throat pain   NECK: No pain or stiffness  RESPIRATORY: No cough, wheezing, hemoptysis; No shortness of breath  CARDIOVASCULAR: No chest pain or palpitations  GASTROINTESTINAL: No abdominal or epigastric pain. No nausea, vomiting, or hematemesis; No diarrhea or constipation. No melena or hematochezia.  GENITOURINARY: No dysuria, frequency or hematuria  NEUROLOGICAL: No numbness or weakness  SKIN: No itching, rashes    OBJECTIVE:    VITAL SIGNS:  ICU Vital Signs Last 24 Hrs  T(C): 36.9 (12 May 2020 11:00), Max: 38.4 (11 May 2020 19:00)  T(F): 98.4 (12 May 2020 11:00), Max: 101.1 (11 May 2020 19:00)  HR: 98 (12 May 2020 13:00) (55 - 109)  BP: --  BP(mean): --  ABP: 183/78 (12 May 2020 13:00) (103/50 - 193/76)  ABP(mean): 126 (12 May 2020 13:00) (64 - 131)  RR: 30 (12 May 2020 13:00) (20 - 40)  SpO2: 94% (12 May 2020 13:00) (91% - 99%)    Mode: AC/ CMV (Assist Control/ Continuous Mandatory Ventilation), RR (machine): 28, TV (machine): 400, FiO2: 60, PEEP: 5, ITime: 0.9, MAP: 14, PIP: 32     @ 07: @ 07:00  --------------------------------------------------------  IN: 5727 mL / OUT: 2665 mL / NET: 3062 mL     @ 07: @ 14:25  --------------------------------------------------------  IN: 1793 mL / OUT: 625 mL / NET: 1168 mL      CAPILLARY BLOOD GLUCOSE      POCT Blood Glucose.: 137 mg/dL (12 May 2020 12:44)      PHYSICAL EXAM:    General: NAD  HEENT: NC/AT; PERRL, clear conjunctiva  Neck: supple  Respiratory: CTA b/l  Cardiovascular: +S1/S2; RRR  Abdomen: soft, NT/ND; +BS x4  Extremities: WWP, 2+ peripheral pulses b/l; no LE edema  Skin: normal color and turgor; no rash  Neurological:    MEDICATIONS:  MEDICATIONS  (STANDING):  BACItracin   Ointment 1 Application(s) Topical two times a day  chlorhexidine 0.12% Liquid 15 milliLiter(s) Oral Mucosa two times a day  chlorhexidine 4% Liquid 1 Application(s) Topical <User Schedule>  dexMEDEtomidine Infusion 0.5 MICROgram(s)/kG/Hr (8.79 mL/Hr) IV Continuous <Continuous>  famotidine Injectable 20 milliGRAM(s) IV Push daily  heparin  Infusion. 900 Unit(s)/Hr (9 mL/Hr) IV Continuous <Continuous>  hydrALAZINE 50 milliGRAM(s) Oral every 8 hours  insulin lispro (HumaLOG) corrective regimen sliding scale   SubCutaneous every 6 hours  meropenem  IVPB 1000 milliGRAM(s) IV Intermittent every 8 hours  methadone   Solution 5 milliGRAM(s) Oral every 8 hours  niCARdipine Infusion 5 mG/Hr (25 mL/Hr) IV Continuous <Continuous>  sodium chloride 0.9% lock flush 3 milliLiter(s) IV Push every 8 hours  vancomycin  IVPB 1000 milliGRAM(s) IV Intermittent every 12 hours    MEDICATIONS  (PRN):  acetaminophen    Suspension .. 650 milliGRAM(s) Oral every 6 hours PRN Temp greater or equal to 38C (100.4F)  ALBUTerol    90 MICROgram(s) HFA Inhaler 2 Puff(s) Inhalation every 6 hours PRN Bronchospasm  diphenhydrAMINE   Injectable 50 milliGRAM(s) IV Push once PRN anaphylaxis to study drug  EPINEPHrine     1 mG/mL Injectable 0.3 milliGRAM(s) IntraMuscular once PRN anaphylaxis to study drug  sodium chloride 0.9% lock flush 10 milliLiter(s) IV Push every 1 hour PRN Pre/post blood products, medications, blood draw, and to maintain line patency      ALLERGIES:  Allergies    No Known Allergies    Intolerances        LABS:                        11.1   25.65 )-----------( 301      ( 12 May 2020 00:11 )             35.6         144  |  103  |  30<H>  ----------------------------<  149<H>  3.8   |  29  |  <0.30<L>    Ca    8.1<L>      12 May 2020 05:57  Phos  1.6       Mg     2.6         TPro  5.6<L>  /  Alb  2.7<L>  /  TBili  0.4  /  DBili  x   /  AST  70<H>  /  ALT  49<H>  /  AlkPhos  294<H>      PT/INR - ( 11 May 2020 00:33 )   PT: 12.7 sec;   INR: 1.10 ratio         PTT - ( 12 May 2020 00:11 )  PTT:63.7 sec  Urinalysis Basic - ( 11 May 2020 14:27 )    Color: Light Orange / Appearance: Slightly Turbid / S.022 / pH: x  Gluc: x / Ketone: Negative  / Bili: Negative / Urobili: Negative   Blood: x / Protein: 30 mg/dL / Nitrite: Negative   Leuk Esterase: Negative / RBC: 437 /hpf / WBC 8 /HPF   Sq Epi: x / Non Sq Epi: 4 /hpf / Bacteria: Negative        RADIOLOGY & ADDITIONAL TESTS: Reviewed. INTERVAL HPI/OVERNIGHT EVENTS: Received fentanyl 1VP for tachycardia and hypertension, PS ventilation 12/5 x 2.5 hours .     SUBJECTIVE: sedated and intubated  OBJECTIVE:    VITAL SIGNS:  ICU Vital Signs Last 24 Hrs  T(C): 36.9 (12 May 2020 11:00), Max: 38.4 (11 May 2020 19:00)  T(F): 98.4 (12 May 2020 11:00), Max: 101.1 (11 May 2020 19:00)  HR: 98 (12 May 2020 13:00) (55 - 109)  BP: --  BP(mean): --  ABP: 183/78 (12 May 2020 13:00) (103/50 - 193/76)  ABP(mean): 126 (12 May 2020 13:00) (64 - 131)  RR: 30 (12 May 2020 13:00) (20 - 40)  SpO2: 94% (12 May 2020 13:00) (91% - 99%)    Mode: AC/ CMV (Assist Control/ Continuous Mandatory Ventilation), RR (machine): 28, TV (machine): 400, FiO2: 60, PEEP: 5, ITime: 0.9, MAP: 14, PIP: 32    05-11 @ 07:  -   @ 07:00  --------------------------------------------------------  IN: 5727 mL / OUT: 2665 mL / NET: 3062 mL     @ 07: @ 14:25  --------------------------------------------------------  IN: 1793 mL / OUT: 625 mL / NET: 1168 mL      CAPILLARY BLOOD GLUCOSE      POCT Blood Glucose.: 137 mg/dL (12 May 2020 12:44)      PHYSICAL EXAM:    General: NAD  HEENT: NC/AT; PERRL, clear conjunctiva  Neck: supple  Respiratory: CTA b/l  Cardiovascular: +S1/S2; RRR  Abdomen: soft, NT/ND; +BS x4  Extremities: , 2+ peripheral pulses b/l; + anasarca  Skin: normal color and turgor; no rash  Neurological: sedated    MEDICATIONS:  MEDICATIONS  (STANDING):  BACItracin   Ointment 1 Application(s) Topical two times a day  chlorhexidine 0.12% Liquid 15 milliLiter(s) Oral Mucosa two times a day  chlorhexidine 4% Liquid 1 Application(s) Topical <User Schedule>  dexMEDEtomidine Infusion 0.5 MICROgram(s)/kG/Hr (8.79 mL/Hr) IV Continuous <Continuous>  famotidine Injectable 20 milliGRAM(s) IV Push daily  heparin  Infusion. 900 Unit(s)/Hr (9 mL/Hr) IV Continuous <Continuous>  hydrALAZINE 50 milliGRAM(s) Oral every 8 hours  insulin lispro (HumaLOG) corrective regimen sliding scale   SubCutaneous every 6 hours  meropenem  IVPB 1000 milliGRAM(s) IV Intermittent every 8 hours  methadone   Solution 5 milliGRAM(s) Oral every 8 hours  niCARdipine Infusion 5 mG/Hr (25 mL/Hr) IV Continuous <Continuous>  sodium chloride 0.9% lock flush 3 milliLiter(s) IV Push every 8 hours  vancomycin  IVPB 1000 milliGRAM(s) IV Intermittent every 12 hours    MEDICATIONS  (PRN):  acetaminophen    Suspension .. 650 milliGRAM(s) Oral every 6 hours PRN Temp greater or equal to 38C (100.4F)  ALBUTerol    90 MICROgram(s) HFA Inhaler 2 Puff(s) Inhalation every 6 hours PRN Bronchospasm  diphenhydrAMINE   Injectable 50 milliGRAM(s) IV Push once PRN anaphylaxis to study drug  EPINEPHrine     1 mG/mL Injectable 0.3 milliGRAM(s) IntraMuscular once PRN anaphylaxis to study drug  sodium chloride 0.9% lock flush 10 milliLiter(s) IV Push every 1 hour PRN Pre/post blood products, medications, blood draw, and to maintain line patency      ALLERGIES:  Allergies    No Known Allergies    Intolerances        LABS:                        11.1   25.65 )-----------( 301      ( 12 May 2020 00:11 )             35.6     05-12    144  |  103  |  30<H>  ----------------------------<  149<H>  3.8   |  29  |  <0.30<L>    Ca    8.1<L>      12 May 2020 05:57  Phos  1.6       Mg     2.6         TPro  5.6<L>  /  Alb  2.7<L>  /  TBili  0.4  /  DBili  x   /  AST  70<H>  /  ALT  49<H>  /  AlkPhos  294<H>      PT/INR - ( 11 May 2020 00:33 )   PT: 12.7 sec;   INR: 1.10 ratio         PTT - ( 12 May 2020 00:11 )  PTT:63.7 sec  Urinalysis Basic - ( 11 May 2020 14:27 )    Color: Light Orange / Appearance: Slightly Turbid / S.022 / pH: x  Gluc: x / Ketone: Negative  / Bili: Negative / Urobili: Negative   Blood: x / Protein: 30 mg/dL / Nitrite: Negative   Leuk Esterase: Negative / RBC: 437 /hpf / WBC 8 /HPF   Sq Epi: x / Non Sq Epi: 4 /hpf / Bacteria: Negative        RADIOLOGY & ADDITIONAL TESTS: Reviewed.

## 2020-05-12 NOTE — CHART NOTE - NSCHARTNOTEFT_GEN_A_CORE
Upon Nutritional Assessment by the Registered Dietitian your patient was determined to meet criteria / has evidence of the following diagnosis/diagnoses:          [ ]  Mild Protein Calorie Malnutrition        [x ]  Moderate Protein Calorie Malnutrition        [ ] Severe Protein Calorie Malnutrition        [ ] Unspecified Protein Calorie Malnutrition        [ ] Underweight / BMI <19        [ ] Morbid Obesity / BMI > 40      Findings as based on:  [x ] Comprehensive nutrition assessment   [ ] Nutrition Focused Physical Exam  [ ] Other:       Nutrition Plan/Recommendations:  continue tube feedings Vital AF 55cc/hr x 24 hrs        PROVIDER Section:     By signing this assessment you are acknowledging and agree with the diagnosis/diagnoses assigned by the Registered Dietitian    Comments:

## 2020-05-12 NOTE — PROGRESS NOTE ADULT - SUBJECTIVE AND OBJECTIVE BOX
Patient is a 65y old  Female who presents with a chief complaint of COVID-19, Hypoxic respiratory failure (11 May 2020 12:14)    Being followed by ID for COVID      Interval history:  Febrile overnight, Tmax 100.9. Pt sedated, on vent. Opens eyes to verbal stimuli. SaO2 99% on vent.   No other acute events      ROS:  Unable to assess    PAST MEDICAL & SURGICAL HISTORY:  No pertinent past medical history  History of cholecystectomy  History of appendectomy    Allergies    No Known Allergies    Intolerances      Antimicrobials:    fluconAZOLE IVPB 200 milliGRAM(s) IV Intermittent every 24 hours  meropenem  IVPB 1000 milliGRAM(s) IV Intermittent every 8 hours  vancomycin  IVPB 1000 milliGRAM(s) IV Intermittent every 12 hours    MEDICATIONS  (STANDING):  BACItracin   Ointment 1 Application(s) Topical two times a day  chlorhexidine 0.12% Liquid 15 milliLiter(s) Oral Mucosa two times a day  chlorhexidine 4% Liquid 1 Application(s) Topical <User Schedule>  dexMEDEtomidine Infusion 0.5 MICROgram(s)/kG/Hr (8.79 mL/Hr) IV Continuous <Continuous>  famotidine Injectable 20 milliGRAM(s) IV Push daily  fluconAZOLE IVPB 200 milliGRAM(s) IV Intermittent every 24 hours  heparin  Infusion. 900 Unit(s)/Hr (9 mL/Hr) IV Continuous <Continuous>  hydrALAZINE 50 milliGRAM(s) Oral every 8 hours  insulin lispro (HumaLOG) corrective regimen sliding scale   SubCutaneous every 6 hours  meropenem  IVPB 1000 milliGRAM(s) IV Intermittent every 8 hours  methadone   Solution 5 milliGRAM(s) Oral every 8 hours  niCARdipine Infusion 5 mG/Hr (25 mL/Hr) IV Continuous <Continuous>  sodium chloride 0.9% lock flush 3 milliLiter(s) IV Push every 8 hours  vancomycin  IVPB 1000 milliGRAM(s) IV Intermittent every 12 hours    MEDICATIONS  (PRN):  acetaminophen    Suspension .. 650 milliGRAM(s) Oral every 6 hours PRN Temp greater or equal to 38C (100.4F)  ALBUTerol    90 MICROgram(s) HFA Inhaler 2 Puff(s) Inhalation every 6 hours PRN Bronchospasm  diphenhydrAMINE   Injectable 50 milliGRAM(s) IV Push once PRN anaphylaxis to study drug  EPINEPHrine     1 mG/mL Injectable 0.3 milliGRAM(s) IntraMuscular once PRN anaphylaxis to study drug  sodium chloride 0.9% lock flush 10 milliLiter(s) IV Push every 1 hour PRN Pre/post blood products, medications, blood draw, and to maintain line patency      Vital Signs Last 24 Hrs  T(C): 36.8 (20 @ 07:00), Max: 38.4 (20 @ 19:00)  T(F): 98.2 (20 @ 07:00), Max: 101.1 (20 @ 19:00)  HR: 60 (20 @ 10:35) (55 - 109)  BP: --  BP(mean): --  RR: 28 (20 @ 10:00) (20 - 40)  SpO2: 96% (20 @ 10:35) (91% - 99%)    Physical Exam:  Constitutional sedated, on vent   Chest Good AE,CTA  CVS RRR S1 S2 WNl  Abd soft   Ext No cyanosis clubbing or edema. Hands cool but good radial pulses b/l   IV site no erythema tenderness or discharge  CNS sedated, opens eyes to verbal stimuli     Lab Data:                             25.65 )-----------( 301      ( 12 May 2020 00:11 )             35.6           144  |  103  |  30<H>  ----------------------------<  149<H>  3.8   |  29  |  <0.30<L>    Ca    8.1<L>      12 May 2020 05:57  Phos  1.6       Mg     2.6         TPro  5.6<L>  /  Alb  2.7<L>  /  TBili  0.4  /  DBili  x   /  AST  70<H>  /  ALT  49<H>  /  AlkPhos  294<H>        Urinalysis Basic - ( 11 May 2020 14:27 )    Color: Light Orange / Appearance: Slightly Turbid / S.022 / pH: x  Gluc: x / Ketone: Negative  / Bili: Negative / Urobili: Negative   Blood: x / Protein: 30 mg/dL / Nitrite: Negative   Leuk Esterase: Negative / RBC: 437 /hpf / WBC 8 /HPF   Sq Epi: x / Non Sq Epi: 4 /hpf / Bacteria: Negative        .Sputum Sputum  20   No growth to date.  --    Few polymorphonuclear leukocytes per low power field  No Squamous epithelial cells per low power field  No organisms seen      .Blood Blood-Peripheral  05-10-20   No growth to date.  --  --      .Sputum Sputum  20   No growth at 48 hours  --    Few polymorphonuclear leukocytes per low power field  Few Squamous epithelial cells per low power field  No organisms seen per oil power field      .Urine Catheterized  20   10,000 - 49,000 CFU/mL Presumptive Candida albicans "Susceptibilities not  performed"  --  --      .Sputum Sputum  20   Normal Respiratory Farzaneh present  --    Moderate polymorphonuclear leukocytes per low power field  No squamous epithelial cells per low power field  No organisms seen      .Blood Blood-Peripheral  20   No Growth Final  --  --        WBC Count: 25.65 (20 @ 00:11)  WBC Count: 25.80 (20 @ 00:33)  WBC Count: 18.02 (05-10-20 @ 00:27)  WBC Count: 19.45 (20 @ 00:25)  WBC Count: 31.35 (20 @ 00:22)  WBC Count: 35.06 (20 @ 03:05)  WBC Count: 33.56 (20 @ 00:15)    D-Dimer Assay, Quantitative: 2865 ng/mL DDU ()  D-Dimer Assay, Quantitative: 2635 ng/mL DDU ()  D-Dimer Assay, Quantitative: 2020 ng/mL DDU (05-10)  D-Dimer Assay, Quantitative: 2662 ng/mL DDU ()    Ferritin, Serum: 1093 ng/mL ()  Ferritin, Serum: 1068 ng/mL ()  Ferritin, Serum: 933 ng/mL (05-10)  Ferritin, Serum: 982 ng/mL ()      C-Reactive Protein, Serum: 0.76 mg/dL (20 @ 00:11)  C-Reactive Protein, Serum: 1.35 ug/mL (20 @ 00:33)  C-Reactive Protein, Serum: 2.46 ug/mL (05-10-20 @ 00:27)  C-Reactive Protein, Serum: 4.73 ug/mL (20 @ 00:25)      Ordinal Scale: score : 2  1) Death  2) Hospitalized, on invasive mechanical ventilation or ECMO  3) Hospitalized, on non-invasive ventilation or high flow oxygen devices  4) Hospitalized, requiring low flow(<11l/min) supplemental oxygen  5) Hospitalized, not requiring supplemental oxygen - requiring ongoing medical care(COVID-19 related or otherwise)  6) Hospitalized, not requiring supplemental oxygen - no longer requires ongoing medical care(other than per protocol RDV administration)  7) Not hospitalized Patient is a 65y old  Female who presents with a chief complaint of COVID-19, Hypoxic respiratory failure (11 May 2020 12:14)    Being followed by ID for COVID      Interval history:  Febrile overnight, Tmax 100.9. Pt sedated, on vent. Opens eyes to verbal stimuli. SaO2 99% on vent.   pt with watery diarrhea  No other acute events      ROS:  Unable to assess    PAST MEDICAL & SURGICAL HISTORY:  No pertinent past medical history  History of cholecystectomy  History of appendectomy    Allergies    No Known Allergies    Intolerances      Antimicrobials:    fluconAZOLE IVPB 200 milliGRAM(s) IV Intermittent every 24 hours  meropenem  IVPB 1000 milliGRAM(s) IV Intermittent every 8 hours  vancomycin  IVPB 1000 milliGRAM(s) IV Intermittent every 12 hours    MEDICATIONS  (STANDING):  BACItracin   Ointment 1 Application(s) Topical two times a day  chlorhexidine 0.12% Liquid 15 milliLiter(s) Oral Mucosa two times a day  chlorhexidine 4% Liquid 1 Application(s) Topical <User Schedule>  dexMEDEtomidine Infusion 0.5 MICROgram(s)/kG/Hr (8.79 mL/Hr) IV Continuous <Continuous>  famotidine Injectable 20 milliGRAM(s) IV Push daily  fluconAZOLE IVPB 200 milliGRAM(s) IV Intermittent every 24 hours  heparin  Infusion. 900 Unit(s)/Hr (9 mL/Hr) IV Continuous <Continuous>  hydrALAZINE 50 milliGRAM(s) Oral every 8 hours  insulin lispro (HumaLOG) corrective regimen sliding scale   SubCutaneous every 6 hours  meropenem  IVPB 1000 milliGRAM(s) IV Intermittent every 8 hours  methadone   Solution 5 milliGRAM(s) Oral every 8 hours  niCARdipine Infusion 5 mG/Hr (25 mL/Hr) IV Continuous <Continuous>  sodium chloride 0.9% lock flush 3 milliLiter(s) IV Push every 8 hours  vancomycin  IVPB 1000 milliGRAM(s) IV Intermittent every 12 hours    MEDICATIONS  (PRN):  acetaminophen    Suspension .. 650 milliGRAM(s) Oral every 6 hours PRN Temp greater or equal to 38C (100.4F)  ALBUTerol    90 MICROgram(s) HFA Inhaler 2 Puff(s) Inhalation every 6 hours PRN Bronchospasm  diphenhydrAMINE   Injectable 50 milliGRAM(s) IV Push once PRN anaphylaxis to study drug  EPINEPHrine     1 mG/mL Injectable 0.3 milliGRAM(s) IntraMuscular once PRN anaphylaxis to study drug  sodium chloride 0.9% lock flush 10 milliLiter(s) IV Push every 1 hour PRN Pre/post blood products, medications, blood draw, and to maintain line patency      Vital Signs Last 24 Hrs  T(C): 36.8 (20 @ 07:00), Max: 38.4 (20 @ 19:00)  T(F): 98.2 (20 @ 07:00), Max: 101.1 (20 @ 19:00)  HR: 60 (20 @ 10:35) (55 - 109)  BP: --  BP(mean): --  RR: 28 (20 @ 10:00) (20 - 40)  SpO2: 96% (20 @ 10:35) (91% - 99%)    Physical Exam:  Constitutional sedated, on vent   Chest Good AE,CTA  CVS RRR S1 S2 WNl  Abd soft   Ext No cyanosis clubbing or edema. Hands cool but good radial pulses b/l   IV site no erythema tenderness or discharge  CNS sedated, opens eyes to verbal stimuli     Lab Data:                          .   25.65 )-----------( 301      ( 12 May 2020 00:11 )             35.6           144  |  103  |  30<H>  ----------------------------<  149<H>  3.8   |  29  |  <0.30<L>    Ca    8.1<L>      12 May 2020 05:57  Phos  1.6       Mg     2.6         TPro  5.6<L>  /  Alb  2.7<L>  /  TBili  0.4  /  DBili  x   /  AST  70<H>  /  ALT  49<H>  /  AlkPhos  294<H>        Urinalysis Basic - ( 11 May 2020 14:27 )    Color: Light Orange / Appearance: Slightly Turbid / S.022 / pH: x  Gluc: x / Ketone: Negative  / Bili: Negative / Urobili: Negative   Blood: x / Protein: 30 mg/dL / Nitrite: Negative   Leuk Esterase: Negative / RBC: 437 /hpf / WBC 8 /HPF   Sq Epi: x / Non Sq Epi: 4 /hpf / Bacteria: Negative        .Sputum Sputum  20   No growth to date.  --    Few polymorphonuclear leukocytes per low power field  No Squamous epithelial cells per low power field  No organisms seen      .Blood Blood-Peripheral  05-10-20   No growth to date.  --  --      .Sputum Sputum  20   No growth at 48 hours  --    Few polymorphonuclear leukocytes per low power field  Few Squamous epithelial cells per low power field  No organisms seen per oil power field      .Urine Catheterized  20   10,000 - 49,000 CFU/mL Presumptive Candida albicans "Susceptibilities not  performed"  --  --      .Sputum Sputum  20   Normal Respiratory Farzaneh present  --    Moderate polymorphonuclear leukocytes per low power field  No squamous epithelial cells per low power field  No organisms seen      .Blood Blood-Peripheral  20   No Growth Final  --  --        WBC Count: 25.65 (20 @ 00:11)  WBC Count: 25.80 (20 @ 00:33)  WBC Count: 18.02 (05-10-20 @ 00:27)  WBC Count: 19.45 (20 @ 00:25)  WBC Count: 31.35 (20 @ 00:22)  WBC Count: 35.06 (20 @ 03:05)  WBC Count: 33.56 (20 @ 00:15)    D-Dimer Assay, Quantitative: 2865 ng/mL DDU ()  D-Dimer Assay, Quantitative: 2635 ng/mL DDU ()  D-Dimer Assay, Quantitative: 2020 ng/mL DDU (05-10)  D-Dimer Assay, Quantitative: 2662 ng/mL DDU ()    Ferritin, Serum: 1093 ng/mL ()  Ferritin, Serum: 1068 ng/mL ()  Ferritin, Serum: 933 ng/mL (05-10)  Ferritin, Serum: 982 ng/mL ()      C-Reactive Protein, Serum: 0.76 mg/dL (20 @ 00:11)  C-Reactive Protein, Serum: 1.35 ug/mL (20 @ 00:33)  C-Reactive Protein, Serum: 2.46 ug/mL (05-10-20 @ 00:27)  C-Reactive Protein, Serum: 4.73 ug/mL (20 @ 00:25)      Ordinal Scale: score : 2  1) Death  2) Hospitalized, on invasive mechanical ventilation or ECMO  3) Hospitalized, on non-invasive ventilation or high flow oxygen devices  4) Hospitalized, requiring low flow(<11l/min) supplemental oxygen  5) Hospitalized, not requiring supplemental oxygen - requiring ongoing medical care(COVID-19 related or otherwise)  6) Hospitalized, not requiring supplemental oxygen - no longer requires ongoing medical care(other than per protocol RDV administration)  7) Not hospitalized

## 2020-05-12 NOTE — PROGRESS NOTE ADULT - ATTENDING COMMENTS
1. Acute hypoxemic respiratory failure from ARDS, SARS-2 Covid pneumonia. , Pt s/p Tocilizumab. Currently receiving Remdesivir.  More alert today.  Tolerated 10/5 for one hour. Continue current AC vent settings,  after weaning.  2. HTN Cardene drip as needed .Increase hydralazine.  3. ID. Candida UTI. Continue Diflucan.  Elevated wbc. Continue meropenem. Add Vancomycin.

## 2020-05-12 NOTE — PROGRESS NOTE ADULT - ASSESSMENT
65F reporting no PMH who presented last night with complaint of fever/chills (home T-max 102 F), night sweats, cough, myalgias (mid & lower back), loss of taste and smell since Friday 4/24 (still tastes sweet/salty), as well as 2 days of weakness and constipation, found to be COVID positive on 4/28/20.    Pt has agreed to participate in a clinical trial for Covid -19  Please discuss with ID team if planning on taking concurrent treatment with other agents with actual or possible direct acting antiviral activity against SARS-CoV-2 such a chloroquine or hydroxychloroquine. Pt was given Tocilizumab by primary team on the day of enrollment as of concern for cytokine storm.     Hospital course  4/29: Started Ceftriaxone (4/29-5/3) and Azithromycin (4/29-5/4) in ED for possible bacterial PNA  4/30: RRT called for desaturation in 70s before receiving 1st dose of Remdesivir and Tocilizumab  5/4: RRT called for desaturation to 20s after drinking water and coughing, intubated by anesthesia. Sedated and started full dose lovenox after [place]-dimer elevated to [address]. Transferred to ICU. On Meropenem, Vancomycin, and Caspofungin for emperic sepsis coverage.  5/7: Caspofungin switched to Fluconazole for growth of candida albicans in urine; Vancomycin d/c'ed for negative MRSA swab  5/10: Started CPAP trials    # Sepsis   - WBC still elevated, but stable from yesterday, still w/ respiratory failure, still spiking fevers (Tmax 101.1 overnight). Suspected source include pneumonia (viral, bacterial, fungal, or aspiration) vs bacteremia vs urosepsis vs lines vs myocarditis vs microthrombi with the elevated D-Dimer  - Currently on Meropenem (5/4-) and Fluconazole (5/8-).  Today is day #9 of abx, however given elevated WBC and pt continues to spike fevers, will continue abx and recommend adding Vancomycin. Pt is s/p Tocilizumab which increases risk of infection  -Rpt BCx NGTD   - Send C. diff if pt has loose stool again  - Lung opacities appear worsened than before, would increase suctioning, sputum Cx negative so far   - f/u rpt fungitell  - s/p Tocilizumab (4/30), Ceftriaxone (4/29-5/3), Azithromycin (4/29-5/4), Vancomycin (5/3-5/7), and Caspofungin (5/4-5/7).  - Caspofungin was switched to Fluconazole 5/7 as Urine Cx grew presumable Candida albicans; Urinanalysis positive for 6-10 WBC, bacteria, and nitrite- day #5  - Bcx negative 4/29, 5/3, 5/10 NGTD. Fungitell negative. Sputum cx showed normal respiratory susanne.   - Trops negative, BNP increased from 378 yesterday to 1537 today. Can consider repeat cardiac  echo to assess cardiac function to rule out myocarditis and/or heart failure (POCUS on 5/4 documented borderline LV hyperdynamic systolic function and possible LV apical hypocontractility)  - L IJ triple lumen and L radial A line in place, placed 5/4  - D-dimer remains elevated, could suggest PE or microthrombi. Pt now on heparin drip. Continued AC per primary team.     # Acute hypoxic respiratory failure  - Multifactorial, likely secondary to COVID-19 infection with viral PNA, with possible superimposed bacterial vs fungal vs aspiration PNA and/or PE   - COVID+ 4/28 w/ CXR demonstrating infiltrates, now diffuse patchy opacities (5/3)    - Intubated 5/4 for worsened hypoxic respiratory failure, requiring proning. Now tolerating CPAP trial   - s/p 10 day course of remdesivir   - Inflammatory markers elevated but now downtrending, please continue to monitor   - S/p tocilizumab 4/30  - Team considering V-V ecmo if respiratory status does not improve     # Elevated LFTs  - Likely due to either COVID 19 vs Ceftriaxone vs Remdesivir vs sepsis, improved.   - Now finished with study drug   - Please continue to monitor LFTs and creatinine daily     # Elevated CRP/Ferritin/D-dimer  - Could be COVID-related. CRP, ferritin, and D-dimer all trending down.   - D-dimer remains elevated, could suggest PE or microthrombi. Pt now on heparin drip. Continued AC per primary team.     # FENGI  - Hypernatremia and hypokalemia, could be secondary to diarrhea or diuresis  - Continue supportive measures and supplementation per primary    #VTE prophylaxis   - As above, pt with elevated d-dimer, please continue to monitor   - Now on heparin drip    - Further AC per primary team     Today is day #13 of study. Ordinal scale score: 2.       Meghan Coe MD  Graduate Physician 65F reporting no PMH who presented last night with complaint of fever/chills (home T-max 102 F), night sweats, cough, myalgias (mid & lower back), loss of taste and smell since Friday 4/24 (still tastes sweet/salty), as well as 2 days of weakness and constipation, found to be COVID positive on 4/28/20.    Pt has agreed to participate in a clinical trial for Covid -19  Please discuss with ID team if planning on taking concurrent treatment with other agents with actual or possible direct acting antiviral activity against SARS-CoV-2 such a chloroquine or hydroxychloroquine. Pt was given Tocilizumab by primary team on the day of enrollment as of concern for cytokine storm.     Hospital course  4/29: Started Ceftriaxone (4/29-5/3) and Azithromycin (4/29-5/4) in ED for possible bacterial PNA  4/30: RRT called for desaturation in 70s before receiving 1st dose of Remdesivir and Tocilizumab  5/4: RRT called for desaturation to 20s after drinking water and coughing, intubated by anesthesia. Sedated and started full dose lovenox after D-dimer elevated to [id number]. Transferred to ICU. On Meropenem, Vancomycin, and Caspofungin for emperic sepsis coverage.  5/7: Caspofungin switched to Fluconazole for growth of candida albicans in urine; Vancomycin d/c'ed for negative MRSA swab  5/10: Started CPAP trials    # Sepsis   - WBC still elevated, but stable from yesterday, still w/ respiratory failure, still spiking fevers (Tmax 101.1 overnight). Suspected source include pneumonia (viral, bacterial, fungal, or aspiration) vs bacteremia vs urosepsis vs lines vs myocarditis vs microthrombi with the elevated D-Dimer  - Currently on Meropenem (5/4-) and Fluconazole (5/8-).  Today is day #9 of abx, however given elevated WBC and pt continues to spike fevers, will continue abx and recommend adding Vancomycin. Pt is s/p Tocilizumab which increases risk of infection  -Rpt BCx NGTD   - please send c diff  - Lung opacities appear worsened than before, would increase suctioning, sputum Cx negative so far   - f/u rpt fungitell  - s/p Tocilizumab (4/30), Ceftriaxone (4/29-5/3), Azithromycin (4/29-5/4), Vancomycin (5/3-5/7), and Caspofungin (5/4-5/7).  - Caspofungin was switched to Fluconazole 5/7 as Urine Cx grew presumable Candida albicans; Urinanalysis positive for 6-10 WBC, bacteria, and nitrite- day #5  - Bcx negative 4/29, 5/3, 5/10 NGTD. Fungitell negative. Sputum cx showed normal respiratory susanne.   - Trops negative, BNP increased from 378 yesterday to 1537 today. Can consider repeat cardiac  echo to assess cardiac function to rule out myocarditis and/or heart failure (POCUS on 5/4 documented borderline LV hyperdynamic systolic function and possible LV apical hypocontractility)  - L IJ triple lumen and L radial A line in place, placed 5/4  - D-dimer remains elevated, could suggest PE or microthrombi. Pt now on heparin drip. Continued AC per primary team.     # Acute hypoxic respiratory failure  - Multifactorial, likely secondary to COVID-19 infection with viral PNA, with possible superimposed bacterial vs fungal vs aspiration PNA and/or PE   - COVID+ 4/28 w/ CXR demonstrating infiltrates, now diffuse patchy opacities (5/3)    - Intubated 5/4 for worsened hypoxic respiratory failure, requiring proning. Now tolerating CPAP trial   - s/p 10 day course of remdesivir   - Inflammatory markers elevated but now downtrending, please continue to monitor   - S/p tocilizumab 4/30  - Team considering V-V ecmo if respiratory status does not improve     # Elevated LFTs  - Likely due to either COVID 19 vs Ceftriaxone vs Remdesivir vs sepsis, improved.   - Now finished with study drug   - Please continue to monitor LFTs and creatinine daily     # Elevated CRP/Ferritin/D-dimer  - Could be COVID-related. CRP, ferritin, and D-dimer all trending down.   - D-dimer remains elevated, could suggest PE or microthrombi. Pt now on heparin drip. Continued AC per primary team.     # FENGI  - Hypernatremia and hypokalemia, could be secondary to diarrhea or diuresis  - Continue supportive measures and supplementation per primary    #VTE prophylaxis   - As above, pt with elevated d-dimer, please continue to monitor   - Now on heparin drip    - Further AC per primary team     Today is day #13 of study. Ordinal scale score: 2.       Meghan Coe MD  Graduate Physician

## 2020-05-12 NOTE — PROGRESS NOTE ADULT - ASSESSMENT
66 y/o F with no significant PMH presents 4/29 with hypoxic respiratory failure 2nd COVID PNA. s/p Tocilizumab (4/30), s/p empiric Ceftriaxone/Azithro (4/30-5/4) for ?superimposed CAP. Currently enrolled in Remdesivir trial (4/30). Intubated 5/4 for worsened hypoxic respiratory failure. Severe ARDS requiring proning on 5/6, 5/7 and 5/8    #Neuro   - Sedation with precedex as needed  - Methadone 5mg q8 started for withdrawal   - Keep RASS -1    #CV  - Restarted Hydralazine 25mg q8 for HTN   - Cardene gtt as needed for SBP>160mmHg  - Labetalol IVP PRN   - bedside POCUS with hyperdynamic LV function     #Pulm:   - Hypoxic respiratory failure 2nd COVID PNA  - Required proning on 5/6, 5/7, 5/8  - AC 28/400/50/5  - Repeat ABG in 1hr after vent changes   - L>R effusion/atelectasis on POCUS    #GI  - Transaminitis stable  - c/w Vital AF at goal rate  - Last BM 5/11  - Check CDiff given leukocytosis without clear source  - Pepcid 20mg qd     #  - Compensated mixed respiratory acidosis and metabolic alkalosis   - Improved contraction alkalosis with Diamox 500mg x1 yesterday   - Increase Free water 300 q4 for hypernatremia  - Change IVF to 0.45% NS@ 50cc/hr    #ID   - Leukocytosis uptrended overnight, with TMax 38.7  - f/u BC x2 and fungitell, sputum culture prelim negative  - Restart Vanco 1g BID   - Check CDiff  - c/w Meropenem (Day 8)  - Caspofungin/Diflucan (Day 5) for candida in urine, length per ID  - Change tyler and re-send UA, urine culture today     COVID: + on 4/28   - s/p remdesivir trial  (4/30-5/9)   - s/p Tocilizumab on 4/30  - inflammatory markers down trending    #  LINES  - L radial [place] from 5/4  - [facility] TLC from 5/4     #Heme   - Heparin gtt for elevated D-dimer   - Goal aPTT 58-99    #Endo  -  Blood glucose controlled on Humalog  Low SS    #Ethics  -Full code 66 y/o F with no significant PMH presents 4/29 with hypoxic respiratory failure 2nd COVID PNA. s/p Tocilizumab (4/30), s/p empiric Ceftriaxone/Azithro (4/30-5/4) for ?superimposed CAP. Currently enrolled in Remdesivir trial (4/30). Intubated 5/4 for worsened hypoxic respiratory failure. Severe ARDS requiring proning on 5/6, 5/7 and 5/8    #Neuro   - Sedation with precedex 0.5 mcg/kg/hr  - continue  Methadone 5mg q8 for narcotic withdrawal   - Keep RASS -1    #CV  - cont Hydralazine 50 mg q8 for HTN   - Cardene gtt as needed for SBP>160mmHg  - Labetalol IVP PRN   - Trop 13 and     #Pulm:   - Hypoxic respiratory failure 2nd COVID PNA  - Required proning on 5/6, 5/7, 5/8  - AC 28/400/60%/5  - CPAP 10/5 as tolerated    #GI  - Transaminitis stable  - c/w Vital AF at goal rate 55 /hr  - Last BM 5/11  - Check CDiff given leukocytosis without clear source  - Pepcid 20mg qd     #  - Metabolic alkalosis  improving with bicarb 29 today   - hyponatremia resolved; Decrease  Free water 250 q6    #ID   -  still with Leukocytosis with T max 38.4 C, lactate 2 with procal 0.08   - Urine clx , sputum clx 5/11 NTD, Blood clx 5/10 NTD,   - f/u  fungitell,   - STOPPED all abx ( s/p 9 days Meropenem, 6 days Diflucan, 2 days Vancomycin  )   - Check CDiff  - ID Following     COVID: + on 4/28   - s/p remdesivir trial  (4/30-5/9)   - s/p Tocilizumab on 4/30  - inflammatory markers uptrending    #  LINES  - L radial [place] from 5/4  - [facility] TLC from 5/4     #Heme   - Heparin gtt for elevated D-dimer   - Goal aPTT 58-99    #Endo  -  Blood glucose controlled on Humalog  Low SS    #Ethics  -Full code

## 2020-05-13 NOTE — PROGRESS NOTE ADULT - ASSESSMENT
65F reporting no PMH who presented last night with complaint of fever/chills (home T-max 102 F), night sweats, cough, myalgias (mid & lower back), loss of taste and smell since Friday 4/24 (still tastes sweet/salty), as well as 2 days of weakness and constipation, found to be COVID positive on 4/28/20.    Pt has agreed to participate in a clinical trial for Covid -19  Please discuss with ID team if planning on taking concurrent treatment with other agents with actual or possible direct acting antiviral activity against SARS-CoV-2 such a chloroquine or hydroxychloroquine. Pt was given Tocilizumab by primary team on the day of enrollment as of concern for cytokine storm.     Hospital course  4/29: Started Ceftriaxone (4/29-5/3) and Azithromycin (4/29-5/4) in ED for possible bacterial PNA  4/30: RRT called for desaturation in 70s before receiving 1st dose of Remdesivir and Tocilizumab  5/4: RRT called for desaturation to 20s after drinking water and coughing, intubated by anesthesia. Sedated and started full dose lovenox after D-dimer elevated to [id number]. Transferred to ICU. On Meropenem, Vancomycin, and Caspofungin for emperic sepsis coverage.  5/7: Caspofungin switched to Fluconazole for growth of candida albicans in urine; Vancomycin d/c'ed for negative MRSA swab  5/10: Started CPAP trials    # Sepsis   -WBC persistently elevated, now downtrending. Afebrile x24 hours   - Suspected source include pneumonia (viral, bacterial, fungal, or aspiration) vs bacteremia vs urosepsis vs lines vs myocarditis vs microthrombi with the elevated D-Dimer  - S/p full course of meropenem (5/4-5/13), Fluconazole (5/8-5/13), Vancomycin (5/11-5/12), now monitoring off Abx  - Pt is s/p Tocilizumab which increases risk of infection  - Now with multiple negative BCx (most recent 5/10), UCx (most recent 5/11), sputum Cx (5/11), fungitell (5/10)  - C Diff negative 5/12  - s/p Tocilizumab (4/30), Ceftriaxone (4/29-5/3), Azithromycin (4/29-5/4), Vancomycin (5/3-5/7), and Caspofungin (5/4-5/7).  - s/p fluconazole for Urine cx growing c albicans   - L IJ triple lumen and L radial A line in place, placed 5/4, per team plan is to remove lines today        # Acute hypoxic respiratory failure  - Multifactorial, likely secondary to COVID-19 infection with viral PNA, with possible superimposed bacterial vs fungal vs aspiration PNA and/or PE   - COVID+ 4/28 w/ CXR demonstrating infiltrates, now diffuse patchy opacities (5/3)    - Intubated 5/4 for worsened hypoxic respiratory failure, requiring proning. Now tolerating CPAP trial   - s/p 10 day course of remdesivir   - Inflammatory markers elevated but now downtrending, please continue to monitor   - S/p tocilizumab 4/30  - Trops negative, BNP increased from 378 yesterday to 1537 today. Can consider repeat cardiac  echo to assess cardiac function to rule out myocarditis and/or heart failure (POCUS on 5/4 documented borderline LV hyperdynamic systolic function and possible LV apical hypocontractility)    # Elevated LFTs  - Likely due to either COVID 19 vs Ceftriaxone vs Remdesivir vs sepsis, improved.   - Now finished with study drug   - Please continue to monitor LFTs and creatinine daily     # Elevated CRP/Ferritin/D-dimer  - Could be COVID-related. CRP, ferritin, trending down.   - D-dimer remains elevated, although much lower than previous peak, could suggest PE or microthrombi. Pt now on heparin drip. Continued AC per primary team.     # FENGI  - Hypernatremia and hypokalemia, could be secondary to diarrhea or diuresis  - Continue supportive measures and supplementation per primary    #VTE prophylaxis   - As above, pt with elevated d-dimer, please continue to monitor   - Now on heparin drip    - Further AC per primary team     Today is day #14 of study. Ordinal scale score: 2.       Meghan Coe MD  Graduate Physician 65F reporting no PMH who presented last night with complaint of fever/chills (home T-max 102 F), night sweats, cough, myalgias (mid & lower back), loss of taste and smell since Friday 4/24 (still tastes sweet/salty), as well as 2 days of weakness and constipation, found to be COVID positive on 4/28/20.    Pt has agreed to participate in a clinical trial for Covid -19  Please discuss with ID team if planning on taking concurrent treatment with other agents with actual or possible direct acting antiviral activity against SARS-CoV-2 such a chloroquine or hydroxychloroquine. Pt was given Tocilizumab by primary team on the day of enrollment as of concern for cytokine storm.     Hospital course  4/29: Started Ceftriaxone (4/29-5/3) and Azithromycin (4/29-5/4) in ED for possible bacterial PNA  4/30: RRT called for desaturation in 70s before receiving 1st dose of Remdesivir and Tocilizumab  5/4: RRT called for desaturation to 20s after drinking water and coughing, intubated by anesthesia. Sedated and started full dose lovenox after D-dimer elevated to [address]. Transferred to ICU. On Meropenem, Vancomycin, and Caspofungin for emperic sepsis coverage.  5/7: Caspofungin switched to Fluconazole for growth of candida albicans in urine; Vancomycin d/c'ed for negative MRSA swab  5/10: Started CPAP trials    # Sepsis   -WBC persistently elevated, now downtrending. Afebrile x24 hours   - Suspected source include pneumonia (viral, bacterial, fungal, or aspiration) vs bacteremia vs urosepsis vs lines vs myocarditis vs microthrombi with the elevated D-Dimer  - S/p full course of meropenem (5/4-5/13), Fluconazole (5/8-5/13), Vancomycin (5/11-5/12), now monitoring off Abx  - Pt is s/p Tocilizumab which increases risk of infection  - Now with multiple negative BCx (most recent 5/10), UCx (most recent 5/11), sputum Cx (5/11), fungitell (5/10)  - C Diff negative 5/12  - s/p Tocilizumab (4/30), Ceftriaxone (4/29-5/3), Azithromycin (4/29-5/4), Vancomycin (5/3-5/7), and Caspofungin (5/4-5/7).  - s/p fluconazole for Urine cx growing c albicans   - L IJ triple lumen and L radial A line in place, placed 5/4, per team plan is to remove lines today        # Acute hypoxic respiratory failure  - Multifactorial, likely secondary to COVID-19 infection with viral PNA, with possible superimposed bacterial vs fungal vs aspiration PNA and/or PE   - COVID+ 4/28 w/ CXR demonstrating infiltrates, now diffuse patchy opacities (5/3)    - Intubated 5/4 for worsened hypoxic respiratory failure, requiring proning. Now tolerating CPAP trial   - s/p 10 day course of remdesivir   - Inflammatory markers elevated but now downtrending, please continue to monitor   - S/p tocilizumab 4/30  - Trops negative, BNP increased from 378 yesterday to 1537 today. Can consider repeat cardiac  echo to assess cardiac function to rule out myocarditis and/or heart failure (POCUS on 5/4 documented borderline LV hyperdynamic systolic function and possible LV apical hypocontractility)    # Elevated LFTs  - Likely due to either COVID 19 vs Ceftriaxone vs Remdesivir vs sepsis, improved.   - Now finished with study drug   - Please continue to monitor LFTs and creatinine daily     # Elevated CRP/Ferritin/D-dimer  - Could be COVID-related. CRP, ferritin, trending down.   - D-dimer remains elevated, although much lower than previous peak, could suggest PE or microthrombi. Pt now on heparin drip. Continued AC per primary team.     # FENGI  - Hypernatremia and hypokalemia, could be secondary to diarrhea or diuresis  - Continue supportive measures and supplementation per primary    #VTE prophylaxis   - As above, pt with elevated d-dimer, please continue to monitor   - Now on heparin drip    - Further AC per primary team     # hematuria  u/a not suggestive of UTI  will monitor    Today is day #14 of study. Ordinal scale score: 2.       Meghan Coe MD  Graduate Physician

## 2020-05-13 NOTE — PROGRESS NOTE ADULT - SUBJECTIVE AND OBJECTIVE BOX
INTERVAL HPI/OVERNIGHT EVENTS: On PS 12/ peep 5 since 1 AM. Tolerating with good TV. Started Lasix for net positive  fluid balance.      SUBJECTIVE: sedated and intubated  OBJECTIVE:    VITAL SIGNS:  ICU Vital Signs Last 24 Hrs  T(C): 37.4 (13 May 2020 11:00), Max: 37.4 (13 May 2020 00:00)  T(F): 99.3 (13 May 2020 11:00), Max: 99.3 (13 May 2020 00:00)  HR: 82 (13 May 2020 11:00) (73 - 118)  BP: 129/62 (13 May 2020 11:00) (129/62 - 129/62)  BP(mean): 89 (13 May 2020 11:00) (89 - 89)  ABP: 157/64 (13 May 2020 10:00) (109/48 - 183/78)  ABP(mean): 103 (13 May 2020 10:00) (70 - 126)  RR: 28 (13 May 2020 11:00) (24 - 37)  SpO2: 93% (13 May 2020 11:00) (91% - 100%)    Mode: CPAP with PS, FiO2: 55, PEEP: 5, PS: 10, ITime: 1, MAP: 6, PIP: 15    12 @ 07:  -  13 @ 07:00  --------------------------------------------------------  IN: 4286 mL / OUT: 2945 mL / NET: 1341 mL     @ 07:  -  13 @ 12:05  --------------------------------------------------------  IN: 562 mL / OUT: 200 mL / NET: 362 mL      CAPILLARY BLOOD GLUCOSE      POCT Blood Glucose.: 124 mg/dL (13 May 2020 04:56)      PHYSICAL EXAM:    General: NAD  HEENT: NC/AT; PERRL, clear conjunctiva  Neck: supple  Respiratory: CTA b/l  Cardiovascular: +S1/S2; RRR  Abdomen: soft, NT/ND; +BS x4  Extremities: WWP, 1+ peripheral pulses b/l; + anasarca  Skin: normal color and turgor; no rash  Neurological: sedated    MEDICATIONS:  MEDICATIONS  (STANDING):  BACItracin   Ointment 1 Application(s) Topical two times a day  chlorhexidine 0.12% Liquid 15 milliLiter(s) Oral Mucosa two times a day  chlorhexidine 4% Liquid 1 Application(s) Topical <User Schedule>  dexMEDEtomidine Infusion 0.5 MICROgram(s)/kG/Hr (8.79 mL/Hr) IV Continuous <Continuous>  famotidine Injectable 20 milliGRAM(s) IV Push daily  heparin  Infusion. 900 Unit(s)/Hr (9 mL/Hr) IV Continuous <Continuous>  hydrALAZINE 50 milliGRAM(s) Oral every 8 hours  insulin lispro (HumaLOG) corrective regimen sliding scale   SubCutaneous every 6 hours  labetalol 200 milliGRAM(s) Oral two times a day  methadone   Solution 5 milliGRAM(s) Oral every 12 hours  methadone   Solution   Oral   niCARdipine Infusion 5 mG/Hr (25 mL/Hr) IV Continuous <Continuous>  sodium chloride 0.9% lock flush 3 milliLiter(s) IV Push every 8 hours    MEDICATIONS  (PRN):  acetaminophen    Suspension .. 650 milliGRAM(s) Oral every 6 hours PRN Temp greater or equal to 38C (100.4F)  ALBUTerol    90 MICROgram(s) HFA Inhaler 2 Puff(s) Inhalation every 6 hours PRN Bronchospasm  diphenhydrAMINE   Injectable 50 milliGRAM(s) IV Push once PRN anaphylaxis to study drug  EPINEPHrine     1 mG/mL Injectable 0.3 milliGRAM(s) IntraMuscular once PRN anaphylaxis to study drug  sodium chloride 0.9% lock flush 10 milliLiter(s) IV Push every 1 hour PRN Pre/post blood products, medications, blood draw, and to maintain line patency      ALLERGIES:  Allergies    No Known Allergies    Intolerances        LABS:                        11.3   22.01 )-----------( 298      ( 13 May 2020 00:40 )             34.9     05-13    144  |  102  |  28<H>  ----------------------------<  164<H>  3.5   |  31  |  0.32<L>    Ca    8.4      13 May 2020 06:15  Phos  2.3     05-  Mg     2.7         TPro  5.5<L>  /  Alb  2.5<L>  /  TBili  0.5  /  DBili  x   /  AST  56<H>  /  ALT  48<H>  /  AlkPhos  265<H>      PT/INR - ( 13 May 2020 00:40 )   PT: 12.0 sec;   INR: 1.04 ratio         PTT - ( 13 May 2020 00:40 )  PTT:79.1 sec  Urinalysis Basic - ( 12 May 2020 17:30 )    Color: Red / Appearance: Turbid / S.016 / pH: x  Gluc: x / Ketone: Negative  / Bili: Small / Urobili: Negative   Blood: x / Protein: 100 mg/dL / Nitrite: Negative   Leuk Esterase: Trace / RBC: >50 /hpf / WBC 8 /HPF   Sq Epi: x / Non Sq Epi: x / Bacteria: x        RADIOLOGY & ADDITIONAL TESTS: Reviewed.

## 2020-05-13 NOTE — PROGRESS NOTE ADULT - ASSESSMENT
66 y/o F with no significant PMH presents 4/29 with hypoxic respiratory failure 2nd COVID PNA. s/p Tocilizumab (4/30), s/p empiric Ceftriaxone/Azithro (4/30-5/4) for ?superimposed CAP. Currently enrolled in Remdesivir trial (4/30). Intubated 5/4 for worsened hypoxic respiratory failure. Severe ARDS requiring proning on 5/6, 5/7 and 5/8.     #Neuro   - awake, slightly responsive to commands, profoundly weak  - Sedation with precedex 0.5 mcg/kg/hr  - wean Methadone to OFF in 2 days: was started for narcotic  withdrawal   - Keep RASS -1    #CV  -  SBP >160 mm Hg requiring Cardene drip   - Add Labetalol 200 mg bid  and cont Hydralazine 50 mg q8 for HTN and titrate OFF Cardene drip  - Cardene gtt as needed for SBP>160mmHg  - Triglyceride 212  - Trop / CPK downtrending 12/148    #Pulm:   - Hypoxic respiratory failure 2nd COVID PNA  - Required proning on 5/6, 5/7, 5/8  -  Overnight on AC 28/400/55%/5:  - On PS 12/5 55 % Fio2  since 1 AM. Reduced to 10/5 at 9:30 AM with TV >350 and RR <35      #GI  - Transaminitis down trending  - c/w Vital AF at goal rate 55 /hr  - Last BM 5/12 ( large and liquid ).  Cdiff neg> Add banana flakes  - Pepcid 20mg qd     #  - Metabolic alkalosis with bicarb 31 today   - Net positive 1491 cc last 24 hours  - hypernatremia resolved;  STOPPED free water and Lasix    #ID   -  Leukocytosis down trending and remains Afebrile   - Urine clx , sputum clx 5/11 NTD, Blood clx 5/10 NTD, Fungitell 5/10 neg  -  S/p full course of meropenem (5/4-5/13), Fluconazole (5/8-5/13), Vancomycin (5/11-5/12), now monitoring off Abx since 5/12   -  CDiff negative  - ID Following     COVID: + on 4/28   - s/p remdesivir trial  (4/30-5/9)   - s/p Tocilizumab on 4/30  - inflammatory markers down trending except  D dimer    #  LINES  - L radial [place] from 5/4: removed today  - [facility] TLC from 5/4     #Heme   - Heparin gtt for elevated D-dimer : D dimer uptrending   - Goal aPTT 58-99    #Endo  -  Blood glucose controlled on Humalog  Low SS    #Ethics  -Full code    Spoke to Daughter Mary and updated.

## 2020-05-13 NOTE — PROGRESS NOTE ADULT - ATTENDING COMMENTS
1. Acute hypoxemic respiratory failure from ARDS, SARS-2 Covid pneumonia. , Pt s/p Tocilizumab. Currently receiving Remdesivir.  Continue PS weaning starting at 10/5. Decrease support as tolerated.  Continue current AC vent settings,  after weaning.  2. HTN Cardene drip as needed .Increase hydralazine. Start labetalol.  3. ID. Candida UTI. Continue Diflucan.  Elevated wbc. Continue meropenem. Add Vancomycin.

## 2020-05-13 NOTE — PROGRESS NOTE ADULT - SUBJECTIVE AND OBJECTIVE BOX
Patient is a 65y old  Female who presents with a chief complaint of COVID-19, Hypoxic respiratory failure (12 May 2020 14:25)    Being followed by ID for COVID, clinical trial      Interval history:  Pt remains in ICU, sedated and on vent. Afebrile. CPAP trialing during my exam, SaO2 93% with PEEP 5, FiO2 55%. Pt opens eyes to verbal stimulation, not following commands.   No other acute events      ROS:  Unable to assess     PAST MEDICAL & SURGICAL HISTORY:  No pertinent past medical history  History of cholecystectomy  History of appendectomy    Allergies    No Known Allergies    Intolerances      Antimicrobials:      MEDICATIONS  (STANDING):  BACItracin   Ointment 1 Application(s) Topical two times a day  chlorhexidine 0.12% Liquid 15 milliLiter(s) Oral Mucosa two times a day  chlorhexidine 4% Liquid 1 Application(s) Topical <User Schedule>  dexMEDEtomidine Infusion 0.5 MICROgram(s)/kG/Hr (8.79 mL/Hr) IV Continuous <Continuous>  famotidine Injectable 20 milliGRAM(s) IV Push daily  heparin  Infusion. 900 Unit(s)/Hr (9 mL/Hr) IV Continuous <Continuous>  hydrALAZINE 50 milliGRAM(s) Oral every 8 hours  insulin lispro (HumaLOG) corrective regimen sliding scale   SubCutaneous every 6 hours  labetalol 200 milliGRAM(s) Oral two times a day  methadone   Solution 5 milliGRAM(s) Oral every 12 hours  niCARdipine Infusion 5 mG/Hr (25 mL/Hr) IV Continuous <Continuous>  sodium chloride 0.9% lock flush 3 milliLiter(s) IV Push every 8 hours    MEDICATIONS  (PRN):  acetaminophen    Suspension .. 650 milliGRAM(s) Oral every 6 hours PRN Temp greater or equal to 38C (100.4F)  ALBUTerol    90 MICROgram(s) HFA Inhaler 2 Puff(s) Inhalation every 6 hours PRN Bronchospasm  diphenhydrAMINE   Injectable 50 milliGRAM(s) IV Push once PRN anaphylaxis to study drug  EPINEPHrine     1 mG/mL Injectable 0.3 milliGRAM(s) IntraMuscular once PRN anaphylaxis to study drug  sodium chloride 0.9% lock flush 10 milliLiter(s) IV Push every 1 hour PRN Pre/post blood products, medications, blood draw, and to maintain line patency      Vital Signs Last 24 Hrs  T(C): 37.3 (20 @ 07:00), Max: 37.4 (20 @ 00:00)  T(F): 99.1 (20 @ 07:00), Max: 99.3 (20 @ 00:00)  HR: 108 (20 @ 10:13) (60 - 118)  BP: --  BP(mean): --  RR: 35 (20 @ 09:00) (24 - 37)  SpO2: 93% (20 @ 10:13) (91% - 100%)    Physical Exam:  Constitutional sedated, intubated   HEENT pressure ulcers on b/l cheeks, some discharge from R nare (NGT site)  Chest coarse breath sounds bialterally, good AE   CVS RRR S1 S2 WNl   Abd soft BS normal No tenderness no masses  Ext No cyanosis clubbing or edema. All four extremities warm, good pulses   IV site no erythema tenderness or discharge  CNS sedated, opens eyes to verbal stimuli, not following commands     Lab Data:                          11.3   22.01 )-----------( 298      ( 13 May 2020 00:40 )             34.9           144  |  102  |  28<H>  ----------------------------<  164<H>  3.5   |  31  |  0.32<L>    Ca    8.4      13 May 2020 06:15  Phos  2.3       Mg     2.7         TPro  5.5<L>  /  Alb  2.5<L>  /  TBili  0.5  /  DBili  x   /  AST  56<H>  /  ALT  48<H>  /  AlkPhos  265<H>        Urinalysis Basic - ( 12 May 2020 17:30 )    Color: Red / Appearance: Turbid / S.016 / pH: x  Gluc: x / Ketone: Negative  / Bili: Small / Urobili: Negative   Blood: x / Protein: 100 mg/dL / Nitrite: Negative   Leuk Esterase: Trace / RBC: >50 /hpf / WBC 8 /HPF   Sq Epi: x / Non Sq Epi: x / Bacteria: x      .Urine Catheterized  20   No growth  --  --      .Sputum Sputum  20   No growth at 48 hours  --    Few polymorphonuclear leukocytes per low power field  No Squamous epithelial cells per low power field  No organisms seen      .Blood Blood-Peripheral  05-10-20   No growth to date.  --  --      .Sputum Sputum  20   No growth at 48 hours  --    Few polymorphonuclear leukocytes per low power field  Few Squamous epithelial cells per low power field  No organisms seen per oil power field        Clostridium difficile GDH Interpretation: Negative for toxigenic C. Difficile.  This specimen is negative for C.  Difficile glutamate dehydrogenase (GDH) antigen and negative for C.  Difficile Toxins A & B, by EIA.  GDH is a highly sensitive screening  marker for C. Difficile that is produced in large amounts by all C.  Difficile strains, both toxigenic and nontoxigenic.  This assay has not  been validated as a test of cure.  Repeat testing during the same episode  of diarrhea is of limited value and is discouraged.  The results of this  assay should always be interpreted in conjunction with pateint's clinical  history. (20 @ 15:00)        WBC Count: 22.01 (20 @ 00:40)  WBC Count: 25.78 (20 @ 14:58)  WBC Count: 25.65 (20 @ 00:11)  WBC Count: 25.80 (20 @ 00:33)  WBC Count: 18.02 (05-10-20 @ 00:27)  WBC Count: 19.45 (20 @ 00:25)  WBC Count: 31.35 (20 @ 00:22)  WBC Count: 35.06 (20 @ 03:05)    D-Dimer Assay, Quantitative: 3356 ng/mL DDU ()  D-Dimer Assay, Quantitative: 2865 ng/mL DDU ()  D-Dimer Assay, Quantitative: 2635 ng/mL DDU ()  D-Dimer Assay, Quantitative: 2020 ng/mL DDU (05-10)    Ferritin, Serum: 918 ng/mL ()  Ferritin, Serum: 1093 ng/mL ()  Ferritin, Serum: 1068 ng/mL ()  Ferritin, Serum: 933 ng/mL (05-10)      C-Reactive Protein, Serum: 0.61 ug/mL (20 @ 00:40)  C-Reactive Protein, Serum: 0.76 mg/dL (20 @ 00:11)  C-Reactive Protein, Serum: 1.35 ug/mL (20 @ 00:33)  C-Reactive Protein, Serum: 2.46 ug/mL (05-10-20 @ 00:27)      Ordinal Scale: score : 2  1) Death  2) Hospitalized, on invasive mechanical ventilation or ECMO  3) Hospitalized, on non-invasive ventilation or high flow oxygen devices  4) Hospitalized, requiring low flow(<11l/min) supplemental oxygen  5) Hospitalized, not requiring supplemental oxygen - requiring ongoing medical care(COVID-19 related or otherwise)  6) Hospitalized, not requiring supplemental oxygen - no longer requires ongoing medical care(other than per protocol RDV administration)  7) Not hospitalized Patient is a 65y old  Female who presents with a chief complaint of COVID-19, Hypoxic respiratory failure (12 May 2020 14:25)    Being followed by ID for COVID, clinical trial      Interval history:  Pt remains in ICU, sedated and on vent. Afebrile. CPAP trialing during my exam, SaO2 93% with PEEP 5, FiO2 55%. Pt opens eyes to verbal stimulation, not following commands  pt now with hematuria.   No other acute events      ROS:  Unable to assess     PAST MEDICAL & SURGICAL HISTORY:  No pertinent past medical history  History of cholecystectomy  History of appendectomy    Allergies    No Known Allergies    Intolerances      Antimicrobials:      MEDICATIONS  (STANDING):  BACItracin   Ointment 1 Application(s) Topical two times a day  chlorhexidine 0.12% Liquid 15 milliLiter(s) Oral Mucosa two times a day  chlorhexidine 4% Liquid 1 Application(s) Topical <User Schedule>  dexMEDEtomidine Infusion 0.5 MICROgram(s)/kG/Hr (8.79 mL/Hr) IV Continuous <Continuous>  famotidine Injectable 20 milliGRAM(s) IV Push daily  heparin  Infusion. 900 Unit(s)/Hr (9 mL/Hr) IV Continuous <Continuous>  hydrALAZINE 50 milliGRAM(s) Oral every 8 hours  insulin lispro (HumaLOG) corrective regimen sliding scale   SubCutaneous every 6 hours  labetalol 200 milliGRAM(s) Oral two times a day  methadone   Solution 5 milliGRAM(s) Oral every 12 hours  niCARdipine Infusion 5 mG/Hr (25 mL/Hr) IV Continuous <Continuous>  sodium chloride 0.9% lock flush 3 milliLiter(s) IV Push every 8 hours    MEDICATIONS  (PRN):  acetaminophen    Suspension .. 650 milliGRAM(s) Oral every 6 hours PRN Temp greater or equal to 38C (100.4F)  ALBUTerol    90 MICROgram(s) HFA Inhaler 2 Puff(s) Inhalation every 6 hours PRN Bronchospasm  diphenhydrAMINE   Injectable 50 milliGRAM(s) IV Push once PRN anaphylaxis to study drug  EPINEPHrine     1 mG/mL Injectable 0.3 milliGRAM(s) IntraMuscular once PRN anaphylaxis to study drug  sodium chloride 0.9% lock flush 10 milliLiter(s) IV Push every 1 hour PRN Pre/post blood products, medications, blood draw, and to maintain line patency      Vital Signs Last 24 Hrs  T(C): 37.3 (20 @ 07:00), Max: 37.4 (20 @ 00:00)  T(F): 99.1 (20 @ 07:00), Max: 99.3 (20 @ 00:00)  HR: 108 (20 @ 10:13) (60 - 118)  BP: --  BP(mean): --  RR: 35 (20 @ 09:00) (24 - 37)  SpO2: 93% (20 @ 10:13) (91% - 100%)    Physical Exam:  Constitutional sedated, intubated   HEENT pressure ulcers on b/l cheeks, some discharge from R nare (NGT site)  Chest coarse breath sounds bialterally, good AE   CVS RRR S1 S2 WNl   Abd soft BS normal No tenderness no masses  Ext No cyanosis clubbing or edema. All four extremities warm, good pulses   IV site no erythema tenderness or discharge  CNS sedated, opens eyes to verbal stimuli, not following commands     Lab Data:                          11.3   22.01 )-----------( 298      ( 13 May 2020 00:40 )             34.9           144  |  102  |  28<H>  ----------------------------<  164<H>  3.5   |  31  |  0.32<L>    Ca    8.4      13 May 2020 06:15  Phos  2.3       Mg     2.7         TPro  5.5<L>  /  Alb  2.5<L>  /  TBili  0.5  /  DBili  x   /  AST  56<H>  /  ALT  48<H>  /  AlkPhos  265<H>        Urinalysis Basic - ( 12 May 2020 17:30 )    Color: Red / Appearance: Turbid / S.016 / pH: x  Gluc: x / Ketone: Negative  / Bili: Small / Urobili: Negative   Blood: x / Protein: 100 mg/dL / Nitrite: Negative   Leuk Esterase: Trace / RBC: >50 /hpf / WBC 8 /HPF   Sq Epi: x / Non Sq Epi: x / Bacteria: x      .Urine Catheterized  20   No growth  --  --      .Sputum Sputum  20   No growth at 48 hours  --    Few polymorphonuclear leukocytes per low power field  No Squamous epithelial cells per low power field  No organisms seen      .Blood Blood-Peripheral  05-10-20   No growth to date.  --  --      .Sputum Sputum  20   No growth at 48 hours  --    Few polymorphonuclear leukocytes per low power field  Few Squamous epithelial cells per low power field  No organisms seen per oil power field        Clostridium difficile GDH Interpretation: Negative for toxigenic C. Difficile.  This specimen is negative for C.  Difficile glutamate dehydrogenase (GDH) antigen and negative for C.  Difficile Toxins A & B, by EIA.  GDH is a highly sensitive screening  marker for C. Difficile that is produced in large amounts by all C.  Difficile strains, both toxigenic and nontoxigenic.  This assay has not  been validated as a test of cure.  Repeat testing during the same episode  of diarrhea is of limited value and is discouraged.  The results of this  assay should always be interpreted in conjunction with pateint's clinical  history. (20 @ 15:00)        WBC Count: 22.01 (20 @ 00:40)  WBC Count: 25.78 (20 @ 14:58)  WBC Count: 25.65 (20 @ 00:11)  WBC Count: 25.80 (20 @ 00:33)  WBC Count: 18.02 (05-10-20 @ 00:27)  WBC Count: 19.45 (20 @ 00:25)  WBC Count: 31.35 (20 @ 00:22)  WBC Count: 35.06 (20 @ 03:05)    D-Dimer Assay, Quantitative: 3356 ng/mL DDU ()  D-Dimer Assay, Quantitative: 2865 ng/mL DDU ()  D-Dimer Assay, Quantitative: 2635 ng/mL DDU ()  D-Dimer Assay, Quantitative: 2020 ng/mL DDU (05-10)    Ferritin, Serum: 918 ng/mL ()  Ferritin, Serum: 1093 ng/mL ()  Ferritin, Serum: 1068 ng/mL ()  Ferritin, Serum: 933 ng/mL (05-10)      C-Reactive Protein, Serum: 0.61 ug/mL (20 @ 00:40)  C-Reactive Protein, Serum: 0.76 mg/dL (20 @ 00:11)  C-Reactive Protein, Serum: 1.35 ug/mL (20 @ 00:33)  C-Reactive Protein, Serum: 2.46 ug/mL (05-10-20 @ 00:27)      Ordinal Scale: score : 2  1) Death  2) Hospitalized, on invasive mechanical ventilation or ECMO  3) Hospitalized, on non-invasive ventilation or high flow oxygen devices  4) Hospitalized, requiring low flow(<11l/min) supplemental oxygen  5) Hospitalized, not requiring supplemental oxygen - requiring ongoing medical care(COVID-19 related or otherwise)  6) Hospitalized, not requiring supplemental oxygen - no longer requires ongoing medical care(other than per protocol RDV administration)  7) Not hospitalized

## 2020-05-14 NOTE — PROGRESS NOTE ADULT - ASSESSMENT
66 y/o F with no significant PMH presents 4/29 with hypoxic respiratory failure 2nd COVID PNA. s/p Tocilizumab (4/30), s/p empiric Ceftriaxone/Azithro (4/30-5/4) for ?superimposed CAP. Currently enrolled in Remdesivir trial (4/30). Intubated 5/4 for worsened hypoxic respiratory failure. Severe ARDS requiring proning on 5/6, 5/7 and 5/8.     #Neuro   - awake, slightly responsive to commands, profoundly weak  - Sedation with precedex 0.5 mcg/kg/hr; Required Fentanyl 100 mcg IVP x 2 , versed 2mg IVP x 2 for vent dyssynchrony and high Peak pressures today  -  Methadone being tapered to OFF, was started for narcotic  withdrawal   - Keep RASS -1    #CV  - OFF cardene drip   - BP still elevated; Labetalol increased to 200 mg q8; cont  Hydralazine 50 mg q8   - Triglyceride 170  - CPK up trending 213 with nl troponin ;  cont to trend    #Pulm:   - Hypoxic respiratory failure 2nd COVID PNA  - Required proning on 5/6, 5/7, 5/8  - Tolerated CPAP trial x 32 hours, placed back on ACV at 10 AM  after episode of vent dyssyncrony, desaturation and high Peak pressures requiring high doses of sedation  - Currently on AC 28/370/80%/5:  - On PS 12/5 55 % Fio2  since 1 AM. Reduced to 10/5 at 9:30 AM with TV >350 and RR <35      #GI  - Alk Phos downtrending with ALT/AST still elevated   - c/w Vital AF at goal rate 55 /hr  - Last BM 5/14  - Pepcid 20mg qd     #  - Metabolic alkalosis with bicarb 33 today with net positive 1300 cc last 24 hours   - consider Diamox if continues to uptrend      #ID   - slight uptrend leukocytosis with no fevers   - Urine clx , sputum clx 5/11 NTD, Blood clx 5/10 NTD, Fungitell 5/10 neg  - S/p full course of meropenem (5/4-5/13), Fluconazole (5/8-5/13), Vancomycin (5/11-5/12), now monitoring off Abx since 5/12   - CDiff negative  - ID Following     COVID: + on 4/28   - s/p 10 days of Remdesivir trial  (4/30-5/9)   - s/p Tocilizumab on 4/30  - inflammatory markers down trending except  D dimer    #  LINES  - [facility] TLC from 5/4     #Heme   - Heparin gtt for elevated D-dimer : D dimer down trending  - Goal aPTT 58-99    #Endo  -  Blood glucose controlled on Humalog  Low SS    #Ethics  -Full code    Spoke to Daughter Mary and updated.

## 2020-05-14 NOTE — CHART NOTE - NSCHARTNOTEFT_GEN_A_CORE
:  Madeline Rutledge MD    INDICATION:    Respiratory Failure (J96.00)  Shock (R57.9)    PROCEDURE:  [ ] LIMITED ECHO  [X] LIMITED CHEST  [ ] LIMITED RETROPERITONEAL  [ ] LIMITED ABDOMINAL  [ ] LIMITED DVT  [ ] NEEDLE GUIDANCE VASCULAR  [ ] NEEDLE GUIDANCE THORACENTESIS  [ ] NEEDLE GUIDANCE PARACENTESIS  [ ] NEEDLE GUIDANCE PERICARDIOCENTESIS  [ ] OTHER    FINDINGS:    Bilateral B-line pattern with irregular pleura in all fields.    INTERPRETATION:  No pneumothorax, diffuse pneumonia.     Images stored in [id number]

## 2020-05-14 NOTE — PROGRESS NOTE ADULT - ASSESSMENT
65F reporting no PMH who presented last night with complaint of fever/chills (home T-max 102 F), night sweats, cough, myalgias (mid & lower back), loss of taste and smell since Friday 4/24 (still tastes sweet/salty), as well as 2 days of weakness and constipation, found to be COVID positive on 4/28/20.    Pt has agreed to participate in a clinical trial for Covid -19  Please discuss with ID team if planning on taking concurrent treatment with other agents with actual or possible direct acting antiviral activity against SARS-CoV-2 such a chloroquine or hydroxychloroquine. Pt was given Tocilizumab by primary team on the day of enrollment as of concern for cytokine storm.     Hospital course  4/29: Started Ceftriaxone (4/29-5/3) and Azithromycin (4/29-5/4) in ED for possible bacterial PNA  4/30: RRT called for desaturation in 70s before receiving 1st dose of Remdesivir and Tocilizumab  5/4: RRT called for desaturation to 20s after drinking water and coughing, intubated by anesthesia. Sedated and started full dose lovenox after D-dimer elevated to [address]. Transferred to ICU. On Meropenem, Vancomycin, and Caspofungin for emperic sepsis coverage.  5/7: Caspofungin switched to Fluconazole for growth of candida albicans in urine; Vancomycin d/c'ed for negative MRSA swab  5/10: Started CPAP trials    # Sepsis   - WBC persistently elevated, now downtrending. Afebrile  - Suspected source include pneumonia (viral, bacterial, fungal, or aspiration) vs bacteremia vs urosepsis vs lines vs myocarditis vs microthrombi with the elevated D-Dimer  - S/p full course of meropenem (5/4-5/13), Fluconazole (5/8-5/13), Vancomycin (5/11-5/12), now monitoring off Abx  - Pt is s/p Tocilizumab which increases risk of infection  - Now with multiple negative BCx (most recent 5/10), UCx (most recent 5/11), sputum Cx (5/11), fungitell (5/10)  - C Diff negative 5/12  - s/p Tocilizumab (4/30), Ceftriaxone (4/29-5/3), Azithromycin (4/29-5/4), Vancomycin (5/3-5/7), and Caspofungin (5/4-5/7).  - s/p fluconazole for Urine cx growing c albicans   - Lines removed 5/13        # Acute hypoxic respiratory failure  - Multifactorial, likely secondary to COVID-19 infection with viral PNA, with possible superimposed bacterial vs fungal vs aspiration PNA and/or PE   - COVID+ 4/28 w/ CXR demonstrating infiltrates, now diffuse patchy opacities (5/3)    - Intubated 5/4 for worsened hypoxic respiratory failure, requiring proning. Now tolerating CPAP trial   - s/p 10 day course of remdesivir   - Inflammatory markers elevated but now downtrending, please continue to monitor   - S/p tocilizumab 4/30  - Trops negative, BNP continue to be elvated. Can consider repeat cardiac echo to assess cardiac function to rule out myocarditis and/or heart failure (POCUS on 5/4 documented borderline LV hyperdynamic systolic function and possible LV apical hypocontractility)    # Elevated LFTs  - Likely due to either COVID 19 vs Ceftriaxone vs Remdesivir vs sepsis, improved.   - Now finished with study drug   - Please continue to monitor LFTs and creatinine daily     # Elevated CRP/Ferritin/D-dimer  - Could be COVID-related. CRP, ferritin, trending down.   - D-dimer remains elevated, although much lower than previous peak, could suggest PE or microthrombi. Pt now on heparin drip. Continued AC per primary team.     # FENGI  - Hypernatremia and hypokalemia, could be secondary to diarrhea or diuresis  - Continue supportive measures and supplementation per primary    #VTE prophylaxis   - As above, pt with elevated d-dimer, please continue to monitor   - Now on heparin drip    - Further AC per primary team     # hematuria  - UA not suggestive of UTI  - will monitor    Today is day #15 of study. Ordinal scale score: 2.       Niki Caceres MD. Graduate Physician

## 2020-05-14 NOTE — PROGRESS NOTE ADULT - ATTENDING COMMENTS
1. Acute hypoxemic respiratory failure from ARDS, SARS-2 Covid pneumonia. , Pt s/p Tocilizumab and Remdesivir.  Pt tolerated PS 8/5 for 2 hours then became acutely tachypneic and placed back on AC ventilation.  Pt required Fentanyl and versed for sedation to get back in synch with ventilator. Pt de recruited  requiring  higher FIO2. Increase peep to 8.    2. HTN Cardene drip off. Continue hydralazine and  labetalol.  3. ID. Candida UTI. Continue Diflucan.  Elevated wbc. Continue meropenem. Add Vancomycin.

## 2020-05-14 NOTE — PROGRESS NOTE ADULT - SUBJECTIVE AND OBJECTIVE BOX
Patient is a 65y old  Female who presents with a chief complaint of COVID-19, Hypoxic respiratory failure (12 May 2020 14:25)    Being followed by ID for COVID, clinical trial      Interval history:  Pt remains in ICU on vent. Afebrile. SaO2 95% with PEEP 5, FiO2 55%. Pt opens eyes to verbal stimulation, following commands to wiggle fingers.  Pt still has hematuria, also with bloody sputum.   No other acute events      ROS:  Unable to assess     PAST MEDICAL & SURGICAL HISTORY:  No pertinent past medical history  History of cholecystectomy  History of appendectomy    Allergies    No Known Allergies    Intolerances      Antimicrobials:      MEDICATIONS  (STANDING):  BACItracin   Ointment 1 Application(s) Topical two times a day  chlorhexidine 0.12% Liquid 15 milliLiter(s) Oral Mucosa two times a day  chlorhexidine 4% Liquid 1 Application(s) Topical <User Schedule>  dexMEDEtomidine Infusion 0.5 MICROgram(s)/kG/Hr (8.79 mL/Hr) IV Continuous <Continuous>  famotidine Injectable 20 milliGRAM(s) IV Push daily  heparin  Infusion. 900 Unit(s)/Hr (9 mL/Hr) IV Continuous <Continuous>  hydrALAZINE 50 milliGRAM(s) Oral every 8 hours  insulin lispro (HumaLOG) corrective regimen sliding scale   SubCutaneous every 6 hours  labetalol 200 milliGRAM(s) Oral two times a day  methadone   Solution 5 milliGRAM(s) Oral daily  methadone   Solution   Oral     MEDICATIONS  (PRN):  acetaminophen    Suspension .. 650 milliGRAM(s) Oral every 6 hours PRN Temp greater or equal to 38C (100.4F)  ALBUTerol    90 MICROgram(s) HFA Inhaler 2 Puff(s) Inhalation every 6 hours PRN Bronchospasm  diphenhydrAMINE   Injectable 50 milliGRAM(s) IV Push once PRN anaphylaxis to study drug  sodium chloride 0.9% lock flush 10 milliLiter(s) IV Push every 1 hour PRN Pre/post blood products, medications, blood draw, and to maintain line patency        Vital Signs Last 24 Hrs  T(C): 37.1 (14 May 2020 08:00), Max: 37.5 (13 May 2020 15:00)  T(F): 98.8 (14 May 2020 08:00), Max: 99.5 (13 May 2020 15:00)  HR: 82 (14 May 2020 09:00) (65 - 108)  BP: 160/70 (14 May 2020 09:00) (113/56 - 174/76)  BP(mean): 100 (14 May 2020 09:00) (80 - 109)  RR: 37 (14 May 2020 09:00) (25 - 45)  SpO2: 90% (14 May 2020 09:00) (88% - 97%)    Physical Exam:  Constitutional sedated, intubated   HEENT pressure ulcers on b/l cheeks  Chest coarse breath sounds bilaterally, good AE   CVS RRR S1 S2 WNl   Abd soft BS normal No tenderness no masses  Ext Swelling in both arms. No cyanosis or clubbing. All four extremities warm, good pulses   IV site no erythema tenderness or discharge  CNS sedated, opens eyes to verbal stimuli, not following commands     Lab Data:                                     10.5   . )-----------( 252      ( 14 May 2020 00:19 )             32.2       05-    146<H>  |  106  |  29<H>  ----------------------------<  167<H>  3.4<L>   |  33<H>  |  <0.30<L>    Ca    7.7<L>      14 May 2020 06:22  Phos  2.6       Mg     2.4         TPro  5.2<L>  /  Alb  2.3<L>  /  TBili  0.4  /  DBili  x   /  AST  61<H>  /  ALT  51<H>  /  AlkPhos  221<H>        Urinalysis Basic - ( 12 May 2020 17:30 )    Color: Red / Appearance: Turbid / S.016 / pH: x  Gluc: x / Ketone: Negative  / Bili: Small / Urobili: Negative   Blood: x / Protein: 100 mg/dL / Nitrite: Negative   Leuk Esterase: Trace / RBC: >50 /hpf / WBC 8 /HPF   Sq Epi: x / Non Sq Epi: x / Bacteria: x      .Urine Catheterized  20   No growth  --  --      .Sputum Sputum  20   No growth at 48 hours  --    Few polymorphonuclear leukocytes per low power field  No Squamous epithelial cells per low power field  No organisms seen      .Blood Blood-Peripheral  05-10-20   No growth to date.  --  --      .Sputum Sputum  20   No growth at 48 hours  --    Few polymorphonuclear leukocytes per low power field  Few Squamous epithelial cells per low power field  No organisms seen per oil power field        Clostridium difficile GDH Interpretation: Negative for toxigenic C. Difficile.  This specimen is negative for C.  Difficile glutamate dehydrogenase (GDH) antigen and negative for C.  Difficile Toxins A & B, by EIA.  GDH is a highly sensitive screening  marker for C. Difficile that is produced in large amounts by all C.  Difficile strains, both toxigenic and nontoxigenic.  This assay has not  been validated as a test of cure.  Repeat testing during the same episode  of diarrhea is of limited value and is discouraged.  The results of this  assay should always be interpreted in conjunction with pateint's clinical  history. (20 @ 15:00)      WBC Count: 19.28 (20 @ 00:19)  WBC Count: 22.01 (20 @ 00:40)  WBC Count: 25.78 (20 @ 14:58)  WBC Count: 25.65 (20 @ 00:11)  WBC Count: 25.80 (20 @ 00:33)  WBC Count: 18.02 (05-10-20 @ 00:27)  WBC Count: 19.45 (20 @ 00:25)  WBC Count: 31.35 (20 @ 00:22)  WBC Count: 35.06 (20 @ 03:05)    D-Dimer Assay, Quantitative: 3247 ng/mL DDU ()  D-Dimer Assay, Quantitative: 3356 ng/mL DDU ()  D-Dimer Assay, Quantitative: 2865 ng/mL DDU ()  D-Dimer Assay, Quantitative: 2635 ng/mL DDU ()    Ferritin, Serum: 855 ng/mL ()  Ferritin, Serum: 918 ng/mL ()  Ferritin, Serum: 1093 ng/mL ()  Ferritin, Serum: 1068 ng/mL ()    C-Reactive Protein, Serum: 0.66 ug/mL (20 @ 00:19)  C-Reactive Protein, Serum: 0.61 ug/mL (20 @ 00:40)  C-Reactive Protein, Serum: 0.76 mg/dL (20 @ 00:11)  C-Reactive Protein, Serum: 1.35 ug/mL (20 @ 00:33)        Ordinal Scale: score : 2  1) Death  2) Hospitalized, on invasive mechanical ventilation or ECMO  3) Hospitalized, on non-invasive ventilation or high flow oxygen devices  4) Hospitalized, requiring low flow(<11l/min) supplemental oxygen  5) Hospitalized, not requiring supplemental oxygen - requiring ongoing medical care(COVID-19 related or otherwise)  6) Hospitalized, not requiring supplemental oxygen - no longer requires ongoing medical care(other than per protocol RDV administration)  7) Not hospitalized Patient is a 65y old  Female who presents with a chief complaint of COVID-19, Hypoxic respiratory failure (12 May 2020 14:25)    Being followed by ID for COVID, clinical trial      Interval history:  Pt remains in ICU on vent. Afebrile. SaO2 95% with PEEP 5, FiO2 55%. Pt opens eyes to verbal stimulation, following commands to wiggle fingers.  Pt still has hematuria, also with bloody sputum.   no diarrhea  No other acute events      ROS:  Unable to assess     PAST MEDICAL & SURGICAL HISTORY:  No pertinent past medical history  History of cholecystectomy  History of appendectomy    Allergies    No Known Allergies    Intolerances      Antimicrobials:      MEDICATIONS  (STANDING):  BACItracin   Ointment 1 Application(s) Topical two times a day  chlorhexidine 0.12% Liquid 15 milliLiter(s) Oral Mucosa two times a day  chlorhexidine 4% Liquid 1 Application(s) Topical <User Schedule>  dexMEDEtomidine Infusion 0.5 MICROgram(s)/kG/Hr (8.79 mL/Hr) IV Continuous <Continuous>  famotidine Injectable 20 milliGRAM(s) IV Push daily  heparin  Infusion. 900 Unit(s)/Hr (9 mL/Hr) IV Continuous <Continuous>  hydrALAZINE 50 milliGRAM(s) Oral every 8 hours  insulin lispro (HumaLOG) corrective regimen sliding scale   SubCutaneous every 6 hours  labetalol 200 milliGRAM(s) Oral two times a day  methadone   Solution 5 milliGRAM(s) Oral daily  methadone   Solution   Oral     MEDICATIONS  (PRN):  acetaminophen    Suspension .. 650 milliGRAM(s) Oral every 6 hours PRN Temp greater or equal to 38C (100.4F)  ALBUTerol    90 MICROgram(s) HFA Inhaler 2 Puff(s) Inhalation every 6 hours PRN Bronchospasm  diphenhydrAMINE   Injectable 50 milliGRAM(s) IV Push once PRN anaphylaxis to study drug  sodium chloride 0.9% lock flush 10 milliLiter(s) IV Push every 1 hour PRN Pre/post blood products, medications, blood draw, and to maintain line patency        Vital Signs Last 24 Hrs  T(C): 37.1 (14 May 2020 08:00), Max: 37.5 (13 May 2020 15:00)  T(F): 98.8 (14 May 2020 08:00), Max: 99.5 (13 May 2020 15:00)  HR: 82 (14 May 2020 09:00) (65 - 108)  BP: 160/70 (14 May 2020 09:00) (113/56 - 174/76)  BP(mean): 100 (14 May 2020 09:00) (80 - 109)  RR: 37 (14 May 2020 09:00) (25 - 45)  SpO2: 90% (14 May 2020 09:00) (88% - 97%)    Physical Exam:  Constitutional sedated, intubated   HEENT pressure ulcers on b/l cheeks  Chest coarse breath sounds bilaterally, good AE   CVS RRR S1 S2 WNl   Abd soft BS normal No tenderness no masses  Ext Swelling in both arms. No cyanosis or clubbing. All four extremities warm, good pulses   IV site no erythema tenderness or discharge  CNS sedated, opens eyes to verbal stimuli, not following commands     Lab Data:                                     10.5    )-----------( 252      ( 14 May 2020 00:19 )             32.2       05-    146<H>  |  106  |  29<H>  ----------------------------<  167<H>  3.4<L>   |  33<H>  |  <0.30<L>    Ca    7.7<L>      14 May 2020 06:22  Phos  2.6       Mg     2.4         TPro  5.2<L>  /  Alb  2.3<L>  /  TBili  0.4  /  DBili  x   /  AST  61<H>  /  ALT  51<H>  /  AlkPhos  221<H>        Urinalysis Basic - ( 12 May 2020 17:30 )    Color: Red / Appearance: Turbid / S.016 / pH: x  Gluc: x / Ketone: Negative  / Bili: Small / Urobili: Negative   Blood: x / Protein: 100 mg/dL / Nitrite: Negative   Leuk Esterase: Trace / RBC: >50 /hpf / WBC 8 /HPF   Sq Epi: x / Non Sq Epi: x / Bacteria: x      .Urine Catheterized  20   No growth  --  --      .Sputum Sputum  20   No growth at 48 hours  --    Few polymorphonuclear leukocytes per low power field  No Squamous epithelial cells per low power field  No organisms seen      .Blood Blood-Peripheral  05-10-20   No growth to date.  --  --      .Sputum Sputum  20   No growth at 48 hours  --    Few polymorphonuclear leukocytes per low power field  Few Squamous epithelial cells per low power field  No organisms seen per oil power field        Clostridium difficile GDH Interpretation: Negative for toxigenic C. Difficile.  This specimen is negative for C.  Difficile glutamate dehydrogenase (GDH) antigen and negative for C.  Difficile Toxins A & B, by EIA.  GDH is a highly sensitive screening  marker for C. Difficile that is produced in large amounts by all C.  Difficile strains, both toxigenic and nontoxigenic.  This assay has not  been validated as a test of cure.  Repeat testing during the same episode  of diarrhea is of limited value and is discouraged.  The results of this  assay should always be interpreted in conjunction with pateint's clinical  history. (20 @ 15:00)      WBC Count: 19.28 (20 @ 00:19)  WBC Count: 22.01 (20 @ 00:40)  WBC Count: 25.78 (20 @ 14:58)  WBC Count: 25.65 (20 @ 00:11)  WBC Count: 25.80 (20 @ 00:33)  WBC Count: 18.02 (05-10-20 @ 00:27)  WBC Count: 19.45 (20 @ 00:25)  WBC Count: 31.35 (20 @ 00:22)  WBC Count: 35.06 (20 @ 03:05)    D-Dimer Assay, Quantitative: 3247 ng/mL DDU ()  D-Dimer Assay, Quantitative: 3356 ng/mL DDU ()  D-Dimer Assay, Quantitative: 2865 ng/mL DDU ()  D-Dimer Assay, Quantitative: 2635 ng/mL DDU ()    Ferritin, Serum: 855 ng/mL ()  Ferritin, Serum: 918 ng/mL ()  Ferritin, Serum: 1093 ng/mL ()  Ferritin, Serum: 1068 ng/mL ()    C-Reactive Protein, Serum: 0.66 ug/mL (20 @ 00:19)  C-Reactive Protein, Serum: 0.61 ug/mL (20 @ 00:40)  C-Reactive Protein, Serum: 0.76 mg/dL (20 @ 00:11)  C-Reactive Protein, Serum: 1.35 ug/mL (20 @ 00:33)        Ordinal Scale: score : 2  1) Death  2) Hospitalized, on invasive mechanical ventilation or ECMO  3) Hospitalized, on non-invasive ventilation or high flow oxygen devices  4) Hospitalized, requiring low flow(<11l/min) supplemental oxygen  5) Hospitalized, not requiring supplemental oxygen - requiring ongoing medical care(COVID-19 related or otherwise)  6) Hospitalized, not requiring supplemental oxygen - no longer requires ongoing medical care(other than per protocol RDV administration)  7) Not hospitalized

## 2020-05-14 NOTE — PROGRESS NOTE ADULT - SUBJECTIVE AND OBJECTIVE BOX
INTERVAL HPI/OVERNIGHT EVENTS: Remains on CPAP with PS 8/PEEP 5 since 10 PM. OFF cardene drip.    SUBJECTIVE: sedated and intubated  OBJECTIVE:    VITAL SIGNS:  ICU Vital Signs Last 24 Hrs  T(C): 37.1 (14 May 2020 08:00), Max: 37.5 (13 May 2020 15:00)  T(F): 98.8 (14 May 2020 08:00), Max: 99.5 (13 May 2020 15:00)  HR: 82 (14 May 2020 09:00) (65 - 108)  BP: 160/70 (14 May 2020 09:00) (113/56 - 174/76)  BP(mean): 100 (14 May 2020 09:00) (80 - 109)  ABP: 157/64 (13 May 2020 10:00) (157/64 - 157/64)  ABP(mean): 103 (13 May 2020 10:00) (103 - 103)  RR: 37 (14 May 2020 09:00) (25 - 45)  SpO2: 90% (14 May 2020 09:00) (88% - 97%)    Mode: CPAP with PS, FiO2: 55, PEEP: 5, PS: 8, ITime: , MAP: 8, PIP: 14    13 @ 07: @ 07:00  --------------------------------------------------------  IN: 3296 mL / OUT: 1995 mL / NET: 1301 mL     @ 07: @ 09:20  --------------------------------------------------------  IN: 293 mL / OUT: 105 mL / NET: 188 mL      CAPILLARY BLOOD GLUCOSE      POCT Blood Glucose.: 166 mg/dL (14 May 2020 05:11)      PHYSICAL EXAM:    General: NAD  HEENT: NC/AT; PERRL, clear conjunctiva  Neck: supple  Respiratory: CTA b/l  Cardiovascular: +S1/S2; RRR  Abdomen: soft, NT/ND; +BS x4  Extremities: WWP, 2+ peripheral pulses b/l;+ anasarca  Skin: normal color and turgor; no rash  Neurological:    MEDICATIONS:  MEDICATIONS  (STANDING):  BACItracin   Ointment 1 Application(s) Topical two times a day  chlorhexidine 0.12% Liquid 15 milliLiter(s) Oral Mucosa two times a day  chlorhexidine 4% Liquid 1 Application(s) Topical <User Schedule>  dexMEDEtomidine Infusion 0.5 MICROgram(s)/kG/Hr (8.79 mL/Hr) IV Continuous <Continuous>  famotidine Injectable 20 milliGRAM(s) IV Push daily  heparin  Infusion. 900 Unit(s)/Hr (9 mL/Hr) IV Continuous <Continuous>  hydrALAZINE 50 milliGRAM(s) Oral every 8 hours  insulin lispro (HumaLOG) corrective regimen sliding scale   SubCutaneous every 6 hours  labetalol 200 milliGRAM(s) Oral two times a day  methadone   Solution 5 milliGRAM(s) Oral daily  methadone   Solution   Oral     MEDICATIONS  (PRN):  acetaminophen    Suspension .. 650 milliGRAM(s) Oral every 6 hours PRN Temp greater or equal to 38C (100.4F)  ALBUTerol    90 MICROgram(s) HFA Inhaler 2 Puff(s) Inhalation every 6 hours PRN Bronchospasm  diphenhydrAMINE   Injectable 50 milliGRAM(s) IV Push once PRN anaphylaxis to study drug  sodium chloride 0.9% lock flush 10 milliLiter(s) IV Push every 1 hour PRN Pre/post blood products, medications, blood draw, and to maintain line patency      ALLERGIES:  Allergies    No Known Allergies    Intolerances        LABS:                        10.5   19.28 )-----------( 252      ( 14 May 2020 00:19 )             32.2     05-14    146<H>  |  106  |  29<H>  ----------------------------<  167<H>  3.4<L>   |  33<H>  |  <0.30<L>    Ca    7.7<L>      14 May 2020 06:22  Phos  2.6     05-14  Mg     2.4     05-14    TPro  5.2<L>  /  Alb  2.3<L>  /  TBili  0.4  /  DBili  x   /  AST  61<H>  /  ALT  51<H>  /  AlkPhos  221<H>  05-14    PT/INR - ( 14 May 2020 00:19 )   PT: 11.7 sec;   INR: 1.03 ratio         PTT - ( 14 May 2020 00:19 )  PTT:81.0 sec  Urinalysis Basic - ( 12 May 2020 17:30 )    Color: Red / Appearance: Turbid / S.016 / pH: x  Gluc: x / Ketone: Negative  / Bili: Small / Urobili: Negative   Blood: x / Protein: 100 mg/dL / Nitrite: Negative   Leuk Esterase: Trace / RBC: >50 /hpf / WBC 8 /HPF   Sq Epi: x / Non Sq Epi: x / Bacteria: x        RADIOLOGY & ADDITIONAL TESTS: Reviewed. INTERVAL HPI/OVERNIGHT EVENTS: PS on  CPAP down to 8, since 10 PM last night. Pt has  been on CPAP x 32 hours. OFF cardene drip.    SUBJECTIVE: sedated and intubated  OBJECTIVE:    VITAL SIGNS:  ICU Vital Signs Last 24 Hrs  T(C): 37.1 (14 May 2020 08:00), Max: 37.5 (13 May 2020 15:00)  T(F): 98.8 (14 May 2020 08:00), Max: 99.5 (13 May 2020 15:00)  HR: 82 (14 May 2020 09:00) (65 - 108)  BP: 160/70 (14 May 2020 09:00) (113/56 - 174/76)  BP(mean): 100 (14 May 2020 09:00) (80 - 109)  ABP: 157/64 (13 May 2020 10:00) (157/64 - 157/64)  ABP(mean): 103 (13 May 2020 10:00) (103 - 103)  RR: 37 (14 May 2020 09:00) (25 - 45)  SpO2: 90% (14 May 2020 09:00) (88% - 97%)    Mode: CPAP with PS, FiO2: 55, PEEP: 5, PS: 8, ITime: , MAP: 8, PIP: 14    13 @ 07: @ 07:00  --------------------------------------------------------  IN: 3296 mL / OUT: 1995 mL / NET: 1301 mL     @ 07:01  -   @ 09:20  --------------------------------------------------------  IN: 293 mL / OUT: 105 mL / NET: 188 mL      CAPILLARY BLOOD GLUCOSE      POCT Blood Glucose.: 166 mg/dL (14 May 2020 05:11)      PHYSICAL EXAM:    General: NAD  HEENT: NC/AT; PERRL, clear conjunctiva  Neck: supple  Respiratory: [place] BS  Cardiovascular: +S1/S2; RRR  Abdomen: soft, NT/ND; +BS x4  Extremities: WWP, 1+ peripheral pulses b/l;+ anasarca  Skin: normal color and turgor; no rash  Neurological: awake, occasionally following simple commands    MEDICATIONS:  MEDICATIONS  (STANDING):  BACItracin   Ointment 1 Application(s) Topical two times a day  chlorhexidine 0.12% Liquid 15 milliLiter(s) Oral Mucosa two times a day  chlorhexidine 4% Liquid 1 Application(s) Topical <User Schedule>  dexMEDEtomidine Infusion 0.5 MICROgram(s)/kG/Hr (8.79 mL/Hr) IV Continuous <Continuous>  famotidine Injectable 20 milliGRAM(s) IV Push daily  heparin  Infusion. 900 Unit(s)/Hr (9 mL/Hr) IV Continuous <Continuous>  hydrALAZINE 50 milliGRAM(s) Oral every 8 hours  insulin lispro (HumaLOG) corrective regimen sliding scale   SubCutaneous every 6 hours  labetalol 200 milliGRAM(s) Oral two times a day  methadone   Solution 5 milliGRAM(s) Oral daily  methadone   Solution   Oral     MEDICATIONS  (PRN):  acetaminophen    Suspension .. 650 milliGRAM(s) Oral every 6 hours PRN Temp greater or equal to 38C (100.4F)  ALBUTerol    90 MICROgram(s) HFA Inhaler 2 Puff(s) Inhalation every 6 hours PRN Bronchospasm  diphenhydrAMINE   Injectable 50 milliGRAM(s) IV Push once PRN anaphylaxis to study drug  sodium chloride 0.9% lock flush 10 milliLiter(s) IV Push every 1 hour PRN Pre/post blood products, medications, blood draw, and to maintain line patency      ALLERGIES:  Allergies    No Known Allergies    Intolerances        LABS:                        10.5   19.28 )-----------( 252      ( 14 May 2020 00:19 )             32.2     05-14    146<H>  |  106  |  29<H>  ----------------------------<  167<H>  3.4<L>   |  33<H>  |  <0.30<L>    Ca    7.7<L>      14 May 2020 06:22  Phos  2.6     05-14  Mg     2.4     05-    TPro  5.2<L>  /  Alb  2.3<L>  /  TBili  0.4  /  DBili  x   /  AST  61<H>  /  ALT  51<H>  /  AlkPhos  221<H>  05-14    PT/INR - ( 14 May 2020 00:19 )   PT: 11.7 sec;   INR: 1.03 ratio         PTT - ( 14 May 2020 00:19 )  PTT:81.0 sec  Urinalysis Basic - ( 12 May 2020 17:30 )    Color: Red / Appearance: Turbid / S.016 / pH: x  Gluc: x / Ketone: Negative  / Bili: Small / Urobili: Negative   Blood: x / Protein: 100 mg/dL / Nitrite: Negative   Leuk Esterase: Trace / RBC: >50 /hpf / WBC 8 /HPF   Sq Epi: x / Non Sq Epi: x / Bacteria: x        RADIOLOGY & ADDITIONAL TESTS: Reviewed.

## 2020-05-15 NOTE — CHART NOTE - NSCHARTNOTEFT_GEN_A_CORE
:  Madeline Rutledge MD    INDICATION:    Respiratory Failure (J96.00)  Shock (R57.9)    PROCEDURE:  [ ] LIMITED ECHO  [X] LIMITED CHEST  [ ] LIMITED RETROPERITONEAL  [ ] LIMITED ABDOMINAL  [ ] LIMITED DVT  [ ] NEEDLE GUIDANCE VASCULAR  [ ] NEEDLE GUIDANCE THORACENTESIS  [ ] NEEDLE GUIDANCE PARACENTESIS  [ ] NEEDLE GUIDANCE PERICARDIOCENTESIS  [ ] OTHER    FINDINGS:    Bilateral B line pattern with irregular pleura.     There is a left lower lobe consolidation with small associated effusion not amenable to drainage.     INTERPRETATION:    Bilateral pneumonia with left lower lobe consolidation.     Images stored in [employer]

## 2020-05-15 NOTE — CHART NOTE - NSCHARTNOTEFT_GEN_A_CORE
Nutrition Follow Up Note   Patient seen for: nutrition follow up on COVID ICU     Source: medical record, communication with team. Unable to speak to pt due to current airborne isolation contact precautions related to COVID-19. Pt remains intubated.     Chart reviewed, events noted. "66 y/o F with no significant PMH presents 4/29 with hypoxic respiratory failure 2nd COVID PNA. s/p Tocilizumab (4/30), s/p empiric Ceftriaxone/Azithro (4/30-5/4) for ?superimposed CAP. Currently enrolled in Remdesivir trial (4/30). Intubated 5/4 for worsened hypoxic respiratory failure. Severe ARDS requiring proning on 5/6, 5/7 and 5/8."    Diet Order: Diet, NPO with Tube Feed:   Tube Feeding Modality: Orogastric  Vital AF (VITALAFRTH)  Total Volume for 24 Hours (mL): 1320  Continuous  Until Goal Tube Feed Rate (mL per Hour): 55  Tube Feed Duration (in Hours): 24  Tube Feed Start Time: 17:00  Banatrol TF     Qty per Day:  2 (05-13-20 @ 18:16)    CURRENT EN ORDER PROVIDES:   - 1320 ml formula, 1584 calories (22.5 kari/Kg), 99 grams protein (1.4 Gm/Kg) based on dosing wt 70.3Kg  - meets nutrition needs; additional 557 calories/day from Propofol    Nutrition Events:   - Enteral Nutrition: Pt has been tolerating EN at goal x 4 days, with interruptions for extubation trials.  - Propofol: current rate 21.1 ml/hr will provide 557 calories from lipid in 24-hours  - Hypernatremia addressed with free water 250ml q6 hrs    GI: Last BM 5/14. Bowel regimen: none    Anthropometric Measurements:   Height (cm): 157.48 (04-28-20 @ 21:02)  Weight (kg): 70.3 (04-28-20 @ 21:02)  BMI (kg/m2): 28.3 (04-28-20 @ 21:02)  IBW: 50 Kg    Medications: MEDICATIONS  (STANDING):  BACItracin   Ointment 1 Application(s) Topical two times a day  chlorhexidine 0.12% Liquid 15 milliLiter(s) Oral Mucosa two times a day  chlorhexidine 4% Liquid 1 Application(s) Topical <User Schedule>  dexMEDEtomidine Infusion 0.5 MICROgram(s)/kG/Hr (8.79 mL/Hr) IV Continuous <Continuous>  famotidine Injectable 20 milliGRAM(s) IV Push daily  fentaNYL   Infusion... 0.5 MICROgram(s)/kG/Hr (1.76 mL/Hr) IV Continuous <Continuous>  heparin  Infusion. 900 Unit(s)/Hr (9 mL/Hr) IV Continuous <Continuous>  insulin lispro (HumaLOG) corrective regimen sliding scale   SubCutaneous every 6 hours  ketamine Infusion. 0.5 mG/kG/Hr (3.52 mL/Hr) IV Continuous <Continuous>  norepinephrine Infusion 0.06 MICROgram(s)/kG/Min (7.91 mL/Hr) IV Continuous <Continuous>  piperacillin/tazobactam IVPB.. 3.375 Gram(s) IV Intermittent every 8 hours  propofol Infusion 25 MICROgram(s)/kG/Min (10.5 mL/Hr) IV Continuous <Continuous>  propofol Infusion 10 MICROgram(s)/kG/Min (4.22 mL/Hr) IV Continuous <Continuous>    MEDICATIONS  (PRN):  acetaminophen    Suspension .. 650 milliGRAM(s) Oral every 6 hours PRN Temp greater or equal to 38C (100.4F)  ALBUTerol    90 MICROgram(s) HFA Inhaler 2 Puff(s) Inhalation every 6 hours PRN Bronchospasm  diphenhydrAMINE   Injectable 50 milliGRAM(s) IV Push once PRN anaphylaxis to study drug  sodium chloride 0.9% lock flush 10 milliLiter(s) IV Push every 1 hour PRN Pre/post blood products, medications, blood draw, and to maintain line patency    Labs: 05-15 @ 07:06: Sodium 144, Potassium 3.7, Calcium 8.1<L>, Magnesium 2.6, Phosphorus 2.3<L>, BUN 26<H>, Creatinine 0.35<L>, Glucose 153<H>, Creatine Kinase <<27>  05-15 @ 05:03: Ferritin 880<H>, Creatine Kinase <<27>  05-15 @ 00:47: Sodium 147<H>, Potassium 4.1, Calcium 7.7<L>, Magnesium 2.6, Phosphorus 2.5, BUN 30<H>, Creatinine 0.34<L>, Glucose 132<H>, Hemoglobin 9.4<L>, Hematocrit 30.4<L>, C-Reactive Protein 1.80, Creatine Kinase <<27>    Triglycerides, Serum: 171 mg/dL (05-15-20 @ 00:47)  Triglycerides, Serum: 170 mg/dL (05-14-20 @ 00:19)  Triglycerides, Serum: 212 mg/dL (05-13-20 @ 00:40)  Triglycerides, Serum: 210 mg/dL (05-12-20 @ 00:11)  Triglycerides, Serum: 183 mg/dL (05-11-20 @ 00:33)  Triglycerides, Serum: 139 mg/dL (05-10-20 @ 00:27)  Triglycerides, Serum: 106 mg/dL (05-09-20 @ 00:25)  Triglycerides, Serum: 121 mg/dL (05-08-20 @ 00:22)  Triglycerides, Serum: 89 mg/dL (05-07-20 @ 03:05)  Triglycerides, Serum: 532 mg/dL (05-06-20 @ 00:15)  Triglycerides, Serum: 179 mg/dL (05-04-20 @ 23:56)    POCT Blood Glucose.: 148 mg/dL (05-15-20 @ 05:03)  POCT Blood Glucose.: 113 mg/dL (05-14-20 @ 23:00)  POCT Blood Glucose.: 146 mg/dL (05-14-20 @ 17:08)  POCT Blood Glucose.: 106 mg/dL (05-14-20 @ 12:01)    Skin: no pressure injuries documented  Edema: 1+ left/right arm    Estimated Needs: based on dosing wt 70.3Kg, with consideration for intubation  Energy: 4643-4557 calories (20-25 kari/Kg)  Protein:  84-98 grams (1.2-1.4 Gm/Kg)    Previous Nutrition Diagnosis: 1) Inadequate Protein-Energy Intake 2) Moderate Malnutrition  Nutrition Diagnosis is: ongoing, addressed with EN at goal    New Nutrition Diagnosis:  none    Recommended Interventions:   1. Continue Vital AF  @ 55ml/hr x 24 hours to provide 1320 ml formula, 1584 calories (22.5 kari/Kg), 99 grams protein (1.4 Gm/Kg) based on dosing wt 70.3Kg  2. Monitor Propofol rate; adjust EN accordingly to prevent overfeeding  3. Trend triglycerides      Monitoring and Evaluation:   Continue to monitor nutrition provision and tolerance, weights, labs, skin integrity.   RD remains available upon request and will follow up per protocol.    Rebecca Pérez MS RD CDN [facility]; Pager # [phone] Nutrition Follow Up Note   Patient seen for: nutrition follow up on COVID ICU     Source: medical record, communication with team. Unable to speak to pt due to current airborne isolation contact precautions related to COVID-19. Pt remains intubated.     Chart reviewed, events noted. "66 y/o F with no significant PMH presents 4/29 with hypoxic respiratory failure 2nd COVID PNA. s/p Tocilizumab (4/30), s/p empiric Ceftriaxone/Azithro (4/30-5/4) for ?superimposed CAP. Currently enrolled in Remdesivir trial (4/30). Intubated 5/4 for worsened hypoxic respiratory failure. Severe ARDS requiring proning on 5/6, 5/7 and 5/8."    Diet Order: Diet, NPO with Tube Feed:   Tube Feeding Modality: Orogastric  Vital AF (VITALAFRTH)  Total Volume for 24 Hours (mL): 1320  Continuous  Until Goal Tube Feed Rate (mL per Hour): 55  Tube Feed Duration (in Hours): 24  Tube Feed Start Time: 17:00  Banatrol TF     Qty per Day:  2 (05-13-20 @ 18:16)    CURRENT EN ORDER PROVIDES:   - 1320 ml formula, 1584 calories (22.5 kari/Kg), 99 grams protein (1.4 Gm/Kg) based on dosing wt 70.3Kg  - meets nutrition needs; additional 557 calories/day from Propofol    Nutrition Events:   - Enteral Nutrition: Pt has been tolerating EN at goal x 4 days, with interruptions for extubation trials.  - Propofol: current rate 21.1 ml/hr will provide 557 calories from lipid in 24-hours  - Hypernatremia addressed with free water 250ml q6 hrs    GI: Last BM 5/14. Bowel regimen: none. Banatrol ordered bid for loose stool.    Anthropometric Measurements:   Height (cm): 157.48 (04-28-20 @ 21:02)  Weight (kg): 70.3 (04-28-20 @ 21:02)  BMI (kg/m2): 28.3 (04-28-20 @ 21:02)  IBW: 50 Kg    Medications: MEDICATIONS  (STANDING):  BACItracin   Ointment 1 Application(s) Topical two times a day  chlorhexidine 0.12% Liquid 15 milliLiter(s) Oral Mucosa two times a day  chlorhexidine 4% Liquid 1 Application(s) Topical <User Schedule>  dexMEDEtomidine Infusion 0.5 MICROgram(s)/kG/Hr (8.79 mL/Hr) IV Continuous <Continuous>  famotidine Injectable 20 milliGRAM(s) IV Push daily  fentaNYL   Infusion... 0.5 MICROgram(s)/kG/Hr (1.76 mL/Hr) IV Continuous <Continuous>  heparin  Infusion. 900 Unit(s)/Hr (9 mL/Hr) IV Continuous <Continuous>  insulin lispro (HumaLOG) corrective regimen sliding scale   SubCutaneous every 6 hours  ketamine Infusion. 0.5 mG/kG/Hr (3.52 mL/Hr) IV Continuous <Continuous>  norepinephrine Infusion 0.06 MICROgram(s)/kG/Min (7.91 mL/Hr) IV Continuous <Continuous>  piperacillin/tazobactam IVPB.. 3.375 Gram(s) IV Intermittent every 8 hours  propofol Infusion 25 MICROgram(s)/kG/Min (10.5 mL/Hr) IV Continuous <Continuous>  propofol Infusion 10 MICROgram(s)/kG/Min (4.22 mL/Hr) IV Continuous <Continuous>    MEDICATIONS  (PRN):  acetaminophen    Suspension .. 650 milliGRAM(s) Oral every 6 hours PRN Temp greater or equal to 38C (100.4F)  ALBUTerol    90 MICROgram(s) HFA Inhaler 2 Puff(s) Inhalation every 6 hours PRN Bronchospasm  diphenhydrAMINE   Injectable 50 milliGRAM(s) IV Push once PRN anaphylaxis to study drug  sodium chloride 0.9% lock flush 10 milliLiter(s) IV Push every 1 hour PRN Pre/post blood products, medications, blood draw, and to maintain line patency    Labs: 05-15 @ 07:06: Sodium 144, Potassium 3.7, Calcium 8.1<L>, Magnesium 2.6, Phosphorus 2.3<L>, BUN 26<H>, Creatinine 0.35<L>, Glucose 153<H>, Creatine Kinase <<27>  05-15 @ 05:03: Ferritin 880<H>, Creatine Kinase <<27>  05-15 @ 00:47: Sodium 147<H>, Potassium 4.1, Calcium 7.7<L>, Magnesium 2.6, Phosphorus 2.5, BUN 30<H>, Creatinine 0.34<L>, Glucose 132<H>, Hemoglobin 9.4<L>, Hematocrit 30.4<L>, C-Reactive Protein 1.80, Creatine Kinase <<27>    Triglycerides, Serum: 171 mg/dL (05-15-20 @ 00:47)  Triglycerides, Serum: 170 mg/dL (05-14-20 @ 00:19)  Triglycerides, Serum: 212 mg/dL (05-13-20 @ 00:40)  Triglycerides, Serum: 210 mg/dL (05-12-20 @ 00:11)  Triglycerides, Serum: 183 mg/dL (05-11-20 @ 00:33)  Triglycerides, Serum: 139 mg/dL (05-10-20 @ 00:27)  Triglycerides, Serum: 106 mg/dL (05-09-20 @ 00:25)  Triglycerides, Serum: 121 mg/dL (05-08-20 @ 00:22)  Triglycerides, Serum: 89 mg/dL (05-07-20 @ 03:05)  Triglycerides, Serum: 532 mg/dL (05-06-20 @ 00:15)  Triglycerides, Serum: 179 mg/dL (05-04-20 @ 23:56)    POCT Blood Glucose.: 148 mg/dL (05-15-20 @ 05:03)  POCT Blood Glucose.: 113 mg/dL (05-14-20 @ 23:00)  POCT Blood Glucose.: 146 mg/dL (05-14-20 @ 17:08)  POCT Blood Glucose.: 106 mg/dL (05-14-20 @ 12:01)    Skin: no pressure injuries documented  Edema: 1+ left/right arm    Estimated Needs: based on dosing wt 70.3Kg, with consideration for intubation  Energy: 8530-7249 calories (20-25 kari/Kg)  Protein:  84-98 grams (1.2-1.4 Gm/Kg)  Eyal State Equation (REE): 1576 calories (22 kari/Kg)    Previous Nutrition Diagnosis: 1) Inadequate Protein-Energy Intake 2) Moderate Malnutrition  Nutrition Diagnosis is: ongoing, addressed with EN at goal    New Nutrition Diagnosis:  none    Recommended Interventions:   1. Continue Vital AF  @ 55ml/hr x 24 hours to provide 1320 ml formula, 1584 calories (22.5 kari/Kg), 99 grams protein (1.4 Gm/Kg) based on dosing wt 70.3Kg  2. Monitor Propofol rate; adjust EN accordingly to prevent overfeeding  3. Trend triglycerides  4. Continue Banatrol as needed      Monitoring and Evaluation:   Continue to monitor nutrition provision and tolerance, weights, labs, skin integrity.   RD remains available upon request and will follow up per protocol.    Rebecca Pérez MS RD CDN  [facility]; Pager # [phone]

## 2020-05-15 NOTE — PROGRESS NOTE ADULT - SUBJECTIVE AND OBJECTIVE BOX
CHIEF COMPLAINT: Patient is a 65y old  Female who presents with a chief complaint of COVID-19, Hypoxic respiratory failure (15 May 2020 11:47)    Interval Events: Decompensated yesterday, had to go back on full vent support with increased sedation and high fi02 requirements. Started on Zosyn overnight for possible sinusitis-draining purulent secretions from nose. NGT changed to OGT. Dense LLL PNA on POCUS     REVIEW OF SYSTEMS:  [x ] Unable to assess ROS because intubated/sedated    OBJECTIVE:  ICU Vital Signs Last 24 Hrs  T(C): 36.3 (15 May 2020 12:00), Max: 37.8 (14 May 2020 20:00)  T(F): 97.3 (15 May 2020 12:00), Max: 100 (14 May 2020 20:00)  HR: 62 (15 May 2020 12:00) (60 - 93)  BP: 132/62 (15 May 2020 12:00) (83/46 - 169/72)  BP(mean): 89 (15 May 2020 12:00) (60 - 103)  ABP: --  ABP(mean): --  RR: 36 (15 May 2020 12:00) (24 - 43)  SpO2: 98% (15 May 2020 12:00) (78% - 99%) on 50%    Mode: AC/ CMV (Assist Control/ Continuous Mandatory Ventilation), RR (machine): 33, TV (machine): 340, FiO2: 60, PEEP: 7, ITime: 0.8, MAP: 16, PIP: 40    05-14 @ 07:01  -  05-15 @ 07:00  --------------------------------------------------------  IN: 2788.7 mL / OUT: 1315 mL / NET: 1473.7 mL    05-15 @ 07:01  -  05-15 @ 12:45  --------------------------------------------------------  IN: 1108.5 mL / OUT: 310 mL / NET: 798.5 mL      CAPILLARY BLOOD GLUCOSE      POCT Blood Glucose.: 139 mg/dL (15 May 2020 11:17)      PHYSICAL EXAM:  General: Intubated  HEENT: WNL  Neck: WNL  Respiratory: Coarse bilaterally   Cardiovascular: RRR  Abdomen: +BS, ND  Extremities: WNL  Skin: facial DTI  Neurological: sedated    HOSPITAL MEDICATIONS:  MEDICATIONS  (STANDING):  BACItracin   Ointment 1 Application(s) Topical two times a day  chlorhexidine 0.12% Liquid 15 milliLiter(s) Oral Mucosa two times a day  chlorhexidine 4% Liquid 1 Application(s) Topical <User Schedule>  dexMEDEtomidine Infusion 0.5 MICROgram(s)/kG/Hr (8.79 mL/Hr) IV Continuous <Continuous>  famotidine Injectable 20 milliGRAM(s) IV Push daily  fentaNYL   Infusion... 0.5 MICROgram(s)/kG/Hr (1.76 mL/Hr) IV Continuous <Continuous>  heparin  Infusion. 900 Unit(s)/Hr (9 mL/Hr) IV Continuous <Continuous>  insulin lispro (HumaLOG) corrective regimen sliding scale   SubCutaneous every 6 hours  ketamine Infusion. 0.5 mG/kG/Hr (3.52 mL/Hr) IV Continuous <Continuous>  norepinephrine Infusion 0.06 MICROgram(s)/kG/Min (7.91 mL/Hr) IV Continuous <Continuous>  piperacillin/tazobactam IVPB.. 3.375 Gram(s) IV Intermittent every 8 hours  propofol Infusion 25 MICROgram(s)/kG/Min (10.5 mL/Hr) IV Continuous <Continuous>  propofol Infusion 10 MICROgram(s)/kG/Min (4.22 mL/Hr) IV Continuous <Continuous>    MEDICATIONS  (PRN):  acetaminophen    Suspension .. 650 milliGRAM(s) Oral every 6 hours PRN Temp greater or equal to 38C (100.4F)  ALBUTerol    90 MICROgram(s) HFA Inhaler 2 Puff(s) Inhalation every 6 hours PRN Bronchospasm  diphenhydrAMINE   Injectable 50 milliGRAM(s) IV Push once PRN anaphylaxis to study drug  sodium chloride 0.9% lock flush 10 milliLiter(s) IV Push every 1 hour PRN Pre/post blood products, medications, blood draw, and to maintain line patency      LABS:  (05-15 @ 00:47)                        9.4  26.19 )-----------( 278                 30.4    Neutrophils = -- (--%)  Lymphocytes = -- (--%)  Eosinophils = -- (--%)  Basophils = -- (--%)  Monocytes = -- (--%)  Bands = --%    WBC Trend: 26.19<--, 26.76<--, 19.28<--  Hb Trend: 9.4<--, 10.6<--, 10.5<--, 10.4<--, 11.3<--  Plt Trend: 278<--, 245<--, 252<--, 268<--, 298<--  05-15    144  |  106  |  26<H>  ----------------------------<  153<H>  3.7   |  30  |  0.35<L>    Ca    8.1<L>      15 May 2020 07:06  Phos  2.3     05-15  Mg     2.6     05-15    TPro  5.2<L>  /  Alb  2.3<L>  /  TBili  0.4  /  DBili  x   /  AST  61<H>  /  ALT  51<H>  /  AlkPhos  221<H>  05-14    Creatinine Trend: 0.35<--, 0.34<--, 0.30<--, <0.30<--, 0.30<--, 0.30<--  PT/INR - ( 15 May 2020 00:47 )   PT: 12.3 sec;   INR: 1.07 ratio         PTT - ( 15 May 2020 07:02 )  PTT:83.3 sec    Arterial Blood Gas:  05-15 @ 07:05  7.44/50/112/34/99/8.6  ABG lactate: --  Arterial Blood Gas:  05-15 @ 04:23  7.39/57/66/34/92/8.1  ABG lactate: --  Arterial Blood Gas:  05-15 @ 00:39  7.42/54/128/35/99/9.2  ABG lactate: --  Arterial Blood Gas:  05-14 @ 15:49  7.47/51/80/36/96/11.4  ABG lactate: --  Arterial Blood Gas:  05-14 @ 03:05  7.50/46/75/36/96/11.7  ABG lactate: --      CARDIAC MARKERS ( 15 May 2020 00:47 )  Trop x     /  U/L / CKMB x       CARDIAC MARKERS ( 14 May 2020 00:19 )  Trop x     /  U/L<H> / CKMB x             MICROBIOLOGY:   Sputum Cx: No growth, prelim    RADIOLOGY:  X Ray: LLL atelectasis/infiltrate

## 2020-05-15 NOTE — PROGRESS NOTE ADULT - ASSESSMENT
65F reporting no PMH who presented last night with complaint of fever/chills (home T-max 102 F), night sweats, cough, myalgias (mid & lower back), loss of taste and smell since Friday 4/24 (still tastes sweet/salty), as well as 2 days of weakness and constipation, found to be COVID positive on 4/28/20.    Pt has agreed to participate in a clinical trial for Covid -19  Please discuss with ID team if planning on taking concurrent treatment with other agents with actual or possible direct acting antiviral activity against SARS-CoV-2 such a chloroquine or hydroxychloroquine. Pt was given Tocilizumab by primary team on the day of enrollment as of concern for cytokine storm.     Hospital course  4/29: Started Ceftriaxone (4/29-5/3) and Azithromycin (4/29-5/4) in ED for possible bacterial PNA  4/30: RRT called for desaturation in 70s before receiving 1st dose of Remdesivir and Tocilizumab  5/4: RRT called for desaturation to 20s after drinking water and coughing, intubated by anesthesia. Sedated and started full dose lovenox after D-dimer elevated to [id number]. Transferred to ICU. On Meropenem, Vancomycin, and Caspofungin for emperic sepsis coverage.  5/7: Caspofungin switched to Fluconazole for growth of candida albicans in urine; Vancomycin d/c'ed for negative MRSA swab  5/10: Started CPAP trials  5/12 - abs d/scooby   5/13 hematuria  5/14 O2 requirements increased    # Sepsis   - WBC persistently elevated, now downtrending. Afebrile  - Suspected source include pneumonia (viral, bacterial, fungal, or aspiration) vs bacteremia vs urosepsis vs lines vs myocarditis vs microthrombi with the elevated D-Dimer  - S/p full course of meropenem (5/4-5/12), Fluconazole (5/8-5/13), Vancomycin (5/11-5/12), now monitoring off Abx  - Pt is s/p Tocilizumab which increases risk of infection  - Now with multiple negative BCx (most recent 5/10), UCx (most recent 5/11), sputum Cx (5/11), fungitell (5/10)  - C Diff negative 5/12  - s/p Tocilizumab (4/30), Ceftriaxone (4/29-5/3), Azithromycin (4/29-5/4), Vancomycin (5/3-5/7), and Caspofungin (5/4-5/7).  - s/p fluconazole for Urine cx growing c albicans   - Lines removed 5/13    Pt now with increasing WBC and concern for possible sepsis, possibly from sinuses. LLL also not clear- ? effusion vs infiltrate, ? need to tap??  PLEASE CHECK BC x 2 sets  , sputum cultures sent,  check u/a and urine culture  please do ultrasound to assess left effusion  agree with restarting abs  change lines if possible      # Acute hypoxic respiratory failure  - Multifactorial, likely secondary to COVID-19 infection with viral PNA, with possible superimposed bacterial vs fungal vs aspiration PNA and/or PE   - COVID+ 4/28 w/ CXR demonstrating infiltrates, now diffuse patchy opacities (5/3)    - Intubated 5/4 for worsened hypoxic respiratory failure, requiring proning. Now tolerating CPAP trial   - s/p 10 day course of remdesivir   - Inflammatory markers elevated but now downtrending, please continue to monitor   - S/p tocilizumab 4/30  - Trops negative, BNP continue to be elvated. Can consider repeat cardiac echo to assess cardiac function to rule out myocarditis and/or heart failure (POCUS on 5/4 documented borderline LV hyperdynamic systolic function and possible LV apical hypocontractility)  - repeat CPK , why was it elevated ? repeat trops    # Elevated LFTs  - Likely due to either COVID 19 vs Ceftriaxone vs Remdesivir vs sepsis, improved.   - Now finished with study drug   - repeat       # Elevated CRP/Ferritin/D-dimer  - Could be COVID-related. CRP, ferritin, trending down.   - D-dimer remains elevated, although much lower than previous peak, could suggest PE or microthrombi. Pt now on heparin drip. Continued AC per primary team.     #VTE prophylaxis   - As above, pt with elevated d-dimer, please continue to monitor   - Now on heparin drip    - Further AC per primary team     # hematuria  - repeat u/a and culture    Today is day #16 of study. Ordinal scale score: 2.

## 2020-05-15 NOTE — PROGRESS NOTE ADULT - SUBJECTIVE AND OBJECTIVE BOX
Patient is a 65y old  Female who presents with a chief complaint of COVID-19, Hypoxic respiratory failure (14 May 2020 10:05)    Being followed by ID for        Interval history:  No other acute events      ROS:  No cough,SOB,CP  No N/V/D  No abd pain  No urinary complaints  No HA  No joint or limb pain  No other complaints    PAST MEDICAL & SURGICAL HISTORY:  No pertinent past medical history  History of cholecystectomy  History of appendectomy    Allergies    No Known Allergies    Intolerances      Antimicrobials:    piperacillin/tazobactam IVPB.. 3.375 Gram(s) IV Intermittent every 8 hours    MEDICATIONS  (STANDING):  BACItracin   Ointment 1 Application(s) Topical two times a day  chlorhexidine 0.12% Liquid 15 milliLiter(s) Oral Mucosa two times a day  chlorhexidine 4% Liquid 1 Application(s) Topical <User Schedule>  dexMEDEtomidine Infusion 0.5 MICROgram(s)/kG/Hr (8.79 mL/Hr) IV Continuous <Continuous>  famotidine Injectable 20 milliGRAM(s) IV Push daily  fentaNYL   Infusion... 0.5 MICROgram(s)/kG/Hr (1.76 mL/Hr) IV Continuous <Continuous>  heparin  Infusion. 900 Unit(s)/Hr (9 mL/Hr) IV Continuous <Continuous>  insulin lispro (HumaLOG) corrective regimen sliding scale   SubCutaneous every 6 hours  ketamine Infusion. 0.5 mG/kG/Hr (3.52 mL/Hr) IV Continuous <Continuous>  norepinephrine Infusion 0.06 MICROgram(s)/kG/Min (7.91 mL/Hr) IV Continuous <Continuous>  piperacillin/tazobactam IVPB.. 3.375 Gram(s) IV Intermittent every 8 hours  propofol Infusion 25 MICROgram(s)/kG/Min (10.5 mL/Hr) IV Continuous <Continuous>  propofol Infusion 10 MICROgram(s)/kG/Min (4.22 mL/Hr) IV Continuous <Continuous>      Vital Signs Last 24 Hrs  T(C): 36.5 (05-15-20 @ 07:30), Max: 37.8 (05-14-20 @ 20:00)  T(F): 97.7 (05-15-20 @ 07:30), Max: 100 (05-14-20 @ 20:00)  HR: 63 (05-15-20 @ 11:00) (60 - 93)  BP: 93/50 (05-15-20 @ 11:00) (83/46 - 169/72)  BP(mean): 68 (05-15-20 @ 11:00) (60 - 103)  RR: 35 (05-15-20 @ 11:00) (24 - 43)  SpO2: 91% (05-15-20 @ 11:00) (78% - 99%)    Physical Exam:    Constitutional well preserved,comfortable,pleasant    HEENT PERRLA EOMI,No pallor or icterus    No oral exudate or erythema    Neck supple no JVD or LN    Chest Good AE,CTA    CVS RRR S1 S2 WNl No murmur or rub or gallop    Abd soft BS normal No tenderness no masses    Ext No cyanosis clubbing or edema    IV site no erythema tenderness or discharge    Joints no swelling or LOM    CNS AAO X 3 no focal    Lab Data:                          9.4    26.19 )-----------( 278      ( 15 May 2020 00:47 )             30.4       05-15    144  |  106  |  26<H>  ----------------------------<  153<H>  3.7   |  30  |  0.35<L>    Ca    8.1<L>      15 May 2020 07:06  Phos  2.3     05-15  Mg     2.6     05-15    TPro  5.2<L>  /  Alb  2.3<L>  /  TBili  0.4  /  DBili  x   /  AST  61<H>  /  ALT  51<H>  /  AlkPhos  221<H>  05-14          .Sputum Sputum  05-15-20 --  --    Few polymorphonuclear leukocytes per low power field  No Squamous epithelial cells per low power field  No organisms seen      .Urine Catheterized  05-11-20   No growth  --  --      .Sputum Sputum  05-11-20   No growth at 48 hours  --    Few polymorphonuclear leukocytes per low power field  No Squamous epithelial cells per low power field  No organisms seen      .Blood Blood-Peripheral  05-10-20   No Growth Final  --  --        WBC Count: 26.19 (05-15-20 @ 00:47)  WBC Count: 26.76 (05-14-20 @ 15:53)  WBC Count: 19.28 (05-14-20 @ 00:19)  WBC Count: 18.06 (05-13-20 @ 12:27)  WBC Count: 22.01 (05-13-20 @ 00:40)  WBC Count: 25.78 (05-12-20 @ 14:58)  WBC Count: 25.65 (05-12-20 @ 00:11)  WBC Count: 25.80 (05-11-20 @ 00:33)  WBC Count: 18.02 (05-10-20 @ 00:27)  WBC Count: 19.45 (05-09-20 @ 00:25) Patient is a 65y old  Female who presents with a chief complaint of COVID-19, Hypoxic respiratory failure (14 May 2020 10:05)    Being followed by ID for        Interval history:  pt is doing poorly  higher O2 requirement  WBC remains elevated  ? of sinusitis raised  one bowel movement last night, none today  still with hematuria  some bloody secretions  No other acute events      PAST MEDICAL & SURGICAL HISTORY:  No pertinent past medical history  History of cholecystectomy  History of appendectomy    Allergies    No Known Allergies    Intolerances      Antimicrobials:    piperacillin/tazobactam IVPB.. 3.375 Gram(s) IV Intermittent every 8 hours    MEDICATIONS  (STANDING):  BACItracin   Ointment 1 Application(s) Topical two times a day  chlorhexidine 0.12% Liquid 15 milliLiter(s) Oral Mucosa two times a day  chlorhexidine 4% Liquid 1 Application(s) Topical <User Schedule>  dexMEDEtomidine Infusion 0.5 MICROgram(s)/kG/Hr (8.79 mL/Hr) IV Continuous <Continuous>  famotidine Injectable 20 milliGRAM(s) IV Push daily  fentaNYL   Infusion... 0.5 MICROgram(s)/kG/Hr (1.76 mL/Hr) IV Continuous <Continuous>  heparin  Infusion. 900 Unit(s)/Hr (9 mL/Hr) IV Continuous <Continuous>  insulin lispro (HumaLOG) corrective regimen sliding scale   SubCutaneous every 6 hours  ketamine Infusion. 0.5 mG/kG/Hr (3.52 mL/Hr) IV Continuous <Continuous>  norepinephrine Infusion 0.06 MICROgram(s)/kG/Min (7.91 mL/Hr) IV Continuous <Continuous>  piperacillin/tazobactam IVPB.. 3.375 Gram(s) IV Intermittent every 8 hours  propofol Infusion 25 MICROgram(s)/kG/Min (10.5 mL/Hr) IV Continuous <Continuous>  propofol Infusion 10 MICROgram(s)/kG/Min (4.22 mL/Hr) IV Continuous <Continuous>      Vital Signs Last 24 Hrs  T(C): 36.5 (05-15-20 @ 07:30), Max: 37.8 (05-14-20 @ 20:00)  T(F): 97.7 (05-15-20 @ 07:30), Max: 100 (05-14-20 @ 20:00)  HR: 63 (05-15-20 @ 11:00) (60 - 93)  BP: 93/50 (05-15-20 @ 11:00) (83/46 - 169/72)  BP(mean): 68 (05-15-20 @ 11:00) (60 - 103)  RR: 35 (05-15-20 @ 11:00) (24 - 43)  SpO2: 91% (05-15-20 @ 11:00) (78% - 99%)    Physical Exam:    Constitutional intubated , sedateda    Neck supple no JVD or LN    Chest Good AE,CTA    CVS S1 S2     Abd softly distended    Ext No cyanosis clubbing or edema    IV site no erythema tenderness or discharge    Joints no swelling or LOM        Lab Data:                          9.4    26.19 )-----------( 278      ( 15 May 2020 00:47 )             30.4       05-15    144  |  106  |  26<H>  ----------------------------<  153<H>  3.7   |  30  |  0.35<L>    Ca    8.1<L>      15 May 2020 07:06  Phos  2.3     05-15  Mg     2.6     05-15    TPro  5.2<L>  /  Alb  2.3<L>  /  TBili  0.4  /  DBili  x   /  AST  61<H>  /  ALT  51<H>  /  AlkPhos  221<H>  05-14          .Sputum Sputum  05-15-20 --  --    Few polymorphonuclear leukocytes per low power field  No Squamous epithelial cells per low power field  No organisms seen      .Urine Catheterized  05-11-20   No growth  --  --      .Sputum Sputum  05-11-20   No growth at 48 hours  --    Few polymorphonuclear leukocytes per low power field  No Squamous epithelial cells per low power field  No organisms seen      .Blood Blood-Peripheral  05-10-20   No Growth Final  --  --        WBC Count: 26.19 (05-15-20 @ 00:47)  WBC Count: 26.76 (05-14-20 @ 15:53)  WBC Count: 19.28 (05-14-20 @ 00:19)  WBC Count: 18.06 (05-13-20 @ 12:27)  WBC Count: 22.01 (05-13-20 @ 00:40)  WBC Count: 25.78 (05-12-20 @ 14:58)  WBC Count: 25.65 (05-12-20 @ 00:11)  WBC Count: 25.80 (05-11-20 @ 00:33)  WBC Count: 18.02 (05-10-20 @ 00:27)  WBC Count: 19.45 (05-09-20 @ 00:25)      Ferritin, Serum (05.15.20 @ 05:03)    Ferritin, Serum: 880 ng/mL      C-Reactive Protein, Serum (05.15.20 @ 00:47)    C-Reactive Protein, Serum: 1.80 ug/mL      D-Dimer Assay, Quantitative (05.15.20 @ 00:47)    D-Dimer Assay, Quantitative: 2353: D-Dimer result less than 230 ng/mL DDU correlates with the absence  of thrombosis in a patient with a low and moderate       pre-test probability of thrombosis.  1 DDU is approximately equal to  2 ng/mL FEU (previous units). ng/mL DDU      < from: Xray Chest 1 View- PORTABLE-Urgent (05.14.20 @ 19:06) >  EXAM:  XR CHEST PORTABLE URGENT 1V                            PROCEDURE DATE:  05/14/2020            INTERPRETATION:  CLINICAL INDICATION: 65 years  Female with ETT.    COMPARISON: 5/10/2020    The tip of the ET tube is in the mid trachea 3.7 cm above the mary. The left jugular catheter tip overlies the superior vena cava. The enteric tube extends below the diaphragm. The tip is not included in the image.    Diffuse bilateral airspace infiltrates with left lower lobe confluence, mildly increased from the prior study. There is no right pleural effusion. Evaluation for left effusion is limited by the left lower lobe consolidation. There is no pneumothorax.    The heart is normal in size. There is no mediastinal or hilar mass.     Mild thoracic degenerative changes are present.    IMPRESSION:    Tubes and catheters in satisfactory position.    Bilateral infiltrates mildly increased in the prior study. Limited evaluation for left effusion.        DISCRETE X-RAY DATA:  Percent of LEFT lungopacification: %  Percent of RIGHT lung opacification: %  Change in lung opacification from most recent x-ray (<=3 days): Increase  Change from prior dated 3 or more days (same admission): Increase      < end of copied text >

## 2020-05-15 NOTE — PROGRESS NOTE ADULT - ASSESSMENT
64 y/o F with no significant PMH presents 4/29 with hypoxic respiratory failure 2nd COVID PNA. s/p Tocilizumab (4/30), s/p empiric Ceftriaxone/Azithro (4/30-5/4) for ?superimposed CAP. Currently enrolled in Remdesivir trial (4/30). Intubated 5/4 for worsened hypoxic respiratory failure. Severe ARDS requiring proning on 5/6, 5/7 and 5/8.     #Neuro   - Sedated on Precedex, Fentanyl, Ketamine, Propofol     #CV  - Now hypotensive on increased sedation  - Levo to maintain MAP>60  - Monitor triglycerides on propofol     #Pulm:   - Hypoxic respiratory failure 2nd COVID PNA  - Required proning on 5/6, 5/7, 5/8  - Worsened pulmonary wise from yesterday, derecruited and now on full support possibly from worsened LLL PNA  - Currently on AC 33/340/50%/7  - f/u sputum culture   - POCUS: L effusion is minimal, more infiltrate       #GI  - Transaminitis   - c/w Vital AF at goal rate 55 /hr  - Last BM 5/14  - Pepcid 20mg qd     #  - Net 1.5L positive over the last 24hrs  - Hypernatremic today, restarted on 250cc q6 free water bolus   - Monitor hematuria     #ID   - LLL VAP + clinical evidence of sinusitis   - Restarted Zosyn overnight as patient clinically decompensated   - Re-send pan cultures  - L effusion is minimal, no indication for diagnostic thoracentesis at this point   - S/p full course of meropenem (5/4-5/13), Fluconazole (5/8-5/13), Vancomycin (5/11-5/12)  - CDiff negative  - ID Following     COVID: + on 4/28   - s/p 10 days of Remdesivir trial  (4/30-5/9)   - s/p Tocilizumab on 4/30    #  LINES  - [facility] TLC from 5/4     #Heme   - Empiric heparin gtt for elevated D-dimer   - Goal aPTT 58-99    #Endo  -  Blood glucose controlled on Humalog  Low SS    #Ethics  -Full code

## 2020-05-15 NOTE — PROGRESS NOTE ADULT - ATTENDING COMMENTS
Alla Oswald M.D. ,   Pager [phone]     after 5PM/ weekends [phone]      ID service will be covering over the weekend. Please call for acute issues or questions. [phone]

## 2020-05-15 NOTE — PROGRESS NOTE ADULT - ATTENDING COMMENTS
1. Acute hypoxemic respiratory failure from ARDS, SARS-2 Covid pneumonia. , Pt s/p Tocilizumab and Remdesivir.  Pt back on AC setting with PEEP 12 FI02 60%. Large LLL consolidation. Minimal pleural effusion. Sedated again for vent compliance.    2. HTN . Continue hydralazine and  labetalol.  3. ID. Candida UTI. Continue Diflucan.   Fevers. Re culture. Clinically with acute sinusitis. Started on Zosyn.

## 2020-05-16 NOTE — PROCEDURE NOTE - NSTRACHPOSTINTU_RESP_A_CORE
Chest excursion noted/bronchoscopy/Breath sounds equal
Breath sounds bilateral/Appropriate capnography/Chest excursion noted

## 2020-05-16 NOTE — CHART NOTE - NSCHARTNOTEFT_GEN_A_CORE
Indication:  Recurrent pneumonia     Operators:   Kezia Rutledge      Findings:  Bronchoscope inserted through ETT. ETT noted to be in good position. Airway evaluation revealed some erythematous mucosa but no real abnormalities.  Lavage taken in LLL superior segment and basal segments.     No purulence or mucus seen in any of the examined segments.    Bronchoscope then withdrawn from ETT.    Specimens:  Culture                        Pre-Bronchoscopy Tuberculosis Risk Screening Tool  To reduce the risk for airborne transmission of Mycobacterium tuberculosis, this assessment form must be completed prior to bronchoscopy procedures being performed outside of a negative pressure environment.    Procedure Date: 5/16/2020  Reason for the Bronchoscopy: pneumonia  Planned Location for the Procedure:  ?[ ] Emergency Department ?[X] Intensive Care Unit ? [ ] Operating Room ? Other: ___________________    Risk Assessment  I. I have personally evaluated this patient for Mycobacterium tuberculosis and I determined thefollowing risk:  ?[X] No Risk for TB     [ ] Low risk or TB     [ ] Significant risk for TB    II. Additional Findings: _________________________    III. Based on the Determined Risk for TB the following Action(s) are Suggested:  1. If there are no risk factors for TB infection proceed with the procedure.  2. If there is low risk or significant risk for TB infection the following recommendations should be followed:            a. Perform the procedure in a negative pressure room, with N95 respirator.            b. If not feasible to move the patient or defer the procedure:                    i. Use a single-bedded room low traffic area to perform the bronchoscopy procedure.                   ii. Place a portable high-efficiency particulate air (HEPA) filter in the space prior tostarting the procedure and keep closed.                       Refer to the INF.1132 titled “Tuberculosis Control Strategy Plan” for additional information.                  iii. All Health Care Provider within the procedure room shall wear an N95 respirator.            c. Documentation of the tuberculosis risk assessment should be included within the patient’s medical record. Indication:  Recurrent pneumonia     Operators:   Kezia Rutledge      Findings:  Bronchoscope inserted through ETT. ETT noted to be in good position. Airway evaluation revealed some erythematous mucosa but no real abnormalities.  Lavage taken in LLL superior segment and basal segments.     No purulence or mucus seen in any of the examined segments.    Bronchoscope then withdrawn from ETT.    Specimens:  Culture                        Pre-Bronchoscopy Tuberculosis Risk Screening Tool  To reduce the risk for airborne transmission of Mycobacterium tuberculosis, this assessment form must be completed prior to bronchoscopy procedures being performed outside of a negative pressure environment.    Procedure Date: 5/16/2020  Reason for the Bronchoscopy: pneumonia  Planned Location for the Procedure:  ?[ ] Emergency Department ?[X] Intensive Care Unit ? [ ] Operating Room ? Other: ___________________    Risk Assessment  I. I have personally evaluated this patient for Mycobacterium tuberculosis and I determined thefollowing risk:  ?[X] No Risk for TB     [ ] Low risk or TB     [ ] Significant risk for TB    II. Additional Findings: _________________________    III. Based on the Determined Risk for TB the following Action(s) are Suggested:  1. If there are no risk factors for TB infection proceed with the procedure.  2. If there is low risk or significant risk for TB infection the following recommendations should be followed:            a. Perform the procedure in a negative pressure room, with N95 respirator.            b. If not feasible to move the patient or defer the procedure:                    i. Use a single-bedded room low traffic area to perform the bronchoscopy procedure.                   ii. Place a portable high-efficiency particulate air (HEPA) filter in the space prior tostarting the procedure and keep closed.                       Refer to the INF.1132 titled “Tuberculosis Control Strategy Plan” for additional information.                  iii. All Health Care Provider within the procedure room shall wear an N95 respirator.            c. Documentation of the tuberculosis risk assessment should be included within the patient’s medical record.        PULM/CCM Attending: I have supervised and assisted with above procedure. I was present for entire procedure.    Caleb Mast MD

## 2020-05-16 NOTE — PROGRESS NOTE ADULT - ATTENDING COMMENTS
1. Acute hypoxemic respiratory failure from ARDS, SARS-2 Covid pneumonia. , Pt s/p Tocilizumab and Remdesivir.  Pt back on AC setting with PEEP 12 FI02 60%. Large LLL consolidation. Minimal pleural effusion. Sedated again for vent compliance.  Now medically paralyzed.  2. HTN . Continue hydralazine and  labetalol.  3. ID. Candida UTI. Continue Diflucan.   Fevers. Re culture. Clinically with acute sinusitis. Started on Zosyn. S/P bronchoscopy. Await cultures.

## 2020-05-16 NOTE — PROCEDURE NOTE - NSPOSTCAREGUIDE_GEN_A_CORE
Care for catheter as per unit/ICU protocols
Instructed patient/caregiver to follow-up with primary care physician
Care for catheter as per unit/ICU protocols
Care for catheter as per unit/ICU protocols
Instructed patient/caregiver to follow-up with primary care physician

## 2020-05-16 NOTE — PROGRESS NOTE ADULT - SUBJECTIVE AND OBJECTIVE BOX
CHIEF COMPLAINT: Patient is a 65y old  Female who presents with a chief complaint of COVID-19, Hypoxic respiratory failure (15 May 2020 12:45)    Interval Events: Paralyzed overnight to aid with vent synchrony. Tidal volume decreased to 310cc for elevated plateau pressures. Given Lasix 20mg IV x1 today     REVIEW OF SYSTEMS:  [x ] Unable to assess ROS because intubated/sedated    OBJECTIVE:  ICU Vital Signs Last 24 Hrs  T(C): 35.6 (16 May 2020 12:00), Max: 37.4 (16 May 2020 00:00)  T(F): 96.1 (16 May 2020 12:00), Max: 99.3 (16 May 2020 00:00)  HR: 67 (16 May 2020 12:49) (65 - 86)  BP: 113/58 (15 May 2020 23:45) (82/47 - 127/58)  BP(mean): 82 (15 May 2020 23:45) (61 - 85)  ABP: 132/52 (16 May 2020 12:00) (98/47 - 151/65)  ABP(mean): 81 (16 May 2020 12:00) (64 - 95)  RR: 33 (16 May 2020 12:00) (14 - 38)  SpO2: 97% (16 May 2020 12:49) (84% - 97%) on 65%    Mode: AC/ CMV (Assist Control/ Continuous Mandatory Ventilation), RR (machine): 33, TV (machine): 310, FiO2: 65, PEEP: 5, ITime: 0.8, MAP: 14, PIP: 39    05-15 @ 07:16 @ 07:00  --------------------------------------------------------  IN: 5003 mL / OUT: 1130 mL / NET: 3873 mL     @ 07:  -  16 @ 14:10  --------------------------------------------------------  IN: 628.1 mL / OUT: 600 mL / NET: 28.1 mL      CAPILLARY BLOOD GLUCOSE      POCT Blood Glucose.: 171 mg/dL (15 May 2020 22:40)      PHYSICAL EXAM:  General: Intubated  HEENT: WNL  Neck: WNL  Respiratory: Coarse bilaterlaly   Cardiovascular: RRR  Abdomen: Hypoactive BS  Extremities: +3 pitting edema  Skin: facial DTI  Neurological: sedated/paralyzed    HOSPITAL MEDICATIONS:  MEDICATIONS  (STANDING):  ALBUTerol    90 MICROgram(s) HFA Inhaler 2 Puff(s) Inhalation every 6 hours  BACItracin   Ointment 1 Application(s) Topical two times a day  chlorhexidine 0.12% Liquid 15 milliLiter(s) Oral Mucosa two times a day  chlorhexidine 4% Liquid 1 Application(s) Topical <User Schedule>  famotidine Injectable 20 milliGRAM(s) IV Push daily  fentaNYL   Infusion... 0.501 MICROgram(s)/kG/Hr (1.76 mL/Hr) IV Continuous <Continuous>  heparin  Infusion. 900 Unit(s)/Hr (9 mL/Hr) IV Continuous <Continuous>  insulin lispro (HumaLOG) corrective regimen sliding scale   SubCutaneous every 6 hours  ketamine Infusion. 0.501 mG/kG/Hr (3.52 mL/Hr) IV Continuous <Continuous>  norepinephrine Infusion 0.06 MICROgram(s)/kG/Min (7.91 mL/Hr) IV Continuous <Continuous>  piperacillin/tazobactam IVPB.. 3.375 Gram(s) IV Intermittent every 8 hours  polyethylene glycol 3350 17 Gram(s) Oral daily  propofol Infusion 24.893 MICROgram(s)/kG/Min (10.5 mL/Hr) IV Continuous <Continuous>  rocuronium Infusion 8 MICROgram(s)/kG/Min (6.75 mL/Hr) IV Continuous <Continuous>  senna Syrup 10 milliLiter(s) Oral daily    MEDICATIONS  (PRN):  acetaminophen    Suspension .. 650 milliGRAM(s) Oral every 6 hours PRN Temp greater or equal to 38C (100.4F)  diphenhydrAMINE   Injectable 50 milliGRAM(s) IV Push once PRN anaphylaxis to study drug  sodium chloride 0.9% lock flush 10 milliLiter(s) IV Push every 1 hour PRN Pre/post blood products, medications, blood draw, and to maintain line patency      LABS:  ( @ 00:27)                        9.0  25.00 )-----------( 285                 28.8    Neutrophils = -- (--%)  Lymphocytes = -- (--%)  Eosinophils = -- (--%)  Basophils = -- (--%)  Monocytes = -- (--%)  Bands = --%    WBC Trend: 25.00<--, 26.19<--, 26.76<--  Hb Trend: 9.0<--, 9.4<--, 10.6<--, 10.5<--, 10.4<--  Plt Trend: 285<--, 278<--, 245<--, 252<--, 268<--      142  |  106  |  21  ----------------------------<  203<H>  4.1   |  28  |  0.35<L>    Ca    7.4<L>      16 May 2020 05:45  Phos  2.4       Mg     2.2         TPro  5.1<L>  /  Alb  2.0<L>  /  TBili  0.3  /  DBili  x   /  AST  44<H>  /  ALT  43  /  AlkPhos  145<H>      Creatinine Trend: 0.35<--, 0.33<--, 0.35<--, 0.34<--, 0.30<--, <0.30<--  PT/INR - ( 16 May 2020 05:45 )   PT: 11.9 sec;   INR: 1.04 ratio         PTT - ( 16 May 2020 13:27 )  PTT:76.7 sec  Urinalysis Basic - ( 15 May 2020 13:39 )    Color: Red / Appearance: Turbid / S.031 / pH: x  Gluc: x / Ketone: Negative  / Bili: Negative / Urobili: 2 mg/dL   Blood: x / Protein: 100 mg/dL / Nitrite: Negative   Leuk Esterase: Negative / RBC: >50 /hpf / WBC 2 /HPF   Sq Epi: x / Non Sq Epi: 1 / Bacteria: Negative      Arterial Blood Gas:   @ 10:57  7.33/63/97/32/97/6.0  ABG lactate: --  Arterial Blood Gas:   @ 05:40  7.28/68/73/31/92/3.4  ABG lactate: --  Arterial Blood Gas:   @ 00:24  7.38/56/78/33/95/7.3  ABG lactate: --  Arterial Blood Gas:  05-15 @ 13:26  7.43/49/72/32/94/7.1  ABG lactate: --  Arterial Blood Gas:  05-15 @ 07:05  7.44/50/112/34/99/8.6  ABG lactate: --  Arterial Blood Gas:  05-15 @ 04:23  7.39/57/66/34/92/8.1  ABG lactate: --  Arterial Blood Gas:  05-15 @ 00:39  7.42/54/128/35/99/9.2  ABG lactate: --  Arterial Blood Gas:   @ 15:49  7.47/51/80/36/96/11.4  ABG lactate: --      CARDIAC MARKERS ( 15 May 2020 00:47 )  Trop x     /  U/L / CKMB x             MICROBIOLOGY:   Blood Cx: Pending   Urine Cx: Pending  Sputum Cx: Normal resp susanne    CXR : Increased LLL consolidation

## 2020-05-16 NOTE — PROGRESS NOTE ADULT - ASSESSMENT
66 y/o F with no significant PMH presents 4/29 with hypoxic respiratory failure 2nd COVID PNA. s/p Tocilizumab (4/30), s/p empiric Ceftriaxone/Azithro (4/30-5/4) for ?superimposed CAP. Currently enrolled in Remdesivir trial (4/30). Intubated 5/4 for worsened hypoxic respiratory failure. Severe ARDS requiring proning on 5/6, 5/7 and 5/8.     #Neuro   - Sedated on Fentanyl, Ketamine, Propofol   - Paralyzed with Rocuronium     #CV  - Now hypotensive on increased sedation  - Levo to maintain MAP>60  - Monitor triglycerides on propofol     #Pulm:   - Hypoxic respiratory failure 2nd COVID PNA  - Required proning on 5/6, 5/7, 5/8  - Worsened pulmonary wise from earlier in the week, derecruited and now on full support possibly from worsened LLL PNA  - Currently on AC 33/310/65%/5  - Permissive hypercapnea for protective lung ventilation   - POCUS: L effusion is minimal, more infiltrate   - LLL PNA-c/w Zosyn       #GI  - Transaminitis   - c/w Vital AF at goal rate 55 /hr  - Last BM 5/15  - Pepcid 20mg qd     #  - Net 3.5L positive over the last 24hrs  - Decrease free water bolus to 150cc q6  - Monitor hematuria     #ID   - LLL VAP + clinical evidence of sinusitis   - Restarted Zosyn overnight as patient clinically decompensated   - f/u pan cultures 5/15  - L effusion is minimal, no indication for diagnostic thoracentesis at this point   - S/p full course of meropenem (5/4-5/13), Fluconazole (5/8-5/13), Vancomycin (5/11-5/12)  - CDiff negative  - ID Following     COVID: + on 4/28   - s/p 10 days of Remdesivir trial  (4/30-5/9)   - s/p Tocilizumab on 4/30    #  LINES  - [facility] TLC from 5/4     #Heme   - Empiric heparin gtt for elevated D-dimer   - Goal aPTT 58-99    #Endo  -  Blood glucose controlled on Humalog  Low SS    #Ethics  -Full code

## 2020-05-16 NOTE — PROGRESS NOTE ADULT - SUBJECTIVE AND OBJECTIVE BOX
65y old  Female who presents with a chief complaint of COVID-19, Hypoxic respiratory failure (16 May 2020 14:10)      Interval history:  Afebrile, on pressors, no diarrhea.       Allergies:   No Known Allergies      Antimicrobials:  piperacillin/tazobactam IVPB.. 3.375 Gram(s) IV Intermittent every 8 hours      REVIEW OF SYSTEMS:  Unable to obtain as patient intubated and sedated.       Vital Signs Last 24 Hrs  T(C): 35.6 (05-16-20 @ 12:00), Max: 37.4 (05-16-20 @ 00:00)  T(F): 96.1 (05-16-20 @ 12:00), Max: 99.3 (05-16-20 @ 00:00)  HR: 89 (05-16-20 @ 15:20) (65 - 89)  BP: 113/58 (05-15-20 @ 23:45) (82/47 - 127/58)  BP(mean): 82 (05-15-20 @ 23:45) (61 - 85)  RR: 33 (05-16-20 @ 12:00) (14 - 38)  SpO2: 98% (05-16-20 @ 15:20) (84% - 98%)      PHYSICAL EXAM:  Patient intubated, sedated   + ET tube  Lt TLC   Cardiovascular: S1S2 normal.  Lungs: + air entry B/L lung fields.  Gastrointestinal: soft, nondistended, ecchymosis b/l flanks   Extremities: + edema.  + tyler with hematuria.                             9.0    25.00 )-----------( 285      ( 16 May 2020 00:27 )             28.8   05-16    142  |  106  |  21  ----------------------------<  203<H>  4.1   |  28  |  0.35<L>    Ca    7.4<L>      16 May 2020 05:45  Phos  2.4     05-16  Mg     2.2     05-16    TPro  5.1<L>  /  Alb  2.0<L>  /  TBili  0.3  /  DBili  x   /  AST  44<H>  /  ALT  43  /  AlkPhos  145<H>  05-16      LIVER FUNCTIONS - ( 16 May 2020 05:45 )  Alb: 2.0 g/dL / Pro: 5.1 g/dL / ALK PHOS: 145 U/L / ALT: 43 U/L / AST: 44 U/L / GGT: x               Culture - Blood (collected 15 May 2020 15:24)  Source: .Blood Blood-Peripheral  Preliminary Report (16 May 2020 16:01):    No growth to date.    Culture - Blood (collected 15 May 2020 15:24)  Source: .Blood Blood-Peripheral  Preliminary Report (16 May 2020 16:01):    No growth to date.    Culture - Sputum (collected 15 May 2020 00:49)  Source: .Sputum Sputum  Gram Stain (15 May 2020 06:02):    Few polymorphonuclear leukocytes per low power field    No Squamous epithelial cells per low power field    No organisms seen  Preliminary Report (15 May 2020 22:48):    Normal Respiratory Farzaneh absent      Radiology:  < from: Xray Abdomen 1 View PORTABLE -Urgent (05.15.20 @ 04:30) >  IMPRESSION:     No abnormal bowel distention.

## 2020-05-16 NOTE — PROGRESS NOTE ADULT - ASSESSMENT
65F reporting no PMH who presented last night with complaint of fever/chills (home T-max 102 F), night sweats, cough, myalgias (mid & lower back), loss of taste and smell since Friday 4/24 (still tastes sweet/salty), as well as 2 days of weakness and constipation, found to be COVID positive on 4/28/20.    Pt has agreed to participate in a clinical trial for Covid -19  Please discuss with ID team if planning on taking concurrent treatment with other agents with actual or possible direct acting antiviral activity against SARS-CoV-2 such a chloroquine or hydroxychloroquine. Pt was given Tocilizumab by primary team on the day of enrollment as of concern for cytokine storm.     Hospital course  4/29: Started Ceftriaxone (4/29-5/3) and Azithromycin (4/29-5/4) in ED for possible bacterial PNA  4/30: RRT called for desaturation in 70s before receiving 1st dose of Remdesivir and Tocilizumab  5/4: RRT called for desaturation to 20s after drinking water and coughing, intubated by anesthesia. Sedated and started full dose lovenox after D-dimer elevated to [address]. Transferred to ICU. On Meropenem, Vancomycin, and Caspofungin for emperic sepsis coverage.  5/7: Caspofungin switched to Fluconazole for growth of candida albicans in urine; Vancomycin d/c'ed for negative MRSA swab  5/10: Started CPAP trials  5/12 - abs d/scooby   5/13 hematuria  5/14 O2 requirements increased      # Sepsis   - WBC persistently elevated, Afebrile  - Suspected source include pneumonia (viral, bacterial, fungal, or aspiration) vs bacteremia vs urosepsis vs lines vs myocarditis vs microthrombi with the elevated D-Dimer  - S/p full course of meropenem (5/4-5/12), Fluconazole (5/8-5/13), Vancomycin (5/11-5/12), on zosyn now.   - Pt is s/p Tocilizumab which increases risk of infection  - All cultures negative to date.   - C Diff negative 5/12   - Lines removed 5/13    - plan for bronch, if bronch cx stay negative and continues to have leucocytosis, will need CT chest/abd/pelvis.       # Acute hypoxic respiratory failure  - Multifactorial, likely secondary to COVID-19 infection with viral PNA, with possible superimposed bacterial vs fungal vs aspiration PNA and/or PE       # Elevated LFTs: minimally elevated.   -continue to monitor     # Elevated CRP/Ferritin/D-dimer  - Could be COVID-related. CRP, ferritin, trending down.   - D-dimer remains elevated, although much lower than previous peak, could suggest PE or microthrombi. Pt now on heparin drip. Continued AC per primary team.       # hematuria  - repeat u/a and culture

## 2020-05-17 NOTE — PROGRESS NOTE ADULT - ATTENDING COMMENTS
1. Acute hypoxemic respiratory failure from ARDS, SARS-2 Covid pneumonia. , Pt s/p Tocilizumab and Remdesivir.  Pt with worsening R sided infiltrates. Minimal pleural effusions on ultrasound. Bronchoscopy yesterday without purulent sputum. Pt on PC ventilation. Oxygenation and ventilation worsening. Will need to prone pt .  2. HTN . Continue hydralazine and  labetalol.  3. ID. Fevers. Clinically with acute sinusitis. Started on Zosyn. S/P bronchoscopy. Await cultures.

## 2020-05-17 NOTE — CHART NOTE - NSCHARTNOTEFT_GEN_A_CORE
:  Caleb Mast  INDICATION:  Respiratory failure.  PROCEDURE:  [ ] LIMITED ECHO  [x ] LIMITED CHEST  [ ] LIMITED RETROPERITONEAL  [ ] LIMITED ABDOMINAL  [ ] LIMITED DVT  [ ] NEEDLE GUIDANCE VASCULAR  [ ] NEEDLE GUIDANCE THORACENTESIS  [ ] NEEDLE GUIDANCE PARACENTESIS  [ ] NEEDLE GUIDANCE PERICARDIOCENTESIS  [ ] OTHER    FINDINGS: LLL consolidation with small simple pleural effusion. Increasing R side infiltrate with small simple pleural effusion.      INTERPRETATION:  Bilateral  infiltrates. Now R>L with bilateral small, non complicated. pleural effusions.

## 2020-05-17 NOTE — PROGRESS NOTE ADULT - ASSESSMENT
64 y/o F with no significant PMH presents 4/29 with hypoxic respiratory failure 2nd COVID PNA. s/p Tocilizumab (4/30), s/p empiric Ceftriaxone/Azithro (4/30-5/4) for ?superimposed CAP. Currently enrolled in Remdesivir trial (4/30). Intubated 5/4 for worsened hypoxic respiratory failure. Severe ARDS requiring proning on 5/6, 5/7 and 5/8.     #Neuro   - Sedated on Fentanyl, Ketamine, Propofol   - Paralyzed with Rocuronium     #CV  - Now hypotensive on increased sedation  - Levo to maintain MAP>60  - Monitor triglycerides on propofol     #Pulm:   - Hypoxic respiratory failure 2nd COVID PNA  - Required proning on 5/6, 5/7, 5/8  - Worsened pulmonary wise from earlier in the week, derecruited and now on full support possibly from worsened LLL PNA  - Currently on AC 33/310/65%/5  - Permissive hypercapnea for protective lung ventilation   - POCUS: L effusion is minimal, more infiltrate   - LLL PNA-c/w Zosyn       #GI  - Transaminitis   - c/w Vital AF at goal rate 55 /hr  - Last BM 5/15  - Pepcid 20mg qd     #  -Net Positive 1 liter. Given Lasix 20 IV this AM.   - Decrease free water bolus to 150cc q6  - Hematuria improving. H/H Stable     #ID   - LLL VAP + clinical evidence of sinusitis   - On Zosyn  - All Cx negative so far.   - L effusion is minimal, no indication for diagnostic thoracentesis at this point   - S/p full course of meropenem (5/4-5/13), Fluconazole (5/8-5/13), Vancomycin (5/11-5/12)  - CDiff negative  - ID Following     COVID: + on 4/28   - s/p 10 days of Remdesivir trial  (4/30-5/9)   - s/p Tocilizumab on 4/30    #  LINES  - [facility] TLC from 5/4     #Heme   - Empiric heparin gtt for elevated D-dimer   - Goal aPTT 58-99    #Endo  -  Blood glucose controlled on Humalog  Low SS    #Ethics  -Full code

## 2020-05-18 NOTE — PROGRESS NOTE ADULT - SUBJECTIVE AND OBJECTIVE BOX
Patient is a 65y old  Female who presents with a chief complaint of COVID-19, Hypoxic respiratory failure (18 May 2020 14:12)    Being followed by ID for        Interval history:  No other acute events      ROS:  No cough,SOB,CP  No N/V/D  No abd pain  No urinary complaints  No HA  No joint or limb pain  No other complaints    PAST MEDICAL & SURGICAL HISTORY:  No pertinent past medical history  History of cholecystectomy  History of appendectomy    Allergies    No Known Allergies    Intolerances      Antimicrobials:    piperacillin/tazobactam IVPB.. 3.375 Gram(s) IV Intermittent every 8 hours    MEDICATIONS  (STANDING):  ALBUTerol    90 MICROgram(s) HFA Inhaler 2 Puff(s) Inhalation every 6 hours  BACItracin   Ointment 1 Application(s) Topical two times a day  chlorhexidine 0.12% Liquid 15 milliLiter(s) Oral Mucosa two times a day  chlorhexidine 4% Liquid 1 Application(s) Topical <User Schedule>  famotidine Injectable 20 milliGRAM(s) IV Push daily  fentaNYL   Infusion... 0.501 MICROgram(s)/kG/Hr (1.76 mL/Hr) IV Continuous <Continuous>  heparin  Infusion. 900 Unit(s)/Hr (9 mL/Hr) IV Continuous <Continuous>  insulin lispro (HumaLOG) corrective regimen sliding scale   SubCutaneous every 6 hours  ketamine Infusion. 0.501 mG/kG/Hr (3.52 mL/Hr) IV Continuous <Continuous>  norepinephrine Infusion 0.06 MICROgram(s)/kG/Min (7.91 mL/Hr) IV Continuous <Continuous>  piperacillin/tazobactam IVPB.. 3.375 Gram(s) IV Intermittent every 8 hours  polyethylene glycol 3350 17 Gram(s) Oral daily  propofol Infusion 24.893 MICROgram(s)/kG/Min (10.5 mL/Hr) IV Continuous <Continuous>  rocuronium Infusion 8 MICROgram(s)/kG/Min (6.75 mL/Hr) IV Continuous <Continuous>  senna Syrup 10 milliLiter(s) Oral daily      Vital Signs Last 24 Hrs  T(C): 36 (05-18-20 @ 12:00), Max: 36.3 (05-17-20 @ 20:00)  T(F): 96.8 (05-18-20 @ 12:00), Max: 97.3 (05-17-20 @ 20:00)  HR: 101 (05-18-20 @ 15:18) (83 - 106)  BP: --  BP(mean): --  RR: 35 (05-18-20 @ 15:00) (32 - 41)  SpO2: 90% (05-18-20 @ 15:18) (83% - 96%)    Physical Exam:    Constitutional well preserved,comfortable,pleasant    HEENT PERRLA EOMI,No pallor or icterus    No oral exudate or erythema    Neck supple no JVD or LN    Chest Good AE,CTA    CVS RRR S1 S2 WNl No murmur or rub or gallop    Abd soft BS normal No tenderness no masses    Ext No cyanosis clubbing or edema    IV site no erythema tenderness or discharge    Joints no swelling or LOM    CNS AAO X 3 no focal    Lab Data:                          7.7    19.45 )-----------( 269      ( 18 May 2020 00:32 )             26.2       05-18    143  |  97  |  11  ----------------------------<  140<H>  3.0<L>   |  35<H>  |  0.30<L>    Ca    8.2<L>      18 May 2020 09:44  Phos  2.6     05-18  Mg     2.3     05-18    TPro  5.3<L>  /  Alb  1.9<L>  /  TBili  0.2  /  DBili  x   /  AST  75<H>  /  ALT  87<H>  /  AlkPhos  142<H>  05-18          Bronch Wash Bronchoalveolar Lavage  05-16-20   No growth  --    Moderate polymorphonuclear leukocytes per low power field  No squamous epithelial cells per low power field  No organisms seen per oil power field      .Blood Blood-Peripheral  05-15-20   No growth to date.  --  --      .Sputum Sputum  05-15-20   No growth at 48 hours  --    Few polymorphonuclear leukocytes per low power field  No Squamous epithelial cells per low power field  No organisms seen      .Urine Catheterized  05-11-20   No growth  --  --          WBC Count: 19.45 (05-18-20 @ 00:32)  WBC Count: 22.54 (05-17-20 @ 11:21)  WBC Count: 20.67 (05-17-20 @ 04:31)  WBC Count: 17.08 (05-17-20 @ 00:37)  WBC Count: 25.00 (05-16-20 @ 00:27)  WBC Count: 26.19 (05-15-20 @ 00:47)  WBC Count: 26.76 (05-14-20 @ 15:53)  WBC Count: 19.28 (05-14-20 @ 00:19)  WBC Count: 18.06 (05-13-20 @ 12:27)  WBC Count: 22.01 (05-13-20 @ 00:40)  WBC Count: 25.78 (05-12-20 @ 14:58)  WBC Count: 25.65 (05-12-20 @ 00:11) Patient is a 65y old  Female who presents with a chief complaint of COVID-19, Hypoxic respiratory failure (18 May 2020 14:12)    Being followed by ID for        Interval history:  pt doing poorly  pt was prone last night  minimal ET secretions  moderate oral secretions  no diarrhea  No other acute events      PAST MEDICAL & SURGICAL HISTORY:  No pertinent past medical history  History of cholecystectomy  History of appendectomy    Allergies    No Known Allergies    Intolerances      Antimicrobials:    piperacillin/tazobactam IVPB.. 3.375 Gram(s) IV Intermittent every 8 hours    MEDICATIONS  (STANDING):  ALBUTerol    90 MICROgram(s) HFA Inhaler 2 Puff(s) Inhalation every 6 hours  BACItracin   Ointment 1 Application(s) Topical two times a day  chlorhexidine 0.12% Liquid 15 milliLiter(s) Oral Mucosa two times a day  chlorhexidine 4% Liquid 1 Application(s) Topical <User Schedule>  famotidine Injectable 20 milliGRAM(s) IV Push daily  fentaNYL   Infusion... 0.501 MICROgram(s)/kG/Hr (1.76 mL/Hr) IV Continuous <Continuous>  heparin  Infusion. 900 Unit(s)/Hr (9 mL/Hr) IV Continuous <Continuous>  insulin lispro (HumaLOG) corrective regimen sliding scale   SubCutaneous every 6 hours  ketamine Infusion. 0.501 mG/kG/Hr (3.52 mL/Hr) IV Continuous <Continuous>  norepinephrine Infusion 0.06 MICROgram(s)/kG/Min (7.91 mL/Hr) IV Continuous <Continuous>  piperacillin/tazobactam IVPB.. 3.375 Gram(s) IV Intermittent every 8 hours  polyethylene glycol 3350 17 Gram(s) Oral daily  propofol Infusion 24.893 MICROgram(s)/kG/Min (10.5 mL/Hr) IV Continuous <Continuous>  rocuronium Infusion 8 MICROgram(s)/kG/Min (6.75 mL/Hr) IV Continuous <Continuous>  senna Syrup 10 milliLiter(s) Oral daily      Vital Signs Last 24 Hrs  T(C): 36 (05-18-20 @ 12:00), Max: 36.3 (05-17-20 @ 20:00)  T(F): 96.8 (05-18-20 @ 12:00), Max: 97.3 (05-17-20 @ 20:00)  HR: 101 (05-18-20 @ 15:18) (83 - 106)  BP: --  BP(mean): --  RR: 35 (05-18-20 @ 15:00) (32 - 41)  SpO2: 90% (05-18-20 @ 15:18) (83% - 96%)    Physical Exam:    Constitutional intubated    HEENT PERRLA EOMI,No pallor or icterus    No oral exudate or erythema    Neck supple no JVD or LN    Chest Good AE,CTA    CVS RRR S1 S2     Abd soft     Ext No cyanosis clubbing or edema    IV site no erythema tenderness or discharge    Joints no swelling or LOM    irritation on cheeks and ears     Lab Data:                          7.7    19.45 )-----------( 269      ( 18 May 2020 00:32 )             26.2       05-18    143  |  97  |  11  ----------------------------<  140<H>  3.0<L>   |  35<H>  |  0.30<L>    Ca    8.2<L>      18 May 2020 09:44  Phos  2.6     05-18  Mg     2.3     05-18    TPro  5.3<L>  /  Alb  1.9<L>  /  TBili  0.2  /  DBili  x   /  AST  75<H>  /  ALT  87<H>  /  AlkPhos  142<H>  05-18      Bronch Wash Bronchoalveolar Lavage  05-16-20   No growth  --    Moderate polymorphonuclear leukocytes per low power field  No squamous epithelial cells per low power field  No organisms seen per oil power field      .Blood Blood-Peripheral  05-15-20   No growth to date.  --  --      .Sputum Sputum  05-15-20   No growth at 48 hours  --    Few polymorphonuclear leukocytes per low power field  No Squamous epithelial cells per low power field  No organisms seen      .Urine Catheterized  05-11-20   No growth  --  --      WBC Count: 19.45 (05-18-20 @ 00:32)  WBC Count: 22.54 (05-17-20 @ 11:21)  WBC Count: 20.67 (05-17-20 @ 04:31)  WBC Count: 17.08 (05-17-20 @ 00:37)  WBC Count: 25.00 (05-16-20 @ 00:27)  WBC Count: 26.19 (05-15-20 @ 00:47)  WBC Count: 26.76 (05-14-20 @ 15:53)  WBC Count: 19.28 (05-14-20 @ 00:19)  WBC Count: 18.06 (05-13-20 @ 12:27)  WBC Count: 22.01 (05-13-20 @ 00:40)  WBC Count: 25.78 (05-12-20 @ 14:58)  WBC Count: 25.65 (05-12-20 @ 00:11)    Ferritin, Serum (05.18.20 @ 05:11)    Ferritin, Serum: 768: Test Repeated ng/mL    D-Dimer Assay, Quantitative (05.18.20 @ 00:32)    D-Dimer Assay, Quantitative: 983: D-Dimer result less than 230 ng/mL DDU correlates with the absence  of thrombosis in a patient with a low and moderate       pre-test probability of thrombosis.  1 DDU is approximately equal to  2 ng/mL FEU (previous units). ng/mL DDU    C-Reactive Protein, Serum (05.18.20 @ 00:32)    C-Reactive Protein, Serum: 12.10 ug/mL    Culture - Bronchial (05.16.20 @ 21:04)    Gram Stain:   Moderate polymorphonuclear leukocytes per low power field  No squamous epithelial cells per low power field  No organisms seen per oil power field    Specimen Source: Bronch Wash Bronchoalveolar Lavage    Culture Results:   No growth at 48 hours    < from: Xray Chest 1 View- PORTABLE-Urgent (05.18.20 @ 06:24) >    EXAM:  XR CHEST PORTABLE URGENT 1V                            PROCEDURE DATE:  05/18/2020            INTERPRETATION:  A single chest x-ray was obtained on May 18, 2020.    Indication: Position of endotracheal tube.    Impression:    The heart is enlarged. Bilateral pleural effusion. Diffuse airspace opacities are seen throughout both lungs which remain unchanged when compared to previous study done May 17, 2020. At 10:21 AM. Endotracheal tube is in good position. G-tube is in the stomach. A central line is seen on the right and the tip is in superior vena cava.        ELLI VARGAS M.D., ATTENDING RADIOLOGIST  This document has been electronically signed. May 18 2020  8:06AM        < end of copied text >

## 2020-05-18 NOTE — PROGRESS NOTE ADULT - SUBJECTIVE AND OBJECTIVE BOX
CHIEF COMPLAINT: Patient is a 65y old  Female who presents with a chief complaint of COVID-19, Hypoxic respiratory failure (15 May 2020 12:45)    Interval Events: Paralyzed overnight to aid with vent synchrony, proned last night, placed supine @ 5am, noted with low tidal volume @ 6am, ABG-ph 7.20, PCO2>104, vent settings subsequently changed from pressure control to volume control with improved TV-290.    REVIEW OF SYSTEMS:  [x ] Unable to assess ROS because intubated/sedated    OBJECTIVE:    VITAL SIGNS:  ICU Vital Signs Last 24 Hrs  T(C): 36 (18 May 2020 12:00), Max: 36.3 (17 May 2020 20:00)  T(F): 96.8 (18 May 2020 12:00), Max: 97.3 (17 May 2020 20:00)  HR: 98 (18 May 2020 14:00) (83 - 106)  BP: --  BP(mean): --  ABP: 138/61 (18 May 2020 14:00) (116/53 - 150/58)  ABP(mean): 92 (18 May 2020 14:00) (76 - 94)  RR: 37 (18 May 2020 14:00) (32 - 41)  SpO2: 91% (18 May 2020 14:00) (83% - 96%)    Mode: AC/ CMV (Assist Control/ Continuous Mandatory Ventilation), RR (machine): 35, TV (machine): 280, FiO2: 80, PEEP: 5, ITime: 0.6, MAP: 18, PC: 35, PIP: 56    05-17 @ 07:01  -  05-18 @ 07:00  --------------------------------------------------------  IN: 2718.9 mL / OUT: 2925 mL / NET: -206.1 mL    05-18 @ 07:01  -  05-18 @ 14:17  --------------------------------------------------------  IN: 736.8 mL / OUT: 2400 mL / NET: -1663.2 mL      CAPILLARY BLOOD GLUCOSE      POCT Blood Glucose.: 132 mg/dL (18 May 2020 10:15)      PHYSICAL EXAM:  General: Intubated  HEENT: WNL  Neck: WNL  Respiratory: Coarse bilaterlaly   Cardiovascular: RRR  Abdomen: Hypoactive BS  Extremities: +3 pitting edema  Skin: facial DTI  Neurological: sedated/paralyzed    MEDICATIONS:  MEDICATIONS  (STANDING):  ALBUTerol    90 MICROgram(s) HFA Inhaler 2 Puff(s) Inhalation every 6 hours  BACItracin   Ointment 1 Application(s) Topical two times a day  chlorhexidine 0.12% Liquid 15 milliLiter(s) Oral Mucosa two times a day  chlorhexidine 4% Liquid 1 Application(s) Topical <User Schedule>  famotidine Injectable 20 milliGRAM(s) IV Push daily  fentaNYL   Infusion... 0.501 MICROgram(s)/kG/Hr (1.76 mL/Hr) IV Continuous <Continuous>  heparin  Infusion. 900 Unit(s)/Hr (9 mL/Hr) IV Continuous <Continuous>  insulin lispro (HumaLOG) corrective regimen sliding scale   SubCutaneous every 6 hours  ketamine Infusion. 0.501 mG/kG/Hr (3.52 mL/Hr) IV Continuous <Continuous>  norepinephrine Infusion 0.06 MICROgram(s)/kG/Min (7.91 mL/Hr) IV Continuous <Continuous>  piperacillin/tazobactam IVPB.. 3.375 Gram(s) IV Intermittent every 8 hours  polyethylene glycol 3350 17 Gram(s) Oral daily  propofol Infusion 24.893 MICROgram(s)/kG/Min (10.5 mL/Hr) IV Continuous <Continuous>  rocuronium Infusion 8 MICROgram(s)/kG/Min (6.75 mL/Hr) IV Continuous <Continuous>  senna Syrup 10 milliLiter(s) Oral daily    MEDICATIONS  (PRN):  acetaminophen    Suspension .. 650 milliGRAM(s) Oral every 6 hours PRN Temp greater or equal to 38C (100.4F)  diphenhydrAMINE   Injectable 50 milliGRAM(s) IV Push once PRN anaphylaxis to study drug  sodium chloride 0.9% lock flush 10 milliLiter(s) IV Push every 1 hour PRN Pre/post blood products, medications, blood draw, and to maintain line patency      ALLERGIES:  Allergies    No Known Allergies    Intolerances        LABS:                        7.7    19.45 )-----------( 269      ( 18 May 2020 00:32 )             26.2     05-18    143  |  97  |  11  ----------------------------<  140<H>  3.0<L>   |  35<H>  |  0.30<L>    Ca    8.2<L>      18 May 2020 09:44  Phos  2.6     05-18  Mg     2.3     05-18    TPro  5.3<L>  /  Alb  1.9<L>  /  TBili  0.2  /  DBili  x   /  AST  75<H>  /  ALT  87<H>  /  AlkPhos  142<H>  05-18    PT/INR - ( 18 May 2020 00:32 )   PT: 11.0 sec;   INR: 0.97 ratio         PTT - ( 18 May 2020 06:10 )  PTT:70.2 sec      RADIOLOGY & ADDITIONAL TESTS: Reviewed.

## 2020-05-18 NOTE — PROGRESS NOTE ADULT - ASSESSMENT
65F reporting no PMH who presented last night with complaint of fever/chills (home T-max 102 F), night sweats, cough, myalgias (mid & lower back), loss of taste and smell since Friday 4/24 (still tastes sweet/salty), as well as 2 days of weakness and constipation, found to be COVID positive on 4/28/20.    Pt has agreed to participate in a clinical trial for Covid -19  Please discuss with ID team if planning on taking concurrent treatment with other agents with actual or possible direct acting antiviral activity against SARS-CoV-2 such a chloroquine or hydroxychloroquine. Pt was given Tocilizumab by primary team on the day of enrollment as of concern for cytokine storm.     Hospital course  4/29: Started Ceftriaxone (4/29-5/3) and Azithromycin (4/29-5/4) in ED for possible bacterial PNA  4/30: RRT called for desaturation in 70s before receiving 1st dose of Remdesivir and Tocilizumab  5/4: RRT called for desaturation to 20s after drinking water and coughing, intubated by anesthesia. Sedated and started full dose lovenox after D-dimer elevated to [id number]. Transferred to ICU. On Meropenem, Vancomycin, and Caspofungin for emperic sepsis coverage.  5/7: Caspofungin switched to Fluconazole for growth of candida albicans in urine; Vancomycin d/c'ed for negative MRSA swab  5/10: Started CPAP trials  5/12 - abs d/scooby   5/13 hematuria  5/14 O2 requirements increased  5/15--> continues to do poorly      # Sepsis   - WBC persistently elevated, Afebrile  - Suspected source include pneumonia (viral, bacterial, fungal, or aspiration) vs bacteremia vs urosepsis vs lines vs myocarditis vs microthrombi with the elevated D-Dimer  - S/p full course of meropenem (5/4-5/12), Fluconazole (5/8-5/13), Vancomycin (5/11-5/12), on zosyn now.   - Pt is s/p Tocilizumab which increases risk of infection  - All cultures negative to date.   - C Diff negative 5/12   - Lines removed 5/13    -, if bronch cx stay negative and continues to have leucocytosis, will need CT chest/abd/pelvis. - but unstable for transport      # Acute hypoxic respiratory failure  - Multifactorial, likely secondary to COVID-19 infection with viral PNA, with possible superimposed bacterial vs fungal vs aspiration PNA and/or PE       # Elevated LFTs: minimally elevated.   -continue to monitor     # Elevated CRP/Ferritin/D-dimer  - Could be COVID-related. CRP, ferritin, trending down.   - D-dimer remains elevated, although much lower than previous peak, could suggest PE or microthrombi. Pt now on heparin drip. Continued AC per primary team.       # hematuria  - repeat u/a and culture      # elevated WBC  repeat cultures , check fungitel  consider empiric fungal treatment?   check lactate  change lines , if able  start vancomycin if spikes fever

## 2020-05-18 NOTE — PROGRESS NOTE ADULT - ATTENDING COMMENTS
66 yo F with no PMH a/w COVID infection, acute hypoxic respiratory failure, intubated on 5/4.   1. Neuro - sedated and paralyzed with Fent, Ketamine, Propofol and tam.   2. Pulm - ARDS, worsening R sided infiltrates s/p bronch 5/16 with minimal secretions. Poor lung compliance, hypercapnia. c/w lung protective ventilation, proned overnight and switched from PC to VC once supine. Minimal pleural effusions on ultrasound. Sedated and paralyzed with Rocuronium - f/u ABG this afternoon and possibly stop paralytics  3. ID-  prior Fevers- acute sinusitis. Leukocytosis downtrending. c/w Zosyn 5/14  S/P bronchoscopy 5/16- minimal secretions, sputum cultures NGTD   Covid pneumonia s/p Tocilizumab and Remdesivir  4. CVS - stable, off pressors, goal MAP >65  5. Renal/FEN- net negative 2.6 L. Metabolic alkalosis - stop Lasix drop and albumin. F/u repeat BMP and urine output. May need diamox  6. GI- pepcid, TFs  7. Heme- hypercoagulable state - c/w heparin drip  8. GOC - very guarded prognosis overall.  Critically ill requiring frequent bedside evaluations and changes in management

## 2020-05-18 NOTE — PROGRESS NOTE ADULT - ASSESSMENT
ASSESSMENT & PLAN: 64 y/o F with no significant PMH presents 4/29 with hypoxic respiratory failure 2nd COVID PNA. s/p Tocilizumab (4/30), s/p empiric Ceftriaxone/Azithro (4/30-5/4) for ?superimposed CAP. Currently enrolled in Remdesivir trial (4/30). Intubated 5/4 for worsened hypoxic respiratory failure. Severe ARDS requiring proning on 5/6, 5/7 and 5/8.     #Neuro   - Sedated on Fentanyl, Ketamine, Propofol   - Paralyzed with Rocuronium     #CV  - Discontinued off Levo, maintaining MAP>65      #Pulm:   - Hypoxic respiratory failure 2nd COVID PNA  - Required proning on 5/6, 5/7, 5/8, and 5/17  - Worsened pulmonary wise from earlier in the week, derecruited and now on full support possibly from worsened LLL PNA  - ABG after initial vent setting changes to VC 35/5/80%, notable for Po2 54   - Currently on VC 35/5/FiO2 90%  - Permissive hypercapnea for protective lung ventilation   - Plan to position patient on R side given LLL consolidation, in efforts to improve oxygenation      #GI  - TF held at this time given patient is supine, resume when able to  - Last BM 5/17, c/w Miralax/Senna  -  on 5/16, plan to monitor Q72h while on Propofol  - Pepcid 20mg qd     #Renal  - Urine output >400-500 cc/hr, now with hypokalemia, in the setting of lasix gtt  - Discontinued Lasix gtt, and continue to monitor I&Os  - K repleted  - Free water bolus to 150cc q6    #ID   - LLL VAP + clinical evidence of sinusitis   - Plan to continue course of Zosyn x 7 days (started 5/14)  - s/p bronchoscopy yesterday, negative cultures  - L effusion is minimal, no indication for diagnostic thoracentesis at this point   - S/p full course of meropenem (5/4-5/13), Fluconazole (5/8-5/13), Vancomycin (5/11-5/12)  - CDiff negative  - COVID: + on 4/28, s/p 10 days of Remdesivir trial  (4/30-5/9), s/p Tocilizumab on 4/30    #  LINES  - [facility] TLC from 5/4     #Heme   - Empiric heparin gtt for elevated D-dimer (now improved to 189)  - Goal aPTT 58-99    #Endo  -  Blood glucose controlled on Humalog Low SS    #Ethics  -Full code ASSESSMENT & PLAN: 66 y/o F with no significant PMH presents 4/29 with hypoxic respiratory failure 2nd COVID PNA. s/p Tocilizumab (4/30), s/p empiric Ceftriaxone/Azithro (4/30-5/4) for ?superimposed CAP. Currently enrolled in Remdesivir trial (4/30). Intubated 5/4 for worsened hypoxic respiratory failure. Severe ARDS requiring proning on 5/6, 5/7, 5/8, & 5/17.     #Neuro   - Sedated on Fentanyl, Ketamine, Propofol   - Paralyzed with Rocuronium     #CV  - Discontinued off Levo, maintaining MAP>65      #Pulm:   - Hypoxic respiratory failure 2nd COVID PNA  - Required proning on 5/6, 5/7, 5/8, and 5/17  - Worsened pulmonary wise from earlier in the week, derecruited and now on full support possibly from worsened LLL PNA  - ABG after initial vent setting changes to VC 35/5/80%, notable for Po2 54   - Currently on VC 35/5/FiO2 90%  - Permissive hypercapnea for protective lung ventilation   - Plan to position patient on R side given LLL consolidation, in efforts to improve oxygenation      #GI  - TF held at this time given patient is supine, resume when able to  - Last BM 5/17, c/w Miralax/Senna  -  on 5/16, plan to monitor Q72h while on Propofol  - Pepcid 20mg qd     #Renal  - Urine output >400-500 cc/hr, now with hypokalemia, in the setting of lasix gtt  - Discontinued Lasix gtt, and continue to monitor I&Os  - K repleted  - Free water bolus to 150cc q6    #ID   - LLL VAP + clinical evidence of sinusitis   - Plan to continue course of Zosyn x 7 days (started 5/14)  - s/p bronchoscopy yesterday, negative cultures  - L effusion is minimal, no indication for diagnostic thoracentesis at this point   - S/p full course of meropenem (5/4-5/13), Fluconazole (5/8-5/13), Vancomycin (5/11-5/12)  - CDiff negative  - COVID: + on 4/28, s/p 10 days of Remdesivir trial  (4/30-5/9), s/p Tocilizumab on 4/30    #  LINES  - [facility] TLC from 5/16     #Heme   - Empiric heparin gtt for elevated D-dimer (now improved to 189)  - Goal aPTT 58-99    #Endo  -  Blood glucose controlled on Humalog Low SS    #Ethics  -Full code ASSESSMENT & PLAN: 66 y/o F with no significant PMH presents 4/29 with hypoxic respiratory failure 2nd COVID PNA. s/p Tocilizumab (4/30), s/p empiric Ceftriaxone/Azithro (4/30-5/4) for ?superimposed CAP. Currently enrolled in Remdesivir trial (4/30). Intubated 5/4 for worsened hypoxic respiratory failure. Severe ARDS requiring proning on 5/6, 5/7, 5/8, & 5/17.     #Neuro   - Sedated on Fentanyl, Ketamine, Propofol   - Paralyzed with Rocuronium     #CV  - Discontinued off Levo, maintaining MAP>65      #Pulm:   - Hypoxic respiratory failure 2nd COVID PNA  - Required proning on 5/6, 5/7, 5/8, and 5/17  - Worsened pulmonary wise from earlier in the week, derecruited and now on full support possibly from worsened LLL PNA  - ABG after initial vent setting changes to VC 35/5/80%, notable for Po2 54   - Currently on VC 35/5/FiO2 90%  - Permissive hypercapnea for protective lung ventilation   - Plan to position patient on R side given LLL consolidation, in efforts to improve oxygenation      #GI  - TF held at this time given patient is supine, resume when able to  - Last BM 5/17, c/w Miralax/Senna  -  on 5/16, plan to monitor Q72h while on Propofol  - Pepcid 20mg qd     #Renal  - Urine output >400-500 cc/hr, now with hypokalemia, in the setting of lasix gtt  - Discontinued Lasix gtt, and continue to monitor I&Os  - K repleted  - Free water bolus to 150cc q6    #ID   - LLL VAP + clinical evidence of sinusitis   - Plan to continue course of Zosyn x 7 days (started 5/14)  - s/p bronchoscopy yesterday, negative cultures  - L effusion is minimal, no indication for diagnostic thoracentesis at this point   - S/p full course of meropenem (5/4-5/13), Fluconazole (5/8-5/13), Vancomycin (5/11-5/12)  - CDiff negative  - COVID: + on 4/28, s/p 10 days of Remdesivir trial  (4/30-5/9), s/p Tocilizumab on 4/30    #  LINES  - [facility] TLC from 5/16     #Heme   - Empiric heparin gtt for elevated D-dimer (now improved to 189)  - Goal aPTT 58-99    #Endo  -  Blood glucose controlled on Humalog Low SS    #Ethics  -Full code  -Attempted to update daughterMary ([phone]) on patient's clinical status and further establish GOC; message left on voicemail requesting callback. ASSESSMENT & PLAN: 66 y/o F with no significant PMH presents 4/29 with hypoxic respiratory failure 2nd COVID PNA. s/p Tocilizumab (4/30), s/p empiric Ceftriaxone/Azithro (4/30-5/4) for ?superimposed CAP. Currently enrolled in Remdesivir trial (4/30). Intubated 5/4 for worsened hypoxic respiratory failure. Severe ARDS requiring proning on 5/6, 5/7, 5/8, & 5/17.     #Neuro   - Sedated on Fentanyl, Ketamine, Propofol   - Paralyzed with Rocuronium     #CV  - Discontinued off Levo, maintaining MAP>65      #Pulm:   - Hypoxic respiratory failure 2nd COVID PNA  - Required proning on 5/6, 5/7, 5/8, and 5/17  - Worsened pulmonary wise from earlier in the week, derecruited and now on full support possibly from worsened LLL PNA  - ABG after initial vent setting changes to VC 35/5/80%, notable for Po2 54   - Currently on VC 35/5/FiO2 90%  - Permissive hypercapnea for protective lung ventilation   - Plan to position patient on R side given LLL consolidation, in efforts to improve oxygenation      #GI  - TF held at this time given patient is supine, resume when able to  - Last BM 5/17, c/w Miralax/Senna  -  on 5/16, plan to monitor Q72h while on Propofol  - Pepcid 20mg qd     #Renal  - Urine output >400-500 cc/hr, now with hypokalemia, in the setting of lasix gtt  - Discontinued Lasix gtt, and continue to monitor I&Os  - K repleted  - Free water bolus to 150cc q6    #ID   - LLL VAP + clinical evidence of sinusitis   - Plan to continue course of Zosyn x 7 days (started 5/14)  - s/p bronchoscopy yesterday, negative cultures  - L effusion is minimal, no indication for diagnostic thoracentesis at this point   - S/p full course of meropenem (5/4-5/13), Fluconazole (5/8-5/13), Vancomycin (5/11-5/12)  - CDiff negative  - COVID: + on 4/28, s/p 10 days of Remdesivir trial  (4/30-5/9), s/p Tocilizumab on 4/30    #  LINES  - [facility] TLC from 5/16     #Heme   - Empiric heparin gtt for elevated D-dimer (now improved to 189)  - Goal aPTT 58-99    #Endo  -  Blood glucose controlled on Humalog Low SS    #Ethics  -Full code  -Updated daughter, Mary ([phone]) on patient's clinical status and to further establish GOC. As per daughter, she remains hopeful for full recovery given patient's father "with extensive medical history", also tested positive and fully recovered. Despite patient's prognosis, daughter is amenable to Palliative Care at this time. ASSESSMENT & PLAN: 66 y/o F with no significant PMH presents 4/29 with hypoxic respiratory failure 2nd COVID PNA. s/p Tocilizumab (4/30), s/p empiric Ceftriaxone/Azithro (4/30-5/4) for ?superimposed CAP. Currently enrolled in Remdesivir trial (4/30). Intubated 5/4 for worsened hypoxic respiratory failure. Severe ARDS requiring proning on 5/6, 5/7, 5/8, & 5/17.     #Neuro   - Sedated on Fentanyl, Ketamine, Propofol   - Paralyzed with Rocuronium     #CV  - Discontinued off Levo, maintaining MAP>65      #Pulm:   - Hypoxic respiratory failure 2nd COVID PNA  - Required proning on 5/6, 5/7, 5/8, and 5/17  - Worsened pulmonary wise from earlier in the week, derecruited and now on full support possibly from worsened LLL PNA  - ABG after initial vent setting changes to VC 35/5/80%, notable for Po2 54   - Currently on VC 35/5/FiO2 90%  - Permissive hypercapnea for protective lung ventilation   - Plan to position patient on R side given LLL consolidation, in efforts to improve oxygenation      #GI  - TF held at this time given patient is supine, resume when able to  - Last BM 5/17, c/w Miralax/Senna  -  on 5/16, plan to monitor Q72h while on Propofol  - Pepcid 20mg qd     #Renal  - Urine output >400-500 cc/hr, now with hypokalemia, in the setting of lasix gtt  - Discontinued Lasix gtt, and continue to monitor I&Os  - K repleted  - Free water bolus to 150cc q6    #ID   - LLL VAP + clinical evidence of sinusitis   - Plan to continue course of Zosyn x 7 days (started 5/14)  - s/p bronchoscopy yesterday, negative cultures  - L effusion is minimal, no indication for diagnostic thoracentesis at this point   - S/p full course of meropenem (5/4-5/13), Fluconazole (5/8-5/13), Vancomycin (5/11-5/12)  - CDiff negative  - COVID: + on 4/28, s/p 10 days of Remdesivir trial  (4/30-5/9), s/p Tocilizumab on 4/30    #  LINES  - [facility] TLC from 5/16     #Heme   - Empiric heparin gtt for elevated D-dimer (now improved to 189)  - Goal aPTT 58-99    #Endo  -  Blood glucose controlled on Humalog Low SS    #Ethics  -Full code  -Updated daughter, Mary ([phone]) on patient's clinical status and to further establish GOC. As per daughter, she remains hopeful for full recovery given patient's father "with extensive medical history", also tested positive and fully recovered. Despite patient's prognosis, daughter is not amenable to Palliative Care at this time.

## 2020-05-19 NOTE — PROGRESS NOTE ADULT - SUBJECTIVE AND OBJECTIVE BOX
Patient is a 65y old  Female who presents with a chief complaint of COVID-19, Hypoxic respiratory failure (18 May 2020 15:52)    Being followed by ID for        Interval history:  pt continues to do poorly  minimal secretions  no bowel movement  on 100% FiO2  No other acute events      PAST MEDICAL & SURGICAL HISTORY:  No pertinent past medical history  History of cholecystectomy  History of appendectomy    Allergies    No Known Allergies    Intolerances      Antimicrobials:    piperacillin/tazobactam IVPB.. 3.375 Gram(s) IV Intermittent every 8 hours    MEDICATIONS  (STANDING):  ALBUTerol    90 MICROgram(s) HFA Inhaler 2 Puff(s) Inhalation every 6 hours  BACItracin   Ointment 1 Application(s) Topical two times a day  chlorhexidine 0.12% Liquid 15 milliLiter(s) Oral Mucosa two times a day  chlorhexidine 4% Liquid 1 Application(s) Topical <User Schedule>  famotidine Injectable 20 milliGRAM(s) IV Push daily  fentaNYL   Infusion... 0.501 MICROgram(s)/kG/Hr (1.76 mL/Hr) IV Continuous <Continuous>  heparin  Infusion. 900 Unit(s)/Hr (9 mL/Hr) IV Continuous <Continuous>  insulin lispro (HumaLOG) corrective regimen sliding scale   SubCutaneous every 6 hours  ketamine Infusion. 0.501 mG/kG/Hr (3.52 mL/Hr) IV Continuous <Continuous>  norepinephrine Infusion 0.06 MICROgram(s)/kG/Min (7.91 mL/Hr) IV Continuous <Continuous>  piperacillin/tazobactam IVPB.. 3.375 Gram(s) IV Intermittent every 8 hours  polyethylene glycol 3350 17 Gram(s) Oral daily  propofol Infusion 24.893 MICROgram(s)/kG/Min (10.5 mL/Hr) IV Continuous <Continuous>  rocuronium Infusion 8 MICROgram(s)/kG/Min (6.75 mL/Hr) IV Continuous <Continuous>  senna Syrup 10 milliLiter(s) Oral daily    MEDICATIONS  (PRN):  acetaminophen    Suspension .. 650 milliGRAM(s) Oral every 6 hours PRN Temp greater or equal to 38C (100.4F)  diphenhydrAMINE   Injectable 50 milliGRAM(s) IV Push once PRN anaphylaxis to study drug  sodium chloride 0.9% lock flush 10 milliLiter(s) IV Push every 1 hour PRN Pre/post blood products, medications, blood draw, and to maintain line patency      Vital Signs Last 24 Hrs  T(C): 37 (05-19-20 @ 04:00), Max: 37.1 (05-19-20 @ 00:00)  T(F): 98.6 (05-19-20 @ 04:00), Max: 98.8 (05-19-20 @ 00:00)  HR: 101 (05-19-20 @ 06:38) (86 - 105)  BP: --  BP(mean): --  RR: 35 (05-19-20 @ 06:00) (35 - 37)  SpO2: 94% (05-19-20 @ 06:38) (90% - 95%)    Physical Exam:    Constitutional intubated, paralyzed    HEENT PERRLA EOMI,No pallor or icterus    some eschars over     Neck supple no JVD or LN    Chest Good AE,CTA    CVS  S1 S2     Abd softly distended    Ext  edema    Joints no swelling or LOM      Lab Data:                          7.5    23.41 )-----------( 267      ( 18 May 2020 23:42 )             25.5       05-18    141  |  97  |  10  ----------------------------<  113<H>  4.0   |  36<H>  |  0.30<L>    Ca    7.9<L>      18 May 2020 23:42  Phos  1.7     05-18  Mg     2.1     05-18    TPro  5.7<L>  /  Alb  2.3<L>  /  TBili  0.4  /  DBili  x   /  AST  74<H>  /  ALT  72<H>  /  AlkPhos  148<H>  05-18      Bronch Wash Bronchoalveolar Lavage  05-16-20   No growth at 48 hours  --    Moderate polymorphonuclear leukocytes per low power field  No squamous epithelial cells per low power field  No organisms seen per oil power field      .Blood Blood-Peripheral  05-15-20   No growth to date.  --  --      .Sputum Sputum  05-15-20   No growth at 48 hours  --    Few polymorphonuclear leukocytes per low power field  No Squamous epithelial cells per low power field  No organisms seen      WBC Count: 23.41 (05-18-20 @ 23:42)  WBC Count: 19.45 (05-18-20 @ 00:32)  WBC Count: 22.54 (05-17-20 @ 11:21)  WBC Count: 20.67 (05-17-20 @ 04:31)  WBC Count: 17.08 (05-17-20 @ 00:37)  WBC Count: 25.00 (05-16-20 @ 00:27)  WBC Count: 26.19 (05-15-20 @ 00:47)  WBC Count: 26.76 (05-14-20 @ 15:53)  WBC Count: 19.28 (05-14-20 @ 00:19)  WBC Count: 18.06 (05-13-20 @ 12:27)  WBC Count: 22.01 (05-13-20 @ 00:40)  WBC Count: 25.78 (05-12-20 @ 14:58)    D-Dimer Assay, Quantitative: 814 ng/mL DDU (05-18)  D-Dimer Assay, Quantitative: 983 ng/mL DDU (05-18)  D-Dimer Assay, Quantitative: 1819 ng/mL DDU (05-17)  D-Dimer Assay, Quantitative: 2281 ng/mL DDU (05-16)    Ferritin, Serum: 678 ng/mL (05-19)  Ferritin, Serum: 768 ng/mL (05-18)  Ferritin, Serum: 818 ng/mL (05-17)  Ferritin, Serum: 880 ng/mL (05-15)      C-Reactive Protein, Serum: 23.92 ug/mL (05-18-20 @ 23:42)  C-Reactive Protein, Serum: 12.10 ug/mL (05-18-20 @ 00:32)  C-Reactive Protein, Serum: 12.57 ug/mL (05-17-20 @ 00:37)  C-Reactive Protein, Serum: 1.80 ug/mL (05-15-20 @ 00:47)      Ordinal Scale: score : 2  1) Death  2) Hospitalized, on invasive mechanical ventilation or ECMO  3) Hospitalized, on non-invasive ventilation or high flow oxygen devices  4) Hospitalized, requiring low flow(<11l/min) supplemental oxygen  5) Hospitalized, not requiring supplemental oxygen - requiring ongoing medical care(COVID-19 related or otherwise)  6) Hospitalized, not requiring supplemental oxygen - no longer requires ongoing medical care(other than per protocol RDV administration)  7) Not hospitalized

## 2020-05-19 NOTE — PROGRESS NOTE ADULT - ASSESSMENT
Assessment and Plan:   · Assessment	  ASSESSMENT & PLAN: 64 y/o F with no significant PMH presents 4/29 with hypoxic respiratory failure 2nd COVID PNA. s/p Tocilizumab (4/30), s/p empiric Ceftriaxone/Azithro (4/30-5/4) for ?superimposed CAP. Currently enrolled in Remdesivir trial (4/30). Intubated 5/4 for worsened hypoxic respiratory failure. Severe ARDS requiring proning on 5/6, 5/7, 5/8, & 5/17.     #Neuro   - Sedated on Fentanyl, Ketamine, Propofol   - Paralyzed with Rocuronium     #CV  - Discontinued off Levo, maintaining MAP>65    #Pulm:   - Hypoxic respiratory failure 2/2 COVID PNA  - Required proning on 5/6, 5/7, 5/8, and 5/17  - Poor lung compliance, permissive hypercapnia for protective lung ventilation   - CXR w/ worsening diffuse b/l opacities  - Plan to recruit more lung by proning patient today    #GI  - TF held at this time given patient is proning, resume when able to  - Last BM 5/17, c/w Miralax/Senna  -  on 5/18, plan to monitor Q72h while on Propofol  - Pepcid 20mg qd     #Renal  - Discontinued Lasix gtt yesterday given metabolic alkolosis & overdiuresing  - Lasix 40mg IV x 1 & Diamox 250 x 1 today  - Continue to monitor I&Os  - Free water bolus to 150cc q6 when able to    #ID   - Worsening diffuse b/l opacities + clinical evidence of sinusitis, uptrending leukocytosis (WBC 23)  - Plan to continue 7-day course of Zosyn (started 5/14)  - s/p bronchoscopy 5/17, negative cultures  - S/p full course of meropenem (5/4-5/13), Fluconazole (5/8-5/13), Vancomycin (5/11-5/12)  - CDiff negative  - COVID: + on 4/28, s/p 10 days of Remdesivir trial  (4/30-5/9), s/p Tocilizumab on 4/30, inflammatory markers down trending  - f/u Fungitell sent today  - If bronch cx remains negative, and if leukocytosis continues to uptrend, plan for CT abd/pel/chest when patient more stable  - ID following, recs appreciated    #  LINES  - RIJ TLC from 5/16     #Heme   - Empiric heparin gtt for elevated D-dimer (now improved to 814)  - Goal aPTT 58-99    #Endo  -  Blood glucose controlled on Humalog Low SS    #Ethics  -Full code  -Updated daughter, Mary ([phone]) on patient's clinical status and to further establish GOC. As per daughter, she remains hopeful for full recovery given patient's father "with extensive medical history", also tested positive and fully recovered. Despite patient's prognosis, daughter is not amenable to Palliative Care at this time. Assessment and Plan:   · Assessment	  ASSESSMENT & PLAN: 64 y/o F with no significant PMH presents 4/29 with hypoxic respiratory failure 2nd COVID PNA. s/p Tocilizumab (4/30), s/p empiric Ceftriaxone/Azithro (4/30-5/4) for ?superimposed CAP. Currently enrolled in Remdesivir trial (4/30). Intubated 5/4 for worsened hypoxic respiratory failure. Severe ARDS requiring proning on 5/6, 5/7, 5/8, & 5/17.     #Neuro   - Sedated on Fentanyl, Ketamine, Propofol   - Paralyzed with Rocuronium     #CV  - Remains off pressors, maintaining MAP>65    #Pulm:   - Hypoxic respiratory failure 2/2 COVID PNA  - Required proning on 5/6, 5/7, 5/8, and 5/17  - Poor lung compliance, permissive hypercapnia for protective lung ventilation   - CXR w/ worsening diffuse b/l opacities  - Plan to recruit more lung by proning patient today    #GI  - TF held at this time given patient is proning, resume when able to  - Last BM 5/17, c/w Miralax/Senna  -  on 5/18, plan to monitor Q72h while on Propofol  - Pepcid 20mg qd     #Renal  - Discontinued Lasix gtt yesterday given metabolic alkolosis & overdiuresing  - Lasix 40mg IV x 1 & Diamox 250 x 1 today  - Continue to monitor I&Os  - Free water bolus to 150cc q6 when able to    #ID   - Worsening diffuse b/l opacities + clinical evidence of sinusitis, uptrending leukocytosis (WBC 23)  - Plan to continue 7-day course of Zosyn (started 5/14)  - s/p bronchoscopy 5/17, negative cultures  - S/p full course of meropenem (5/4-5/13), Fluconazole (5/8-5/13), Vancomycin (5/11-5/12)  - CDiff negative  - COVID: + on 4/28, s/p 10 days of Remdesivir trial  (4/30-5/9), s/p Tocilizumab on 4/30, inflammatory markers down trending  - f/u Fungitell sent today  - If bronch cx remains negative, and if leukocytosis continues to uptrend, plan for CT abd/pel/chest when patient more stable  - ID following, recs appreciated    #  LINES  - RIJ TLC from 5/16     #Heme   - Empiric heparin gtt for elevated D-dimer (now improved to 814)  - Goal aPTT 58-99    #Endo  -  Blood glucose controlled on Humalog Low SS    #Ethics  -Full code  -Updated daughter, Mary ([phone]) on patient's clinical status and to further establish GOC. As per daughter, she remains hopeful for full recovery given patient's father "with extensive medical history", also tested positive and fully recovered. Despite patient's prognosis, daughter is not amenable to Palliative Care at this time.

## 2020-05-19 NOTE — PROGRESS NOTE ADULT - ASSESSMENT
65F reporting no PMH who presented last night with complaint of fever/chills (home T-max 102 F), night sweats, cough, myalgias (mid & lower back), loss of taste and smell since Friday 4/24 (still tastes sweet/salty), as well as 2 days of weakness and constipation, found to be COVID positive on 4/28/20.    Pt has agreed to participate in a clinical trial for Covid -19  Please discuss with ID team if planning on taking concurrent treatment with other agents with actual or possible direct acting antiviral activity against SARS-CoV-2 such a chloroquine or hydroxychloroquine. Pt was given Tocilizumab by primary team on the day of enrollment as of concern for cytokine storm.     Hospital course  4/29: Started Ceftriaxone (4/29-5/3) and Azithromycin (4/29-5/4) in ED for possible bacterial PNA  4/30: RRT called for desaturation in 70s before receiving 1st dose of Remdesivir and Tocilizumab  5/4: RRT called for desaturation to 20s after drinking water and coughing, intubated by anesthesia. Sedated and started full dose lovenox after D-dimer elevated to [address]. Transferred to ICU. On Meropenem, Vancomycin, and Caspofungin for emperic sepsis coverage.  5/7: Caspofungin switched to Fluconazole for growth of candida albicans in urine; Vancomycin d/c'ed for negative MRSA swab  5/10: Started CPAP trials  5/12 - abs d/scooby   5/13 hematuria  5/14 O2 requirements increased  5/15--> continues to do poorly      # Sepsis   - WBC persistently elevated, Afebrile    - S/p full course of meropenem (5/4-5/12), Fluconazole (5/8-5/13), Vancomycin (5/11-5/12), on zosyn now.   - Pt is s/p Tocilizumab which increases risk of infection  - All cultures negative to date.   - team will assess lines  -, if bronch cx stay negative and continues to have leucocytosis, will need CT chest/abd/pelvis. - but unstable for transport  - FOR POC ultrasound to look for effusion      # Acute hypoxic respiratory failure  - Multifactorial, likely secondary to COVID-19 infection with viral PNA, with possible superimposed bacterial vs fungal vs aspiration PNA and/or PE   - r/o effusion. for POC ultrasound      # Elevated LFTs: minimally elevated.   -continue to monitor     # Elevated CRP/Ferritin/D-dimer  - Could be COVID-related. CRP, ferritin, trending down.   - D-dimer remains elevated, although much lower than previous peak, could suggest PE or microthrombi. Pt now on heparin drip. Continued AC per primary team.       # elevated WBC  repeat cultures , check fungitel  consider empiric fungal treatment?   check lactate  change lines , if able  ? vancomycin, but MRSA swab negative and combination with zosyn is nephrotoxic. ? change of abs  DAY #6 of zosyn      # anemia  pt slowly drifted down but remains about the same last week or so  could patient have bled into lung, but not further dropping    #neuro  hard to assess, pt is paralyzed  according to nurse , pupils react

## 2020-05-19 NOTE — PROGRESS NOTE ADULT - ATTENDING COMMENTS
66 yo F with no PMH a/w COVID infection, acute hypoxic respiratory failure, intubated on 5/4.   1. Neuro - sedated and paralyzed with Fent, Ketamine, Propofol and tam.   2. Pulm - ARDS, worsening R sided infiltrates s/p bronch 5/16 with minimal secretions. Poor lung compliance, hypercapnia. c/w lung protective ventilation, proned overnight 5/18 and switched from PC to VC once supine. Minimal pleural effusions on ultrasound. Sedated and paralyzed with Rocuronium. Repeat CXR with ETT migrated up and worsening b/l opacities. Tube readjusted and will prone again today and start Solumedrol 1 mg/kg x 3 days  3. ID-  prior Fevers- acute sinusitis. Leukocytosis downtrending. c/w Zosyn 5/14. Send Fungitell. Appreciate ID follow up  S/P bronchoscopy 5/16- minimal secretions, sputum cultures NGTD   Covid pneumonia s/p Tocilizumab and Remdesivir  4. CVS - stable, off pressors, goal MAP >65  5. Renal/FEN- net positive with metabolic alkalosis. c/w Lasix and add Diamox. F/u repeat BMP and urine output.   6. GI- pepcid, TFs  7. Heme- hypercoagulable state - c/w heparin drip  8. GOC - very guarded prognosis overall.  Critically ill requiring frequent bedside evaluations and changes in management.

## 2020-05-19 NOTE — PROGRESS NOTE ADULT - SUBJECTIVE AND OBJECTIVE BOX
CHIEF COMPLAINT: Patient is a 65y old  Female who presents with a chief complaint of COVID-19, Hypoxic respiratory failure (15 May 2020 12:45)    INTERVAL HPI/OVERNIGHT EVENTS: ETT migrated during repositioning noted on CXR, advanced 4cm, noted with worsening diffuse b/l opacities.     REVIEW OF SYSTEMS:  [x ] Unable to assess ROS because intubated/sedated    OBJECTIVE:    VITAL SIGNS:  ICU Vital Signs Last 24 Hrs  T(C): 37.1 (19 May 2020 12:00), Max: 37.1 (19 May 2020 00:00)  T(F): 98.8 (19 May 2020 12:00), Max: 98.8 (19 May 2020 00:00)  HR: 103 (19 May 2020 13:00) (98 - 105)  BP: --  BP(mean): --  ABP: 129/54 (19 May 2020 13:00) (112/50 - 154/62)  ABP(mean): 82 (19 May 2020 13:00) (74 - 100)  RR: 35 (19 May 2020 13:00) (35 - 37)  SpO2: 92% (19 May 2020 13:00) (90% - 95%)    Mode: AC/ CMV (Assist Control/ Continuous Mandatory Ventilation), RR (machine): 35, TV (machine): 280, FiO2: 100, PEEP: 5, ITime: 0.6, MAP: 20, PIP: 64     @ 07: @ 07:00  --------------------------------------------------------  IN: 2261.6 mL / OUT: 3260 mL / NET: -998.4 mL     @ 07: @ 13:33  --------------------------------------------------------  IN: 389 mL / OUT: 1080 mL / NET: -691 mL      CAPILLARY BLOOD GLUCOSE      POCT Blood Glucose.: 120 mg/dL (19 May 2020 11:21)    PHYSICAL EXAM:  General: Intubated  HEENT: WNL  Neck: WNL  Respiratory: Coarse bilaterlaly   Cardiovascular: RRR  Abdomen: Hypoactive BS  Extremities: +3 pitting edema  Skin: facial DTI  Neurological: sedated/paralyzed    MEDICATIONS:  MEDICATIONS  (STANDING):  ALBUTerol    90 MICROgram(s) HFA Inhaler 2 Puff(s) Inhalation every 6 hours  BACItracin   Ointment 1 Application(s) Topical two times a day  chlorhexidine 0.12% Liquid 15 milliLiter(s) Oral Mucosa two times a day  chlorhexidine 4% Liquid 1 Application(s) Topical <User Schedule>  famotidine Injectable 20 milliGRAM(s) IV Push daily  fentaNYL   Infusion... 0.501 MICROgram(s)/kG/Hr (1.76 mL/Hr) IV Continuous <Continuous>  heparin  Infusion. 900 Unit(s)/Hr (9 mL/Hr) IV Continuous <Continuous>  insulin lispro (HumaLOG) corrective regimen sliding scale   SubCutaneous every 6 hours  ketamine Infusion. 0.501 mG/kG/Hr (3.52 mL/Hr) IV Continuous <Continuous>  methylPREDNISolone sodium succinate Injectable 40 milliGRAM(s) IV Push two times a day  norepinephrine Infusion 0.06 MICROgram(s)/kG/Min (7.91 mL/Hr) IV Continuous <Continuous>  piperacillin/tazobactam IVPB.. 3.375 Gram(s) IV Intermittent every 8 hours  polyethylene glycol 3350 17 Gram(s) Oral daily  propofol Infusion 24.893 MICROgram(s)/kG/Min (10.5 mL/Hr) IV Continuous <Continuous>  rocuronium Infusion 8 MICROgram(s)/kG/Min (6.75 mL/Hr) IV Continuous <Continuous>  senna Syrup 10 milliLiter(s) Oral daily    MEDICATIONS  (PRN):  acetaminophen    Suspension .. 650 milliGRAM(s) Oral every 6 hours PRN Temp greater or equal to 38C (100.4F)  diphenhydrAMINE   Injectable 50 milliGRAM(s) IV Push once PRN anaphylaxis to study drug  sodium chloride 0.9% lock flush 10 milliLiter(s) IV Push every 1 hour PRN Pre/post blood products, medications, blood draw, and to maintain line patency      ALLERGIES:  Allergies    No Known Allergies    Intolerances        LABS:                        7.5    23.41 )-----------( 267      ( 18 May 2020 23:42 )             25.5     05-18    141  |  97  |  10  ----------------------------<  113<H>  4.0   |  36<H>  |  0.30<L>    Ca    7.9<L>      18 May 2020 23:42  Phos  1.7     -  Mg     2.1         TPro  5.7<L>  /  Alb  2.3<L>  /  TBili  0.4  /  DBili  x   /  AST  74<H>  /  ALT  72<H>  /  AlkPhos  148<H>  18    PT/INR - ( 18 May 2020 23:42 )   PT: 12.0 sec;   INR: 1.04 ratio         PTT - ( 19 May 2020 12:45 )  PTT:79.3 sec  Urinalysis Basic - ( 19 May 2020 09:09 )    Color: Orange / Appearance: Turbid / S.023 / pH: x  Gluc: x / Ketone: Small  / Bili: Small / Urobili: Negative   Blood: x / Protein: 300 mg/dL / Nitrite: Negative   Leuk Esterase: Negative / RBC: >50 /hpf / WBC x   Sq Epi: x / Non Sq Epi: x / Bacteria: x        RADIOLOGY & ADDITIONAL TESTS: Reviewed.

## 2020-05-20 NOTE — PROGRESS NOTE ADULT - SUBJECTIVE AND OBJECTIVE BOX
Patient is a 65y old  Female who presents with a chief complaint of COVID-19, Hypoxic respiratory failure (20 May 2020 12:56)    Being followed by ID for        Interval history:  central line changed on the   and the a line  pt remains intubated  was proned for a while yesterday and today  no bowel movement but no feeds for two days, until now  no breakdown except on face  to lower paralytic  today to see if responds  No other acute events        PAST MEDICAL & SURGICAL HISTORY:  No pertinent past medical history  History of cholecystectomy  History of appendectomy    Allergies    No Known Allergies    Intolerances      Antimicrobials:    piperacillin/tazobactam IVPB.. 3.375 Gram(s) IV Intermittent every 8 hours    MEDICATIONS  (STANDING):  ALBUTerol    90 MICROgram(s) HFA Inhaler 2 Puff(s) Inhalation every 6 hours  BACItracin   Ointment 1 Application(s) Topical two times a day  chlorhexidine 0.12% Liquid 15 milliLiter(s) Oral Mucosa two times a day  chlorhexidine 4% Liquid 1 Application(s) Topical <User Schedule>  famotidine Injectable 20 milliGRAM(s) IV Push daily  fentaNYL   Infusion... 0.501 MICROgram(s)/kG/Hr (1.76 mL/Hr) IV Continuous <Continuous>  heparin  Infusion. 900 Unit(s)/Hr (9 mL/Hr) IV Continuous <Continuous>  insulin lispro (HumaLOG) corrective regimen sliding scale   SubCutaneous every 6 hours  ketamine Infusion. 0.501 mG/kG/Hr (3.52 mL/Hr) IV Continuous <Continuous>  methylPREDNISolone sodium succinate Injectable 40 milliGRAM(s) IV Push two times a day  norepinephrine Infusion 0.06 MICROgram(s)/kG/Min (7.91 mL/Hr) IV Continuous <Continuous>  piperacillin/tazobactam IVPB.. 3.375 Gram(s) IV Intermittent every 8 hours  polyethylene glycol 3350 17 Gram(s) Oral daily  potassium chloride  20 mEq/100 mL IVPB 20 milliEquivalent(s) IV Intermittent every 2 hours  propofol Infusion 24.893 MICROgram(s)/kG/Min (10.5 mL/Hr) IV Continuous <Continuous>  rocuronium Infusion 8 MICROgram(s)/kG/Min (6.75 mL/Hr) IV Continuous <Continuous>  senna Syrup 10 milliLiter(s) Oral daily    MEDICATIONS  (PRN):  acetaminophen    Suspension .. 650 milliGRAM(s) Oral every 6 hours PRN Temp greater or equal to 38C (100.4F)  diphenhydrAMINE   Injectable 50 milliGRAM(s) IV Push once PRN anaphylaxis to study drug  sodium chloride 0.9% lock flush 10 milliLiter(s) IV Push every 1 hour PRN Pre/post blood products, medications, blood draw, and to maintain line patency      Vital Signs Last 24 Hrs  T(C): 35.5 (20 @ 12:00), Max: 37 (20 @ 16:00)  T(F): 95.9 (20 @ 12:00), Max: 98.6 (20 @ 16:00)  HR: 91 (20 @ 12:34) (84 - 103)  BP: --  BP(mean): --  RR: 35 (20 @ 12:00) (35 - 36)  SpO2: 94% (20 @ 12:34) (90% - 100%)    Physical Exam:    Constitutional intubated     HEENT Pupils sluggish but reactive  No oral exudate or erythema    Neck supple no JVD or LN    Chest Good AE,CTA    CVS RRR S1 S2     Abd softly distended    Ext  edema    IV site no erythema tenderness or discharge    Joints no swelling or LOM        Lab Data:                          7.8    18.46 )-----------( 309      ( 20 May 2020 06:28 )             24.9           139  |  97  |  13  ----------------------------<  113<H>  4.0   |  35<H>  |  0.41<L>    Ca    8.1<L>      19 May 2020 22:08  Phos  2.0       Mg     2.4         TPro  5.8<L>  /  Alb  2.1<L>  /  TBili  0.5  /  DBili  x   /  AST  56<H>  /  ALT  61<H>  /  AlkPhos  142<H>        Urinalysis Basic - ( 19 May 2020 09:09 )    Color: Orange / Appearance: Turbid / S.023 / pH: x  Gluc: x / Ketone: Small  / Bili: Small / Urobili: Negative   Blood: x / Protein: 300 mg/dL / Nitrite: Negative   Leuk Esterase: Negative / RBC: >50 /hpf / WBC x   Sq Epi: x / Non Sq Epi: x / Bacteria: x        Bronch Wash Bronchoalveolar Lavage  20   No growth at 48 hours  --    Moderate polymorphonuclear leukocytes per low power field  No squamous epithelial cells per low power field  No organisms seen per oil power field      .Blood Blood-Peripheral  05-15-20   No growth to date.  --  --      .Sputum Sputum  05-15-20   No growth at 48 hours  --    Few polymorphonuclear leukocytes per low power field  No Squamous epithelial cells per low power field  No organisms seen        WBC Count: 18.46 (20 @ 06:28)  WBC Count: 19.41 (20 @ 22:08)  WBC Count: 23.41 (20 @ 23:42)  WBC Count: 19.45 (20 @ 00:32)  WBC Count: 22.54 (20 @ 11:21)  WBC Count: 20.67 (20 @ 04:31)  WBC Count: 17.08 (20 @ 00:37)  WBC Count: 25.00 (20 @ 00:27)  WBC Count: 26.19 (05-15-20 @ 00:47)  WBC Count: 26.76 (20 @ 15:53)  WBC Count: 19.28 (20 @ 00:19)    D-Dimer Assay, Quantitative: 1034 ng/mL DDU ()  D-Dimer Assay, Quantitative: 814 ng/mL DDU ()  D-Dimer Assay, Quantitative: 983 ng/mL DDU ()  D-Dimer Assay, Quantitative: 1819 ng/mL DDU ()    Ferritin, Serum: 621 ng/mL ()  Ferritin, Serum: 678 ng/mL ()  Ferritin, Serum: 768 ng/mL ()  Ferritin, Serum: 818 ng/mL ()      C-Reactive Protein, Serum: 31.81 ug/mL (20 @ 22:08)  C-Reactive Protein, Serum: 23.92 ug/mL (05-18-20 @ 23:42)  C-Reactive Protein, Serum: 12.10 ug/mL (20 @ 00:32)  C-Reactive Protein, Serum: 12.57 ug/mL (20 @ 00:37)      Ordinal Scale: score : 2  1) Death  2) Hospitalized, on invasive mechanical ventilation or ECMO  3) Hospitalized, on non-invasive ventilation or high flow oxygen devices  4) Hospitalized, requiring low flow(<11l/min) supplemental oxygen  5) Hospitalized, not requiring supplemental oxygen - requiring ongoing medical care(COVID-19 related or otherwise)  6) Hospitalized, not requiring supplemental oxygen - no longer requires ongoing medical care(other than per protocol RDV administration)  7) Not hospitalized

## 2020-05-20 NOTE — PROGRESS NOTE ADULT - ATTENDING COMMENTS
64 yo F with no PMH a/w COVID infection, acute hypoxic respiratory failure, intubated on 5/4.   1. Neuro - sedated and paralyzed with Fent, Ketamine, Propofol and tam.   2. Pulm - ARDS, worsening R sided infiltrates s/p bronch 5/16 with minimal secretions.   Continues to have poor lung compliance, with hypoxic and hypercapnic respiratory failure. c/w lung protective ventilation, proned overnight 5/18 and 5/19.  Sedated and paralyzed with Rocuronium. Solumedrol 1 mg/kg x 3 days  3. ID-  prior Fevers- acute sinusitis. Leukocytosis downtrending. c/w Zosyn 5/14. Appreciate ID follow up  S/P bronchoscopy 5/16- minimal secretions, sputum cultures NGTD   Covid pneumonia s/p Tocilizumab and Remdesivir  4. CVS - stable, off pressors, goal MAP >65  5. Renal/FEN- diuresed with Bumex and zaroxolyn overnight. F/u repeat BMP and urine output.   6. GI- pepcid, TFs  7. Heme- hypercoagulable state - c/w heparin drip  8. GOC - very guarded prognosis. Critically ill requiring frequent bedside evaluations and changes in management.

## 2020-05-20 NOTE — PROGRESS NOTE ADULT - SUBJECTIVE AND OBJECTIVE BOX
CHIEF COMPLAINT:  Patient is a 65y old  Female who presents with a chief complaint of COVID-19, Hypoxic respiratory failure (19 May 2020 13:32)  HPI: 65F reporting no PMH who tested + for COVID-19 on . Admitted on 20 with complaints of fever/chills (home T-max 102 F), night sweats, cough, myalgias (mid & lower back), loss of taste and smell as well as 2 days of weakness and constipation and SaO2 (measured at home) 89%.   Intubated 20 - severe ARDS  Interval Events: : Started Ceftriaxone (-5/3) and Azithromycin (-) in ED for possible bacterial PNA  : RRT called for desaturation in 70s before receiving 1st dose of Remdesivir and Tocilizumab  : RRT called for desaturation to 20s after drinking water and coughing, intubated by anesthesia. Sedated and started full dose Lovenox after D-dimer elevated to [address]. Transferred to ICU. On Meropenem, Vancomycin, and Caspofungin for empiric sepsis coverage.  : Caspofungin switched to Fluconazole for growth of candida albicans in urine; Vancomycin d/c'ed for negative MRSA swab  5/10: Appeared to be improving - Started CPAP trials  :  antibiotics discontinued   : hematuria  : ARDS, worsening R sided infiltrates. Poor lung compliance, hypercapnia  : s/p bronch with minimal secretions  :  proned overnight  and switched from PC to VC once supine. Minimal pleural effusions on ultrasound. Sedated and paralyzed with Rocuronium. Repeat CXR with ETT migrated up and worsening b/l opacities. Tube readjusted and will prone again today and start Solumedrol 1 mg/kg x 3               OBJECTIVE:  ICU Vital Signs Last 24 Hrs  T(C): 36.1 (20 May 2020 08:00), Max: 37 (19 May 2020 16:00)  T(F): 97 (20 May 2020 08:00), Max: 98.6 (19 May 2020 16:00)  HR: 91 (20 May 2020 12:34) (84 - 104)  BP: --  BP(mean): --  ABP: 107/45 (20 May 2020 11:00) (103/45 - 147/55)  ABP(mean): 68 (20 May 2020 11:00) (66 - 95)  RR: 35 (20 May 2020 11:00) (35 - 36)  SpO2: 94% (20 May 2020 12:34) (82% - 100%)    Mode: AC/ CMV (Assist Control/ Continuous Mandatory Ventilation), RR (machine): 35, TV (machine): 280, FiO2: 100, PEEP: 5, ITime: 0.6, MAP: 20, PIP: 56     @ 07: @ 07:00  --------------------------------------------------------  IN: 2622.1 mL / OUT: 4990 mL / NET: -2367.9 mL     @ 07: @ 12:56  --------------------------------------------------------  IN: 484 mL / OUT: 410 mL / NET: 74 mL      CAPILLARY BLOOD GLUCOSE      POCT Blood Glucose.: 137 mg/dL (20 May 2020 11:40)          PHYSICAL EXAM:          HOSPITAL MEDICATIONS:  MEDICATIONS  (STANDING):  ALBUTerol    90 MICROgram(s) HFA Inhaler 2 Puff(s) Inhalation every 6 hours  BACItracin   Ointment 1 Application(s) Topical two times a day  chlorhexidine 0.12% Liquid 15 milliLiter(s) Oral Mucosa two times a day  chlorhexidine 4% Liquid 1 Application(s) Topical <User Schedule>  famotidine Injectable 20 milliGRAM(s) IV Push daily  fentaNYL   Infusion... 0.501 MICROgram(s)/kG/Hr (1.76 mL/Hr) IV Continuous <Continuous>  heparin  Infusion. 900 Unit(s)/Hr (9 mL/Hr) IV Continuous <Continuous>  insulin lispro (HumaLOG) corrective regimen sliding scale   SubCutaneous every 6 hours  ketamine Infusion. 0.501 mG/kG/Hr (3.52 mL/Hr) IV Continuous <Continuous>  methylPREDNISolone sodium succinate Injectable 40 milliGRAM(s) IV Push two times a day  norepinephrine Infusion 0.06 MICROgram(s)/kG/Min (7.91 mL/Hr) IV Continuous <Continuous>  piperacillin/tazobactam IVPB.. 3.375 Gram(s) IV Intermittent every 8 hours  polyethylene glycol 3350 17 Gram(s) Oral daily  potassium chloride  20 mEq/100 mL IVPB 20 milliEquivalent(s) IV Intermittent every 2 hours  propofol Infusion 24.893 MICROgram(s)/kG/Min (10.5 mL/Hr) IV Continuous <Continuous>  rocuronium Infusion 8 MICROgram(s)/kG/Min (6.75 mL/Hr) IV Continuous <Continuous>  senna Syrup 10 milliLiter(s) Oral daily    MEDICATIONS  (PRN):  acetaminophen    Suspension .. 650 milliGRAM(s) Oral every 6 hours PRN Temp greater or equal to 38C (100.4F)  diphenhydrAMINE   Injectable 50 milliGRAM(s) IV Push once PRN anaphylaxis to study drug  sodium chloride 0.9% lock flush 10 milliLiter(s) IV Push every 1 hour PRN Pre/post blood products, medications, blood draw, and to maintain line patency      LABS:                        7.8    18.46 )-----------( 309      ( 20 May 2020 06:28 )             24.9     Hgb Trend: 7.8<--, 7.2<--, 7.5<--, 7.7<--, 8.4<--  0519    139  |  97  |  13  ----------------------------<  113<H>  4.0   |  35<H>  |  0.41<L>    Ca    8.1<L>      19 May 2020 22:08  Phos  2.0       Mg     2.4         TPro  5.8<L>  /  Alb  2.1<L>  /  TBili  0.5  /  DBili  x   /  AST  56<H>  /  ALT  61<H>  /  AlkPhos  142<H>      LIVER FUNCTIONS - ( 19 May 2020 22:08 )  Alb: 2.1 g/dL / Pro: 5.8 g/dL / ALK PHOS: 142 U/L / ALT: 61 U/L / AST: 56 U/L / GGT: x           Creatinine Trend: 0.41<--, 0.30<--, 0.31<--, 0.30<--, 0.30<--, 0.35<--  PT/INR - ( 19 May 2020 22:08 )   PT: 12.7 sec;   INR: 1.11 ratio         PTT - ( 19 May 2020 22:08 )  PTT:78.3 sec  Urinalysis Basic - ( 19 May 2020 09:09 )    Color: Orange / Appearance: Turbid / S.023 / pH: x  Gluc: x / Ketone: Small  / Bili: Small / Urobili: Negative   Blood: x / Protein: 300 mg/dL / Nitrite: Negative   Leuk Esterase: Negative / RBC: >50 /hpf / WBC x   Sq Epi: x / Non Sq Epi: x / Bacteria: x      Arterial Blood Gas:   @ 06:25  7.33/85/88/43/96/15.6  ABG lactate: --  Arterial Blood Gas:   @ 22:02  7.29/88/82/41/96/12.8  ABG lactate: --  Arterial Blood Gas:   @ 14:41  7.30/88/64/42/91/14.2  ABG lactate: --  Arterial Blood Gas:   @ 12:43  7.31/88/72/43/95/15.2  ABG lactate: --  Arterial Blood Gas:   @ 23:38  7.31/87/60/43/89/14.7  ABG lactate: --  Arterial Blood Gas:   @ 17:15  7.29/89/58/42/86/13.9  ABG lactate: --        MICROBIOLOGY:     RADIOLOGY:  [ ] Reviewed and interpreted by me    EKG:      [facility] (ext [phone]) CHIEF COMPLAINT:  Patient is a 65y old  Female who presents with a chief complaint of COVID-19, Hypoxic respiratory failure (19 May 2020 13:32)  HPI: 65F reporting no PMH who tested + for COVID-19 on . Admitted on 20 with complaints of fever/chills (home T-max 102 F), night sweats, cough, myalgias (mid & lower back), loss of taste and smell as well as 2 days of weakness and constipation and SaO2 (measured at home) 89%.   Intubated 20 - severe ARDS  Interval Events: : Started Ceftriaxone (-5/3) and Azithromycin (-) in ED for possible bacterial PNA  : RRT called for desaturation in 70s before receiving 1st dose of Remdesivir and Tocilizumab  : RRT called for desaturation to 20s after drinking water and coughing, intubated by anesthesia. Sedated and started full dose Lovenox after D-dimer elevated to [address]. Transferred to ICU. On Meropenem, Vancomycin, and Caspofungin for empiric sepsis coverage.  : Caspofungin switched to Fluconazole for growth of candida albicans in urine; Vancomycin d/c'ed for negative MRSA swab  5/10: Appeared to be improving - Started CPAP trials  :  antibiotics discontinued   : hematuria  : ARDS, worsening R sided infiltrates. Poor lung compliance, hypercapnia  : s/p bronch with minimal secretions  :  proned overnight and switched from PC to VC once supine. Minimal pleural effusions on ultrasound. Sedated and paralyzed with Rocuronium.   : Repeat CXR with ETT migrated up and worsening b/l opacities. Tube readjusted and proned again. Solumedrol 1 mg/kg x 3 (for worsening opacities)  : Changed to PRVC on vent (FIO2 70%, , Rate 32, PEEP +6) - plan to discontinue paralytic - not planning to prone today      OBJECTIVE:  ICU Vital Signs Last 24 Hrs  T(C): 36.1 (20 May 2020 08:00), Max: 37 (19 May 2020 16:00)  T(F): 97 (20 May 2020 08:00), Max: 98.6 (19 May 2020 16:00)  HR: 91 (20 May 2020 12:34) (84 - 104)  BP: --  BP(mean): --  ABP: 107/45 (20 May 2020 11:00) (103/45 - 147/55)  ABP(mean): 68 (20 May 2020 11:00) (66 - 95)  RR: 35 (20 May 2020 11:00) (35 - 36)  SpO2: 94% (20 May 2020 12:34) (82% - 100%)    Mode: AC/ CMV (Assist Control/ Continuous Mandatory Ventilation), RR (machine): 35, TV (machine): 280, FiO2: 100, PEEP: 5, ITime: 0.6, MAP: 20, PIP: 56     @ 07:01  -   @ 07:00  --------------------------------------------------------  IN: 2622.1 mL / OUT: 4990 mL / NET: -2367.9 mL     @ 07:01  -   @ 12:56  --------------------------------------------------------  IN: 484 mL / OUT: 410 mL / NET: 74 mL          HOSPITAL MEDICATIONS:  MEDICATIONS  (STANDING):  ALBUTerol    90 MICROgram(s) HFA Inhaler 2 Puff(s) Inhalation every 6 hours  BACItracin   Ointment 1 Application(s) Topical two times a day  chlorhexidine 0.12% Liquid 15 milliLiter(s) Oral Mucosa two times a day  chlorhexidine 4% Liquid 1 Application(s) Topical <User Schedule>  famotidine Injectable 20 milliGRAM(s) IV Push daily  fentaNYL   Infusion... 0.501 MICROgram(s)/kG/Hr (1.76 mL/Hr) IV Continuous <Continuous>  heparin  Infusion. 900 Unit(s)/Hr (9 mL/Hr) IV Continuous <Continuous>  insulin lispro (HumaLOG) corrective regimen sliding scale   SubCutaneous every 6 hours  ketamine Infusion. 0.501 mG/kG/Hr (3.52 mL/Hr) IV Continuous <Continuous>  methylPREDNISolone sodium succinate Injectable 40 milliGRAM(s) IV Push two times a day  norepinephrine Infusion 0.06 MICROgram(s)/kG/Min (7.91 mL/Hr) IV Continuous <Continuous>  piperacillin/tazobactam IVPB.. 3.375 Gram(s) IV Intermittent every 8 hours  polyethylene glycol 3350 17 Gram(s) Oral daily  potassium chloride  20 mEq/100 mL IVPB 20 milliEquivalent(s) IV Intermittent every 2 hours  propofol Infusion 24.893 MICROgram(s)/kG/Min (10.5 mL/Hr) IV Continuous <Continuous>  rocuronium Infusion 8 MICROgram(s)/kG/Min (6.75 mL/Hr) IV Continuous <Continuous>  senna Syrup 10 milliLiter(s) Oral daily    MEDICATIONS  (PRN):  acetaminophen    Suspension .. 650 milliGRAM(s) Oral every 6 hours PRN Temp greater or equal to 38C (100.4F)  diphenhydrAMINE   Injectable 50 milliGRAM(s) IV Push once PRN anaphylaxis to study drug  sodium chloride 0.9% lock flush 10 milliLiter(s) IV Push every 1 hour PRN Pre/post blood products, medications, blood draw, and to maintain line patency      LABS:                        7.8    18.46 )-----------( 309      ( 20 May 2020 06:28 )             24.9     Hgb Trend: 7.8<--, 7.2<--, 7.5<--, 7.7<--, 8.4<--  0519    139  |  97  |  13  ----------------------------<  113<H>  4.0   |  35<H>  |  0.41<L>    Ca    8.1<L>      19 May 2020 22:08  Phos  2.0       Mg     2.4         TPro  5.8<L>  /  Alb  2.1<L>  /  TBili  0.5  /  DBili  x   /  AST  56<H>  /  ALT  61<H>  /  AlkPhos  142<H>      LIVER FUNCTIONS - ( 19 May 2020 22:08 )  Alb: 2.1 g/dL / Pro: 5.8 g/dL / ALK PHOS: 142 U/L / ALT: 61 U/L / AST: 56 U/L / GGT: x           Creatinine Trend: 0.41<--, 0.30<--, 0.31<--, 0.30<--, 0.30<--, 0.35<--  PT/INR - ( 19 May 2020 22:08 )   PT: 12.7 sec;   INR: 1.11 ratio         PTT - ( 19 May 2020 22:08 )  PTT:78.3 sec  Urinalysis Basic - ( 19 May 2020 09:09 )    Color: Orange / Appearance: Turbid / S.023 / pH: x  Gluc: x / Ketone: Small  / Bili: Small / Urobili: Negative   Blood: x / Protein: 300 mg/dL / Nitrite: Negative   Leuk Esterase: Negative / RBC: >50 /hpf / WBC x   Sq Epi: x / Non Sq Epi: x / Bacteria: x      Arterial Blood Gas:   @ 06:25  7.33/85/88/43/96/15.6  ABG lactate: --  Arterial Blood Gas:   @ 22:02  7.29/88/82/41/96/12.8  ABG lactate: --  Arterial Blood Gas:   @ 14:41  7.30/88/64/42/91/14.2  ABG lactate: --  Arterial Blood Gas:   @ 12:43  7.31/88/72/43/95/15.2  ABG lactate: --  Arterial Blood Gas:   @ 23:38  7.31/87/60/43/89/14.7  ABG lactate: --  Arterial Blood Gas:   @ 17:15  7.29/89/58/42/86/13.9  ABG lactate: --

## 2020-05-20 NOTE — CHART NOTE - NSCHARTNOTEFT_GEN_A_CORE
Nutrition Follow Up Note   Patient seen for: malnutrition follow up on COVID ICU     Source: medical record, communication with team. Unable to speak to pt due to current airborne isolation contact precautions related to COVID-19.     Chart reviewed, events noted. Pt remains intubated. Was proned 5/19 and side positioned 5/17    Diet Order: Diet, NPO with Tube Feed:   Tube Feeding Modality: Orogastric  Vital AF (VITALAFRTH)  Total Volume for 24 Hours (mL): 1320  Continuous  Starting Tube Feed Rate {mL per Hour}: 20  Increase Tube Feed Rate by (mL): 10     Every 4 hours  Until Goal Tube Feed Rate (mL per Hour): 55  Tube Feed Duration (in Hours): 24  Tube Feed Start Time: 13:00  Banatrol TF     Qty per Day:  2 (05-20-20 @ 06:06)    CURRENT EN ORDER PROVIDES: 1584 kcals, 99 gm protein    Nutrition Events:   tube feeds resumed this am, currently infusing at 30cc/hr  45% EN goal met over past 5 days (tube feeds held for proning, positioning)  additional kcals from propofol 5/20 557 kcals, 5/19 534 kcals, 5/18 557 kcals    GI: no vomiting, Abd distended  last BM 5/17 (miralax and senna ordered)    Anthropometric Measurements:   Height (cm): 157.48 (04-28-20 @ 21:02)  Weight (kg): 70.3 (04-28-20 @ 21:02)  BMI (kg/m2): 28.3 (04-28-20 @ 21:02)  no recent wt    Medications: MEDICATIONS  (STANDING):  ALBUTerol    90 MICROgram(s) HFA Inhaler 2 Puff(s) Inhalation every 6 hours  BACItracin   Ointment 1 Application(s) Topical two times a day  chlorhexidine 0.12% Liquid 15 milliLiter(s) Oral Mucosa two times a day  chlorhexidine 4% Liquid 1 Application(s) Topical <User Schedule>  famotidine Injectable 20 milliGRAM(s) IV Push daily  fentaNYL   Infusion... 0.501 MICROgram(s)/kG/Hr (1.76 mL/Hr) IV Continuous <Continuous>  heparin  Infusion. 900 Unit(s)/Hr (9 mL/Hr) IV Continuous <Continuous>  insulin lispro (HumaLOG) corrective regimen sliding scale   SubCutaneous every 6 hours  ketamine Infusion. 0.501 mG/kG/Hr (3.52 mL/Hr) IV Continuous <Continuous>  methylPREDNISolone sodium succinate Injectable 40 milliGRAM(s) IV Push two times a day  norepinephrine Infusion 0.06 MICROgram(s)/kG/Min (7.91 mL/Hr) IV Continuous <Continuous>  piperacillin/tazobactam IVPB.. 3.375 Gram(s) IV Intermittent every 8 hours  polyethylene glycol 3350 17 Gram(s) Oral daily  potassium chloride  20 mEq/100 mL IVPB 20 milliEquivalent(s) IV Intermittent every 2 hours  propofol Infusion 24.893 MICROgram(s)/kG/Min (10.5 mL/Hr) IV Continuous <Continuous>  rocuronium Infusion 8 MICROgram(s)/kG/Min (6.75 mL/Hr) IV Continuous <Continuous>  senna Syrup 10 milliLiter(s) Oral daily    MEDICATIONS  (PRN):  acetaminophen    Suspension .. 650 milliGRAM(s) Oral every 6 hours PRN Temp greater or equal to 38C (100.4F)  diphenhydrAMINE   Injectable 50 milliGRAM(s) IV Push once PRN anaphylaxis to study drug  sodium chloride 0.9% lock flush 10 milliLiter(s) IV Push every 1 hour PRN Pre/post blood products, medications, blood draw, and to maintain line patency    Labs: 05-19 @ 22:08: Sodium 139, Potassium 4.0, Calcium 8.1<L>, Magnesium 2.4, Phosphorus 2.0<L>, BUN 13, Creatinine 0.41<L>, Glucose 113<H>, Alk Phos 142<H>, ALT/SGPT 61<H>, AST/SGOT 56<H>, Albumin 2.1<L>, Prealbumin --, Total Bilirubin 0.5, Hemoglobin 7.2<L>, Hematocrit 24.6<L>, Ferritin --, C-Reactive Protein 31.81, Creatine Kinase <<27>      Triglycerides, Serum: 184 mg/dL (05-18-20 @ 17:27)  Triglycerides, Serum: 155 mg/dL (05-16-20 @ 00:27)  Triglycerides, Serum: 171 mg/dL (05-15-20 @ 00:47)      POCT Blood Glucose.: 137 mg/dL (05-20-20 @ 11:40)  POCT Blood Glucose.: 149 mg/dL (05-20-20 @ 05:15)  POCT Blood Glucose.: 151 mg/dL (05-20-20 @ 03:55)  POCT Blood Glucose.: 114 mg/dL (05-19-20 @ 22:52)  POCT Blood Glucose.: 138 mg/dL (05-19-20 @ 16:47)    Skin: no pressure injuries documented   Edema: 2+ generalized    Estimated Needs: based on dosing wt 70.3Kg, with consideration for intubation  Energy: 6662-4374 calories (20-25 kari/Kg)  Protein:  84-98 grams (1.2-1.4 Gm/Kg)      Previous Nutrition Diagnosis: 1) Inadequate Protein-Energy Intake 2) Moderate Malnutrition  Nutrition Diagnoses are: ongoing, EN has been resumed and increasing to goal rate    Recommended Interventions:   1. Continue Vital AF (1.2) goal 55 cc/hr x 24 hrs to provide 1584 kcals, 99 gm protein, 1070cc free water meets 22 kcals/kg, 1.4 gm protein/kg dosing wt 70.3 kg. Monitor propofol intake, if requirements are high, will need to adjust EN goal rate.   2. Banatrol as needed      Monitoring and Evaluation:   Continue to monitor nutrition provision and tolerance, weights, labs, skin integrity.   RD remains available upon request and will follow up per protocol.

## 2020-05-20 NOTE — PROGRESS NOTE ADULT - ASSESSMENT
ASSESSMENT & PLAN: 64 y/o F with no significant PMH presents 4/29 with hypoxic respiratory failure 2nd COVID PNA. s/p Tocilizumab (4/30), s/p empiric Ceftriaxone/Azithro (4/30-5/4) for ?superimposed CAP. Currently enrolled in Remdesivir trial (4/30). Intubated 5/4 for worsened hypoxic respiratory failure. Severe ARDS requiring proning on 5/6, 5/7, 5/8, & 5/17.     #Neuro   - Sedated on Fentanyl, Ketamine, Propofol   - Paralyzed with Rocuronium - plan to discontinue Rocuronium (no plans for proning today)    #CV  - Remains off pressors, maintaining MAP>65    #Pulm:   - Hypoxic respiratory failure 2/2 COVID PNA  - Required proning on 5/6, 5/7, 5/8, and 5/17  - Poor lung compliance, permissive hypercapnia for protective lung ventilation   - CXR w/ worsening diffuse b/l opacities  - Plan to recruit more lung by proning patient today    #GI  - TF held at this time given patient is proning, resume when able to  - Last BM 5/17, c/w Miralax/Senna  -  on 5/18, plan to monitor Q72h while on Propofol  - Pepcid 20mg qd     #Renal  - Discontinued Lasix gtt yesterday given metabolic alkolosis & overdiuresing  - Lasix 40mg IV x 1 & Diamox 250 x 1 today  - Continue to monitor I&Os  - Free water bolus to 150cc q6 when able to    #ID   - Worsening diffuse b/l opacities + clinical evidence of sinusitis, uptrending leukocytosis (WBC 23)  - Plan to continue 7-day course of Zosyn (started 5/14)  - s/p bronchoscopy 5/17, negative cultures  - S/p full course of meropenem (5/4-5/13), Fluconazole (5/8-5/13), Vancomycin (5/11-5/12)  - CDiff negative  - COVID: + on 4/28, s/p 10 days of Remdesivir trial  (4/30-5/9), s/p Tocilizumab on 4/30, inflammatory markers down trending  - f/u Fungitell sent today  - If bronch cx remains negative, and if leukocytosis continues to uptrend, plan for CT abd/pel/chest when patient more stable  - ID following, recs appreciated    #  LINES  - RIJ TLC from 5/16     #Heme   - Empiric heparin gtt for elevated D-dimer (now improved to 814)  - Goal aPTT 58-99    #Endo  -  Blood glucose controlled on Humalog Low SS    #Ethics  -Full code  -Updated daughter, Mary [phone]) on patient's clinical status and to further establish GOC. As per daughter, she remains hopeful for full recovery given patient's father "with extensive medical history", also tested positive and fully recovered. Despite patient's prognosis, daughter is not amenable to Palliative Care at this time. ASSESSMENT & PLAN: 64 y/o F with no significant PMH presents 4/29 with hypoxic respiratory failure 2nd COVID PNA. s/p Tocilizumab (4/30), s/p empiric Ceftriaxone/Azithro (4/30-5/4) for ?superimposed CAP. Currently enrolled in Remdesivir trial (4/30). Intubated 5/4 for worsened hypoxic respiratory failure. Severe ARDS requiring proning on 5/6, 5/7, 5/8, & 5/17.     #Neuro   - Sedated on Fentanyl, Ketamine, Propofol   - Paralyzed with Rocuronium - plan to discontinue Rocuronium (no plans for proning today)    #CV  - Remains off pressors, maintaining MAP>65    #Pulm:   - Hypoxic respiratory failure 2/2 COVID PNA  - Required proning on 5/6, 5/7, 5/8, 5/17 and 5/19  - Poor lung compliance, permissive hypercapnia for protective lung ventilation   - CXR w/ worsening diffuse b/l opacities  - Ventilator settings adjusted to PRVC, , Rate 32, FIO2 70%, PEEP +6    #GI  - Tube feeding with Vital @30cc/hr  - Last BM 5/17, c/w Miralax/Senna  - Pepcid 20mg qd     #Renal  - Lasix gtt discontinued 5/18 given metabolic alkalosis & overdiuresis  - Received IV Bumex 1mg and Zaroxolyn via OGT x 1 overnight last night  - Continue to monitor I&Os    #ID   - Worsening diffuse b/l opacities + clinical evidence of sinusitis, up trending leukocytosis (WBC 23)  - Plan to continue 7-day course of Zosyn (started 5/14)  - s/p bronchoscopy 5/17, negative cultures  - S/p full course of meropenem (5/4-5/13), Fluconazole (5/8-5/13), Vancomycin (5/11-5/12)  - CDiff negative  - COVID: + on 4/28, s/p 10 days of Remdesivir trial  (4/30-5/9), s/p Tocilizumab on 4/30, inflammatory markers down trending  - f/u Fungitell sent today  - If bronch cx remains negative, and if leukocytosis continues to uptrend, plan for CT abd/pel/chest when patient more stable  - ID following, recs appreciated  - If no cultures in last 4 days - will resend (patient received trial medication)    #  LINES  - RIJ TLC from 5/16     #Heme   - Empiric heparin gtt for elevated D-dimer (now improved to 814)  - PTT therapeutic @ 78  - Goal aPTT 58-99    #Endo  -  Blood glucose controlled on Humalog Low SS    #Ethics  -Full code  - Daughter DUKE [phone] - updated on current status. She is requesting a "conference call" between attending physician and family to discuss plan of care. I informed SW and will inform attending

## 2020-05-20 NOTE — PROGRESS NOTE ADULT - ASSESSMENT
65F reporting no PMH who presented last night with complaint of fever/chills (home T-max 102 F), night sweats, cough, myalgias (mid & lower back), loss of taste and smell since Friday 4/24 (still tastes sweet/salty), as well as 2 days of weakness and constipation, found to be COVID positive on 4/28/20.    Pt has agreed to participate in a clinical trial for Covid -19  Please discuss with ID team if planning on taking concurrent treatment with other agents with actual or possible direct acting antiviral activity against SARS-CoV-2 such a chloroquine or hydroxychloroquine. Pt was given Tocilizumab by primary team on the day of enrollment as of concern for cytokine storm.     Hospital course  4/29: Started Ceftriaxone (4/29-5/3) and Azithromycin (4/29-5/4) in ED for possible bacterial PNA  4/30: RRT called for desaturation in 70s before receiving 1st dose of Remdesivir and Tocilizumab  5/4: RRT called for desaturation to 20s after drinking water and coughing, intubated by anesthesia. Sedated and started full dose lovenox after D-dimer elevated to [address]. Transferred to ICU. On Meropenem, Vancomycin, and Caspofungin for emperic sepsis coverage.  5/7: Caspofungin switched to Fluconazole for growth of candida albicans in urine; Vancomycin d/c'ed for negative MRSA swab  5/10: Started CPAP trials  5/12 - abs d/scooby   5/13 hematuria  5/14 O2 requirements increased  5/15--> continues to do poorly  5/19- trials of prone    # Sepsis   - WBC persistently elevated, Afebrile  - S/p full course of meropenem (5/4-5/12), Fluconazole (5/8-5/13), Vancomycin (5/11-5/12), on zosyn now.   - Pt is s/p Tocilizumab which increases risk of infection  - All cultures negative to date.   - team will assess lines  -, if bronch cx stay negative and continues to have leucocytosis, will need CT chest/abd/pelvis. - but unstable for transport  - FOR POC ultrasound to look for effusion      # Acute hypoxic respiratory failure  - Multifactorial, likely secondary to COVID-19 infection with viral PNA, with possible superimposed bacterial vs fungal vs aspiration PNA and/or PE   - r/o effusion. for POC ultrasound      # Elevated LFTs: minimally elevated.   -continue to monitor     # Elevated CRP/Ferritin/D-dimer  - Could be COVID-related. CRP, ferritin, trending down.   - D-dimer remains elevated, although much lower than previous peak, could suggest PE or microthrombi. Pt now on heparin drip. Continued AC per primary team.       # elevated WBC  repeat cultures , check fungitel  consider empiric fungal treatment?   check lactate  change lines , if able  ? vancomycin, but MRSA swab negative and combination with zosyn is nephrotoxic. ? change of abs  DAY #7 of zosyn, ? d/c after today?    # anemia  pt slowly drifted down but remains about the same last week or so  could patient have bled into lung, but not further dropping    #neuro  hard to assess, pt is paralyzed  according to nurse , pupils react

## 2020-05-21 NOTE — PROGRESS NOTE ADULT - ATTENDING COMMENTS
66 yo F with no PMH a/w COVID infection, acute hypoxic respiratory failure, intubated on 5/4.   1. Neuro - sedated with Fent, Ketamine, Propofol. Rocuronium d/scooby.    2. Pulm - ARDS, worsening R sided infiltrates s/p bronch 5/16 with minimal secretions.   Continues to have poor lung compliance, with hypoxic and hypercapnic respiratory failure. c/w lung protective ventilation, proned overnight 5/18 and 5/19.  Solumedrol 1 mg/kg - day 3  3. ID-  prior Fevers- acute sinusitis. Leukocytosis downtrending. c/w Zosyn 5/14. Appreciate ID follow up  S/P bronchoscopy 5/16- minimal secretions, sputum cultures NGTD   Covid pneumonia s/p Tocilizumab and Remdesivir  4. CVS - stable, off pressors, goal MAP >65  5. Renal/FEN- cont to diurese - Lasix and Diamox F/u repeat BMP and urine output.   6. GI- pepcid, TFs  7. Heme- hypercoagulable state - c/w heparin drip  8. GOC - very guarded prognosis. Critically ill requiring frequent bedside evaluations and changes in management.     Spoke with daughters and aunt over conference call - reviewed above at length with them and answered all questions. Explained that the patient's lung dysfunction with a very poorly complaint system is her main issue with superimposed infection. Will c/w current management and emotional support provided. Advance care planning time spent 30 min

## 2020-05-21 NOTE — PROVIDER CONTACT NOTE (EICU) - RECOMMENDATIONS
Vent settings 32/300/80/5 with peak pressure in low 60s. Unable to increase tidal volume given high pressures. While hypercarbia slightly increased, pH metabolically compensated. Would continue to monitor for now. Vent settings 32/300/80/5 with peak pressure in low 60s. Unable to increase tidal volume given high pressures. While hypercarbia slightly increased, pH metabolically compensated. Would get repeat abg and continue to monitor for now.

## 2020-05-21 NOTE — PROGRESS NOTE ADULT - ASSESSMENT
65F reporting no PMH who presented last night with complaint of fever/chills (home T-max 102 F), night sweats, cough, myalgias (mid & lower back), loss of taste and smell since Friday 4/24 (still tastes sweet/salty), as well as 2 days of weakness and constipation, found to be COVID positive on 4/28/20.    Pt has agreed to participate in a clinical trial for Covid -19  Please discuss with ID team if planning on taking concurrent treatment with other agents with actual or possible direct acting antiviral activity against SARS-CoV-2 such a chloroquine or hydroxychloroquine. Pt was given Tocilizumab by primary team on the day of enrollment as of concern for cytokine storm.     Hospital course  4/29: Started Ceftriaxone (4/29-5/3) and Azithromycin (4/29-5/4) in ED for possible bacterial PNA  4/30: RRT called for desaturation in 70s before receiving 1st dose of Remdesivir and Tocilizumab  5/4: RRT called for desaturation to 20s after drinking water and coughing, intubated by anesthesia. Sedated and started full dose lovenox after D-dimer elevated to [address]. Transferred to ICU. On Meropenem, Vancomycin, and Caspofungin for emperic sepsis coverage.  5/7: Caspofungin switched to Fluconazole for growth of candida albicans in urine; Vancomycin d/c'ed for negative MRSA swab  5/10: Started CPAP trials  5/12 - abs d/scooby   5/13 hematuria  5/14 O2 requirements increased  5/15--> continues to do poorly  5/19- trials of prone  5/21- WBC down slightly     # Sepsis   - WBC persistently elevated, Afebrile  - S/p full course of meropenem (5/4-5/12), Fluconazole (5/8-5/13), Vancomycin (5/11-5/12), on zosyn now.   - Pt is s/p Tocilizumab which increases risk of infection  - All cultures negative to date.   - team will assess lines  -, if bronch cx stay negative and continues to have leucocytosis, will need CT chest/abd/pelvis. - but unstable for transport  - FOR POC ultrasound to look for effusion  - would d/c zosyn  Has completed a course      # Acute hypoxic respiratory failure  - Multifactorial, likely secondary to COVID-19 infection with viral PNA, with possible superimposed bacterial vs fungal vs aspiration PNA and/or PE   - r/o effusion. for POC ultrasound  - could patient have bled into lung?      # Elevated LFTs:   -continue to monitor   - consider RUQ ultrasound    # Elevated CRP/Ferritin/D-dimer  - Could be COVID-related. CRP, ferritin, trending down.   - D-dimer remains elevated, although much lower than previous peak, could suggest PE or microthrombi. Pt now on heparin drip. Continued AC per primary team.       # elevated WBC  repeat cultures , await repeat  fungitel  consider empiric fungal treatment?   check lactate  DAY #8 of zosyn, suggest d/c    # anemia  pt slowly drifted down but remains about the same last week or so  could patient have bled into lung, but not further dropping    #neuro  hard to assess, pt is paralyzed  according to nurse , pupils react

## 2020-05-21 NOTE — PROGRESS NOTE ADULT - SUBJECTIVE AND OBJECTIVE BOX
Patient is a 65y old  Female who presents with a chief complaint of COVID-19, Hypoxic respiratory failure (20 May 2020 14:12)    Being followed by ID for        Interval history:  No other acute events      ROS:  No cough,SOB,CP  No N/V/D  No abd pain  No urinary complaints  No HA  No joint or limb pain  No other complaints    PAST MEDICAL & SURGICAL HISTORY:  No pertinent past medical history  History of cholecystectomy  History of appendectomy    Allergies    No Known Allergies    Intolerances      Antimicrobials:    piperacillin/tazobactam IVPB.. 3.375 Gram(s) IV Intermittent every 8 hours    MEDICATIONS  (STANDING):  ALBUTerol    90 MICROgram(s) HFA Inhaler 2 Puff(s) Inhalation every 6 hours  BACItracin   Ointment 1 Application(s) Topical two times a day  chlorhexidine 0.12% Liquid 15 milliLiter(s) Oral Mucosa two times a day  chlorhexidine 4% Liquid 1 Application(s) Topical <User Schedule>  famotidine Injectable 20 milliGRAM(s) IV Push daily  fentaNYL   Infusion... 0.501 MICROgram(s)/kG/Hr (1.76 mL/Hr) IV Continuous <Continuous>  heparin  Infusion 13 Unit(s)/Hr (13 mL/Hr) IV Continuous <Continuous>  insulin lispro (HumaLOG) corrective regimen sliding scale   SubCutaneous every 6 hours  ketamine Infusion. 0.501 mG/kG/Hr (3.52 mL/Hr) IV Continuous <Continuous>  methylPREDNISolone sodium succinate Injectable 40 milliGRAM(s) IV Push two times a day  norepinephrine Infusion 0.06 MICROgram(s)/kG/Min (7.91 mL/Hr) IV Continuous <Continuous>  piperacillin/tazobactam IVPB.. 3.375 Gram(s) IV Intermittent every 8 hours  polyethylene glycol 3350 17 Gram(s) Oral two times a day  propofol Infusion 24.893 MICROgram(s)/kG/Min (10.5 mL/Hr) IV Continuous <Continuous>  senna Syrup 10 milliLiter(s) Oral daily      Vital Signs Last 24 Hrs  T(C): 37.7 (05-21-20 @ 08:00), Max: 37.7 (05-21-20 @ 08:00)  T(F): 99.9 (05-21-20 @ 08:00), Max: 99.9 (05-21-20 @ 08:00)  HR: 98 (05-21-20 @ 09:24) (89 - 108)  BP: --  BP(mean): --  RR: 32 (05-21-20 @ 08:00) (30 - 43)  SpO2: 93% (05-21-20 @ 09:24) (90% - 96%)    Physical Exam:    Constitutional well preserved,comfortable,pleasant    HEENT PERRLA EOMI,No pallor or icterus    No oral exudate or erythema    Neck supple no JVD or LN    Chest Good AE,CTA    CVS RRR S1 S2 WNl No murmur or rub or gallop    Abd soft BS normal No tenderness no masses    Ext No cyanosis clubbing or edema    IV site no erythema tenderness or discharge    Joints no swelling or LOM    CNS AAO X 3 no focal    Lab Data:                          7.1    15.15 )-----------( 325      ( 21 May 2020 01:47 )             23.5       05-21    132<L>  |  87<L>  |  13  ----------------------------<  129<H>  3.7   |  40<H>  |  0.38<L>    Ca    8.5      21 May 2020 01:47  Phos  1.8     05-21  Mg     2.3     05-21    TPro  6.2  /  Alb  2.4<L>  /  TBili  0.4  /  DBili  x   /  AST  71<H>  /  ALT  74<H>  /  AlkPhos  176<H>  05-21          Bronch Wash Bronchoalveolar Lavage  05-16-20   No growth at 48 hours  --    Moderate polymorphonuclear leukocytes per low power field  No squamous epithelial cells per low power field  No organisms seen per oil power field      .Blood Blood-Peripheral  05-15-20   No Growth Final  --  --      .Sputum Sputum  05-15-20   No growth at 48 hours  --    Few polymorphonuclear leukocytes per low power field  No Squamous epithelial cells per low power field  No organisms seen                    WBC Count: 15.15 (05-21-20 @ 01:47)  WBC Count: 18.46 (05-20-20 @ 06:28)  WBC Count: 19.41 (05-19-20 @ 22:08)  WBC Count: 23.41 (05-18-20 @ 23:42)  WBC Count: 19.45 (05-18-20 @ 00:32)  WBC Count: 22.54 (05-17-20 @ 11:21)  WBC Count: 20.67 (05-17-20 @ 04:31)  WBC Count: 17.08 (05-17-20 @ 00:37)  WBC Count: 25.00 (05-16-20 @ 00:27)  WBC Count: 26.19 (05-15-20 @ 00:47)  WBC Count: 26.76 (05-14-20 @ 15:53) Patient is a 65y old  Female who presents with a chief complaint of COVID-19, Hypoxic respiratory failure (20 May 2020 14:12)    Being followed by ID for        Interval history:  remains intubated  some bloody secretions  hematuria  still with high O2 requirement  DELROY goes up when try to lower sedation  No other acute events        PAST MEDICAL & SURGICAL HISTORY:  No pertinent past medical history  History of cholecystectomy  History of appendectomy    Allergies    No Known Allergies    Intolerances      Antimicrobials:    piperacillin/tazobactam IVPB.. 3.375 Gram(s) IV Intermittent every 8 hours    MEDICATIONS  (STANDING):  ALBUTerol    90 MICROgram(s) HFA Inhaler 2 Puff(s) Inhalation every 6 hours  BACItracin   Ointment 1 Application(s) Topical two times a day  chlorhexidine 0.12% Liquid 15 milliLiter(s) Oral Mucosa two times a day  chlorhexidine 4% Liquid 1 Application(s) Topical <User Schedule>  famotidine Injectable 20 milliGRAM(s) IV Push daily  fentaNYL   Infusion... 0.501 MICROgram(s)/kG/Hr (1.76 mL/Hr) IV Continuous <Continuous>  heparin  Infusion 13 Unit(s)/Hr (13 mL/Hr) IV Continuous <Continuous>  insulin lispro (HumaLOG) corrective regimen sliding scale   SubCutaneous every 6 hours  ketamine Infusion. 0.501 mG/kG/Hr (3.52 mL/Hr) IV Continuous <Continuous>  methylPREDNISolone sodium succinate Injectable 40 milliGRAM(s) IV Push two times a day  norepinephrine Infusion 0.06 MICROgram(s)/kG/Min (7.91 mL/Hr) IV Continuous <Continuous>  piperacillin/tazobactam IVPB.. 3.375 Gram(s) IV Intermittent every 8 hours  polyethylene glycol 3350 17 Gram(s) Oral two times a day  propofol Infusion 24.893 MICROgram(s)/kG/Min (10.5 mL/Hr) IV Continuous <Continuous>  senna Syrup 10 milliLiter(s) Oral daily      Vital Signs Last 24 Hrs  T(C): 37.7 (05-21-20 @ 08:00), Max: 37.7 (05-21-20 @ 08:00)  T(F): 99.9 (05-21-20 @ 08:00), Max: 99.9 (05-21-20 @ 08:00)  HR: 98 (05-21-20 @ 09:24) (89 - 108)  BP: --  BP(mean): --  RR: 32 (05-21-20 @ 08:00) (30 - 43)  SpO2: 93% (05-21-20 @ 09:24) (90% - 96%)    Physical Exam:    Constitutional intubated    HEENT PERRL    Neck supple no JVD or LN    Chest Good AE,CTA    CVS RRR S1 S2    Abd soft BS normal No tenderness    Ext  edema    IV site no erythema tenderness or discharge    Joints no swelling or LOM    Lab Data:                          7.1    15.15 )-----------( 325      ( 21 May 2020 01:47 )             23.5       05-21    132<L>  |  87<L>  |  13  ----------------------------<  129<H>  3.7   |  40<H>  |  0.38<L>    Ca    8.5      21 May 2020 01:47  Phos  1.8     05-21  Mg     2.3     05-21    TPro  6.2  /  Alb  2.4<L>  /  TBili  0.4  /  DBili  x   /  AST  71<H>  /  ALT  74<H>  /  AlkPhos  176<H>  05-21          Bronch Wash Bronchoalveolar Lavage  05-16-20   No growth at 48 hours  --    Moderate polymorphonuclear leukocytes per low power field  No squamous epithelial cells per low power field  No organisms seen per oil power field      .Blood Blood-Peripheral  05-15-20   No Growth Final  --  --      .Sputum Sputum  05-15-20   No growth at 48 hours  --    Few polymorphonuclear leukocytes per low power field  No Squamous epithelial cells per low power field  No organisms seen    Fungitell (05.10.20 @ 08:32)    Fungitell: <31: Interpretation: The Fungitell assay does not detect certain fungal  species such as the genus Cryptococcus (Hira et al. 1991) which  produces very low levels of (1-3)-Beta-D-Glucan. The assay also does  not detect the Zygomycetes such as Absidia, Mucor and Rhizopus  (Hipolito et al. 1994) which are not known to produce  (1-3)-Beta-D-Glucan. In addition, the yeast phase of Blastomyces  dermatitidis produces little (1-3)-Beta-D-Glucan and may not be  detected by the assay (Madalyn et al. 2007).  Reference Range:  Less than 60 pg/mL. Glucan values of less than 60 pg/mL are  interpreted as negative.  Glucan values of 60 to 79 pg/mL are interpreted as indeterminate,  and suggest a possible fungal infection. Additional sampling and  testing of sera is required to interpret the results.  Glucan values of greater than or equal to 80 pg/mL are interpreted  as positive.  Due to the potential for environmental contamination when  transferred to pour-off tubes, which can lead to false positive  results, interpret positive results from samples provided in  pour-off tubes with caution.  Results should be used in conjunction  with clinical findings, and should not form the sole basis for a  diagnosis or treatment decision. The Fungitell test is approved or  cleared for in vitro diagnostic use by the U.[place] Food and Drug  Administration. Modifications to the approved package insert have  been made and the performance characteristics for these  modifications were determined by [facility].  Ifsample result is greater than 500 pg/mL, physician may order a  titer of the sample. Please contact [facility] if you would  like to order a retest of this sample to obtain an actual value.  Samples are held for 1 week after initial testing date.  ____________________________________________________________  Performed at:  [facility]  [address] [place], [place] [address] [phone]  : Henrietta Daley PhD HCLD([facility])  CLIA# [id number] pg/mL      < from: Xray Chest 1 View- PORTABLE-Urgent (05.19.20 @ 09:39) >  EXAM:  XR CHEST PORTABLE URGENT 1V                            PROCEDURE DATE:  05/19/2020            INTERPRETATION:  CLINICAL INFORMATION: Endotracheal tube placement evaluation. COVID positive with ARDS.    TECHNIQUE: Single frontal radiograph ofthe chest 5/19/2020 9:39 AM.    COMPARISON: Chest radiograph 5/18/2020.    FINDINGS:  Endotracheal tube with tip between the level of the clavicles and the mary. Enteric tube with tip in the stomach. Right-sided IJ central catheter with tip in the SVC.  Diffuse bilateral patchy opacities, compatible with ARDS. Superimposed edema cannot be excluded.  No pleural effusions. No pneumothorax.  Cardiac size cannot accurately be assessed in this projection.       IMPRESSION: Endotracheal tube in satisfactory position.    DISCRETE X-RAY DATA:  Percent of LEFT lung opacification: %  Percent of RIGHT lung opacification: %  Change in lung opacification from most recent x-ray (<=3 days): Stable  Change from prior dated 3 or more days (same admission): Stable      < end of copied text >                WBC Count: 15.15 (05-21-20 @ 01:47)  WBC Count: 18.46 (05-20-20 @ 06:28)  WBC Count: 19.41 (05-19-20 @ 22:08)  WBC Count: 23.41 (05-18-20 @ 23:42)  WBC Count: 19.45 (05-18-20 @ 00:32)  WBC Count: 22.54 (05-17-20 @ 11:21)  WBC Count: 20.67 (05-17-20 @ 04:31)  WBC Count: 17.08 (05-17-20 @ 00:37)  WBC Count: 25.00 (05-16-20 @ 00:27)  WBC Count: 26.19 (05-15-20 @ 00:47)  WBC Count: 26.76 (05-14-20 @ 15:53)      D-Dimer Assay, Quantitative (05.19.20 @ 22:08)    D-Dimer Assay, Quantitative: 1034: D-Dimer result less than 230 ng/mL DDU correlates with the absence  of thrombosis in a patient with a low and moderate       pre-test probability of thrombosis.  1 DDU is approximately equal to  2 ng/mL FEU (previous units). ng/mL DDU    D-Dimer Assay, Quantitative (05.18.20 @ 23:42)    D-Dimer Assay, Quantitative: 814: D-Dimer result less than 230 ng/mL DDU correlates with the absence  of thrombosis in a patient with a low and moderate       pre-test probability of thrombosis.  1 DDU is approximately equal to  2 ng/mL FEU (previous units). ng/mL DDU      Ferritin, Serum (05.20.20 @ 00:18)    Ferritin, Serum: 621 ng/mL

## 2020-05-21 NOTE — PROGRESS NOTE ADULT - ASSESSMENT
ASSESSMENT & PLAN: 66 y/o F with no significant PMH presents 4/29 with hypoxic respiratory failure 2nd COVID PNA. s/p Tocilizumab (4/30), s/p empiric Ceftriaxone/Azithro (4/30-5/4) for ?superimposed CAP. Currently enrolled in Remdesivir trial (4/30). Intubated 5/4 for worsened hypoxic respiratory failure. Severe ARDS requiring proning on 5/6, 5/7, 5/8, & 5/17.     #Neuro   - Sedated on Fentanyl, Ketamine, Propofol   - Rocuronium discontinued      #Vent:  -Day #18 on Ventilator (intubated 5/4/20  - Continues to have poor lung compliance with hypoxic and hypercapnic respiratory failure - continue lung protective ventilation  - Current Vent Settings: , Rate 32, FIO2 90%, PEEP +5    #Pulm:   - ARDS, worsening R sided infiltrates s/p bronch 5/16 with minimal secretions.   - Hypoxic respiratory failure 2/2 COVID PNA  - Required proning on 5/6, 5/7, 5/8, 5/17 and 5/19  - Poor lung compliance, permissive hypercapnia for protective lung ventilation   - CXR w/ worsening diffuse b/l opacities    #CV  - Remains off pressors, maintaining MAP>65    #GI  - Tube feeding with Vital @30cc/hr  - Last BM 5/21, c/w Miralax/Senna  - Pepcid 20mg qd     #Renal  - Lasix gtt discontinued 5/18 given metabolic alkalosis & overdiuresis  - Received IV Bumex 1mg and Zaroxolyn via OGT x 1 overnight 5/20  - Diamox IV and IV Lasix x 1 each today  - Continue to monitor I&Os    #ID   - Worsening diffuse b/l opacities + clinical evidence of sinusitis, up trending leukocytosis (WBC 23)  - Plan to continue 7-day course of Zosyn (started 5/14)  - s/p bronchoscopy 5/17, negative cultures  - S/p full course of meropenem (5/4-5/13), Fluconazole (5/8-5/13), Vancomycin (5/11-5/12)  - CDiff negative  - COVID: + on 4/28, s/p 10 days of Remdesivir trial  (4/30-5/9), s/p Tocilizumab on 4/30, inflammatory markers down trending  - f/u Fungitell sent today  - If bronch cx remains negative, and if leukocytosis continues to uptrend, plan for CT abd/pel/chest when patient more stable  - ID following, recs appreciated  - Gracia cultured last evening (patient received study medication - no cultures x 5 days)    #  LINES  - RIJ TLC from 5/16     #Heme   - Empiric heparin gtt for elevated D-dimer (now improved to 814)  - PTT therapeutic @ 61.4  - Goal aPTT 58-99    #Endo  -  Blood glucose controlled on Humalog Low SS    #Ethics  -Full code  - Daughter DUKE [phone] - updated on current status. She is requesting a "conference call" between attending physician and family to discuss plan of care. I informed SW and will inform attending

## 2020-05-21 NOTE — PROGRESS NOTE ADULT - SUBJECTIVE AND OBJECTIVE BOX
CHIEF COMPLAINT:  Patient is a 65y old  Female who presents with a chief complaint of COVID-19, Hypoxic respiratory failure (19 May 2020 13:32)  HPI: 65F reporting no PMH who tested + for COVID-19 on 4/28. Admitted on 4/29/20 with complaints of fever/chills (home T-max 102 F), night sweats, cough, myalgias (mid & lower back), loss of taste and smell as well as 2 days of weakness and constipation and SaO2 (measured at home) 89%.   Intubated 5/4/20 - severe ARDS  Interval Events: 4/29: Started Ceftriaxone (4/29-5/3) and Azithromycin (4/29-5/4) in ED for possible bacterial PNA  4/30: RRT called for desaturation in 70s before receiving 1st dose of Remdesivir and Tocilizumab  5/4: RRT called for desaturation to 20s after drinking water and coughing, intubated by anesthesia. Sedated and started full dose Lovenox after D-dimer elevated to [address]. Transferred to ICU. On Meropenem, Vancomycin, and Caspofungin for empiric sepsis coverage.  5/7: Caspofungin switched to Fluconazole for growth of candida albicans in urine; Vancomycin d/c'ed for negative MRSA swab  5/10: Appeared to be improving - Started CPAP trials  5/12:  antibiotics discontinued   5/13: hematuria  5/14: ARDS, worsening R sided infiltrates. Poor lung compliance, hypercapnia  5/16: s/p bronch with minimal secretions  5/18:  proned overnight and switched from PC to VC once supine. Minimal pleural effusions on ultrasound. Sedated and paralyzed with Rocuronium.   5/19: Repeat CXR with ETT migrated up and worsening b/l opacities. Tube readjusted and proned again. Solumedrol 1 mg/kg x 3 (for worsening opacities)  5/20: Changed to PRVC on vent (FIO2 70%, , Rate 32, PEEP +6) - plan to discontinue paralytic - not planning to prone today  5/21: Off Rocuronium, Last day (#3) of IV Steroids - plan on 2pm conference call with family           OBJECTIVE:  ICU Vital Signs Last 24 Hrs  T(C): 37.9 (21 May 2020 12:00), Max: 37.9 (21 May 2020 12:00)  T(F): 100.2 (21 May 2020 12:00), Max: 100.2 (21 May 2020 12:00)  HR: 100 (21 May 2020 12:00) (90 - 108)  BP: --  BP(mean): --  ABP: 127/51 (21 May 2020 12:00) (108/45 - 147/54)  ABP(mean): 77 (21 May 2020 12:00) (67 - 87)  RR: 32 (21 May 2020 12:00) (30 - 43)  SpO2: 93% (21 May 2020 12:00) (90% - 96%)    Mode: AC/ CMV (Assist Control/ Continuous Mandatory Ventilation), RR (machine): 32, TV (machine): 300, FiO2: 90, PEEP: 5, MAP: 19, PIP: 56    05-20 @ 07:01  -  05-21 @ 07:00  --------------------------------------------------------  IN: 3060 mL / OUT: 3165 mL / NET: -105 mL    05-21 @ 07:01  -  05-21 @ 13:09  --------------------------------------------------------  IN: 631.9 mL / OUT: 650 mL / NET: -18.1 mL      POCT Blood Glucose.: 182 mg/dL (21 May 2020 11:34)      HOSPITAL MEDICATIONS:  MEDICATIONS  (STANDING):  ALBUTerol    90 MICROgram(s) HFA Inhaler 2 Puff(s) Inhalation every 6 hours  BACItracin   Ointment 1 Application(s) Topical two times a day  chlorhexidine 0.12% Liquid 15 milliLiter(s) Oral Mucosa two times a day  chlorhexidine 4% Liquid 1 Application(s) Topical <User Schedule>  famotidine Injectable 20 milliGRAM(s) IV Push daily  fentaNYL   Infusion... 0.501 MICROgram(s)/kG/Hr (1.76 mL/Hr) IV Continuous <Continuous>  heparin  Infusion 1300 Unit(s)/Hr (13 mL/Hr) IV Continuous <Continuous>  insulin lispro (HumaLOG) corrective regimen sliding scale   SubCutaneous every 6 hours  ketamine Infusion. 0.501 mG/kG/Hr (3.52 mL/Hr) IV Continuous <Continuous>  methylPREDNISolone sodium succinate Injectable 40 milliGRAM(s) IV Push two times a day  norepinephrine Infusion 0.06 MICROgram(s)/kG/Min (7.91 mL/Hr) IV Continuous <Continuous>  piperacillin/tazobactam IVPB.. 3.375 Gram(s) IV Intermittent every 8 hours  polyethylene glycol 3350 17 Gram(s) Oral two times a day  propofol Infusion 24.893 MICROgram(s)/kG/Min (10.5 mL/Hr) IV Continuous <Continuous>  senna Syrup 10 milliLiter(s) Oral daily    MEDICATIONS  (PRN):  acetaminophen    Suspension .. 650 milliGRAM(s) Oral every 6 hours PRN Temp greater or equal to 38C (100.4F)  diphenhydrAMINE   Injectable 50 milliGRAM(s) IV Push once PRN anaphylaxis to study drug  sodium chloride 0.9% lock flush 10 milliLiter(s) IV Push every 1 hour PRN Pre/post blood products, medications, blood draw, and to maintain line patency      LABS:                        7.1    15.15 )-----------( 325      ( 21 May 2020 01:47 )             23.5     Hgb Trend: 7.1<--, 7.8<--, 7.2<--, 7.5<--, 7.7<--  05-21    132<L>  |  87<L>  |  13  ----------------------------<  129<H>  3.7   |  40<H>  |  0.38<L>    Ca    8.5      21 May 2020 01:47  Phos  1.8     05-21  Mg     2.3     05-21    TPro  6.2  /  Alb  2.4<L>  /  TBili  0.4  /  DBili  x   /  AST  71<H>  /  ALT  74<H>  /  AlkPhos  176<H>  05-21    LIVER FUNCTIONS - ( 21 May 2020 01:47 )  Alb: 2.4 g/dL / Pro: 6.2 g/dL / ALK PHOS: 176 U/L / ALT: 74 U/L / AST: 71 U/L / GGT: x           Creatinine Trend: 0.38<--, 0.36<--, 0.41<--, 0.30<--, 0.31<--, 0.30<--  PT/INR - ( 19 May 2020 22:08 )   PT: 12.7 sec;   INR: 1.11 ratio         PTT - ( 21 May 2020 09:35 )  PTT:61.4 sec    Arterial Blood Gas:  05-21 @ 05:41  7.41/79/57/48/90/21.2  ABG lactate: --  Arterial Blood Gas:  05-21 @ 01:37  7.31/95/65/46/90/17.5  ABG lactate: --  Arterial Blood Gas:  05-20 @ 18:22  7.31/88/77/43/94/15.3  ABG lactate: --  Arterial Blood Gas:  05-20 @ 13:40  7.35/83/60/44/91/16.8  ABG lactate: --  Arterial Blood Gas:  05-20 @ 06:25  7.33/85/88/43/96/15.6  ABG lactate: --  Arterial Blood Gas:  05-19 @ 22:02  7.29/88/82/41/96/12.8  ABG lactate: --  Arterial Blood Gas:  05-19 @ 14:41  7.30/88/64/42/91/14.2  ABG lactate: --

## 2020-05-22 NOTE — PROGRESS NOTE ADULT - ASSESSMENT
65F reporting no PMH who presented last night with complaint of fever/chills (home T-max 102 F), night sweats, cough, myalgias (mid & lower back), loss of taste and smell since Friday 4/24 (still tastes sweet/salty), as well as 2 days of weakness and constipation, found to be COVID positive on 4/28/20.    Pt had agreed to participate in a clinical trial for Covid -19  Please discuss with ID team if planning on taking concurrent treatment with other agents with actual or possible direct acting antiviral activity against SARS-CoV-2 such a chloroquine or hydroxychloroquine. Pt was given Tocilizumab by primary team on the day of enrollment as of concern for cytokine storm.     Hospital course  4/29: Started Ceftriaxone (4/29-5/3) and Azithromycin (4/29-5/4) in ED for possible bacterial PNA  4/30: RRT called for desaturation in 70s before receiving 1st dose of Remdesivir and Tocilizumab  5/4: RRT called for desaturation to 20s after drinking water and coughing, intubated by anesthesia. Sedated and started full dose lovenox after D-dimer elevated to [address]. Transferred to ICU. On Meropenem, Vancomycin, and Caspofungin for emperic sepsis coverage.  5/7: Caspofungin switched to Fluconazole for growth of candida albicans in urine; Vancomycin d/c'ed for negative MRSA swab  5/10: Started CPAP trials  5/12 - abs d/scooby   5/13 hematuria  5/14 O2 requirements increased  5/15--> continues to do poorly  5/19- trials of prone and steroids  5/21- WBC down slightly   5/22- continues to have bloody secretions    # Sepsis   - WBC persistently elevated, Afebrile  - S/p full course of meropenem (5/4-5/12), Fluconazole (5/8-5/13), Vancomycin (5/11-5/12), and zosyn    - Pt is s/p Tocilizumab which increases risk of infection and now solumedrol which does as well, including fungal infection  - All cultures negative to date.   - team will assess lines  -, if bronch cx stay negative and continues to have leucocytosis, will need CT chest/abd/pelvis. - but unstable for transport  - FOR POC ultrasound to look for effusion  - await repeat cultures and fungitell      # Acute hypoxic respiratory failure  - Multifactorial, likely secondary to COVID-19 infection with viral PNA, with possible superimposed bacterial vs fungal vs aspiration PNA and/or PE   - r/o effusion. for POC ultrasound  - could patient have bled into lung?      # Elevated LFTs:   -continue to monitor   - consider RUQ ultrasound    # Elevated CRP/Ferritin/D-dimer  - Could be COVID-related. CRP, ferritin, trending down.   - D-dimer remains elevated, although much lower than previous peak, could suggest PE or microthrombi. Pt now on heparin drip. Continued AC per primary team.       # elevated WBC  repeat cultures , await repeat  fungitel  consider empiric fungal treatment?   check lactate  in part from steroids     # anemia  pt slowly drifted down but remains about the same last week or so  could patient have bled into lung, but not further dropping  Also could pt hav traumatized oral palate- SUGGEST ENT input    #neuro  hard to assess, pt is paralyzed  according to nurse , pupils react       ID service will be covering over the weekend. Please call for acute issues or questions. [phone] 65F reporting no PMH who presented last night with complaint of fever/chills (home T-max 102 F), night sweats, cough, myalgias (mid & lower back), loss of taste and smell since Friday 4/24 (still tastes sweet/salty), as well as 2 days of weakness and constipation, found to be COVID positive on 4/28/20.    Pt had agreed to participate in a clinical trial for Covid -19  Please discuss with ID team if planning on taking concurrent treatment with other agents with actual or possible direct acting antiviral activity against SARS-CoV-2 such a chloroquine or hydroxychloroquine. Pt was given Tocilizumab by primary team on the day of enrollment as of concern for cytokine storm.     Hospital course  4/29: Started Ceftriaxone (4/29-5/3) and Azithromycin (4/29-5/4) in ED for possible bacterial PNA  4/30: RRT called for desaturation in 70s before receiving 1st dose of Remdesivir and Tocilizumab  5/4: RRT called for desaturation to 20s after drinking water and coughing, intubated by anesthesia. Sedated and started full dose lovenox after D-dimer elevated to [address]. Transferred to ICU. On Meropenem, Vancomycin, and Caspofungin for emperic sepsis coverage.  5/7: Caspofungin switched to Fluconazole for growth of candida albicans in urine; Vancomycin d/c'ed for negative MRSA swab  5/10: Started CPAP trials  5/12 - abs d/scooby   5/13 hematuria  5/14 O2 requirements increased  5/15--> continues to do poorly  5/19- trials of prone and steroids  5/21- WBC down slightly   5/22- continues to have bloody secretions    # Sepsis   - WBC persistently elevated, Afebrile  - S/p full course of meropenem (5/4-5/12), Fluconazole (5/8-5/13), Vancomycin (5/11-5/12), and zosyn    - Pt is s/p Tocilizumab which increases risk of infection and now solumedrol which does as well, including fungal infection  - All cultures negative to date.   - team will assess lines  -, if bronch cx stay negative and continues to have leucocytosis, will need CT chest/abd/pelvis. - but unstable for transport  - FOR POC ultrasound to look for effusion  - await repeat cultures and fungitell      # Acute hypoxic respiratory failure  - Multifactorial, likely secondary to COVID-19 infection with viral PNA, with possible superimposed bacterial vs fungal vs aspiration PNA and/or PE   - r/o effusion. for POC ultrasound  - could patient have bled into lung?      # Elevated LFTs:   -continue to monitor   - consider RUQ ultrasound    # Elevated CRP/Ferritin/D-dimer  - Could be COVID-related. CRP, ferritin, trending down.   - D-dimer remains elevated, although much lower than previous peak, could suggest PE or microthrombi. Pt now on heparin drip. Continued AC per primary team.       # elevated WBC  repeat cultures ,  repeat  fungitel- negative   consider empiric fungal treatment?   check lactate  elevated WBC in part from steroids     # anemia  pt slowly drifted down but remains about the same last week or so  could patient have bled into lung, but not further dropping  Also could pt hav traumatized oral palate- SUGGEST ENT input    #neuro  hard to assess, pt is paralyzed  according to nurse , pupils react       ID service will be covering over the weekend. Please call for acute issues or questions. [phone]

## 2020-05-22 NOTE — PROGRESS NOTE ADULT - SUBJECTIVE AND OBJECTIVE BOX
CHIEF COMPLAINT:  Patient is a 65y old  Female who presents with a chief complaint of COVID-19, Hypoxic respiratory failure (19 May 2020 13:32)  HPI: 65F reporting no PMH who tested + for COVID-19 on 4/28. Admitted on 4/29/20 with complaints of fever/chills (home T-max 102 F), night sweats, cough, myalgias (mid & lower back), loss of taste and smell as well as 2 days of weakness and constipation and SaO2 (measured at home) 89%.   Intubated 5/4/20 - severe ARDS  Interval Events: 4/29: Started Ceftriaxone (4/29-5/3) and Azithromycin (4/29-5/4) in ED for possible bacterial PNA  4/30: RRT called for desaturation in 70s before receiving 1st dose of Remdesivir and Tocilizumab  5/4: RRT called for desaturation to 20s after drinking water and coughing, intubated by anesthesia. Sedated and started full dose Lovenox after D-dimer elevated to [address]. Transferred to ICU. On Meropenem, Vancomycin, and Caspofungin for empiric sepsis coverage.  5/7: Caspofungin switched to Fluconazole for growth of candida albicans in urine; Vancomycin d/c'ed for negative MRSA swab  5/10: Appeared to be improving - Started CPAP trials  5/12:  antibiotics discontinued   5/13: hematuria  5/14: ARDS, worsening R sided infiltrates. Poor lung compliance, hypercapnia  5/16: s/p bronch with minimal secretions  5/18:  proned overnight and switched from PC to VC once supine. Minimal pleural effusions on ultrasound. Sedated and paralyzed with Rocuronium.   5/19: Repeat CXR with ETT migrated up and worsening b/l opacities. Tube readjusted and proned again. Solumedrol 1 mg/kg x 3 (for worsening opacities)  5/20: Changed to PRVC on vent (FIO2 70%, , Rate 32, PEEP +6) - plan to discontinue paralytic - not planning to prone today  5/21: Off Rocuronium, Last day (#3) of IV Steroids - plan on 2pm conference call with family   5/22: Dyssynchronous with vent despite adequate sedation. High airway pressures, required paralytic-Loi, now discontinued    OBJECTIVE:    VITAL SIGNS:  ICU Vital Signs Last 24 Hrs  T(C): 36.4 (22 May 2020 12:00), Max: 37.9 (21 May 2020 16:00)  T(F): 97.5 (22 May 2020 12:00), Max: 100.2 (21 May 2020 16:00)  HR: 98 (22 May 2020 12:00) (91 - 111)  BP: --  BP(mean): --  ABP: 151/60 (22 May 2020 12:00) (113/50 - 167/64)  ABP(mean): 94 (22 May 2020 12:00) (68 - 103)  RR: 29 (22 May 2020 12:00) (26 - 32)  SpO2: 93% (22 May 2020 12:00) (88% - 100%)    Mode: AC/ CMV (Assist Control/ Continuous Mandatory Ventilation), RR (machine): 30, TV (machine): 300, FiO2: 80, PEEP: 5, MAP: 18, PIP: 50    05-21 @ 07:01 - 05-22 @ 07:00  --------------------------------------------------------  IN: 2873.1 mL / OUT: 2095 mL / NET: 778.1 mL    05-22 @ 07:01  - 05-22 @ 12:43  --------------------------------------------------------  IN: 616.9 mL / OUT: 325 mL / NET: 291.9 mL      CAPILLARY BLOOD GLUCOSE      POCT Blood Glucose.: 140 mg/dL (22 May 2020 11:32)      PHYSICAL EXAM:    General: NAD  HEENT: NC/AT; PERRL, clear conjunctiva  Neck: supple  Respiratory: CTA b/l  Cardiovascular: +S1/S2; RRR  Abdomen: soft, NT/ND; +BS x4  Extremities: WWP, 2+ peripheral pulses b/l; no LE edema  Skin: normal color and turgor; no rash  Neurological:    MEDICATIONS:  MEDICATIONS  (STANDING):  acetaZOLAMIDE Injectable 250 milliGRAM(s) IV Push once  ALBUTerol    90 MICROgram(s) HFA Inhaler 2 Puff(s) Inhalation every 6 hours  BACItracin   Ointment 1 Application(s) Topical two times a day  chlorhexidine 0.12% Liquid 15 milliLiter(s) Oral Mucosa two times a day  chlorhexidine 4% Liquid 1 Application(s) Topical <User Schedule>  famotidine Injectable 20 milliGRAM(s) IV Push daily  fentaNYL   Infusion... 0.501 MICROgram(s)/kG/Hr (1.76 mL/Hr) IV Continuous <Continuous>  heparin  Infusion 1300 Unit(s)/Hr (13 mL/Hr) IV Continuous <Continuous>  insulin lispro (HumaLOG) corrective regimen sliding scale   SubCutaneous every 6 hours  ketamine Infusion. 0.501 mG/kG/Hr (3.52 mL/Hr) IV Continuous <Continuous>  norepinephrine Infusion 0.06 MICROgram(s)/kG/Min (7.91 mL/Hr) IV Continuous <Continuous>  polyethylene glycol 3350 17 Gram(s) Oral two times a day  propofol Infusion 24.893 MICROgram(s)/kG/Min (10.5 mL/Hr) IV Continuous <Continuous>  rocuronium Infusion 8 MICROgram(s)/kG/Min (6.75 mL/Hr) IV Continuous <Continuous>  senna Syrup 10 milliLiter(s) Oral daily    MEDICATIONS  (PRN):  acetaminophen    Suspension .. 650 milliGRAM(s) Oral every 6 hours PRN Temp greater or equal to 38C (100.4F)  diphenhydrAMINE   Injectable 50 milliGRAM(s) IV Push once PRN anaphylaxis to study drug  sodium chloride 0.9% lock flush 10 milliLiter(s) IV Push every 1 hour PRN Pre/post blood products, medications, blood draw, and to maintain line patency      ALLERGIES:  Allergies    No Known Allergies    Intolerances        LABS:                        7.4    21.02 )-----------( 358      ( 22 May 2020 01:16 )             24.7     05-22    139  |  90<L>  |  14  ----------------------------<  157<H>  3.5   |  42<H>  |  <0.30<L>    Ca    8.6      22 May 2020 01:16  Phos  2.5     05-22  Mg     2.2     05-22    TPro  6.4  /  Alb  2.3<L>  /  TBili  0.4  /  DBili  x   /  AST  101<H>  /  ALT  105<H>  /  AlkPhos  208<H>  05-22    PT/INR - ( 22 May 2020 01:16 )   PT: 11.7 sec;   INR: 1.03 ratio         PTT - ( 22 May 2020 01:16 )  PTT:81.9 sec      RADIOLOGY & ADDITIONAL TESTS: Reviewed.

## 2020-05-22 NOTE — PROGRESS NOTE ADULT - ASSESSMENT
ASSESSMENT & PLAN: 64 y/o F with no significant PMH presents 4/29 with hypoxic respiratory failure 2nd COVID PNA. s/p Tocilizumab (4/30), s/p empiric Ceftriaxone/Azithro (4/30-5/4) for ?superimposed CAP. Currently enrolled in Remdesivir trial (4/30). Intubated 5/4 for worsened hypoxic respiratory failure. Severe ARDS requiring proning on 5/6, 5/7, 5/8, 5/17 & 5/19.     #Neuro   - Sedated on Fentanyl, Ketamine, Propofol   - s/p Rocuronium last night given patient was dysynchronous with vent despite and high airway pressures  - Discontinued now from Rocuronium     #Vent:  -Day #19 on Ventilator (intubated 5/4/20)  - Continues to have poor lung compliance with hypoxic and hypercapnic respiratory failure - continue lung protective ventilation  - Current Vent Settings: , Rate 30, FIO2 80%, PEEP +5, plateau pressure 45    #Pulm:   - ARDS, worsening R sided infiltrates s/p bronch 5/16 with minimal secretions   - Hypoxic respiratory failure 2/2 COVID PNA  - Required proning on 5/6, 5/7, 5/8, 5/17 and 5/19  - Completed 3-day course of Solumedrol 1 mg/kg on 5/21  - Poor lung compliance, permissive hypercapnia for protective lung ventilation   - CXR w/ worsening diffuse b/l opacities  - ? concern for possible pulmonary hemorrhage (RN noting scant blood in ETT when suctioning)- ENT to be consulted    #CV  - Remains off pressors, maintaining MAP>65    #GI  - Tube feeding with Vital @55 cc/hr  - Last BM today, 5/22, c/w Miralax/Senna  - Pepcid 20mg qd     #Renal  - Lasix gtt discontinued 5/18 given metabolic alkalosis & overdiuresis  - Received IV Bumex 1mg and Zaroxolyn via OGT x 1 overnight 5/20  - Diamox IV and IV Lasix x 1 each day  - Continue to monitor I&Os    #ID   - Worsening diffuse b/l opacities + clinical evidence of sinusitis, leukocytosis WBC 21, likely in the setting of recent steroids  - Completed 7-day course of Zosyn (on 5/21)  - s/p bronchoscopy 5/17, negative cultures  - S/p full course of meropenem (5/4-5/13), Fluconazole (5/8-5/13), Vancomycin (5/11-5/12)  - CDiff negative  - COVID: + on 4/28, s/p 10 days of Remdesivir trial  (4/30-5/9), s/p Tocilizumab on 4/30, inflammatory markers down trending  - f/u Fungitell sent today  - If bronch cx remains negative, and if leukocytosis continues to uptrend, plan for CT abd/pel/chest when patient more stable  - ID following, recs appreciated  - Gracia cultured last evening (patient received study medication - no cultures x 5 days)    #  LINES  - RIJ TLC from 5/16     #Heme   - Empiric heparin gtt for elevated D-dimer (now uptrending 1663)  - PTT therapeutic @ 81  - Goal aPTT 58-99    #Endo  -  Blood glucose controlled on Humalog Low SS    #Ethics  -Full code  - Daughter DUKE [phone] - s/p conference call with daughters and aunt yesterday and family aware of patient's lung dysfunction with a very poorly complaint system is her main issue with superimposed infection. Continue with management and emotional support.

## 2020-05-22 NOTE — PROVIDER CONTACT NOTE (EICU) - ASSESSMENT
Tidal volumes 190-220mL. pt very dyssynchronous with vent despite adequate sedation. High airway pressures.

## 2020-05-22 NOTE — CONSULT NOTE ADULT - SUBJECTIVE AND OBJECTIVE BOX
CC: blood suctioned from ETT    HPI: 64yo female with no significant PMHx, admitted for treatment of COVID-19. ENT called to evaluate for blood suctioned from ETT. Pt was intubated on 5/4. She was most recently proned on 5/19 for 18 hours. Per team, serosanguanous secretions were being suctioned through ETT, with concern for pulmonary hemorrhage. CXR shows worsening diffuse b/l opacities. Pt has ARDS with worsening R sided infiltrates s/p bronch 5/16 with minimal secretions. Pt is on heparin infusion. H/H stable.       PAST MEDICAL & SURGICAL HISTORY:  No pertinent past medical history  History of cholecystectomy  History of appendectomy    Allergies    No Known Allergies    Intolerances      MEDICATIONS  (STANDING):  ALBUTerol    90 MICROgram(s) HFA Inhaler 2 Puff(s) Inhalation every 6 hours  BACItracin   Ointment 1 Application(s) Topical two times a day  chlorhexidine 0.12% Liquid 15 milliLiter(s) Oral Mucosa two times a day  chlorhexidine 4% Liquid 1 Application(s) Topical <User Schedule>  famotidine Injectable 20 milliGRAM(s) IV Push daily  fentaNYL   Infusion... 0.501 MICROgram(s)/kG/Hr (1.76 mL/Hr) IV Continuous <Continuous>  heparin  Infusion 1300 Unit(s)/Hr (13 mL/Hr) IV Continuous <Continuous>  insulin lispro (HumaLOG) corrective regimen sliding scale   SubCutaneous every 6 hours  ketamine Infusion. 0.501 mG/kG/Hr (3.52 mL/Hr) IV Continuous <Continuous>  norepinephrine Infusion 0.06 MICROgram(s)/kG/Min (7.91 mL/Hr) IV Continuous <Continuous>  polyethylene glycol 3350 17 Gram(s) Oral two times a day  potassium chloride  10 mEq/100 mL IVPB 10 milliEquivalent(s) IV Intermittent every 1 hour  potassium phosphate IVPB 30 milliMole(s) IV Intermittent once  propofol Infusion 24.893 MICROgram(s)/kG/Min (10.5 mL/Hr) IV Continuous <Continuous>  rocuronium Infusion 8 MICROgram(s)/kG/Min (6.75 mL/Hr) IV Continuous <Continuous>  senna Syrup 10 milliLiter(s) Oral daily    MEDICATIONS  (PRN):  acetaminophen    Suspension .. 650 milliGRAM(s) Oral every 6 hours PRN Temp greater or equal to 38C (100.4F)  diphenhydrAMINE   Injectable 50 milliGRAM(s) IV Push once PRN anaphylaxis to study drug  sodium chloride 0.9% lock flush 10 milliLiter(s) IV Push every 1 hour PRN Pre/post blood products, medications, blood draw, and to maintain line patency      Social History: no tobacco use per H&P    Family history: no significant family hx    ROS:   unable to obtain due to pt's condition     Vital Signs Last 24 Hrs  T(C): 36.4 (22 May 2020 12:00), Max: 37.9 (21 May 2020 16:00)  T(F): 97.5 (22 May 2020 12:00), Max: 100.2 (21 May 2020 16:00)  HR: 96 (22 May 2020 15:01) (91 - 111)  BP: --  BP(mean): --  RR: 31 (22 May 2020 14:00) (26 - 32)  SpO2: 94% (22 May 2020 15:01) (88% - 100%)                          7.4    21.02 )-----------( 358      ( 22 May 2020 01:16 )             24.7    05-22    140  |  90<L>  |  17  ----------------------------<  157<H>  3.0<L>   |  43<H>  |  <0.30<L>    Ca    7.8<L>      22 May 2020 13:04  Phos  1.8     05-22  Mg     2.0     05-22    TPro  6.4  /  Alb  2.3<L>  /  TBili  0.4  /  DBili  x   /  AST  101<H>  /  ALT  105<H>  /  AlkPhos  208<H>  05-22   PT/INR - ( 22 May 2020 01:16 )   PT: 11.7 sec;   INR: 1.03 ratio         PTT - ( 22 May 2020 01:16 )  PTT:81.9 sec    PHYSICAL EXAM:  pending      Fiberoptic Indirect laryngoscopy:  (Scope #2 used)  pending CC: blood suctioned from ETT    HPI: 64yo female with no significant PMHx, admitted for treatment of COVID-19. ENT called to evaluate for blood suctioned from ETT. Pt was intubated on 5/4. She was most recently proned on 5/19 for 18 hours. Per team, serosanguanous secretions were being suctioned through ETT, with concern for pulmonary hemorrhage. CXR shows worsening diffuse b/l opacities. Pt has ARDS with worsening R sided infiltrates s/p bronch 5/16 with minimal secretions. Pt is on heparin infusion. H/H stable.       PAST MEDICAL & SURGICAL HISTORY:  No pertinent past medical history  History of cholecystectomy  History of appendectomy    Allergies    No Known Allergies    Intolerances      MEDICATIONS  (STANDING):  ALBUTerol    90 MICROgram(s) HFA Inhaler 2 Puff(s) Inhalation every 6 hours  BACItracin   Ointment 1 Application(s) Topical two times a day  chlorhexidine 0.12% Liquid 15 milliLiter(s) Oral Mucosa two times a day  chlorhexidine 4% Liquid 1 Application(s) Topical <User Schedule>  famotidine Injectable 20 milliGRAM(s) IV Push daily  fentaNYL   Infusion... 0.501 MICROgram(s)/kG/Hr (1.76 mL/Hr) IV Continuous <Continuous>  heparin  Infusion 1300 Unit(s)/Hr (13 mL/Hr) IV Continuous <Continuous>  insulin lispro (HumaLOG) corrective regimen sliding scale   SubCutaneous every 6 hours  ketamine Infusion. 0.501 mG/kG/Hr (3.52 mL/Hr) IV Continuous <Continuous>  norepinephrine Infusion 0.06 MICROgram(s)/kG/Min (7.91 mL/Hr) IV Continuous <Continuous>  polyethylene glycol 3350 17 Gram(s) Oral two times a day  potassium chloride  10 mEq/100 mL IVPB 10 milliEquivalent(s) IV Intermittent every 1 hour  potassium phosphate IVPB 30 milliMole(s) IV Intermittent once  propofol Infusion 24.893 MICROgram(s)/kG/Min (10.5 mL/Hr) IV Continuous <Continuous>  rocuronium Infusion 8 MICROgram(s)/kG/Min (6.75 mL/Hr) IV Continuous <Continuous>  senna Syrup 10 milliLiter(s) Oral daily    MEDICATIONS  (PRN):  acetaminophen    Suspension .. 650 milliGRAM(s) Oral every 6 hours PRN Temp greater or equal to 38C (100.4F)  diphenhydrAMINE   Injectable 50 milliGRAM(s) IV Push once PRN anaphylaxis to study drug  sodium chloride 0.9% lock flush 10 milliLiter(s) IV Push every 1 hour PRN Pre/post blood products, medications, blood draw, and to maintain line patency      Social History: no tobacco use per H&P    Family history: no significant family hx    ROS:   unable to obtain due to pt's condition     Vital Signs Last 24 Hrs  T(C): 36.4 (22 May 2020 12:00), Max: 37.9 (21 May 2020 16:00)  T(F): 97.5 (22 May 2020 12:00), Max: 100.2 (21 May 2020 16:00)  HR: 96 (22 May 2020 15:01) (91 - 111)  BP: --  BP(mean): --  RR: 31 (22 May 2020 14:00) (26 - 32)  SpO2: 94% (22 May 2020 15:01) (88% - 100%)                          7.4    21.02 )-----------( 358      ( 22 May 2020 01:16 )             24.7    05-22    140  |  90<L>  |  17  ----------------------------<  157<H>  3.0<L>   |  43<H>  |  <0.30<L>    Ca    7.8<L>      22 May 2020 13:04  Phos  1.8     05-22  Mg     2.0     05-22    TPro  6.4  /  Alb  2.3<L>  /  TBili  0.4  /  DBili  x   /  AST  101<H>  /  ALT  105<H>  /  AlkPhos  208<H>  05-22   PT/INR - ( 22 May 2020 01:16 )   PT: 11.7 sec;   INR: 1.03 ratio         PTT - ( 22 May 2020 01:16 )  PTT:81.9 sec          PHYSICAL EXAM:  Gen: NAD  Skin: No rashes, bruises, or lesions  Head: Normocephalic, Atraumatic  Face: no edema, erythema, or fluctuance.  Eyes: no scleral injection  Nose: Nares bilaterally patent, no discharge  Mouth: ETT in place, small amount of blood suctioned from posterior pharynx, no obvious laceration noted, difficult to visualize BOT and left tonsillar fossa.   Neck: Flat, supple, no lymphadenopathy, trachea midline, no masses  Lymphatic: No lymphadenopathy  Resp: on vent, ventilating well  CV: no peripheral edema/cyanosis  GI: nondistended   Peripheral vascular: no JVD or edema  Neuro: unable to assess    Fiberoptic Indirect laryngoscopy:  (Scope #2 used)  Scope advanced through ETT, crusting noted at distal end of tube. Unable to visualize complete length of ETT as scope is too short. No bleeding noted. CC: blood suctioned from ETT    HPI: 66yo female with no significant PMHx, admitted for treatment of COVID-19. ENT called to evaluate for blood suctioned from ETT. Pt was intubated on 5/4. She was most recently proned on 5/19 for 18 hours. Per team, serosanguanous secretions were being suctioned through ETT, with concern for pulmonary hemorrhage. CXR shows worsening diffuse b/l opacities. Pt has ARDS with worsening R sided infiltrates s/p bronch 5/16 with minimal secretions. Pt is on heparin infusion. H/H stable.       PAST MEDICAL & SURGICAL HISTORY:  No pertinent past medical history  History of cholecystectomy  History of appendectomy    Allergies    No Known Allergies    Intolerances      MEDICATIONS  (STANDING):  ALBUTerol    90 MICROgram(s) HFA Inhaler 2 Puff(s) Inhalation every 6 hours  BACItracin   Ointment 1 Application(s) Topical two times a day  chlorhexidine 0.12% Liquid 15 milliLiter(s) Oral Mucosa two times a day  chlorhexidine 4% Liquid 1 Application(s) Topical <User Schedule>  famotidine Injectable 20 milliGRAM(s) IV Push daily  fentaNYL   Infusion... 0.501 MICROgram(s)/kG/Hr (1.76 mL/Hr) IV Continuous <Continuous>  heparin  Infusion 1300 Unit(s)/Hr (13 mL/Hr) IV Continuous <Continuous>  insulin lispro (HumaLOG) corrective regimen sliding scale   SubCutaneous every 6 hours  ketamine Infusion. 0.501 mG/kG/Hr (3.52 mL/Hr) IV Continuous <Continuous>  norepinephrine Infusion 0.06 MICROgram(s)/kG/Min (7.91 mL/Hr) IV Continuous <Continuous>  polyethylene glycol 3350 17 Gram(s) Oral two times a day  potassium chloride  10 mEq/100 mL IVPB 10 milliEquivalent(s) IV Intermittent every 1 hour  potassium phosphate IVPB 30 milliMole(s) IV Intermittent once  propofol Infusion 24.893 MICROgram(s)/kG/Min (10.5 mL/Hr) IV Continuous <Continuous>  rocuronium Infusion 8 MICROgram(s)/kG/Min (6.75 mL/Hr) IV Continuous <Continuous>  senna Syrup 10 milliLiter(s) Oral daily    MEDICATIONS  (PRN):  acetaminophen    Suspension .. 650 milliGRAM(s) Oral every 6 hours PRN Temp greater or equal to 38C (100.4F)  diphenhydrAMINE   Injectable 50 milliGRAM(s) IV Push once PRN anaphylaxis to study drug  sodium chloride 0.9% lock flush 10 milliLiter(s) IV Push every 1 hour PRN Pre/post blood products, medications, blood draw, and to maintain line patency      Social History: no tobacco use per H&P    Family history: no significant family hx    ROS:   unable to obtain due to pt's condition     Vital Signs Last 24 Hrs  T(C): 36.4 (22 May 2020 12:00), Max: 37.9 (21 May 2020 16:00)  T(F): 97.5 (22 May 2020 12:00), Max: 100.2 (21 May 2020 16:00)  HR: 96 (22 May 2020 15:01) (91 - 111)  BP: --  BP(mean): --  RR: 31 (22 May 2020 14:00) (26 - 32)  SpO2: 94% (22 May 2020 15:01) (88% - 100%)                          7.4    21.02 )-----------( 358      ( 22 May 2020 01:16 )             24.7    05-22    140  |  90<L>  |  17  ----------------------------<  157<H>  3.0<L>   |  43<H>  |  <0.30<L>    Ca    7.8<L>      22 May 2020 13:04  Phos  1.8     05-22  Mg     2.0     05-22    TPro  6.4  /  Alb  2.3<L>  /  TBili  0.4  /  DBili  x   /  AST  101<H>  /  ALT  105<H>  /  AlkPhos  208<H>  05-22   PT/INR - ( 22 May 2020 01:16 )   PT: 11.7 sec;   INR: 1.03 ratio         PTT - ( 22 May 2020 01:16 )  PTT:81.9 sec          PHYSICAL EXAM:  Gen: NAD  Skin: No rashes, bruises, or lesions  Head: Normocephalic, Atraumatic  Face: no edema, erythema, or fluctuance.  Eyes: no scleral injection  Nose: Nares bilaterally patent, no discharge  Mouth: ETT in place, small amount of blood suctioned from posterior pharynx, on the LEFT, old clots suctioned as well, excellent exam obtained of anterior tongue, hard palate and soft palate up to base of uvula.  No lacerations seen there but difficult to visualize BOT and left tonsillar fossa, given small amount of bleeding, could be trauma to that area.  However, there is no significant oral bleeding at this time.   Neck: Flat, supple, no lymphadenopathy, trachea midline, no masses  Lymphatic: No lymphadenopathy  Resp: on vent, ventilating well  CV: no peripheral edema/cyanosis  GI: nondistended   Peripheral vascular: no JVD or edema  Neuro: unable to assess    Fiberoptic Indirect laryngoscopy:  (Scope #2 used)  Scope advanced through ETT, crusting noted at distal end of tube. Unable to visualize complete length of ETT as scope is too short. No bleeding noted.

## 2020-05-22 NOTE — PROVIDER CONTACT NOTE (EICU) - SITUATION
ABG 7.39/84/96/50 --> 7.21/ greater than 104/81/49. Vent ACPC 45, 80% and peep 5
ABG with worsening hypercarbia 79 -->84  7.39/84/96/50

## 2020-05-22 NOTE — CONSULT NOTE ADULT - PROBLEM SELECTOR RECOMMENDATION 9
- no need for packing at this time  - call ENT [id number] if bleeding increases and requires packing - currently 1/6 of canister of blood for several days, would not recommend oropharynx packing at this time  - gentle oral suctioning to minimize any trauma posteriorly  - call ENT [id number] if bleeding increases

## 2020-05-22 NOTE — CONSULT NOTE ADULT - ASSESSMENT
66yo female with blood suctioned from ETT. Pending indirect laryngoscopy. 66yo female with blood suctioned from ETT per team. On exam, small amount of blood suctioned from posterior pharynx. Unable to visualize source of bleed. No need for oral packing at this time. Scope through ETT showed crusting at distal end, no active bleeding noted.

## 2020-05-22 NOTE — PROGRESS NOTE ADULT - SUBJECTIVE AND OBJECTIVE BOX
Patient is a 65y old  Female who presents with a chief complaint of COVID-19, Hypoxic respiratory failure (21 May 2020 13:08)    Being followed by ID for        Interval history:  s/p steroid for three days  remains intubated  reparalyzed  bloody secretions from ETT and oropharynx  had bowel mvmt yesterday  No other acute events        PAST MEDICAL & SURGICAL HISTORY:  No pertinent past medical history  History of cholecystectomy  History of appendectomy    Allergies    No Known Allergies    Intolerances      Antimicrobials:      MEDICATIONS  (STANDING):  ALBUTerol    90 MICROgram(s) HFA Inhaler 2 Puff(s) Inhalation every 6 hours  BACItracin   Ointment 1 Application(s) Topical two times a day  chlorhexidine 0.12% Liquid 15 milliLiter(s) Oral Mucosa two times a day  chlorhexidine 4% Liquid 1 Application(s) Topical <User Schedule>  famotidine Injectable 20 milliGRAM(s) IV Push daily  fentaNYL   Infusion... 0.501 MICROgram(s)/kG/Hr (1.76 mL/Hr) IV Continuous <Continuous>  heparin  Infusion 1300 Unit(s)/Hr (13 mL/Hr) IV Continuous <Continuous>  insulin lispro (HumaLOG) corrective regimen sliding scale   SubCutaneous every 6 hours  ketamine Infusion. 0.501 mG/kG/Hr (3.52 mL/Hr) IV Continuous <Continuous>  norepinephrine Infusion 0.06 MICROgram(s)/kG/Min (7.91 mL/Hr) IV Continuous <Continuous>  polyethylene glycol 3350 17 Gram(s) Oral two times a day  propofol Infusion 24.893 MICROgram(s)/kG/Min (10.5 mL/Hr) IV Continuous <Continuous>  rocuronium Infusion 8 MICROgram(s)/kG/Min (6.75 mL/Hr) IV Continuous <Continuous>  senna Syrup 10 milliLiter(s) Oral daily    MEDICATIONS  (PRN):  acetaminophen    Suspension .. 650 milliGRAM(s) Oral every 6 hours PRN Temp greater or equal to 38C (100.4F)  diphenhydrAMINE   Injectable 50 milliGRAM(s) IV Push once PRN anaphylaxis to study drug  sodium chloride 0.9% lock flush 10 milliLiter(s) IV Push every 1 hour PRN Pre/post blood products, medications, blood draw, and to maintain line patency      Vital Signs Last 24 Hrs  T(C): 37 (05-22-20 @ 08:00), Max: 37.9 (05-21-20 @ 12:00)  T(F): 98.6 (05-22-20 @ 08:00), Max: 100.2 (05-21-20 @ 12:00)  HR: 96 (05-22-20 @ 09:26) (91 - 111)  BP: --  BP(mean): --  RR: 30 (05-22-20 @ 09:00) (30 - 32)  SpO2: 91% (05-22-20 @ 09:26) (88% - 100%)    Physical Exam:    Constitutional well preserved,comfortable,pleasant    HEENT PERRLA EOMI,No pallor or icterus    No oral exudate or erythema    Neck supple no JVD or LN    Chest Good AE,CTA    CVS RRR S1 S2 WNl No murmur or rub or gallop    Abd soft BS normal No tenderness no masses    Ext No cyanosis clubbing or edema    IV site no erythema tenderness or discharge    Joints no swelling or LOM    CNS AAO X 3 no focal    Lab Data:                          7.4    21.02 )-----------( 358      ( 22 May 2020 01:16 )             24.7       05-22    139  |  90<L>  |  14  ----------------------------<  157<H>  3.5   |  42<H>  |  <0.30<L>    Ca    8.6      22 May 2020 01:16  Phos  2.5     05-22  Mg     2.2     05-22    TPro  6.4  /  Alb  2.3<L>  /  TBili  0.4  /  DBili  x   /  AST  101<H>  /  ALT  105<H>  /  AlkPhos  208<H>  05-22          .Blood Blood  05-20-20   No growth to date.  --  --      Bronch Wash Bronchoalveolar Lavage  05-16-20   No growth at 48 hours  --    Moderate polymorphonuclear leukocytes per low power field  No squamous epithelial cells per low power field  No organisms seen per oil power field      .Blood Blood-Peripheral  05-15-20   No Growth Final  --  --                  WBC Count: 21.02 (05-22-20 @ 01:16)  WBC Count: 15.15 (05-21-20 @ 01:47)  WBC Count: 18.46 (05-20-20 @ 06:28)  WBC Count: 19.41 (05-19-20 @ 22:08)  WBC Count: 23.41 (05-18-20 @ 23:42)  WBC Count: 19.45 (05-18-20 @ 00:32)  WBC Count: 22.54 (05-17-20 @ 11:21)  WBC Count: 20.67 (05-17-20 @ 04:31)  WBC Count: 17.08 (05-17-20 @ 00:37)  WBC Count: 25.00 (05-16-20 @ 00:27)    D-Dimer Assay, Quantitative: 1663 ng/mL DDU (05-22)  D-Dimer Assay, Quantitative: 1034 ng/mL DDU (05-19)  D-Dimer Assay, Quantitative: 814 ng/mL DDU (05-18)  D-Dimer Assay, Quantitative: 983 ng/mL DDU (05-18)    Ferritin, Serum: 883 ng/mL (05-22)  Ferritin, Serum: 621 ng/mL (05-20)  Ferritin, Serum: 678 ng/mL (05-19)  Ferritin, Serum: 768 ng/mL (05-18)          C-Reactive Protein, Serum: 17.01 mg/dL (05-22-20 @ 01:16)  C-Reactive Protein, Serum: 31.81 ug/mL (05-19-20 @ 22:08)  C-Reactive Protein, Serum: 23.92 ug/mL (05-18-20 @ 23:42)  C-Reactive Protein, Serum: 12.10 ug/mL (05-18-20 @ 00:32) Patient is a 65y old  Female who presents with a chief complaint of COVID-19, Hypoxic respiratory failure (21 May 2020 13:08)    Being followed by ID for        Interval history:  s/p solumedrol for three days  remains intubated  reparalyzed  bloody secretions from ETT and oropharynx  had bowel mvmt yesterday  No other acute events        PAST MEDICAL & SURGICAL HISTORY:  No pertinent past medical history  History of cholecystectomy  History of appendectomy    Allergies    No Known Allergies    Intolerances      Antimicrobials:      MEDICATIONS  (STANDING):  ALBUTerol    90 MICROgram(s) HFA Inhaler 2 Puff(s) Inhalation every 6 hours  BACItracin   Ointment 1 Application(s) Topical two times a day  chlorhexidine 0.12% Liquid 15 milliLiter(s) Oral Mucosa two times a day  chlorhexidine 4% Liquid 1 Application(s) Topical <User Schedule>  famotidine Injectable 20 milliGRAM(s) IV Push daily  fentaNYL   Infusion... 0.501 MICROgram(s)/kG/Hr (1.76 mL/Hr) IV Continuous <Continuous>  heparin  Infusion 1300 Unit(s)/Hr (13 mL/Hr) IV Continuous <Continuous>  insulin lispro (HumaLOG) corrective regimen sliding scale   SubCutaneous every 6 hours  ketamine Infusion. 0.501 mG/kG/Hr (3.52 mL/Hr) IV Continuous <Continuous>  norepinephrine Infusion 0.06 MICROgram(s)/kG/Min (7.91 mL/Hr) IV Continuous <Continuous>  polyethylene glycol 3350 17 Gram(s) Oral two times a day  propofol Infusion 24.893 MICROgram(s)/kG/Min (10.5 mL/Hr) IV Continuous <Continuous>  rocuronium Infusion 8 MICROgram(s)/kG/Min (6.75 mL/Hr) IV Continuous <Continuous>  senna Syrup 10 milliLiter(s) Oral daily    MEDICATIONS  (PRN):  acetaminophen    Suspension .. 650 milliGRAM(s) Oral every 6 hours PRN Temp greater or equal to 38C (100.4F)  diphenhydrAMINE   Injectable 50 milliGRAM(s) IV Push once PRN anaphylaxis to study drug  sodium chloride 0.9% lock flush 10 milliLiter(s) IV Push every 1 hour PRN Pre/post blood products, medications, blood draw, and to maintain line patency      Vital Signs Last 24 Hrs  T(C): 37 (05-22-20 @ 08:00), Max: 37.9 (05-21-20 @ 12:00)  T(F): 98.6 (05-22-20 @ 08:00), Max: 100.2 (05-21-20 @ 12:00)  HR: 96 (05-22-20 @ 09:26) (91 - 111)  BP: --  BP(mean): --  RR: 30 (05-22-20 @ 09:00) (30 - 32)  SpO2: 91% (05-22-20 @ 09:26) (88% - 100%)    Physical Exam:    Constitutional intubated ,sedated    HEENT PERRL    Chest bilateral BS    CVS RRR S1 S2     Abd softly distended    Ext edema    sedated     Lab Data:                          7.4    21.02 )-----------( 358      ( 22 May 2020 01:16 )             24.7       05-22    139  |  90<L>  |  14  ----------------------------<  157<H>  3.5   |  42<H>  |  <0.30<L>    Ca    8.6      22 May 2020 01:16  Phos  2.5     05-22  Mg     2.2     05-22    TPro  6.4  /  Alb  2.3<L>  /  TBili  0.4  /  DBili  x   /  AST  101<H>  /  ALT  105<H>  /  AlkPhos  208<H>  05-22          .Blood Blood  05-20-20   No growth to date.  --  --      Bronch Wash Bronchoalveolar Lavage  05-16-20   No growth at 48 hours  --    Moderate polymorphonuclear leukocytes per low power field  No squamous epithelial cells per low power field  No organisms seen per oil power field      .Blood Blood-Peripheral  05-15-20   No Growth Final  --  --            WBC Count: 21.02 (05-22-20 @ 01:16)  WBC Count: 15.15 (05-21-20 @ 01:47)  WBC Count: 18.46 (05-20-20 @ 06:28)  WBC Count: 19.41 (05-19-20 @ 22:08)  WBC Count: 23.41 (05-18-20 @ 23:42)  WBC Count: 19.45 (05-18-20 @ 00:32)  WBC Count: 22.54 (05-17-20 @ 11:21)  WBC Count: 20.67 (05-17-20 @ 04:31)  WBC Count: 17.08 (05-17-20 @ 00:37)  WBC Count: 25.00 (05-16-20 @ 00:27)    D-Dimer Assay, Quantitative: 1663 ng/mL DDU (05-22)  D-Dimer Assay, Quantitative: 1034 ng/mL DDU (05-19)  D-Dimer Assay, Quantitative: 814 ng/mL DDU (05-18)  D-Dimer Assay, Quantitative: 983 ng/mL DDU (05-18)    Ferritin, Serum: 883 ng/mL (05-22)  Ferritin, Serum: 621 ng/mL (05-20)  Ferritin, Serum: 678 ng/mL (05-19)  Ferritin, Serum: 768 ng/mL (05-18)    C-Reactive Protein, Serum: 17.01 mg/dL (05-22-20 @ 01:16)  C-Reactive Protein, Serum: 31.81 ug/mL (05-19-20 @ 22:08)  C-Reactive Protein, Serum: 23.92 ug/mL (05-18-20 @ 23:42)  C-Reactive Protein, Serum: 12.10 ug/mL (05-18-20 @ 00:32)      < from: Xray Chest 1 View- PORTABLE-Urgent (05.19.20 @ 09:39) >    EXAM:  XR CHEST PORTABLE URGENT 1V                            PROCEDURE DATE:  05/19/2020            INTERPRETATION:  CLINICAL INFORMATION: Endotracheal tube placement evaluation. COVID positive with ARDS.    TECHNIQUE: Single frontal radiograph ofthe chest 5/19/2020 9:39 AM.    COMPARISON: Chest radiograph 5/18/2020.    FINDINGS:  Endotracheal tube with tip between the level of the clavicles and the mary. Enteric tube with tip in the stomach. Right-sided IJ central catheter with tip in the SVC.  Diffuse bilateral patchy opacities, compatible with ARDS. Superimposed edema cannot be excluded.  No pleural effusions. No pneumothorax.  Cardiac size cannot accurately be assessed in this projection.       IMPRESSION: Endotracheal tube in satisfactory position.    DISCRETE X-RAY DATA:  Percent of LEFT lung opacification: %  Percent of RIGHT lung opacification: %  Change in lung opacification from most recent x-ray (<=3 days): Stable  Change from prior dated 3 or more days (same admission): Stable    < end of copied text > Patient is a 65y old  Female who presents with a chief complaint of COVID-19, Hypoxic respiratory failure (21 May 2020 13:08)    Being followed by ID for        Interval history:  s/p solumedrol for three days  remains intubated  reparalyzed  bloody secretions from ETT and oropharynx  had bowel mvmt yesterday  No other acute events        PAST MEDICAL & SURGICAL HISTORY:  No pertinent past medical history  History of cholecystectomy  History of appendectomy    Allergies    No Known Allergies    Intolerances      Antimicrobials:      MEDICATIONS  (STANDING):  ALBUTerol    90 MICROgram(s) HFA Inhaler 2 Puff(s) Inhalation every 6 hours  BACItracin   Ointment 1 Application(s) Topical two times a day  chlorhexidine 0.12% Liquid 15 milliLiter(s) Oral Mucosa two times a day  chlorhexidine 4% Liquid 1 Application(s) Topical <User Schedule>  famotidine Injectable 20 milliGRAM(s) IV Push daily  fentaNYL   Infusion... 0.501 MICROgram(s)/kG/Hr (1.76 mL/Hr) IV Continuous <Continuous>  heparin  Infusion 1300 Unit(s)/Hr (13 mL/Hr) IV Continuous <Continuous>  insulin lispro (HumaLOG) corrective regimen sliding scale   SubCutaneous every 6 hours  ketamine Infusion. 0.501 mG/kG/Hr (3.52 mL/Hr) IV Continuous <Continuous>  norepinephrine Infusion 0.06 MICROgram(s)/kG/Min (7.91 mL/Hr) IV Continuous <Continuous>  polyethylene glycol 3350 17 Gram(s) Oral two times a day  propofol Infusion 24.893 MICROgram(s)/kG/Min (10.5 mL/Hr) IV Continuous <Continuous>  rocuronium Infusion 8 MICROgram(s)/kG/Min (6.75 mL/Hr) IV Continuous <Continuous>  senna Syrup 10 milliLiter(s) Oral daily    MEDICATIONS  (PRN):  acetaminophen    Suspension .. 650 milliGRAM(s) Oral every 6 hours PRN Temp greater or equal to 38C (100.4F)  diphenhydrAMINE   Injectable 50 milliGRAM(s) IV Push once PRN anaphylaxis to study drug  sodium chloride 0.9% lock flush 10 milliLiter(s) IV Push every 1 hour PRN Pre/post blood products, medications, blood draw, and to maintain line patency      Vital Signs Last 24 Hrs  T(C): 37 (05-22-20 @ 08:00), Max: 37.9 (05-21-20 @ 12:00)  T(F): 98.6 (05-22-20 @ 08:00), Max: 100.2 (05-21-20 @ 12:00)  HR: 96 (05-22-20 @ 09:26) (91 - 111)  BP: --  BP(mean): --  RR: 30 (05-22-20 @ 09:00) (30 - 32)  SpO2: 91% (05-22-20 @ 09:26) (88% - 100%)    Physical Exam:    Constitutional intubated ,sedated    HEENT PERRL    Chest bilateral BS    CVS RRR S1 S2     Abd softly distended    Ext edema    sedated     Lab Data:                          7.4    21.02 )-----------( 358      ( 22 May 2020 01:16 )             24.7       05-22    139  |  90<L>  |  14  ----------------------------<  157<H>  3.5   |  42<H>  |  <0.30<L>    Ca    8.6      22 May 2020 01:16  Phos  2.5     05-22  Mg     2.2     05-22    TPro  6.4  /  Alb  2.3<L>  /  TBili  0.4  /  DBili  x   /  AST  101<H>  /  ALT  105<H>  /  AlkPhos  208<H>  05-22          .Blood Blood  05-20-20   No growth to date.  --  --      Bronch Wash Bronchoalveolar Lavage  05-16-20   No growth at 48 hours  --    Moderate polymorphonuclear leukocytes per low power field  No squamous epithelial cells per low power field  No organisms seen per oil power field      .Blood Blood-Peripheral  05-15-20   No Growth Final  --  --      Fungitell (05.19.20 @ 20:25)    Fungitell: <31: Interpretation: The Fungitell assay does not detect certain fungal  species such as the genus Cryptococcus (Hira et al. 1991) which  produces very low levels of (1-3)-Beta-D-Glucan. The assay also does  not detect the Zygomycetes such as Absidia, Mucor and Rhizopus  (Hipolito et al. 1994) which are not known to produce  (1-3)-Beta-D-Glucan. In addition, the yeast phase of Blastomyces  dermatitidis produces little (1-3)-Beta-D-Glucan and may not be  detected by the assay (Madalyn et al. 2007).  Reference Range:  Less than 60 pg/mL. Glucan values of less than 60 pg/mL are  interpreted as negative.  Glucan values of 60 to 79 pg/mL are interpreted as indeterminate,  and suggest a possible fungal infection. Additional sampling and  testing of sera is required to interpret the results.  Glucan values of greater than or equal to 80 pg/mL are interpreted  as positive.  Due to the potential for environmental contamination when  transferred to pour-off tubes, which can lead to false positive  results, interpret positive results from samples provided in  pour-off tubes with caution.  Results should be used in conjunction  with clinical findings, and should not form the sole basis for a  diagnosis or treatment decision. The Fungitell test is approved or  cleared for in vitro diagnostic use by the U.[place] Food and Drug  Administration. Modifications to the approved package insert have  been made and the performance characteristics for these  modifications were determined by [facility].  Ifsample result is greater than 500 pg/mL, physician may order a  titer of the sample. Please contact [facility] if you would  like to order a retest of this sample to obtain an actual value.  Samples are held for 1 week after initial testing date.  ____________________________________________________________  Performed at:  [facility]  [address]  [place] [address]  [phone]  : Henrietta Daley PhD HCLD([facility])  CLIA# [id number] pg/mL          WBC Count: 21.02 (05-22-20 @ 01:16)  WBC Count: 15.15 (05-21-20 @ 01:47)  WBC Count: 18.46 (05-20-20 @ 06:28)  WBC Count: 19.41 (05-19-20 @ 22:08)  WBC Count: 23.41 (05-18-20 @ 23:42)  WBC Count: 19.45 (05-18-20 @ 00:32)  WBC Count: 22.54 (05-17-20 @ 11:21)  WBC Count: 20.67 (05-17-20 @ 04:31)  WBC Count: 17.08 (05-17-20 @ 00:37)  WBC Count: 25.00 (05-16-20 @ 00:27)    D-Dimer Assay, Quantitative: 1663 ng/mL DDU (05-22)  D-Dimer Assay, Quantitative: 1034 ng/mL DDU (05-19)  D-Dimer Assay, Quantitative: 814 ng/mL DDU (05-18)  D-Dimer Assay, Quantitative: 983 ng/mL DDU (05-18)    Ferritin, Serum: 883 ng/mL (05-22)  Ferritin, Serum: 621 ng/mL (05-20)  Ferritin, Serum: 678 ng/mL (05-19)  Ferritin, Serum: 768 ng/mL (05-18)    C-Reactive Protein, Serum: 17.01 mg/dL (05-22-20 @ 01:16)  C-Reactive Protein, Serum: 31.81 ug/mL (05-19-20 @ 22:08)  C-Reactive Protein, Serum: 23.92 ug/mL (05-18-20 @ 23:42)  C-Reactive Protein, Serum: 12.10 ug/mL (05-18-20 @ 00:32)      < from: Xray Chest 1 View- PORTABLE-Urgent (05.19.20 @ 09:39) >    EXAM:  XR CHEST PORTABLE URGENT 1V                            PROCEDURE DATE:  05/19/2020            INTERPRETATION:  CLINICAL INFORMATION: Endotracheal tube placement evaluation. COVID positive with ARDS.    TECHNIQUE: Single frontal radiograph ofthe chest 5/19/2020 9:39 AM.    COMPARISON: Chest radiograph 5/18/2020.    FINDINGS:  Endotracheal tube with tip between the level of the clavicles and the mary. Enteric tube with tip in the stomach. Right-sided IJ central catheter with tip in the SVC.  Diffuse bilateral patchy opacities, compatible with ARDS. Superimposed edema cannot be excluded.  No pleural effusions. No pneumothorax.  Cardiac size cannot accurately be assessed in this projection.       IMPRESSION: Endotracheal tube in satisfactory position.    DISCRETE X-RAY DATA:  Percent of LEFT lung opacification: %  Percent of RIGHT lung opacification: %  Change in lung opacification from most recent x-ray (<=3 days): Stable  Change from prior dated 3 or more days (same admission): Stable    < end of copied text >

## 2020-05-22 NOTE — PROGRESS NOTE ADULT - ATTENDING COMMENTS
64 yo F with no PMH a/w COVID infection, acute hypoxic respiratory failure, intubated on 5/4.   Overnight high airway pressures and dyssynchronous - given paralytic.   1. Neuro - sedated with Fent, Ketamine, Propofol. Rocuronium d/scooby.    2. Pulm - ARDS, worsening R sided infiltrates s/p bronch 5/16 with minimal secretions.   Continues to have poor lung compliance, with hypoxic and hypercapnic respiratory failure. c/w lung protective ventilation, proned overnight 5/18 and 5/19.  Completed 3 day course of Solumedrol  3. ID-  acute sinusitis. Leukocytosis downtrending. completed 7 day course of Zosyn. Appreciate ID follow up  S/P bronchoscopy 5/16- minimal secretions, sputum cultures NGTD   Covid pneumonia s/p Tocilizumab and Remdesivir  4. CVS - stable, off pressors, goal MAP >65  5. Renal/FEN- cont to diurese - Lasix and Diamox F/u repeat BMP and urine output. Hematuria - ongoing, repeat UA.    6. GI- pepcid, TFs  7. Heme- hypercoagulable state - c/w heparin drip, H/H stable though having oral bleeding and hematuria. ENT consulted to eval oropharyngeal bleeding  8. GOC - very guarded prognosis. Critically ill requiring frequent bedside evaluations and changes in management.

## 2020-05-23 NOTE — PROGRESS NOTE ADULT - ASSESSMENT
ASSESSMENT   66 y/o F with no significant PMH presents 4/29 with hypoxic respiratory failure 2nd COVID PNA. s/p Tocilizumab (4/30), s/p empiric Ceftriaxone/Azithro (4/30-5/4) for ?superimposed CAP. s/p 10 days  Remdesivir trial (4/30). Intubated 5/4 for worsened hypoxic respiratory failure from  Severe ARDS requiring proning on 5/6, 5/7, 5/8, 5/17 & 5/19.  Sedated and started full dose Lovenox after D-dimer elevated to [address]. s/p course of  Meropenem, Vancomycin, and Caspofungin for empiric sepsis coverage.(5/4- 5/12 ); 5/7: Caspofungin switched to Fluconazole  on 5/7 for growth of candida albicans in urine and  Vancomycin d/c'ed for negative MRSA swab; Pt was starting to improve, tolerating CPAP trials, but developed worsening hypercapnia on 5/14 with POCUS showing worsening R sided infiltrates. s/p  bronch on 5/16 with minimal secreations. s/p 7 day course of Zosyn; Required additional proning  on 5/17. Paralyzed with  NEGAR 5/18 ;  Repeat CXR on 5/19 with ETT migrated up and worsening b/l opacities. Tube readjusted and proned again. Solumedrol 1 mg/kg x 3 (for worsening opacities);  Changed to PRVC on 5/20;  Off Rocuronium  since 5/21. Changed to PCV on 5/22.     PLAN    #Neuro   - Sedated on Fentanyl @ 3mcg, Ketamine @ 2.5, Propofol @ 50  - sedate to vent synchrony  - Triglyceride 208; cont to trend    # PULMONARY  - intubated 5/4/20 from  Hypoxic respiratory failure 2/2 COVID PNA  - hypercapnia slightly improved on PCV with pressure set 45, RR 32, Fio2 80%, Peep 5  - Po2/Fio2  ratio 74; severe ARDS  - s/p bronch 5/16 with minimal secretions and no pulmonary hemorrhage   - Required proning on 5/6, 5/7, 5/8, 5/17 and 5/19 for worsening hypercapnia  - Completed 3-day course of Solumedrol 1 mg/kg on 5/21  - Worsening diffuse b/l opacities  on CXR from 5/19; ? slight improvement on 5/22; Follow up official read  - Blood on suctioning ETT, s/p laryngoscopy 5/22 showing crusting at distal end with no active bleeding. small amount of blood suctioned from post.pharynx         #CV  - Remains off pressors, maintaining MAP>65    #GI  - Transaminitis trending up ; Follow up RUQ sono  - Tube feeding with Vital @55 cc/hr  - Last BM today, 5/22, c/w Miralax/Senna  - Pepcid 20mg qd     #Renal  - Hematuria improving: UA with 2247 RBCs  - OFF Lasix gtt since  5/18   - Received Lasix/Diamox 5/22 and Lasix 40 early AM in b/w transfusions  - Net positive 1051cc/24 hours  - Diamox IV and IV Lasix x 1 later today; bicarb 41 today  - Continue to monitor I&Os    #ID   - Leucocytosis downtrending with no fever  - UA positive with +Leuc esterace, 22 WBC : will change Child and repeat UA   - Completed 7-day course of Zosyn (on 5/21)  - s/p bronchoscopy 5/17, negative cultures  - S/p full course of meropenem (5/4-5/13), Fluconazole (5/8-5/13), Vancomycin (5/11-5/12) for empiric coverage  - CDiff negative     COVID:   + on 4/28, and on rpt on 5/13  - s/p 10 days of Remdesivir trial  (4/30-5/9),   - s/p Tocilizumab on 4/30  - Follow daily inflammatory markers    #  LINES  - RIJ TLC from 5/16   - R radial A line 5/16    #Heme   - s/p 2 Units PRBC on 5/22 for blood loss anemia; H/H stable at 9.8/31.7  - Empiric heparin gtt for elevated D-dimer  - PTT therapeutic @ 74  - Goal aPTT 58-99    #Endo  - Blood glucose controlled on Humalog Low SS    #Ethics  -Full code

## 2020-05-23 NOTE — PROGRESS NOTE ADULT - ASSESSMENT
65F reporting no PMH who presented last night with complaint of fever/chills (home T-max 102 F), night sweats, cough, myalgias (mid & lower back), loss of taste and smell since Friday 4/24 (still tastes sweet/salty), as well as 2 days of weakness and constipation, found to be COVID positive on 4/28/20.    Pt had agreed to participate in a clinical trial for Covid -19  Please discuss with ID team if planning on taking concurrent treatment with other agents with actual or possible direct acting antiviral activity against SARS-CoV-2 such a chloroquine or hydroxychloroquine. Pt was given Tocilizumab by primary team on the day of enrollment as of concern for cytokine storm.     Hospital course  4/29: Started Ceftriaxone (4/29-5/3) and Azithromycin (4/29-5/4) in ED for possible bacterial PNA  4/30: RRT called for desaturation in 70s before receiving 1st dose of Remdesivir and Tocilizumab  5/4: RRT called for desaturation to 20s after drinking water and coughing, intubated by anesthesia. Sedated and started full dose lovenox after D-dimer elevated to [address]. Transferred to ICU. On Meropenem, Vancomycin, and Caspofungin for emperic sepsis coverage.  5/7: Caspofungin switched to Fluconazole for growth of candida albicans in urine; Vancomycin d/c'ed for negative MRSA swab  5/10: Started CPAP trials  5/12 - abs d/scooby   5/13 hematuria  5/14 O2 requirements increased  5/15--> continues to do poorly  5/19- trials of prone and steroids  5/21- WBC down slightly   5/22- continues to have bloody secretions  5/23 - recurrent fever    # Sepsis / Fever  - WBC persistently elevated, febrile  - S/p full course of meropenem (5/4-5/12), Fluconazole (5/8-5/13), Vancomycin (5/11-5/12), and zosyn    - Pt is s/p Tocilizumab which increases risk of infection and now solumedrol which does as well, including fungal infection  - All cultures negative to date.   - If able would pursue CT Chest/Abdomen/Pelvis with IV contrast. Given blood from ETT would check CT Head/Neck if pursuing CT scan to evaluate for source of bleeding from ETT   - Followup on repeat blood cultures. Would check sputum culture. U/A without pyuria  - If persistent fever or any clinical change would start Vancomycin and Meropenem pending cultures    # Acute hypoxic respiratory failure  - Multifactorial, likely secondary to COVID-19 infection with viral PNA, with possible superimposed bacterial vs fungal vs aspiration PNA and/or PE   - r/o effusion. for POC ultrasound  - could patient have bled into lung?    # Elevated LFTs:   - continue to monitor   - consider RUQ ultrasound (prefer CT A/P as above if able)    # Elevated CRP/Ferritin/D-dimer  - Could be COVID-related. CRP, ferritin, trending down.   - D-dimer remains elevated, although much lower than previous peak, could suggest PE or microthrombi. Pt now on heparin drip. Continued AC per primary team.     # elevated WBC (improving)  repeat cultures , repeat fungitel is negative   check lactate  elevated WBC in part from steroids     # anemia  pt slowly drifted down but remains about the same last week or so  could patient have bled into lung, but not further dropping  Also could pt have traumatized oral palate- ENT input appreciated  CT Head/Neck as above    I will continue to follow. Please feel free to contact me with any further questions.    Rodolfo Hutton M.D.  [facility] Division of Infectious Disease  8AM-5PM: Pager Number [phone]  After Hours (or if no response): Please contact the Infectious Diseases Office at [phone]     The above assessment and plan were discussed with 8ICU NP

## 2020-05-23 NOTE — PROGRESS NOTE ADULT - SUBJECTIVE AND OBJECTIVE BOX
Follow Up:  COVID19    Interval History:    REVIEW OF SYSTEMS  [  ] ROS unobtainable because:    [  ] All other systems negative except as noted below:     Constitutional:  [ ] fever [ ] chills  [ ] weight loss  [ ] weakness  Skin:  [ ] rash [ ] phlebitis	  Eyes: [ ] icterus [ ] pain  [ ] discharge	  ENMT: [ ] sore throat  [ ] thrush [ ] ulcers [ ] exudates  Respiratory: [  ] dyspnea [ ] hemoptysis [ ] cough [ ] sputum	  Cardiovascular:  [ ] chest pain [ ] palpitations [ ] edema	  Gastrointestinal:  [ ] nausea [ ] vomiting [ ] diarrhea [ ] constipation [ ] pain	  Genitourinary:  [ ] dysuria [ ] frequency [ ] hematuria [ ] discharge [ ] flank pain  [ ] incontinence  Musculoskeletal:  [ ] myalgias [ ] arthralgias [ ] arthritis  [ ] back pain  Neurological:  [ ] headache [ ] seizures  [ ] confusion/altered mental status    Allergies  No Known Allergies        ANTIMICROBIALS:      OTHER MEDS:  MEDICATIONS  (STANDING):  acetaminophen    Suspension .. 650 every 6 hours PRN  ALBUTerol    90 MICROgram(s) HFA Inhaler 2 every 6 hours  diphenhydrAMINE   Injectable 50 once PRN  famotidine Injectable 20 daily  fentaNYL   Infusion... 0.501 <Continuous>  furosemide Infusion 8 <Continuous>  heparin  Infusion 1300 <Continuous>  insulin lispro (HumaLOG) corrective regimen sliding scale  every 6 hours  ketamine Infusion. 0.501 <Continuous>  polyethylene glycol 3350 17 two times a day  propofol Infusion 24.893 <Continuous>  senna Syrup 10 daily      Vital Signs Last 24 Hrs  T(C): 37.9 (23 May 2020 16:00), Max: 38.3 (23 May 2020 12:00)  T(F): 100.2 (23 May 2020 16:00), Max: 100.9 (23 May 2020 12:00)  HR: 92 (23 May 2020 18:00) (85 - 100)  BP: --  BP(mean): --  RR: 32 (23 May 2020 18:00) (32 - 32)  SpO2: 93% (23 May 2020 18:00) (89% - 95%)    PHYSICAL EXAMINATION:    General: Alert and Awake, NAD  HEENT: PERRL, EOMI  Neck: Supple  Cardiac: RRR, No M/R/G  Resp: CTAB, No Wh/Rh/Ra  Abdomen: NBS, NT/ND, No HSM, No rigidity or guarding  MSK: No LE edema. No Calf tenderness  : No tyler  Skin: No rashes or lesions. Skin is warm and dry to the touch.   Neuro: Alert and Awake. CN 2-12 Grossly intact. Moves all four extremities spontaneously.  Psych: Calm, Pleasant, Cooperative    LABORATORY:                          9.2    14.96 )-----------( 284      ( 23 May 2020 15:33 )             29.6           137  |  92<L>  |  18  ----------------------------<  132<H>  3.5   |  40<H>  |  <0.30<L>    Ca    7.7<L>      23 May 2020 15:33  Phos  1.8       Mg     2.0         TPro  6.2  /  Alb  2.3<L>  /  TBili  0.6  /  DBili  x   /  AST  138<H>  /  ALT  122<H>  /  AlkPhos  218<H>        Urinalysis Basic - ( 23 May 2020 11:36 )    Color: BROWN / Appearance: Slightly Turbid / S.023 / pH: x  Gluc: x / Ketone: Negative  / Bili: Negative / Urobili: 4 mg/dL   Blood: x / Protein: 30 mg/dL / Nitrite: Negative   Leuk Esterase: Small / RBC: >50 /hpf / WBC 4 /HPF   Sq Epi: x / Non Sq Epi: 0 /hpf / Bacteria: Negative        C-Reactive Protein, Serum: 12.05 mg/dL (20 @ 01:55)  C-Reactive Protein, Serum: 17.01 mg/dL (20 @ 01:16)  C-Reactive Protein, Serum: 31.81 ug/mL (20 @ 22:08)      Ferritin, Serum: 959 ng/mL (20 @ 05:06)  Ferritin, Serum: 883 ng/mL (20 @ 04:47)  Ferritin, Serum: 621 ng/mL (20 @ 00:18)      D-Dimer Assay, Quantitative: 1339 ng/mL DDU (20 @ 01:55)  D-Dimer Assay, Quantitative: 1663 ng/mL DDU (20 @ 01:16)  D-Dimer Assay, Quantitative: 1034 ng/mL DDU (20 @ 22:08)      Procalcitonin, Serum: 0.24 ng/mL (20 @ 01:55)  Procalcitonin, Serum: 0.20 ng/mL (20 @ 01:16)  Procalcitonin, Serum: 0.28 ng/mL (20 @ 22:08)      MICROBIOLOGY:  v  .Blood Blood  20   No growth to date.  --  --      Bronch Wash Bronchoalveolar Lavage  20   No growth at 48 hours  --    Moderate polymorphonuclear leukocytes per low power field  No squamous epithelial cells per low power field  No organisms seen per oil power field      .Blood Blood-Peripheral  05-15-20   No Growth Final  --  --      .Sputum Sputum  05-15-20   No growth at 48 hours  --    Few polymorphonuclear leukocytes per low power field  No Squamous epithelial cells per low power field  No organisms seen      .Urine Catheterized  20   No growth  --  --      .Sputum Sputum  20   No growth at 48 hours  --    Few polymorphonuclear leukocytes per low power field  No Squamous epithelial cells per low power field  No organisms seen      .Blood Blood-Peripheral  05-10-20   No Growth Final  --  --      .Sputum Sputum  20   No growth at 48 hours  --    Few polymorphonuclear leukocytes per low power field  Few Squamous epithelial cells per low power field  No organisms seen per oil power field      .Urine Catheterized  20   10,000 - 49,000 CFU/mL Presumptive Candida albicans "Susceptibilities not  performed"  --  --      .Sputum Sputum  20   Normal Respiratory Farzaneh present  --    Moderate polymorphonuclear leukocytes per low power field  No squamous epithelial cells per low power field  No organisms seen      .Blood Blood-Peripheral  20   No Growth Final  --  --      .Blood Blood-Peripheral  20   No Growth Final  --  --      .Blood Blood-Peripheral  20   No Growth Final  --  --                RADIOLOGY:    <The imaging below has been reviewed and visualized by me independently. Findings as detailed in report below> Follow Up:  COVID19    Interval History: febrile this PM. remains intubated.     REVIEW OF SYSTEMS  [x  ] ROS unobtainable because:  intubated and sedated  [  ] All other systems negative except as noted below:     Constitutional:  [ ] fever [ ] chills  [ ] weight loss  [ ] weakness  Skin:  [ ] rash [ ] phlebitis	  Eyes: [ ] icterus [ ] pain  [ ] discharge	  ENMT: [ ] sore throat  [ ] thrush [ ] ulcers [ ] exudates  Respiratory: [  ] dyspnea [ ] hemoptysis [ ] cough [ ] sputum	  Cardiovascular:  [ ] chest pain [ ] palpitations [ ] edema	  Gastrointestinal:  [ ] nausea [ ] vomiting [ ] diarrhea [ ] constipation [ ] pain	  Genitourinary:  [ ] dysuria [ ] frequency [ ] hematuria [ ] discharge [ ] flank pain  [ ] incontinence  Musculoskeletal:  [ ] myalgias [ ] arthralgias [ ] arthritis  [ ] back pain  Neurological:  [ ] headache [ ] seizures  [ ] confusion/altered mental status    Allergies  No Known Allergies        ANTIMICROBIALS:      OTHER MEDS:  MEDICATIONS  (STANDING):  acetaminophen    Suspension .. 650 every 6 hours PRN  ALBUTerol    90 MICROgram(s) HFA Inhaler 2 every 6 hours  diphenhydrAMINE   Injectable 50 once PRN  famotidine Injectable 20 daily  fentaNYL   Infusion... 0.501 <Continuous>  furosemide Infusion 8 <Continuous>  heparin  Infusion 1300 <Continuous>  insulin lispro (HumaLOG) corrective regimen sliding scale  every 6 hours  ketamine Infusion. 0.501 <Continuous>  polyethylene glycol 3350 17 two times a day  propofol Infusion 24.893 <Continuous>  senna Syrup 10 daily      Vital Signs Last 24 Hrs  T(C): 37.9 (23 May 2020 16:00), Max: 38.3 (23 May 2020 12:00)  T(F): 100.2 (23 May 2020 16:00), Max: 100.9 (23 May 2020 12:00)  HR: 92 (23 May 2020 18:00) (85 - 100)  BP: --  BP(mean): --  RR: 32 (23 May 2020 18:00) (32 - 32)  SpO2: 93% (23 May 2020 18:00) (89% - 95%)    PHYSICAL EXAMINATION:  General: Intubated and Sedated  HEENT: +ETT  Neck: Supple  Cardiac: RRR, No M/R/G  Resp: Rhoncerous BS bilaterally, scattered rales   Abdomen: NBS, NT/ND, No HSM, No rigidity or guarding  MSK: No LE edema. No Calf tenderness  : Child  Skin: No rashes or lesions. Skin is warm and dry to the touch.   Vascular: RIJ (No surrounding erythema, drainage or tenderness to palpation)  Neuro: Intubated and Sedated  Psych: Unable to assess - intubated and sedated    LABORATORY:                          9.2    14.96 )-----------( 284      ( 23 May 2020 15:33 )             29.6           137  |  92<L>  |  18  ----------------------------<  132<H>  3.5   |  40<H>  |  <0.30<L>    Ca    7.7<L>      23 May 2020 15:33  Phos  1.8       Mg     2.0         TPro  6.2  /  Alb  2.3<L>  /  TBili  0.6  /  DBili  x   /  AST  138<H>  /  ALT  122<H>  /  AlkPhos  218<H>        Urinalysis Basic - ( 23 May 2020 11:36 )    Color: BROWN / Appearance: Slightly Turbid / S.023 / pH: x  Gluc: x / Ketone: Negative  / Bili: Negative / Urobili: 4 mg/dL   Blood: x / Protein: 30 mg/dL / Nitrite: Negative   Leuk Esterase: Small / RBC: >50 /hpf / WBC 4 /HPF   Sq Epi: x / Non Sq Epi: 0 /hpf / Bacteria: Negative        C-Reactive Protein, Serum: 12.05 mg/dL (20 @ 01:55)  C-Reactive Protein, Serum: 17.01 mg/dL (20 @ 01:16)  C-Reactive Protein, Serum: 31.81 ug/mL (20 @ 22:08)      Ferritin, Serum: 959 ng/mL (20 @ 05:06)  Ferritin, Serum: 883 ng/mL (20 @ 04:47)  Ferritin, Serum: 621 ng/mL (20 @ 00:18)      D-Dimer Assay, Quantitative: 1339 ng/mL DDU (20 @ 01:55)  D-Dimer Assay, Quantitative: 1663 ng/mL DDU (20 @ 01:16)  D-Dimer Assay, Quantitative: 1034 ng/mL DDU (20 @ 22:08)      Procalcitonin, Serum: 0.24 ng/mL (20 @ 01:55)  Procalcitonin, Serum: 0.20 ng/mL (20 @ 01:16)  Procalcitonin, Serum: 0.28 ng/mL (20 @ 22:08)      MICROBIOLOGY:  v  .Blood Blood  20   No growth to date.  --  --      Bronch Wash Bronchoalveolar Lavage  20   No growth at 48 hours  --    Moderate polymorphonuclear leukocytes per low power field  No squamous epithelial cells per low power field  No organisms seen per oil power field      .Blood Blood-Peripheral  05-15-20   No Growth Final  --  --      .Sputum Sputum  05-15-20   No growth at 48 hours  --    Few polymorphonuclear leukocytes per low power field  No Squamous epithelial cells per low power field  No organisms seen      .Urine Catheterized  20   No growth  --  --      .Sputum Sputum  20   No growth at 48 hours  --    Few polymorphonuclear leukocytes per low power field  No Squamous epithelial cells per low power field  No organisms seen      .Blood Blood-Peripheral  05-10-20   No Growth Final  --  --      .Sputum Sputum  20   No growth at 48 hours  --    Few polymorphonuclear leukocytes per low power field  Few Squamous epithelial cells per low power field  No organisms seen per oil power field      .Urine Catheterized  20   10,000 - 49,000 CFU/mL Presumptive Candida albicans "Susceptibilities not  performed"  --  --      .Sputum Sputum  20   Normal Respiratory Farzaneh present  --    Moderate polymorphonuclear leukocytes per low power field  No squamous epithelial cells per low power field  No organisms seen      .Blood Blood-Peripheral  20   No Growth Final  --  --      .Blood Blood-Peripheral  20   No Growth Final  --  --      .Blood Blood-Peripheral  20   No Growth Final  --  --    RADIOLOGY:    <The imaging below has been reviewed and visualized by me independently. Findings as detailed in report below>    EXAM:  XR CHEST PORTABLE URGENT 1V                        PROCEDURE DATE:  2020  endotracheal tube with tip above the mary.  enteric tube with tip in the stomach   follow up official report in AM

## 2020-05-23 NOTE — PROGRESS NOTE ADULT - SUBJECTIVE AND OBJECTIVE BOX
INTERVAL HPI/OVERNIGHT EVENTS: H/H down trended to 6.2/21.4. s/p 2 units PRBC with adequate response. s/p ENT eval for oral bleeding; small amount of blood suctioned from posterior pharynx. Unable to visualize source of bleed. Scope through ETT showed crusting at distal end, no active bleeding noted.     SUBJECTIVE: sedated and intubated    OBJECTIVE:    VITAL SIGNS:  ICU Vital Signs Last 24 Hrs  T(C): 37.8 (23 May 2020 08:00), Max: 37.8 (23 May 2020 08:00)  T(F): 100 (23 May 2020 08:00), Max: 100 (23 May 2020 08:00)  HR: 96 (23 May 2020 10:00) (91 - 100)  BP: --  BP(mean): --  ABP: 122/47 (23 May 2020 10:00) (103/46 - 151/60)  ABP(mean): 74 (23 May 2020 10:00) (67 - 94)  RR: 32 (23 May 2020 10:00) (29 - 33)  SpO2: 92% (23 May 2020 10:00) (89% - 96%)    Mode: AC/ CMV (Assist Control/ Continuous Mandatory Ventilation), RR (machine): 32, FiO2: 80, PEEP: 5, PS: 45, MAP: 25, PIP: 51    05-22 @ 07:01 - 05-23 @ 07:00  --------------------------------------------------------  IN: 4276.9 mL / OUT: 3225 mL / NET: 1051.9 mL    05-23 @ 07:01  -  05-23 @ 10:30  --------------------------------------------------------  IN: 351.6 mL / OUT: 325 mL / NET: 26.6 mL      CAPILLARY BLOOD GLUCOSE      POCT Blood Glucose.: 143 mg/dL (23 May 2020 04:32)      PHYSICAL EXAM:    General: NAD  HEENT:   Neck: supple  Respiratory: course BS  b/l  Cardiovascular: +S1/S2; RRR  Abdomen: soft, NT/ND; +BS x4  Extremities: WWP, 2+ peripheral pulses b/l;   Skin: normal color and turgor; no rash  Neurological: sedated    MEDICATIONS:  MEDICATIONS  (STANDING):  acetaZOLAMIDE  IVPB 250 milliGRAM(s) IV Intermittent once  ALBUTerol    90 MICROgram(s) HFA Inhaler 2 Puff(s) Inhalation every 6 hours  BACItracin   Ointment 1 Application(s) Topical two times a day  chlorhexidine 0.12% Liquid 15 milliLiter(s) Oral Mucosa two times a day  chlorhexidine 4% Liquid 1 Application(s) Topical <User Schedule>  famotidine Injectable 20 milliGRAM(s) IV Push daily  fentaNYL   Infusion... 0.501 MICROgram(s)/kG/Hr (1.76 mL/Hr) IV Continuous <Continuous>  furosemide   Injectable 40 milliGRAM(s) IV Push once  heparin  Infusion 1300 Unit(s)/Hr (13 mL/Hr) IV Continuous <Continuous>  insulin lispro (HumaLOG) corrective regimen sliding scale   SubCutaneous every 6 hours  ketamine Infusion. 0.501 mG/kG/Hr (3.52 mL/Hr) IV Continuous <Continuous>  polyethylene glycol 3350 17 Gram(s) Oral two times a day  propofol Infusion 24.893 MICROgram(s)/kG/Min (10.5 mL/Hr) IV Continuous <Continuous>  senna Syrup 10 milliLiter(s) Oral daily    MEDICATIONS  (PRN):  acetaminophen    Suspension .. 650 milliGRAM(s) Oral every 6 hours PRN Temp greater or equal to 38C (100.4F)  diphenhydrAMINE   Injectable 50 milliGRAM(s) IV Push once PRN anaphylaxis to study drug  sodium chloride 0.9% lock flush 10 milliLiter(s) IV Push every 1 hour PRN Pre/post blood products, medications, blood draw, and to maintain line patency      ALLERGIES:  Allergies    No Known Allergies    Intolerances        LABS:                        9.8    15.91 )-----------( 291      ( 23 May 2020 01:55 )             31.7     05-23    139  |  92<L>  |  19  ----------------------------<  122<H>  3.9   |  41<H>  |  <0.30<L>    Ca    8.9      23 May 2020 01:55  Phos  2.3     05-23  Mg     2.2     05-23    TPro  6.2  /  Alb  2.3<L>  /  TBili  0.6  /  DBili  x   /  AST  138<H>  /  ALT  122<H>  /  AlkPhos  218<H>  05-23    PT/INR - ( 22 May 2020 01:16 )   PT: 11.7 sec;   INR: 1.03 ratio         PTT - ( 23 May 2020 01:55 )  PTT:74.4 sec      RADIOLOGY & ADDITIONAL TESTS: Reviewed.

## 2020-05-23 NOTE — PROGRESS NOTE ADULT - ATTENDING COMMENTS
66 yo F with no PMH a/w COVID infection, acute hypoxic respiratory failure, intubated on 5/4, proned multiple times throughout her hospital course.      1. Neuro - sedated with Fent, Ketamine, Propofol. Remains off paralytics. follow triglyceride levels every 72 hours while on Prop  2. Pulm - ARDS, s/p bronch 5/16 with minimal secretions, no signs of bleeding.  Continues to have poor lung compliance, with hypoxic and hypercapnic respiratory failure. c/w lung protective ventilation, proned overnight 5/18 and 5/19.  Completed 3 day course of Solumedrol on 5/20  Airway clearance therapy, bed percussor  3. ID-  acute sinusitis s/p 7 day course of Zosyn. Appreciate ID follow up. All cultures NGTD  Covid pneumonia s/p Tocilizumab and Remdesivir  4. CVS - stable, off pressors, goal MAP >65  5. Renal/FEN- cont to diurese - Lasix and Diamox F/u repeat BMP and urine output. Hematuria - improving, repeat UA.    6. GI- pepcid, TFs  7. Heme- hypercoagulable state - c/w heparin drip, H/H dropped overnight likely from oral bleeding, hematuria and critical illness- responded to PRBCs. ENT consult appreciated- minimal posterior pharyngeal bleeding, benefit of anticoagulation outweighs risks given high Ddimer and risk for thrombosis.   8. GOC - very guarded prognosis. Critically ill requiring frequent bedside evaluations and changes in management.

## 2020-05-24 NOTE — PROGRESS NOTE ADULT - ASSESSMENT
64 y/o F with no significant PMH presents 4/29 with hypoxic respiratory failure 2nd COVID PNA. s/p Tocilizumab (4/30), s/p empiric Ceftriaxone/Azithro (4/30-5/4) for ?superimposed CAP. s/p 10 days  Remdesivir trial (4/30). Intubated 5/4 for worsened hypoxic respiratory failure from  Severe ARDS requiring proning on 5/6, 5/7, 5/8, 5/17 & 5/19.  Sedated and started full dose Lovenox after D-dimer elevated to [address]. s/p course of  Meropenem, Vancomycin, and Caspofungin for empiric sepsis coverage.(5/4- 5/12 ); 5/7: Caspofungin switched to Fluconazole  on 5/7 for growth of candida albicans in urine and  Vancomycin d/c'ed for negative MRSA swab; Pt was starting to improve, tolerating CPAP trials, but developed worsening hypercapnia on 5/14 with POCUS showing worsening R sided infiltrates. s/p  bronch on 5/16 with minimal secreations. s/p 7 day course of Zosyn; Required additional proning  on 5/17. Paralyzed with  NEGAR 5/18 ;  Repeat CXR on 5/19 with ETT migrated up and worsening b/l opacities. Tube readjusted and proned again. Solumedrol 1 mg/kg x 3 (for worsening opacities);  Changed to PRVC on 5/20;  Off Rocuronium  since 5/21. Changed to PCV on 5/22.     PLAN    #Neuro   - Sedated on Fentanyl, Ketamine, Propofol  - sedate to vent synchrony  - Triglyceride 208; cont to trend    # PULMONARY  - intubated 5/4/20 from Hypoxic respiratory failure 2/2 COVID PNA  - s/p aggressive airway clearance therapy overnight w/ duonebs, saline inhalation, repositioning   - Current vent settings: PC Rate 32, PEEP +8, Fio2 80%, TV  - Po2/Fio2 ratio <100; severe ARDS  - s/p bronch 5/16 with minimal secretions and no pulmonary hemorrhage   - Required proning on 5/6, 5/7, 5/8, 5/17 and 5/19 for worsening hypercapnia  - Completed 3-day course of Solumedrol 1 mg/kg on 5/21  - Worsening diffuse b/l opacities  on CXR from 5/19; ? slight improvement on 5/22; Follow up official read  - Blood on suctioning ETT, s/p laryngoscopy 5/22 showing crusting at distal end with no active bleeding. small amount of blood suctioned from post.pharynx       #CV  - Remains off pressors, currently on Midodrine 10mg Q8H, maintaining MAP>65    #GI  - LFTs uptrending ; Follow up RUQ sono  - Tube feeding at goal with Vital @55 cc/hr  - Last BM today, 5/22, c/w Miralax/Senna  - Pepcid 20mg qd     #Renal  - Hematuria improving: UA with 2247 RBCs  - Restarted on Lasix gtt overnight  - Received Lasix/Diamox 5/22 and Lasix 40 early AM in b/w transfusions  - Net positive 1051cc/24 hours  - Diamox IV and IV Lasix x 1 later today; bicarb 41 today  - Continue to monitor I&Os    #ID   - Leucocytosis downtrending with no fever  - UA positive with +Leuc esterace, 22 WBC : will change Child and repeat UA   - Completed 7-day course of Zosyn (on 5/21)  - s/p bronchoscopy 5/17, negative cultures  - S/p full course of meropenem (5/4-5/13), Fluconazole (5/8-5/13), Vancomycin (5/11-5/12) for empiric coverage  - CDiff negative     COVID:   + on 4/28, and on rpt on 5/13  - s/p 10 days of Remdesivir trial  (4/30-5/9),   - s/p Tocilizumab on 4/30  - Follow daily inflammatory markers    #  LINES  - RIJ TLC from 5/16   - R radial A line 5/16    #Heme   - s/p 2 Units PRBC on 5/22 for blood loss anemia; H/H stable at 9.8/31.7  - Empiric heparin gtt for elevated D-dimer  - PTT therapeutic @ 74  - Goal aPTT 58-99    #Endo  - Blood glucose controlled on Humalog Low SS    #Ethics  -Full code      Critical Care Attestation:    Critical Care Attestation:  Attending with resident/fellow:  I have personally provided 35 minutes of critical care time concurrently with the resident/fellow. This time excludes time spent on separate procedures and time spent teaching. I have reviewed the resident/fellow’s documentation and I agree with the assessment and plan of care.    Attending Attestation:   64 yo F with no PMH a/w COVID infection, acute hypoxic respiratory failure, intubated on 5/4, proned multiple times throughout her hospital course.      1. Neuro - sedated with Fent, Ketamine, Propofol. Remains off paralytics. follow triglyceride levels every 72 hours while on Prop  2. Pulm - ARDS, s/p bronch 5/16 with minimal secretions, no signs of bleeding.  Continues to have poor lung compliance, with hypoxic and hypercapnic respiratory failure. c/w lung protective ventilation, proned overnight 5/18 and 5/19.  Completed 3 day course of Solumedrol on 5/20  Airway clearance therapy, bed percussor  3. ID-  acute sinusitis s/p 7 day course of Zosyn. Appreciate ID follow up. All cultures NGTD  Covid pneumonia s/p Tocilizumab and Remdesivir  4. CVS - stable, off pressors, goal MAP >65  5. Renal/FEN- cont to diurese - Lasix and Diamox F/u repeat BMP and urine output. Hematuria - improving, repeat UA.    6. GI- pepcid, TFs  7. Heme- hypercoagulable state - c/w heparin drip, H/H dropped overnight likely from oral bleeding, hematuria and critical illness- responded to PRBCs. ENT consult appreciated- minimal posterior pharyngeal bleeding, benefit of anticoagulation outweighs risks given high Ddimer and risk for thrombosis.   8. GOC - very guarded prognosis. Critically ill requiring frequent bedside evaluations and changes in management.       Electronic Signatures:  Ambar Ignacio)  (Signed 23-May-2020 12:40)  	Authored: Critical Care Attestation, Attending Attestation  	Co-Signer: Progress Note, Reason for Admission, Subjective and Objective, Assessment and Plan  Janett Sebastian (NP)  (Signed 23-May-2020 11:25)  	Authored: Progress Note, Reason for Admission, Subjective and Objective, Assessment and Plan      Last Updated: 23-May-2020 12:40 by Ambar Ignacio) 64 y/o F with no significant PMH presents 4/29 with hypoxic respiratory failure 2nd COVID PNA. s/p Tocilizumab (4/30), s/p empiric Ceftriaxone/Azithro (4/30-5/4) for ?superimposed CAP. s/p 10 days  Remdesivir trial (4/30). Intubated 5/4 for worsened hypoxic respiratory failure from  Severe ARDS requiring proning on 5/6, 5/7, 5/8, 5/17 & 5/19.  Sedated and started full dose Lovenox after D-dimer elevated to [address]. s/p course of  Meropenem, Vancomycin, and Caspofungin for empiric sepsis coverage.(5/4- 5/12 ); 5/7: Caspofungin switched to Fluconazole  on 5/7 for growth of candida albicans in urine and  Vancomycin d/c'ed for negative MRSA swab; Pt was starting to improve, tolerating CPAP trials, but developed worsening hypercapnia on 5/14 with POCUS showing worsening R sided infiltrates. s/p  bronch on 5/16 with minimal secreations. s/p 7 day course of Zosyn; Required additional proning  on 5/17. Paralyzed with  NEGAR 5/18 ;  Repeat CXR on 5/19 with ETT migrated up and worsening b/l opacities. Tube readjusted and proned again. Solumedrol 1 mg/kg x 3 (for worsening opacities);  Changed to PRVC on 5/20;  Off Rocuronium  since 5/21. Changed to PCV on 5/22.     PLAN    #Neuro   - Sedated on Fentanyl, Ketamine, Propofol  - sedate to vent synchrony  - Triglyceride 208; cont to trend Q72H while on Prop    # PULMONARY  - intubated 5/4/20 from Hypoxic respiratory failure 2/2 COVID PNA  - s/p aggressive airway clearance therapy overnight w/ duonebs, saline inhalation, repositioning   - Current vent settings: PC Rate 32, PEEP +8, Fio2 80%  - Po2/Fio2 ratio <100; severe ARDS  - s/p bronch 5/16 with minimal secretions and no pulmonary hemorrhage   - Required proning on 5/6, 5/7, 5/8, 5/17 and 5/19 for worsening hypercapnia  - Completed 3-day course of Solumedrol 1 mg/kg on 5/21  - Worsening diffuse b/l opacities  on CXR from 5/19; ? slight improvement on 5/22; Follow up official read  - Blood on suctioning ETT, s/p laryngoscopy 5/22 showing crusting at distal end with no active bleeding. small amount of blood suctioned from post.pharynx       #CV  - Remains off pressors, currently on Midodrine 10mg Q8H, maintaining MAP>65    #GI  - LFTs uptrending ; Follow up RUQ sono  - Tube feeding at goal with Vital @55 cc/hr  - Last BM today, 5/22, c/w Miralax/Senna  - Pepcid 20mg qd     #Renal  - Hematuria improving: UA >50 RBCs  - Restarted on Lasix gtt overnight  - Diamox 250 x 1 today, has been receiving one time doses daily   - Net negative 1.6L, urine output -200-300 ccs/hr on Lasix  - Replete electrolytes PRN  - Continue to monitor I&Os    #ID   - Leukocytosis downtrending with no fever  - Child changed 5/23, repeat UA with small leuks & WBC 4  - Completed 7-day course of Zosyn (on 5/21)  - s/p bronchoscopy 5/17, negative cultures  - S/p full course of meropenem (5/4-5/13), Fluconazole (5/8-5/13), Vancomycin (5/11-5/12) for empiric coverage  - CDiff negative, Last BC x 2 from 5/20 NGTD   - Recommended for repeat CT-imaging to eval for occult infection, however imaging deferred given patient's current respiratory status     COVID:   + on 4/28, and on rpt on 5/13  - s/p 10 days of Remdesivir trial  (4/30-5/9),   - s/p Tocilizumab on 4/30  - Follow daily inflammatory markers    #  LINES  - RIJ TLC from 5/16   - R radial A line 5/16    #Heme   - s/p 2 Units PRBC on 5/22 for blood loss anemia; H/H stable at 9.1/29.5  - Empiric heparin gtt for elevated D-dimer  - PTT therapeutic @ 83  - Goal aPTT 58-99    #Endo  - Blood glucose controlled on Humalog Low SS    #Ethics  -Full code 64 y/o F with no significant PMH presents 4/29 with hypoxic respiratory failure 2nd COVID PNA. s/p Tocilizumab (4/30), s/p empiric Ceftriaxone/Azithro (4/30-5/4) for ?superimposed CAP. s/p 10 days  Remdesivir trial (4/30). Intubated 5/4 for worsened hypoxic respiratory failure from  Severe ARDS requiring proning on 5/6, 5/7, 5/8, 5/17 & 5/19.  Sedated and started full dose Lovenox after D-dimer elevated to [address]. s/p course of  Meropenem, Vancomycin, and Caspofungin for empiric sepsis coverage.(5/4- 5/12 ); 5/7: Caspofungin switched to Fluconazole  on 5/7 for growth of candida albicans in urine and  Vancomycin d/c'ed for negative MRSA swab; Pt was starting to improve, tolerating CPAP trials, but developed worsening hypercapnia on 5/14 with POCUS showing worsening R sided infiltrates. s/p  bronch on 5/16 with minimal secreations. s/p 7 day course of Zosyn; Required additional proning  on 5/17. Paralyzed with  NEGAR 5/18 ;  Repeat CXR on 5/19 with ETT migrated up and worsening b/l opacities. Tube readjusted and proned again. Solumedrol 1 mg/kg x 3 (for worsening opacities);  Changed to PRVC on 5/20;  Off Rocuronium  since 5/21. Changed to PCV on 5/22.     PLAN    #Neuro   - Sedated on Fentanyl, Ketamine, Propofol  - sedate to vent synchrony  - Triglyceride 208; cont to trend Q72H while on Prop    # PULMONARY  - intubated 5/4/20 from Hypoxic respiratory failure 2/2 COVID PNA  - s/p aggressive airway clearance therapy overnight w/ duonebs, saline inhalation, repositioning   - Current vent settings: PC Rate 32, PEEP +8, Fio2 80%  - Po2/Fio2 ratio <100; severe ARDS  - s/p bronch 5/16 with minimal secretions and no pulmonary hemorrhage   - Required proning on 5/6, 5/7, 5/8, 5/17 and 5/19 for worsening hypercapnia  - Completed 3-day course of Solumedrol 1 mg/kg on 5/21  - Worsening diffuse b/l opacities  on CXR from 5/19; ? slight improvement on 5/22; Follow up official read  - Blood on suctioning ETT, s/p laryngoscopy 5/22 showing crusting at distal end with no active bleeding. small amount of blood suctioned from post.pharynx       #CV  - Remains off pressors, currently on Midodrine 10mg Q8H, maintaining MAP>65    #GI  - LFTs uptrending ; Follow up RUQ sono  - Tube feeding at goal with Vital @55 cc/hr  - Last BM today, 5/22, c/w Miralax/Senna  - Pepcid 20mg qd     #Renal  - Hematuria improving: UA >50 RBCs  - Restarted on Lasix gtt overnight  - Diamox 250 x 1 today, has been receiving one time doses daily   - Net negative 1.6L, urine output -200-300 ccs/hr on Lasix  - Replete electrolytes PRN  - Continue to monitor I&Os    #ID   - Leukocytosis downtrending with no fever  - Child changed 5/23, repeat UA with small leuks & WBC 4  - Completed 7-day course of Zosyn (on 5/21)  - s/p bronchoscopy 5/17, negative cultures  - S/p full course of meropenem (5/4-5/13), Fluconazole (5/8-5/13), Vancomycin (5/11-5/12) for empiric coverage  - CDiff negative, Last BC x 2 from 5/20 NGTD   - Recommended for repeat CT-imaging to eval for occult infection, however imaging deferred given patient's current respiratory status     COVID:   + on 4/28, and on rpt on 5/13  - s/p 10 days of Remdesivir trial  (4/30-5/9),   - s/p Tocilizumab on 4/30  - Follow daily inflammatory markers    #  LINES  - RIJ TLC from 5/16   - R radial A line 5/16    #Heme   - s/p 2 Units PRBC on 5/22 for blood loss anemia; H/H stable at 9.1/29.5  - Empiric heparin gtt for elevated D-dimer  - PTT therapeutic @ 83  - Goal aPTT 58-99    #Endo  - Blood glucose controlled on Humalog Low SS    #Ethics  -Full code  - Updated all 3 daughters (Yecenia Marroquin, & Mary [phone]) via conference call, and family aware of patient's lung dysfunction with a very poorly complaint system is her main issue. Family remains hopeful for full recovery. Ongoing management and emotional support at this time. 66 y/o F with no significant PMH presents 4/29 with hypoxic respiratory failure 2nd COVID PNA. s/p Tocilizumab (4/30), s/p empiric Ceftriaxone/Azithro (4/30-5/4) for ?superimposed CAP. s/p 10 days  Remdesivir trial (4/30). Intubated 5/4 for worsened hypoxic respiratory failure from  Severe ARDS requiring proning on 5/6, 5/7, 5/8, 5/17 & 5/19.  Sedated and started full dose Lovenox after D-dimer elevated to [address]. s/p course of  Meropenem, Vancomycin, and Caspofungin for empiric sepsis coverage.(5/4- 5/12 ); 5/7: Caspofungin switched to Fluconazole  on 5/7 for growth of candida albicans in urine and  Vancomycin d/c'ed for negative MRSA swab; Pt was starting to improve, tolerating CPAP trials, but developed worsening hypercapnia on 5/14 with POCUS showing worsening R sided infiltrates. s/p  bronch on 5/16 with minimal secreations. s/p 7 day course of Zosyn; Required additional proning  on 5/17. Paralyzed with  NEGAR 5/18 ;  Repeat CXR on 5/19 with ETT migrated up and worsening b/l opacities. Tube readjusted and proned again. Solumedrol 1 mg/kg x 3 (for worsening opacities);  Changed to PRVC on 5/20;  Off Rocuronium  since 5/21. Changed to PCV on 5/22.     PLAN    #Neuro   - Sedated on Fentanyl, Ketamine, Propofol  - sedate to vent synchrony  - Triglyceride 208; cont to trend Q72H while on Prop    # PULMONARY  - intubated 5/4/20 from Hypoxic respiratory failure 2/2 COVID PNA  - s/p aggressive airway clearance therapy overnight w/ duonebs, saline inhalation, repositioning   - Current vent settings: PC Rate 32, PEEP +8, Fio2 80%  - Po2/Fio2 ratio <100; severe ARDS  - s/p bronch 5/16 with minimal secretions and no pulmonary hemorrhage   - Required proning on 5/6, 5/7, 5/8, 5/17 and 5/19 for worsening hypercapnia  - Completed 3-day course of Solumedrol 1 mg/kg on 5/21  - Worsening diffuse b/l opacities  on CXR from 5/19; ? slight improvement on 5/22; Follow up official read  - Blood on suctioning ETT, s/p laryngoscopy 5/22 showing crusting at distal end with no active bleeding. small amount of blood suctioned from post.pharynx       #CV  - Remains off pressors, currently on Midodrine 10mg Q8H, maintaining MAP>65    #GI  - LFTs uptrending ; Follow up RUQ sono  - Tube feeding at goal with Vital @55 cc/hr  - Last BM today, 5/22, c/w Miralax/Senna  - Pepcid 20mg qd     #Renal  - Hematuria improving: UA >50 RBCs  - Restarted on Lasix gtt overnight  - s/p Diamox 250 x 1 on 5/23   - Net negative 1.6L, urine output -200-300 ccs/hr on Lasix gtt  - Replete electrolytes PRN  - Continue to monitor I&Os    #ID   - Leukocytosis downtrending with no fever  - Child changed 5/23, repeat UA with small leuks & WBC 4  - Completed 7-day course of Zosyn (on 5/21)  - s/p bronchoscopy 5/17, negative cultures  - S/p full course of meropenem (5/4-5/13), Fluconazole (5/8-5/13), Vancomycin (5/11-5/12) for empiric coverage  - CDiff negative, Last BC x 2 from 5/20 NGTD   - Recommended for repeat CT-imaging to eval for occult infection, however imaging deferred given patient's current respiratory status     COVID:   + on 4/28, and on rpt on 5/13  - s/p 10 days of Remdesivir trial  (4/30-5/9),   - s/p Tocilizumab on 4/30  - Follow daily inflammatory markers    #  LINES  - RIJ TLC from 5/16   - R radial A line 5/16    #Heme   - s/p 2 Units PRBC on 5/22 for blood loss anemia; H/H stable at 9.1/29.5  - Empiric heparin gtt for elevated D-dimer  - PTT therapeutic @ 83  - Goal aPTT 58-99    #Endo  - Blood glucose controlled on Humalog Low SS    #Ethics  -Full code  - Updated all 3 daughters (LopezYecenia fletcher, & Mary @ [phone]) via conference call, and family aware of patient's lung dysfunction with a very poorly complaint system is her main issue. Family remains hopeful for full recovery. Ongoing management and emotional support at this time.

## 2020-05-24 NOTE — PROGRESS NOTE ADULT - SUBJECTIVE AND OBJECTIVE BOX
INTERVAL HPI/OVERNIGHT EVENTS: Aggressive airway clearance therapy overnight with no improvement of ventilation. H/H stable s/p PRBCs.    SUBJECTIVE: sedated and intubated    OBJECTIVE:    VITAL SIGNS:  ICU Vital Signs Last 24 Hrs  T(C): 36.7 (24 May 2020 08:00), Max: 38.3 (23 May 2020 12:00)  T(F): 98.1 (24 May 2020 08:00), Max: 100.9 (23 May 2020 12:00)  HR: 92 (24 May 2020 08:00) (79 - 97)  BP: --  BP(mean): --  ABP: 110/45 (24 May 2020 08:00) (104/46 - 146/60)  ABP(mean): 69 (24 May 2020 08:00) (67 - 92)  RR: 32 (24 May 2020 08:00) (27 - 36)  SpO2: 88% (24 May 2020 08:00) (85% - 97%)    Mode: AC/ CMV (Assist Control/ Continuous Mandatory Ventilation), RR (machine): 32, TV (machine): 290, FiO2: 80, PEEP: 5, PS: , MAP: 26, PC: 45, PIP: 51     @ 07: @ 07:00  --------------------------------------------------------  IN: 3679.7 mL / OUT: 5280 mL / NET: -1600.3 mL     @ 07: @ 08:25  --------------------------------------------------------  IN: 121.2 mL / OUT: 400 mL / NET: -278.8 mL      CAPILLARY BLOOD GLUCOSE      POCT Blood Glucose.: 122 mg/dL (23 May 2020 23:33)      MEDICATIONS:  MEDICATIONS  (STANDING):  ALBUTerol    90 MICROgram(s) HFA Inhaler 2 Puff(s) Inhalation every 6 hours  albuterol/ipratropium for Nebulization. 3 milliLiter(s) Nebulizer every 6 hours  BACItracin   Ointment 1 Application(s) Topical two times a day  chlorhexidine 0.12% Liquid 15 milliLiter(s) Oral Mucosa two times a day  chlorhexidine 4% Liquid 1 Application(s) Topical <User Schedule>  famotidine Injectable 20 milliGRAM(s) IV Push daily  fentaNYL   Infusion... 0.5 MICROgram(s)/kG/Hr (1.76 mL/Hr) IV Continuous <Continuous>  furosemide Infusion 8 mG/Hr (4 mL/Hr) IV Continuous <Continuous>  heparin  Infusion 1300 Unit(s)/Hr (13 mL/Hr) IV Continuous <Continuous>  insulin lispro (HumaLOG) corrective regimen sliding scale   SubCutaneous every 6 hours  ketamine Infusion. 0.25 mG/kG/Hr (1.76 mL/Hr) IV Continuous <Continuous>  midodrine 10 milliGRAM(s) Oral every 8 hours  polyethylene glycol 3350 17 Gram(s) Oral two times a day  propofol Infusion 24.893 MICROgram(s)/kG/Min (10.5 mL/Hr) IV Continuous <Continuous>  senna Syrup 10 milliLiter(s) Oral daily  sodium chloride 3%  Inhalation 5 milliLiter(s) Inhalation every 6 hours    MEDICATIONS  (PRN):  acetaminophen    Suspension .. 650 milliGRAM(s) Oral every 6 hours PRN Temp greater or equal to 38C (100.4F)  diphenhydrAMINE   Injectable 50 milliGRAM(s) IV Push once PRN anaphylaxis to study drug  sodium chloride 0.9% lock flush 10 milliLiter(s) IV Push every 1 hour PRN Pre/post blood products, medications, blood draw, and to maintain line patency      ALLERGIES:  Allergies    No Known Allergies    Intolerances        LABS:                        9.1    15.75 )-----------( 290      ( 24 May 2020 00:09 )             29.5     05-24    140  |  93<L>  |  19  ----------------------------<  120<H>  3.5   |  41<H>  |  <0.30<L>    Ca    8.3<L>      24 May 2020 00:09  Phos  3.8     05-24  Mg     2.1     05-24    TPro  6.0  /  Alb  2.1<L>  /  TBili  0.8  /  DBili  x   /  AST  156<H>  /  ALT  165<H>  /  AlkPhos  248<H>  05-24    PT/INR - ( 24 May 2020 00:09 )   PT: 12.1 sec;   INR: 1.06 ratio         PTT - ( 24 May 2020 00:09 )  PTT:83.5 sec  Urinalysis Basic - ( 23 May 2020 11:36 )    Color: BROWN / Appearance: Slightly Turbid / S.023 / pH: x  Gluc: x / Ketone: Negative  / Bili: Negative / Urobili: 4 mg/dL   Blood: x / Protein: 30 mg/dL / Nitrite: Negative   Leuk Esterase: Small / RBC: >50 /hpf / WBC 4 /HPF   Sq Epi: x / Non Sq Epi: 0 /hpf / Bacteria: Negative        RADIOLOGY & ADDITIONAL TESTS: Reviewed. INTERVAL HPI/OVERNIGHT EVENTS: Aggressive airway clearance therapy overnight with no improvement of ventilation. H/H stable s/p 2 units PRBCs.    SUBJECTIVE: sedated and intubated    OBJECTIVE:    VITAL SIGNS:  ICU Vital Signs Last 24 Hrs  T(C): 36.7 (24 May 2020 08:00), Max: 38.3 (23 May 2020 12:00)  T(F): 98.1 (24 May 2020 08:00), Max: 100.9 (23 May 2020 12:00)  HR: 92 (24 May 2020 08:00) (79 - 97)  BP: --  BP(mean): --  ABP: 110/45 (24 May 2020 08:00) (104/46 - 146/60)  ABP(mean): 69 (24 May 2020 08:00) (67 - 92)  RR: 32 (24 May 2020 08:00) (27 - 36)  SpO2: 88% (24 May 2020 08:00) (85% - 97%)    Mode: AC/ CMV (Assist Control/ Continuous Mandatory Ventilation), RR (machine): 32, TV (machine): 290, FiO2: 80, PEEP: 5, PS: , MAP: 26, PC: 45, PIP: 51     @ 07: @ 07:00  --------------------------------------------------------  IN: 3679.7 mL / OUT: 5280 mL / NET: -1600.3 mL     @ 07: @ 08:25  --------------------------------------------------------  IN: 121.2 mL / OUT: 400 mL / NET: -278.8 mL      CAPILLARY BLOOD GLUCOSE      POCT Blood Glucose.: 122 mg/dL (23 May 2020 23:33)      MEDICATIONS:  MEDICATIONS  (STANDING):  ALBUTerol    90 MICROgram(s) HFA Inhaler 2 Puff(s) Inhalation every 6 hours  albuterol/ipratropium for Nebulization. 3 milliLiter(s) Nebulizer every 6 hours  BACItracin   Ointment 1 Application(s) Topical two times a day  chlorhexidine 0.12% Liquid 15 milliLiter(s) Oral Mucosa two times a day  chlorhexidine 4% Liquid 1 Application(s) Topical <User Schedule>  famotidine Injectable 20 milliGRAM(s) IV Push daily  fentaNYL   Infusion... 0.5 MICROgram(s)/kG/Hr (1.76 mL/Hr) IV Continuous <Continuous>  furosemide Infusion 8 mG/Hr (4 mL/Hr) IV Continuous <Continuous>  heparin  Infusion 1300 Unit(s)/Hr (13 mL/Hr) IV Continuous <Continuous>  insulin lispro (HumaLOG) corrective regimen sliding scale   SubCutaneous every 6 hours  ketamine Infusion. 0.25 mG/kG/Hr (1.76 mL/Hr) IV Continuous <Continuous>  midodrine 10 milliGRAM(s) Oral every 8 hours  polyethylene glycol 3350 17 Gram(s) Oral two times a day  propofol Infusion 24.893 MICROgram(s)/kG/Min (10.5 mL/Hr) IV Continuous <Continuous>  senna Syrup 10 milliLiter(s) Oral daily  sodium chloride 3%  Inhalation 5 milliLiter(s) Inhalation every 6 hours    MEDICATIONS  (PRN):  acetaminophen    Suspension .. 650 milliGRAM(s) Oral every 6 hours PRN Temp greater or equal to 38C (100.4F)  diphenhydrAMINE   Injectable 50 milliGRAM(s) IV Push once PRN anaphylaxis to study drug  sodium chloride 0.9% lock flush 10 milliLiter(s) IV Push every 1 hour PRN Pre/post blood products, medications, blood draw, and to maintain line patency      ALLERGIES:  Allergies    No Known Allergies    Intolerances        LABS:                        9.1    15.75 )-----------( 290      ( 24 May 2020 00:09 )             29.5     05-24    140  |  93<L>  |  19  ----------------------------<  120<H>  3.5   |  41<H>  |  <0.30<L>    Ca    8.3<L>      24 May 2020 00:09  Phos  3.8     05-24  Mg     2.1     05-24    TPro  6.0  /  Alb  2.1<L>  /  TBili  0.8  /  DBili  x   /  AST  156<H>  /  ALT  165<H>  /  AlkPhos  248<H>  05-24    PT/INR - ( 24 May 2020 00:09 )   PT: 12.1 sec;   INR: 1.06 ratio         PTT - ( 24 May 2020 00:09 )  PTT:83.5 sec  Urinalysis Basic - ( 23 May 2020 11:36 )    Color: BROWN / Appearance: Slightly Turbid / S.023 / pH: x  Gluc: x / Ketone: Negative  / Bili: Negative / Urobili: 4 mg/dL   Blood: x / Protein: 30 mg/dL / Nitrite: Negative   Leuk Esterase: Small / RBC: >50 /hpf / WBC 4 /HPF   Sq Epi: x / Non Sq Epi: 0 /hpf / Bacteria: Negative        RADIOLOGY & ADDITIONAL TESTS: Reviewed.

## 2020-05-24 NOTE — PROGRESS NOTE ADULT - ATTENDING COMMENTS
64 yo F with no PMH a/w COVID infection, acute hypoxic respiratory failure, intubated on 5/4, proned multiple times throughout her hospital course.      1. Neuro - sedated with Fent, Ketamine, Propofol. Remains off paralytics. follow triglyceride levels every 72 hours while on Prop  2. Pulm - ARDS, overnight more hypoxemic. Continues to have poor lung compliance, with hypoxic and hypercapnic respiratory failure. c/w lung protective ventilation, Completed 3 day course of Solumedrol on 5/20. Airway clearance therapy, bed percussor. At this time too unstable to be transported for CT scan - desats when laid flat or positioned.   3. ID-  acute sinusitis s/p 7 day course of Zosyn. Appreciate ID follow up. All cultures NGTD  Covid pneumonia s/p Tocilizumab and Remdesivir  4. CVS - stable, off pressors, goal MAP >65  5. Renal/FEN- cont to diurese - Lasix and Diamox F/u repeat BMP and urine output. Hematuria - improving, repeat UA.    6. GI- pepcid, TFs  7. Heme- hypercoagulable state - c/w heparin drip, H/H stable. ENT consult appreciated- minimal posterior pharyngeal bleeding, benefit of anticoagulation outweighs risks given high Ddimer and risk for thrombosis.   8. GOC - very guarded prognosis. Critically ill requiring frequent bedside evaluations and changes in management.

## 2020-05-25 NOTE — PROGRESS NOTE ADULT - ASSESSMENT
66 y/o F with no significant PMH presents 4/29 with hypoxic respiratory failure 2/2 COVID PNA, s/p Tocilizumab (4/30), s/p empiric Ceftriaxone/Azithro (4/30-5/4) for ?superimposed CAP. s/p 10 days  Remdesivir trial (4/30). Intubated 5/4 for worsened hypoxic respiratory failure from  Severe ARDS requiring proning on 5/6, 5/7, 5/8, 5/17 & 5/19.  Sedated and started full dose Lovenox after D-dimer elevated to [address]. s/p course of  Meropenem, Vancomycin, and Caspofungin for empiric sepsis coverage.(5/4- 5/12 ); 5/7: Caspofungin switched to Fluconazole  on 5/7 for growth of candida albicans in urine and  Vancomycin d/c'ed for negative MRSA swab; Pt was starting to improve, tolerating CPAP trials, but developed worsening hypercapnia on 5/14 with POCUS showing worsening R sided infiltrates. s/p  bronch on 5/16 with minimal secreations. s/p 7 day course of Zosyn; Required additional proning  on 5/17. Paralyzed with  NEGAR 5/18 ;  Repeat CXR on 5/19 with ETT migrated up and worsening b/l opacities. Tube readjusted and proned again. Solumedrol 1 mg/kg x 3 (for worsening opacities);  Changed to PRVC on 5/20;  Off Rocuronium  since 5/21. Changed to PCV on 5/22.     PLAN    #Neuro   - Sedated on Fentanyl, Ketamine, Propofol  - Sedate to vent synchrony  - Triglyceride 208; cont to trend Q72H while on Prop    # PULMONARY  - Intubated since 5/4/20 for Hypoxic/Hypercapnic Respiratory Failure 2/2 COVID PNA  - Continue with aggressive airway clearance therapy w/ duonebs, HyperSaline inhalation, repositioning   - Current vent settings: PC Rate 32, PEEP +8, Fio2 90%, continues to have poor lung compliance  - Po2/Fio2 ratio <100; severe ARDS  - Completed 3-day course of Solumedrol 1 mg/kg on 5/21, with modest improvement  - Restarted on course of Solumedrol 1 mg/kg today, plan to reevaluate daily   - s/p bronch 5/16 with minimal secretions and no pulmonary hemorrhage   - Required proning on 5/6, 5/7, 5/8, 5/17 and 5/19 for worsening hypercapnia  - Worsening diffuse b/l opacities  on CXR from 5/19; ? slight improvement on 5/22  - Blood on suctioning ETT, s/p laryngoscopy 5/22 showing crusting at distal end with no active bleeding. Small amount of blood suctioned from post pharynx     #CV  - Remains off pressors, currently on Midodrine 10mg Q8H, maintaining MAP>65    #GI  - LFTs elevated but peaked; Follow up RUQ sono  - Tube feeding at goal with Vital @55 cc/hr  - Last output from rectal tube 5/24, c/w Miralax/Senna  - Pepcid 20mg qd for GI ppx    #Renal  - Hematuria improving: UA >50 RBCs, moderate blood  - Discontinued off Lasix gtt today given worsening metabolic alkalosis   - Started on Bumex 1mg Q12H  - s/p Diamox 250 x 1 on 5/23, Serum Bicarb uptrending today (45)  - Diamox 250 x 1 today, continue trending serum bicarb  - Net negative 1.3L  - Replete electrolytes PRN  - Continue to monitor I&Os    #ID   - Leukocytosis uptrending (WBC 17), Tmax 101.1, PCT 0.34  - GP rods in anaerobic bottle from 5/23, pending sensitivities  - Discussed findings from BC w/ ID last night, concern for intraabdominal infection, however too unstable to be transported for CT scan - desats when laid flat or positioned  - Started on Vanc 1gm Q12H & Meropenem 1gm Q8H on 5/24   - Follow-up repeat BC x 2 drawn 5/24  - Has RIJ in place from 5/16, if no other source of infection identified, may consider as possible source of infection & may require line change  - Child changed 5/23, repeat UA with small leuks & WBC 4  - Completed 7-day course of Zosyn (on 5/21), s/p full course of meropenem (5/4-5/13), Fluconazole (5/8-5/13), Vancomycin (5/11-5/12) for empiric coverage  - s/p bronchoscopy 5/17, negative cultures  - CDiff negative  - ID following, further recommendations appreciated     COVID:   + on 4/28, and on rpt on 5/13  - s/p 10 days of Remdesivir trial  (4/30-5/9), s/p Tocilizumab on 4/30  - Follow daily inflammatory markers    #  LINES  - RIJ TLC from 5/16   - R radial A line 5/16    #Heme   - s/p 2 Units PRBC on 5/22 for blood loss anemia; H/H stable at 9.6/30.2  - Empiric heparin gtt for elevated D-dimer  - PTT therapeutic @ 68  - Goal aPTT 58-99  - ENT consult appreciated- minimal posterior pharyngeal bleeding, benefit of anticoagulation outweighs risks given elevated Ddimer and risk for thrombosis    #Endo  - Blood glucose controlled on Humalog Low SS    #Ethics  -Full code  - Updated all 3 daughters (Lopez, Yecenia, & Mary [phone]) via conference call, and family aware of patient's lung dysfunction with a very poorly complaint system is her main issue. Family remains hopeful for full recovery. Ongoing management and emotional support at this time. 64 y/o F with no significant PMH presents 4/29 with hypoxic respiratory failure 2/2 COVID PNA, s/p Tocilizumab (4/30), s/p empiric Ceftriaxone/Azithro (4/30-5/4) for ?superimposed CAP. s/p 10 days  Remdesivir trial (4/30). Intubated 5/4 for worsened hypoxic respiratory failure from  Severe ARDS requiring proning on 5/6, 5/7, 5/8, 5/17 & 5/19.  Sedated and started full dose Lovenox after D-dimer elevated to [address]. s/p course of  Meropenem, Vancomycin, and Caspofungin for empiric sepsis coverage.(5/4- 5/12 ); 5/7: Caspofungin switched to Fluconazole  on 5/7 for growth of candida albicans in urine and  Vancomycin d/c'ed for negative MRSA swab; Pt was starting to improve, tolerating CPAP trials, but developed worsening hypercapnia on 5/14 with POCUS showing worsening R sided infiltrates. s/p  bronch on 5/16 with minimal secreations. s/p 7 day course of Zosyn; Required additional proning  on 5/17. Paralyzed with  NEGAR 5/18 ;  Repeat CXR on 5/19 with ETT migrated up and worsening b/l opacities. Tube readjusted and proned again. Solumedrol 1 mg/kg x 3 (for worsening opacities);  Changed to PRVC on 5/20;  Off Rocuronium  since 5/21. Changed to PCV on 5/22.     PLAN    #Neuro   - Sedated on Fentanyl, Ketamine, Propofol  - Sedate to vent synchrony  - Triglyceride 208; cont to trend Q72H while on Prop    # PULMONARY  - Intubated since 5/4/20 for Hypoxic/Hypercapnic Respiratory Failure 2/2 COVID PNA  - Continue with aggressive airway clearance therapy w/ duonebs, HyperSaline inhalation, repositioning   - Current vent settings: PC Rate 32, PEEP +8, Fio2 90%, continues to have poor lung compliance  - Po2/Fio2 ratio <100; severe ARDS  - Completed 3-day course of Solumedrol 1 mg/kg on 5/21, with modest improvement  - Restarted on course of Solumedrol 1 mg/kg today, plan to reevaluate daily   - s/p bronch 5/16 with minimal secretions and no pulmonary hemorrhage   - Required proning on 5/6, 5/7, 5/8, 5/17 and 5/19 for worsening hypercapnia  - Worsening diffuse b/l opacities  on CXR from 5/19; ? slight improvement on 5/22  - Blood on suctioning ETT, s/p laryngoscopy 5/22 showing crusting at distal end with no active bleeding. Small amount of blood suctioned from post pharynx     #CV  - Remains off pressors, currently on Midodrine 10mg Q8H, maintaining MAP>65    #GI  - LFTs elevated but peaked; Follow up RUQ sono  - Tube feeding at goal with Vital @55 cc/hr  - Last output from rectal tube 5/24, c/w Miralax/Senna  - Pepcid 20mg qd for GI ppx    #Renal  - Hematuria improving: UA >50 RBCs, moderate blood  - Discontinued off Lasix gtt today given worsening metabolic alkalosis   - Started on Bumex 1mg Q12H  - s/p Diamox 250 x 1 on 5/23, Serum Bicarb uptrending today (45)  - Diamox 500 x 1 today, continue trending serum bicarb  - Net negative 1.3L  - Replete electrolytes PRN  - Continue to monitor I&Os    #ID   - Leukocytosis uptrending (WBC 17), Tmax 101.1, PCT 0.34  - GP rods in anaerobic bottle from 5/23, pending sensitivities  - Discussed findings from BC w/ ID last night, concern for intraabdominal infection, however too unstable to be transported for CT scan - desats when laid flat or positioned  - Started on Vanc 1gm Q12H & Meropenem 1gm Q8H on 5/24   - Follow-up repeat BC x 2 drawn 5/24  - Has RIJ in place from 5/16, if no other source of infection identified, may consider as possible source of infection & may require line change  - Child changed 5/23, repeat UA with small leuks & WBC 4  - Completed 7-day course of Zosyn (on 5/21), s/p full course of meropenem (5/4-5/13), Fluconazole (5/8-5/13), Vancomycin (5/11-5/12) for empiric coverage  - s/p bronchoscopy 5/17, negative cultures  - CDiff negative  - ID following, further recommendations appreciated     COVID:   + on 4/28, and on rpt on 5/13  - s/p 10 days of Remdesivir trial  (4/30-5/9), s/p Tocilizumab on 4/30  - Follow daily inflammatory markers    #  LINES  - RIJ TLC from 5/16   - R radial A line 5/16    #Heme   - s/p 2 Units PRBC on 5/22 for blood loss anemia; H/H stable at 9.6/30.2  - Empiric heparin gtt for elevated D-dimer  - PTT therapeutic @ 68  - Goal aPTT 58-99  - ENT consult appreciated- minimal posterior pharyngeal bleeding, benefit of anticoagulation outweighs risks given elevated Ddimer and risk for thrombosis    #Endo  - Blood glucose controlled on Humalog Low SS    #Ethics  -Full code  - Updated all 3 daughters (Lopez, Yecenia, & Mary @ [phone]) via conference call, and family aware of patient's lung dysfunction with a very poorly complaint system is her main issue. Family remains hopeful for full recovery. Ongoing management and emotional support at this time. 64 y/o F with no significant PMH presents 4/29 with hypoxic respiratory failure 2/2 COVID PNA, s/p Tocilizumab (4/30), s/p empiric Ceftriaxone/Azithro (4/30-5/4) for ?superimposed CAP. s/p 10 days  Remdesivir trial (4/30). Intubated 5/4 for worsened hypoxic respiratory failure from  Severe ARDS requiring proning on 5/6, 5/7, 5/8, 5/17 & 5/19.  Sedated and started full dose Lovenox after D-dimer elevated to [address]. s/p course of  Meropenem, Vancomycin, and Caspofungin for empiric sepsis coverage.(5/4- 5/12 ); 5/7: Caspofungin switched to Fluconazole  on 5/7 for growth of candida albicans in urine and  Vancomycin d/c'ed for negative MRSA swab; Pt was starting to improve, tolerating CPAP trials, but developed worsening hypercapnia on 5/14 with POCUS showing worsening R sided infiltrates. s/p  bronch on 5/16 with minimal secreations. s/p 7 day course of Zosyn; Required additional proning  on 5/17. Paralyzed with  NEGAR 5/18 ;  Repeat CXR on 5/19 with ETT migrated up and worsening b/l opacities. Tube readjusted and proned again. Solumedrol 1 mg/kg x 3 (for worsening opacities);  Changed to PRVC on 5/20;  Off Rocuronium  since 5/21. Changed to PCV on 5/22.     PLAN    #Neuro   - Sedated on Fentanyl, Ketamine, Propofol  - Sedate to vent synchrony  - Triglyceride 208; cont to trend Q72H while on Prop    # PULMONARY  - Intubated since 5/4/20 for Hypoxic/Hypercapnic Respiratory Failure 2/2 COVID PNA  - Continue with aggressive airway clearance therapy w/ duonebs, HyperSaline inhalation, repositioning   - Current vent settings: PC Rate 32, PEEP +8, Fio2 90%, continues to have poor lung compliance  - Po2/Fio2 ratio <100; severe ARDS  - Completed 3-day course of Solumedrol 1 mg/kg on 5/21, with modest improvement  - Restarted on course of Solumedrol 1 mg/kg today, plan to reevaluate daily   - s/p bronch 5/16 with minimal secretions and no pulmonary hemorrhage   - Required proning on 5/6, 5/7, 5/8, 5/17 and 5/19 for worsening hypercapnia  - Worsening diffuse b/l opacities  on CXR from 5/19; ? slight improvement on 5/22  - Blood on suctioning ETT, s/p laryngoscopy 5/22 showing crusting at distal end with no active bleeding. Small amount of blood suctioned from post pharynx     #CV  - Remains off pressors, currently on Midodrine 10mg Q8H, maintaining MAP>65    #GI  - LFTs elevated but peaked; Follow up RUQ sono  - Tube feeding at goal with Vital @55 cc/hr  - Last output from rectal tube 5/24, c/w Miralax/Senna  - Pepcid 20mg qd for GI ppx    #Renal  - Hematuria improving: UA >50 RBCs, moderate blood  - Discontinued off Lasix gtt today given worsening metabolic alkalosis   - Started on Bumex 1mg Q12H  - s/p Diamox 250 x 1 on 5/23, Serum Bicarb uptrending today (45)  - Diamox 500 x 1 today, continue trending serum bicarb  - Net negative 1.3L  - Replete electrolytes PRN  - Continue to monitor I&Os    #ID   - Leukocytosis uptrending (WBC 17), Tmax 101.1, PCT 0.34  - GP rods in anaerobic bottle from 5/23, pending sensitivities  - Discussed findings from BC w/ ID last night, concern for intraabdominal infection, however too unstable to be transported for CT scan - desats when laid flat or positioned  - Started on Vanc 1gm Q12H & Meropenem 1gm Q8H on 5/24   - Follow-up repeat BC x 2 drawn 5/24  - Has RIJ in place from 5/16, if no other source of infection identified, may consider as possible source of infection & may require line change  - Child changed 5/23, repeat UA with small leuks & WBC 4  - Completed 7-day course of Zosyn (on 5/21), s/p full course of meropenem (5/4-5/13), Fluconazole (5/8-5/13), Vancomycin (5/11-5/12) for empiric coverage  - s/p bronchoscopy 5/17, negative cultures  - CDiff negative  - ID following, further recommendations appreciated     COVID:   + on 4/28, and on rpt on 5/13  - s/p 10 days of Remdesivir trial  (4/30-5/9), s/p Tocilizumab on 4/30  - Follow daily inflammatory markers    #  LINES  - RIJ TLC from 5/16   - R radial A line 5/16    #Heme   - s/p 2 Units PRBC on 5/22 for blood loss anemia; H/H stable at 9.6/30.2  - Empiric heparin gtt for elevated D-dimer  - PTT therapeutic @ 68  - Goal aPTT 58-99  - ENT consult appreciated- minimal posterior pharyngeal bleeding, benefit of anticoagulation outweighs risks given elevated Ddimer and risk for thrombosis    #Endo  - Blood glucose controlled on Humalog Low SS    #Ethics  -Full code  - Updated daughter, Mary [phone]), who is aware of patient's lung dysfunction with a very poorly complaint system. Family remains hopeful for full recovery. Ongoing management and emotional support at this time.

## 2020-05-25 NOTE — PROGRESS NOTE ADULT - PROBLEM SELECTOR PROBLEM 1
COVID-19 virus infection
Oral bleeding
COVID-19 virus infection
Patient/Caregiver provided printed discharge information.

## 2020-05-25 NOTE — PROGRESS NOTE ADULT - ASSESSMENT
66 yo female admitted w COVID pnma currently intubated on heparin gtt, nasal and oral exam clear of any sources of bleeding, mild old blood suctioned from mouth

## 2020-05-25 NOTE — PROGRESS NOTE ADULT - SUBJECTIVE AND OBJECTIVE BOX
Follow Up:  COVID19    Interval History: febrile overnight. remains on high FiO2. no diarrhea. limited ETT secretions    REVIEW OF SYSTEMS  [x  ] ROS unobtainable because:  intubated and sedated  [  ] All other systems negative except as noted below:     Constitutional:  [ ] fever [ ] chills  [ ] weight loss  [ ] weakness  Skin:  [ ] rash [ ] phlebitis	  Eyes: [ ] icterus [ ] pain  [ ] discharge	  ENMT: [ ] sore throat  [ ] thrush [ ] ulcers [ ] exudates  Respiratory: [  ] dyspnea [ ] hemoptysis [ ] cough [ ] sputum	  Cardiovascular:  [ ] chest pain [ ] palpitations [ ] edema	  Gastrointestinal:  [ ] nausea [ ] vomiting [ ] diarrhea [ ] constipation [ ] pain	  Genitourinary:  [ ] dysuria [ ] frequency [ ] hematuria [ ] discharge [ ] flank pain  [ ] incontinence  Musculoskeletal:  [ ] myalgias [ ] arthralgias [ ] arthritis  [ ] back pain  Neurological:  [ ] headache [ ] seizures  [ ] confusion/altered mental status    Allergies  No Known Allergies        ANTIMICROBIALS:  meropenem  IVPB 1000 every 8 hours  vancomycin  IVPB 1000 every 12 hours  vancomycin  IVPB        OTHER MEDS:  MEDICATIONS  (STANDING):  acetaminophen    Suspension .. 650 every 6 hours PRN  albuterol/ipratropium for Nebulization. 3 every 6 hours  buMETAnide Injectable 1 every 12 hours  diphenhydrAMINE   Injectable 50 once PRN  famotidine Injectable 20 daily  fentaNYL   Infusion... 0.501 <Continuous>  heparin  Infusion 1300 <Continuous>  insulin lispro (HumaLOG) corrective regimen sliding scale  every 6 hours  ketamine Infusion. 0.25 <Continuous>  methylPREDNISolone sodium succinate Injectable 40 two times a day  midodrine 10 every 8 hours  polyethylene glycol 3350 17 two times a day  propofol Infusion 24.893 <Continuous>  senna Syrup 10 daily  sodium chloride 3%  Inhalation 5 every 6 hours      Vital Signs Last 24 Hrs  T(C): 37.8 (25 May 2020 16:00), Max: 38.4 (25 May 2020 04:00)  T(F): 100 (25 May 2020 16:00), Max: 101.1 (25 May 2020 04:00)  HR: 83 (25 May 2020 18:00) (79 - 95)  BP: --  BP(mean): --  RR: 32 (25 May 2020 18:00) (18 - 81)  SpO2: 96% (25 May 2020 18:00) (86% - 96%)    PHYSICAL EXAMINATION:  General: Intubated and Sedated  HEENT: +ETT  Neck: Supple  Cardiac: RRR, No M/R/G  Resp: Rhoncerous BS bilaterally, scattered rales   Abdomen: NBS, NT/ND, No HSM, No rigidity or guarding  MSK: No LE edema. No Calf tenderness  : Child  Skin: No rashes or lesions. Skin is warm and dry to the touch.   Vascular: RIJ (No surrounding erythema, drainage or tenderness to palpation)  Neuro: Intubated and Sedated  Psych: Unable to assess - intubated and sedated    LABORATORY:                          9.6    17.48 )-----------( 292      ( 25 May 2020 00:28 )             30.2       05-25    137  |  85<L>  |  17  ----------------------------<  132<H>  3.6   |  45<HH>  |  0.31<L>    Ca    8.6      25 May 2020 00:28  Phos  3.0     05-25  Mg     2.3     05-25    TPro  6.3  /  Alb  2.2<L>  /  TBili  0.7  /  DBili  x   /  AST  124<H>  /  ALT  160<H>  /  AlkPhos  270<H>  05-25          C-Reactive Protein, Serum: 20.92 mg/dL (05-25-20 @ 00:28)  C-Reactive Protein, Serum: 13.66 mg/dL (05-24-20 @ 00:09)  C-Reactive Protein, Serum: 12.05 mg/dL (05-23-20 @ 01:55)      Ferritin, Serum: 1145 ng/mL (05-25-20 @ 05:16)  Ferritin, Serum: 1130 ng/mL (05-24-20 @ 03:34)  Ferritin, Serum: 959 ng/mL (05-23-20 @ 05:06)      D-Dimer Assay, Quantitative: 1326 ng/mL DDU (05-25-20 @ 00:28)  D-Dimer Assay, Quantitative: 1295 ng/mL DDU (05-24-20 @ 00:09)  D-Dimer Assay, Quantitative: 1339 ng/mL DDU (05-23-20 @ 01:55)      Procalcitonin, Serum: 0.34 ng/mL (05-25-20 @ 00:28)  Procalcitonin, Serum: 0.32 ng/mL (05-24-20 @ 00:09)  Procalcitonin, Serum: 0.24 ng/mL (05-23-20 @ 01:55)      MICROBIOLOGY:  v  .Sputum Sputum  05-24-20   Normal Respiratory Farzaneh present  --    Few polymorphonuclear leukocytes per low power field  Rare Squamous epithelial cells per low power field  No organisms seen per oil power field      .Blood Blood-Peripheral  05-23-20   Growth in anaerobic bottle: Gram Positive Rods      .Urine Catheterized  05-23-20   No growth  --  --      .Blood Blood  05-20-20   No growth to date.  --  --      Bronch Wash Bronchoalveolar Lavage  05-16-20   No growth at 48 hours  --    Moderate polymorphonuclear leukocytes per low power field  No squamous epithelial cells per low power field  No organisms seen per oil power field      .Blood Blood-Peripheral  05-15-20   No Growth Final  --  --      .Sputum Sputum  05-15-20   No growth at 48 hours  --    Few polymorphonuclear leukocytes per low power field  No Squamous epithelial cells per low power field  No organisms seen      .Urine Catheterized  05-11-20   No growth  --  --      .Sputum Sputum  05-11-20   No growth at 48 hours  --    Few polymorphonuclear leukocytes per low power field  No Squamous epithelial cells per low power field  No organisms seen      .Blood Blood-Peripheral  05-10-20   No Growth Final  --  --      .Sputum Sputum  05-08-20   No growth at 48 hours  --    Few polymorphonuclear leukocytes per low power field  Few Squamous epithelial cells per low power field  No organisms seen per oil power field      .Urine Catheterized  05-05-20   10,000 - 49,000 CFU/mL Presumptive Candida albicans "Susceptibilities not  performed"  --  --      .Sputum Sputum  05-05-20   Normal Respiratory Farzaneh present  --    Moderate polymorphonuclear leukocytes per low power field  No squamous epithelial cells per low power field  No organisms seen      .Blood Blood-Peripheral  05-05-20   No Growth Final  --  --      .Blood Blood-Peripheral  05-03-20   No Growth Final  --  --      .Blood Blood-Peripheral  04-29-20   No Growth Final  --  --                RADIOLOGY:    <The imaging below has been reviewed and visualized by me independently. Findings as detailed in report below>      EXAM:  XR CHEST PORTABLE URGENT 1V                            PROCEDURE DATE:  05/22/2020            INTERPRETATION:  CLINICAL INFORMATION: Concern for endotracheal tube migration.    TECHNIQUE: AP view of the chest.    COMPARISON: Chest radiograph from 5/19/2020.    FINDINGS:    Endotracheal tube terminates above the mary, unchanged. Right IJ central venous catheter terminates in the SVC. Enteric tube is partially visualized with its distal tip in the stomach. Diffuse bilateral patchy opacities, not significantly changed. No pneumothorax or pleural effusion.    IMPRESSION:     Endotracheal tube terminates above the mary, unchanged.

## 2020-05-25 NOTE — PROGRESS NOTE ADULT - ASSESSMENT
65F reporting no PMH who presented last night with complaint of fever/chills (home T-max 102 F), night sweats, cough, myalgias (mid & lower back), loss of taste and smell since Friday 4/24 (still tastes sweet/salty), as well as 2 days of weakness and constipation, found to be COVID positive on 4/28/20.    Pt had agreed to participate in a clinical trial for Covid -19  Please discuss with ID team if planning on taking concurrent treatment with other agents with actual or possible direct acting antiviral activity against SARS-CoV-2 such a chloroquine or hydroxychloroquine. Pt was given Tocilizumab by primary team on the day of enrollment as of concern for cytokine storm.     Hospital course  4/29: Started Ceftriaxone (4/29-5/3) and Azithromycin (4/29-5/4) in ED for possible bacterial PNA  4/30: RRT called for desaturation in 70s before receiving 1st dose of Remdesivir and Tocilizumab  5/4: RRT called for desaturation to 20s after drinking water and coughing, intubated by anesthesia. Sedated and started full dose lovenox after D-dimer elevated to [id number]. Transferred to ICU. On Meropenem, Vancomycin, and Caspofungin for emperic sepsis coverage.  5/7: Caspofungin switched to Fluconazole for growth of candida albicans in urine; Vancomycin d/c'ed for negative MRSA swab  5/10: Started CPAP trials  5/12 - abs d/scooby   5/13 hematuria  5/14 O2 requirements increased  5/15--> continues to do poorly  5/19- trials of prone and steroids  5/21- WBC down slightly   5/22- continues to have bloody secretions  5/23 - recurrent fever and positive BCx 1/2 with GPR in anaerobic bottle     # Shock (persistent) / Fever (persistent) / Positive Blood Culture / Therapeutic Drug Montoring  - WBC persistently elevated, febrile  - S/p full course of meropenem (5/4-5/12), Fluconazole (5/8-5/13), Vancomycin (5/11-5/12), and zosyn    - Pt is s/p Tocilizumab which increases risk of infection and now solumedrol which does as well, including fungal infection  - If able would pursue CT Chest/Abdomen/Pelvis with IV contrast. Given blood from ETT would check CT Head/Neck as well  - followup on ID of GPR (?gut anaerobe)  - followup on repeat BCx. U/A without pyuria  - Continue Meropenem 1g IV Q8H  - Continue Vancomycin 1g IV Q12H. Check trough prior to fourth. Target trough 10-15  - Continue to monitor renal function and vancomycin troughs to avoid nephrotoxicity / otoxicity secondary to vancomycin    # Acute hypoxic respiratory failure  - Multifactorial, likely secondary to COVID-19 infection with viral PNA, with possible superimposed bacterial vs fungal vs aspiration PNA and/or PE   - r/o effusion. for POC ultrasound  - could patient have bled into lung?    # Elevated LFTs:   - continue to monitor   - consider RUQ ultrasound (prefer CT A/P as above if able)    # Elevated CRP/Ferritin/D-dimer  - Could be COVID-related. CRP, ferritin, trending down.   - D-dimer remains elevated, although much lower than previous peak, could suggest PE or microthrombi. Pt now on heparin drip. Continued AC per primary team.     # elevated WBC (improving)  repeat cultures , repeat fungitel is negative   check lactate  elevated WBC in part from steroids     # anemia  pt slowly drifted down but remains about the same last week or so  could patient have bled into lung, but not further dropping  Also could pt have traumatized oral palate- ENT input appreciated  CT Head/Neck as above    Rodolfo Hutton M.D.  [facility] Division of Infectious Disease  8AM-5PM: Pager Number [phone]  After Hours (or if no response): Please contact the Infectious Diseases Office at [phone]     The above assessment and plan were discussed with medicine NP

## 2020-05-25 NOTE — PROGRESS NOTE ADULT - ATTENDING COMMENTS
Pt seen and examined. 65 F with no known PMH, now with acute resp failure with hypoxia and hypercapnia/ ARDS 2/2 COVID-19 pneumonia. Continues to require sedation to maintain synchrony with the ventilator. Received Tocilizumab, steroids and Remdesivir. solumedrol BID. Continues to require a FiO2 of 90 % on lung protective ventilation, lung compliance remains poor with high plateau/ peak pressures despite optimization of vent parameters. Cont aggressive chest PT/ pulm toilet. Will repeat a short course of Solumedrol BID.  Now off IV pressors, cont midodrine Q8H, , keep MAP >65. Continue diuresis, transition from Lasix gtt to Bumex 1 mg BID, follow electrolytes BID. Serum bicarb now 45, give Diamox 500 mg IV x1, follow bicarb. Fever with worsening leukocytosis, BCxs from 5/23 showing G+ rods. Currently on Vanc/ Vaughn. FUP speciation and surveillance BCxs from 5/24. Pt is not stable for transport to obtain a CT chest/ abd/ pelvis. No further oropharyngeal bleeding, cont to monitor CBC on therapeutic AC with a heparin gtt. Pt seen and examined. 65 F with no known PMH, now with acute resp failure with hypoxia and hypercapnia/ ARDS 2/2 COVID-19 pneumonia. Continues to require sedation to maintain synchrony with the ventilator. Received Tocilizumab, steroids and Remdesivir. solumedrol BID. Continues to require a FiO2 of 90 % on lung protective ventilation, lung compliance remains poor with high plateau/ peak pressures despite optimization of vent parameters. Cont aggressive chest PT/ pulm toilet. Will repeat a short course of Solumedrol BID.  Now off IV pressors, cont midodrine Q8H, , keep MAP >65. Continue diuresis, transition from Lasix gtt to Bumex 1 mg BID, follow electrolytes BID. Serum bicarb now 45, give Diamox 500 mg IV x1, follow bicarb. Fever with worsening leukocytosis, BCxs from 5/23 showing G+ rods. Currently on Vanc/ Vaughn. FUP speciation and surveillance BCxs from 5/24. Pt is not stable for transport to obtain a CT chest/ abd/ pelvis. No further oropharyngeal bleeding, cont to monitor CBC on therapeutic AC with a heparin gtt. Overall prognosis poor. Remains critically ill requiring multiple bedside visits and changes in therapy.

## 2020-05-25 NOTE — PROGRESS NOTE ADULT - PROBLEM SELECTOR PLAN 1
-No further ENT intervention at this time  -Please call ENT if pt has active hemorrhage in which we will further evaluate and or pack

## 2020-05-25 NOTE — PROGRESS NOTE ADULT - SUBJECTIVE AND OBJECTIVE BOX
INTERVAL HPI/OVERNIGHT EVENTS: Saturations in low 80s on vent settings ACPC 45, 90%, RR 32 peep 8. Peep increased to 10 with tidal volumes around 250. ABG with CO2 74-->93. With reduction in peep back to 8, tidal volume increased to ~300mL.    SUBJECTIVE: Patient intubated and sedated.    OBJECTIVE:    VITAL SIGNS:  ICU Vital Signs Last 24 Hrs  T(C): 38.1 (25 May 2020 08:00), Max: 38.4 (25 May 2020 04:00)  T(F): 100.6 (25 May 2020 08:00), Max: 101.1 (25 May 2020 04:00)  HR: 79 (25 May 2020 09:06) (79 - 94)  BP: --  BP(mean): --  ABP: 92/42 (25 May 2020 08:00) (92/42 - 177/67)  ABP(mean): 59 (25 May 2020 08:00) (59 - 111)  RR: 81 (25 May 2020 08:00) (32 - 81)  SpO2: 87% (25 May 2020 09:06) (85% - 94%)    Mode: AC/ CMV (Assist Control/ Continuous Mandatory Ventilation), RR (machine): 32, TV (machine): 288, FiO2: 90, PEEP: 8, MAP: 26, PC: 45, PIP: 53     @ 07: @ 07:00  --------------------------------------------------------  IN: 3972.4 mL / OUT: 5725 mL / NET: -1752.6 mL     @ 07: @ 10:37  --------------------------------------------------------  IN: 117.7 mL / OUT: 275 mL / NET: -157.3 mL      CAPILLARY BLOOD GLUCOSE      POCT Blood Glucose.: 142 mg/dL (25 May 2020 04:46)      MEDICATIONS:  MEDICATIONS  (STANDING):  acetaZOLAMIDE Injectable 250 milliGRAM(s) IV Push once  albuterol/ipratropium for Nebulization. 3 milliLiter(s) Nebulizer every 6 hours  BACItracin   Ointment 1 Application(s) Topical two times a day  buMETAnide Injectable 1 milliGRAM(s) IV Push every 12 hours  chlorhexidine 0.12% Liquid 15 milliLiter(s) Oral Mucosa two times a day  chlorhexidine 4% Liquid 1 Application(s) Topical <User Schedule>  famotidine Injectable 20 milliGRAM(s) IV Push daily  fentaNYL   Infusion... 0.501 MICROgram(s)/kG/Hr (1.76 mL/Hr) IV Continuous <Continuous>  heparin  Infusion 1300 Unit(s)/Hr (13 mL/Hr) IV Continuous <Continuous>  insulin lispro (HumaLOG) corrective regimen sliding scale   SubCutaneous every 6 hours  ketamine Infusion. 0.25 mG/kG/Hr (1.76 mL/Hr) IV Continuous <Continuous>  meropenem  IVPB 1000 milliGRAM(s) IV Intermittent every 8 hours  methylPREDNISolone sodium succinate Injectable 40 milliGRAM(s) IV Push two times a day  midodrine 10 milliGRAM(s) Oral every 8 hours  polyethylene glycol 3350 17 Gram(s) Oral two times a day  propofol Infusion 24.893 MICROgram(s)/kG/Min (10.5 mL/Hr) IV Continuous <Continuous>  senna Syrup 10 milliLiter(s) Oral daily  sodium chloride 3%  Inhalation 5 milliLiter(s) Inhalation every 6 hours  vancomycin  IVPB 1000 milliGRAM(s) IV Intermittent every 12 hours  vancomycin  IVPB        MEDICATIONS  (PRN):  acetaminophen    Suspension .. 650 milliGRAM(s) Oral every 6 hours PRN Temp greater or equal to 38C (100.4F)  diphenhydrAMINE   Injectable 50 milliGRAM(s) IV Push once PRN anaphylaxis to study drug  sodium chloride 0.9% lock flush 10 milliLiter(s) IV Push every 1 hour PRN Pre/post blood products, medications, blood draw, and to maintain line patency      ALLERGIES:  Allergies    No Known Allergies    Intolerances        LABS:                        9.6    17.48 )-----------( 292      ( 25 May 2020 00:28 )             30.2     05-25    137  |  85<L>  |  17  ----------------------------<  132<H>  3.6   |  45<HH>  |  0.31<L>    Ca    8.6      25 May 2020 00:28  Phos  3.0       Mg     2.3         TPro  6.3  /  Alb  2.2<L>  /  TBili  0.7  /  DBili  x   /  AST  124<H>  /  ALT  160<H>  /  AlkPhos  270<H>      PT/INR - ( 25 May 2020 00:28 )   PT: 12.0 sec;   INR: 1.04 ratio         PTT - ( 25 May 2020 00:28 )  PTT:68.9 sec  Urinalysis Basic - ( 23 May 2020 11:36 )    Color: BROWN / Appearance: Slightly Turbid / S.023 / pH: x  Gluc: x / Ketone: Negative  / Bili: Negative / Urobili: 4 mg/dL   Blood: x / Protein: 30 mg/dL / Nitrite: Negative   Leuk Esterase: Small / RBC: >50 /hpf / WBC 4 /HPF   Sq Epi: x / Non Sq Epi: 0 /hpf / Bacteria: Negative        RADIOLOGY & ADDITIONAL TESTS: Reviewed.

## 2020-05-25 NOTE — PROVIDER CONTACT NOTE (EICU) - ASSESSMENT
Pt intubated, vent ACPC 45, 90%, RR 32 peep 8-->10 with tidal volumes around 250. ABG with CO2 74-->93. With reduction in peep back to 8, tidal volume increased to ~300mL. Would change back to peep 8 for improved ventilation, repeat abg. Discussed with bedside team.

## 2020-05-25 NOTE — PROGRESS NOTE ADULT - SUBJECTIVE AND OBJECTIVE BOX
ENT ISSUE/POD: oral bleeding    HPI: 64 yo female admitted for covid pneumonia presently intubated in the ICU. Team requesting ENT eval as pt with blood suctioned from oral cavity. Pt currently on heparin gtt        PAST MEDICAL & SURGICAL HISTORY:  No pertinent past medical history  History of cholecystectomy  History of appendectomy    Allergies    No Known Allergies    Intolerances      MEDICATIONS  (STANDING):  acetaZOLAMIDE Injectable 250 milliGRAM(s) IV Push once  albuterol/ipratropium for Nebulization. 3 milliLiter(s) Nebulizer every 6 hours  BACItracin   Ointment 1 Application(s) Topical two times a day  buMETAnide Injectable 1 milliGRAM(s) IV Push every 12 hours  chlorhexidine 0.12% Liquid 15 milliLiter(s) Oral Mucosa two times a day  chlorhexidine 4% Liquid 1 Application(s) Topical <User Schedule>  famotidine Injectable 20 milliGRAM(s) IV Push daily  fentaNYL   Infusion... 0.501 MICROgram(s)/kG/Hr (1.76 mL/Hr) IV Continuous <Continuous>  heparin  Infusion 1300 Unit(s)/Hr (13 mL/Hr) IV Continuous <Continuous>  insulin lispro (HumaLOG) corrective regimen sliding scale   SubCutaneous every 6 hours  ketamine Infusion. 0.25 mG/kG/Hr (1.76 mL/Hr) IV Continuous <Continuous>  meropenem  IVPB 1000 milliGRAM(s) IV Intermittent every 8 hours  methylPREDNISolone sodium succinate Injectable 40 milliGRAM(s) IV Push two times a day  midodrine 10 milliGRAM(s) Oral every 8 hours  polyethylene glycol 3350 17 Gram(s) Oral two times a day  propofol Infusion 24.893 MICROgram(s)/kG/Min (10.5 mL/Hr) IV Continuous <Continuous>  senna Syrup 10 milliLiter(s) Oral daily  sodium chloride 3%  Inhalation 5 milliLiter(s) Inhalation every 6 hours  vancomycin  IVPB 1000 milliGRAM(s) IV Intermittent every 12 hours  vancomycin  IVPB        MEDICATIONS  (PRN):  acetaminophen    Suspension .. 650 milliGRAM(s) Oral every 6 hours PRN Temp greater or equal to 38C (100.4F)  diphenhydrAMINE   Injectable 50 milliGRAM(s) IV Push once PRN anaphylaxis to study drug  sodium chloride 0.9% lock flush 10 milliLiter(s) IV Push every 1 hour PRN Pre/post blood products, medications, blood draw, and to maintain line patency      ROS:   Unable to obtain due to pts clinical condition      Vital Signs Last 24 Hrs  T(C): 37.8 (25 May 2020 16:00), Max: 38.4 (25 May 2020 04:00)  T(F): 100 (25 May 2020 16:00), Max: 101.1 (25 May 2020 04:00)  HR: 79 (25 May 2020 16:00) (79 - 95)  BP: --  BP(mean): --  RR: 18 (25 May 2020 16:00) (18 - 81)  SpO2: 95% (25 May 2020 16:00) (86% - 95%)                          9.6    17.48 )-----------( 292      ( 25 May 2020 00:28 )             30.2    05-25    137  |  85<L>  |  17  ----------------------------<  132<H>  3.6   |  45<HH>  |  0.31<L>    Ca    8.6      25 May 2020 00:28  Phos  3.0     05-25  Mg     2.3     05-25    TPro  6.3  /  Alb  2.2<L>  /  TBili  0.7  /  DBili  x   /  AST  124<H>  /  ALT  160<H>  /  AlkPhos  270<H>  05-25   PT/INR - ( 25 May 2020 00:28 )   PT: 12.0 sec;   INR: 1.04 ratio         PTT - ( 25 May 2020 00:28 )  PTT:68.9 sec    PHYSICAL EXAM:  Gen: NAD  Skin: No rashes, bruises, or lesions  Head: Normocephalic, Atraumatic  Face: no edema, erythema, or fluctuance. Parotid glands soft without mass  Eyes: no scleral injection  Nose: Nares bilaterally patent, no discharge  Mouth: ETTIbe in place, inflated cuff, oropharynx clean of BRB or trauma, small clot noted at R posterior pharynx, minimal old blood suctioned  Neck: Flat, supple, no lymphadenopathy, trachea midline, no masses  Lymphatic: No lymphadenopathy  Resp: on ventilator  Neuro: facial nerve intact, no facial droop

## 2020-05-26 NOTE — PROGRESS NOTE ADULT - SUBJECTIVE AND OBJECTIVE BOX
INTERVAL HPI/OVERNIGHT EVENTS: ABG last night with elevated PCO2 >90s, I:E ratio increased to 1:3, now with improved PCO2 87. Attempted sVC mode to allow more volume, but noted with elevated peak pressures, now back to PC mode with improved peak pressures.     SUBJECTIVE: Patient intubated and sedated.    OBJECTIVE:    VITAL SIGNS:  ICU Vital Signs Last 24 Hrs  T(C): 36.4 (26 May 2020 08:00), Max: 37.8 (25 May 2020 12:00)  T(F): 97.5 (26 May 2020 08:00), Max: 100 (25 May 2020 12:00)  HR: 74 (26 May 2020 08:56) (73 - 95)  BP: --  BP(mean): --  ABP: 114/50 (26 May 2020 07:00) (99/42 - 166/60)  ABP(mean): 73 (26 May 2020 07:00) (62 - 99)  RR: 33 (26 May 2020 07:00) (18 - 63)  SpO2: 97% (26 May 2020 08:56) (88% - 97%)    Mode: AC/ CMV (Assist Control/ Continuous Mandatory Ventilation), RR (machine): 32, TV (machine): 250, FiO2: 80, PEEP: 5, ITime: 1, MAP: 19, PC: 45, PIP: 52    05-25 @ 07:01 - 05-26 @ 07:00  --------------------------------------------------------  IN: 3311.7 mL / OUT: 3605 mL / NET: -293.3 mL    05-26 @ 07:01 - 05-26 @ 10:50  --------------------------------------------------------  IN: 286.1 mL / OUT: 25 mL / NET: 261.1 mL      CAPILLARY BLOOD GLUCOSE      POCT Blood Glucose.: 100 mg/dL (26 May 2020 05:32)    MEDICATIONS:  MEDICATIONS  (STANDING):  acetaZOLAMIDE Injectable 500 milliGRAM(s) IV Push once  albuterol/ipratropium for Nebulization. 3 milliLiter(s) Nebulizer every 6 hours  BACItracin   Ointment 1 Application(s) Topical two times a day  buMETAnide Injectable 1 milliGRAM(s) IV Push every 12 hours  chlorhexidine 0.12% Liquid 15 milliLiter(s) Oral Mucosa two times a day  chlorhexidine 4% Liquid 1 Application(s) Topical <User Schedule>  famotidine Injectable 20 milliGRAM(s) IV Push daily  fentaNYL   Infusion... 0.501 MICROgram(s)/kG/Hr (1.76 mL/Hr) IV Continuous <Continuous>  heparin  Infusion 1300 Unit(s)/Hr (13 mL/Hr) IV Continuous <Continuous>  insulin lispro (HumaLOG) corrective regimen sliding scale   SubCutaneous every 6 hours  ketamine Infusion. 0.25 mG/kG/Hr (1.76 mL/Hr) IV Continuous <Continuous>  meropenem  IVPB 1000 milliGRAM(s) IV Intermittent every 8 hours  methylPREDNISolone sodium succinate Injectable 40 milliGRAM(s) IV Push two times a day  midodrine 10 milliGRAM(s) Oral every 8 hours  polyethylene glycol 3350 17 Gram(s) Oral two times a day  propofol Infusion 24.893 MICROgram(s)/kG/Min (10.5 mL/Hr) IV Continuous <Continuous>  senna Syrup 10 milliLiter(s) Oral daily  sodium chloride 3%  Inhalation 5 milliLiter(s) Inhalation every 6 hours  vancomycin  IVPB 1000 milliGRAM(s) IV Intermittent every 12 hours  vancomycin  IVPB        MEDICATIONS  (PRN):  acetaminophen    Suspension .. 650 milliGRAM(s) Oral every 6 hours PRN Temp greater or equal to 38C (100.4F)  diphenhydrAMINE   Injectable 50 milliGRAM(s) IV Push once PRN anaphylaxis to study drug  sodium chloride 0.9% lock flush 10 milliLiter(s) IV Push every 1 hour PRN Pre/post blood products, medications, blood draw, and to maintain line patency      ALLERGIES:  Allergies    No Known Allergies    Intolerances        LABS:                        9.2    16.81 )-----------( 334      ( 26 May 2020 00:14 )             30.2     05-26    133<L>  |  85<L>  |  19  ----------------------------<  116<H>  3.1<L>   |  41<H>  |  0.33<L>    Ca    8.3<L>      26 May 2020 00:14  Phos  3.0     05-26  Mg     2.6     05-26    TPro  6.3  /  Alb  2.1<L>  /  TBili  0.5  /  DBili  x   /  AST  95<H>  /  ALT  136<H>  /  AlkPhos  277<H>  05-26    PT/INR - ( 26 May 2020 00:14 )   PT: 11.6 sec;   INR: 1.02 ratio         PTT - ( 26 May 2020 00:14 )  PTT:79.3 sec      RADIOLOGY & ADDITIONAL TESTS: Reviewed. INTERVAL HPI/OVERNIGHT EVENTS: ABG last night with elevated PCO2 >90s, I:E ratio increased to 1:3, now with improved PCO2 87. Attempted ACVC mode to allow more volume, but noted with elevated peak pressures, now back to PC mode with improved peak pressures.     SUBJECTIVE: Patient intubated and sedated.    OBJECTIVE:    VITAL SIGNS:  ICU Vital Signs Last 24 Hrs  T(C): 36.4 (26 May 2020 08:00), Max: 37.8 (25 May 2020 12:00)  T(F): 97.5 (26 May 2020 08:00), Max: 100 (25 May 2020 12:00)  HR: 74 (26 May 2020 08:56) (73 - 95)  BP: --  BP(mean): --  ABP: 114/50 (26 May 2020 07:00) (99/42 - 166/60)  ABP(mean): 73 (26 May 2020 07:00) (62 - 99)  RR: 33 (26 May 2020 07:00) (18 - 63)  SpO2: 97% (26 May 2020 08:56) (88% - 97%)    Mode: AC/ CMV (Assist Control/ Continuous Mandatory Ventilation), RR (machine): 32, TV (machine): 250, FiO2: 80, PEEP: 5, ITime: 1, MAP: 19, PC: 45, PIP: 52    05-25 @ 07:01 - 05-26 @ 07:00  --------------------------------------------------------  IN: 3311.7 mL / OUT: 3605 mL / NET: -293.3 mL    05-26 @ 07:01 - 05-26 @ 10:50  --------------------------------------------------------  IN: 286.1 mL / OUT: 25 mL / NET: 261.1 mL      CAPILLARY BLOOD GLUCOSE      POCT Blood Glucose.: 100 mg/dL (26 May 2020 05:32)    MEDICATIONS:  MEDICATIONS  (STANDING):  acetaZOLAMIDE Injectable 500 milliGRAM(s) IV Push once  albuterol/ipratropium for Nebulization. 3 milliLiter(s) Nebulizer every 6 hours  BACItracin   Ointment 1 Application(s) Topical two times a day  buMETAnide Injectable 1 milliGRAM(s) IV Push every 12 hours  chlorhexidine 0.12% Liquid 15 milliLiter(s) Oral Mucosa two times a day  chlorhexidine 4% Liquid 1 Application(s) Topical <User Schedule>  famotidine Injectable 20 milliGRAM(s) IV Push daily  fentaNYL   Infusion... 0.501 MICROgram(s)/kG/Hr (1.76 mL/Hr) IV Continuous <Continuous>  heparin  Infusion 1300 Unit(s)/Hr (13 mL/Hr) IV Continuous <Continuous>  insulin lispro (HumaLOG) corrective regimen sliding scale   SubCutaneous every 6 hours  ketamine Infusion. 0.25 mG/kG/Hr (1.76 mL/Hr) IV Continuous <Continuous>  meropenem  IVPB 1000 milliGRAM(s) IV Intermittent every 8 hours  methylPREDNISolone sodium succinate Injectable 40 milliGRAM(s) IV Push two times a day  midodrine 10 milliGRAM(s) Oral every 8 hours  polyethylene glycol 3350 17 Gram(s) Oral two times a day  propofol Infusion 24.893 MICROgram(s)/kG/Min (10.5 mL/Hr) IV Continuous <Continuous>  senna Syrup 10 milliLiter(s) Oral daily  sodium chloride 3%  Inhalation 5 milliLiter(s) Inhalation every 6 hours  vancomycin  IVPB 1000 milliGRAM(s) IV Intermittent every 12 hours  vancomycin  IVPB        MEDICATIONS  (PRN):  acetaminophen    Suspension .. 650 milliGRAM(s) Oral every 6 hours PRN Temp greater or equal to 38C (100.4F)  diphenhydrAMINE   Injectable 50 milliGRAM(s) IV Push once PRN anaphylaxis to study drug  sodium chloride 0.9% lock flush 10 milliLiter(s) IV Push every 1 hour PRN Pre/post blood products, medications, blood draw, and to maintain line patency      ALLERGIES:  Allergies    No Known Allergies    Intolerances        LABS:                        9.2    16.81 )-----------( 334      ( 26 May 2020 00:14 )             30.2     05-26    133<L>  |  85<L>  |  19  ----------------------------<  116<H>  3.1<L>   |  41<H>  |  0.33<L>    Ca    8.3<L>      26 May 2020 00:14  Phos  3.0     05-26  Mg     2.6     05-26    TPro  6.3  /  Alb  2.1<L>  /  TBili  0.5  /  DBili  x   /  AST  95<H>  /  ALT  136<H>  /  AlkPhos  277<H>  05-26    PT/INR - ( 26 May 2020 00:14 )   PT: 11.6 sec;   INR: 1.02 ratio         PTT - ( 26 May 2020 00:14 )  PTT:79.3 sec      RADIOLOGY & ADDITIONAL TESTS: Reviewed.

## 2020-05-26 NOTE — PROGRESS NOTE ADULT - SUBJECTIVE AND OBJECTIVE BOX
Patient is a 65y old  Female who presents with a chief complaint of COVID-19, Hypoxic respiratory failure (26 May 2020 10:49)    Being followed by ID for        Interval history:  events of weekend noted  Pt is doing poorly  Pt is back on broad spectrum abs and steroids ( per ICU team)  pt was not stable enough for transport for CT  No other acute events        PAST MEDICAL & SURGICAL HISTORY:  No pertinent past medical history  History of cholecystectomy  History of appendectomy    Allergies    No Known Allergies    Intolerances      Antimicrobials:    meropenem  IVPB 1000 milliGRAM(s) IV Intermittent every 8 hours  vancomycin  IVPB 1000 milliGRAM(s) IV Intermittent every 12 hours  vancomycin  IVPB        MEDICATIONS  (STANDING):  albuterol/ipratropium for Nebulization. 3 milliLiter(s) Nebulizer every 6 hours  BACItracin   Ointment 1 Application(s) Topical two times a day  buMETAnide Injectable 1 milliGRAM(s) IV Push every 12 hours  chlorhexidine 0.12% Liquid 15 milliLiter(s) Oral Mucosa two times a day  chlorhexidine 4% Liquid 1 Application(s) Topical <User Schedule>  famotidine Injectable 20 milliGRAM(s) IV Push daily  fentaNYL   Infusion... 0.501 MICROgram(s)/kG/Hr (1.76 mL/Hr) IV Continuous <Continuous>  heparin  Infusion 1300 Unit(s)/Hr (13 mL/Hr) IV Continuous <Continuous>  insulin lispro (HumaLOG) corrective regimen sliding scale   SubCutaneous every 6 hours  ketamine Infusion. 0.25 mG/kG/Hr (1.76 mL/Hr) IV Continuous <Continuous>  meropenem  IVPB 1000 milliGRAM(s) IV Intermittent every 8 hours  methylPREDNISolone sodium succinate Injectable 40 milliGRAM(s) IV Push two times a day  midodrine 10 milliGRAM(s) Oral every 8 hours  polyethylene glycol 3350 17 Gram(s) Oral two times a day  propofol Infusion 24.893 MICROgram(s)/kG/Min (10.5 mL/Hr) IV Continuous <Continuous>  senna Syrup 10 milliLiter(s) Oral daily  sodium chloride 3%  Inhalation 5 milliLiter(s) Inhalation every 6 hours  vancomycin  IVPB 1000 milliGRAM(s) IV Intermittent every 12 hours  vancomycin  IVPB        MEDICATIONS  (PRN):  acetaminophen    Suspension .. 650 milliGRAM(s) Oral every 6 hours PRN Temp greater or equal to 38C (100.4F)  diphenhydrAMINE   Injectable 50 milliGRAM(s) IV Push once PRN anaphylaxis to study drug  sodium chloride 0.9% lock flush 10 milliLiter(s) IV Push every 1 hour PRN Pre/post blood products, medications, blood draw, and to maintain line patency      Vital Signs Last 24 Hrs  T(C): 36.9 (05-26-20 @ 12:00), Max: 37.4 (05-25-20 @ 20:00)  T(F): 98.4 (05-26-20 @ 12:00), Max: 99.3 (05-25-20 @ 20:00)  HR: 90 (05-26-20 @ 18:00) (73 - 90)  BP: --  BP(mean): --  RR: 26 (05-26-20 @ 18:00) (16 - 44)  SpO2: 90% (05-26-20 @ 18:00) (90% - 97%)    Physical Exam:    Constitutional intubated    HEENT PERRL - small but reactive    Chest Good AE,CTA    CVS  S1 S2     Abd softly distended  positive bowel sounds    Ext  edema    right TLC     rectal tube  Lab Data:                          9.2    16.81 )-----------( 334      ( 26 May 2020 00:14 )             30.2       05-26    133<L>  |  87<L>  |  21  ----------------------------<  182<H>  3.0<L>   |  37<H>  |  <0.30<L>    Ca    8.2<L>      26 May 2020 11:59  Phos  2.9     05-26  Mg     2.4     05-26    TPro  6.3  /  Alb  2.1<L>  /  TBili  0.5  /  DBili  x   /  AST  95<H>  /  ALT  136<H>  /  AlkPhos  277<H>  05-26          .Blood Blood-Peripheral  05-24-20   No growth to date.  --  --      .Sputum Sputum  05-24-20   Normal Respiratory Farzaneh present  --    Few polymorphonuclear leukocytes per low power field  Rare Squamous epithelial cells per low power field  No organisms seen per oil power field      .Blood Blood-Peripheral  05-23-20   Growth in anaerobic bottle: Bacillus species not anthracis  "Susceptibilities not performed"  --    Growth in anaerobic bottle: Gram Positive Rods      .Urine Catheterized  05-23-20   No growth  --  --      .Blood Blood  05-20-20   No Growth Final  --  --      Fungitell (05.19.20 @ 20:25)    Fungitell: <31: Interpretation: The Fungitell assay does not detect certain fungal  species such as the genus Cryptococcus (Hira et al. 1991) which  produces very low levels of (1-3)-Beta-D-Glucan. The assay also does  not detect the Zygomycetes such as Absidia, Mucor and Rhizopus  (Hipolito et al. 1994) which are not known to produce  (1-3)-Beta-D-Glucan. In addition, the yeast phase of Blastomyces  dermatitidis produces little (1-3)-Beta-D-Glucan and may not be  detected by the assay (Madalyn et al. 2007).  Reference Range:  Less than 60 pg/mL. Glucan values of less than 60 pg/mL are  interpreted as negative.  Glucan values of 60 to 79 pg/mL are interpreted as indeterminate,  and suggest a possible fungal infection. Additional sampling and  testing of sera is required to interpret the results.  Glucan values of greater than or equal to 80 pg/mL are interpreted  as positive.  Due to the potential for environmental contamination when  transferred to pour-off tubes, which can lead to false positive  results, interpret positive results from samples provided in  pour-off tubes with caution.  Results should be used in conjunction  with clinical findings, and should not form the sole basis for a  diagnosis or treatment decision. The Fungitell test is approved or  cleared for in vitro diagnostic use by the U.[place] Food and Drug  Administration. Modifications to the approved package insert have  been made and the performance characteristics for these  modifications were determined by [facility].  Ifsample result is greater than 500 pg/mL, physician may order a  titer of the sample. Please contact [facility] if you would  like to order a retest of this sample to obtain an actual value.  Samples are held for 1 week after initial testing date.  ____________________________________________________________  Performed at:  [facility]  [address] [facility]  [place] [address]  [phone]  : Henrietta Daley PhD HCLD(ABB)  CLIA# [id number] pg/mL            Vancomycin Level, Trough: 13.0 ug/mL (05-26-20 @ 17:33)      WBC Count: 16.81 (05-26-20 @ 00:14)  WBC Count: 17.48 (05-25-20 @ 00:28)  WBC Count: 17.81 (05-24-20 @ 16:26)  WBC Count: 15.75 (05-24-20 @ 00:09)  WBC Count: 14.96 (05-23-20 @ 15:33)  WBC Count: 15.91 (05-23-20 @ 01:55)  WBC Count: 16.35 (05-22-20 @ 17:26)  WBC Count: 21.02 (05-22-20 @ 01:16)  WBC Count: 15.15 (05-21-20 @ 01:47)  WBC Count: 18.46 (05-20-20 @ 06:28)  WBC Count: 19.41 (05-19-20 @ 22:08)    D-Dimer Assay, Quantitative: 1168 ng/mL DDU (05-26)  D-Dimer Assay, Quantitative: 1326 ng/mL DDU (05-25)  D-Dimer Assay, Quantitative: 1295 ng/mL DDU (05-24)  D-Dimer Assay, Quantitative: 1339 ng/mL DDU (05-23)    Ferritin, Serum: 1017 ng/mL (05-26)  Ferritin, Serum: 1145 ng/mL (05-25)  Ferritin, Serum: 1130 ng/mL (05-24)  Ferritin, Serum: 959 ng/mL (05-23)    C-Reactive Protein, Serum: 17.09 ug/mL (05-26-20 @ 00:14)  C-Reactive Protein, Serum: 20.92 mg/dL (05-25-20 @ 00:28)  C-Reactive Protein, Serum: 13.66 mg/dL (05-24-20 @ 00:09)  C-Reactive Protein, Serum: 12.05 mg/dL (05-23-20 @ 01:55)      Ordinal Scale: score : 2  1) Death  2) Hospitalized, on invasive mechanical ventilation or ECMO  3) Hospitalized, on non-invasive ventilation or high flow oxygen devices  4) Hospitalized, requiring low flow(<11l/min) supplemental oxygen  5) Hospitalized, not requiring supplemental oxygen - requiring ongoing medical care(COVID-19 related or otherwise)  6) Hospitalized, not requiring supplemental oxygen - no longer requires ongoing medical care(other than per protocol RDV administration)  7) Not hospitalized    < from: US Abdomen Upper Quadrant Right (05.26.20 @ 13:13) >  PROCEDURE DATE:  05/26/2020            INTERPRETATION:  CLINICAL INFORMATION: Transaminitis. Hypoxic respiratory failure secondary to Covid pneumonia.    COMPARISON: Correlation is made with prior abdominal CT dated 5/7/2018.    TECHNIQUE: Portable Sonography of the right upper quadrant.     FINDINGS:  Liver: Within normal limits. Smooth contour.  Bile ducts: Normal caliber. Common bile duct measures 5 mm.   Gallbladder: Status post cholecystectomy.        Pancreas: Visualized portions are within normal limits.Limited evaluation of the pancreatic head and tail.    Right kidney: 11.4 cm. No hydronephrosis.  Ascites: None.  IVC: Visualized portions are within normal limits.    Miscellaneous: Right pleural effusion.    IMPRESSION: No biliary ductal dilatation.    Right pleural effusion.    < end of copied text >

## 2020-05-26 NOTE — CHART NOTE - NSCHARTNOTEFT_GEN_A_CORE
Nutrition Follow Up Note   Patient seen for: malnutrition follow up on COVID ICU     Source: medical record, communication with team. Unable to speak to pt due to current airborne isolation contact precautions related to COVID-19.     Chart reviewed, events noted. Pt remains intubated.    Diet Order: Diet, NPO with Tube Feed:   Tube Feeding Modality: Orogastric  Vital AF (VITALAFRTH)  Total Volume for 24 Hours (mL): 1320  Continuous  Starting Tube Feed Rate {mL per Hour}: 20  Increase Tube Feed Rate by (mL): 10     Every 4 hours  Until Goal Tube Feed Rate (mL per Hour): 55  Tube Feed Duration (in Hours): 24  Tube Feed Start Time: 13:00  Banatrol TF     Qty per Day:  2 (05-20-20 @ 06:06)    CURRENT EN ORDER PROVIDES: 1584 kcals, 99 gm protein    Nutrition Events:   average of 99% EN goal met over past 5 days   additional kcals from propofol: 5/26 487 kcals, 5/25 557 kcals, 5/24 550 kcals    GI: no vomiting, no abd distention  rectal tube in place output 5/26 50cc, 5/25 400cc (has order for banatrol 2 times daily)    Anthropometric Measurements:   Height (cm): 157.48 (04-28-20 @ 21:02)  Weight (kg): 70.3 (04-28-20 @ 21:02)  BMI (kg/m2): 28.3 (04-28-20 @ 21:02)  most recent wt 5/4 70.3 kg, no further wts available      Medications: MEDICATIONS  (STANDING):  albuterol/ipratropium for Nebulization. 3 milliLiter(s) Nebulizer every 6 hours  BACItracin   Ointment 1 Application(s) Topical two times a day  buMETAnide Injectable 1 milliGRAM(s) IV Push every 12 hours  chlorhexidine 0.12% Liquid 15 milliLiter(s) Oral Mucosa two times a day  chlorhexidine 4% Liquid 1 Application(s) Topical <User Schedule>  famotidine Injectable 20 milliGRAM(s) IV Push daily  fentaNYL   Infusion... 0.501 MICROgram(s)/kG/Hr (1.76 mL/Hr) IV Continuous <Continuous>  heparin  Infusion 1300 Unit(s)/Hr (13 mL/Hr) IV Continuous <Continuous>  insulin lispro (HumaLOG) corrective regimen sliding scale   SubCutaneous every 6 hours  ketamine Infusion. 0.25 mG/kG/Hr (1.76 mL/Hr) IV Continuous <Continuous>  meropenem  IVPB 1000 milliGRAM(s) IV Intermittent every 8 hours  methylPREDNISolone sodium succinate Injectable 40 milliGRAM(s) IV Push two times a day  midodrine 10 milliGRAM(s) Oral every 8 hours  polyethylene glycol 3350 17 Gram(s) Oral two times a day  propofol Infusion 24.893 MICROgram(s)/kG/Min (10.5 mL/Hr) IV Continuous <Continuous>  senna Syrup 10 milliLiter(s) Oral daily  sodium chloride 3%  Inhalation 5 milliLiter(s) Inhalation every 6 hours  vancomycin  IVPB 1000 milliGRAM(s) IV Intermittent every 12 hours  vancomycin  IVPB        MEDICATIONS  (PRN):  acetaminophen    Suspension .. 650 milliGRAM(s) Oral every 6 hours PRN Temp greater or equal to 38C (100.4F)  diphenhydrAMINE   Injectable 50 milliGRAM(s) IV Push once PRN anaphylaxis to study drug  sodium chloride 0.9% lock flush 10 milliLiter(s) IV Push every 1 hour PRN Pre/post blood products, medications, blood draw, and to maintain line patency    Labs: 05-26 @ 02:23: Sodium --, Potassium --, Calcium --, Magnesium --, Phosphorus --, BUN --, Creatinine --, Glucose --, Alk Phos --, ALT/SGPT --, AST/SGOT --, Albumin --, Prealbumin --, Total Bilirubin --, Hemoglobin --, Hematocrit --, Ferritin 1017<H>, C-Reactive Protein --, Creatine Kinase <<27>  05-26 @ 00:14: Sodium 133<L>, Potassium 3.1<L>, Calcium 8.3<L>, Magnesium 2.6, Phosphorus 3.0, BUN 19, Creatinine 0.33<L>, Glucose 116<H>, Alk Phos 277<H>, ALT/SGPT 136<H>, AST/SGOT 95<H>, Albumin 2.1<L>, Prealbumin --, Total Bilirubin 0.5, Hemoglobin 9.2<L>, Hematocrit 30.2<L>, Ferritin --, C-Reactive Protein 17.09, Creatine Kinase <<27>      Triglycerides, Serum: 204 mg/dL (05-26-20 @ 00:14)  Triglycerides, Serum: 208 mg/dL (05-23-20 @ 01:55)  Triglycerides, Serum: 184 mg/dL (05-18-20 @ 17:27)      POCT Blood Glucose.: 100 mg/dL (05-26-20 @ 05:32)  POCT Blood Glucose.: 140 mg/dL (05-25-20 @ 23:42)  POCT Blood Glucose.: 161 mg/dL (05-25-20 @ 18:31)  POCT Blood Glucose.: 162 mg/dL (05-25-20 @ 17:27)    Skin: no pressure injuries documented, multiple facial abrasions  Edema: 2+ generalized    Estimated Needs: based on dosing wt 70.3Kg, with consideration for intubation  Energy: 5194-9361 calories (20-25 kari/Kg)  Protein:  84-98 grams (1.2-1.4 Gm/Kg)    Previous Nutrition Diagnosis:   1) Inadequate Protein-Energy Intake dx resolved, EN at goal rate, meeting >80% estimated needs  2) Moderate Malnutrition, care plan ongoing, addressed w/ EN    Recommended Interventions:   1. Continue Vital AF (1.2) goal 55 cc/hr x 24 hrs to provide 1584 kcals, 99 gm protein, 1070cc free water meets 22 kcals/kg, 1.4 gm protein/kg dosing wt 70.3 kg. Monitor propofol intake, if requirements are high, will need to adjust EN goal rate.   2. Banatrol as needed      Monitoring and Evaluation:   Continue to monitor nutrition provision and tolerance, weights, labs, skin integrity.   RD remains available upon request and will follow up per protocol.

## 2020-05-26 NOTE — PROGRESS NOTE ADULT - ATTENDING COMMENTS
Pt seen and examined. 65 F with no known PMH, now with acute resp failure with hypoxia and hypercapnia/ ARDS 2/2 COVID-19 pneumonia. Continues to require sedation to maintain synchrony with the ventilator. Received Tocilizumab, steroids and Remdesivir. solumedrol BID. PaO2/ FiO2 ratio improved (? related to initiation of steroids), FiO2 tirated down to 80 % on lung protective ventilation, lung compliance remains poor with high plateau/ peak pressures despite optimization of vent parameters. Cont solumedrol BID, aggressive chest PT/ pulm toilet.  Now off IV pressors, cont midodrine Q8H, , keep MAP >65. Continue diuresis with Bumex 1 mg BID, follow electrolytes BID. Serum bicarb now 41, give Diamox 500 mg IV x1, follow bicarb. Remains febrile, leukocytosis improving, BCxs from 5/23 showing G+ rods, surveillance Bcxs from 5/24 NGTD. Cont on Vanc/ Vaughn. FUP speciation on BCxs from 5/23. Evaluated by ENT, no active oropharyngeal bleeding, cont to monitor CBC on therapeutic AC with a heparin gtt. Overall prognosis extremely guarded. Remains critically ill requiring multiple bedside visits and changes in therapy.

## 2020-05-26 NOTE — PROGRESS NOTE ADULT - ASSESSMENT
66 y/o F with no significant PMH presents 4/29 with hypoxic respiratory failure 2/2 COVID PNA, s/p Tocilizumab (4/30), s/p empiric Ceftriaxone/Azithro (4/30-5/4) for ?superimposed CAP. s/p 10 days of Remdesivir trial (4/30-5/9). Intubated 5/4 for worsened hypoxic respiratory failure from severe ARDS requiring proning on 5/6, 5/7, 5/8, 5/17 & 5/19.  Sedated and started Hep gtt for elevated D-dimer 20530. S/p course of  Meropenem, Vancomycin, and Caspofungin for empiric sepsis coverage.(5/4- 5/12 ); 5/7: Caspofungin switched to Fluconazole on 5/7 for growth of candida albicans in urine and Vancomycin d/c'ed for negative MRSA swab; Pt was starting to improve, tolerating CPAP trials, but developed worsening hypercapnia on 5/14 with POCUS showing worsening R sided infiltrates. s/p bronch on 5/16 with minimal secreations, s/p 7 day course of Zosyn;  Paralyzed with  NEGAR 5/18 ;  Repeat CXR on 5/19 with ETT migrated up and worsening b/l opacities. s/p Solumedrol 1 mg/kg x 3 (for worsening opacities) completed 5/21;  Off Rocuronium  since 5/21. Changed to PCV on 5/22, now with GP rods in anaerobic bottle from 5/23, started on Vanc/Vaughn.    PLAN    # Neuro   - Sedated on Fentanyl, Ketamine, Propofol  - Sedate to vent synchrony  - Triglyceride 204; cont to trend Q72H while on Prop    # PULMONARY  - Intubated since 5/4/20 for Hypoxic/Hypercapnic Respiratory Failure 2/2 COVID PNA  - Continue aggressive chest PT/ pulm toilet  - Current vent settings: PC Rate 32, PEEP +8, Fio2 80%, I:E ratio increased to 1:3, plateau pressure 48, continues to have poor lung compliance  - Po2/Fio2 ratio improved to 110 today  - Completed 3-day course of Solumedrol 1 mg/kg on 5/21, with modest improvement  - Continue course of Solumedrol 1 mg/kg (restarted 5/25), plan to reevaluate daily   - s/p bronch 5/16 with minimal secretions and no pulmonary hemorrhage   - Required proning on 5/6, 5/7, 5/8, 5/17 and 5/19 for worsening hypercapnia  - Worsening diffuse b/l opacities on CXR from 5/19; ? slight improvement on 5/22  - ENT evaluation, nasal and oral exam clear of any sources of bleeding, mild old blood suctioned from mouth, no further ENT intervention at this time    # CV  - Remains off pressors, currently on Midodrine 10mg Q8H, maintaining MAP>65    # GI  - LFTs downtrending; Follow up RUQ sono   - Tube feeding at goal with Vital @55 cc/hr  - Last output from rectal tube 5/26 AM, c/w Miralax/Senna  - Pepcid 20mg qd for GI ppx    # Renal  - Hematuria improving: UA >50 RBCs, moderate blood  - Discontinued off Lasix gtt 5/25 given worsening metabolic alkalosis   - Continue with Bumex 1mg Q12H (started 5/25)  - s/p Diamox 500 x 1 on 5/25, Serum Bicarb downtrending (41)  - Diamox 500 x 1 today, continue trending serum bicarb  - Net negative 350mls  - Replete electrolytes PRN  - Continue to monitor I&Os    # ID   - Leukocytosis improving (WBC 16), afebrile x 24 hours, PCT downtrending 0.29  - GP rods in anaerobic bottle from 5/23, follow-up speciation  - Surveillance BC collected 5/24 NGTD; sputum cx 5/24 normal susanne  - Remains too unstable to be transported for CT scan to further evaluate for GI source- desats when laid flat or positioned  - Day #3/7 of Vanc 1gm Q12H & Meropenem 1gm Q8H (started 5/24)   - Has RIJ in place from 5/16, if no other source of infection identified, may consider as possible source of infection & may require line change  - Child changed 5/23, repeat UA with small leuks & WBC 4  - Completed 7-day course of Zosyn (on 5/21), s/p full course of meropenem (5/4-5/13), Fluconazole (5/8-5/13), Vancomycin (5/11-5/12) for empiric coverage  - s/p bronchoscopy 5/17, negative cultures  - C Diff negative  - ID following, further recommendations appreciated    # COVID  + on 4/28, and on rpt on 5/13  - s/p 10 days of Remdesivir trial  (4/30-5/9), s/p Tocilizumab on 4/30  - Follow daily inflammatory markers    #  LINES  - RIJ TLC from 5/16   - R radial A line 5/16    # Heme   - s/p 2 Units PRBC on 5/22 for blood loss anemia; H/H stable at 9.2/30.2  - Empiric heparin gtt for elevated D-dimer  - PTT therapeutic @ 79  - Goal aPTT 58-99  - ENT consult appreciated- nasal and oral exam clear of any sources of bleeding, benefit of anticoagulation outweighs risks given elevated Ddimer and risk for thrombosis    #Endo  - Blood glucose controlled on Humalog Low SS    #Ethics  -Full code  - Updated daughter, Mary ([phone]), who is aware of patient's lung dysfunction with a very poorly complaint system. Family remains hopeful for full recovery. Ongoing management and emotional support at this time. 66 y/o F with no significant PMH presents 4/29 with hypoxic respiratory failure 2/2 COVID PNA, s/p Tocilizumab (4/30), s/p empiric Ceftriaxone/Azithro (4/30-5/4) for ?superimposed CAP. s/p 10 days of Remdesivir trial (4/30-5/9). Intubated 5/4 for worsened hypoxic respiratory failure from severe ARDS requiring proning on 5/6, 5/7, 5/8, 5/17 & 5/19.  Sedated and started Hep gtt for elevated D-dimer 61594. S/p course of  Meropenem, Vancomycin, and Caspofungin for empiric sepsis coverage.(5/4- 5/12 ); 5/7: Caspofungin switched to Fluconazole on 5/7 for growth of candida albicans in urine and Vancomycin d/c'ed for negative MRSA swab; Pt was starting to improve, tolerating CPAP trials, but developed worsening hypercapnia on 5/14 with POCUS showing worsening R sided infiltrates. s/p bronch on 5/16 with minimal secreations, s/p 7 day course of Zosyn;  Paralyzed with  NEGAR 5/18 ;  Repeat CXR on 5/19 with ETT migrated up and worsening b/l opacities. s/p Solumedrol 1 mg/kg x 3 (for worsening opacities) completed 5/21;  Off Rocuronium  since 5/21. Changed to PCV on 5/22, now with GP rods in anaerobic bottle from 5/23, started on Vanc/Vaughn.    PLAN    # Neuro   - Sedated on Fentanyl, Ketamine, Propofol  - Sedate to vent synchrony  - Triglyceride 204; cont to trend Q72H while on Prop    # PULMONARY  - Intubated since 5/4/20 for Hypoxic/Hypercapnic Respiratory Failure 2/2 COVID PNA  - Continue aggressive chest PT/ pulm toilet  - Current vent settings: PC Rate 32, PEEP +8, Fio2 80%, I:E ratio increased to 1:3, plateau pressure 48, continues to have poor lung compliance  - Po2/Fio2 ratio improved to 110 today  - Completed 3-day course of Solumedrol 1 mg/kg on 5/21, with modest improvement  - Continue course of Solumedrol 1 mg/kg (restarted 5/25), plan to reevaluate daily   - s/p bronch 5/16 with minimal secretions and no pulmonary hemorrhage   - Required proning on 5/6, 5/7, 5/8, 5/17 and 5/19 for worsening hypercapnia  - Worsening diffuse b/l opacities on CXR from 5/19; ? slight improvement on 5/22  - ENT evaluation, nasal and oral exam clear of any sources of bleeding, mild old blood suctioned from mouth, no further ENT intervention at this time    # CV  - Remains off pressors, currently on Midodrine 10mg Q8H, maintaining MAP>65    # GI  - LFTs downtrending; RUQ sono with no biliary ductal dilatation  - Tube feeding at goal with Vital @55 cc/hr  - Last output from rectal tube 5/26 AM, c/w Miralax/Senna  - Pepcid 20mg qd for GI ppx    # Renal  - Hematuria improving: UA >50 RBCs, moderate blood  - Discontinued off Lasix gtt 5/25 given worsening metabolic alkalosis   - Continue with Bumex 1mg Q12H (started 5/25)  - s/p Diamox 500 x 1 on 5/25, Serum Bicarb downtrending (41)  - Diamox 500 x 1 today, continue trending serum bicarb  - Net negative 350mls  - Replete electrolytes PRN  - Continue to monitor I&Os    # ID   - Leukocytosis improving (WBC 16), afebrile x 24 hours, PCT downtrending 0.29  - GP rods in anaerobic bottle from 5/23, follow-up speciation  - Surveillance BC collected 5/24 NGTD; sputum cx 5/24 normal susanne  - Remains too unstable to be transported for CT scan to further evaluate for GI source- desats when laid flat or positioned  - Day #3/7 of Vanc 1gm Q12H & Meropenem 1gm Q8H (started 5/24)   - Has RIJ in place from 5/16, if no other source of infection identified, may consider as possible source of infection & may require line change  - Child changed 5/23, repeat UA with small leuks & WBC 4  - Completed 7-day course of Zosyn (on 5/21), s/p full course of meropenem (5/4-5/13), Fluconazole (5/8-5/13), Vancomycin (5/11-5/12) for empiric coverage  - s/p bronchoscopy 5/17, negative cultures  - C Diff negative  - ID following, further recommendations appreciated    # COVID  + on 4/28, and on rpt on 5/13  - s/p 10 days of Remdesivir trial  (4/30-5/9), s/p Tocilizumab on 4/30  - Follow daily inflammatory markers    #  LINES  - RIJ TLC from 5/16   - R radial A line 5/16    # Heme   - s/p 2 Units PRBC on 5/22 for blood loss anemia; H/H stable at 9.2/30.2  - Empiric heparin gtt for elevated D-dimer  - PTT therapeutic @ 79  - Goal aPTT 58-99  - ENT consult appreciated- nasal and oral exam clear of any sources of bleeding, benefit of anticoagulation outweighs risks given elevated Ddimer and risk for thrombosis    #Endo  - Blood glucose controlled on Humalog Low SS    #Ethics  -Full code  - Updated daughter, Mary ([phone]), who is aware of patient's lung dysfunction with a very poorly complaint system. Family remains hopeful for full recovery. Ongoing management and emotional support at this time.

## 2020-05-26 NOTE — PROGRESS NOTE ADULT - ASSESSMENT
65F reporting no PMH who presented last night with complaint of fever/chills (home T-max 102 F), night sweats, cough, myalgias (mid & lower back), loss of taste and smell since Friday 4/24 (still tastes sweet/salty), as well as 2 days of weakness and constipation, found to be COVID positive on 4/28/20.    Pt had agreed to participate in a clinical trial for Covid -19  Please discuss with ID team if planning on taking concurrent treatment with other agents with actual or possible direct acting antiviral activity against SARS-CoV-2 such a chloroquine or hydroxychloroquine. Pt was given Tocilizumab by primary team on the day of enrollment as of concern for cytokine storm.     Hospital course  4/29: Started Ceftriaxone (4/29-5/3) and Azithromycin (4/29-5/4) in ED for possible bacterial PNA  4/30: RRT called for desaturation in 70s before receiving 1st dose of Remdesivir and Tocilizumab  5/4: RRT called for desaturation to 20s after drinking water and coughing, intubated by anesthesia. Sedated and started full dose lovenox after D-dimer elevated to [address]. Transferred to ICU. On Meropenem, Vancomycin, and Caspofungin for emperic sepsis coverage.  5/7: Caspofungin switched to Fluconazole for growth of candida albicans in urine; Vancomycin d/c'ed for negative MRSA swab  5/10: Started CPAP trials  5/12 - abs d/scooby   5/13 hematuria  5/14 O2 requirements increased  5/15--> continues to do poorly  5/19- trials of prone and steroids  5/21- WBC down slightly   5/22- continues to have bloody secretions  5/23 - recurrent fever and positive BCx 1/2 with GPR in anaerobic bottle   5/24-new course of meropenem and vancomycin started  5/25 - new course of solumedrol started    # Shock (persistent) / Fever (persistent) / Positive Blood Culture / Therapeutic Drug Montoring  - WBC persistently elevated, febrile  - S/p full course of meropenem (5/4-5/12), Fluconazole (5/8-5/13), Vancomycin (5/11-5/12), and zosyn    - Pt is s/p Tocilizumab which increases risk of infection and now solumedrol which does as well, including fungal infection  - If able would pursue CT Chest/Abdomen/Pelvis with IV contrast. Given blood from ETT would check CT Head/Neck as well  - fn ID of GPR - bacillus species- ? procurement contaminant vs line vs other?  - followup on repeat BCx. U/A without pyuria  - Continue Meropenem 1g IV Q8H  - Continue Vancomycin 1g IV Q12H. Check trough prior to fourth. Target trough 10-15  - Continue to monitor renal function and vancomycin troughs to avoid nephrotoxicity / otoxicity secondary to vancomycin    # Acute hypoxic respiratory failure  - Multifactorial, likely secondary to COVID-19 infection with viral PNA, with possible superimposed bacterial vs fungal vs aspiration PNA and/or PE   - r/o effusion. for POC ultrasound  - could patient have bled into lung?    # Elevated LFTs:   - continue to monitor   -  RUQ ultrasound - noted    # Elevated CRP/Ferritin/D-dimer  - Could be COVID-related. CRP, ferritin, trending down.   - D-dimer remains elevated, although much lower than previous peak, could suggest PE or microthrombi. Pt now on heparin drip. Continued AC per primary team.     # elevated WBC (improving)  repeat cultures , repeat fungitel is negative   check lactate  back on abs     # anemia  pt slowly drifted down but remains about the same last week or so  could patient have bled into lung, but not further dropping  Also could pt have traumatized oral palate- ENT input appreciated  CT Head/Neck as above  ENT input appreciated    # Stool  check C diff

## 2020-05-27 NOTE — PROCEDURE NOTE - NSPROCDETAILS_GEN_ALL_CORE
patient pre-oxygenated, tube inserted, placement confirmed
guidewire recovered
location identified, draped/prepped, sterile technique used, needle inserted/introduced/connected to a pressurized flush line/all materials/supplies accounted for at end of procedure/positive blood return obtained via catheter/sutured in place/Seldinger technique/ultrasound guidance/hemostasis with direct pressure, dressing applied
sterile technique, catheter placed/ultrasound guidance/guidewire recovered/lumen(s) aspirated and flushed/sterile dressing applied
ultrasound guidance/positive blood return obtained via catheter/sutured in place/hemostasis with direct pressure, dressing applied/Seldinger technique/all materials/supplies accounted for at end of procedure/location identified, draped/prepped, sterile technique used, needle inserted/introduced/connected to a pressurized flush line
all materials/supplies accounted for at end of procedure/location identified, draped/prepped, sterile technique used, needle inserted/introduced/connected to a pressurized flush line/positive blood return obtained via catheter/sutured in place/hemostasis with direct pressure, dressing applied/ultrasound guidance

## 2020-05-27 NOTE — PROGRESS NOTE ADULT - ASSESSMENT
66 y/o F with no significant PMH presents 4/29 with hypoxic respiratory failure 2/2 COVID PNA, s/p Tocilizumab (4/30), s/p empiric Ceftriaxone/Azithro (4/30-5/4) for ?superimposed CAP. s/p 10 days of Remdesivir trial (4/30-5/9). Intubated 5/4 for worsened hypoxic respiratory failure from severe ARDS requiring proning on 5/6, 5/7, 5/8, 5/17 & 5/19.  Sedated and started Hep gtt for elevated D-dimer 82420. S/p course of  Meropenem, Vancomycin, and Caspofungin for empiric sepsis coverage (5/4- 5/12); Caspofungin switched to Fluconazole on 5/7 for growth of candida albicans in urine. s/p bronch on 5/16 for increased R sided opacities but she had minimal secretions, s/p 7 day course of Zosyn empirically. Pt was starting to improve respiratory wise, tolerating CPAP trials but on 5/17 she decompensated, had worsened hypoxic respiratory failure and required re-proning and paralyzed with Loi. Remains on high fi02 with poor lung compliance. Changed to PC ventilation on 5/22, now with bacillus (1/4 bottles) from 5/23, started on Vanc/Vaughn.    PLAN    # Neuro   - Sedated on Fentanyl, Ketamine, Propofol  - Sedate to vent synchrony  - Triglyceride 204; cont to trend Q72H while on Prop    # PULMONARY  - Intubated since 5/4/20 for Hypoxic/Hypercapnic Respiratory Failure 2/2 COVID PNA  - Continue aggressive chest PT/ pulm toilet  - Current vent settings: PC Rate 32, PEEP +5, Fio2 80%, I:E ratio increased to 1:3, continues to have poor lung compliance  - Completed 3-day course of Solumedrol 1 mg/kg on 5/21, with modest improvement  - Continue course of Solumedrol 1 mg/kg (restarted 5/25), plan to reevaluate daily   - s/p bronch 5/16 with minimal secretions and no pulmonary hemorrhage   - Required proning on 5/6, 5/7, 5/8, 5/17 and 5/19 for hypoxic failure   - ENT evaluation, nasal and oral exam clear of any sources of bleeding, mild old blood suctioned from mouth, no further ENT intervention at this time    # CV  - Remains off pressors, currently on Midodrine 10mg Q8H, maintaining MAP>65    # GI  - LFTs downtrending; RUQ sono with no biliary ductal dilatation  - Tube feeding at goal with Vital @55 cc/hr  - Rectal tube with 100cc output overnight, d/c senna and miralax   - Pepcid 20mg qd for GI ppx    # Renal  - Discontinued off Lasix gtt 5/25 given worsening metabolic alkalosis   - Increase Bumex 1mg q8 to maintain net negative fluid balance   - Monitor serum bicarb today, has required intermittent Diamox     # ID   - Bacillus species in BC 5/23 ?contaminate (1/4 bottles)   - Surveillance BC collected 5/24 NGTD; sputum cx 5/24 normal susanne  - Remains too unstable to be transported for CT scan to further evaluate for GI source- desats when laid flat or positioned  - Day #4/7 of Vanc 1gm q12 (goal trough 10-15) & Meropenem 1gm Q8H (started 5/24)   - May require line change if no source of sepsis identified   - Child changed 5/23, repeat UA with small leuks & WBC 4  - Completed 7-day course of Zosyn (on 5/21), s/p full course of meropenem (5/4-5/13), Fluconazole (5/8-5/13), Vancomycin (5/11-5/12) for empiric coverage  - s/p bronchoscopy 5/17, negative cultures  - C Diff negative  - ID following, further recommendations appreciated    # COVID  + on 4/28, and on rpt on 5/13  - s/p 10 days of Remdesivir trial  (4/30-5/9), s/p Tocilizumab on 4/30  - Follow daily inflammatory markers    #  LINES  - RIJ TLC from 5/16   - R radial A line 5/16    # Heme   - s/p 2 Units PRBC on 5/22 for blood loss anemia; H/H stable at 9.2/30.2  - Empiric heparin gtt for elevated D-dimer  - Goal aPTT 58-99  - ENT consult appreciated- nasal and oral exam clear of any sources of bleeding, benefit of anticoagulation outweighs risks given elevated Ddimer and risk for thrombosis    #Endo  - Blood glucose controlled on Humalog Low SS    #Ethics  -Full code

## 2020-05-27 NOTE — PROCEDURE NOTE - NSINDICATIONS_GEN_A_CORE
critical illness/volume resuscitation
monitoring purposes/arterial puncture to obtain ABG's
respiratory failure
venous access/volume resuscitation/critical illness
monitoring purposes/arterial puncture to obtain ABG's/critical patient
respiratory failure/COVID19
arterial puncture to obtain ABG's/monitoring purposes/critical patient

## 2020-05-27 NOTE — PROGRESS NOTE ADULT - ASSESSMENT
65F reporting no PMH who presented last night with complaint of fever/chills (home T-max 102 F), night sweats, cough, myalgias (mid & lower back), loss of taste and smell since Friday 4/24 (still tastes sweet/salty), as well as 2 days of weakness and constipation, found to be COVID positive on 4/28/20.    Pt had agreed to participate in a clinical trial for Covid -19  Please discuss with ID team if planning on taking concurrent treatment with other agents with actual or possible direct acting antiviral activity against SARS-CoV-2 such a chloroquine or hydroxychloroquine. Pt was given Tocilizumab by primary team on the day of enrollment as of concern for cytokine storm.     Hospital course  4/29: Started Ceftriaxone (4/29-5/3) and Azithromycin (4/29-5/4) in ED for possible bacterial PNA  4/30: RRT called for desaturation in 70s before receiving 1st dose of Remdesivir and Tocilizumab  5/4: RRT called for desaturation to 20s after drinking water and coughing, intubated by anesthesia. Sedated and started full dose lovenox after D-dimer elevated to [address]. Transferred to ICU. On Meropenem, Vancomycin, and Caspofungin for emperic sepsis coverage.  5/7: Caspofungin switched to Fluconazole for growth of candida albicans in urine; Vancomycin d/c'ed for negative MRSA swab  5/10: Started CPAP trials  5/12 - abs d/scooby   5/13 hematuria  5/14 O2 requirements increased  5/15--> continues to do poorly  5/19- trials of prone and steroids  5/21- WBC down slightly   5/22- continues to have bloody secretions  5/23 - recurrent fever and positive BCx 1/2 with GPR in anaerobic bottle   5/24-new course of meropenem and vancomycin started  5/25 - new course of solumedrol started  5/27 - moved to [address] [place] [address]    # Shock (persistent) / Fever (persistent) / Positive Blood Culture / Therapeutic Drug Montoring  - WBC persistently elevated, febrile  - S/p full course of meropenem (5/4-5/12), Fluconazole (5/8-5/13), Vancomycin (5/11-5/12), and zosyn    - Pt is s/p Tocilizumab which increases risk of infection and now solumedrol which does as well, including fungal infection  - If able would pursue CT Chest/Abdomen/Pelvis with IV contrast. Given blood from ETT would check CT Head/Neck as well  - ID of GPR - bacillus species- ? procurement contaminant vs line vs other?  - followup on repeat BCx. U/A without pyuria  - Continue Meropenem 1g IV Q8H  - Continue Vancomycin 1g IV Q12H. Check trough prior to fourth. Target trough 10-15  - Continue to monitor renal function and vancomycin troughs to avoid nephrotoxicity / otoxicity secondary to vancomycin    # Acute hypoxic respiratory failure  - Multifactorial, likely secondary to COVID-19 infection with viral PNA, with possible superimposed bacterial vs fungal vs aspiration PNA and/or PE   - r/o effusion. for POC ultrasound  - could patient have bled into lung?    # Elevated LFTs:   - continue to monitor   -  RUQ ultrasound - noted    # Elevated CRP/Ferritin/D-dimer  - Could be COVID-related. CRP, ferritin, trending down.   - D-dimer remains elevated, although much lower than previous peak, could suggest PE or microthrombi. Pt now on heparin drip. Continued AC per primary team.     # elevated WBC (improving)  repeat cultures , repeat fungitel is negative   check lactate  back on abs     # anemia  pt slowly drifted down but remains about the same last week or so  could patient have bled into lung, but not further dropping  Also could pt have traumatized oral palate- ENT input appreciated  CT Head/Neck as above  ENT input appreciated    # Stool  check C diff      Today is Day # 28 of study  Ordinal scale is 2  Study is completed    ID will continue to follow

## 2020-05-27 NOTE — PROCEDURE NOTE - NSINFORMCONSENT_GEN_A_CORE
Benefits, risks, and possible complications of procedure explained to patient/caregiver who verbalized understanding and gave verbal consent.
This was an emergent procedure.
Benefits, risks, and possible complications of procedure explained to patient/caregiver who verbalized understanding and gave verbal consent.
This was an emergent procedure.
Benefits, risks, and possible complications of procedure explained to patient/caregiver who verbalized understanding and gave written consent.
This was an emergent procedure.
This was an emergent procedure.

## 2020-05-27 NOTE — PROGRESS NOTE ADULT - SUBJECTIVE AND OBJECTIVE BOX
CHIEF COMPLAINT: Patient is a 65y old  Female who presents with a chief complaint of COVID-19, Hypoxic respiratory failure (26 May 2020 15:39)    Interval Events: Bumex increased to 1mg TID as patient was still net positive. Fi02 increased to 80% this morning for sp02 83-85%    REVIEW OF SYSTEMS:  [x ] Unable to assess ROS because intubated/sedated    OBJECTIVE:  ICU Vital Signs Last 24 Hrs  T(C): 36.9 (27 May 2020 08:00), Max: 37.5 (27 May 2020 03:00)  T(F): 98.4 (27 May 2020 08:00), Max: 99.5 (27 May 2020 03:00)  HR: 89 (27 May 2020 08:00) (83 - 96)  BP: --  BP(mean): --  ABP: 127/43 (27 May 2020 08:00) (96/40 - 138/55)  ABP(mean): 72 (27 May 2020 08:00) (62 - 85)  RR: 35 (27 May 2020 08:00) (16 - 44)  SpO2: 83% (27 May 2020 08:00) (83% - 95%) on 80%    Mode: AC/ CMV (Assist Control/ Continuous Mandatory Ventilation), RR (machine): 32, FiO2: 80, PEEP: 5, ITime: 0.7, MAP: 23, PC: 45, PIP: 50    05-26 @ 07:01 - 05-27 @ 07:00  --------------------------------------------------------  IN: 4031.2 mL / OUT: 2930 mL / NET: 1101.2 mL    05-27 @ 07:01 - 05-27 @ 10:10  --------------------------------------------------------  IN: 228 mL / OUT: 225 mL / NET: 3 mL      CAPILLARY BLOOD GLUCOSE  150 (26 May 2020 22:00)      POCT Blood Glucose.: 153 mg/dL (27 May 2020 05:14)      PHYSICAL EXAM:  General: Intubated  HEENT: WNL  Neck: WNL  Respiratory: Coarse bilaterally   Cardiovascular: RRR  Abdomen: +BS, ND  Extremities: edematous   Skin: facial DTI  Neurological: sedated    HOSPITAL MEDICATIONS:  MEDICATIONS  (STANDING):  albuterol/ipratropium for Nebulization. 3 milliLiter(s) Nebulizer every 6 hours  BACItracin   Ointment 1 Application(s) Topical two times a day  buMETAnide Injectable 1 milliGRAM(s) IV Push every 8 hours  chlorhexidine 0.12% Liquid 15 milliLiter(s) Oral Mucosa two times a day  chlorhexidine 4% Liquid 1 Application(s) Topical <User Schedule>  famotidine Injectable 20 milliGRAM(s) IV Push daily  fentaNYL   Infusion... 0.501 MICROgram(s)/kG/Hr (1.76 mL/Hr) IV Continuous <Continuous>  heparin  Infusion 1300 Unit(s)/Hr (13 mL/Hr) IV Continuous <Continuous>  insulin lispro (HumaLOG) corrective regimen sliding scale   SubCutaneous every 6 hours  ketamine Infusion. 0.25 mG/kG/Hr (1.76 mL/Hr) IV Continuous <Continuous>  meropenem  IVPB 1000 milliGRAM(s) IV Intermittent every 8 hours  methylPREDNISolone sodium succinate Injectable 40 milliGRAM(s) IV Push two times a day  midodrine 10 milliGRAM(s) Oral every 8 hours  polyethylene glycol 3350 17 Gram(s) Oral two times a day  propofol Infusion 24.893 MICROgram(s)/kG/Min (10.5 mL/Hr) IV Continuous <Continuous>  senna Syrup 10 milliLiter(s) Oral daily  sodium chloride 3%  Inhalation 5 milliLiter(s) Inhalation every 6 hours  vancomycin  IVPB 1000 milliGRAM(s) IV Intermittent every 12 hours  vancomycin  IVPB        MEDICATIONS  (PRN):  acetaminophen    Suspension .. 650 milliGRAM(s) Oral every 6 hours PRN Temp greater or equal to 38C (100.4F)      LABS:  (05-26 @ 23:32)                        9.0  16.13 )-----------( 353                 28.7    Neutrophils = -- (--%)  Lymphocytes = -- (--%)  Eosinophils = -- (--%)  Basophils = -- (--%)  Monocytes = -- (--%)  Bands = --%    WBC Trend: 16.13<--, 16.81<--, 17.48<--  Hb Trend: 9.0<--, 9.2<--, 9.6<--, 9.5<--, 9.1<--  Plt Trend: 353<--, 334<--, 292<--, 289<--, 290<--  05-26    133<L>  |  88<L>  |  21  ----------------------------<  133<H>  4.0   |  38<H>  |  <0.30<L>    Ca    8.3<L>      26 May 2020 23:32  Phos  2.1     05-26  Mg     3.1     05-26    TPro  6.3  /  Alb  2.6<L>  /  TBili  0.6  /  DBili  x   /  AST  91<H>  /  ALT  129<H>  /  AlkPhos  322<H>  05-26    Creatinine Trend: <0.30<--, <0.30<--, 0.33<--, 0.31<--, <0.30<--, <0.30<--  PT/INR - ( 26 May 2020 23:32 )   PT: 11.0 sec;   INR: 0.96 ratio         PTT - ( 26 May 2020 23:32 )  PTT:61.9 sec    Arterial Blood Gas:  05-26 @ 23:29  7.33/81/56/42/88/14.2  ABG lactate: --  Arterial Blood Gas:  05-26 @ 16:45  7.31/86/84/42/97/14.0  ABG lactate: --  Arterial Blood Gas:  05-26 @ 05:40  7.34/87/110/46/98/17.7  ABG lactate: --  Arterial Blood Gas:  05-26 @ 03:52  7.34/90/78/48/95/18.9  ABG lactate: --  Arterial Blood Gas:  05-26 @ 02:11  7.33/91/111/47/98/18.2  ABG lactate: --  Arterial Blood Gas:  05-26 @ 00:09  7.34/92/90/48/96/19.2  ABG lactate: --      CARDIAC MARKERS ( 26 May 2020 23:32 )  Trop x     / CK 63 U/L / CKMB x       CARDIAC MARKERS ( 26 May 2020 00:14 )  Trop x     / CK 50 U/L / CKMB x             MICROBIOLOGY:   Blood Cx: 5/23: Bacillus, 5/24: NGTD  Sputum Cx: 5/24: Normal resp susanne

## 2020-05-27 NOTE — PROCEDURE NOTE - NSSITEPREP_SKIN_A_CORE
Adherence to aseptic technique: hand hygiene prior to donning barriers (gown, gloves), don cap and mask, sterile drape over patient/alcohol/chlorhexidine
Adherence to aseptic technique: hand hygiene prior to donning barriers (gown, gloves), don cap and mask, sterile drape over patient/chlorhexidine
chlorhexidine
chlorhexidine
Adherence to aseptic technique: hand hygiene prior to donning barriers (gown, gloves), don cap and mask, sterile drape over patient/chlorhexidine

## 2020-05-27 NOTE — PROCEDURE NOTE - PROCEDURE DATE TIME, MLM
04-May-2020 16:30
16-May-2020 21:32
04-May-2020 16:00
16-May-2020 15:56
04-May-2020 14:30
15-May-2020 00:00
27-May-2020 14:04

## 2020-05-27 NOTE — PROGRESS NOTE ADULT - ATTENDING COMMENTS
Pt seen and examined. 65 F with no known PMH, now with acute resp failure with hypoxia and hypercapnia/ ARDS 2/2 COVID-19 pneumonia. Continues to require sedation to maintain synchrony with the ventilator. Received Tocilizumab, steroids and Remdesivir. solumedrol BID. Continues to require a FiO2 of 80 %, lung compliance remains poor with high plateau/ peak pressures now also with worsening respiratory acidosis despite optimization of vent parameters. Cont solumedrol BID, aggressive chest PT/ pulm toilet.  Now off IV pressors, cont midodrine Q8H, , keep MAP >65. Receiving diuresis with Bumex 1 mg TID, follow electrolytes BID. Serum bicarb now 38, follow bicarb level, will need Diamox prn. Afebrile overnight, leukocytosis improving, BCxs from 5/23 showing G+ rods, surveillance Bcxs from 5/24 NGTD. Cont on Vanc/ Vaughn. FUP speciation on BCxs from 5/23. No further oropharyngeal bleeding, cont to monitor CBC on therapeutic AC with a heparin gtt. Overall prognosis extremely guarded. Remains critically ill requiring multiple bedside visits and changes in therapy.

## 2020-05-27 NOTE — PROGRESS NOTE ADULT - SUBJECTIVE AND OBJECTIVE BOX
Patient is a 65y old  Female who presents with a chief complaint of COVID-19, Hypoxic respiratory failure (26 May 2020 15:39)    Being followed by ID for        Interval history:  pt was moved to [address] [place] [address]  rectal tube  remains on ventilator  minimal secretions - some blood tinged  No other acute events      PAST MEDICAL & SURGICAL HISTORY:  No pertinent past medical history  History of cholecystectomy  History of appendectomy    Allergies    No Known Allergies    Intolerances      Antimicrobials:    meropenem  IVPB 1000 milliGRAM(s) IV Intermittent every 8 hours  vancomycin  IVPB 1000 milliGRAM(s) IV Intermittent every 12 hours  vancomycin  IVPB        MEDICATIONS  (STANDING):  albuterol/ipratropium for Nebulization. 3 milliLiter(s) Nebulizer every 6 hours  BACItracin   Ointment 1 Application(s) Topical two times a day  buMETAnide Injectable 1 milliGRAM(s) IV Push every 8 hours  chlorhexidine 0.12% Liquid 15 milliLiter(s) Oral Mucosa two times a day  chlorhexidine 4% Liquid 1 Application(s) Topical <User Schedule>  famotidine Injectable 20 milliGRAM(s) IV Push daily  fentaNYL   Infusion... 0.501 MICROgram(s)/kG/Hr (1.76 mL/Hr) IV Continuous <Continuous>  heparin  Infusion 1300 Unit(s)/Hr (13 mL/Hr) IV Continuous <Continuous>  insulin lispro (HumaLOG) corrective regimen sliding scale   SubCutaneous every 6 hours  ketamine Infusion. 0.25 mG/kG/Hr (1.76 mL/Hr) IV Continuous <Continuous>  meropenem  IVPB 1000 milliGRAM(s) IV Intermittent every 8 hours  methylPREDNISolone sodium succinate Injectable 40 milliGRAM(s) IV Push two times a day  midodrine 10 milliGRAM(s) Oral every 8 hours  polyethylene glycol 3350 17 Gram(s) Oral two times a day  propofol Infusion 24.893 MICROgram(s)/kG/Min (10.5 mL/Hr) IV Continuous <Continuous>  senna Syrup 10 milliLiter(s) Oral daily  sodium chloride 3%  Inhalation 5 milliLiter(s) Inhalation every 6 hours  vancomycin  IVPB 1000 milliGRAM(s) IV Intermittent every 12 hours  vancomycin  IVPB        MEDICATIONS  (PRN):  acetaminophen    Suspension .. 650 milliGRAM(s) Oral every 6 hours PRN Temp greater or equal to 38C (100.4F)      Vital Signs Last 24 Hrs  T(C): 36.9 (05-27-20 @ 08:00), Max: 37.5 (05-27-20 @ 03:00)  T(F): 98.4 (05-27-20 @ 08:00), Max: 99.5 (05-27-20 @ 03:00)  HR: 89 (05-27-20 @ 08:00) (83 - 96)  BP: --  BP(mean): --  RR: 35 (05-27-20 @ 08:00) (16 - 44)  SpO2: 83% (05-27-20 @ 08:00) (83% - 95%)  )2 sat is 94% on   Physical Exam:    Constitutional intubated ,sedated    HEENT PERRLA EOMI,No pallor or icterus    No oral exudate or erythema    Neck supple no JVD or LN    Chest Good AE,CTA    CVS RRR S1 S2     Abd softly distended    Ext No cyanosis clubbing or edema    IV site no erythema tenderness or discharge    Joints no swelling or LOM    facial ulcers on cheeks     Lab Data:                          9.0    16.13 )-----------( 353      ( 26 May 2020 23:32 )             28.7       05-26    133<L>  |  88<L>  |  21  ----------------------------<  133<H>  4.0   |  38<H>  |  <0.30<L>    Ca    8.3<L>      26 May 2020 23:32  Phos  2.1     05-26  Mg     3.1     05-26    TPro  6.3  /  Alb  2.6<L>  /  TBili  0.6  /  DBili  x   /  AST  91<H>  /  ALT  129<H>  /  AlkPhos  322<H>  05-26          .Blood Blood-Peripheral  05-24-20   No growth to date.  --  --      .Sputum Sputum  05-24-20   Normal Respiratory Farzaneh present  --    Few polymorphonuclear leukocytes per low power field  Rare Squamous epithelial cells per low power field  No organisms seen per oil power field      .Blood Blood-Peripheral  05-23-20   Growth in anaerobic bottle: Bacillus species not anthracis  "Susceptibilities not performed"  --    Growth in anaerobic bottle: Gram Positive Rods      .Urine Catheterized  05-23-20   No growth  --  --      .Blood Blood  05-20-20   No Growth Final  --  --    Vancomycin Level, Trough: 13.0 ug/mL (05-26-20 @ 17:33)      WBC Count: 16.13 (05-26-20 @ 23:32)  WBC Count: 16.81 (05-26-20 @ 00:14)  WBC Count: 17.48 (05-25-20 @ 00:28)  WBC Count: 17.81 (05-24-20 @ 16:26)  WBC Count: 15.75 (05-24-20 @ 00:09)  WBC Count: 14.96 (05-23-20 @ 15:33)  WBC Count: 15.91 (05-23-20 @ 01:55)  WBC Count: 16.35 (05-22-20 @ 17:26)  WBC Count: 21.02 (05-22-20 @ 01:16)  WBC Count: 15.15 (05-21-20 @ 01:47)    D-Dimer Assay, Quantitative: 1031 ng/mL DDU (05-26)  D-Dimer Assay, Quantitative: 1168 ng/mL DDU (05-26)  D-Dimer Assay, Quantitative: 1326 ng/mL DDU (05-25)  D-Dimer Assay, Quantitative: 1295 ng/mL DDU (05-24)    Ferritin, Serum: 1017 ng/mL (05-27)  Ferritin, Serum: 1017 ng/mL (05-26)  Ferritin, Serum: 1145 ng/mL (05-25)  Ferritin, Serum: 1130 ng/mL (05-24)    C-Reactive Protein, Serum: 12.22 ug/mL (05-26-20 @ 23:32)  C-Reactive Protein, Serum: 17.09 ug/mL (05-26-20 @ 00:14)  C-Reactive Protein, Serum: 20.92 mg/dL (05-25-20 @ 00:28)  C-Reactive Protein, Serum: 13.66 mg/dL (05-24-20 @ 00:09)      Ordinal Scale: score : 2  1) Death  2) Hospitalized, on invasive mechanical ventilation or ECMO  3) Hospitalized, on non-invasive ventilation or high flow oxygen devices  4) Hospitalized, requiring low flow(<11l/min) supplemental oxygen  5) Hospitalized, not requiring supplemental oxygen - requiring ongoing medical care(COVID-19 related or otherwise)  6) Hospitalized, not requiring supplemental oxygen - no longer requires ongoing medical care(other than per protocol RDV administration)  7) Not hospitalized

## 2020-05-28 NOTE — PROGRESS NOTE ADULT - ATTENDING COMMENTS
1.acute hypoxemic respiratory failure from ARDS due to SARS-2 pneumonia , corona virus pneumonia. Pt remains on PC ventilation with PC45, Peep5 with Vt ~ 270. Pt remains hypercapnic. Elevated serum bicarb to try to compensate for hypercapnia. . Pt remain hypoxemic with high FIO2 requirements and  very poor lung compliance.  Will need to discuss goals of care with family. Pt with very poor prognosis because of fibrotic lung .  Tracheostomy will NOT change outcome .

## 2020-05-28 NOTE — CHART NOTE - NSCHARTNOTEFT_GEN_A_CORE
Spoke with JUANA Clark. Consult was placed 2/2 to very poor prognosis given high oxygen requirements and severe ARDS in the setting of COVID 19 infection. She and her attending, Dr. Mast would like to speak with family prior to palliative reaching out. JUANA Clark will let me know after she speaks with them and then palliative will consult.

## 2020-05-28 NOTE — PROGRESS NOTE ADULT - SUBJECTIVE AND OBJECTIVE BOX
Patient is a 65y old  Female who presents with a chief complaint of COVID-19, Hypoxic respiratory failure (28 May 2020 09:53)    Being followed by ID for        Interval history:  No other acute events      ROS:  No cough,SOB,CP  No N/V/D  No abd pain  No urinary complaints  No HA  No joint or limb pain  No other complaints    PAST MEDICAL & SURGICAL HISTORY:  No pertinent past medical history  History of cholecystectomy  History of appendectomy    Allergies    No Known Allergies    Intolerances      Antimicrobials:    meropenem  IVPB 1000 milliGRAM(s) IV Intermittent every 8 hours  vancomycin  IVPB 1000 milliGRAM(s) IV Intermittent every 12 hours  vancomycin  IVPB        MEDICATIONS  (STANDING):  ALBUTerol    90 MICROgram(s) HFA Inhaler 2 Puff(s) Inhalation every 6 hours  BACItracin   Ointment 1 Application(s) Topical two times a day  buMETAnide Injectable 1 milliGRAM(s) IV Push every 8 hours  chlorhexidine 0.12% Liquid 15 milliLiter(s) Oral Mucosa two times a day  chlorhexidine 4% Liquid 1 Application(s) Topical <User Schedule>  famotidine Injectable 20 milliGRAM(s) IV Push daily  fentaNYL   Infusion... 0.501 MICROgram(s)/kG/Hr (1.76 mL/Hr) IV Continuous <Continuous>  heparin  Infusion 1300 Unit(s)/Hr (13 mL/Hr) IV Continuous <Continuous>  insulin lispro (HumaLOG) corrective regimen sliding scale   SubCutaneous every 6 hours  ketamine Infusion. 0.25 mG/kG/Hr (1.76 mL/Hr) IV Continuous <Continuous>  meropenem  IVPB 1000 milliGRAM(s) IV Intermittent every 8 hours  midodrine 10 milliGRAM(s) Oral every 8 hours  propofol Infusion 24.893 MICROgram(s)/kG/Min (10.5 mL/Hr) IV Continuous <Continuous>  vancomycin  IVPB 1000 milliGRAM(s) IV Intermittent every 12 hours  vancomycin  IVPB          Vital Signs Last 24 Hrs  T(C): 37.8 (05-28-20 @ 12:00), Max: 37.8 (05-28-20 @ 04:00)  T(F): 100 (05-28-20 @ 12:00), Max: 100 (05-28-20 @ 04:00)  HR: 90 (05-28-20 @ 15:41) (85 - 104)  BP: --  BP(mean): --  RR: 32 (05-28-20 @ 14:00) (32 - 32)  SpO2: 95% (05-28-20 @ 15:41) (88% - 98%)    Physical Exam:    Constitutional well preserved,comfortable,pleasant    HEENT PERRLA EOMI,No pallor or icterus    No oral exudate or erythema    Neck supple no JVD or LN    Chest Good AE,CTA    CVS RRR S1 S2 WNl No murmur or rub or gallop    Abd soft BS normal No tenderness no masses    Ext No cyanosis clubbing or edema    IV site no erythema tenderness or discharge    Joints no swelling or LOM    CNS AAO X 3 no focal    Lab Data:                          8.4    17.29 )-----------( 389      ( 28 May 2020 01:04 )             28.1       05-28    134<L>  |  86<L>  |  22  ----------------------------<  148<H>  3.4<L>   |  39<H>  |  <0.30<L>    Ca    8.1<L>      28 May 2020 01:04  Phos  3.2     05-28  Mg     2.4     05-28    TPro  6.5  /  Alb  2.6<L>  /  TBili  0.5  /  DBili  x   /  AST  126<H>  /  ALT  147<H>  /  AlkPhos  386<H>  05-28          .Blood Blood-Peripheral  05-24-20   No growth to date.  --  --      .Sputum Sputum  05-24-20   Normal Respiratory Farzaneh present  --    Few polymorphonuclear leukocytes per low power field  Rare Squamous epithelial cells per low power field  No organisms seen per oil power field      .Blood Blood-Peripheral  05-23-20   Growth in anaerobic bottle: Bacillus species not anthracis  "Susceptibilities not performed"  --    Growth in anaerobic bottle: Gram Positive Rods      .Urine Catheterized  05-23-20   No growth  --  --                Vancomycin Level, Trough: 15.7 ug/mL (05-28-20 @ 04:56)  Vancomycin Level, Trough: 13.0 ug/mL (05-26-20 @ 17:33)      WBC Count: 17.29 (05-28-20 @ 01:04)  WBC Count: 16.13 (05-26-20 @ 23:32)  WBC Count: 16.81 (05-26-20 @ 00:14)  WBC Count: 17.48 (05-25-20 @ 00:28)  WBC Count: 17.81 (05-24-20 @ 16:26)  WBC Count: 15.75 (05-24-20 @ 00:09)  WBC Count: 14.96 (05-23-20 @ 15:33)  WBC Count: 15.91 (05-23-20 @ 01:55)  WBC Count: 16.35 (05-22-20 @ 17:26)  WBC Count: 21.02 (05-22-20 @ 01:16) Patient is a 65y old  Female who presents with a chief complaint of COVID-19, Hypoxic respiratory failure (28 May 2020 09:53)    Being followed by ID for        Interval history:  pt remains intubated  some bloody oral secretions and ET tube secretions  still with watery diarrhea  not on pressors  No other acute events      PAST MEDICAL & SURGICAL HISTORY:  No pertinent past medical history  History of cholecystectomy  History of appendectomy    Allergies    No Known Allergies    Intolerances      Antimicrobials:    meropenem  IVPB 1000 milliGRAM(s) IV Intermittent every 8 hours  vancomycin  IVPB 1000 milliGRAM(s) IV Intermittent every 12 hours  vancomycin  IVPB        MEDICATIONS  (STANDING):  ALBUTerol    90 MICROgram(s) HFA Inhaler 2 Puff(s) Inhalation every 6 hours  BACItracin   Ointment 1 Application(s) Topical two times a day  buMETAnide Injectable 1 milliGRAM(s) IV Push every 8 hours  chlorhexidine 0.12% Liquid 15 milliLiter(s) Oral Mucosa two times a day  chlorhexidine 4% Liquid 1 Application(s) Topical <User Schedule>  famotidine Injectable 20 milliGRAM(s) IV Push daily  fentaNYL   Infusion... 0.501 MICROgram(s)/kG/Hr (1.76 mL/Hr) IV Continuous <Continuous>  heparin  Infusion 1300 Unit(s)/Hr (13 mL/Hr) IV Continuous <Continuous>  insulin lispro (HumaLOG) corrective regimen sliding scale   SubCutaneous every 6 hours  ketamine Infusion. 0.25 mG/kG/Hr (1.76 mL/Hr) IV Continuous <Continuous>  meropenem  IVPB 1000 milliGRAM(s) IV Intermittent every 8 hours  midodrine 10 milliGRAM(s) Oral every 8 hours  propofol Infusion 24.893 MICROgram(s)/kG/Min (10.5 mL/Hr) IV Continuous <Continuous>  vancomycin  IVPB 1000 milliGRAM(s) IV Intermittent every 12 hours  vancomycin  IVPB          Vital Signs Last 24 Hrs  T(C): 37.8 (05-28-20 @ 12:00), Max: 37.8 (05-28-20 @ 04:00)  T(F): 100 (05-28-20 @ 12:00), Max: 100 (05-28-20 @ 04:00)  HR: 90 (05-28-20 @ 15:41) (85 - 104)  BP: --  BP(mean): --  RR: 32 (05-28-20 @ 14:00) (32 - 32)  SpO2: 95% (05-28-20 @ 15:41) (88% - 98%)    Physical Exam:    Constitutional intubated sedated, some abrasions on cheeks    HEENT PERRL    Chest positve BS bilateral    CVS S1 S2     Abd soft     Ext No cyanosis clubbing or edema    IV site no erythema tenderness or discharge    Lab Data:                          8.4    17.29 )-----------( 389      ( 28 May 2020 01:04 )             28.1       05-28    134<L>  |  86<L>  |  22  ----------------------------<  148<H>  3.4<L>   |  39<H>  |  <0.30<L>    Ca    8.1<L>      28 May 2020 01:04  Phos  3.2     05-28  Mg     2.4     05-28    TPro  6.5  /  Alb  2.6<L>  /  TBili  0.5  /  DBili  x   /  AST  126<H>  /  ALT  147<H>  /  AlkPhos  386<H>  05-28          .Blood Blood-Peripheral  05-24-20   No growth to date.  --  --      .Sputum Sputum  05-24-20   Normal Respiratory Farzaneh present  --    Few polymorphonuclear leukocytes per low power field  Rare Squamous epithelial cells per low power field  No organisms seen per oil power field      .Blood Blood-Peripheral  05-23-20   Growth in anaerobic bottle: Bacillus species not anthracis  "Susceptibilities not performed"  --    Growth in anaerobic bottle: Gram Positive Rods      .Urine Catheterized  05-23-20   No growth  --  --      Vancomycin Level, Trough: 15.7 ug/mL (05-28-20 @ 04:56)  Vancomycin Level, Trough: 13.0 ug/mL (05-26-20 @ 17:33)      WBC Count: 17.29 (05-28-20 @ 01:04)  WBC Count: 16.13 (05-26-20 @ 23:32)  WBC Count: 16.81 (05-26-20 @ 00:14)  WBC Count: 17.48 (05-25-20 @ 00:28)  WBC Count: 17.81 (05-24-20 @ 16:26)  WBC Count: 15.75 (05-24-20 @ 00:09)  WBC Count: 14.96 (05-23-20 @ 15:33)  WBC Count: 15.91 (05-23-20 @ 01:55)  WBC Count: 16.35 (05-22-20 @ 17:26)  WBC Count: 21.02 (05-22-20 @ 01:16)      < from: Xray Chest 1 View- PORTABLE-Routine (05.28.20 @ 08:54) >  EXAM:  XR CHEST PORTABLE ROUTINE 1V                            PROCEDURE DATE:  05/28/2020            INTERPRETATION:  EXAMINATION: XR CHEST    CLINICAL INDICATION: hypoxic resp failure    TECHNIQUE: Frontal radiograph of the chest was obtained.    COMPARISON: 5.28.20.    FINDINGS:     Cardiac silhouette normal in size. Endotracheal tube tip mid trachea.  Enteric tube tip below diaphragm.  Bilateral patchy lung opacities.. No pleural effusion or pneumothorax.  Endotracheal tube tip mid trachea. Enteric tube tip below diaphragm.      IMPRESSION:   No change.    EV HOGAN M.D., ATTENDING RADIOLOGIST  This document has been electronically signed. May 28 2020 11:24AM    < end of copied text >          < from: US Abdomen Upper Quadrant Right (05.26.20 @ 13:13) >    EXAM:  US ABDOMEN RT UPR QUADRANT                            PROCEDURE DATE:  05/26/2020            INTERPRETATION:  CLINICAL INFORMATION: Transaminitis. Hypoxic respiratory failure secondary to Covid pneumonia.    COMPARISON: Correlation is made with prior abdominal CT dated 5/7/2018.    TECHNIQUE: Portable Sonography of the right upper quadrant.     FINDINGS:  Liver: Within normal limits. Smooth contour.  Bile ducts: Normal caliber. Common bile duct measures 5 mm.   Gallbladder: Status post cholecystectomy.        Pancreas: Visualized portions are within normal limits.Limited evaluation of the pancreatic head and tail.    Right kidney: 11.4 cm. No hydronephrosis.  Ascites: None.  IVC: Visualized portions are within normal limits.    Miscellaneous: Right pleural effusion.    IMPRESSION: No biliary ductal dilatation.    Right pleural effusion.    < end of copied text >

## 2020-05-28 NOTE — PROGRESS NOTE ADULT - ASSESSMENT
65F reporting no PMH who presented last night with complaint of fever/chills (home T-max 102 F), night sweats, cough, myalgias (mid & lower back), loss of taste and smell since Friday 4/24 (still tastes sweet/salty), as well as 2 days of weakness and constipation, found to be COVID positive on 4/28/20.      Hospital course  4/29: Started Ceftriaxone (4/29-5/3) and Azithromycin (4/29-5/4) in ED for possible bacterial PNA  4/30: RRT called for desaturation in 70s before receiving 1st dose of Remdesivir and Tocilizumab  5/4: RRT called for desaturation to 20s after drinking water and coughing, intubated by anesthesia. Sedated and started full dose lovenox after D-dimer elevated to [address]. Transferred to ICU. On Meropenem, Vancomycin, and Caspofungin for emperic sepsis coverage.  5/7: Caspofungin switched to Fluconazole for growth of candida albicans in urine; Vancomycin d/c'ed for negative MRSA swab  5/10: Started CPAP trials  5/12 - abs d/scooby   5/13 hematuria  5/14 O2 requirements increased  5/15--> continues to do poorly  5/19- trials of prone and steroids  5/21- WBC down slightly   5/22- continues to have bloody secretions  5/23 - recurrent fever and positive BCx 1/2 with GPR in anaerobic bottle   5/24-new course of meropenem and vancomycin started  5/25 - new course of solumedrol started  5/27 - moved to [address] [place] [address]  5/28 - off steroids  sputum still bloody      # Shock (persistent) / Fever (persistent) / Positive Blood Culture / Therapeutic Drug [place]  - WBC persistently elevated, febrile  - S/p full course of meropenem (5/4-5/12), Fluconazole (5/8-5/13), Vancomycin (5/11-5/12), and zosyn    - Pt is s/p Tocilizumab which increases risk of infection and now solumedrol which does as well, including fungal infection  - If able would pursue CT Chest/Abdomen/Pelvis with IV contrast. Given blood from ETT would check CT Head/Neck as well  - ID of GPR - bacillus species- ? procurement contaminant vs line vs other?  - followup on repeat BCx. U/A without pyuria  - Continue Meropenem 1g IV Q8H  - Continue Vancomycin 1g IV Q12H. Check trough prior to fourth. Target trough 10-15  - Continue to monitor renal function and vancomycin troughs to avoid nephrotoxicity / otoxicity secondary to vancomycin  check sputum culture  check repeat blood cultures    # Acute hypoxic respiratory failure  - Multifactorial, likely secondary to COVID-19 infection with viral PNA, with possible superimposed bacterial vs fungal vs aspiration PNA and/or PE   - r/o effusion. for POC ultrasound  - could patient have bled into lung?    # Elevated LFTs:   - continue to monitor   -  RUQ ultrasound - noted  - alk phos is climbing  check amylase, lipase  - ? drug related  ? congestion    # Elevated CRP/Ferritin/D-dimer  - Could be COVID-related. CRP, ferritin, trending down.   - D-dimer remains elevated, although much lower than previous peak, could suggest PE or microthrombi. Pt now on heparin drip. Continued AC per primary team.     # elevated WBC (improving)  repeat cultures , repeat fungitel is negative   check lactate  back on abs     # anemia  pt slowly drifted down but remains about the same last week or so  could patient have bled into lung, but not further dropping  Also could pt have traumatized oral palate- ENT input appreciated  CT Head/Neck as above  ENT input appreciated  consider reevaluation if sputum still bloody , ? trach???    # Stool  check C diff, still loose off miralax      ID will continue to follow

## 2020-05-28 NOTE — CONSULT NOTE ADULT - SUBJECTIVE AND OBJECTIVE BOX
HPI:  Portions of this History and Physical Exam are adopted from ER Provider Notation per [facility] policy to limit healthcare provider exposure during COVID19 Pandemic    65F reporting no PMH who presented last night with complaint of fever/chills (home T-max 102 F), night sweats, cough, myalgias (mid & lower back), loss of taste and smell since Friday (still tastes sweet/salty), as well as 2 days of weakness and constipation. Yesterday she was taking a nap, woke up, and measured her pulse-ox at 89%. She called her PCP who recommended that she go to the emergency department.   Denied sick contacts, recent travel.     After initial period in the ED, she was transferred to the CDU for further monitoring prior to dispo decision. During her ED/CDU course, she developed fever of T-max 103.2 F, HR , BP (110-149)/(70-80). Her ED resting pulse-ox measured 92-94% with ambulatory pulse-ox 88-90% and non-labored breathing. Her symptoms worsened in the CDU, with note tachypnea (25-30) upon ambulating to the bathroom and tachycardia 120 bpm, with SpO2 88-90% on 6L NC, temporarily requiring NRB 8L.  Initial Labs: WBC 14.76, Hgb 13.3, BUN/Cr 7/.66, D-dimer 290, Ferritin 881, AST//90, Procalcitonin .26, COVID PCR POSITIVE.  Initial Imaging: X-ray Chest demonstrated patchy bilateral opacities    In the ED, she was given Azithromycin/Ceftriaxone, Tylenol 975 mg + Tylenol 650 mg (29 Apr 2020 09:05)    PERTINENT PM/SXH:   No pertinent past medical history    History of cholecystectomy  History of appendectomy  S/P appendectomy  H/O lithotripsy  No significant past surgical history    FAMILY HISTORY:  No pertinent family history in first degree relatives    ITEMS NOT CHECKED ARE NOT PRESENT    SOCIAL HISTORY:   Significant other/partner:  [x ]  Children: 3 daughters [ ]  Amish/Spirituality:  Substance hx:  [ ]   Tobacco hx:  [ ]   Alcohol hx: [ ]   Home Opioid hx:  [ ] I-Stop Reference No:  Living Situation: [x ]Home  [ ]Long term care  [ ]Rehab [ ]Other    ADVANCE DIRECTIVES:    DNR  MOLST  [ ]  Living Will  [ ]   DECISION MAKER(s):  [ ] Health Care Proxy(s)  [ ] Surrogate(s)  [ ] Guardian           Name(s): Phone Number(s):    BASELINE (I)ADL(s) (prior to admission):  [place]: [ x]Total  [ ] Moderate [ ]Dependent    Allergies    No Known Allergies    Intolerances    MEDICATIONS  (STANDING):  ALBUTerol    90 MICROgram(s) HFA Inhaler 2 Puff(s) Inhalation every 6 hours  BACItracin   Ointment 1 Application(s) Topical two times a day  buMETAnide Injectable 1 milliGRAM(s) IV Push every 8 hours  chlorhexidine 0.12% Liquid 15 milliLiter(s) Oral Mucosa two times a day  chlorhexidine 4% Liquid 1 Application(s) Topical <User Schedule>  famotidine Injectable 20 milliGRAM(s) IV Push daily  fentaNYL   Infusion... 0.501 MICROgram(s)/kG/Hr (1.76 mL/Hr) IV Continuous <Continuous>  heparin  Infusion 1300 Unit(s)/Hr (13 mL/Hr) IV Continuous <Continuous>  insulin lispro (HumaLOG) corrective regimen sliding scale   SubCutaneous every 6 hours  ketamine Infusion. 0.25 mG/kG/Hr (1.76 mL/Hr) IV Continuous <Continuous>  meropenem  IVPB 1000 milliGRAM(s) IV Intermittent every 8 hours  midodrine 10 milliGRAM(s) Oral every 8 hours  propofol Infusion 24.893 MICROgram(s)/kG/Min (10.5 mL/Hr) IV Continuous <Continuous>  vancomycin  IVPB 1000 milliGRAM(s) IV Intermittent every 12 hours  vancomycin  IVPB        MEDICATIONS  (PRN):  acetaminophen    Suspension .. 650 milliGRAM(s) Oral every 6 hours PRN Temp greater or equal to 38C (100.4F)    Due to COVID 19 and in an effort to decrease provider exposure and use fo PPE, H&P, ROS and/or PE was taken from primary team's note.    PRESENT SYMPTOMS: [ ]Unable to obtain due to poor mentation   Source if other than patient:  [ ]Family   [ ]Team     Pain: [ ] yes [ ] no  QOL impact -   Location -                    Aggravating factors -  Quality -  Radiation -  Timing-  Severity (0-10 scale):  Minimal acceptable level (0-10 scale):     PAIN AD Score:     [email or web address] (press ctrl +  left click to view)    Due to COVID 19, ROS per primary team    Dyspnea:                           [ ]Mild [ ]Moderate [ ]Severe  Anxiety:                             [ ]Mild [ ]Moderate [ ]Severe  Fatigue:                             [ ]Mild [ ]Moderate [ ]Severe  Nausea:                             [ ]Mild [ ]Moderate [ ]Severe  Loss of appetite:              [ ]Mild [ ]Moderate [ ]Severe  Constipation:                    [ ]Mild [ ]Moderate [ ]Severe    Other Symptoms:  [ ]All other review of systems negative     Karnofsky Performance Score/Palliative Performance Status Version 2:         %    http://npcrc.org/files/news/palliative_performance_scale_ppsv2.pdf  PHYSICAL EXAM:  Vital Signs Last 24 Hrs  T(C): 37.5 (28 May 2020 10:00), Max: 37.8 (28 May 2020 04:00)  T(F): 99.5 (28 May 2020 10:00), Max: 100 (28 May 2020 04:00)  HR: 95 (28 May 2020 10:00) (85 - 104)  BP: --  BP(mean): --  RR: 32 (28 May 2020 10:00) (32 - 35)  SpO2: 93% (28 May 2020 10:00) (88% - 99%) I&O's Summary    27 May 2020 07:01  -  28 May 2020 07:00  --------------------------------------------------------  IN: 3065.9 mL / OUT: 2650 mL / NET: 415.9 mL    28 May 2020 07:01  -  28 May 2020 10:34  --------------------------------------------------------  IN: 328.5 mL / OUT: 0 mL / NET: 328.5 mL    GENERAL: Deferred due to COVID 19  [ ]Alert  [ ]Oriented x   [ ]Lethargic  [ ]Cachexia  [ ]Unarousable  [ ]Verbal  [ ]Non-Verbal  Behavioral: Deferred due to COVID 19  [ ] Anxiety  [ ] Delirium [ ] Agitation [ ] Other  HEENT: Deferred due to COVID 19  [ ]Normal   [ ]Dry mouth   [ ]ET Tube/Trach  [ ]Oral lesions  PULMONARY: Deferred due to COVID 19  [ ]Clear [ ]Tachypnea  [ ]Audible excessive secretions   [ ]Rhonchi        [ ]Right [ ]Left [ ]Bilateral  [ ]Crackles        [ ]Right [ ]Left [ ]Bilateral  [ ]Wheezing     [ ]Right [ ]Left [ ]Bilateral  CARDIOVASCULAR: Deferred due to COVID 19  [ ]Regular [ ]Irregular [ ]Tachy  [ ]Evelio [ ]Murmur [ ]Other  GASTROINTESTINAL: Deferred due to COVID 19  [ ]Soft  [ ]Distended   [ ]+BS  [ ]Non tender [ ]Tender  [ ]PEG [ ]OGT/ NGT  Last BM:   GENITOURINARY: Deferred due to COVID 19  [ ]Normal [ ] Incontinent   [ ]Oliguria/Anuria   [ ]Child  MUSCULOSKELETAL: Deferred due to COVID 19  [ ]Normal   [ ]Weakness  [ ]Bed/Wheelchair bound [ ]Edema  NEUROLOGIC: Deferred due to COVID 19  [ ]No focal deficits  [ ] Cognitive impairment  [ ] Dysphagia [ ]Dysarthria [ ] Paresis [ ]Other   SKIN: Deferred due to COVID 19  [ ]Normal   [ ]Pressure ulcer(s)  [ ]Rash    CRITICAL CARE:  [ ] Shock Present  [ ]Septic [ ]Cardiogenic [ ]Neurologic [ ]Hypovolemic  [ ]  Vasopressors [ ]  Inotropes   [x ] Respiratory failure present [ ] mechanical ventilation [ ] non-invasive ventilatory support [ ] High flow  [x ] Acute  [ ] Chronic [x ] Hypoxic  [ ] Hypercarbic [ ] Other  [ ] Other organ failure     LABS:                        8.4    17.29 )-----------( 389      ( 28 May 2020 01:04 )             28.1   05-28    134<L>  |  86<L>  |  22  ----------------------------<  148<H>  3.4<L>   |  39<H>  |  <0.30<L>    Ca    8.1<L>      28 May 2020 01:04  Phos  3.2     05-28  Mg     2.4     05-28    TPro  6.5  /  Alb  2.6<L>  /  TBili  0.5  /  DBili  x   /  AST  126<H>  /  ALT  147<H>  /  AlkPhos  386<H>  05-28  PT/INR - ( 28 May 2020 01:04 )   PT: 10.7 sec;   INR: 0.94 ratio         PTT - ( 28 May 2020 01:04 )  PTT:66.4 sec      RADIOLOGY & ADDITIONAL STUDIES:    PROTEIN CALORIE MALNUTRITION PRESENT: [ ] Yes [ ] No  [ ] PPSV2 < or = to 30% [ ] significant weight loss  [ ] poor nutritional intake [ ] catabolic state [ ] anasarca     Albumin, Serum: 2.6 g/dL (05-28-20 @ 01:04)  Artificial Nutrition [ ]     REFERRALS:   [ ]Chaplaincy  [ ] Hospice  [ ]Child Life  [ ]Social Work  [ ]Case management [ ]Holistic Therapy     Goals of Care Document:   Care Coordination Assessment [C. Provider] (04-29-20 @ 12:25)   Progress Notes - Care Coordination [C. Provider] (05-27-20 @ 15:10)

## 2020-05-28 NOTE — CONSULT NOTE ADULT - REASON FOR ADMISSION
COVID-19, Hypoxic respiratory failure

## 2020-05-28 NOTE — CONSULT NOTE ADULT - ASSESSMENT
64 y/o F with no significant PMH presents 4/29 with hypoxic respiratory failure 2/2 COVID PNA, s/p Tocilizumab (4/30), s/p empiric Ceftriaxone/Azithro (4/30-5/4) for ?superimposed CAP. s/p 10 days of Remdesivir trial (4/30-5/9). Intubated 5/4 for worsened hypoxic respiratory failure from severe ARDS requiring proning on 5/6, 5/7, 5/8, 5/17 & 5/19.  Sedated and started Hep gtt for elevated D-dimer 73863. S/p course of  Meropenem, Vancomycin, and Caspofungin for empiric sepsis coverage (5/4- 5/12); Caspofungin switched to Fluconazole on 5/7 for growth of candida albicans in urine. s/p bronch on 5/16 for increased R sided opacities but she had minimal secretions, s/p 7 day course of Zosyn empirically. Pt was starting to improve respiratory wise, tolerating CPAP trials but on 5/17 she decompensated, had worsened hypoxic respiratory failure and required re-proning and paralyzed with Loi. Remains on high fi02 with poor lung compliance. Changed to PC ventilation on 5/22, now with bacillus (1/4 bottles) from 5/23, started on Vanc/Vaughn.    Palliative team called for GOC in the setting of respiratory failure and high oxygen requirements along with severe ARDS 2/2 to COVID 19 infection.

## 2020-05-28 NOTE — PROGRESS NOTE ADULT - SUBJECTIVE AND OBJECTIVE BOX
CHIEF COMPLAINT: Patient is a 65y old  Female who presents with a chief complaint of COVID-19, Hypoxic respiratory failure (27 May 2020 10:13)    Interval Events: Patient became more acidotic overnight, pressure control settings increased to 48/5, fi02 increased to 100% then lowered to 90% this morning.     REVIEW OF SYSTEMS:  [x ] Unable to assess ROS because intubated/sedated    OBJECTIVE:  ICU Vital Signs Last 24 Hrs  T(C): 37.2 (28 May 2020 08:00), Max: 37.8 (28 May 2020 04:00)  T(F): 99 (28 May 2020 08:00), Max: 100 (28 May 2020 04:00)  HR: 92 (28 May 2020 08:15) (85 - 104)  BP: --  BP(mean): --  ABP: 106/45 (28 May 2020 08:00) (95/45 - 125/54)  ABP(mean): 66 (28 May 2020 08:00) (65 - 84)  RR: 32 (28 May 2020 08:00) (32 - 35)  SpO2: 96% (28 May 2020 08:15) (88% - 99%) on 90%    Mode: AC/ CMV (Assist Control/ Continuous Mandatory Ventilation), RR (machine): 32, FiO2: 90, PEEP: 5, ITime: 1, MAP: 24, PC: 48, PIP: 52    05-27 @ 07:01  -  05-28 @ 07:00  --------------------------------------------------------  IN: 3065.9 mL / OUT: 2650 mL / NET: 415.9 mL    05-28 @ 07:01  -  05-28 @ 09:53  --------------------------------------------------------  IN: 328.5 mL / OUT: 0 mL / NET: 328.5 mL      CAPILLARY BLOOD GLUCOSE  150 (26 May 2020 22:00)      POCT Blood Glucose.: 149 mg/dL (28 May 2020 06:07)      PHYSICAL EXAM:  General: Intubated  HEENT: WNL  Neck: WNL  Respiratory: Coarse bilaterally  Cardiovascular: RRR  Abdomen: +BS, ND, rectal tube  Extremities: +edema  Skin: facial DTI  Neurological: sedated    HOSPITAL MEDICATIONS:  MEDICATIONS  (STANDING):  ALBUTerol    90 MICROgram(s) HFA Inhaler 2 Puff(s) Inhalation every 6 hours  BACItracin   Ointment 1 Application(s) Topical two times a day  buMETAnide Injectable 1 milliGRAM(s) IV Push every 8 hours  chlorhexidine 0.12% Liquid 15 milliLiter(s) Oral Mucosa two times a day  chlorhexidine 4% Liquid 1 Application(s) Topical <User Schedule>  famotidine Injectable 20 milliGRAM(s) IV Push daily  fentaNYL   Infusion... 0.501 MICROgram(s)/kG/Hr (1.76 mL/Hr) IV Continuous <Continuous>  heparin  Infusion 1300 Unit(s)/Hr (13 mL/Hr) IV Continuous <Continuous>  insulin lispro (HumaLOG) corrective regimen sliding scale   SubCutaneous every 6 hours  ketamine Infusion. 0.25 mG/kG/Hr (1.76 mL/Hr) IV Continuous <Continuous>  meropenem  IVPB 1000 milliGRAM(s) IV Intermittent every 8 hours  midodrine 10 milliGRAM(s) Oral every 8 hours  propofol Infusion 24.893 MICROgram(s)/kG/Min (10.5 mL/Hr) IV Continuous <Continuous>  vancomycin  IVPB 1000 milliGRAM(s) IV Intermittent every 12 hours  vancomycin  IVPB        MEDICATIONS  (PRN):  acetaminophen    Suspension .. 650 milliGRAM(s) Oral every 6 hours PRN Temp greater or equal to 38C (100.4F)      LABS:  (05-28 @ 01:04)                        8.4  17.29 )-----------( 389                 28.1    Neutrophils = -- (--%)  Lymphocytes = -- (--%)  Eosinophils = -- (--%)  Basophils = -- (--%)  Monocytes = -- (--%)  Bands = --%    WBC Trend: 17.29<--, 16.13<--, 16.81<--  Hb Trend: 8.4<--, 9.0<--, 9.2<--, 9.6<--, 9.5<--  Plt Trend: 389<--, 353<--, 334<--, 292<--, 289<--  05-28    134<L>  |  86<L>  |  22  ----------------------------<  148<H>  3.4<L>   |  39<H>  |  <0.30<L>    Ca    8.1<L>      28 May 2020 01:04  Phos  3.2     05-28  Mg     2.4     05-28    TPro  6.5  /  Alb  2.6<L>  /  TBili  0.5  /  DBili  x   /  AST  126<H>  /  ALT  147<H>  /  AlkPhos  386<H>  05-28    Creatinine Trend: <0.30<--, <0.30<--, <0.30<--, <0.30<--, 0.33<--, 0.31<--  PT/INR - ( 28 May 2020 01:04 )   PT: 10.7 sec;   INR: 0.94 ratio         PTT - ( 28 May 2020 01:04 )  PTT:66.4 sec    Arterial Blood Gas:  05-28 @ 04:03  7.31/94/162/46/99/18.0  ABG lactate: --  Arterial Blood Gas:  05-28 @ 00:50  7.22/>104/78/46/94/15.4  ABG lactate: --  Arterial Blood Gas:  05-27 @ 16:17  7.31/90/101/45/98/16.2  ABG lactate: --  Arterial Blood Gas:  05-26 @ 23:29  7.33/81/56/42/88/14.2  ABG lactate: --  Arterial Blood Gas:  05-26 @ 16:45  7.31/86/84/42/97/14.0  ABG lactate: --      CARDIAC MARKERS ( 28 May 2020 01:04 )  Trop x     / CK 94 U/L / CKMB x       CARDIAC MARKERS ( 26 May 2020 23:32 )  Trop x     / CK 63 U/L / CKMB x             MICROBIOLOGY:   Blood Cx: 5/23: Bacillus (1/4 bottles), 5/24: NGTD    RADIOLOGY:  X Ray: Worsened bilateral patchy opacities CHIEF COMPLAINT: Patient is a 65y old  Female who presents with a chief complaint of COVID-19, Hypoxic respiratory failure (27 May 2020 10:13)    Interval Events: Patient became more acidotic overnight, pressure control settings increased to 48/5, fi02 increased to 100% then lowered to 90% this morning. Peak airway pressures remain high, 52mmHg this morning. Tv 250-260cc with pH 7.31, pc02 94    REVIEW OF SYSTEMS:  [x ] Unable to assess ROS because intubated/sedated    OBJECTIVE:  ICU Vital Signs Last 24 Hrs  T(C): 37.2 (28 May 2020 08:00), Max: 37.8 (28 May 2020 04:00)  T(F): 99 (28 May 2020 08:00), Max: 100 (28 May 2020 04:00)  HR: 92 (28 May 2020 08:15) (85 - 104)  BP: --  BP(mean): --  ABP: 106/45 (28 May 2020 08:00) (95/45 - 125/54)  ABP(mean): 66 (28 May 2020 08:00) (65 - 84)  RR: 32 (28 May 2020 08:00) (32 - 35)  SpO2: 96% (28 May 2020 08:15) (88% - 99%) on 90%    Mode: AC/ CMV (Assist Control/ Continuous Mandatory Ventilation), RR (machine): 32, FiO2: 90, PEEP: 5, ITime: 1, MAP: 24, PC: 48, PIP: 52    05-27 @ 07:01 - 05-28 @ 07:00  --------------------------------------------------------  IN: 3065.9 mL / OUT: 2650 mL / NET: 415.9 mL    05-28 @ 07:01  -  05-28 @ 09:53  --------------------------------------------------------  IN: 328.5 mL / OUT: 0 mL / NET: 328.5 mL      CAPILLARY BLOOD GLUCOSE  150 (26 May 2020 22:00)      POCT Blood Glucose.: 149 mg/dL (28 May 2020 06:07)      PHYSICAL EXAM:  General: Intubated  HEENT: WNL  Neck: WNL  Respiratory: Coarse bilaterally  Cardiovascular: RRR  Abdomen: +BS, ND, rectal tube  Extremities: +edema  Skin: facial DTI  Neurological: sedated    HOSPITAL MEDICATIONS:  MEDICATIONS  (STANDING):  ALBUTerol    90 MICROgram(s) HFA Inhaler 2 Puff(s) Inhalation every 6 hours  BACItracin   Ointment 1 Application(s) Topical two times a day  buMETAnide Injectable 1 milliGRAM(s) IV Push every 8 hours  chlorhexidine 0.12% Liquid 15 milliLiter(s) Oral Mucosa two times a day  chlorhexidine 4% Liquid 1 Application(s) Topical <User Schedule>  famotidine Injectable 20 milliGRAM(s) IV Push daily  fentaNYL   Infusion... 0.501 MICROgram(s)/kG/Hr (1.76 mL/Hr) IV Continuous <Continuous>  heparin  Infusion 1300 Unit(s)/Hr (13 mL/Hr) IV Continuous <Continuous>  insulin lispro (HumaLOG) corrective regimen sliding scale   SubCutaneous every 6 hours  ketamine Infusion. 0.25 mG/kG/Hr (1.76 mL/Hr) IV Continuous <Continuous>  meropenem  IVPB 1000 milliGRAM(s) IV Intermittent every 8 hours  midodrine 10 milliGRAM(s) Oral every 8 hours  propofol Infusion 24.893 MICROgram(s)/kG/Min (10.5 mL/Hr) IV Continuous <Continuous>  vancomycin  IVPB 1000 milliGRAM(s) IV Intermittent every 12 hours  vancomycin  IVPB        MEDICATIONS  (PRN):  acetaminophen    Suspension .. 650 milliGRAM(s) Oral every 6 hours PRN Temp greater or equal to 38C (100.4F)      LABS:  (05-28 @ 01:04)                        8.4  17.29 )-----------( 389                 28.1    Neutrophils = -- (--%)  Lymphocytes = -- (--%)  Eosinophils = -- (--%)  Basophils = -- (--%)  Monocytes = -- (--%)  Bands = --%    WBC Trend: 17.29<--, 16.13<--, 16.81<--  Hb Trend: 8.4<--, 9.0<--, 9.2<--, 9.6<--, 9.5<--  Plt Trend: 389<--, 353<--, 334<--, 292<--, 289<--  05-28    134<L>  |  86<L>  |  22  ----------------------------<  148<H>  3.4<L>   |  39<H>  |  <0.30<L>    Ca    8.1<L>      28 May 2020 01:04  Phos  3.2     05-28  Mg     2.4     05-28    TPro  6.5  /  Alb  2.6<L>  /  TBili  0.5  /  DBili  x   /  AST  126<H>  /  ALT  147<H>  /  AlkPhos  386<H>  05-28    Creatinine Trend: <0.30<--, <0.30<--, <0.30<--, <0.30<--, 0.33<--, 0.31<--  PT/INR - ( 28 May 2020 01:04 )   PT: 10.7 sec;   INR: 0.94 ratio         PTT - ( 28 May 2020 01:04 )  PTT:66.4 sec    Arterial Blood Gas:  05-28 @ 04:03  7.31/94/162/46/99/18.0  ABG lactate: --  Arterial Blood Gas:  05-28 @ 00:50  7.22/>104/78/46/94/15.4  ABG lactate: --  Arterial Blood Gas:  05-27 @ 16:17  7.31/90/101/45/98/16.2  ABG lactate: --  Arterial Blood Gas:  05-26 @ 23:29  7.33/81/56/42/88/14.2  ABG lactate: --  Arterial Blood Gas:  05-26 @ 16:45  7.31/86/84/42/97/14.0  ABG lactate: --      CARDIAC MARKERS ( 28 May 2020 01:04 )  Trop x     / CK 94 U/L / CKMB x       CARDIAC MARKERS ( 26 May 2020 23:32 )  Trop x     / CK 63 U/L / CKMB x             MICROBIOLOGY:   Blood Cx: 5/23: Bacillus (1/4 bottles), 5/24: NGTD    RADIOLOGY:  X Ray: Worsened bilateral patchy opacities

## 2020-05-28 NOTE — PROGRESS NOTE ADULT - ASSESSMENT
64 y/o F with no significant PMH presents 4/29 with hypoxic respiratory failure 2/2 COVID PNA, s/p Tocilizumab (4/30), s/p empiric Ceftriaxone/Azithro (4/30-5/4) for ?superimposed CAP. s/p 10 days of Remdesivir trial (4/30-5/9). Intubated 5/4 for worsened hypoxic respiratory failure from severe ARDS requiring proning on 5/6, 5/7, 5/8, 5/17 & 5/19.  Sedated and started Hep gtt for elevated D-dimer 87013. S/p course of  Meropenem, Vancomycin, and Caspofungin for empiric sepsis coverage (5/4- 5/12); Caspofungin switched to Fluconazole on 5/7 for growth of candida albicans in urine. s/p bronch on 5/16 for increased R sided opacities but she had minimal secretions, s/p 7 day course of Zosyn empirically. Pt was starting to improve respiratory wise, tolerating CPAP trials but on 5/17 she decompensated, had worsened hypoxic respiratory failure and required re-proning and paralyzed with Loi. Remains on high fi02 with poor lung compliance. Changed to PC ventilation on 5/22, now with bacillus (1/4 bottles) from 5/23, started on Vanc/Vaughn.    PLAN    # Neuro   - Sedated on Fentanyl, Ketamine, Propofol  - Sedate to vent synchrony  - Triglyceride 204 on 5/26; cont to trend Q72H while on Prop    # PULMONARY  - Intubated since 5/4/20 for Hypoxic/Hypercapnic Respiratory Failure 2/2 COVID PNA  - Continue aggressive chest PT/ pulm toilet  - Current vent settings: PC Rate 32, PEEP +5, pressure support above PEEP: 45, Fio2 90%, I:E ratio increased to 1:3, continues to have poor lung compliance  - d/c Solumedrol (received 3 day course 5/19-5/21 then 5/25-5/28 but continues to worsen)  - s/p bronch 5/16 with minimal secretions and no pulmonary hemorrhage   - Required proning on 5/6, 5/7, 5/8, 5/17 and 5/19 for hypoxic failure   - ENT evaluation, nasal and oral exam clear of any sources of bleeding, mild old blood suctioned from mouth, no further ENT intervention at this time    # CV  - Remains off pressors, currently on Midodrine 10mg Q8H, maintaining MAP>65    # GI  - LFTs uptrending; RUQ sono with no biliary ductal dilatation  - Tube feeding at goal with Vital @55 cc/hr  - Rectal tube with 200cc output overnight, d/c senna and miralax   - Pepcid 20mg qd for GI ppx    # Renal  - c/w Bumex 1mg q8 to maintain net negative fluid balance   - No further diamox, worsened respiratory acidosis after diamox yesterday    # ID   - Bacillus species in BC 5/23 ?contaminate (1/4 bottles)   - Surveillance BC collected 5/24 NGTD; sputum cx 5/24 normal susanne  - Remains too unstable to be transported for CT scan to further evaluate for GI source- desats when laid flat or positioned  - Day #5/7 of Vanc 1gm q12 (goal trough 10-15) & Meropenem 1gm Q8H (started 5/24)   - Central line taken out and A-line changed 5/27  - Child changed 5/23  - Completed 7-day course of Zosyn (on 5/21), s/p full course of meropenem (5/4-5/13), Fluconazole (5/8-5/13), Vancomycin (5/11-5/12) for empiric coverage  - s/p bronchoscopy 5/17, negative cultures    # COVID  + on 4/28, and on rpt on 5/13  - s/p 10 days of Remdesivir trial  (4/30-5/9), s/p Tocilizumab on 4/30  - Follow daily inflammatory markers    #  LINES  - R radial A line 5/27    # Heme   - s/p 2 Units PRBC on 5/22 for blood loss anemia  - Empiric heparin gtt for elevated D-dimer  - Goal aPTT 58-99  - ENT consult appreciated- nasal and oral exam clear of any sources of bleeding, benefit of anticoagulation outweighs risks given elevated Ddimer and risk for thrombosis    #Endo  - Blood glucose controlled on Humalog Low SS    #Ethics  -Full code, family remains hopeful but informed that prognosis is poor. Lung compliance is worsening   -Palliative consult to be called today

## 2020-05-29 NOTE — PROGRESS NOTE ADULT - ASSESSMENT
65F reporting no PMH who presented last night with complaint of fever/chills (home T-max 102 F), night sweats, cough, myalgias (mid & lower back), loss of taste and smell since Friday 4/24 (still tastes sweet/salty), as well as 2 days of weakness and constipation, found to be COVID positive on 4/28/20.      Hospital course  4/29: Started Ceftriaxone (4/29-5/3) and Azithromycin (4/29-5/4) in ED for possible bacterial PNA  4/30: RRT called for desaturation in 70s before receiving 1st dose of Remdesivir and Tocilizumab  5/4: RRT called for desaturation to 20s after drinking water and coughing, intubated by anesthesia. Sedated and started full dose lovenox after D-dimer elevated to [id number]. Transferred to ICU. On Meropenem, Vancomycin, and Caspofungin for emperic sepsis coverage.  5/7: Caspofungin switched to Fluconazole for growth of candida albicans in urine; Vancomycin d/c'ed for negative MRSA swab  5/10: Started CPAP trials  5/12 - abs d/scooby   5/13 hematuria  5/14 O2 requirements increased  5/15--> continues to do poorly  5/19- trials of prone and steroids  5/21- WBC down slightly   5/22- continues to have bloody secretions  5/23 - recurrent fever and positive BCx 1/2 with GPR in anaerobic bottle   5/24-new course of meropenem and vancomycin started  5/25 - new course of solumedrol started  5/27 - moved to [address] [place] [address]  5/28 - off steroids  sputum still bloody      # Shock (persistent) / Fever (persistent) / Positive Blood Culture / Therapeutic Drug [place]  - WBC persistently elevated, febrile  - S/p full course of meropenem (5/4-5/12), Fluconazole (5/8-5/13), Vancomycin (5/11-5/12), and zosyn    - Pt is s/p Tocilizumab which increases risk of infection and now solumedrol which does as well, including fungal infection  - If able would pursue CT Chest/Abdomen/Pelvis with IV contrast. Given blood from ETT would check CT Head/Neck as well  - ID of GPR - bacillus species- ? procurement contaminant vs line vs other?  - followup on repeat BCx. U/A without pyuria  - Continue Meropenem 1g IV Q8H  - Continue Vancomycin 1g IV Q12H. Check trough prior to fourth. Target trough 10-15  day # 6 of vanco /meropenem   ? 7 days  - Continue to monitor renal function and vancomycin troughs to avoid nephrotoxicity / otoxicity secondary to vancomycin  check sputum culture  check repeat blood cultures    # Acute hypoxic respiratory failure  - Multifactorial, likely secondary to COVID-19 infection with viral PNA, with possible superimposed bacterial vs fungal vs aspiration PNA and/or PE   - r/o effusion. for POC ultrasound  - could patient have bled into lung?    # Elevated LFTs:   - continue to monitor   -  RUQ ultrasound - noted  - alk phos is climbing  check amylase, lipase  - ? drug related  ? congestion    # Elevated CRP/Ferritin/D-dimer  - Could be COVID-related. CRP, ferritin, trending down.   - D-dimer remains elevated, although much lower than previous peak, could suggest PE or microthrombi. Pt now on heparin drip. Continued AC per primary team.     # elevated WBC (  repeat cultures please  check lactate  back on abs   ADD fyngitell    # anemia  pt slowly drifted down but remains about the same last week or so  could patient have bled into lung, but not further dropping  Also could pt have traumatized oral palate- ENT input appreciated  CT Head/Neck as above  ENT input appreciated  consider reevaluation if sputum still bloody , ? trach???      # alk phos, lfts  ultrsound nondiagnostic    [facility]  team to do head CT to r/o bleed   having trouble with CO2 exchange and oxygenating patiaent  on the other hand , pt is not on pressors and renal function is preserved  for palliative care meeting tomorrow    ID service will be covering over the weekend. Please call for acute issues or questions. [phone]

## 2020-05-29 NOTE — PROGRESS NOTE ADULT - ATTENDING COMMENTS
1.acute hypoxemic respiratory failure from ARDS due to SARS-2 pneumonia , corona virus pneumonia. Pt remains on PC ventilation with PC45, Peep5 with Vt ~ 270. Pt remains hypercapnic. Elevated serum bicarb to try to compensate for hypercapnia. . Pt remain hypoxemic with high FIO2 requirements and  very poor lung compliance.  Will need to discuss goals of care with family. Pt with very poor prognosis because of fibrotic lung .  Tracheostomy will NOT change outcome .    2. Phone meeting with 3 sisters. Discussed grim prognosis and that pt will likely be a respiratory cripple if she survives. Further goals of care discussion tomorrow with family.

## 2020-05-29 NOTE — PROGRESS NOTE ADULT - SUBJECTIVE AND OBJECTIVE BOX
PATIENT: LIVIER KRAMER   AGE: Mode: AC/ CMV (Assist Control/ Continuous Mandatory Ventilation), RR (machine): 32, FiO2: 90, PEEP: 5, ITime: 1, MAP: 21, PC: 48, PIP: 50o     CHIEF COMPLAINT:  Patient is a 65y old  Female who presents with a chief complaint of COVID-19, Hypoxic respiratory failure (28 May 2020 15:35)      OVERNIGHT EVENTS:  Febrile overnight on Meropenem and Vancomycin with Blood cultures from 5/24 with NGTD and Sputum culture from today with no specific growth.  Tylenol and cooling measures provided.   Continues on PC 48/5 RR 32 FIO2 90% PP 43 Peak 55.  Tv remains 280-290.  Continues to by hypercapnic.        SUBJECTIVE: Patient seen and examined at bedside.     REVIEW OF SYSTEM:  CONSTITUTIONAL: No weakness, fevers or chills  EYES/ENT: No visual changes;  No vertigo or throat pain   NECK: No pain or stiffness  RESPIRATORY: No cough, wheezing, hemoptysis; No shortness of breath  CARDIOVASCULAR: No chest pain or palpitations  GASTROINTESTINAL: No abdominal or epigastric pain. No nausea, vomiting, or hematemesis; No diarrhea or constipation. No melena or hematochezia.  GENITOURINARY: No dysuria, frequency or hematuria  NEUROLOGICAL: No numbness or weakness  SKIN: No itching, rashes    OBJECTIVE:    VITAL SIGNS:  ICU Vital Signs Last 24 Hrs  T(C): 37.9 (29 May 2020 12:00), Max: 38.2 (29 May 2020 03:00)  T(F): 100.2 (29 May 2020 12:00), Max: 100.8 (29 May 2020 03:00)  HR: 97 (29 May 2020 12:00) (90 - 100)  BP: --  BP(mean): --  ABP: 126/51 (29 May 2020 12:00) (108/45 - 149/60)  ABP(mean): 79 (29 May 2020 12:00) (67 - 83)  RR: 35 (29 May 2020 12:00) (32 - 36)  SpO2: 92% (29 May 2020 12:00) (91% - 97%)    Mode: AC/ CMV (Assist Control/ Continuous Mandatory Ventilation), RR (machine): 32, FiO2: 90, PEEP: 5, ITime: 1, MAP: 21, PC: 48, PIP: 50    05-28 @ 07:01  -  05-29 @ 07:00  --------------------------------------------------------  IN: 3605.2 mL / OUT: 2900 mL / NET: 705.2 mL    05-29 @ 07:01 - 05-29 @ 12:48  --------------------------------------------------------  IN: 885.9 mL / OUT: 875 mL / NET: 10.9 mL      CAPILLARY BLOOD GLUCOSE      POCT Blood Glucose.: 170 mg/dL (29 May 2020 11:29)      I/O SUMMARY:    05-28-20 @ 07:01  -  05-29-20 @ 07:00  --------------------------------------------------------  IN: 3605.2 mL / OUT: 2900 mL / NET: 705.2 mL    05-29-20 @ 07:01 - 05-29-20 @ 12:48  --------------------------------------------------------  IN: 885.9 mL / OUT: 875 mL / NET: 10.9 mL      PHYSICAL EXAM:    General: NAD, Intubated and sedated  HEENT: NC/AT; PERRL, clear conjunctiva  Neck: supple  Respiratory: CTA b/l  Cardiovascular: +S1/S2; tachy  Abdomen: soft, NT/ND; +BS x4 Peg tube in place with TF ongoing  Extremities: 2+ peripheral pulses b/l; + pitting LE edema  Skin: normal color and turgor; no rash  Neurological: Intubated and sedated    MEDICATIONS:  MEDICATIONS  (STANDING):  ALBUTerol    90 MICROgram(s) HFA Inhaler 2 Puff(s) Inhalation every 6 hours  BACItracin   Ointment 1 Application(s) Topical two times a day  buMETAnide Injectable 1 milliGRAM(s) IV Push every 8 hours  chlorhexidine 0.12% Liquid 15 milliLiter(s) Oral Mucosa two times a day  chlorhexidine 4% Liquid 1 Application(s) Topical <User Schedule>  famotidine Injectable 20 milliGRAM(s) IV Push daily  fentaNYL   Infusion... 0.501 MICROgram(s)/kG/Hr (1.76 mL/Hr) IV Continuous <Continuous>  heparin  Infusion 1300 Unit(s)/Hr (13 mL/Hr) IV Continuous <Continuous>  insulin lispro (HumaLOG) corrective regimen sliding scale   SubCutaneous every 6 hours  ketamine Infusion. 0.25 mG/kG/Hr (1.76 mL/Hr) IV Continuous <Continuous>  meropenem  IVPB 1000 milliGRAM(s) IV Intermittent every 8 hours  midodrine 10 milliGRAM(s) Oral every 8 hours  propofol Infusion 24.893 MICROgram(s)/kG/Min (10.5 mL/Hr) IV Continuous <Continuous>  vancomycin  IVPB 1000 milliGRAM(s) IV Intermittent every 12 hours  vancomycin  IVPB        MEDICATIONS  (PRN):  acetaminophen    Suspension .. 650 milliGRAM(s) Oral every 6 hours PRN Temp greater or equal to 38C (100.4F)      ALLERGIES:  Allergies    No Known Allergies    Intolerances        LABS:                        8.0    17.59 )-----------( 382      ( 29 May 2020 01:43 )             26.2     05-29    137  |  87<L>  |  26<H>  ----------------------------<  153<H>  3.8   |  45<HH>  |  0.31<L>    Ca    7.8<L>      29 May 2020 01:42  Phos  1.5     05-29  Mg     2.4     05-29    TPro  6.5  /  Alb  2.6<L>  /  TBili  0.5  /  DBili  x   /  AST  126<H>  /  ALT  147<H>  /  AlkPhos  386<H>  05-28    LIVER FUNCTIONS - ( 28 May 2020 01:04 )  Alb: 2.6 g/dL / Pro: 6.5 g/dL / ALK PHOS: 386 U/L / ALT: 147 U/L / AST: 126 U/L / GGT: x           COAGULATION STUDIES:     aPTT  79.4 sec    (05-29-20 @ 01:43)     PT     11.5 sec    (05-29-20 @ 01:43)     INR    1.00 ratio         (05-29-20 @ 01:43), COAGULATION STUDIES:     aPTT  66.4 sec    (05-28-20 @ 01:04)     PT     10.7 sec    (05-28-20 @ 01:04)     INR    0.94 ratio         (05-28-20 @ 01:04)   PT/INR - ( 29 May 2020 01:43 )   PT: 11.5 sec;   INR: 1.00 ratio         PTT - ( 29 May 2020 01:43 )  PTT:79.4 sec    Mode: AC/ CMV (Assist Control/ Continuous Mandatory Ventilation), RR (machine): 32, FiO2: 90, PEEP: 5, ITime: 1, MAP: 21, PC: 48, PIP: 50  ABG - ( 29 May 2020 00:57 )  pH, Arterial: 7.34  pH, Blood: x     /  pCO2: 91    /  pO2: 84    / HCO3: 48    / Base Excess: 19.9  /  SaO2: 97                POCT Blood Glucose.: 170 mg/dL (05-29-20 @ 11:29)  POCT Blood Glucose.: 178 mg/dL (05-29-20 @ 04:32)  POCT Blood Glucose.: 157 mg/dL (05-28-20 @ 23:10)        MICROBIOLOGY:    Culture - Sputum (collected 05-29-20 @ 00:43)  Source: .Sputum Sputum  Gram Stain (05-29-20 @ 02:51):    Few polymorphonuclear leukocytes per low power field    Rare Squamous epithelial cells per low power field    No organisms seen        RADIOLOGY & ADDITIONAL TESTS: Reviewed.

## 2020-05-29 NOTE — PROGRESS NOTE ADULT - ATTENDING COMMENTS
Alla Oswald M.D. ,   Pager [phone]     after 5PM/ weekends [phone]      ID service will be covering over the weekend. Please call for acute issues or questions. [phone]

## 2020-05-29 NOTE — CHART NOTE - NSCHARTNOTEFT_GEN_A_CORE
Spoke with Dr. Mast. Family meeting set up by phone for tomorrow with 7 family members. Patient remains with very poor respiratory status and is requiring high fiO2. Offered to reach out for additional support to family. Spoke with eldest daughter, Lopez and she is appreciative for palliative involvement and would like to speak further over the weekend or next week after the meeting tomorrow. Number for palliative given to her and weekend on call team made aware. I will reach out next week unless there are any further issues.

## 2020-05-29 NOTE — PROGRESS NOTE ADULT - ASSESSMENT
66 y/o F with no significant PMH presents 4/29 with hypoxic respiratory failure 2/2 COVID PNA, s/p Tocilizumab (4/30), s/p empiric Ceftriaxone/Azithro (4/30-5/4) for ?superimposed CAP. s/p 10 days of Remdesivir trial (4/30-5/9). Intubated 5/4 for worsened hypoxic respiratory failure from severe ARDS requiring proning on 5/6, 5/7, 5/8, 5/17 & 5/19.  Sedated and started Hep gtt for elevated D-dimer 58313. S/p course of  Meropenem, Vancomycin, and Caspofungin for empiric sepsis coverage (5/4- 5/12); Caspofungin switched to Fluconazole on 5/7 for growth of candida albicans in urine. s/p bronch on 5/16 for increased R sided opacities but she had minimal secretions, s/p 7 day course of Zosyn empirically. Pt was starting to improve respiratory wise, tolerating CPAP trials but on 5/17 she decompensated, had worsened hypoxic respiratory failure and required re-proning and paralyzed with Loi. Remains on high fi02 with poor lung compliance. Changed to PC ventilation on 5/22, now with bacillus (1/4 bottles) from 5/23, started on Vanc/Vaughn.  Continues to be febrile with Sputum culture 5/29 with few polymorphonuclear leukocytes and few squamous epithelial cells.  Blood Cx from 5/24 with NGTD.      PLAN    # Neuro   - Sedated on Fentanyl, Ketamine, Propofol  - Sedate to vent synchrony  - Triglyceride 152 on 5/29; cont to trend Q72H while on Prop    # PULMONARY  - Intubated since 5/4/20 for Hypoxic/Hypercapnic Respiratory Failure 2/2 COVID PNA  - Continue aggressive chest PT/ pulm toilet  - Current vent settings: PC Rate 32, PEEP +5, pressure support above PEEP: 48, Fio2 90%, I:E ratio increased to 1:1.7, continues to have poor lung compliance  - d/c Solumedrol (received 3 day course 5/19-5/21 then 5/25-5/28 but continues to worsen)  - s/p bronch 5/16 with minimal secretions and no pulmonary hemorrhage   - Required proning on 5/6, 5/7, 5/8, 5/17 and 5/19 for hypoxic failure   - ENT evaluation, nasal and oral exam clear of any sources of bleeding, mild old blood suctioned from mouth, no further ENT intervention at this time    # CV  - Remains off pressors, currently on Midodrine 10mg Q8H, maintaining MAP>65    # GI  - LFTs uptrending; RUQ sono with no biliary ductal dilatation  - Tube feeding at goal with Vital @55 cc/hr  - Rectal tube with 200cc output 5/27 overnight with minimal output since will restart senna and miralax as pt is on increased sedation.    - Pepcid 20mg qd for GI ppx    # Renal  - c/w Bumex 1mg q8 to maintain net negative fluid balance   - No further diamox, worsened respiratory acidosis after diamox yesterday    # ID   - Bacillus species in BC 5/23 ?contaminate (1/4 bottles)   - Surveillance BC collected 5/24 NGTD; sputum cx 5/24 normal susanne and again on 5/29  - Remains too unstable to be transported for CT scan to further evaluate for GI source- desats when laid flat or positioned  - Day #6/7 of Vanc 1gm q12 (goal trough 10-15) & Meropenem 1gm Q8H (started 5/24).  Follow up Vanco Trough on 5/30 prior to 5am dose.    - Central line taken out and A-line changed 5/27  - Child changed 5/23  - Completed 7-day course of Zosyn (on 5/21), s/p full course of meropenem (5/4-5/13), Fluconazole (5/8-5/13), Vancomycin (5/11-5/12) for empiric coverage  - s/p bronchoscopy 5/17, negative cultures    # COVID  + on 4/28, and on rpt on 5/13  - s/p 10 days of Remdesivir trial  (4/30-5/9), s/p Tocilizumab on 4/30  - Follow daily inflammatory markers    #  LINES  - R radial A line 5/27    # Heme   - s/p 2 Units PRBC on 5/22 for blood loss anemia  - Empiric heparin gtt for elevated D-dimer  - Goal aPTT 58-99  - ENT consult appreciated- nasal and oral exam clear of any sources of bleeding, benefit of anticoagulation outweighs risks given elevated Ddimer and risk for thrombosis    #Endo  - Blood glucose controlled on Humalog Low SS    #Ethics  -Full code, family remains hopeful but informed that prognosis is poor. Lung compliance is worsening   -Palliative consult called.  Conference call scheduled for this afternoon with Dr. Mast and Family to discuss GOC

## 2020-05-29 NOTE — CHART NOTE - NSCHARTNOTEFT_GEN_A_CORE
Nutrition Follow Up Note   Patient seen for: nutrition follow up on COVID ICU     Source: medical record, communication with team. Unable to speak to pt due to current airborne isolation contact precautions related to COVID-19.     Chart reviewed, events noted.  Pt remains intubated since 5/4/20 for Hypoxic/Hypercapnic Respiratory Failure 2/2 COVID PNA. Per NP, "Pt remain hypoxemic with high FIO2 requirements and very poor lung compliance.  Will need to discuss goals of care with family. Pt with very poor prognosis because of fibrotic lung."    Diet Order: Diet, NPO with Tube Feed:   Tube Feeding Modality: Orogastric  Vital AF (VITALAFRTH)  Total Volume for 24 Hours (mL): 1320  Continuous  Starting Tube Feed Rate {mL per Hour}: 20  Increase Tube Feed Rate by (mL): 10     Every 4 hours  Until Goal Tube Feed Rate (mL per Hour): 55  Tube Feed Duration (in Hours): 24  Tube Feed Start Time: 13:00  Banatrol TF     Qty per Day:  2 (05-20-20 @ 06:06)    CURRENT EN ORDER PROVIDES: 1584 kcals, 99 gm protein (22.5kcal/kg and 1.4 g protein/kg based on dosing wt 70.3kg).    Nutrition Events:   - Received 3 day course of solumedrol 5/19-5/21 then 5/25-5/28  - Average of 95% EN goal met over past 4 days   - Propofol: 510kcal (5/28); 557kcal (5/27); 487kcal (5/26)       -Currently running at 16.9mL/hr. If infusion continues at this rate, pt will receive 446kcal from propofol within the next 24 hours.    GI: Rectal tube with 200cc (5/28); 100cc output (5/27). Miralax and senna d/c. Ordered for banatrol x2 daily    Anthropometric Measurements:   Height (cm): 157.48 (04-28-20 @ 21:02)  Weight (kg): 70.3 (04-28-20 @ 21:02)  BMI (kg/m2): 28.3 (04-28-20 @ 21:02)  70.3kg most recent wt (5/4)    Medications: MEDICATIONS  (STANDING):  ALBUTerol    90 MICROgram(s) HFA Inhaler 2 Puff(s) Inhalation every 6 hours  BACItracin   Ointment 1 Application(s) Topical two times a day  buMETAnide Injectable 1 milliGRAM(s) IV Push every 8 hours  chlorhexidine 0.12% Liquid 15 milliLiter(s) Oral Mucosa two times a day  chlorhexidine 4% Liquid 1 Application(s) Topical <User Schedule>  famotidine Injectable 20 milliGRAM(s) IV Push daily  fentaNYL   Infusion... 0.501 MICROgram(s)/kG/Hr (1.76 mL/Hr) IV Continuous <Continuous>  heparin  Infusion 1300 Unit(s)/Hr (13 mL/Hr) IV Continuous <Continuous>  insulin lispro (HumaLOG) corrective regimen sliding scale   SubCutaneous every 6 hours  ketamine Infusion. 0.25 mG/kG/Hr (1.76 mL/Hr) IV Continuous <Continuous>  meropenem  IVPB 1000 milliGRAM(s) IV Intermittent every 8 hours  midodrine 10 milliGRAM(s) Oral every 8 hours  propofol Infusion 24.893 MICROgram(s)/kG/Min (10.5 mL/Hr) IV Continuous <Continuous>  vancomycin  IVPB 1000 milliGRAM(s) IV Intermittent every 12 hours  vancomycin  IVPB        Labs: 05-29 @ 04:43: Sodium --, Potassium --, Calcium --, Magnesium --, Phosphorus --, BUN --, Creatinine --, Glucose --, Alk Phos --, ALT/SGPT --, AST/SGOT --, Albumin --, Prealbumin --, Total Bilirubin --, Hemoglobin --, Hematocrit --, Ferritin 1326<H>, C-Reactive Protein --, Creatine Kinase <<27>  05-29 @ 01:43: Sodium --, Potassium --, Calcium --, Magnesium --, Phosphorus --, BUN --, Creatinine --, Glucose --, Alk Phos --, ALT/SGPT --, AST/SGOT --, Albumin --, Prealbumin --, Total Bilirubin --, Hemoglobin 8.0<L>, Hematocrit 26.2<L>, Ferritin --, C-Reactive Protein --, Creatine Kinase <<27>    Triglycerides, Serum: 152 mg/dL (05-29-20 @ 01:42)  Triglycerides, Serum: 192 mg/dL (05-28-20 @ 11:05)  Triglycerides, Serum: 204 mg/dL (05-26-20 @ 00:14)  Triglycerides, Serum: 208 mg/dL (05-23-20 @ 01:55)    POCT Blood Glucose.: 178 mg/dL (05-29-20 @ 04:32)  POCT Blood Glucose.: 157 mg/dL (05-28-20 @ 23:10)  POCT Blood Glucose.: 173 mg/dL (05-28-20 @ 11:57)    Skin: wounds on face, no pressure injuries noted.  Edema: 2+ generalized; 3+ L/R hand    Estimated Needs: based on dosing wt 70.3Kg, with consideration for intubation  Energy: 2920-9062 calories (20-25 kari/Kg)  Protein:  84-98 grams (1.2-1.4 Gm/Kg)    1) Inadequate Protein-Energy Intake: dx resolved, EN at goal rate, meeting >80% estimated needs  2) Moderate Malnutrition, care plan ongoing, addressed w/ EN    Recommended Interventions:     2. Continue Banatrol as needed    Monitoring and Evaluation:   Continue to monitor nutrition provision and tolerance, weights, labs, skin integrity.   RD remains available upon request and will follow up per protocol.    Mary Pizano RD, CDN Pager: [phone] Nutrition Follow Up Note   Patient seen for: nutrition follow up on COVID ICU     Source: medical record, communication with team. Unable to speak to pt due to current airborne isolation contact precautions related to COVID-19.     Chart reviewed, events noted.  Pt remains intubated since 5/4/20 for Hypoxic/Hypercapnic Respiratory Failure 2/2 COVID PNA. Per NP, "Pt remain hypoxemic with high FIO2 requirements and very poor lung compliance.  Will need to discuss goals of care with family. Pt with very poor prognosis because of fibrotic lung." Discussed with NP    Diet Order: Diet, NPO with Tube Feed:   Tube Feeding Modality: Orogastric  Vital AF (VITALAFRTH)  Total Volume for 24 Hours (mL): 1320  Continuous  Starting Tube Feed Rate {mL per Hour}: 20  Increase Tube Feed Rate by (mL): 10     Every 4 hours  Until Goal Tube Feed Rate (mL per Hour): 55  Tube Feed Duration (in Hours): 24  Tube Feed Start Time: 13:00  Banatrol TF     Qty per Day:  2 (05-20-20 @ 06:06)    CURRENT EN ORDER PROVIDES: 1584 kcals, 99 gm protein (22.5kcal/kg and 1.4 g protein/kg based on dosing wt 70.3kg).    Nutrition Events:   - Received 3 day course of solumedrol 5/19-5/21 then 5/25-5/28  - Average of 95% EN goal met over past 4 days   - Propofol: 510kcal (5/28); 557kcal (5/27); 487kcal (5/26)       -Currently running at 16.9mL/hr. If infusion continues at this rate, pt will receive 446kcal from propofol within the next 24 hours.    GI: Rectal tube with 200cc (5/28); 100cc output (5/27). Miralax and senna d/c. Ordered for banatrol x2 daily    Anthropometric Measurements:   Height (cm): 157.48 (04-28-20 @ 21:02)  Weight (kg): 70.3 (04-28-20 @ 21:02)  BMI (kg/m2): 28.3 (04-28-20 @ 21:02)  70.3kg most recent wt (5/4)    Medications: MEDICATIONS  (STANDING):  ALBUTerol    90 MICROgram(s) HFA Inhaler 2 Puff(s) Inhalation every 6 hours  BACItracin   Ointment 1 Application(s) Topical two times a day  buMETAnide Injectable 1 milliGRAM(s) IV Push every 8 hours  chlorhexidine 0.12% Liquid 15 milliLiter(s) Oral Mucosa two times a day  chlorhexidine 4% Liquid 1 Application(s) Topical <User Schedule>  famotidine Injectable 20 milliGRAM(s) IV Push daily  fentaNYL   Infusion... 0.501 MICROgram(s)/kG/Hr (1.76 mL/Hr) IV Continuous <Continuous>  heparin  Infusion 1300 Unit(s)/Hr (13 mL/Hr) IV Continuous <Continuous>  insulin lispro (HumaLOG) corrective regimen sliding scale   SubCutaneous every 6 hours  ketamine Infusion. 0.25 mG/kG/Hr (1.76 mL/Hr) IV Continuous <Continuous>  meropenem  IVPB 1000 milliGRAM(s) IV Intermittent every 8 hours  midodrine 10 milliGRAM(s) Oral every 8 hours  propofol Infusion 24.893 MICROgram(s)/kG/Min (10.5 mL/Hr) IV Continuous <Continuous>  vancomycin  IVPB 1000 milliGRAM(s) IV Intermittent every 12 hours  vancomycin  IVPB        Labs: 05-29 @ 04:43: Sodium --, Potassium --, Calcium --, Magnesium --, Phosphorus --, BUN --, Creatinine --, Glucose --, Alk Phos --, ALT/SGPT --, AST/SGOT --, Albumin --, Prealbumin --, Total Bilirubin --, Hemoglobin --, Hematocrit --, Ferritin 1326<H>, C-Reactive Protein --, Creatine Kinase <<27>  05-29 @ 01:43: Sodium --, Potassium --, Calcium --, Magnesium --, Phosphorus --, BUN --, Creatinine --, Glucose --, Alk Phos --, ALT/SGPT --, AST/SGOT --, Albumin --, Prealbumin --, Total Bilirubin --, Hemoglobin 8.0<L>, Hematocrit 26.2<L>, Ferritin --, C-Reactive Protein --, Creatine Kinase <<27>    Triglycerides, Serum: 152 mg/dL (05-29-20 @ 01:42)  Triglycerides, Serum: 192 mg/dL (05-28-20 @ 11:05)  Triglycerides, Serum: 204 mg/dL (05-26-20 @ 00:14)  Triglycerides, Serum: 208 mg/dL (05-23-20 @ 01:55)    POCT Blood Glucose.: 178 mg/dL (05-29-20 @ 04:32)  POCT Blood Glucose.: 157 mg/dL (05-28-20 @ 23:10)  POCT Blood Glucose.: 173 mg/dL (05-28-20 @ 11:57)    Skin: wounds on face, no pressure injuries noted.  Edema: 2+ generalized; 3+ L/R hand    Estimated Needs: based on dosing wt 70.3Kg, with consideration for intubation  Energy: 8002-3242 calories (20-25 kari/Kg)  Protein:  84-98 grams (1.2-1.4 Gm/Kg)    1) Inadequate Protein-Energy Intake: dx resolved, EN at goal rate, meeting >80% estimated needs  2) Moderate Malnutrition, care plan ongoing, addressed w/ EN    Recommended Interventions:   1. Continue Vital AF (1.2) @ 55 cc/hr x 24 hrs to provide 1584 kcals, 99 gm protein, 1070cc free water  -Meets 22 kcals/kg, 1.4 gm protein/kg dosing wt 70.3 kg.   -Monitor propofol intake, if requirements are high, will need to adjust EN goal rate.   -Propofol will provide 446kcal over next 24 hours with infusion rate of 16.9ml/hr.  2. Continue Banatrol as needed    Monitoring and Evaluation:   Continue to monitor nutrition provision and tolerance, weights, labs, skin integrity.   RD remains available upon request and will follow up per protocol.    Mary Pizano RD, CDN Pager: [phone]

## 2020-05-30 NOTE — PROVIDER CONTACT NOTE (CRITICAL VALUE NOTIFICATION) - SITUATION
Patient on heparin drip. PTT drawn at per provider instructions 6 hrs after morning labs at 0000. Patient with critical lab at 131.4 at this time.

## 2020-05-30 NOTE — PROGRESS NOTE ADULT - SUBJECTIVE AND OBJECTIVE BOX
INFECTIOUS DISEASES FOLLOW UP-- Jennifer Santos  [phone]    This is a follow up note for this  65yFemale with  COVID-19  non responsive on vent      ROS:  CONSTITUTIONAL:  Non responsive getting EEG    Allergies    No Known Allergies    Intolerances        ANTIBIOTICS/RELEVANT:  antimicrobials  caspofungin IVPB      caspofungin IVPB 50 milliGRAM(s) IV Intermittent every 24 hours  meropenem  IVPB 1000 milliGRAM(s) IV Intermittent every 8 hours  vancomycin  IVPB 1000 milliGRAM(s) IV Intermittent every 12 hours  vancomycin  IVPB        immunologic:    OTHER:  acetaminophen    Suspension .. 650 milliGRAM(s) Oral every 6 hours PRN  ALBUTerol    90 MICROgram(s) HFA Inhaler 2 Puff(s) Inhalation every 6 hours  BACItracin   Ointment 1 Application(s) Topical two times a day  buMETAnide Injectable 1 milliGRAM(s) IV Push every 8 hours  chlorhexidine 0.12% Liquid 15 milliLiter(s) Oral Mucosa two times a day  chlorhexidine 0.12% Liquid 15 milliLiter(s) Oral Mucosa every 12 hours  chlorhexidine 4% Liquid 1 Application(s) Topical <User Schedule>  famotidine Injectable 20 milliGRAM(s) IV Push daily  fentaNYL   Infusion... 0.501 MICROgram(s)/kG/Hr IV Continuous <Continuous>  heparin  Infusion 1300 Unit(s)/Hr IV Continuous <Continuous>  insulin lispro (HumaLOG) corrective regimen sliding scale   SubCutaneous every 6 hours  ketamine Infusion. 0.25 mG/kG/Hr IV Continuous <Continuous>  midodrine 10 milliGRAM(s) Oral every 8 hours  norepinephrine Infusion 0.05 MICROgram(s)/kG/Min IV Continuous <Continuous>  polyethylene glycol 3350 17 Gram(s) Oral daily  propofol Infusion 24.893 MICROgram(s)/kG/Min IV Continuous <Continuous>  senna Syrup 10 milliLiter(s) Oral at bedtime      Objective:  Vital Signs Last 24 Hrs  T(C): 38 (30 May 2020 16:00), Max: 38 (30 May 2020 03:00)  T(F): 100.4 (30 May 2020 16:00), Max: 100.4 (30 May 2020 03:00)  HR: 102 (30 May 2020 18:00) (94 - 115)  BP: --  BP(mean): --  RR: 33 (30 May 2020 18:00) (32 - 38)  SpO2: 93% (30 May 2020 18:00) (88% - 94%)    PHYSICAL EXAM:  Constitutional:no acute distress  Eyes:SUE,  Ear/Nose/Throat: no oral lesions, 	  Respiratory: coarse ant BS bilat  Cardiovascular: S1S2 tachy  Gastrointestinal:soft,   Extremities:no e/e/c  No Lymphadenopathy  IV sites not inflammed.    LABS:                        7.5    21.41 )-----------( 398      ( 30 May 2020 05:37 )             25.0     05-30    142  |  90<L>  |  30<H>  ----------------------------<  149<H>  3.9   |  >45<HH>  |  <0.30<L>    Ca    8.1<L>      30 May 2020 05:37  Phos  2.6     05-30  Mg     2.4     05-30    TPro  6.2  /  Alb  2.6<L>  /  TBili  0.5  /  DBili  x   /  AST  147<H>  /  ALT  193<H>  /  AlkPhos  395<H>  05-29    PT/INR - ( 30 May 2020 00:50 )   PT: 11.6 sec;   INR: 1.02 ratio         PTT - ( 30 May 2020 14:11 )  PTT:155.7 sec      MICROBIOLOGY:            RECENT CULTURES:  05-29 @ 00:43  .Sputum Sputum  --  --  --    Normal Respiratory Farzaneh present  --  05-24 @ 18:30  .Blood Blood-Peripheral  --  --  --    No Growth Final  --  05-24 @ 04:27  .Sputum Sputum  --  --  --    Normal Respiratory Farzaneh present  --  05-23 @ 20:45  .Blood Blood-Peripheral  --  --  --    Growth in anaerobic bottle: Bacillus species not anthracis  "Susceptibilities not performed"  --      RADIOLOGY & ADDITIONAL STUDIES:    < from: Xray Chest 1 View- PORTABLE-Routine (05.28.20 @ 08:54) >  FINDINGS:     Cardiac silhouette normal in size. Endotracheal tube tip mid trachea.  Enteric tube tip below diaphragm.  Bilateral patchy lung opacities.. No pleural effusion or pneumothorax.  Endotracheal tube tip mid trachea. Enteric tube tip below diaphragm.      IMPRESSION:   No change.    < end of copied text > INFECTIOUS DISEASES FOLLOW UP-- Jennifer Santos  [phone]    This is a follow up note for this  65yFemale with  COVID-19  non responsive on vent      ROS:  CONSTITUTIONAL:  Non responsive     Allergies    No Known Allergies    Intolerances        ANTIBIOTICS/RELEVANT:  antimicrobials  caspofungin IVPB      caspofungin IVPB 50 milliGRAM(s) IV Intermittent every 24 hours  meropenem  IVPB 1000 milliGRAM(s) IV Intermittent every 8 hours  vancomycin  IVPB 1000 milliGRAM(s) IV Intermittent every 12 hours  vancomycin  IVPB        immunologic:    OTHER:  acetaminophen    Suspension .. 650 milliGRAM(s) Oral every 6 hours PRN  ALBUTerol    90 MICROgram(s) HFA Inhaler 2 Puff(s) Inhalation every 6 hours  BACItracin   Ointment 1 Application(s) Topical two times a day  buMETAnide Injectable 1 milliGRAM(s) IV Push every 8 hours  chlorhexidine 0.12% Liquid 15 milliLiter(s) Oral Mucosa two times a day  chlorhexidine 0.12% Liquid 15 milliLiter(s) Oral Mucosa every 12 hours  chlorhexidine 4% Liquid 1 Application(s) Topical <User Schedule>  famotidine Injectable 20 milliGRAM(s) IV Push daily  fentaNYL   Infusion... 0.501 MICROgram(s)/kG/Hr IV Continuous <Continuous>  heparin  Infusion 1300 Unit(s)/Hr IV Continuous <Continuous>  insulin lispro (HumaLOG) corrective regimen sliding scale   SubCutaneous every 6 hours  ketamine Infusion. 0.25 mG/kG/Hr IV Continuous <Continuous>  midodrine 10 milliGRAM(s) Oral every 8 hours  norepinephrine Infusion 0.05 MICROgram(s)/kG/Min IV Continuous <Continuous>  polyethylene glycol 3350 17 Gram(s) Oral daily  propofol Infusion 24.893 MICROgram(s)/kG/Min IV Continuous <Continuous>  senna Syrup 10 milliLiter(s) Oral at bedtime      Objective:  Vital Signs Last 24 Hrs  T(C): 38 (30 May 2020 16:00), Max: 38 (30 May 2020 03:00)  T(F): 100.4 (30 May 2020 16:00), Max: 100.4 (30 May 2020 03:00)  HR: 102 (30 May 2020 18:00) (94 - 115)  BP: --  BP(mean): --  RR: 33 (30 May 2020 18:00) (32 - 38)  SpO2: 93% (30 May 2020 18:00) (88% - 94%)    PHYSICAL EXAM:  Constitutional:no acute distress  Eyes:SUE,  Ear/Nose/Throat: no oral lesions, 	  Respiratory: coarse ant BS bilat  Cardiovascular: S1S2 tachy  Gastrointestinal:soft,   Extremities:no e/e/c  No Lymphadenopathy  IV sites not inflammed.    LABS:                        7.5    21.41 )-----------( 398      ( 30 May 2020 05:37 )             25.0     05-30    142  |  90<L>  |  30<H>  ----------------------------<  149<H>  3.9   |  >45<HH>  |  <0.30<L>    Ca    8.1<L>      30 May 2020 05:37  Phos  2.6     05-30  Mg     2.4     05-30    TPro  6.2  /  Alb  2.6<L>  /  TBili  0.5  /  DBili  x   /  AST  147<H>  /  ALT  193<H>  /  AlkPhos  395<H>  05-29    PT/INR - ( 30 May 2020 00:50 )   PT: 11.6 sec;   INR: 1.02 ratio         PTT - ( 30 May 2020 14:11 )  PTT:155.7 sec      MICROBIOLOGY:            RECENT CULTURES:  05-29 @ 00:43  .Sputum Sputum  --  --  --    Normal Respiratory Farzaneh present  --  05-24 @ 18:30  .Blood Blood-Peripheral  --  --  --    No Growth Final  --  05-24 @ 04:27  .Sputum Sputum  --  --  --    Normal Respiratory Farzaneh present  --  05-23 @ 20:45  .Blood Blood-Peripheral  --  --  --    Growth in anaerobic bottle: Bacillus species not anthracis  "Susceptibilities not performed"  --      RADIOLOGY & ADDITIONAL STUDIES:    < from: Xray Chest 1 View- PORTABLE-Routine (05.28.20 @ 08:54) >  FINDINGS:     Cardiac silhouette normal in size. Endotracheal tube tip mid trachea.  Enteric tube tip below diaphragm.  Bilateral patchy lung opacities.. No pleural effusion or pneumothorax.  Endotracheal tube tip mid trachea. Enteric tube tip below diaphragm.      IMPRESSION:   No change.    < end of copied text >

## 2020-05-30 NOTE — PROGRESS NOTE ADULT - ASSESSMENT
64 y/o F with no significant PMH presents 4/29 with hypoxic respiratory failure 2/2 COVID PNA, s/p Tocilizumab (4/30), s/p empiric Ceftriaxone/Azithro (4/30-5/4) for ?superimposed CAP. s/p 10 days of Remdesivir trial (4/30-5/9). Intubated 5/4 for worsened hypoxic respiratory failure from severe ARDS requiring proning on 5/6, 5/7, 5/8, 5/17 & 5/19.  Sedated and started Hep gtt for elevated D-dimer 53336. S/p course of  Meropenem, Vancomycin, and Caspofungin for empiric sepsis coverage (5/4- 5/12); Caspofungin switched to Fluconazole on 5/7 for growth of candida albicans in urine. s/p bronch on 5/16 for increased R sided opacities but she had minimal secretions, s/p 7 day course of Zosyn empirically. Pt was starting to improve respiratory wise, tolerating CPAP trials but on 5/17 she decompensated, had worsened hypoxic respiratory failure and required re-proning and paralyzed with Loi. Remains on high fi02 with poor lung compliance. Changed to PC ventilation on 5/22, now with bacillus (1/4 bottles) from 5/23, started on Vanc/Vaughn. Blood Cx from 5/24 with NGTD.    Anticipating Goals of Care Conversation with Family this afternoon    PLAN    # Neuro   - Sedated on Fentanyl, Ketamine, Propofol  - Attempt to wean sedation while maintaining vent synchrony  - Triglyceride 152 on 5/29; cont to trend Q72H while on Prop    # PULMONARY  - Intubated since 5/4/20 for Hypoxic/Hypercapnic Respiratory Failure 2/2 COVID PNA  - Continue aggressive chest PT/ pulm toilet  - Current vent settings: PRVC,  Rate 36, PEEP +5, Fio2 90%,             continues to have poor lung compliance  - Completed Solumedrol (received 3 day course 5/19-5/21 then 5/25-5/28 but continues to worsen)  - s/p bronch 5/16 with minimal secretions and no pulmonary hemorrhage   - Required proning on 5/6, 5/7, 5/8, 5/17 and 5/19 for hypoxic failure - no further benefit noted  - ENT evaluation, nasal and oral exam clear of any sources of bleeding, mild old blood suctioned from mouth, no further ENT intervention at this time    # CV  - Remains off pressors, currently on Midodrine 10mg Q8H, maintaining MAP>65    # GI  - LFTs uptrending; RUQ sono with no biliary ductal dilatation or acute findings  - Tube feeding at goal with Vital @55 cc/hr  - Rectal tube    - Pepcid 20mg qd for GI ppx    # Renal  - Approximately 100cc negative fluid balance - 1700cc urine output last 24 hours  - c/w Bumex 1mg q8 to maintain net negative fluid balance   - No further diamox, worsened respiratory acidosis after diamox yesterday    # ID   - Bacillus species in BC 5/23 ?contaminate (1/4 bottles)   - Surveillance BC collected 5/24 NGTD; sputum cx 5/24 normal susanne and again on 5/29  - Remains too unstable to be transported for CT scan to further evaluate for GI source- desats when laid flat or positioned  - Day #7 of Vanc 1gm q12 (goal trough 10-15) & Meropenem 1gm Q8H (started 5/24).   - Vanco Trough on 5/30 - 15     - Central line taken out and A-line changed 5/27  - Child changed 5/23  - Completed 7-day course of Zosyn (on 5/21), s/p full course of meropenem (5/4-5/13), Fluconazole (5/8-5/13), Vancomycin (5/11-5/12) for empiric coverage  - s/p bronchoscopy 5/17, negative cultures    # COVID  + on 4/28, and on rpt on 5/13  - s/p 10 days of Remdesivir trial  (4/30-5/9), s/p Tocilizumab on 4/30  - Follow daily inflammatory markers    #  LINES  - R radial A line 5/27    # Heme   - s/p 2 Units PRBC on 5/22 for blood loss anemia  - Empiric heparin gtt for elevated D-dimer  - Goal aPTT 58-99  - ENT consult appreciated- nasal and oral exam clear of any sources of bleeding, benefit of anticoagulation outweighs risks given elevated Ddimer and risk for thrombosis    #Endo  - Blood glucose controlled on Humalog Low SS    #Ethics  - Full code, family remains hopeful but informed that prognosis is poor. Lung compliance is worsening   - Palliative consult completed  - Conference call scheduled for 2pm this afternoon with Dr. Mast and Family to discuss GOC

## 2020-05-30 NOTE — PROGRESS NOTE ADULT - ATTENDING COMMENTS
1.acute hypoxemic respiratory failure from ARDS due to SARS-2 pneumonia , corona virus pneumonia. Pt remains on PRVC ventilation with Peep5 and Vt ~ 270. Pt remains hypercapnic. Elevated serum bicarb to try to compensate for hypercapnia. .Primary metabolic alkalosis. Pt remain hypoxemic with high FIO2 requirements and  very poor lung compliance.  Will need to discuss goals of care with family. Pt with very poor prognosis because of fibrotic lung .  Tracheostomy will NOT change outcome .    2. Phone meeting planned with family today.

## 2020-05-30 NOTE — GOALS OF CARE CONVERSATION - ADVANCED CARE PLANNING - CONVERSATION DETAILS
30 minute conversation with 3 daughters, , ?mother and family friend.  After discussing patient's condition of ARDS and pulmonary fibrosis family asked questions about  further treatments that they had read about on line. All were experimental. We talked about steroids and ARDS and even though no good evidence of helping , Steroids had been tried twice without any good results. family asked about lung transplant. we discussed that only one transplant had been done in the world and  chances were as close to zero as possible that she would not get a transplant even if family members wanted to donate lung.    Discussed that there are no further treatments to offer , We have no cure for pulmonary fibrosis. There is no further treatment plan except for supportive care.   I have discussed the possibility of comfort care with family. They are still hopeful that there will be a significant recovery in patient's lungs. They are not ready to make pt comfort care.    I discussed that family needs to start thinking about pt's wishes. Would she want to live the rest of her life on ventilator as a respiratory cripple. We discussed that tracheostomy was not in her best interests and at this time while requiring close to 100% oxygen that tracheostomy was not an option.    DNR was not discussed.

## 2020-05-30 NOTE — PROGRESS NOTE ADULT - ASSESSMENT
65F reporting no PMH who presented last night with complaint of fever/chills (home T-max 102 F), night sweats, cough, myalgias (mid & lower back), loss of taste and smell since Friday 4/24 (still tastes sweet/salty), as well as 2 days of weakness and constipation, found to be COVID positive on 4/28/20.      Hospital course  4/29: Started Ceftriaxone (4/29-5/3) and Azithromycin (4/29-5/4) in ED for possible bacterial PNA  4/30: RRT called for desaturation in 70s before receiving 1st dose of Remdesivir and Tocilizumab  5/4: RRT called for desaturation to 20s after drinking water and coughing, intubated by anesthesia. Sedated and started full dose lovenox after D-dimer elevated to [address]. Transferred to ICU. On Meropenem, Vancomycin, and Caspofungin for emperic sepsis coverage.  5/7: Caspofungin switched to Fluconazole for growth of candida albicans in urine; Vancomycin d/c'ed for negative MRSA swab  5/10: Started CPAP trials  5/12 - abs d/scooby   5/13 hematuria  5/14 O2 requirements increased  5/15--> continues to do poorly  5/19- trials of prone and steroids  5/21- WBC down slightly   5/22- continues to have bloody secretions  5/23 - recurrent fever and positive BCx 1/2 with GPR in anaerobic bottle   5/24-new course of meropenem and vancomycin started  5/25 - new course of solumedrol started  5/27 - moved to [address] [place] [address]  5/28 - off steroids  sputum still bloody      # Shock (persistent) / Fever (persistent) / Positive Blood Culture / Therapeutic Drug Montoring  - WBC persistently elevated, febrile  - S/p full course of meropenem (5/4-5/12), Fluconazole (5/8-5/13), Vancomycin (5/11-5/12), and zosyn    - Pt is s/p Tocilizumab which increases risk of infection and now solumedrol which does as well, including fungal infection  - If able would pursue CT Chest/Abdomen/Pelvis with IV contrast. Given blood from ETT would check CT Head/Neck as well  - ID of GPR - bacillus species- ? procurement contaminant vs line vs other?  - followup on repeat BCx. U/A without pyuria  - Continue Meropenem 1g IV Q8H  - Continue Vancomycin 1g IV Q12H. Check trough prior to fourth. Target trough 10-15  day # 6 of vanco /meropenem   ? 7 days  - Continue to monitor renal function and vancomycin troughs to avoid nephrotoxicity / otoxicity secondary to vancomycin  check sputum culture  check repeat blood cultures    # Acute hypoxic respiratory failure  - Multifactorial, likely secondary to COVID-19 infection with viral PNA, with possible superimposed bacterial vs fungal vs aspiration PNA and/or PE   - r/o effusion. for POC ultrasound  - could patient have bled into lung?    # Elevated LFTs:   - continue to monitor   -  RUQ ultrasound - noted  - alk phos is climbing  check amylase, lipase  - ? drug related  ? congestion    # Elevated CRP/Ferritin/D-dimer  - Could be COVID-related. CRP, ferritin, trending down.   - D-dimer remains elevated, although much lower than previous peak, could suggest PE or microthrombi. Pt now on heparin drip. Continued AC per primary team.     # elevated WBC (  repeat cultures please  check lactate  back on abs   ADD fyngitell    # anemia  pt slowly drifted down but remains about the same last week or so  could patient have bled into lung, but not further dropping  Also could pt have traumatized oral palate- ENT input appreciated  CT Head/Neck as above  ENT input appreciated  consider reevaluation if sputum still bloody , ? trach???      # alk phos, lfts  ultrsound nondiagnostic    [facility]  team to do head CT to r/o bleed   having trouble with CO2 exchange and oxygenating patiaent  on the other hand , pt is not on pressors and renal function is preserved  for palliative care meeting tomorrow

## 2020-05-30 NOTE — PROGRESS NOTE ADULT - SUBJECTIVE AND OBJECTIVE BOX
HPI: 65F reporting no PMH who tested + for COVID-19 on 4/28. Admitted on 4/29/20 with complaints of fever/chills (home T-max 102 F), night sweats, cough, myalgias (mid & lower back), loss of taste and smell as well as 2 days of weakness and constipation and SaO2 (measured at home) 89%.   Intubated 5/4/20 - severe ARDS  Interval Events: 4/29: Started Ceftriaxone (4/29-5/3) and Azithromycin (4/29-5/4) in ED for possible bacterial PNA    Interval Events:  4/30: RRT called for desaturation in 70s before receiving 1st dose of Remdesivir and Tocilizumab  5/4: RRT called for desaturation to 20s after drinking water and coughing, intubated by anesthesia. Sedated and started full dose Lovenox after D-dimer elevated to [address]. Transferred to ICU. On Meropenem, Vancomycin, and Caspofungin for empiric sepsis coverage.  5/7: Caspofungin switched to Fluconazole for growth of candida albicans in urine; Vancomycin d/c'ed for negative MRSA swab  5/10: Appeared to be improving - Started CPAP trials  5/12:  antibiotics discontinued   5/13: hematuria  5/14: ARDS, worsening R sided infiltrates. Poor lung compliance, hypercapnia  5/16: s/p bronch with minimal secretions  5/18:  proned overnight and switched from PC to VC once supine. Minimal pleural effusions on ultrasound. Sedated and paralyzed with Rocuronium.   5/19: Repeat CXR with ETT migrated up and worsening b/l opacities. Tube readjusted and proned again. Solumedrol 1 mg/kg x 3 (for worsening opacities)  5/20: Changed to PRVC on vent (FIO2 70%, , Rate 32, PEEP +6) - discontinue paralytic -   5/21: Off Rocuronium, Last day (#3) of IV Steroids   5/23: H/H down trended to 6.2/21.4. s/p 2 units PRBC with adequate response. s/p ENT eval for oral bleeding; small amount of blood suctioned from posterior pharynx. Unable to visualize source of bleed. Scope through ETT showed crusting at distal end, no active bleeding noted.  5/25: Continues to require a FiO2 of 90 % on lung protective ventilation, lung compliance remains poor with high plateau/ peak pressures despite optimization of vent parameters  5/26: Continues to require sedation to maintain synchrony with the ventilator. Received Tocilizumab, steroids and Remdesivir. solumedrol BID. PaO2/ FiO2 ratio improved (? related to initiation of steroids), FiO2 titrated down to 80 % on lung protective ventilation, lung compliance remains poor with high plateau/ peak pressures despite optimization of vent parameters. Now off IV pressors,  5/27:Bumex increased to 1mg TID as patient was still net positive. Fi02 increased to 80% this morning for sp02 83-85%  5/28: more acidotic overnight, pressure control settings increased to 48/5, fi02 increased to 100% then lowered to 90% this morning. Peak airway pressures remain high, 52mmHg this morning. Tv 250-260cc with pH 7.31, pc02 94  5/29:Febrile overnight on Meropenem and Vancomycin with Blood cultures from 5/24 with NGTD and Sputum culture from today with no specific growth.  Tylenol and cooling measures provided  5/30: more acidotic and hypoxic overnight - Vent settings changed to PRVC, , rate 36, FIO2 90%       OBJECTIVE:  ICU Vital Signs Last 24 Hrs  T(C): 37.4 (30 May 2020 08:00), Max: 38 (30 May 2020 03:00)  T(F): 99.3 (30 May 2020 08:00), Max: 100.4 (30 May 2020 03:00)  HR: 112 (30 May 2020 11:29) (94 - 115)  BP: --  BP(mean): --  ABP: 107/43 (30 May 2020 10:00) (102/42 - 127/51)  ABP(mean): 66 (30 May 2020 10:00) (65 - 79)  RR: 38 (30 May 2020 10:00) (32 - 38)  SpO2: 90% (30 May 2020 11:29) (88% - 98%)    Mode: AC/ CMV (Assist Control/ Continuous Mandatory Ventilation), RR (machine): 36, TV (machine): 260, FiO2: 90, PEEP: 5, ITime: 0.65, MAP: 21, PC: 48, PIP: 47    05-29 @ 07:01 - 05-30 @ 07:00  --------------------------------------------------------  IN: 3320.8 mL / OUT: 3425 mL / NET: -104.2 mL    05-30 @ 07:01 - 05-30 @ 11:45  --------------------------------------------------------  IN: 231.9 mL / OUT: 525 mL / NET: -293.1 mL      POCT Blood Glucose.: 152 mg/dL (30 May 2020 06:02)      HOSPITAL MEDICATIONS:  MEDICATIONS  (STANDING):  ALBUTerol    90 MICROgram(s) HFA Inhaler 2 Puff(s) Inhalation every 6 hours  BACItracin   Ointment 1 Application(s) Topical two times a day  buMETAnide Injectable 1 milliGRAM(s) IV Push every 8 hours  caspofungin IVPB      caspofungin IVPB 50 milliGRAM(s) IV Intermittent every 24 hours  chlorhexidine 0.12% Liquid 15 milliLiter(s) Oral Mucosa two times a day  chlorhexidine 0.12% Liquid 15 milliLiter(s) Oral Mucosa every 12 hours  chlorhexidine 4% Liquid 1 Application(s) Topical <User Schedule>  famotidine Injectable 20 milliGRAM(s) IV Push daily  fentaNYL   Infusion... 0.501 MICROgram(s)/kG/Hr (1.76 mL/Hr) IV Continuous <Continuous>  heparin  Infusion 1300 Unit(s)/Hr (11 mL/Hr) IV Continuous <Continuous>  insulin lispro (HumaLOG) corrective regimen sliding scale   SubCutaneous every 6 hours  ketamine Infusion. 0.25 mG/kG/Hr (1.76 mL/Hr) IV Continuous <Continuous>  meropenem  IVPB 1000 milliGRAM(s) IV Intermittent every 8 hours  midodrine 10 milliGRAM(s) Oral every 8 hours  polyethylene glycol 3350 17 Gram(s) Oral daily  propofol Infusion 24.893 MICROgram(s)/kG/Min (10.5 mL/Hr) IV Continuous <Continuous>  senna Syrup 10 milliLiter(s) Oral at bedtime  vancomycin  IVPB 1000 milliGRAM(s) IV Intermittent every 12 hours  vancomycin  IVPB        MEDICATIONS  (PRN):  acetaminophen    Suspension .. 650 milliGRAM(s) Oral every 6 hours PRN Temp greater or equal to 38C (100.4F)      LABS:                        7.5    21.41 )-----------( 398      ( 30 May 2020 05:37 )             25.0     Hgb Trend: 7.5<--, 7.4<--, 8.0<--, 8.4<--, 9.0<--  05-30    142  |  90<L>  |  30<H>  ----------------------------<  149<H>  3.9   |  >45<HH>  |  <0.30<L>    Ca    8.1<L>      30 May 2020 05:37  Phos  2.6     05-30  Mg     2.4     05-30    TPro  6.2  /  Alb  2.6<L>  /  TBili  0.5  /  DBili  x   /  AST  147<H>  /  ALT  193<H>  /  AlkPhos  395<H>  05-29    LIVER FUNCTIONS - ( 29 May 2020 14:51 )  Alb: 2.6 g/dL / Pro: 6.2 g/dL / ALK PHOS: 395 U/L / ALT: 193 U/L / AST: 147 U/L / GGT: x           Creatinine Trend: <0.30<--, 0.32<--, 0.31<--, <0.30<--, <0.30<--, <0.30<--  PT/INR - ( 30 May 2020 00:50 )   PT: 11.6 sec;   INR: 1.02 ratio         PTT - ( 30 May 2020 05:37 )  PTT:131.4 sec    Arterial Blood Gas:  05-30 @ 07:33  7.25/>104/70/54/92/24.4  ABG lactate: --  Arterial Blood Gas:  05-30 @ 00:49  7.31/104/79/51/96/22.0  ABG lactate: --  Arterial Blood Gas:  05-29 @ 20:16  7.32/104/66/52/94/23.1  ABG lactate: --  Arterial Blood Gas:  05-29 @ 14:32  7.29/>104/76/49/95/20.3  ABG lactate: --  Arterial Blood Gas:  05-29 @ 00:57  7.34/91/84/48/97/19.9  ABG lactate: --  Arterial Blood Gas:  05-28 @ 20:52  7.33/92/77/48/96/19.4  ABG lactate: --  Arterial Blood Gas:  05-28 @ 19:01  7.36/88/79/48/96/20.3  ABG lactate: 0.5      MICROBIOLOGY:   Culture - Sputum (collected 29 May 2020 00:43)  Source: .Sputum Sputum  Gram Stain (29 May 2020 02:51):    Few polymorphonuclear leukocytes per low power field    Rare Squamous epithelial cells per low power field    No organisms seen  Preliminary Report (29 May 2020 19:49):    Normal Respiratory Farzaneh present

## 2020-05-31 NOTE — PROGRESS NOTE ADULT - SUBJECTIVE AND OBJECTIVE BOX
INTERVAL HPI/OVERNIGHT EVENTS: No acute events.     SUBJECTIVE: Patient seen and examined at bedside. Sedated and trached.        OBJECTIVE:    VITAL SIGNS:  ICU Vital Signs Last 24 Hrs  T(C): 37.8 (31 May 2020 08:00), Max: 38.1 (30 May 2020 19:00)  T(F): 100 (31 May 2020 08:00), Max: 100.6 (30 May 2020 19:00)  HR: 95 (31 May 2020 08:52) (95 - 115)  BP: --  BP(mean): --  ABP: 114/44 (31 May 2020 08:00) (103/41 - 128/45)  ABP(mean): 67 (31 May 2020 08:00) (63 - 75)  RR: 33 (31 May 2020 08:00) (28 - 38)  SpO2: 96% (31 May 2020 08:52) (88% - 99%)    Mode: AC/ CMV (Assist Control/ Continuous Mandatory Ventilation), RR (machine): 32, FiO2: 90, PEEP: 7, ITime: 0.7, MAP: 22, PIP: 49    05-30 @ 07:01 - 05-31 @ 07:00  --------------------------------------------------------  IN: 2506.2 mL / OUT: 3130 mL / NET: -623.8 mL    05-31 @ 07:01 - 05-31 @ 08:55  --------------------------------------------------------  IN: 64.5 mL / OUT: 50 mL / NET: 14.5 mL      CAPILLARY BLOOD GLUCOSE      POCT Blood Glucose.: 156 mg/dL (31 May 2020 05:49)      PHYSICAL EXAM:    General: NAD  HEENT: NC/AT; PERRL, clear conjunctiva  Neck: supple  Respiratory: CTA b/l  Cardiovascular: +S1/S2; RRR  Abdomen: soft, NT/ND; +BS x4  Extremities: WWP, 2+ peripheral pulses b/l; no LE edema  Skin: normal color and turgor; no rash  Neurological: sedated     MEDICATIONS:  MEDICATIONS  (STANDING):  ALBUTerol    90 MICROgram(s) HFA Inhaler 2 Puff(s) Inhalation every 6 hours  BACItracin   Ointment 1 Application(s) Topical two times a day  buMETAnide Injectable 1 milliGRAM(s) IV Push every 8 hours  caspofungin IVPB      caspofungin IVPB 50 milliGRAM(s) IV Intermittent every 24 hours  chlorhexidine 0.12% Liquid 15 milliLiter(s) Oral Mucosa two times a day  chlorhexidine 0.12% Liquid 15 milliLiter(s) Oral Mucosa every 12 hours  chlorhexidine 4% Liquid 1 Application(s) Topical <User Schedule>  famotidine Injectable 20 milliGRAM(s) IV Push daily  fentaNYL   Infusion... 0.501 MICROgram(s)/kG/Hr (1.76 mL/Hr) IV Continuous <Continuous>  heparin  Infusion 1300 Unit(s)/Hr (11 mL/Hr) IV Continuous <Continuous>  insulin lispro (HumaLOG) corrective regimen sliding scale   SubCutaneous every 6 hours  ketamine Infusion. 0.25 mG/kG/Hr (1.76 mL/Hr) IV Continuous <Continuous>  meropenem  IVPB 1000 milliGRAM(s) IV Intermittent every 8 hours  midodrine 10 milliGRAM(s) Oral every 8 hours  norepinephrine Infusion 0.05 MICROgram(s)/kG/Min (3.3 mL/Hr) IV Continuous <Continuous>  polyethylene glycol 3350 17 Gram(s) Oral daily  propofol Infusion 24.893 MICROgram(s)/kG/Min (10.5 mL/Hr) IV Continuous <Continuous>  senna Syrup 10 milliLiter(s) Oral at bedtime  vancomycin  IVPB 1000 milliGRAM(s) IV Intermittent every 12 hours  vancomycin  IVPB        MEDICATIONS  (PRN):  acetaminophen    Suspension .. 650 milliGRAM(s) Oral every 6 hours PRN Temp greater or equal to 38C (100.4F)      ALLERGIES:  Allergies    No Known Allergies    Intolerances        LABS:                        7.2    20.65 )-----------( 445      ( 31 May 2020 02:00 )             25.3     05-31    143  |  88<L>  |  41<H>  ----------------------------<  192<H>  3.7   |  >45<HH>  |  0.34<L>    Ca    8.4      31 May 2020 02:00  Phos  2.6     05-31  Mg     2.6     05-31    TPro  6.2  /  Alb  2.6<L>  /  TBili  0.5  /  DBili  x   /  AST  147<H>  /  ALT  193<H>  /  AlkPhos  395<H>  05-29    PT/INR - ( 31 May 2020 02:00 )   PT: 10.8 sec;   INR: 0.94 ratio         PTT - ( 31 May 2020 02:00 )  PTT:25.8 sec      RADIOLOGY & ADDITIONAL TESTS: Reviewed. INTERVAL HPI/OVERNIGHT EVENTS: No acute events.     SUBJECTIVE: Patient seen and examined at bedside. Sedated and trached.      OBJECTIVE:    VITAL SIGNS:  ICU Vital Signs Last 24 Hrs  T(C): 37.8 (31 May 2020 08:00), Max: 38.1 (30 May 2020 19:00)  T(F): 100 (31 May 2020 08:00), Max: 100.6 (30 May 2020 19:00)  HR: 95 (31 May 2020 08:52) (95 - 115)  BP: --  BP(mean): --  ABP: 114/44 (31 May 2020 08:00) (103/41 - 128/45)  ABP(mean): 67 (31 May 2020 08:00) (63 - 75)  RR: 33 (31 May 2020 08:00) (28 - 38)  SpO2: 96% (31 May 2020 08:52) (88% - 99%)    Mode: AC/ CMV (Assist Control/ Continuous Mandatory Ventilation), RR (machine): 32, FiO2: 90, PEEP: 7, ITime: 0.7, MAP: 22, PIP: 49    05-30 @ 07:01 - 05-31 @ 07:00  --------------------------------------------------------  IN: 2506.2 mL / OUT: 3130 mL / NET: -623.8 mL    05-31 @ 07:01 - 05-31 @ 08:55  --------------------------------------------------------  IN: 64.5 mL / OUT: 50 mL / NET: 14.5 mL      CAPILLARY BLOOD GLUCOSE      POCT Blood Glucose.: 156 mg/dL (31 May 2020 05:49)      PHYSICAL EXAM:    General: NAD  HEENT: NC/AT; PERRL, clear conjunctiva  Neck: supple  Respiratory: CTA b/l  Cardiovascular: +S1/S2; RRR  Abdomen: soft, NT/ND; +BS x4  Extremities: WWP, 2+ peripheral pulses b/l; no LE edema  Skin: normal color and turgor; no rash  Neurological: sedated     MEDICATIONS:  MEDICATIONS  (STANDING):  ALBUTerol    90 MICROgram(s) HFA Inhaler 2 Puff(s) Inhalation every 6 hours  BACItracin   Ointment 1 Application(s) Topical two times a day  buMETAnide Injectable 1 milliGRAM(s) IV Push every 8 hours  caspofungin IVPB      caspofungin IVPB 50 milliGRAM(s) IV Intermittent every 24 hours  chlorhexidine 0.12% Liquid 15 milliLiter(s) Oral Mucosa two times a day  chlorhexidine 0.12% Liquid 15 milliLiter(s) Oral Mucosa every 12 hours  chlorhexidine 4% Liquid 1 Application(s) Topical <User Schedule>  famotidine Injectable 20 milliGRAM(s) IV Push daily  fentaNYL   Infusion... 0.501 MICROgram(s)/kG/Hr (1.76 mL/Hr) IV Continuous <Continuous>  heparin  Infusion 1300 Unit(s)/Hr (11 mL/Hr) IV Continuous <Continuous>  insulin lispro (HumaLOG) corrective regimen sliding scale   SubCutaneous every 6 hours  ketamine Infusion. 0.25 mG/kG/Hr (1.76 mL/Hr) IV Continuous <Continuous>  meropenem  IVPB 1000 milliGRAM(s) IV Intermittent every 8 hours  midodrine 10 milliGRAM(s) Oral every 8 hours  norepinephrine Infusion 0.05 MICROgram(s)/kG/Min (3.3 mL/Hr) IV Continuous <Continuous>  polyethylene glycol 3350 17 Gram(s) Oral daily  propofol Infusion 24.893 MICROgram(s)/kG/Min (10.5 mL/Hr) IV Continuous <Continuous>  senna Syrup 10 milliLiter(s) Oral at bedtime  vancomycin  IVPB 1000 milliGRAM(s) IV Intermittent every 12 hours  vancomycin  IVPB        MEDICATIONS  (PRN):  acetaminophen    Suspension .. 650 milliGRAM(s) Oral every 6 hours PRN Temp greater or equal to 38C (100.4F)      ALLERGIES:  Allergies    No Known Allergies    Intolerances        LABS:                        7.2    20.65 )-----------( 445      ( 31 May 2020 02:00 )             25.3     05-31    143  |  88<L>  |  41<H>  ----------------------------<  192<H>  3.7   |  >45<HH>  |  0.34<L>    Ca    8.4      31 May 2020 02:00  Phos  2.6     05-31  Mg     2.6     05-31    TPro  6.2  /  Alb  2.6<L>  /  TBili  0.5  /  DBili  x   /  AST  147<H>  /  ALT  193<H>  /  AlkPhos  395<H>  05-29    PT/INR - ( 31 May 2020 02:00 )   PT: 10.8 sec;   INR: 0.94 ratio         PTT - ( 31 May 2020 02:00 )  PTT:25.8 sec      RADIOLOGY & ADDITIONAL TESTS: Reviewed.

## 2020-05-31 NOTE — PROGRESS NOTE ADULT - ATTENDING COMMENTS
Needs appointment   1.Acute hypoxemic hypercapnic  respiratory failure from ARDS due to SARS-2 pneumonia , corona virus pneumonia. Pt back on PC ventilation with Pep 7 PI 42 vt 260-27-, RR32. Pt remains hypoxemic and hypercapnic on PC ventilation FIO2 90- 100%. Elevated serum bicarb to try to compensate for hypercapnia. .Primary metabolic alkalosis. Pt remain hypoxemic with high FIO2 requirements and  very poor lung compliance. Pt with very poor prognosis because of fibrotic lung . Family having difficulty facing pt's unfortunate condition.  Tracheostomy will NOT change outcome . Pt too unstable to consider tracheostomy at this point.

## 2020-05-31 NOTE — PROGRESS NOTE ADULT - ASSESSMENT
66 y/o F with no significant PMH presents 4/29 with hypoxic respiratory failure 2/2 COVID PNA, s/p Tocilizumab (4/30), s/p empiric Ceftriaxone/Azithro (4/30-5/4) for ?superimposed CAP. s/p 10 days of Remdesivir trial (4/30-5/9). Intubated 5/4 for worsened hypoxic respiratory failure from severe ARDS requiring proning on 5/6, 5/7, 5/8, 5/17 & 5/19.  Sedated and started Hep gtt for elevated D-dimer 59811. S/p course of  Meropenem, Vancomycin, and Caspofungin for empiric sepsis coverage (5/4- 5/12); Caspofungin switched to Fluconazole on 5/7 for growth of candida albicans in urine. s/p bronch on 5/16 for increased R sided opacities but she had minimal secretions, s/p 7 day course of Zosyn empirically. Pt was starting to improve respiratory wise, tolerating CPAP trials but on 5/17 she decompensated, had worsened hypoxic respiratory failure and required re-proning and paralyzed with Loi. Remains on high fi02 with poor lung compliance. Changed to PC ventilation on 5/22, now with bacillus (1/4 bottles) from 5/23, started on Vanc/Vaughn. Blood Cx from 5/24 with NGTD.    Anticipating Goals of Care Conversation with Family this afternoon    PLAN    # Neuro   - Sedated on Fentanyl, Ketamine, Propofol  - Attempt to wean sedation while maintaining vent synchrony  - Triglyceride 152 on 5/29; cont to trend Q72H while on Prop    # PULMONARY  - Intubated since 5/4/20 for Hypoxic/Hypercapnic Respiratory Failure 2/2 COVID PNA  - Continue aggressive chest PT/ pulm toilet  - Current vent settings: PRVC,  Rate 36, PEEP +5, Fio2 90%,continues to have poor lung compliance  - Completed Solumedrol (received 3 day course 5/19-5/21 then 5/25-5/28 but continues to worsen)  - s/p bronch 5/16 with minimal secretions and no pulmonary hemorrhage   - Required proning on 5/6, 5/7, 5/8, 5/17 and 5/19 for hypoxic failure - no further benefit noted  - ENT evaluation, nasal and oral exam clear of any sources of bleeding, mild old blood suctioned from mouth, no further ENT intervention at this time    # CV  - Levo started 5/30  - Midodrine 10mg q8hr    # GI  - LFTs uptrending; RUQ sono with no biliary ductal dilatation or acute findings  - Tube feeding at goal with Vital @55 cc/hr  - Rectal tube-350cc overnight    - Pepcid 20mg qd for GI ppx    # Renal  - c/w Bumex 1mg q8 to maintain net negative fluid balance   - No further diamox, worsened respiratory acidosis after diamox yesterday    # ID   - Bacillus species in BC 5/23 ?contaminate (1/4 bottles)   - Surveillance BC collected 5/24 NGTD; sputum cx 5/24 normal susanne and again on 5/29  - Remains too unstable to be transported for CT scan to further evaluate for GI source- desats when laid flat or positioned  - Day #7 of Vanc 1gm q12 (goal trough 10-15) & Meropenem 1gm Q8H (started 5/24).   - Vanco Trough on 5/30 - 15     - Central line taken out and A-line changed 5/27  - Child changed 5/23  - Completed 7-day course of Zosyn (on 5/21), s/p full course of meropenem (5/4-5/13), Fluconazole (5/8-5/13), Vancomycin (5/11-5/12) for empiric coverage  - s/p bronchoscopy 5/17, negative cultures    # COVID  + on 4/28, and on rpt on 5/13  - s/p 10 days of Remdesivir trial  (4/30-5/9), s/p Tocilizumab on 4/30  - Follow daily inflammatory markers    # LINES  - R radial A line 5/27    # Heme   - s/p 2 Units PRBC on 5/22 for blood loss anemia  - Empiric heparin gtt for elevated D-dimer-held 5/30 .7  - ENT consult appreciated- nasal and oral exam clear of any sources of bleeding, benefit of anticoagulation outweighs risks given elevated Ddimer and risk for thrombosis    #Endo  - Blood glucose controlled on Humalog Low SS    #Ethics  - Full code, family remains hopeful but informed that prognosis is poor. Lung compliance is worsening   - Palliative consult completed  - s/p conference call with MD aMst 5/30 [facility] disucssion. Family wants to keep patient full code. 66 y/o F with no significant PMH presents 4/29 with hypoxic respiratory failure 2/2 COVID PNA, s/p Tocilizumab (4/30), s/p empiric Ceftriaxone/Azithro (4/30-5/4) for ?superimposed CAP. s/p 10 days of Remdesivir trial (4/30-5/9). Intubated 5/4 for worsened hypoxic respiratory failure from severe ARDS requiring proning on 5/6, 5/7, 5/8, 5/17 & 5/19.  Sedated and started Hep gtt for elevated D-dimer 31787. S/p course of  Meropenem, Vancomycin, and Caspofungin for empiric sepsis coverage (5/4- 5/12); Caspofungin switched to Fluconazole on 5/7 for growth of candida albicans in urine. s/p bronch on 5/16 for increased R sided opacities but she had minimal secretions, s/p 7 day course of Zosyn empirically. Pt was starting to improve respiratory wise, tolerating CPAP trials but on 5/17 she decompensated, had worsened hypoxic respiratory failure and required re-proning and paralyzed with Loi. Remains on high fi02 with poor lung compliance. Changed to PC ventilation on 5/22, now with bacillus (1/4 bottles) from 5/23, started on Vanc/Vaughn. Blood Cx from 5/24 with NGTD.    Anticipating Goals of Care Conversation with Family this afternoon    PLAN    # Neuro   - Sedated on Fentanyl and Propofol  - Attempt to wean sedation while maintaining vent synchrony  - Triglyceride 152 on 5/29; cont to trend Q72H while on Prop    # PULMONARY  - Intubated since 5/4/20 for Hypoxic/Hypercapnic Respiratory Failure 2/2 COVID PNA  - Continue aggressive chest PT/ pulm toilet  - Current vent settings: Pressure Control settings RR 32, Total PIP 49, Peep 7, 90% Fio2, satting low 90's.   - Continues to have poor lung compliance  - Completed Solumedrol (received 3 day course 5/19-5/21 then 5/25-5/28 but continues to worsen)  - s/p bronch 5/16 with minimal secretions and no pulmonary hemorrhage   - ENT evaluation, nasal and oral exam clear of any sources of bleeding, mild old blood suctioned from mouth, no further ENT intervention at this time    # CV  - Levo started 5/30 for hypotension, off since 6am 5/31  - Midodrine 10mg q8hr    # GI  - LFTs uptrending; RUQ sono with no biliary ductal dilatation or acute findings  - Tube feeding at goal with Vital @55 cc/hr  - Rectal tube-350cc overnight    - Pepcid 20mg qd for GI ppx    # Renal  - c/w Bumex 1mg q8 to maintain net negative fluid balance     # ID   - Bacillus species in BC 5/23 ?contaminate (1/4 bottles)-Completed Vanco/Meropenum 7day course 5/30.   - Surveillance BC collected 5/24 NGTD; sputum cx 5/24 normal susanne and again on 5/29 normal susanne.  - Blood cx 5/29 prelim: NGTD. F/u Fungitell drawn 5/29.  - Day #7 of Vanc 1gm q12 (goal trough 10-15) & Meropenem 1gm Q8H (started 5/24).   - Vanco Trough on 5/30 - 15     - Central line taken out and A-line changed 5/27  - Child changed 5/23  - Completed 7-day course of Zosyn (on 5/21), s/p full course of meropenem (5/4-5/13), Fluconazole (5/8-5/13), Vancomycin (5/11-5/12) for empiric coverage  - s/p bronchoscopy 5/17, negative cultures    # COVID  + on 4/28, and on rpt on 5/13  - s/p 10 days of Remdesivir trial  (4/30-5/9), s/p Tocilizumab on 4/30  - Follow daily inflammatory markers.    # LINES  - R radial A line 5/27    # Heme   - s/p 2 Units PRBC on 5/22 for blood loss anemia  - Heparin drip for elevated DDimer discontinued 5/31  - Started lovenox 40mg daily    #Endo  - Blood glucose controlled on Humalog Low SS    #Ethics  - Full code, family remains hopeful but informed that prognosis is poor. Lung compliance is worsening   - Palliative consult completed  - s/p conference call with MD Mast 5/30 [facility] disucssion. See note.

## 2020-06-01 NOTE — PROGRESS NOTE ADULT - ATTENDING COMMENTS
Patient seen and examined with ICU team. COVID 19 infection with prolonged and complicated hospital course, ARDS, poor lung compliance, unable to ventilate despite multiple rounds of proning and steroids. s/p Remdesivir and Toce.     1. Neuro - sedated on Fentanyl drip  2. Pulm - Acute hypoxemic and hypercapnic respiratory failure from ARDS due to SARS-2 pneumonia, coronavirus pneumonia.  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 Remains on PC ventilation with Pep 7, PI 42 vt 260-270, RR 32. FiO2 %  Pt remains hypoxemic and hypercapnic  3. CVS - prior shock state, now off Levophed however remains on Midodrine 10 Q 8  4. ID - Febrile, leukocytosis. s/p multiple course of antibiotics. Blood cultures from 5/29 NGTD. Fungitell 5/29 pending. Remains on Caspofungin and Vanco. Too unstable for CT imaging. CXR from 5/28 with b/l patchy opacities. ID following   5. Heme - acute anemia, unclear etiology though no signs of active bleeding. S/p 1U prbcs with appropriate response. c/w Lovenox ppx  6. GI - tube feeds, Pepcid  7. Renal/FEN - monitor Ins/outs, urine output   Pt with very poor prognosis because of fibrotic lung . Family having difficulty facing pt's unfortunate condition.  Tracheostomy will NOT change outcome and regardless the patient remains too unstable with high Pressure and FiO2 requirements to consider tracheostomy at this point.

## 2020-06-01 NOTE — PROGRESS NOTE ADULT - SUBJECTIVE AND OBJECTIVE BOX
INTERVAL HPI/OVERNIGHT EVENTS: s/p 1U PRBC 6.8    SUBJECTIVE: Patient seen and examined at bedside.     CONSTITUTIONAL: No weakness, fevers or chills  EYES/ENT: No visual changes;  No vertigo or throat pain   NECK: No pain or stiffness  RESPIRATORY: No cough, wheezing, hemoptysis; No shortness of breath  CARDIOVASCULAR: No chest pain or palpitations  GASTROINTESTINAL: No abdominal or epigastric pain. No nausea, vomiting, or hematemesis; No diarrhea or constipation. No melena or hematochezia.  GENITOURINARY: No dysuria, frequency or hematuria  NEUROLOGICAL: No numbness or weakness  SKIN: No itching, rashes    OBJECTIVE:    VITAL SIGNS:  ICU Vital Signs Last 24 Hrs  T(C): 37.8 (01 Jun 2020 08:00), Max: 38.4 (31 May 2020 20:00)  T(F): 100 (01 Jun 2020 08:00), Max: 101.1 (31 May 2020 20:00)  HR: 101 (01 Jun 2020 12:00) (88 - 103)  BP: --  BP(mean): --  ABP: 133/52 (01 Jun 2020 10:00) (106/42 - 154/57)  ABP(mean): 80 (01 Jun 2020 10:00) (65 - 96)  RR: 35 (01 Jun 2020 10:00) (23 - 35)  SpO2: 95% (01 Jun 2020 12:00) (89% - 99%)    Mode: AC/ CMV (Assist Control/ Continuous Mandatory Ventilation), RR (machine): 32, TV (machine): 420, FiO2: 95, PEEP: 7, ITime: 0.7, MAP: 23, PC: 42, PIP: 48    05-31 @ 07:01  -  06-01 @ 07:00  --------------------------------------------------------  IN: 2369.8 mL / OUT: 1945 mL / NET: 424.8 mL      CAPILLARY BLOOD GLUCOSE      POCT Blood Glucose.: 196 mg/dL (01 Jun 2020 11:02)      PHYSICAL EXAM:    General: NAD  HEENT: NC/AT; PERRL, clear conjunctiva  Neck: supple  Respiratory: CTA b/l  Cardiovascular: +S1/S2; RRR  Abdomen: soft, NT/ND; +BS x4  Extremities: WWP, 2+ peripheral pulses b/l; no LE edema  Skin: normal color and turgor; no rash  Neurological:    MEDICATIONS:  MEDICATIONS  (STANDING):  ALBUTerol    90 MICROgram(s) HFA Inhaler 2 Puff(s) Inhalation every 6 hours  BACItracin   Ointment 1 Application(s) Topical two times a day  buMETAnide Injectable 1 milliGRAM(s) IV Push every 8 hours  caspofungin IVPB      caspofungin IVPB 50 milliGRAM(s) IV Intermittent every 24 hours  chlorhexidine 0.12% Liquid 15 milliLiter(s) Oral Mucosa every 12 hours  chlorhexidine 4% Liquid 1 Application(s) Topical <User Schedule>  enoxaparin Injectable 40 milliGRAM(s) SubCutaneous daily  famotidine Injectable 20 milliGRAM(s) IV Push daily  fentaNYL   Infusion... 1.5 MICROgram(s)/kG/Hr (5.27 mL/Hr) IV Continuous <Continuous>  insulin lispro (HumaLOG) corrective regimen sliding scale   SubCutaneous every 6 hours  midodrine 10 milliGRAM(s) Oral every 8 hours  norepinephrine Infusion 0.05 MICROgram(s)/kG/Min (3.3 mL/Hr) IV Continuous <Continuous>  polyethylene glycol 3350 17 Gram(s) Oral daily  propofol Infusion 24.893 MICROgram(s)/kG/Min (10.5 mL/Hr) IV Continuous <Continuous>  senna Syrup 10 milliLiter(s) Oral at bedtime  vancomycin  IVPB 1000 milliGRAM(s) IV Intermittent every 12 hours  vancomycin  IVPB        MEDICATIONS  (PRN):  acetaminophen    Suspension .. 650 milliGRAM(s) Oral every 6 hours PRN Temp greater or equal to 38C (100.4F)      ALLERGIES:  Allergies    No Known Allergies    Intolerances        LABS:                        8.6    18.58 )-----------( 407      ( 01 Jun 2020 09:22 )             29.7     06-01    148<H>  |  90<L>  |  48<H>  ----------------------------<  209<H>  4.0   |  >45<HH>  |  0.30<L>    Ca    8.7      01 Jun 2020 01:23  Phos  1.5     06-01  Mg     2.6     06-01    TPro  6.5  /  Alb  2.8<L>  /  TBili  0.4  /  DBili  x   /  AST  80<H>  /  ALT  132<H>  /  AlkPhos  311<H>  06-01    PT/INR - ( 31 May 2020 02:00 )   PT: 10.8 sec;   INR: 0.94 ratio         PTT - ( 31 May 2020 02:00 )  PTT:25.8 sec      RADIOLOGY & ADDITIONAL TESTS: Reviewed. INTERVAL HPI/OVERNIGHT EVENTS: s/p 1U PRBC 6.8/23.7     SUBJECTIVE: Patient seen and examined at bedside.     OBJECTIVE:    VITAL SIGNS:  ICU Vital Signs Last 24 Hrs  T(C): 37.8 (01 Jun 2020 08:00), Max: 38.4 (31 May 2020 20:00)  T(F): 100 (01 Jun 2020 08:00), Max: 101.1 (31 May 2020 20:00)  HR: 101 (01 Jun 2020 12:00) (88 - 103)  BP: --  BP(mean): --  ABP: 133/52 (01 Jun 2020 10:00) (106/42 - 154/57)  ABP(mean): 80 (01 Jun 2020 10:00) (65 - 96)  RR: 35 (01 Jun 2020 10:00) (23 - 35)  SpO2: 95% (01 Jun 2020 12:00) (89% - 99%)    Mode: AC/ CMV (Assist Control/ Continuous Mandatory Ventilation), RR (machine): 32, TV (machine): 420, FiO2: 95, PEEP: 7, ITime: 0.7, MAP: 23, PC: 42, PIP: 48    05-31 @ 07:01  -  06-01 @ 07:00  --------------------------------------------------------  IN: 2369.8 mL / OUT: 1945 mL / NET: 424.8 mL      CAPILLARY BLOOD GLUCOSE      POCT Blood Glucose.: 196 mg/dL (01 Jun 2020 11:02)      PHYSICAL EXAM:    General: NAD  HEENT: NC/AT; PERRL, clear conjunctiva  Neck: supple  Respiratory: CTA b/l  Cardiovascular: +S1/S2; RRR  Abdomen: soft, NT/ND; +BS x4  Extremities: WWP, 2+ peripheral pulses b/l; no LE edema  Skin: normal color and turgor; no rash  Neurological: sedated       MEDICATIONS:  MEDICATIONS  (STANDING):  ALBUTerol    90 MICROgram(s) HFA Inhaler 2 Puff(s) Inhalation every 6 hours  BACItracin   Ointment 1 Application(s) Topical two times a day  buMETAnide Injectable 1 milliGRAM(s) IV Push every 8 hours  caspofungin IVPB      caspofungin IVPB 50 milliGRAM(s) IV Intermittent every 24 hours  chlorhexidine 0.12% Liquid 15 milliLiter(s) Oral Mucosa every 12 hours  chlorhexidine 4% Liquid 1 Application(s) Topical <User Schedule>  enoxaparin Injectable 40 milliGRAM(s) SubCutaneous daily  famotidine Injectable 20 milliGRAM(s) IV Push daily  fentaNYL   Infusion... 1.5 MICROgram(s)/kG/Hr (5.27 mL/Hr) IV Continuous <Continuous>  insulin lispro (HumaLOG) corrective regimen sliding scale   SubCutaneous every 6 hours  midodrine 10 milliGRAM(s) Oral every 8 hours  norepinephrine Infusion 0.05 MICROgram(s)/kG/Min (3.3 mL/Hr) IV Continuous <Continuous>  polyethylene glycol 3350 17 Gram(s) Oral daily  propofol Infusion 24.893 MICROgram(s)/kG/Min (10.5 mL/Hr) IV Continuous <Continuous>  senna Syrup 10 milliLiter(s) Oral at bedtime  vancomycin  IVPB 1000 milliGRAM(s) IV Intermittent every 12 hours  vancomycin  IVPB        MEDICATIONS  (PRN):  acetaminophen    Suspension .. 650 milliGRAM(s) Oral every 6 hours PRN Temp greater or equal to 38C (100.4F)      ALLERGIES:  Allergies    No Known Allergies    Intolerances        LABS:                        8.6    18.58 )-----------( 407      ( 01 Jun 2020 09:22 )             29.7     06-01    148<H>  |  90<L>  |  48<H>  ----------------------------<  209<H>  4.0   |  >45<HH>  |  0.30<L>    Ca    8.7      01 Jun 2020 01:23  Phos  1.5     06-01  Mg     2.6     06-01    TPro  6.5  /  Alb  2.8<L>  /  TBili  0.4  /  DBili  x   /  AST  80<H>  /  ALT  132<H>  /  AlkPhos  311<H>  06-01    PT/INR - ( 31 May 2020 02:00 )   PT: 10.8 sec;   INR: 0.94 ratio         PTT - ( 31 May 2020 02:00 )  PTT:25.8 sec      RADIOLOGY & ADDITIONAL TESTS: Reviewed. INTERVAL HPI/OVERNIGHT EVENTS: s/p 1U PRBC 6.8/23.7     SUBJECTIVE: Patient seen and examined at bedside.     OBJECTIVE:    VITAL SIGNS:  ICU Vital Signs Last 24 Hrs  T(C): 37.8 (01 Jun 2020 08:00), Max: 38.4 (31 May 2020 20:00)  T(F): 100 (01 Jun 2020 08:00), Max: 101.1 (31 May 2020 20:00)  HR: 101 (01 Jun 2020 12:00) (88 - 103)  BP: --  BP(mean): --  ABP: 133/52 (01 Jun 2020 10:00) (106/42 - 154/57)  ABP(mean): 80 (01 Jun 2020 10:00) (65 - 96)  RR: 35 (01 Jun 2020 10:00) (23 - 35)  SpO2: 95% (01 Jun 2020 12:00) (89% - 99%)    Mode: AC/ CMV (Assist Control/ Continuous Mandatory Ventilation), RR (machine): 32, TV (machine): 420, FiO2: 95, PEEP: 7, ITime: 0.7, MAP: 23, PC: 42, PIP: 48    05-31 @ 07:01  -  06-01 @ 07:00  --------------------------------------------------------  IN: 2369.8 mL / OUT: 1945 mL / NET: 424.8 mL      CAPILLARY BLOOD GLUCOSE      POCT Blood Glucose.: 196 mg/dL (01 Jun 2020 11:02)      PHYSICAL EXAM:    General: NAD  HEENT: NC/AT; PERRL, clear conjunctiva  Neck: supple  Respiratory: CTA b/l  Cardiovascular: +S1/S2; RRR  Abdomen: soft, NT/ND; +BS x4  Extremities: WWP, 2+ peripheral pulses b/l; no LE edema  Skin: normal color and turgor; no rash  Neurological: sedated       MEDICATIONS:  MEDICATIONS  (STANDING):  ALBUTerol    90 MICROgram(s) HFA Inhaler 2 Puff(s) Inhalation every 6 hours  BACItracin   Ointment 1 Application(s) Topical two times a day  buMETAnide Injectable 1 milliGRAM(s) IV Push every 8 hours  caspofungin IVPB      caspofungin IVPB 50 milliGRAM(s) IV Intermittent every 24 hours  chlorhexidine 0.12% Liquid 15 milliLiter(s) Oral Mucosa every 12 hours  chlorhexidine 4% Liquid 1 Application(s) Topical <User Schedule>  enoxaparin Injectable 40 milliGRAM(s) SubCutaneous daily  famotidine Injectable 20 milliGRAM(s) IV Push daily  fentaNYL   Infusion... 1.5 MICROgram(s)/kG/Hr (5.27 mL/Hr) IV Continuous <Continuous>  insulin lispro (HumaLOG) corrective regimen sliding scale   SubCutaneous every 6 hours  midodrine 10 milliGRAM(s) Oral every 8 hours  norepinephrine Infusion 0.05 MICROgram(s)/kG/Min (3.3 mL/Hr) IV Continuous <Continuous>  polyethylene glycol 3350 17 Gram(s) Oral daily  propofol Infusion 24.893 MICROgram(s)/kG/Min (10.5 mL/Hr) IV Continuous <Continuous>  senna Syrup 10 milliLiter(s) Oral at bedtime  vancomycin  IVPB 1000 milliGRAM(s) IV Intermittent every 12 hours  vancomycin  IVPB        MEDICATIONS  (PRN):  acetaminophen    Suspension .. 650 milliGRAM(s) Oral every 6 hours PRN Temp greater or equal to 38C (100.4F)      ALLERGIES:  Allergies    No Known Allergies    Intolerances        LABS:                        8.6    18.58 )-----------( 407      ( 01 Jun 2020 09:22 )             29.7     06-01    148<H>  |  90<L>  |  48<H>  ----------------------------<  209<H>  4.0   |  >45<HH>  |  0.30<L>    Ca    8.7      01 Jun 2020 01:23  Phos  1.5     06-01  Mg     2.6     06-01    TPro  6.5  /  Alb  2.8<L>  /  TBili  0.4  /  DBili  x   /  AST  80<H>  /  ALT  132<H>  /  AlkPhos  311<H>  06-01    PT/INR - ( 31 May 2020 02:00 )   PT: 10.8 sec;   INR: 0.94 ratio         PTT - ( 31 May 2020 02:00 )  PTT:25.8 sec    Ferritin, Serum: 920 ng/mL (06.01.20 @ 04:06)  Procalcitonin, Serum: 0.17:   D-Dimer Assay, Quantitative: 1121            RADIOLOGY & ADDITIONAL TESTS: Reviewed.

## 2020-06-01 NOTE — PROGRESS NOTE ADULT - ASSESSMENT
65F reporting no PMH who presented last night with complaint of fever/chills (home T-max 102 F), night sweats, cough, myalgias (mid & lower back), loss of taste and smell since Friday 4/24 (still tastes sweet/salty), as well as 2 days of weakness and constipation, found to be COVID positive on 4/28/20.      Hospital course  4/29: Started Ceftriaxone (4/29-5/3) and Azithromycin (4/29-5/4) in ED for possible bacterial PNA  4/30: RRT called for desaturation in 70s before receiving 1st dose of Remdesivir and Tocilizumab  5/4: RRT called for desaturation to 20s after drinking water and coughing, intubated by anesthesia. Sedated and started full dose lovenox after D-dimer elevated to 33000. Transferred to ICU. On Meropenem, Vancomycin, and Caspofungin for emperic sepsis coverage.  5/7: Caspofungin switched to Fluconazole for growth of candida albicans in urine; Vancomycin d/c'ed for negative MRSA swab  5/10: Started CPAP trials  5/12 - abs d/scooby   5/13 hematuria  5/14 O2 requirements increased  5/15--> continues to do poorly  5/19- trials of prone and steroids  5/21- WBC down slightly   5/22- continues to have bloody secretions  5/23 - recurrent fever and positive BCx 1/2 with GPR in anaerobic bottle   5/24-new course of meropenem and vancomycin started  5/25 - new course of solumedrol started  5/27 - moved to [address] [place] [address]  5/28 - off steroids  sputum still bloody    5/20- fungal coverage added  Recultured    # Shock (persistent) / Fever (persistent) / Positive Blood Culture / Therapeutic Drug Montoring  - WBC persistently elevated, febrile  - S/p full course of meropenem (5/4-5/12), Fluconazole (5/8-5/13), Vancomycin (5/11-5/12), and zosyn    - Pt is s/p Tocilizumab which increases risk of infection and now solumedrol which does as well, including fungal infection  - If able would pursue CT Chest/Abdomen/Pelvis with IV contrast. Given blood from ETT would check CT Head/Neck as well  - ID of GPR - bacillus species- ? procurement contaminant vs line vs other?  - followup on repeat BCx. U/A without pyuria  - Continue Meropenem 1g IV Q8H  - Continue Vancomycin 1g IV Q12H. Check trough prior to fourth. Target trough 10-15  day # 7 days  of meropenem   . On day # 9  - Continue to monitor renal function and vancomycin troughs to avoid nephrotoxicity / otoxicity secondary to vancomycin  check sputum culture  check repeat blood cultures  would d/c the vancomycin    # Acute hypoxic respiratory failure  - Multifactorial, likely secondary to COVID-19 infection with viral PNA, with possible superimposed bacterial vs fungal vs aspiration PNA and/or PE   - r/o effusion. for POC ultrasound  - could patient have bled into lung?  - sputum without polys    # Elevated LFTs:   - continue to monitor   -  RUQ ultrasound - , ? repeat  - alk phos is climbing  - ? drug related  ? congestion    # Elevated CRP/Ferritin/D-dimer  - Could be COVID-related. CRP, ferritin, trending down.   - D-dimer remains elevated, although much lower than previous peak, could suggest PE or microthrombi. Pt now on heparin drip. Continued AC per primary team.     # elevated WBC   stable , still quite elevated but not climbing  check lactate  on cancidas  d/c vancomycin    # anemia  pt slowly drifted down but remains about the same last week or so  could patient have bled into lung, but not further dropping  Also could pt have traumatized oral palate- ENT input appreciated  CT Head/Neck as above  ENT input appreciated  consider reevaluation if sputum still bloody , ? trach???      # alk phos, lfts  ultrasound nondiagnostic  CAT scan if stable or repeat ultrasound    GOC  team to do head CT to r/o bleed - trying to figure out logistics   having trouble with CO2 exchange and oxygenating patient  on the other hand , pt is not on pressors and renal function is preserved  family wants aggressive measures      Respiratory failure  Please repeat CXR  consider trach

## 2020-06-01 NOTE — CHART NOTE - NSCHARTNOTEFT_GEN_A_CORE
Spoke with eldest daughter, Lopez. She and her family had a meeting with Dr. Mast on Saturday and at this time, they are not ready to withdraw care. States they remain hopeful that with prayers and miracles, patient will improve. However, realistic that if anything should change, she would like to speak with our team further. Spoke with JUANA Navarrete and aware that palliative will sign off case at this time and can be re-consulted at any time if patient's condition should worsen or family would like to speak with us.

## 2020-06-01 NOTE — PROGRESS NOTE ADULT - ASSESSMENT
64 y/o F with no significant PMH presents 4/29 with hypoxic respiratory failure 2/2 COVID PNA, s/p Tocilizumab (4/30), s/p empiric Ceftriaxone/Azithro (4/30-5/4) for ?superimposed CAP. s/p 10 days of Remdesivir trial (4/30-5/9). Intubated 5/4 for worsened hypoxic respiratory failure from severe ARDS requiring proning on 5/6, 5/7, 5/8, 5/17 & 5/19.  Sedated and started Hep gtt for elevated D-dimer 00749. S/p course of  Meropenem, Vancomycin, and Caspofungin for empiric sepsis coverage (5/4- 5/12); Caspofungin switched to Fluconazole on 5/7 for growth of candida albicans in urine. s/p bronch on 5/16 for increased R sided opacities but she had minimal secretions, s/p 7 day course of Zosyn empirically. Pt was starting to improve respiratory wise, tolerating CPAP trials but on 5/17 she decompensated, had worsened hypoxic respiratory failure and required re-proning and paralyzed with Loi. Remains on high fi02 with poor lung compliance. Changed to PC ventilation on 5/22, now with bacillus (1/4 bottles) from 5/23, started on Vanc/Vaughn. Blood Cx from 5/24 with NGTD.    Anticipating Goals of Care Conversation with Family this afternoon    PLAN    # Neuro   - Sedated on Fentanyl and Propofol  - Attempt to wean sedation while maintaining vent synchrony  - Triglyceride 152 on 5/29; cont to trend Q72H while on Prop    # PULMONARY  - Intubated since 5/4/20 for Hypoxic/Hypercapnic Respiratory Failure 2/2 COVID PNA  - Continue aggressive chest PT/ pulm toilet  - Current vent settings: Pressure Control settings RR 32, Total PIP 49, Peep 7, 90% Fio2, satting low 90's.   - Continues to have poor lung compliance  - Completed Solumedrol (received 3 day course 5/19-5/21 then 5/25-5/28 but continues to worsen)  - s/p bronch 5/16 with minimal secretions and no pulmonary hemorrhage   - ENT evaluation, nasal and oral exam clear of any sources of bleeding, mild old blood suctioned from mouth, no further ENT intervention at this time    # CV  - Levo started 5/30 for hypotension, off since 6am 5/31  - Midodrine 10mg q8hr    # GI  - LFTs uptrending; RUQ sono with no biliary ductal dilatation or acute findings  - Tube feeding at goal with Vital @55 cc/hr  - Rectal tube-350cc overnight    - Pepcid 20mg qd for GI ppx    # Renal  - c/w Bumex 1mg q8 to maintain net negative fluid balance     # ID   - Bacillus species in BC 5/23 ?contaminate (1/4 bottles)-Completed Vanco/Meropenum 7day course 5/30.   - Surveillance BC collected 5/24 NGTD; sputum cx 5/24 normal susanne and again on 5/29 normal susanne.  - Blood cx 5/29 prelim: NGTD. F/u Fungitell drawn 5/29.  - Day #7 of Vanc 1gm q12 (goal trough 10-15) & Meropenem 1gm Q8H (started 5/24).   - Vanco Trough on 5/30 - 15     - Central line taken out and A-line changed 5/27  - Child changed 5/23  - Completed 7-day course of Zosyn (on 5/21), s/p full course of meropenem (5/4-5/13), Fluconazole (5/8-5/13), Vancomycin (5/11-5/12) for empiric coverage  - s/p bronchoscopy 5/17, negative cultures    # COVID  + on 4/28, and on rpt on 5/13  - s/p 10 days of Remdesivir trial  (4/30-5/9), s/p Tocilizumab on 4/30  - Follow daily inflammatory markers.    # LINES  - R radial A line 5/27    # Heme   - s/p 2 Units PRBC on 5/22 for blood loss anemia  - Heparin drip for elevated DDimer discontinued 5/31  - Started lovenox 40mg daily    #Endo  - Blood glucose controlled on Humalog Low SS    #Ethics  - Full code, family remains hopeful but informed that prognosis is poor. Lung compliance is worsening   - Palliative consult completed  - s/p conference call with MD Mast 5/30 [facility] disucssion. See note. 66 y/o F with no significant PMH presents 4/29 with hypoxic respiratory failure 2/2 COVID PNA, s/p Tocilizumab (4/30), s/p empiric Ceftriaxone/Azithro (4/30-5/4) for ?superimposed CAP. s/p 10 days of Remdesivir trial (4/30-5/9). Intubated 5/4 for worsened hypoxic respiratory failure from severe ARDS requiring proning on 5/6, 5/7, 5/8, 5/17 & 5/19.  Sedated and started Hep gtt for elevated D-dimer 93199. S/p course of  Meropenem, Vancomycin, and Caspofungin for empiric sepsis coverage (5/4- 5/12); Caspofungin switched to Fluconazole on 5/7 for growth of candida albicans in urine. s/p bronch on 5/16 for increased R sided opacities but she had minimal secretions, s/p 7 day course of Zosyn empirically. Pt was starting to improve respiratory wise, tolerating CPAP trials but on 5/17 she decompensated, had worsened hypoxic respiratory failure and required re-proning and paralyzed with Loi. Remains on high fi02 with poor lung compliance. Changed to PC ventilation on 5/22, now with bacillus (1/4 bottles) from 5/23, started on Vanc/Vaughn. Blood Cx from 5/24 prelim with NGTD.        PLAN    # Neuro   - Sedated on Fentanyl, propofol OFF  - Attempt to wean sedation while maintaining vent synchrony  - Triglyceride 352 on 6/1; cont to trend Q72H while on Prop    # PULMONARY  - Intubated since 5/4/20 for Hypoxic/Hypercapnic Respiratory Failure 2/2 COVID PNA  - Continue aggressive chest PT/ pulm toilet  - Current vent settings: Pressure Control settings RR 32, Total PIP 49, Peep 7, 90% Fio2, satting low 90's.   - Continues to have poor lung compliance  - Completed Solumedrol (received 3 day course 5/19-5/21 then 5/25-5/28 but continues to worsen)  - s/p bronch 5/16 with minimal secretions and no pulmonary hemorrhage   - ENT evaluation, nasal and oral exam clear of any sources of bleeding, mild old blood suctioned from mouth, no further ENT intervention at this time    # CV  - Levo started 5/30 for hypotension, off since 6am 5/31  - Midodrine 10mg q8hr    # GI  - LFTs uptrending; RUQ sono with no biliary ductal dilatation or acute findings  - Tube feeding at goal with Vital @55 cc/hr  - Rectal tube-350cc overnight    - Pepcid 20mg qd for GI ppx    # Renal  - c/w Bumex 1mg q8 to maintain net negative fluid balance     # ID   - Bacillus species in BC 5/23 ?contaminate (1/4 bottles)-Completed Vanco/Meropenum 7day course 5/30.   - Surveillance BC collected 5/24 NGTD; sputum cx 5/24 normal susanne and again on 5/29 normal susanne.  - Blood cx 5/29 prelim: NGTD. F/u Fungitell drawn 5/29.  - Day #7 of Vanc 1gm q12 (goal trough 10-15) & Meropenem 1gm Q8H (started 5/24).   - Vanco Trough on 5/30 - 15     - Central line taken out and A-line changed 5/27  - Child changed 5/23  - Completed 7-day course of Zosyn (on 5/21), s/p full course of meropenem (5/4-5/13), Fluconazole (5/8-5/13), Vancomycin (5/11-5/12) for empiric coverage  - s/p bronchoscopy 5/17, negative cultures    # COVID  + on 4/28, and on rpt on 5/13  - s/p 10 days of Remdesivir trial  (4/30-5/9), s/p Tocilizumab on 4/30  - Follow daily inflammatory markers.    # LINES  - R radial A line 5/27    # Heme   - s/p 2 Units PRBC on 5/22 for blood loss anemia  - Heparin drip for elevated DDimer discontinued 5/31  - Started lovenox 40mg daily    #Endo  - Blood glucose controlled on Humalog Low SS    #Ethics  - Full code, family remains hopeful but informed that prognosis is poor. Lung compliance is worsening   - Palliative consult completed  - s/p conference call with MD Mast 5/30 [facility] disucssion. See note.    1.Acute hypoxemic hypercapnic  respiratory failure from ARDS due to SARS-2 pneumonia , corona virus pneumonia. Pt back on PC ventilation with Pep 7 PI 42 vt 260-27-, RR32. Pt remains hypoxemic and hypercapnic on PC ventilation FIO2 90- 100%. Elevated serum bicarb to try to compensate for hypercapnia. .Primary metabolic alkalosis. Pt remain hypoxemic with high FIO2 requirements and  very poor lung compliance. Pt with very poor prognosis because of fibrotic lung . Family having difficulty facing pt's unfortunate condition.  Tracheostomy will NOT change outcome . Pt too unstable to consider tracheostomy at this point. 66 y/o F with no significant PMH presents 4/29 with hypoxic respiratory failure 2/2 COVID PNA, s/p Tocilizumab (4/30), s/p empiric Ceftriaxone/Azithro (4/30-5/4) for ?superimposed CAP. s/p 10 days of Remdesivir trial (4/30-5/9). Intubated 5/4 for worsened hypoxic respiratory failure from severe ARDS requiring proning on 5/6, 5/7, 5/8, 5/17 & 5/19.  Sedated and started Hep gtt for elevated D-dimer 01670. S/p course of  Meropenem, Vancomycin, and Caspofungin for empiric sepsis coverage (5/4- 5/12); Caspofungin switched to Fluconazole on 5/7 for growth of candida albicans in urine. s/p bronch on 5/16 for increased R sided opacities but she had minimal secretions, s/p 7 day course of Zosyn empirically. Pt was starting to improve respiratory wise, tolerating CPAP trials but on 5/17 she decompensated, had worsened hypoxic respiratory failure and required re-proning and paralyzed with Loi. Remains on high fi02 with poor lung compliance. Changed to PC ventilation on 5/22, now with bacillus (1/4 bottles) from 5/23, started on Vanc/Vaughn. Blood Cx from 5/24 prelim with NGTD.        PLAN    # Neuro   - Sedated on Fentanyl, propofol OFF  - Attempt to wean sedation while maintaining vent synchrony  - Triglyceride 352 on 6/1; cont to trend Q72H while on Prop    # PULMONARY  - Intubated since 5/4/20 for Hypoxic/Hypercapnic Respiratory Failure 2/2 COVID PNA  - Continue aggressive chest PT/ pulm toilet  - Current vent settings: Pressure Control settings RR 32, Total PIP 49, Peep 7, 90% Fio2, satting low 90's.   - Continues to have poor lung compliance  - Completed Solumedrol (received 3 day course 5/19-5/21 then 5/25-5/28 but continues to worsen)  - s/p bronch 5/16 with minimal secretions and no pulmonary hemorrhage   - ENT evaluation, nasal and oral exam clear of any sources of bleeding, mild old blood suctioned from mouth, no further ENT intervention at this time    # CV  - Levo off  - Midodrine 10mg q8hr    # GI  - LFTs uptrending; RUQ sono with no biliary ductal dilatation or acute findings  - Tube feeding at goal with Vital @55 cc/hr  - Rectal tube-5/31 350cc  - Senna, Miralax   - Pepcid 20mg qd for GI ppx    # Renal  - c/w Bumex 1mg q8 to maintain net negative fluid balance.  approx +1L in last 25 hrs    # ID   - Bacillus species in BC 5/23 ?contaminate (1/4 bottles)-Completed Vanco/Meropenum 7day course 5/30.   - Surveillance BC collected 5/24 NGTD; sputum cx 5/24 normal susanne and again on 5/29 normal susanne.  - F/u Blood cx 5/29 prelim: NGTD. F/u Fungitell drawn 5/29.  - Central line taken out and A-line changed 5/27  - Child changed 5/23  - Completed 7-day course of Zosyn (on 5/21), s/p full course of meropenem (5/4-5/13), Fluconazole (5/8-5/13), Vancomycin (5/11-5/12) for empiric coverage  - s/p bronchoscopy 5/17, negative cultures    # COVID  + on 4/28, and on rpt on 5/13  - s/p 10 days of Remdesivir trial  (4/30-5/9), s/p Tocilizumab on 4/30  - Follow daily inflammatory markers    # LINES  - R radial A line 5/27    # Heme   - s/p 2 Units PRBC on 5/22 for blood loss anemia  - Lovenox 40mg daily, was on hep gtt for elevated ddimer    #Endo  - Blood glucose controlled on Humalog Low SS    #Ethics  - Full code, family remains hopeful but informed that prognosis is poor. Lung compliance is worsening   - Palliative consult completed  - s/p conference call with MD Mast 5/30 [facility] disucssion. Family not ready to make patient comfort care. 66 y/o F with no significant PMH presents 4/29 with hypoxic respiratory failure 2/2 COVID PNA, s/p Tocilizumab (4/30), s/p empiric Ceftriaxone/Azithro (4/30-5/4) for ?superimposed CAP. s/p 10 days of Remdesivir trial (4/30-5/9). Intubated 5/4 for worsened hypoxic respiratory failure from severe ARDS requiring proning on 5/6, 5/7, 5/8, 5/17 & 5/19.  Sedated and started Hep gtt for elevated D-dimer 45310. S/p course of  Meropenem, Vancomycin, and Caspofungin for empiric sepsis coverage (5/4- 5/12); Caspofungin switched to Fluconazole on 5/7 for growth of candida albicans in urine. s/p bronch on 5/16 for increased R sided opacities but she had minimal secretions, s/p 7 day course of Zosyn empirically. Pt was starting to improve respiratory wise, tolerating CPAP trials but on 5/17 she decompensated, had worsened hypoxic respiratory failure and required re-proning and paralyzed with Loi. Remains on high fi02 with poor lung compliance. Changed to PC ventilation on 5/22, now with bacillus (1/4 bottles) from 5/23, started on Vanc/Vaughn. Blood Cx from 5/24 prelim with NGTD.        PLAN    # Neuro   - Sedated on Fentanyl, propofol OFF  - Attempt to wean sedation while maintaining vent synchrony  - Triglyceride 352 on 6/1; cont to trend Q72H while on Prop    # PULMONARY  - Intubated since 5/4/20 for Hypoxic/Hypercapnic Respiratory Failure 2/2 COVID PNA  - Continue aggressive chest PT/ pulm toilet  - Current vent settings: Pressure Control settings RR 32, Total PIP 49, Peep 7, 90% Fio2, satting low 90's.   - Continues to have poor lung compliance  - Completed Solumedrol (received 3 day course 5/19-5/21 then 5/25-5/28 but continues to worsen)  - s/p bronch 5/16 with minimal secretions and no pulmonary hemorrhage   - ENT evaluation, nasal and oral exam clear of any sources of bleeding, mild old blood suctioned from mouth, no further ENT intervention at this time    # CV  - Levo off  - Midodrine 10mg q8hr    # GI  - LFTs uptrending; RUQ sono with no biliary ductal dilatation or acute findings  - Tube feeding at goal with Vital @55 cc/hr  - Rectal tube-5/31 350cc  - Senna, Miralax   - Pepcid 20mg qd for GI ppx    # Renal  - c/w Bumex 1mg q8 to maintain net negative fluid balance.  approx +1L in last 25 hrs    # ID   - Bacillus species in BC 5/23 ?contaminate (1/4 bottles)-Completed Vanco/Meropenum 7day course 5/30.   - Surveillance BC collected 5/24 NGTD; sputum cx 5/24 normal susanne and again on 5/29 normal susanne.  - F/u Blood cx 5/29 prelim: NGTD. F/u Fungitell drawn 5/29.  - Central line taken out and A-line changed 5/27  - Child changed 5/23  - Completed 7-day course of Zosyn (on 5/21), s/p full course of meropenem (5/4-5/13), Fluconazole (5/8-5/13), Vancomycin (5/11-5/12) for empiric coverage  - s/p bronchoscopy 5/17, negative cultures    # COVID  + on 4/28, and on rpt on 5/13  - s/p 10 days of Remdesivir trial  (4/30-5/9), s/p Tocilizumab on 4/30  - Follow daily inflammatory markers    # LINES  - R radial A line 5/27    # Heme   -  s/p 1 unit PRBC transfused today for h/h 6.8/23.7, last tx 2 Units PRBC on 5/22 for blood loss anemia  - post PRBC H/H 8.6/29.7  - Lovenox 40mg daily, hep gtt d/c for elevated ddimer    #Endo  - Blood glucose controlled on Humalog Low SS    #Ethics  - Full code, family remains hopeful but informed that prognosis is poor. Lung compliance is worsening   - Palliative consult completed  - s/p conference call with MD Mast 5/30 [facility] disucssion. Family not ready to make patient comfort care. 64 y/o F with no significant PMH presents 4/29 with hypoxic respiratory failure 2/2 COVID PNA, s/p Tocilizumab (4/30), s/p empiric Ceftriaxone/Azithro (4/30-5/4) for ?superimposed CAP. s/p 10 days of Remdesivir trial (4/30-5/9). Intubated 5/4 for worsened hypoxic respiratory failure from severe ARDS requiring proning on 5/6, 5/7, 5/8, 5/17 & 5/19.  Sedated and started Hep gtt for elevated D-dimer 60300. S/p course of  Meropenem, Vancomycin, and Caspofungin for empiric sepsis coverage (5/4- 5/12); Caspofungin switched to Fluconazole on 5/7 for growth of candida albicans in urine. s/p bronch on 5/16 for increased R sided opacities but she had minimal secretions, s/p 7 day course of Zosyn empirically. Pt was starting to improve respiratory wise, tolerating CPAP trials but on 5/17 she decompensated, had worsened hypoxic respiratory failure and required re-proning and paralyzed with Loi. Remains on high fi02 with poor lung compliance. Changed to PC ventilation on 5/22, now with bacillus (1/4 bottles) from 5/23, started on Vanc/Vaughn. Blood Cx from 5/24 prelim with NGTD.        PLAN    # Neuro   - Sedated on Fentanyl, propofol OFF  - Attempt to wean sedation while maintaining vent synchrony  - Triglyceride 352 on 6/1; cont to trend Q72H while on Prop    # PULMONARY  - Intubated since 5/4/20 for Hypoxic/Hypercapnic Respiratory Failure 2/2 COVID PNA  - Continue aggressive chest PT/ pulm toilet  - Current vent settings: Pressure Control settings RR 32, Total PIP 49, Peep 7, 90% Fio2, satting low 90's.   - Continues to have poor lung compliance  - Completed Solumedrol (received 3 day course 5/19-5/21 then 5/25-5/28 but continues to worsen)  - s/p bronch 5/16 with minimal secretions and no pulmonary hemorrhage   - ENT evaluation, nasal and oral exam clear of any sources of bleeding, mild old blood suctioned from mouth, no further ENT intervention at this time    # CV  - Levo off  - Midodrine 10mg q8hr    # GI  - LFTs uptrending; RUQ sono with no biliary ductal dilatation or acute findings  - Tube feeding at goal with Vital @55 cc/hr  - Rectal tube-5/31 350cc  - Senna, Miralax   - Pepcid 20mg qd for GI ppx    # Renal  - c/w Bumex 1mg q8 to maintain net negative fluid balance.  approx +1L in last 25 hrs    # ID   - Febrile Tmax 38.4 6/1. Completed vanc/vaughn 5/30 for bacillus species in BC 5/23. On Capsofungin.   - 5/29 Blood cx prelim: NGTD and Fungitell pending.  - If continues to be febrile, will send blood CX and UA    - Surveillance BC collected 5/24 NGTD; sputum cx 5/24 normal susanne and again on 5/29 normal susanne.  - Central line taken out and A-line changed 5/27  - Child changed 5/23  - Completed 7-day course of Zosyn (on 5/21), s/p full course of meropenem (5/4-5/13), Fluconazole (5/8-5/13), Vancomycin (5/11-5/12) for empiric coverage  - s/p bronchoscopy 5/17, negative cultures    # COVID  + on 4/28, and on rpt on 5/13  - s/p 10 days of Remdesivir trial  (4/30-5/9), s/p Tocilizumab on 4/30  - Follow daily inflammatory markers    # LINES  - R radial A line 5/27    # Heme   -  s/p 1 unit PRBC transfused today for h/h 6.8/23.7, last tx 2 Units PRBC on 5/22 for blood loss anemia  - post PRBC H/H 8.6/29.7  - Lovenox 40mg daily, hep gtt d/c for elevated ddimer    #Endo  - Blood glucose controlled on Humalog Low SS    #Ethics  - Full code, family remains hopeful but informed that prognosis is poor. Lung compliance is worsening   - Palliative consult completed  - s/p conference call with MD Mats 5/30 [facility] disucssion. Family not ready to make patient comfort care. 64 y/o F with no significant PMH presents 4/29 with hypoxic respiratory failure 2/2 COVID PNA, s/p Tocilizumab (4/30), s/p empiric Ceftriaxone/Azithro (4/30-5/4) for ?superimposed CAP. s/p 10 days of Remdesivir trial (4/30-5/9). Intubated 5/4 for worsened hypoxic respiratory failure from severe ARDS requiring proning on 5/6, 5/7, 5/8, 5/17 & 5/19.  Sedated and started Hep gtt for elevated D-dimer 07259. S/p course of  Meropenem, Vancomycin, and Caspofungin for empiric sepsis coverage (5/4- 5/12); Caspofungin switched to Fluconazole on 5/7 for growth of candida albicans in urine. s/p bronch on 5/16 for increased R sided opacities but she had minimal secretions, s/p 7 day course of Zosyn empirically. Pt was starting to improve respiratory wise, tolerating CPAP trials but on 5/17 she decompensated, had worsened hypoxic respiratory failure and required re-proning and paralyzed with Loi. Remains on high fi02 with poor lung compliance. Changed to PC ventilation on 5/22, now with bacillus (1/4 bottles) from 5/23, started on Vanc/Vaughn. Blood Cx from 5/24 prelim with NGTD.        PLAN    # Neuro   - Sedated on Fentanyl, propofol OFF  - Attempt to wean sedation while maintaining vent synchrony  - Triglyceride 352 on 6/1; cont to trend Q72H while on Prop    # PULMONARY  - Intubated since 5/4/20 for Hypoxic/Hypercapnic Respiratory Failure 2/2 COVID PNA  - Continue aggressive chest PT/ pulm toilet  - Current vent settings: Pressure Control settings RR 32, Total PIP 49, Peep 7, 90% Fio2, satting low 90's.   - Continues to have poor lung compliance  - Completed Solumedrol (received 3 day course 5/19-5/21 then 5/25-5/28 but continues to worsen)  - s/p bronch 5/16 with minimal secretions and no pulmonary hemorrhage   - ENT evaluation, nasal and oral exam clear of any sources of bleeding, mild old blood suctioned from mouth, no further ENT intervention at this time    # CV  - Levo off  - Midodrine 10mg q8hr    # GI  - LFTs uptrending; RUQ sono with no biliary ductal dilatation or acute findings  - Tube feeding at goal with Vital @55 cc/hr  - Rectal tube-5/31 350cc  - Senna, Miralax   - Pepcid 20mg qd for GI ppx    # Renal  - c/w Bumex 1mg q8 to maintain net negative fluid balance.  approx +1L in last 25 hrs    # ID   - Febrile Tmax 38.4 6/1. Completed vanc/vaughn 5/30 for bacillus species in BC 5/23. On Capsofungin.   - 5/29 Blood CX: NGTD and Fungitell pending.  - If continues to be febrile, will send blood CX and UA    - Surveillance BC collected 5/24 NGTD; sputum cx 5/24 normal susanne and again on 5/29 normal susanne.  - Central line taken out and A-line changed 5/27  - Child changed 5/23  - Completed 7-day course of Zosyn (on 5/21), s/p full course of meropenem (5/4-5/13), Fluconazole (5/8-5/13), Vancomycin (5/11-5/12) for empiric coverage  - s/p bronchoscopy 5/17, negative cultures    # COVID  + on 4/28, and on rpt on 5/13  - s/p 10 days of Remdesivir trial  (4/30-5/9), s/p Tocilizumab on 4/30  - Follow daily inflammatory markers    # LINES  - R radial A line 5/27    # Heme   -  s/p 1 unit PRBC transfused today for h/h 6.8/23.7, last tx 2 Units PRBC on 5/22 for blood loss anemia  - post PRBC H/H 8.6/29.7  - Lovenox 40mg daily, hep gtt d/c for elevated ddimer    #Endo  - Blood glucose controlled on Humalog Low SS    #Ethics  - Full code, family remains hopeful but informed that prognosis is poor. Lung compliance is worsening   - Palliative consult completed  - s/p conference call with MD Mast 5/30 [facility] disucssion. Family not ready to make patient comfort care. 64 y/o F with no significant PMH presents 4/29 with hypoxic respiratory failure 2/2 COVID PNA, s/p Tocilizumab (4/30), s/p empiric Ceftriaxone/Azithro (4/30-5/4) for ?superimposed CAP. s/p 10 days of Remdesivir trial (4/30-5/9). Intubated 5/4 for worsened hypoxic respiratory failure from severe ARDS requiring proning on 5/6, 5/7, 5/8, 5/17 & 5/19.  Sedated and started Hep gtt for elevated D-dimer 19791. S/p course of  Meropenem, Vancomycin, and Caspofungin for empiric sepsis coverage (5/4- 5/12); Caspofungin switched to Fluconazole on 5/7 for growth of candida albicans in urine. s/p bronch on 5/16 for increased R sided opacities but she had minimal secretions, s/p 7 day course of Zosyn empirically. Pt was starting to improve respiratory wise, tolerating CPAP trials but on 5/17 she decompensated, had worsened hypoxic respiratory failure and required re-proning and paralyzed with Loi. Remains on high fi02 with poor lung compliance. Changed to PC ventilation on 5/22, now with bacillus (1/4 bottles) from 5/23, started on Vanc/Vaughn. Blood Cx from 5/24 prelim with NGTD.        PLAN    # Neuro   - Sedated on Fentanyl, propofol OFF  - Attempt to wean sedation while maintaining vent synchrony  - Triglyceride 352 on 6/1; cont to trend Q72H while on Prop    # PULMONARY  - Intubated since 5/4/20 for Hypoxic/Hypercapnic Respiratory Failure 2/2 COVID PNA  - Continue aggressive chest PT/ pulm toilet  - Current vent settings: Pressure Control settings RR 32, Total PIP 49, Peep 7, 90% Fio2, satting low 90's.   - Continues to have poor lung compliance  - Completed Solumedrol (received 3 day course 5/19-5/21 then 5/25-5/28 but continues to worsen)  - s/p bronch 5/16 with minimal secretions and no pulmonary hemorrhage   - ENT evaluation, nasal and oral exam clear of any sources of bleeding, mild old blood suctioned from mouth, no further ENT intervention at this time    # CV  - Levo off  - Midodrine 10mg q8hr    # GI  - LFTs uptrending; RUQ sono with no biliary ductal dilatation or acute findings  - Tube feeding at goal with Vital @55 cc/hr  - Rectal tube-5/31 350cc  - Senna, Miralax   - Pepcid 20mg qd for GI ppx    # Renal  - c/w Bumex 1mg q8 to maintain net negative fluid balance.  approx +1L in last 25 hrs    # ID   - Febrile Tmax 38.4 6/1. Completed vanc/vaughn 5/30 for bacillus species in BC 5/23. On Capsofungin.   - 5/29 Blood CX: NGTD and Fungitell pending.  - If continues to be febrile, will send blood CX and UA    - Surveillance BC collected 5/24 NGTD; sputum cx 5/24 normal susanne and again on 5/29 normal susanne.  - Central line taken out and A-line changed 5/27  - Child changed 5/23  - Completed 7-day course of Zosyn (on 5/21), s/p full course of meropenem (5/4-5/13), Fluconazole (5/8-5/13), Vancomycin (5/11-5/12) for empiric coverage  - s/p bronchoscopy 5/17, negative cultures    # COVID  + on 4/28, and on rpt on 5/13  - s/p 10 days of Remdesivir trial  (4/30-5/9), s/p Tocilizumab on 4/30  - Follow daily inflammatory markers    # LINES  - R radial A line 5/27    # Heme   -  s/p 1 unit PRBC transfused today for h/h 6.8/23.7, last tx 2 Units PRBC on 5/22 for blood loss anemia  - post PRBC H/H 8.6/29.7  - Lovenox 40mg daily, hep gtt d/c for elevated ddimer    #Endo  - Blood glucose controlled on Humalog Low SS    #Ethics  - Full code, family remains hopeful but informed that prognosis is poor. Lung compliance is worsening   - Palliative consult completed, family not ready to withdraw care, palliative signed off, reconsult prn  - s/p conference call with MD Mast 5/30 [facility] disucssion. Family not ready to make patient comfort care.

## 2020-06-01 NOTE — PROGRESS NOTE ADULT - SUBJECTIVE AND OBJECTIVE BOX
Spoke with patient who requests that I call and inform the volunteer office at [facility] that patient had Flu vaccine and may fax proof if needed.  Spoke with Opal at the office to get fax number [phone]) immunization records faxed.   Patient is a 65y old  Female who presents with a chief complaint of COVID-19, Hypoxic respiratory failure (31 May 2020 08:53)    Being followed by ID for        Interval history:  pt remains intubated  pt with minimal secretions in ET tube and more thin, more saliva like, in mouth  rectal tube  No other acute events        PAST MEDICAL & SURGICAL HISTORY:  No pertinent past medical history  History of cholecystectomy  History of appendectomy    Allergies    No Known Allergies    Intolerances      Antimicrobials:    caspofungin IVPB      caspofungin IVPB 50 milliGRAM(s) IV Intermittent every 24 hours  vancomycin  IVPB 1000 milliGRAM(s) IV Intermittent every 12 hours  vancomycin  IVPB        MEDICATIONS  (STANDING):  ALBUTerol    90 MICROgram(s) HFA Inhaler 2 Puff(s) Inhalation every 6 hours  BACItracin   Ointment 1 Application(s) Topical two times a day  buMETAnide Injectable 1 milliGRAM(s) IV Push every 8 hours  caspofungin IVPB      caspofungin IVPB 50 milliGRAM(s) IV Intermittent every 24 hours  chlorhexidine 0.12% Liquid 15 milliLiter(s) Oral Mucosa every 12 hours  chlorhexidine 4% Liquid 1 Application(s) Topical <User Schedule>  enoxaparin Injectable 40 milliGRAM(s) SubCutaneous daily  famotidine Injectable 20 milliGRAM(s) IV Push daily  fentaNYL   Infusion... 1.5 MICROgram(s)/kG/Hr (5.27 mL/Hr) IV Continuous <Continuous>  insulin lispro (HumaLOG) corrective regimen sliding scale   SubCutaneous every 6 hours  midodrine 10 milliGRAM(s) Oral every 8 hours  norepinephrine Infusion 0.05 MICROgram(s)/kG/Min (3.3 mL/Hr) IV Continuous <Continuous>  polyethylene glycol 3350 17 Gram(s) Oral daily  propofol Infusion 24.893 MICROgram(s)/kG/Min (10.5 mL/Hr) IV Continuous <Continuous>  senna Syrup 10 milliLiter(s) Oral at bedtime  vancomycin  IVPB 1000 milliGRAM(s) IV Intermittent every 12 hours  vancomycin  IVPB        MEDICATIONS  (PRN):  acetaminophen    Suspension .. 650 milliGRAM(s) Oral every 6 hours PRN Temp greater or equal to 38C (100.4F)      Vital Signs Last 24 Hrs  T(C): 37.8 (06-01-20 @ 08:00), Max: 38.4 (05-31-20 @ 20:00)  T(F): 100 (06-01-20 @ 08:00), Max: 101.1 (05-31-20 @ 20:00)  HR: 101 (06-01-20 @ 12:00) (88 - 103)  BP: --  BP(mean): --  RR: 35 (06-01-20 @ 10:00) (23 - 35)  SpO2: 95% (06-01-20 @ 12:00) (89% - 99%)    Physical Exam:    Constitutional intubated ,sedated    HEENT PERRL    Chest  scattered coarse rhonchi    CVS RRR S1 S2 WNl     Abd softly distended     Ext  trace edema    IV site no erythema tenderness or discharge    Joints no swelling or LOM    Lab Data:                          8.6    18.58 )-----------( 407      ( 01 Jun 2020 09:22 )             29.7       06-01    148<H>  |  90<L>  |  48<H>  ----------------------------<  209<H>  4.0   |  >45<HH>  |  0.30<L>    Ca    8.7      01 Jun 2020 01:23  Phos  1.5     06-01  Mg     2.6     06-01    TPro  6.5  /  Alb  2.8<L>  /  TBili  0.4  /  DBili  x   /  AST  80<H>  /  ALT  132<H>  /  AlkPhos  311<H>  06-01      .Blood Blood-Peripheral  05-29-20   No growth to date.  --  --      .Blood Blood-Peripheral  05-29-20   No growth to date.  --  --      .Sputum Sputum  05-29-20   Normal Respiratory Farzaneh present  --    Few polymorphonuclear leukocytes per low power field  Rare Squamous epithelial cells per low power field  No organisms seen      WBC Count: 18.58 (06-01-20 @ 09:22)  WBC Count: 19.04 (06-01-20 @ 01:23)  WBC Count: 20.65 (05-31-20 @ 02:00)  WBC Count: 21.41 (05-30-20 @ 05:37)  WBC Count: 20.64 (05-30-20 @ 00:50)  WBC Count: 17.59 (05-29-20 @ 01:43)  WBC Count: 17.29 (05-28-20 @ 01:04)  WBC Count: 16.13 (05-26-20 @ 23:32)  WBC Count: 16.81 (05-26-20 @ 00:14)    D-Dimer Assay, Quantitative: 1121 ng/mL DDU (06-01)  D-Dimer Assay, Quantitative: 998 ng/mL DDU (05-30)  D-Dimer Assay, Quantitative: 1084 ng/mL DDU (05-29)  D-Dimer Assay, Quantitative: 1209 ng/mL DDU (05-28)    Ferritin, Serum: 920 ng/mL (06-01)  Ferritin, Serum: 1002 ng/mL (05-30)  Ferritin, Serum: 1326 ng/mL (05-29)  Ferritin, Serum: 950 ng/mL (05-28)    C-Reactive Protein, Serum: 8.36 ug/mL (06-01-20 @ 01:23)  C-Reactive Protein, Serum: 9.66 ug/mL (05-30-20 @ 05:37)  C-Reactive Protein, Serum: 6.31 mg/dL (05-29-20 @ 01:42)  C-Reactive Protein, Serum: 7.73 mg/dL (05-28-20 @ 01:04)      < from: Xray Chest 1 View- PORTABLE-Routine (05.28.20 @ 08:54) >  EXAM:  XR CHEST PORTABLE ROUTINE 1V                            PROCEDURE DATE:  05/28/2020            INTERPRETATION:  EXAMINATION: XR CHEST    CLINICAL INDICATION: hypoxic resp failure    TECHNIQUE: Frontal radiograph of the chest was obtained.    COMPARISON: 5.28.20.    FINDINGS:     Cardiac silhouette normal in size. Endotracheal tube tip mid trachea.  Enteric tube tip below diaphragm.  Bilateral patchy lung opacities.. No pleural effusion or pneumothorax.  Endotracheal tube tip mid trachea. Enteric tube tip below diaphragm.      IMPRESSION:   No change.    < end of copied text >    Vancomycin Level, Trough (05.30.20 @ 05:37)    Vancomycin Level, Trough: 15.0: Vancomycin trough levels should be rapidly reached and maintained at  15-20 ug/ml for life threatening MRSA  infections such as sepsis, endocarditis, osteomyelitis and pneumonia. A  first trough level should be drawn  before the 3rd or 4th dose.  Risk of renal toxicity is increased for levels >15 ug/ml, in patients on  other nephrotoxic drugs, who are  hemodynamically unstable, have unstable renal function, or are on  Vancomycin therapy for >14 days. Renal function with  creatinine levels should be monitored for those patients. ug/mL

## 2020-06-02 NOTE — PROGRESS NOTE ADULT - ASSESSMENT
66 y/o F with no significant PMH presents 4/29 with hypoxic respiratory failure 2/2 COVID PNA, s/p Tocilizumab (4/30), s/p empiric Ceftriaxone/Azithro (4/30-5/4) for ?superimposed CAP. s/p 10 days of Remdesivir trial (4/30-5/9). Intubated 5/4 for worsened hypoxic respiratory failure from severe ARDS requiring proning on 5/6, 5/7, 5/8, 5/17 & 5/19.  Sedated and started Hep gtt for elevated D-dimer 33736. S/p course of  Meropenem, Vancomycin, and Caspofungin for empiric sepsis coverage (5/4- 5/12); Caspofungin switched to Fluconazole on 5/7 for growth of candida albicans in urine. s/p bronch on 5/16 for increased R sided opacities but she had minimal secretions, s/p 7 day course of Zosyn empirically. Pt was starting to improve respiratory wise, tolerating CPAP trials but on 5/17 she decompensated, had worsened hypoxic respiratory failure and required re-proning and paralyzed with Loi. Remains on high fi02 with poor lung compliance. Changed to PC ventilation on 5/22, now with bacillus (1/4 bottles) from 5/23, started on Vanc/Vaughn. Blood Cx from 5/24 prelim with NGTD.      PLAN    # Neuro   - Sedated on Fentanyl gtt to maintain vent synchrony  - Off propofol due to elevated triglycerides, now downtrending    # PULMONARY  - Intubated since 5/4/20 for Hypoxic/Hypercapnic Respiratory Failure 2/2 COVID PNA  - Continue aggressive chest PT/ pulm toilet  - Current vent settings: Pressure Control settings 42/7, 32, 7, 95%  - Continues to have poor lung compliance with elevated peak/plateau pressures  - Completed Solumedrol (received 3 day course 5/19-5/21 then 5/25-5/28 but continues to worsen)  - s/p bronch 5/16 with minimal secretions and no pulmonary hemorrhage   - ENT evaluation, nasal and oral exam clear of any sources of bleeding, mild old blood suctioned from mouth, no further ENT intervention at this time    # CV  - Sustaining MAP >65 off pressors  - Midodrine 10mg q8hr    # GI  - LFTs downtrending; RUQ sono with no biliary ductal dilatation or acute findings  - Tube feeding at goal with Vital AF @55 cc/hr via OGT  - Rectal tube-5/31 350cc out   - Cont Senna, Miralax   - Pepcid 20mg qd for GI ppx    # Renal  - c/w Bumex 1mg q8 to maintain net negative fluid balance  - Will start FWF 250cc q6h for hypernatremia and stop metolazone in order to prevent him from getting more contracted. His BUN is also rising.  - Q12h BMP and Mag while being diuresed and supplement K/Mag PRN    # ID   - Completed 7-day course of Zosyn (on 5/21), s/p full course of meropenem (5/4-5/13), Fluconazole (5/8-5/13), Vancomycin (5/11-5/12) for empiric coverage  - s/p bronchoscopy 5/17, negative cultures  - Completed vanc/vaughn 5/30 for bacillus species in BC 5/23.  - 5/29 Blood CX: NGTD, sputum cx with normal respiratory susanne, and Fungitell pending.  - Afebrile since yesterday morning, Tmax 100.2 overnight.  - Will continue empiric caspofungin for now (5/29- ) and f/u with ID recs  - If continues to be febrile, will send blood CX and UA  - Central line taken out and A-line changed 5/27  - Child changed 5/23    # COVID+ on 4/28 and 5/13  - s/p 10 days of Remdesivir trial  (4/30-5/9), s/p Tocilizumab on 4/30    # Heme   - H/H currently stable, s/p 1 unit PRBC on 6/1  - Continue Lovenox 40 mg daily for DVT ppx    #Endo  - Blood glucose controlled on Humalog Low SS    # LINES  - Left radial A-line 5/27    #Ethics  - Full code, family remains hopeful but informed that prognosis is poor. Lung compliance is worsening   - Palliative consult completed, family not ready to withdraw care, palliative signed off, reconsult prn  - s/p conference call with MD Mast 5/30 [facility] disucssion. Family not ready to make patient comfort care.

## 2020-06-02 NOTE — PROGRESS NOTE ADULT - SUBJECTIVE AND OBJECTIVE BOX
Admit Date: 04-29-20  Length of Stay: 34d    Patient is a 65y old  Female who presents with a chief complaint of COVID-19, Hypoxic respiratory failure (01 Jun 2020 12:22)    INTERVAL HPI/OVERNIGHT EVENTS: No acute events overnight. Remained on ventilator, pressure control.     MEDICATIONS  (STANDING):  ALBUTerol    90 MICROgram(s) HFA Inhaler 2 Puff(s) Inhalation every 6 hours  BACItracin   Ointment 1 Application(s) Topical two times a day  buMETAnide Injectable 1 milliGRAM(s) IV Push every 8 hours  caspofungin IVPB      caspofungin IVPB 50 milliGRAM(s) IV Intermittent every 24 hours  chlorhexidine 0.12% Liquid 15 milliLiter(s) Oral Mucosa every 12 hours  chlorhexidine 4% Liquid 1 Application(s) Topical <User Schedule>  enoxaparin Injectable 40 milliGRAM(s) SubCutaneous daily  famotidine Injectable 20 milliGRAM(s) IV Push daily  fentaNYL   Infusion... 1.5 MICROgram(s)/kG/Hr (5.27 mL/Hr) IV Continuous <Continuous>  insulin lispro (HumaLOG) corrective regimen sliding scale   SubCutaneous every 6 hours  midodrine 10 milliGRAM(s) Oral every 8 hours  norepinephrine Infusion 0.05 MICROgram(s)/kG/Min (3.3 mL/Hr) IV Continuous <Continuous>  polyethylene glycol 3350 17 Gram(s) Oral daily  propofol Infusion 24.893 MICROgram(s)/kG/Min (10.5 mL/Hr) IV Continuous <Continuous>  senna Syrup 10 milliLiter(s) Oral at bedtime    MEDICATIONS  (PRN):  acetaminophen    Suspension .. 650 milliGRAM(s) Oral every 6 hours PRN Temp greater or equal to 38C (100.4F)    Allergies:  No Known Allergies    ICU Vital Signs Last 24 Hrs  T(C): 37.6, Max: 38.2 (06-01 @ 13:00)  HR: 100 (98 - 106)  ABP: 109/45 (100/45 - 132/50)  ABP(mean): 68 (64 - 79)  RR: 20 (16 - 36)  SpO2: 92% (92% - 98%)    Mode: Pressure Control  RR (machine): 32  FiO2: 95  PEEP: 7  ITime: 0.7  PC: 42  PIP: 49    I&O's Summary Last 24 Hrs    IN: 1997.2 mL / OUT: 2095 mL / NET: -97.8 mL      Physical Exam:    Interpretation of telemetry: SR/ST     General: NAD, intubated and sedated  Respiratory: Compliant with vent  CV: RRR  GI: Abdomen soft, nontender, normoactive BS, rectal tube  : Child, clear yellow urine   Neurological: Sedated    Lines:  Left radial camille (5/27)  PIV    Labs:  COVID-19 PCR: Detected (05-13-20 @ 15:41)    ( 02 Jun 2020 01:08 )               8.4    18.26 )--------( 381                  29.6     ( 02 Jun 2020 01:08)     153  |  92  |  61  ---------------------<  155  3.4  |  >45  |  0.38    Ca 8.4  Phos 2.7  Mg 2.8    ( 02 Jun 2020 01:08 )  TPro  6.4  /  Alb  2.7  /  TBili  0.6  /  DBili  x   /  AST  103  /  ALT  128  /  AlkPhos  282  ( 01 Jun 2020 01:23 )  TPro  6.5  /  Alb  2.8  /  TBili  0.4  /  DBili  x   /  AST  80  /  ALT  132  /  AlkPhos  311    PTT/PT/INR - ( 02 Jun 2020 01:08 )  PTT: 27.6 sec / PT: 11.0 sec / INR: 0.97 ratio    CAPILLARY BLOOD GLUCOSE  POCT Blood Glucose.: 158 mg/dL (02 Jun 2020 05:51)  POCT Blood Glucose.: 205 mg/dL (01 Jun 2020 23:16)    Triglycerides:  249 ( 02 Jun 2020 01:08 )    ABG - ( 02 Jun 2020 00:55 )  pH: 7.34  /  pCO2: >104  /  pO2: 76    / HCO3: 62    / Base Excess: SEE NOTE /  SaO2: 95        COVID related labs  D-Dimer: 1114 ( 02 Jun 2020 01:08 )  D-Dimer: 1121 ( 01 Jun 2020 01:23 )    Ferritin: 1018 ( 02 Jun 2020 03:51 )  Ferritin: 920 ( 01 Jun 2020 04:06 )    CRP: 4.59 ( 02 Jun 2020 01:08 )  CRP: 8.36 ( 01 Jun 2020 01:23 )    Procalcitonin: 0.19 ( 02 Jun 2020 01:08 )  Procalcitonin: 0.17 ( 01 Jun 2020 01:23 )    LDH: 806 ( 02 Jun 2020 01:08 )  LDH: 566 ( 30 May 2020 05:37 )    Lactate: 0.9 ( 29 May 2020 01:46 )  Lactate: 0.7 ( 26 May 2020 23:32 )

## 2020-06-02 NOTE — PROGRESS NOTE ADULT - NSHPATTENDINGPLANDISCUSS_GEN_ALL_CORE
4 issa team
8 ICU team
Dr Hunter
Kezia
MICU Attending
MICU team
MICU team
ICU team
MICU team
MICU team
medical team
Dr Ignacio
Dr Mast
Dr Mast
ICU team
MICU team
MICU team
NP
8ICU team
DR Ellis
Dr Ellis
Dr Mast
Dr Moeller
MICU team
Dr Dowling
ICU team
ICU team
8ICU team
medical team
MICU team
JUAN Coffman
Dr Ellis

## 2020-06-02 NOTE — PROGRESS NOTE ADULT - ASSESSMENT
65F reporting no PMH who presented last night with complaint of fever/chills (home T-max 102 F), night sweats, cough, myalgias (mid & lower back), loss of taste and smell since Friday 4/24 (still tastes sweet/salty), as well as 2 days of weakness and constipation, found to be COVID positive on 4/28/20.      Hospital course  4/29: Started Ceftriaxone (4/29-5/3) and Azithromycin (4/29-5/4) in ED for possible bacterial PNA  4/30: RRT called for desaturation in 70s before receiving 1st dose of Remdesivir and Tocilizumab  5/4: RRT called for desaturation to 20s after drinking water and coughing, intubated by anesthesia. Sedated and started full dose lovenox after D-dimer elevated to [id number]. Transferred to ICU. On Meropenem, Vancomycin, and Caspofungin for emperic sepsis coverage.  5/7: Caspofungin switched to Fluconazole for growth of candida albicans in urine; Vancomycin d/c'ed for negative MRSA swab  5/10: Started CPAP trials  5/12 - abs d/scooby   5/13 hematuria  5/14 O2 requirements increased  5/15--> continues to do poorly  5/19- trials of prone and steroids  5/21- WBC down slightly   5/22- continues to have bloody secretions  5/23 - recurrent fever and positive BCx 1/2 with GPR in anaerobic bottle   5/24-new course of meropenem and vancomycin started  5/25 - new course of solumedrol started  5/27 - moved to [address] [place] [address]  5/28 - off steroids  sputum still bloody    5/29- fungal coverage added  Recultured  6/1 - competed abs , remains on fungal treatment  Unstable to move for CT     respiratory failure/ fever/covid -19/elevated WBC    - WBC persistently elevated, febrile  - S/p full course of meropenem (5/4-5/12), Fluconazole (5/8-5/13), Vancomycin (5/11-5/12), and zosyn   ( 5/14-5/21 ) and now anothr course of vancomycin/meropenem ( 5/24-6/1)  - Pt is s/p Tocilizumab which increases risk of infection and now solumedrol which does as well, including fungal infection  - If able would pursue CT Chest/Abdomen/Pelvis with IV contrast. Given blood from ETT would check CT Head/Neck as well  - bacillus species- ? procurement contaminant vs line vs other?follow up cultures are negative  - followup on repeat BCx. U/A without pyuria      check repeat blood cultures  and urinalysis     #  hypoxic respiratory failure  ? fibrotic stage of ARDS  Remains difficult to oxygenate    - could patient have bled into lung?, secretions are no longer bloody  - sputum without polys  - team feels it is too risky to get a trach    # Elevated LFTs:   - continue to monitor   -  RUQ ultrasound - , ? repeat  - alk phos is climbing  - ? drug related  ? congestion    # Elevated CRP/Ferritin/D-dimer  - Could be COVID-related. CRP, ferritin, trending down.   - D-dimer remains elevated, although much lower than previous peak, could suggest PE or microthrombi. Pt now on lovenox Continued AC per primary team.     # elevated WBC   stable , still quite elevated but not climbing  check lactate  on Cancidas  check repeat cultures    # anemia  pt slowly drifted down but remains about the same last week or so  could patient have bled into lung, but not further dropping  Also could pt have traumatized oral palate- ENT input appreciated  CT Head/Neck as above  ENT input appreciated  consider reevaluation if sputum still bloody , ? trach???      # alk phos, lfts  ultrasound nondiagnostic  CAT scan if stable or repeat ultrasound    GOC  team to do head CT to r/o bleed - trying to figure out logistics   having trouble with CO2 exchange and oxygenating patient  on the other hand , pt is not on pressors and renal function is preserved  family wants aggressive measures      Respiratory failure   repeat CXR still with diffuse infiltrates  consider trach if able , for now team does not feel it is safe to do

## 2020-06-02 NOTE — PROGRESS NOTE ADULT - SUBJECTIVE AND OBJECTIVE BOX
Patient is a 65y old  Female who presents with a chief complaint of COVID-19, Hypoxic respiratory failure (02 Jun 2020 11:02)    Being followed by ID for        Interval history:  pt remains intuabated   BP labile at times  had some fever, currently afebrile  rectal tube   some oral secretions  No other acute events        PAST MEDICAL & SURGICAL HISTORY:  No pertinent past medical history  History of cholecystectomy  History of appendectomy    Allergies    No Known Allergies    Intolerances      Antimicrobials:    caspofungin IVPB      caspofungin IVPB 50 milliGRAM(s) IV Intermittent every 24 hours    MEDICATIONS  (STANDING):  ALBUTerol    90 MICROgram(s) HFA Inhaler 2 Puff(s) Inhalation every 6 hours  BACItracin   Ointment 1 Application(s) Topical two times a day  buMETAnide Injectable 1 milliGRAM(s) IV Push every 8 hours  caspofungin IVPB      caspofungin IVPB 50 milliGRAM(s) IV Intermittent every 24 hours  chlorhexidine 0.12% Liquid 15 milliLiter(s) Oral Mucosa every 12 hours  chlorhexidine 4% Liquid 1 Application(s) Topical <User Schedule>  enoxaparin Injectable 40 milliGRAM(s) SubCutaneous daily  famotidine Injectable 20 milliGRAM(s) IV Push daily  fentaNYL   Infusion... 1.5 MICROgram(s)/kG/Hr (5.27 mL/Hr) IV Continuous <Continuous>  insulin lispro (HumaLOG) corrective regimen sliding scale   SubCutaneous every 6 hours  midodrine 10 milliGRAM(s) Oral every 8 hours  polyethylene glycol 3350 17 Gram(s) Oral daily  senna Syrup 10 milliLiter(s) Oral at bedtime    MEDICATIONS  (PRN):  acetaminophen    Suspension .. 650 milliGRAM(s) Oral every 6 hours PRN Temp greater or equal to 38C (100.4F)      Vital Signs Last 24 Hrs  T(C): 36.9 (06-02-20 @ 12:00), Max: 37.9 (06-02-20 @ 00:00)  T(F): 98.4 (06-02-20 @ 12:00), Max: 100.2 (06-02-20 @ 00:00)  HR: 97 (06-02-20 @ 12:33) (97 - 106)  BP: --  BP(mean): --  RR: 25 (06-02-20 @ 12:00) (16 - 36)  SpO2: 96% (06-02-20 @ 12:33) (92% - 98%)    Physical Exam:    Constitutional intubated    Chest scattered rhonchi    CVS  S1 S2     Abd soft     Ext No cyanosis clubbing or edema    IV site no erythema tenderness or discharge    Joints no swelling or LOM      Lab Data:                          8.4    18.26 )-----------( 381      ( 02 Jun 2020 01:08 )             29.6       06-02    153<H>  |  92<L>  |  61<H>  ----------------------------<  155<H>  3.4<L>   |  >45<HH>  |  0.38<L>    Ca    8.4      02 Jun 2020 01:08  Phos  2.7     06-02  Mg     2.8     06-02    TPro  6.4  /  Alb  2.7<L>  /  TBili  0.6  /  DBili  x   /  AST  103<H>  /  ALT  128<H>  /  AlkPhos  282<H>  06-02          .Blood Blood-Peripheral  05-29-20   No growth to date.  --  --      .Blood Blood-Peripheral  05-29-20   No growth to date.  --  --      .Sputum Sputum  05-29-20   Normal Respiratory Farzaneh present  --    Few polymorphonuclear leukocytes per low power field  Rare Squamous epithelial cells per low power field  No organisms seen        WBC Count: 18.26 (06-02-20 @ 01:08)  WBC Count: 18.58 (06-01-20 @ 09:22)  WBC Count: 19.04 (06-01-20 @ 01:23)  WBC Count: 20.65 (05-31-20 @ 02:00)  WBC Count: 21.41 (05-30-20 @ 05:37)  WBC Count: 20.64 (05-30-20 @ 00:50)  WBC Count: 17.59 (05-29-20 @ 01:43)  WBC Count: 17.29 (05-28-20 @ 01:04)  WBC Count: 16.13 (05-26-20 @ 23:32)    D-Dimer Assay, Quantitative: 1114 ng/mL DDU (06-02)  D-Dimer Assay, Quantitative: 1121 ng/mL DDU (06-01)  D-Dimer Assay, Quantitative: 998 ng/mL DDU (05-30)  D-Dimer Assay, Quantitative: 1084 ng/mL DDU (05-29)    Ferritin, Serum: 1018 ng/mL (06-02)  Ferritin, Serum: 920 ng/mL (06-01)  Ferritin, Serum: 1002 ng/mL (05-30)  Ferritin, Serum: 1326 ng/mL (05-29)      C-Reactive Protein, Serum: 4.59 ug/mL (06-02-20 @ 01:08)  C-Reactive Protein, Serum: 8.36 ug/mL (06-01-20 @ 01:23)  C-Reactive Protein, Serum: 9.66 ug/mL (05-30-20 @ 05:37)  C-Reactive Protein, Serum: 6.31 mg/dL (05-29-20 @ 01:42)

## 2020-06-02 NOTE — PROGRESS NOTE ADULT - ATTENDING COMMENTS
Patient seen and examined with ICU team. COVID 19 infection with prolonged and complicated hospital course, ARDS, poor lung compliance, unable to ventilate despite multiple rounds of proning and steroids. s/p Remdesivir and Toce.     1. Neuro - sedated on Fentanyl drip  2. Pulm - Acute hypoxemic and hypercapnic respiratory failure from ARDS due to SARS-2 pneumonia.  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 Remains on PC ventilation with Pep 7, PI, 42 vt 260-270, RR 32. FiO2 %  Pt remains hypoxemic and hypercapnic despite multiple   3. CVS - prior shock state, remains on Midodrine 10 Q 8  4. ID - Febrile, leukocytosis. s/p multiple course of antibiotics. Blood cultures from 5/29 NGTD. Fungitell 5/29 pending. Remains on Caspofungin and Vanco. Too unstable for CT imaging. CXR from 5/28 with b/l patchy opacities. ID following.    5. Heme - acute anemia, unclear etiology though no signs of active bleeding. S/p 1U prbcs with appropriate response. c/w Lovenox ppx  6. GI - tube feeds, Pepcid  7. Renal/FEN - monitor Ins/outs, urine output   Pt with very poor prognosis because of fibrotic lung . Family having difficulty with this.  Tracheostomy will not change outcome and regardless the patient remains too unstable with high Pressure and FiO2 requirements to consider tracheostomy at this point.

## 2020-06-02 NOTE — PROGRESS NOTE ADULT - REASON FOR ADMISSION
COVID-19, Hypoxic respiratory failure
Hypoxic respiratory failure
COVID-19, Hypoxic respiratory failure

## 2020-06-03 NOTE — CHART NOTE - NSCHARTNOTEFT_GEN_A_CORE
Nutrition Follow Up Note   Patient seen for: nutrition follow up on COVID ICU     Source: medical record, communication with team. Unable to speak to pt due to current airborne isolation contact precautions related to COVID-19. Pt remains intubated.     Chart reviewed, events noted.  Pt remains intubated since 5/4/20 for Hypoxic/Hypercapnic Respiratory Failure 2/2 COVID PNA. "Pt with very poor prognosis because of fibrotic lung." "Tracheostomy will not change outcome." GOC discussion took place, family not ready to withdraw care    Diet Order: Diet, NPO with Tube Feed:   Tube Feeding Modality: Orogastric  Vital AF (VITALAFRTH)  Total Volume for 24 Hours (mL): 1320  Continuous  Starting Tube Feed Rate {mL per Hour}: 20  Increase Tube Feed Rate by (mL): 10     Every 4 hours  Until Goal Tube Feed Rate (mL per Hour): 55  Tube Feed Duration (in Hours): 24  Tube Feed Start Time: 13:00  Banatrol TF     Qty per Day:  2 (06-03-20 @ 02:18)    CURRENT EN ORDER PROVIDES: 1584 kcals, 99 gm protein (22.5kcal/kg and 1.4 g protein/kg based on dosing wt 70.3kg).    Nutrition Events:   - Pt changed to Nepro on 6/1, and changed back to vital AF on 6/1  - >95% of EN goal volume was met x 1 week  - Propofol: Off propofol due to elevated triglycerides, now downtrending  - Free water flushes 300cc q 4hours for hypernatremia    GI: Rectal tube output- 370cc (6/2); 350cc (5/31). Pt is ordered for miralax, senna, banatrol x2     Anthropometric Measurements:   Height (cm): 157.48 (04-28-20 @ 21:02)  Weight (kg): 70.3 (04-28-20 @ 21:02)  BMI (kg/m2): 28.3 (04-28-20 @ 21:02)    Medications: MEDICATIONS  (STANDING):  ALBUTerol    90 MICROgram(s) HFA Inhaler 2 Puff(s) Inhalation every 6 hours  BACItracin   Ointment 1 Application(s) Topical two times a day  buMETAnide Infusion 1 mG/Hr (5 mL/Hr) IV Continuous <Continuous>  caspofungin IVPB      caspofungin IVPB 50 milliGRAM(s) IV Intermittent every 24 hours  chlorhexidine 0.12% Liquid 15 milliLiter(s) Oral Mucosa every 12 hours  chlorhexidine 4% Liquid 1 Application(s) Topical <User Schedule>  enoxaparin Injectable 40 milliGRAM(s) SubCutaneous daily  famotidine Injectable 20 milliGRAM(s) IV Push daily  fentaNYL    Injectable 50 MICROGram(s) IV Push once  fentaNYL    Injectable 25 MICROGram(s) IV Push once  fentaNYL   Infusion... 1.5 MICROgram(s)/kG/Hr (5.27 mL/Hr) IV Continuous <Continuous>  insulin lispro (HumaLOG) corrective regimen sliding scale   SubCutaneous every 6 hours  ketamine Infusion. 0.25 mG/kG/Hr (1.76 mL/Hr) IV Continuous <Continuous>  lactated ringers Bolus 500 milliLiter(s) IV Bolus once  midodrine 10 milliGRAM(s) Oral every 8 hours  norepinephrine Infusion 0.05 MICROgram(s)/kG/Min (3.3 mL/Hr) IV Continuous <Continuous>  polyethylene glycol 3350 17 Gram(s) Oral daily  senna Syrup 10 milliLiter(s) Oral at bedtime  sodium bicarbonate  Injectable 50 milliEquivalent(s) IV Push every 5 minutes  sodium chloride 0.45%. 2000 milliLiter(s) (100 mL/Hr) IV Continuous <Continuous>  sodium chloride 0.45%. 250 milliLiter(s) (999 mL/Hr) IV Continuous <Continuous>  sodium chloride 0.45%. 250 milliLiter(s) (999 mL/Hr) IV Continuous <Continuous>  vasopressin Infusion 0.04 Unit(s)/Min (2.4 mL/Hr) IV Continuous <Continuous>    MEDICATIONS  (PRN):  acetaminophen    Suspension .. 650 milliGRAM(s) Oral every 6 hours PRN Temp greater or equal to 38C (100.4F)    Labs: 06-03 @ 00:44: Sodium 158<H>, Potassium 3.7, Calcium 8.5, Magnesium 2.7<H>, Phosphorus 2.6, BUN 54<H>, Creatinine 0.35<L>, Glucose 174<H>, Alk Phos 231<H>, ALT/SGPT 106<H>, AST/SGOT 71<H>, Albumin 2.6<L>, Prealbumin --, Total Bilirubin 0.4, Hemoglobin 8.4<L>, Hematocrit 30.0<L>, Ferritin --, C-Reactive Protein 4.27<H>, Creatine Kinase <<27>  06-02 @ 16:22: Sodium 155<H>, Potassium 4.2, Calcium 8.5, Magnesium 2.7<H>, Phosphorus --, BUN 57<H>, Creatinine 0.33<L>, Glucose 179<H>, Alk Phos --, ALT/SGPT --, AST/SGOT --, Albumin --, Prealbumin --, Total Bilirubin --, Hemoglobin --, Hematocrit --, Ferritin --, C-Reactive Protein --, Creatine Kinase <<27>      Triglycerides, Serum: 249 mg/dL (06-02-20 @ 01:08)  Triglycerides, Serum: 352 mg/dL (06-01-20 @ 01:23)  Triglycerides, Serum: 182 mg/dL (05-30-20 @ 05:37)  Triglycerides, Serum: 152 mg/dL (05-29-20 @ 01:42)  Triglycerides, Serum: 192 mg/dL (05-28-20 @ 11:05)  Triglycerides, Serum: 204 mg/dL (05-26-20 @ 00:14)  Triglycerides, Serum: 208 mg/dL (05-23-20 @ 01:55)  Triglycerides, Serum: 184 mg/dL (05-18-20 @ 17:27)  Triglycerides, Serum: 155 mg/dL (05-16-20 @ 00:27)  Triglycerides, Serum: 171 mg/dL (05-15-20 @ 00:47)  Triglycerides, Serum: 170 mg/dL (05-14-20 @ 00:19)  Triglycerides, Serum: 212 mg/dL (05-13-20 @ 00:40)  Triglycerides, Serum: 210 mg/dL (05-12-20 @ 00:11)  Triglycerides, Serum: 183 mg/dL (05-11-20 @ 00:33)    POCT Blood Glucose.: 142 mg/dL (06-03-20 @ 06:09)  POCT Blood Glucose.: 173 mg/dL (06-02-20 @ 23:22)  POCT Blood Glucose.: 188 mg/dL (06-02-20 @ 16:58)  POCT Blood Glucose.: 170 mg/dL (06-02-20 @ 11:12)    Skin: wounds on face, no pressure injuries noted.  Edema: 2+ generalized; 3+ L/R hand    Estimated Needs: based on dosing wt 70.3Kg, with consideration for intubation  Energy: 1017-2704 calories (20-25 kari/Kg)  Protein:  84-98 grams (1.2-1.4 Gm/Kg)    1) Inadequate Protein-Energy Intake: dx resolved, EN at goal rate, meeting >80% estimated needs  2) Moderate Malnutrition, care plan ongoing, addressed w/ EN    Recommended Interventions:   1. Continue Vital AF (1.2) @ 55 cc/hr x 24 hrs to provide 1584 kcals, 99 gm protein, 1070cc free water  -Meets 22 kcals/kg, 1.4 gm protein/kg dosing wt 70.3 kg.   2. Continue Banatrol as needed, consider dc miralax and senna if rectal tube output remains high    Monitoring and Evaluation:   Continue to monitor nutrition provision and tolerance, weights, labs, skin integrity.   RD remains available upon request and will follow up per protocol.    Mary Pizano RD, CDN Pager: [phone]

## 2020-06-03 NOTE — DISCHARGE NOTE FOR THE EXPIRED PATIENT - HOSPITAL COURSE
66 y/o F with no significant PMH presents 4/29 with hypoxic respiratory failure 2/2 COVID PNA, s/p Tocilizumab (4/30), s/p empiric Ceftriaxone/Azithro (4/30-5/4) for ?superimposed CAP. s/p 10 days of Remdesivir trial (4/30-5/9). Intubated 5/4 for worsened hypoxic respiratory failure from severe ARDS requiring proning on 5/6, 5/7, 5/8, 5/17 & 5/19.  Sedated and started Hep gtt for elevated D-dimer 90401. S/p course of  Meropenem, Vancomycin, and Caspofungin for empiric sepsis coverage (5/4- 5/12); Caspofungin switched to Fluconazole on 5/7 for growth of candida albicans in urine. s/p bronch on 5/16 for increased R sided opacities but she had minimal secretions, s/p 7 day course of Zosyn empirically. Pt was starting to improve respiratory wise, tolerating CPAP trials but on 5/17 she decompensated, had worsened hypoxic respiratory failure and required re-proning and paralyzed with Loi. Remains on high fi02 with poor lung compliance. Changed to PC ventilation on 5/22, now with bacillus (1/4 bottles) from 5/23, started on Vanc/Vaughn. Blood Cx from 5/24 prelim with NGTD. 64 y/o F with no significant PMH who presented on  with hypoxic respiratory failure 2/2 COVID PNA, s/p Tocilizumab (), s/p empiric Ceftriaxone/Azithro (-) for ?superimposed CAP. s/p 10 days of Remdesivir trial (-). Intubated  for worsened hypoxic respiratory failure from severe ARDS requiring proning on , , ,  & . S/p course of Meropenem, Vancomycin, and Caspofungin for empiric sepsis coverage (- ); Caspofungin switched to Fluconazole on  for growth of candida albicans in urine. s/p bronch on  for increased R sided opacities but she had minimal secretions, s/p 7 day course of Zosyn empirically. Pt was starting to improve respiratory wise, tolerating CPAP trials but on  she decompensated and had worsened hypoxic respiratory failure required re-proning and paralyzed with Loi. She continued to be critically ill on high FiO2 with poor lung compliance and severely elevated peak/plateau pressures, then this morning further decompensated with hypotension requiring the addition of 2 vasopressors along with worsening respiratory acidosis. Family called ( and 3 daughters) and notified of the patients critical condition. They were able to come and visit patient and patient  at 1020.

## 2020-06-04 LAB
CULTURE RESULTS: SIGNIFICANT CHANGE UP
SPECIMEN SOURCE: SIGNIFICANT CHANGE UP